# Patient Record
Sex: FEMALE | Race: WHITE | NOT HISPANIC OR LATINO | Employment: OTHER | URBAN - METROPOLITAN AREA
[De-identification: names, ages, dates, MRNs, and addresses within clinical notes are randomized per-mention and may not be internally consistent; named-entity substitution may affect disease eponyms.]

---

## 2017-03-13 ENCOUNTER — ALLSCRIPTS OFFICE VISIT (OUTPATIENT)
Dept: OTHER | Facility: OTHER | Age: 72
End: 2017-03-13

## 2017-03-29 ENCOUNTER — HOSPITAL ENCOUNTER (OUTPATIENT)
Dept: MAMMOGRAPHY | Facility: MEDICAL CENTER | Age: 72
Discharge: HOME | End: 2017-03-29
Payer: MEDICARE

## 2017-03-29 DIAGNOSIS — Z12.31 ENCOUNTER FOR SCREENING MAMMOGRAM FOR MALIGNANT NEOPLASM OF BREAST: ICD-10-CM

## 2017-03-29 PROCEDURE — G0202 SCR MAMMO BI INCL CAD: HCPCS

## 2017-03-29 PROCEDURE — 77063 BREAST TOMOSYNTHESIS BI: CPT

## 2017-04-05 ENCOUNTER — APPOINTMENT (OUTPATIENT)
Dept: LAB | Facility: MEDICAL CENTER | Age: 72
End: 2017-04-05
Payer: MEDICARE

## 2017-04-05 ENCOUNTER — TRANSCRIBE ORDERS (OUTPATIENT)
Dept: ADMINISTRATIVE | Facility: HOSPITAL | Age: 72
End: 2017-04-05

## 2017-04-05 ENCOUNTER — ALLSCRIPTS OFFICE VISIT (OUTPATIENT)
Dept: OTHER | Facility: OTHER | Age: 72
End: 2017-04-05

## 2017-04-05 DIAGNOSIS — E03.9 HYPOTHYROIDISM: ICD-10-CM

## 2017-04-05 DIAGNOSIS — E78.5 HYPERLIPIDEMIA: ICD-10-CM

## 2017-04-05 DIAGNOSIS — R93.5 ABNORMAL FINDINGS ON DIAGNOSTIC IMAGING OF OTHER ABDOMINAL REGIONS, INCLUDING RETROPERITONEUM: ICD-10-CM

## 2017-04-05 DIAGNOSIS — K21.9 GASTRO-ESOPHAGEAL REFLUX DISEASE WITHOUT ESOPHAGITIS: ICD-10-CM

## 2017-04-05 DIAGNOSIS — R73.01 IMPAIRED FASTING GLUCOSE: ICD-10-CM

## 2017-04-05 DIAGNOSIS — I10 ESSENTIAL (PRIMARY) HYPERTENSION: ICD-10-CM

## 2017-04-05 LAB
ALBUMIN SERPL BCP-MCNC: 3.4 G/DL (ref 3.5–5)
ALP SERPL-CCNC: 86 U/L (ref 46–116)
ALT SERPL W P-5'-P-CCNC: 49 U/L (ref 12–78)
ANION GAP SERPL CALCULATED.3IONS-SCNC: 9 MMOL/L (ref 4–13)
AST SERPL W P-5'-P-CCNC: 42 U/L (ref 5–45)
BASOPHILS # BLD AUTO: 0.04 THOUSANDS/ΜL (ref 0–0.1)
BASOPHILS NFR BLD AUTO: 1 % (ref 0–1)
BILIRUB SERPL-MCNC: 0.99 MG/DL (ref 0.2–1)
BUN SERPL-MCNC: 10 MG/DL (ref 5–25)
CALCIUM SERPL-MCNC: 9.3 MG/DL (ref 8.3–10.1)
CHLORIDE SERPL-SCNC: 94 MMOL/L (ref 100–108)
CHOLEST SERPL-MCNC: 278 MG/DL (ref 50–200)
CO2 SERPL-SCNC: 28 MMOL/L (ref 21–32)
CREAT SERPL-MCNC: 0.83 MG/DL (ref 0.6–1.3)
CREAT UR-MCNC: 123 MG/DL
EOSINOPHIL # BLD AUTO: 0.08 THOUSAND/ΜL (ref 0–0.61)
EOSINOPHIL NFR BLD AUTO: 2 % (ref 0–6)
ERYTHROCYTE [DISTWIDTH] IN BLOOD BY AUTOMATED COUNT: 12 % (ref 11.6–15.1)
EST. AVERAGE GLUCOSE BLD GHB EST-MCNC: 137 MG/DL
GFR SERPL CREATININE-BSD FRML MDRD: >60 ML/MIN/1.73SQ M
GLUCOSE P FAST SERPL-MCNC: 159 MG/DL (ref 65–99)
HBA1C MFR BLD: 6.4 % (ref 4.2–6.3)
HCT VFR BLD AUTO: 42.8 % (ref 34.8–46.1)
HDLC SERPL-MCNC: 67 MG/DL (ref 40–60)
HGB BLD-MCNC: 15.1 G/DL (ref 11.5–15.4)
LDLC SERPL CALC-MCNC: 175 MG/DL (ref 0–100)
LYMPHOCYTES # BLD AUTO: 1.64 THOUSANDS/ΜL (ref 0.6–4.47)
LYMPHOCYTES NFR BLD AUTO: 33 % (ref 14–44)
MCH RBC QN AUTO: 34.6 PG (ref 26.8–34.3)
MCHC RBC AUTO-ENTMCNC: 35.3 G/DL (ref 31.4–37.4)
MCV RBC AUTO: 98 FL (ref 82–98)
MICROALBUMIN UR-MCNC: 7 MG/L (ref 0–20)
MICROALBUMIN/CREAT 24H UR: 6 MG/G CREATININE (ref 0–30)
MONOCYTES # BLD AUTO: 0.62 THOUSAND/ΜL (ref 0.17–1.22)
MONOCYTES NFR BLD AUTO: 12 % (ref 4–12)
NEUTROPHILS # BLD AUTO: 2.66 THOUSANDS/ΜL (ref 1.85–7.62)
NEUTS SEG NFR BLD AUTO: 52 % (ref 43–75)
NRBC BLD AUTO-RTO: 0 /100 WBCS
PLATELET # BLD AUTO: 193 THOUSANDS/UL (ref 149–390)
PMV BLD AUTO: 9.9 FL (ref 8.9–12.7)
POTASSIUM SERPL-SCNC: 3.3 MMOL/L (ref 3.5–5.3)
PROT SERPL-MCNC: 6.6 G/DL (ref 6.4–8.2)
RBC # BLD AUTO: 4.37 MILLION/UL (ref 3.81–5.12)
SODIUM SERPL-SCNC: 131 MMOL/L (ref 136–145)
T4 FREE SERPL-MCNC: 1.31 NG/DL (ref 0.76–1.46)
TRIGL SERPL-MCNC: 180 MG/DL
TSH SERPL DL<=0.05 MIU/L-ACNC: 4.34 UIU/ML (ref 0.36–3.74)
WBC # BLD AUTO: 5.05 THOUSAND/UL (ref 4.31–10.16)

## 2017-04-05 PROCEDURE — 80061 LIPID PANEL: CPT

## 2017-04-05 PROCEDURE — 82570 ASSAY OF URINE CREATININE: CPT

## 2017-04-05 PROCEDURE — 83036 HEMOGLOBIN GLYCOSYLATED A1C: CPT

## 2017-04-05 PROCEDURE — 82043 UR ALBUMIN QUANTITATIVE: CPT

## 2017-04-05 PROCEDURE — 36415 COLL VENOUS BLD VENIPUNCTURE: CPT

## 2017-04-05 PROCEDURE — 84439 ASSAY OF FREE THYROXINE: CPT

## 2017-04-05 PROCEDURE — 84443 ASSAY THYROID STIM HORMONE: CPT

## 2017-04-05 PROCEDURE — 80053 COMPREHEN METABOLIC PANEL: CPT

## 2017-04-05 PROCEDURE — 85025 COMPLETE CBC W/AUTO DIFF WBC: CPT

## 2017-04-12 ENCOUNTER — ALLSCRIPTS OFFICE VISIT (OUTPATIENT)
Dept: OTHER | Facility: OTHER | Age: 72
End: 2017-04-12

## 2017-04-12 DIAGNOSIS — R91.8 OTHER NONSPECIFIC ABNORMAL FINDING OF LUNG FIELD: ICD-10-CM

## 2017-04-12 DIAGNOSIS — R93.5 ABNORMAL FINDINGS ON DIAGNOSTIC IMAGING OF OTHER ABDOMINAL REGIONS, INCLUDING RETROPERITONEUM: ICD-10-CM

## 2017-04-12 DIAGNOSIS — E03.9 HYPOTHYROIDISM: ICD-10-CM

## 2017-04-12 DIAGNOSIS — E55.9 VITAMIN D DEFICIENCY: ICD-10-CM

## 2017-04-12 DIAGNOSIS — C50.911 MALIGNANT NEOPLASM OF RIGHT FEMALE BREAST (HCC): ICD-10-CM

## 2017-04-12 DIAGNOSIS — I71.2 THORACIC AORTIC ANEURYSM WITHOUT RUPTURE (HCC): ICD-10-CM

## 2017-04-18 ENCOUNTER — HOSPITAL ENCOUNTER (OUTPATIENT)
Dept: CT IMAGING | Facility: HOSPITAL | Age: 72
Discharge: HOME | End: 2017-04-18
Payer: MEDICARE

## 2017-04-18 DIAGNOSIS — R91.8 OTHER NONSPECIFIC ABNORMAL FINDING OF LUNG FIELD: ICD-10-CM

## 2017-04-18 DIAGNOSIS — R93.5 ABNORMAL FINDINGS ON DIAGNOSTIC IMAGING OF OTHER ABDOMINAL REGIONS, INCLUDING RETROPERITONEUM: ICD-10-CM

## 2017-04-18 PROCEDURE — 71260 CT THORAX DX C+: CPT

## 2017-04-18 RX ADMIN — IOHEXOL 85 ML: 350 INJECTION, SOLUTION INTRAVENOUS at 18:11

## 2017-05-09 ENCOUNTER — APPOINTMENT (EMERGENCY)
Dept: CT IMAGING | Facility: HOSPITAL | Age: 72
DRG: 394 | End: 2017-05-09
Payer: MEDICARE

## 2017-05-09 ENCOUNTER — HOSPITAL ENCOUNTER (EMERGENCY)
Facility: HOSPITAL | Age: 72
Discharge: HOME | DRG: 394 | End: 2017-05-09
Attending: EMERGENCY MEDICINE | Admitting: EMERGENCY MEDICINE
Payer: MEDICARE

## 2017-05-09 VITALS
BODY MASS INDEX: 33.66 KG/M2 | TEMPERATURE: 98 F | RESPIRATION RATE: 18 BRPM | DIASTOLIC BLOOD PRESSURE: 68 MMHG | WEIGHT: 190 LBS | SYSTOLIC BLOOD PRESSURE: 145 MMHG | OXYGEN SATURATION: 98 % | HEART RATE: 90 BPM

## 2017-05-09 DIAGNOSIS — R10.9 ACUTE FLANK PAIN: ICD-10-CM

## 2017-05-09 DIAGNOSIS — E86.0 MODERATE DEHYDRATION: ICD-10-CM

## 2017-05-09 DIAGNOSIS — R10.9 ACUTE ABDOMINAL PAIN: Primary | ICD-10-CM

## 2017-05-09 LAB
ALBUMIN SERPL BCP-MCNC: 3.8 G/DL (ref 3.5–5)
ALP SERPL-CCNC: 110 U/L (ref 46–116)
ALT SERPL W P-5'-P-CCNC: 49 U/L (ref 12–78)
ANION GAP BLD CALC-SCNC: 18 MMOL/L (ref 4–13)
ANION GAP SERPL CALCULATED.3IONS-SCNC: 9 MMOL/L (ref 4–13)
AST SERPL W P-5'-P-CCNC: 58 U/L (ref 5–45)
BASOPHILS # BLD AUTO: 0.02 THOUSANDS/ΜL (ref 0–0.1)
BASOPHILS NFR BLD AUTO: 0 % (ref 0–1)
BILIRUB SERPL-MCNC: 0.87 MG/DL (ref 0.2–1)
BILIRUB UR QL STRIP: NEGATIVE
BUN BLD-MCNC: 12 MG/DL (ref 5–25)
BUN SERPL-MCNC: 12 MG/DL (ref 5–25)
CA-I BLD-SCNC: 1 MMOL/L (ref 1.12–1.32)
CALCIUM SERPL-MCNC: 9.7 MG/DL (ref 8.3–10.1)
CHLORIDE BLD-SCNC: 90 MMOL/L (ref 100–108)
CHLORIDE SERPL-SCNC: 90 MMOL/L (ref 100–108)
CLARITY UR: CLEAR
CLARITY, POC: CLEAR
CO2 SERPL-SCNC: 30 MMOL/L (ref 21–32)
COLOR UR: YELLOW
COLOR, POC: YELLOW
CREAT BLD-MCNC: 0.8 MG/DL (ref 0.6–1.3)
CREAT SERPL-MCNC: 1.04 MG/DL (ref 0.6–1.3)
EOSINOPHIL # BLD AUTO: 0.02 THOUSAND/ΜL (ref 0–0.61)
EOSINOPHIL NFR BLD AUTO: 0 % (ref 0–6)
ERYTHROCYTE [DISTWIDTH] IN BLOOD BY AUTOMATED COUNT: 11.8 % (ref 11.6–15.1)
GFR SERPL CREATININE-BSD FRML MDRD: 52.5 ML/MIN/1.73SQ M
GFR SERPL CREATININE-BSD FRML MDRD: >60 ML/MIN/1.73SQ M
GLUCOSE SERPL-MCNC: 114 MG/DL (ref 65–140)
GLUCOSE SERPL-MCNC: 149 MG/DL (ref 65–140)
GLUCOSE UR STRIP-MCNC: NEGATIVE MG/DL
HCT VFR BLD AUTO: 44.6 % (ref 34.8–46.1)
HCT VFR BLD CALC: 40 % (ref 34.8–46.1)
HGB BLD-MCNC: 16.3 G/DL (ref 11.5–15.4)
HGB BLDA-MCNC: 13.6 G/DL (ref 11.5–15.4)
HGB UR QL STRIP.AUTO: NEGATIVE
KETONES UR STRIP-MCNC: NEGATIVE MG/DL
LACTATE SERPL-SCNC: 1.5 MMOL/L (ref 0.5–2)
LACTATE SERPL-SCNC: 3 MMOL/L (ref 0.5–2)
LEUKOCYTE ESTERASE UR QL STRIP: NEGATIVE
LIPASE SERPL-CCNC: 93 U/L (ref 73–393)
LYMPHOCYTES # BLD AUTO: 1.73 THOUSANDS/ΜL (ref 0.6–4.47)
LYMPHOCYTES NFR BLD AUTO: 19 % (ref 14–44)
MCH RBC QN AUTO: 34.7 PG (ref 26.8–34.3)
MCHC RBC AUTO-ENTMCNC: 36.5 G/DL (ref 31.4–37.4)
MCV RBC AUTO: 95 FL (ref 82–98)
MONOCYTES # BLD AUTO: 0.8 THOUSAND/ΜL (ref 0.17–1.22)
MONOCYTES NFR BLD AUTO: 9 % (ref 4–12)
NEUTROPHILS # BLD AUTO: 6.34 THOUSANDS/ΜL (ref 1.85–7.62)
NEUTS SEG NFR BLD AUTO: 72 % (ref 43–75)
NITRITE UR QL STRIP: NEGATIVE
PCO2 BLD: 26 MMOL/L (ref 21–32)
PH UR STRIP.AUTO: 7.5 [PH] (ref 4.5–8)
PLATELET # BLD AUTO: 221 THOUSANDS/UL (ref 149–390)
PMV BLD AUTO: 9.4 FL (ref 8.9–12.7)
POTASSIUM BLD-SCNC: 3.5 MMOL/L (ref 3.5–5.3)
POTASSIUM SERPL-SCNC: 3.6 MMOL/L (ref 3.5–5.3)
PROT SERPL-MCNC: 7.6 G/DL (ref 6.4–8.2)
PROT UR STRIP-MCNC: NEGATIVE MG/DL
RBC # BLD AUTO: 4.7 MILLION/UL (ref 3.81–5.12)
SODIUM BLD-SCNC: 130 MMOL/L (ref 136–145)
SODIUM SERPL-SCNC: 129 MMOL/L (ref 136–145)
SP GR UR STRIP.AUTO: 1.01 (ref 1–1.03)
SPECIMEN SOURCE: ABNORMAL
UROBILINOGEN UR QL STRIP.AUTO: 0.2 E.U./DL
WBC # BLD AUTO: 8.91 THOUSAND/UL (ref 4.31–10.16)

## 2017-05-09 PROCEDURE — 96374 THER/PROPH/DIAG INJ IV PUSH: CPT

## 2017-05-09 PROCEDURE — 96361 HYDRATE IV INFUSION ADD-ON: CPT

## 2017-05-09 PROCEDURE — 81002 URINALYSIS NONAUTO W/O SCOPE: CPT | Performed by: EMERGENCY MEDICINE

## 2017-05-09 PROCEDURE — 81003 URINALYSIS AUTO W/O SCOPE: CPT

## 2017-05-09 PROCEDURE — 83605 ASSAY OF LACTIC ACID: CPT | Performed by: EMERGENCY MEDICINE

## 2017-05-09 PROCEDURE — 80047 BASIC METABLC PNL IONIZED CA: CPT

## 2017-05-09 PROCEDURE — 85025 COMPLETE CBC W/AUTO DIFF WBC: CPT | Performed by: EMERGENCY MEDICINE

## 2017-05-09 PROCEDURE — 96375 TX/PRO/DX INJ NEW DRUG ADDON: CPT

## 2017-05-09 PROCEDURE — 80053 COMPREHEN METABOLIC PANEL: CPT | Performed by: EMERGENCY MEDICINE

## 2017-05-09 PROCEDURE — 83690 ASSAY OF LIPASE: CPT | Performed by: EMERGENCY MEDICINE

## 2017-05-09 PROCEDURE — 74177 CT ABD & PELVIS W/CONTRAST: CPT

## 2017-05-09 PROCEDURE — 99284 EMERGENCY DEPT VISIT MOD MDM: CPT

## 2017-05-09 PROCEDURE — 36415 COLL VENOUS BLD VENIPUNCTURE: CPT | Performed by: EMERGENCY MEDICINE

## 2017-05-09 PROCEDURE — 85014 HEMATOCRIT: CPT

## 2017-05-09 RX ORDER — DICYCLOMINE HCL 20 MG
20 TABLET ORAL 2 TIMES DAILY
Qty: 20 TABLET | Refills: 0 | Status: SHIPPED | OUTPATIENT
Start: 2017-05-09 | End: 2017-05-13 | Stop reason: HOSPADM

## 2017-05-09 RX ORDER — ONDANSETRON 2 MG/ML
4 INJECTION INTRAMUSCULAR; INTRAVENOUS ONCE
Status: COMPLETED | OUTPATIENT
Start: 2017-05-09 | End: 2017-05-09

## 2017-05-09 RX ORDER — ANASTROZOLE 1 MG/1
TABLET ORAL
COMMUNITY
Start: 2016-05-13 | End: 2018-01-30

## 2017-05-09 RX ORDER — KETOROLAC TROMETHAMINE 30 MG/ML
15 INJECTION, SOLUTION INTRAMUSCULAR; INTRAVENOUS ONCE
Status: COMPLETED | OUTPATIENT
Start: 2017-05-09 | End: 2017-05-09

## 2017-05-09 RX ADMIN — ONDANSETRON 4 MG: 2 INJECTION INTRAMUSCULAR; INTRAVENOUS at 13:14

## 2017-05-09 RX ADMIN — SODIUM CHLORIDE 500 ML: 0.9 INJECTION, SOLUTION INTRAVENOUS at 13:13

## 2017-05-09 RX ADMIN — SODIUM CHLORIDE 500 ML: 0.9 INJECTION, SOLUTION INTRAVENOUS at 14:29

## 2017-05-09 RX ADMIN — IOHEXOL 100 ML: 350 INJECTION, SOLUTION INTRAVENOUS at 14:01

## 2017-05-09 RX ADMIN — KETOROLAC TROMETHAMINE 15 MG: 30 INJECTION, SOLUTION INTRAMUSCULAR at 13:15

## 2017-05-10 ENCOUNTER — HOSPITAL ENCOUNTER (INPATIENT)
Facility: HOSPITAL | Age: 72
LOS: 2 days | Discharge: HOME | DRG: 394 | End: 2017-05-13
Attending: EMERGENCY MEDICINE | Admitting: INTERNAL MEDICINE
Payer: MEDICARE

## 2017-05-10 DIAGNOSIS — K92.2 ACUTE LOWER GI BLEEDING: Primary | ICD-10-CM

## 2017-05-10 LAB
ALBUMIN SERPL BCP-MCNC: 3.3 G/DL (ref 3.5–5)
ALP SERPL-CCNC: 109 U/L (ref 46–116)
ALT SERPL W P-5'-P-CCNC: 38 U/L (ref 12–78)
ANION GAP SERPL CALCULATED.3IONS-SCNC: 8 MMOL/L (ref 4–13)
APTT PPP: 31 SECONDS (ref 23–35)
AST SERPL W P-5'-P-CCNC: 47 U/L (ref 5–45)
BASOPHILS # BLD AUTO: 0.05 THOUSANDS/ΜL (ref 0–0.1)
BASOPHILS NFR BLD AUTO: 1 % (ref 0–1)
BILIRUB SERPL-MCNC: 1.24 MG/DL (ref 0.2–1)
BUN SERPL-MCNC: 17 MG/DL (ref 5–25)
CALCIUM SERPL-MCNC: 9.6 MG/DL (ref 8.3–10.1)
CHLORIDE SERPL-SCNC: 94 MMOL/L (ref 100–108)
CO2 SERPL-SCNC: 30 MMOL/L (ref 21–32)
CREAT SERPL-MCNC: 1.2 MG/DL (ref 0.6–1.3)
EOSINOPHIL # BLD AUTO: 0.04 THOUSAND/ΜL (ref 0–0.61)
EOSINOPHIL NFR BLD AUTO: 1 % (ref 0–6)
ERYTHROCYTE [DISTWIDTH] IN BLOOD BY AUTOMATED COUNT: 12.2 % (ref 11.6–15.1)
GFR SERPL CREATININE-BSD FRML MDRD: 44.5 ML/MIN/1.73SQ M
GLUCOSE SERPL-MCNC: 142 MG/DL (ref 65–140)
HCT VFR BLD AUTO: 41.3 % (ref 34.8–46.1)
HGB BLD-MCNC: 14.6 G/DL (ref 11.5–15.4)
INR PPP: 0.99 (ref 0.86–1.16)
LIPASE SERPL-CCNC: 155 U/L (ref 73–393)
LYMPHOCYTES # BLD AUTO: 1.18 THOUSANDS/ΜL (ref 0.6–4.47)
LYMPHOCYTES NFR BLD AUTO: 17 % (ref 14–44)
MCH RBC QN AUTO: 34.8 PG (ref 26.8–34.3)
MCHC RBC AUTO-ENTMCNC: 35.4 G/DL (ref 31.4–37.4)
MCV RBC AUTO: 99 FL (ref 82–98)
MONOCYTES # BLD AUTO: 0.64 THOUSAND/ΜL (ref 0.17–1.22)
MONOCYTES NFR BLD AUTO: 9 % (ref 4–12)
NEUTROPHILS # BLD AUTO: 4.9 THOUSANDS/ΜL (ref 1.85–7.62)
NEUTS SEG NFR BLD AUTO: 72 % (ref 43–75)
NRBC BLD AUTO-RTO: 0 /100 WBCS
PLATELET # BLD AUTO: 194 THOUSANDS/UL (ref 149–390)
PMV BLD AUTO: 10.2 FL (ref 8.9–12.7)
POTASSIUM SERPL-SCNC: 3.2 MMOL/L (ref 3.5–5.3)
PROT SERPL-MCNC: 6.5 G/DL (ref 6.4–8.2)
PROTHROMBIN TIME: 13.1 SECONDS (ref 12.1–14.4)
RBC # BLD AUTO: 4.19 MILLION/UL (ref 3.81–5.12)
SODIUM SERPL-SCNC: 132 MMOL/L (ref 136–145)
WBC # BLD AUTO: 6.81 THOUSAND/UL (ref 4.31–10.16)

## 2017-05-10 PROCEDURE — 86900 BLOOD TYPING SEROLOGIC ABO: CPT | Performed by: EMERGENCY MEDICINE

## 2017-05-10 PROCEDURE — 36415 COLL VENOUS BLD VENIPUNCTURE: CPT

## 2017-05-10 PROCEDURE — 80053 COMPREHEN METABOLIC PANEL: CPT

## 2017-05-10 PROCEDURE — 85730 THROMBOPLASTIN TIME PARTIAL: CPT | Performed by: EMERGENCY MEDICINE

## 2017-05-10 PROCEDURE — 86901 BLOOD TYPING SEROLOGIC RH(D): CPT | Performed by: EMERGENCY MEDICINE

## 2017-05-10 PROCEDURE — 85025 COMPLETE CBC W/AUTO DIFF WBC: CPT

## 2017-05-10 PROCEDURE — 85610 PROTHROMBIN TIME: CPT | Performed by: EMERGENCY MEDICINE

## 2017-05-10 PROCEDURE — 83690 ASSAY OF LIPASE: CPT

## 2017-05-10 PROCEDURE — 86850 RBC ANTIBODY SCREEN: CPT | Performed by: EMERGENCY MEDICINE

## 2017-05-10 RX ORDER — SODIUM CHLORIDE 9 MG/ML
250 INJECTION, SOLUTION INTRAVENOUS CONTINUOUS
Status: DISCONTINUED | OUTPATIENT
Start: 2017-05-10 | End: 2017-05-11

## 2017-05-10 RX ORDER — SODIUM CHLORIDE 9 MG/ML
125 INJECTION, SOLUTION INTRAVENOUS CONTINUOUS
Status: DISCONTINUED | OUTPATIENT
Start: 2017-05-10 | End: 2017-05-12

## 2017-05-10 RX ADMIN — SODIUM CHLORIDE 125 ML/HR: 0.9 INJECTION, SOLUTION INTRAVENOUS at 23:26

## 2017-05-10 RX ADMIN — SODIUM CHLORIDE 250 ML/HR: 0.9 INJECTION, SOLUTION INTRAVENOUS at 21:29

## 2017-05-11 PROBLEM — K92.2 GASTROINTESTINAL BLEEDING: Status: ACTIVE | Noted: 2017-05-11

## 2017-05-11 LAB
ABO GROUP BLD: NORMAL
ANION GAP SERPL CALCULATED.3IONS-SCNC: 10 MMOL/L (ref 4–13)
BLD GP AB SCN SERPL QL: NEGATIVE
BUN SERPL-MCNC: 15 MG/DL (ref 5–25)
CALCIUM SERPL-MCNC: 9 MG/DL (ref 8.3–10.1)
CHLORIDE SERPL-SCNC: 100 MMOL/L (ref 100–108)
CO2 SERPL-SCNC: 25 MMOL/L (ref 21–32)
CREAT SERPL-MCNC: 0.84 MG/DL (ref 0.6–1.3)
ERYTHROCYTE [DISTWIDTH] IN BLOOD BY AUTOMATED COUNT: 12.3 % (ref 11.6–15.1)
GFR SERPL CREATININE-BSD FRML MDRD: >60 ML/MIN/1.73SQ M
GLUCOSE SERPL-MCNC: 127 MG/DL (ref 65–140)
HCT VFR BLD AUTO: 38.1 % (ref 34.8–46.1)
HEMOCCULT STL QL: POSITIVE
HGB BLD-MCNC: 13.1 G/DL (ref 11.5–15.4)
MCH RBC QN AUTO: 34.5 PG (ref 26.8–34.3)
MCHC RBC AUTO-ENTMCNC: 34.4 G/DL (ref 31.4–37.4)
MCV RBC AUTO: 100 FL (ref 82–98)
PLATELET # BLD AUTO: 138 THOUSANDS/UL (ref 149–390)
PMV BLD AUTO: 10.1 FL (ref 8.9–12.7)
POTASSIUM SERPL-SCNC: 3.9 MMOL/L (ref 3.5–5.3)
RBC # BLD AUTO: 3.8 MILLION/UL (ref 3.81–5.12)
RH BLD: POSITIVE
SODIUM SERPL-SCNC: 135 MMOL/L (ref 136–145)
SPECIMEN EXPIRATION DATE: NORMAL
TSH SERPL DL<=0.05 MIU/L-ACNC: 2.35 UIU/ML (ref 0.36–3.74)
WBC # BLD AUTO: 8.42 THOUSAND/UL (ref 4.31–10.16)

## 2017-05-11 PROCEDURE — 85027 COMPLETE CBC AUTOMATED: CPT | Performed by: NURSE PRACTITIONER

## 2017-05-11 PROCEDURE — 84443 ASSAY THYROID STIM HORMONE: CPT | Performed by: NURSE PRACTITIONER

## 2017-05-11 PROCEDURE — 82272 OCCULT BLD FECES 1-3 TESTS: CPT

## 2017-05-11 PROCEDURE — 80048 BASIC METABOLIC PNL TOTAL CA: CPT | Performed by: NURSE PRACTITIONER

## 2017-05-11 PROCEDURE — 99285 EMERGENCY DEPT VISIT HI MDM: CPT

## 2017-05-11 PROCEDURE — 82272 OCCULT BLD FECES 1-3 TESTS: CPT | Performed by: NURSE PRACTITIONER

## 2017-05-11 RX ORDER — POTASSIUM CHLORIDE 14.9 MG/ML
20 INJECTION INTRAVENOUS ONCE
Status: COMPLETED | OUTPATIENT
Start: 2017-05-11 | End: 2017-05-11

## 2017-05-11 RX ORDER — AMLODIPINE BESYLATE 5 MG/1
5 TABLET ORAL DAILY
Status: DISCONTINUED | OUTPATIENT
Start: 2017-05-11 | End: 2017-05-13 | Stop reason: HOSPADM

## 2017-05-11 RX ORDER — LEVOTHYROXINE SODIUM 0.07 MG/1
75 TABLET ORAL
Status: DISCONTINUED | OUTPATIENT
Start: 2017-05-11 | End: 2017-05-13 | Stop reason: HOSPADM

## 2017-05-11 RX ORDER — ONDANSETRON 2 MG/ML
4 INJECTION INTRAMUSCULAR; INTRAVENOUS EVERY 6 HOURS PRN
Status: DISCONTINUED | OUTPATIENT
Start: 2017-05-11 | End: 2017-05-13 | Stop reason: HOSPADM

## 2017-05-11 RX ORDER — ANASTROZOLE 1 MG/1
1 TABLET ORAL DAILY
Status: DISCONTINUED | OUTPATIENT
Start: 2017-05-11 | End: 2017-05-13 | Stop reason: HOSPADM

## 2017-05-11 RX ORDER — METOPROLOL SUCCINATE 50 MG/1
200 TABLET, EXTENDED RELEASE ORAL DAILY
Status: DISCONTINUED | OUTPATIENT
Start: 2017-05-11 | End: 2017-05-13 | Stop reason: HOSPADM

## 2017-05-11 RX ORDER — PANTOPRAZOLE SODIUM 40 MG/1
40 TABLET, DELAYED RELEASE ORAL
Status: DISCONTINUED | OUTPATIENT
Start: 2017-05-11 | End: 2017-05-13 | Stop reason: HOSPADM

## 2017-05-11 RX ADMIN — SODIUM CHLORIDE 125 ML/HR: 0.9 INJECTION, SOLUTION INTRAVENOUS at 03:30

## 2017-05-11 RX ADMIN — LEVOTHYROXINE SODIUM 75 MCG: 75 TABLET ORAL at 06:47

## 2017-05-11 RX ADMIN — PANTOPRAZOLE SODIUM 40 MG: 40 TABLET, DELAYED RELEASE ORAL at 06:47

## 2017-05-11 RX ADMIN — ANASTROZOLE 1 MG: 1 TABLET, COATED ORAL at 10:47

## 2017-05-11 RX ADMIN — SODIUM CHLORIDE 125 ML/HR: 0.9 INJECTION, SOLUTION INTRAVENOUS at 13:10

## 2017-05-11 RX ADMIN — HYDROCHLOROTHIAZIDE: 25 TABLET ORAL at 10:46

## 2017-05-11 RX ADMIN — POLYETHYLENE GLYCOL 3350, SODIUM SULFATE ANHYDROUS, SODIUM BICARBONATE, SODIUM CHLORIDE, POTASSIUM CHLORIDE 4000 ML: 236; 22.74; 6.74; 5.86; 2.97 POWDER, FOR SOLUTION ORAL at 22:38

## 2017-05-11 RX ADMIN — METOPROLOL SUCCINATE 200 MG: 50 TABLET, EXTENDED RELEASE ORAL at 10:46

## 2017-05-11 RX ADMIN — POTASSIUM CHLORIDE 20 MEQ: 200 INJECTION, SOLUTION INTRAVENOUS at 01:14

## 2017-05-11 RX ADMIN — AMLODIPINE BESYLATE 5 MG: 5 TABLET ORAL at 10:47

## 2017-05-12 ENCOUNTER — ANESTHESIA EVENT (INPATIENT)
Dept: GASTROENTEROLOGY | Facility: HOSPITAL | Age: 72
DRG: 394 | End: 2017-05-12
Payer: MEDICARE

## 2017-05-12 ENCOUNTER — GENERIC CONVERSION - ENCOUNTER (OUTPATIENT)
Dept: OTHER | Facility: OTHER | Age: 72
End: 2017-05-12

## 2017-05-12 ENCOUNTER — ANESTHESIA (INPATIENT)
Dept: GASTROENTEROLOGY | Facility: HOSPITAL | Age: 72
DRG: 394 | End: 2017-05-12
Payer: MEDICARE

## 2017-05-12 PROBLEM — E87.6 HYPOKALEMIA: Status: ACTIVE | Noted: 2017-05-12

## 2017-05-12 LAB
ANION GAP SERPL CALCULATED.3IONS-SCNC: 10 MMOL/L (ref 4–13)
BUN SERPL-MCNC: 7 MG/DL (ref 5–25)
CALCIUM SERPL-MCNC: 8.3 MG/DL (ref 8.3–10.1)
CHLORIDE SERPL-SCNC: 101 MMOL/L (ref 100–108)
CO2 SERPL-SCNC: 26 MMOL/L (ref 21–32)
CREAT SERPL-MCNC: 0.72 MG/DL (ref 0.6–1.3)
ERYTHROCYTE [DISTWIDTH] IN BLOOD BY AUTOMATED COUNT: 11.8 % (ref 11.6–15.1)
GFR SERPL CREATININE-BSD FRML MDRD: >60 ML/MIN/1.73SQ M
GLUCOSE SERPL-MCNC: 120 MG/DL (ref 65–140)
HCT VFR BLD AUTO: 36.9 % (ref 34.8–46.1)
HGB BLD-MCNC: 12.7 G/DL (ref 11.5–15.4)
MCH RBC QN AUTO: 34.1 PG (ref 26.8–34.3)
MCHC RBC AUTO-ENTMCNC: 34.4 G/DL (ref 31.4–37.4)
MCV RBC AUTO: 99 FL (ref 82–98)
PLATELET # BLD AUTO: 162 THOUSANDS/UL (ref 149–390)
PMV BLD AUTO: 9.8 FL (ref 8.9–12.7)
POTASSIUM SERPL-SCNC: 3 MMOL/L (ref 3.5–5.3)
RBC # BLD AUTO: 3.72 MILLION/UL (ref 3.81–5.12)
SODIUM SERPL-SCNC: 137 MMOL/L (ref 136–145)
WBC # BLD AUTO: 9.35 THOUSAND/UL (ref 4.31–10.16)

## 2017-05-12 PROCEDURE — 0DBL8ZX EXCISION OF TRANSVERSE COLON, VIA NATURAL OR ARTIFICIAL OPENING ENDOSCOPIC, DIAGNOSTIC: ICD-10-PCS | Performed by: INTERNAL MEDICINE

## 2017-05-12 PROCEDURE — 88305 TISSUE EXAM BY PATHOLOGIST: CPT | Performed by: NURSE PRACTITIONER

## 2017-05-12 PROCEDURE — 80048 BASIC METABOLIC PNL TOTAL CA: CPT | Performed by: INTERNAL MEDICINE

## 2017-05-12 PROCEDURE — 85027 COMPLETE CBC AUTOMATED: CPT | Performed by: INTERNAL MEDICINE

## 2017-05-12 RX ORDER — POTASSIUM CHLORIDE 20 MEQ/1
40 TABLET, EXTENDED RELEASE ORAL ONCE
Status: COMPLETED | OUTPATIENT
Start: 2017-05-12 | End: 2017-05-12

## 2017-05-12 RX ORDER — PROPOFOL 10 MG/ML
INJECTION, EMULSION INTRAVENOUS AS NEEDED
Status: DISCONTINUED | OUTPATIENT
Start: 2017-05-12 | End: 2017-05-12 | Stop reason: SURG

## 2017-05-12 RX ADMIN — PROPOFOL 120 MG: 10 INJECTION, EMULSION INTRAVENOUS at 11:18

## 2017-05-12 RX ADMIN — PROPOFOL 30 MG: 10 INJECTION, EMULSION INTRAVENOUS at 11:28

## 2017-05-12 RX ADMIN — POTASSIUM CHLORIDE 40 MEQ: 1500 TABLET, EXTENDED RELEASE ORAL at 18:18

## 2017-05-12 RX ADMIN — SODIUM CHLORIDE 125 ML/HR: 0.9 INJECTION, SOLUTION INTRAVENOUS at 05:05

## 2017-05-12 RX ADMIN — PROPOFOL 30 MG: 10 INJECTION, EMULSION INTRAVENOUS at 11:24

## 2017-05-12 RX ADMIN — ANASTROZOLE 1 MG: 1 TABLET, COATED ORAL at 16:42

## 2017-05-12 RX ADMIN — PANTOPRAZOLE SODIUM 40 MG: 40 TABLET, DELAYED RELEASE ORAL at 04:02

## 2017-05-12 RX ADMIN — METOPROLOL SUCCINATE 200 MG: 50 TABLET, EXTENDED RELEASE ORAL at 08:54

## 2017-05-12 RX ADMIN — LEVOTHYROXINE SODIUM 75 MCG: 75 TABLET ORAL at 04:02

## 2017-05-12 RX ADMIN — HYDROCHLOROTHIAZIDE: 25 TABLET ORAL at 16:42

## 2017-05-12 RX ADMIN — SODIUM CHLORIDE: 0.9 INJECTION, SOLUTION INTRAVENOUS at 11:30

## 2017-05-12 RX ADMIN — SODIUM CHLORIDE: 0.9 INJECTION, SOLUTION INTRAVENOUS at 11:14

## 2017-05-12 RX ADMIN — AMLODIPINE BESYLATE 5 MG: 5 TABLET ORAL at 16:41

## 2017-05-12 RX ADMIN — PROPOFOL 20 MG: 10 INJECTION, EMULSION INTRAVENOUS at 11:20

## 2017-05-12 RX ADMIN — PROPOFOL 20 MG: 10 INJECTION, EMULSION INTRAVENOUS at 11:22

## 2017-05-12 RX ADMIN — LIDOCAINE HYDROCHLORIDE 100 MG: 20 INJECTION, SOLUTION INTRAVENOUS at 11:18

## 2017-05-13 VITALS
TEMPERATURE: 97.2 F | RESPIRATION RATE: 18 BRPM | DIASTOLIC BLOOD PRESSURE: 78 MMHG | SYSTOLIC BLOOD PRESSURE: 122 MMHG | BODY MASS INDEX: 32.89 KG/M2 | OXYGEN SATURATION: 94 % | HEART RATE: 81 BPM | WEIGHT: 185.63 LBS | HEIGHT: 63 IN

## 2017-05-13 PROBLEM — I71.20 THORACIC AORTIC ANEURYSM: Status: ACTIVE | Noted: 2017-05-13

## 2017-05-13 PROBLEM — K55.9 ISCHEMIC COLITIS (HCC): Status: ACTIVE | Noted: 2017-05-11

## 2017-05-13 PROBLEM — I71.2 THORACIC AORTIC ANEURYSM: Status: ACTIVE | Noted: 2017-05-13

## 2017-05-13 RX ADMIN — METOPROLOL SUCCINATE 200 MG: 50 TABLET, EXTENDED RELEASE ORAL at 10:00

## 2017-05-13 RX ADMIN — PANTOPRAZOLE SODIUM 40 MG: 40 TABLET, DELAYED RELEASE ORAL at 05:52

## 2017-05-13 RX ADMIN — AMLODIPINE BESYLATE 5 MG: 5 TABLET ORAL at 10:00

## 2017-05-13 RX ADMIN — LEVOTHYROXINE SODIUM 75 MCG: 75 TABLET ORAL at 05:52

## 2017-05-13 RX ADMIN — HYDROCHLOROTHIAZIDE: 25 TABLET ORAL at 10:00

## 2017-05-13 RX ADMIN — ANASTROZOLE 1 MG: 1 TABLET, COATED ORAL at 10:00

## 2017-05-30 ENCOUNTER — ALLSCRIPTS OFFICE VISIT (OUTPATIENT)
Dept: OTHER | Facility: OTHER | Age: 72
End: 2017-05-30

## 2017-06-02 ENCOUNTER — GENERIC CONVERSION - ENCOUNTER (OUTPATIENT)
Dept: OTHER | Facility: OTHER | Age: 72
End: 2017-06-02

## 2017-06-30 ENCOUNTER — ALLSCRIPTS OFFICE VISIT (OUTPATIENT)
Dept: OTHER | Facility: OTHER | Age: 72
End: 2017-06-30

## 2017-10-03 ENCOUNTER — TRANSCRIBE ORDERS (OUTPATIENT)
Dept: ADMINISTRATIVE | Facility: HOSPITAL | Age: 72
End: 2017-10-03

## 2017-10-03 ENCOUNTER — ALLSCRIPTS OFFICE VISIT (OUTPATIENT)
Dept: OTHER | Facility: OTHER | Age: 72
End: 2017-10-03

## 2017-10-03 DIAGNOSIS — Z12.31 ENCOUNTER FOR SCREENING MAMMOGRAM FOR MALIGNANT NEOPLASM OF BREAST: Primary | ICD-10-CM

## 2017-10-04 NOTE — PROGRESS NOTES
Assessment  1  Encounter for screening mammogram for high-risk patient (V76 11) (Z12 31)   2  Invasive ductal carcinoma of breast, right (174 9) (C50 911)   3  Use of anastrozole (Arimidex) (V07 52) (Z79 811)    Plan  Encounter for screening mammogram for high-risk patient    · MAMMO SCREENING LEFT W 3D & CAD; Status:Active; Requested for:2018  12:20PM;    Perform:[facility]; Order Comments:[facility] [place] [place]; Due:2019; Last Updated Jonathan Watson; 10/3/2017 2:44:25 PM;Ordered;For:Encounter for screening mammogram for high-risk patient; Ordered By:Alhaji Chu; Invasive ductal carcinoma of breast, right    · Follow-up visit in 6 months Evaluation and Treatment  Follow-up  Status: Complete   Done: 32VKR0817   Ordered; For: Invasive ductal carcinoma of breast, right; Ordered By: Richard Corral Performed:  Due: 28UTC5346; Last Updated By: Krystal Hernandez; 10/3/2017 2:46:07 PM    Discussion/Summary  74 yo female s/p completion mastectomy of the right breast  She continues on anastrazole  KHANG based on exam today  I will make arrangements for her next left mammogram due the end of March  I will see her again in six months or sooner should the need arise  Chief Complaint  Chief Complaint Free Text Note Form: Pt is here for a 6 month breast checkhas no new concerns at this timeimagin2017 mammo scrn L 3D (Douglas [place], Dr Cesar Cerda and Dr Roblero Clock      History of Present Illness  Diagnosis and Staging: IDC right breast and pathologic Stage 1 T1N0, grade 2100%, CA 95%, HER2 1+   Treatment History: h/o DCIS 2004 lumpectomy, WBRT, henry x 5 yearsright mastectomy, SLNB  Current Therapy: anastrazole   Disease Status: khang   Interval History: none      Review of Systems  Complete Female ROS SurgOnc:   Constitutional: The patient denies new or recent history of general fatigue, no recent weight loss, no change in appetite     Eyes: No complaints of visual problems, no scleral icterus  ENT: no complaints of ear pain, no hoarseness, no difficulty swallowing,-no tinnitus-and-no new masses in head, oral cavity, or neck  Cardiovascular: No complaints of chest pain, no palpitations, no ankle edema  Respiratory: No complaints of shortness of breath, no cough  Gastrointestinal: bloody stools-and-reports being hospitalized for ischemic colitis  Genitourinary: No complaints of dysuria, no hematuria, no nocturia, no frequent urination, no urethral discharge  Musculoskeletal: No complaints of weakness, paralysis, joint stiffness or arthralgias,  Integumentary: No complaints of rash, no new lesions  Neurological: No complaints of convulsions, no seizures, no dizziness  Hematologic/Lymphatic: No complaints of easy bruising  Active Problems  1  Abnormal CT of the abdomen (793 6) (R93 5)   2  Anxiety (300 00) (F41 9)   3  Appetite loss (783 0) (R63 0)   4  Arthritis of knee, left (716 96) (M17 12)   5  Ascending aortic aneurysm (441 2) (I71 2)   6  Colon cancer screening (V76 51) (Z12 11)   7  Cyst of ovary (620 2) (N83 209)   8  Dysphagia (787 20) (R13 10)   9  Encounter for routine gynecological examination (V72 31) (Z01 419)   10  Encounter for screening mammogram for high-risk patient (V76 11) (Z12 31)   11  Esophageal reflux disease (530 81) (K21 9)   12  Herpes zoster (053 9) (B02 9)   13  Hyperlipidemia (272 4) (E78 5)   14  Hypertension (401 9) (I10)   15  Hypokalemia (276 8) (E87 6)   16  Hypomagnesemia (275 2) (E83 42)   17  Hypotension (458 9) (I95 9)   18  Hypothyroidism (244 9) (E03 9)   19  Impaired fasting glucose (790 21) (R73 01)   20  Invasive ductal carcinoma of breast, right (174 9) (C50 911)   21  Ischemic colitis (557 9) (K55 9)   22  Knee pain (719 46) (M25 569)   23  Primary localized osteoarthritis of left knee (715 16) (M17 12)   24  Pulmonary nodules (793 19) (R91 8)   25  Use of anastrozole (Arimidex) (V07 52) (Z79 811)   26   Vasovagal syncope (780 2) (R55)   27  Vitamin D deficiency (268 9) (E55 9)    Past Medical History  1  History of Abnormal finding on mammography (793 80) (R92 8)   2  History of Birth control (V25 9) (Z30 9)   3  History of DCIS (ductal carcinoma in situ) of breast (233 0) (D05 10)   4  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)   5  History of acute bronchitis (V12 69) (Z87 09)   6  History of hypokalemia (V12 29) (Z86 39)   7  History of pneumonia (V12 61) (Z87 01)   8  History of pregnancy (V13 29)   9  History of tamoxifen therapy (V67 51) (Z92 21)   10  History of Menarche (V21 8)   11  History of Radiation Therapy (V15 3)   12  History of Routine Gynecological Exam With Cervical Pap Smear (V72 31)   13  History of Weight loss (783 21) (R63 4)    Surgical History  1  History of Biopsy Breast Percutaneous Needle Core   · 03/02/16   2  History of Breast Surgery Excision Of Breast Single Lesion   · 1990 right excisional breast biopsy hyperplasia; 1st biopsy at 23years old right      excisional biopsy benign   3  History of Breast Surgery Lumpectomy   · 2004 right lumpectomy   4  History of Breast Surgery Mastectomy   · 04/12/16   5  History of Cholecystectomy   6  History of Hysterectomy   7  History of [place] Lymph Node Biopsy   · 04/12/16  Surgical History Reviewed: The surgical history was reviewed and updated today  Family History  Mother    1  Family history of Anemia (V18 2)   2  Family history of Disorders Of Blood And Blood-forming Organs (V18 3)   3  Denied: Family history of malignant neoplasm   4  Family history of Hypertension (V17 49)  Father    5  Family history of cerebral infarction (V17 1) (Z82 3)   6  Denied: Family history of malignant neoplasm  Brother    7  Family history of alcoholism (V17 0) (Z81 1)  Family History Reviewed: The family history was reviewed and updated today         Social History   · Being A Social Drinker   · Denied: Drug use (305 90) (F19 90)   · Never A Smoker  Social History Reviewed: The social history was reviewed and updated today  The social history was reviewed and is unchanged  Current Meds   1  Anastrozole 1 MG Oral Tablet; TAKE 1 TABLET DAILY; Therapy: 78WWG2276 to (Evaluate:36Wmk4317)  Requested for: 44EIC3489; Last   Rx:38Dsy8733 Ordered   2  Levothyroxine Sodium 75 MCG Oral Tablet; take 1 tablet every day; Therapy: 25BZU7155 to ([facility])  Requested for: 94Fgz6229; Last   Rx:26Aso2078 Ordered   3  Losartan Potassium-HCTZ 100-25 MG Oral Tablet; take 1 tablet every day; Therapy: 25TYU3971 to ([facility])  Requested for: 84Llr4776; Last   Rx:10Bfk7708 Ordered   4  Metoprolol Succinate  MG Oral Tablet Extended Release 24 Hour; take 1 tablet   every day; Therapy: 46UNB4836 to ([facility])  Requested for: 11Amd0261; Last   Rx:42Xxa5189 Ordered   5  Pantoprazole Sodium 40 MG Oral Tablet Delayed Release; Take 1 tablet daily; Therapy: 29XVY8782 to ([phone] 26)  Requested for: 65Imq7171 Recorded  Medication List Reviewed: The medication list was reviewed and updated today  Allergies  1  Lipitor TABS   2  Zocor TABS   3  Demerol SOLN   4  Nitroglycerin SUBL    Vitals  Vital Signs    Recorded: 38BNR1249 01:45PM   Temperature 98 5 F   Heart Rate 73   Respiration 15   Systolic 026   Diastolic 80   Height 5 ft 2 in   Weight 181 lb    BMI Calculated 33 11   BSA Calculated 1 83   O2 Saturation 96     Physical Exam    Constitutional: General appearance: The Patient is well-developed, well-nourished female who appears her stated age in no acute distress  She is pleasant and talkative  Chest: The lungs are clear to auscultation  Cardiac: Heart is regular rate  Abdomen: There is no evidence of hepatosplenomegaly  Neuro: Grossly nonfocal  -Orientation to person, place and time: Normal  -Mood and affect: Normal     Lymphatic: no evidence of cervical adenopathy bilaterally  -no evidence of axillary adenopathy bilaterally  Skin: Examination of Breast: Abnormal   Breast: Examination of the right breast revealed a total mastectomy-and-a mastectomy scar, but-no breast reconstruction  Examination of the left breast revealed no erythema,-no swelling-and-no tenderness  The left nipple was not inverted  No discharge was noted from the left nipple  No mass was palpable in the left breast  No chest wall masses or skin nodules right chest wall  Axillary nodes: no enlarged nodes  Future Appointments    Date/Time Provider Specialty Site   04/09/2018 01:45 PM MEJIA Lopez  Surgical Oncology [facility]   11/03/2017 02:00 PM MEJIA Jarrell  Hematology Oncology [facility]   10/30/2017 01:00 PM Debra Gross, Dong Brigette Irwin     End of Encounter Meds  1  Pantoprazole Sodium 40 MG Oral Tablet Delayed Release; Take 1 tablet daily; Therapy: 42YTL0555 to ([phone])  Requested for: 10Dly3240 Recorded  2  Losartan Potassium-HCTZ 100-25 MG Oral Tablet; take 1 tablet every day; Therapy: 78EZP5137 to ([facility])  Requested for: 61Wyc9839; Last   Rx:89Kbf0704 Ordered   3  Metoprolol Succinate  MG Oral Tablet Extended Release 24 Hour; take 1 tablet   every day; Therapy: 22AYM1769 to ([facility])  Requested for: 15Lkl9209; Last   Rx:42Aaa3212 Ordered  4  Levothyroxine Sodium 75 MCG Oral Tablet; take 1 tablet every day; Therapy: 66NZO6722 to ([facility])  Requested for: 12Iky6551; Last   Rx:97Dsg4904 Ordered  5  Anastrozole 1 MG Oral Tablet; TAKE 1 TABLET DAILY;    Therapy: 49EIS3870 to (Evaluate:69Bwc9482)  Requested for: 16BKZ2991; Last   Rx:16Zej9651 Ordered    Signatures   Electronically signed by : MEJIA Mir ; Oct  3 2017  3:06PM EST                       (Author)

## 2017-10-30 ENCOUNTER — ALLSCRIPTS OFFICE VISIT (OUTPATIENT)
Dept: OTHER | Facility: OTHER | Age: 72
End: 2017-10-30

## 2017-10-30 DIAGNOSIS — I10 ESSENTIAL (PRIMARY) HYPERTENSION: ICD-10-CM

## 2017-10-30 DIAGNOSIS — R73.01 IMPAIRED FASTING GLUCOSE: ICD-10-CM

## 2017-10-30 DIAGNOSIS — E78.5 HYPERLIPIDEMIA: ICD-10-CM

## 2017-10-30 DIAGNOSIS — E03.9 HYPOTHYROIDISM: ICD-10-CM

## 2017-10-31 NOTE — PROGRESS NOTES
Assessment  1  Hypertension (401 9) (I10)   2  Hyperlipidemia (272 4) (E78 5)   3  Hypothyroidism (244 9) (E03 9)   4  Impaired fasting glucose (790 21) (R73 01)    Plan  Hyperlipidemia, Hypertension, Hypothyroidism, Impaired fasting glucose    · Follow-up visit in 3 months Evaluation and Treatment  Follow-up  Status: Hold For -  Scheduling  Requested for: 30Oct2017   · (1) CBC/PLT/DIFF; Status:Active; Requested for:30Oct2017;    · (1) COMPREHENSIVE METABOLIC PANEL; Status:Active; Requested for:30Oct2017;    · (1) HEMOGLOBIN A1C; Status:Active; Requested for:30Oct2017;    · (1) LIPID PANEL FASTING W DIRECT LDL REFLEX; Status:Active; Requested  for:30Oct2017;    · (1) MICROALBUMIN CREATININE RATIO, RANDOM URINE; Status:Active; Requested  for:30Oct2017;    · (1) T4, FREE; Status:Active; Requested for:30Oct2017;    · (1) TSH; Status:Active; Requested for:30Oct2017;    · Fluzone High-Dose 0 5 ML Intramuscular Suspension Prefilled Syringe;  INJECT 0 5  ML Intramuscular; To Be Done: 75AEE8140    Discussion/Summary    Patient will continue with current regimen  Refills already given  Patient flu shot at this time  Patient will have laboratory studies done  Chief Complaint  PT reports feeling well today  No concerns  would like flu shot today  reports no falls injuries since last visit  History of Present Illness  Patient follow-up on hypertension hyperlipidemia hypothyroidism impaired fasting glucose  Patient feeling well overall  Patient is losing weight  No diarrhea or abdominal pain or vomiting  No chest pain or shortness of breath  No headache or blurred vision  Patient with decreased appetite  Review of Systems    Constitutional: No fever, no chills, feels well, no tiredness, no recent weight gain or weight loss  Eyes: No complaints of eye pain, no red eyes, no eyesight problems, no discharge, no dry eyes, no itching of eyes     ENT: no complaints of earache, no loss of hearing, no nose bleeds, no nasal discharge, no sore throat, no hoarseness  Cardiovascular: No complaints of slow heart rate, no fast heart rate, no chest pain, no palpitations, no leg claudication, no lower extremity edema  Respiratory: No complaints of shortness of breath, no wheezing, no cough, no SOB on exertion, no orthopnea, no PND  Gastrointestinal: No complaints of abdominal pain, no constipation, no nausea or vomiting, no diarrhea, no bloody stools  Genitourinary: No complaints of dysuria, no incontinence, no pelvic pain, no dysmenorrhea, no vaginal discharge or bleeding  Musculoskeletal: No complaints of arthralgias, no myalgias, no joint swelling or stiffness, no limb pain or swelling  Integumentary: No complaints of skin rash or lesions, no itching, no skin wounds, no breast pain or lump  Neurological: No complaints of headache, no confusion, no convulsions, no numbness, no dizziness or fainting, no tingling, no limb weakness, no difficulty walking  Psychiatric: Not suicidal, no sleep disturbance, no anxiety or depression, no change in personality, no emotional problems  Endocrine: No complaints of proptosis, no hot flashes, no muscle weakness, no deepening of the voice, no feelings of weakness  Hematologic/Lymphatic: No complaints of swollen glands, no swollen glands in the neck, does not bleed easily, does not bruise easily  Active Problems  1  Abnormal CT of the abdomen (793 6) (R93 5)   2  Anxiety (300 00) (F41 9)   3  Appetite loss (783 0) (R63 0)   4  Arthritis of knee, left (716 96) (M17 12)   5  Ascending aortic aneurysm (441 2) (I71 2)   6  Colon cancer screening (V76 51) (Z12 11)   7  Cyst of ovary (620 2) (N83 209)   8  Dysphagia (787 20) (R13 10)   9  Encounter for routine gynecological examination (V72 31) (Z01 419)   10  Encounter for screening mammogram for high-risk patient (V76 11) (Z12 31)   11  Esophageal reflux disease (530 81) (K21 9)   12  Herpes zoster (053 9) (B02 9)   13   Hyperlipidemia (272  4) (E78 5)   14  Hypertension (401 9) (I10)   15  Hypokalemia (276 8) (E87 6)   16  Hypomagnesemia (275 2) (E83 42)   17  Hypotension (458 9) (I95 9)   18  Hypothyroidism (244 9) (E03 9)   19  Impaired fasting glucose (790 21) (R73 01)   20  Invasive ductal carcinoma of breast, right (174 9) (C50 911)   21  Ischemic colitis (557 9) (K55 9)   22  Knee pain (719 46) (M25 569)   23  Primary localized osteoarthritis of left knee (715 16) (M17 12)   24  Pulmonary nodules (793 19) (R91 8)   25  Use of anastrozole (Arimidex) (V07 52) (Z79 811)   26  Vasovagal syncope (780 2) (R55)   27  Vitamin D deficiency (268 9) (E55 9)    Past Medical History  1  History of Abnormal finding on mammography (793 80) (R92 8)   2  History of Birth control (V25 9) (Z30 9)   3  History of DCIS (ductal carcinoma in situ) of breast (233 0) (D05 10)   4  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)   5  History of acute bronchitis (V12 69) (Z87 09)   6  History of hypokalemia (V12 29) (Z86 39)   7  History of pneumonia (V12 61) (Z87 01)   8  History of pregnancy (V13 29)   9  History of tamoxifen therapy (V67 51) (Z92 21)   10  History of Menarche (V21 8)   11  History of Radiation Therapy (V15 3)   12  History of Routine Gynecological Exam With Cervical Pap Smear (V72 31)   13  History of Weight loss (783 21) (R63 4)    The active problems and past medical history were reviewed and updated today  Surgical History  1  History of Biopsy Breast Percutaneous Needle Core   2  History of Breast Surgery Excision Of Breast Single Lesion   3  History of Breast Surgery Lumpectomy   4  History of Breast Surgery Mastectomy   5  History of Cholecystectomy   6  History of Hysterectomy   7  History of [place] Lymph Node Biopsy    Family History  Mother    1  Family history of Anemia (V18 2)   2  Family history of Disorders Of Blood And Blood-forming Organs (V18 3)   3  Denied: Family history of malignant neoplasm   4   Family history of Hypertension (V17 49)  Father    5  Family history of cerebral infarction (V17 1) (Z82 3)   6  Denied: Family history of malignant neoplasm  Brother    7  Family history of alcoholism (V17 0) (Z81 1)    Social History   · Being A Social Drinker   · Denied: Drug use (305 90) (F19 90)   · Never A Smoker    Current Meds   1  Anastrozole 1 MG Oral Tablet; TAKE 1 TABLET DAILY; Therapy: 96SCD1939 to (Evaluate:91Dxz1098)  Requested for: 98MNF3526; Last   Rx:66Crz1570 Ordered   2  Levothyroxine Sodium 75 MCG Oral Tablet; take 1 tablet every day; Therapy: 01DUW9257 to ([facility])  Requested for: 04Mdd2491; Last   Rx:62Cgb0250 Ordered   3  Losartan Potassium-HCTZ 100-25 MG Oral Tablet; take 1 tablet every day; Therapy: 93DRF2074 to ([facility])  Requested for: 21Uqe3979; Last   Rx:40Flq3850 Ordered   4  Metoprolol Succinate  MG Oral Tablet Extended Release 24 Hour; take 1 tablet   every day; Therapy: 57QRO9389 to ([facility])  Requested for: 69Gkr8178; Last   Rx:14Tys9957 Ordered   5  Pantoprazole Sodium 40 MG Oral Tablet Delayed Release; Take 1 tablet daily; Therapy: 46YRD7875 to (Scooter Clark)  Requested for: 11Sde7626 Recorded    The medication list was reviewed and updated today  Allergies  1  Lipitor TABS   2  Zocor TABS   3  Demerol SOLN   4  Nitroglycerin SUBL    Vitals  Vital Signs    Recorded: 66YDG7155 01:16PM   Heart Rate 92, L Radial   Respiration 16   Systolic 923, RUE, Sitting   Diastolic 80, RUE, Sitting   Height 5 ft 2 in   Weight 177 lb    BMI Calculated 32 37   BSA Calculated 1 81     Physical Exam    Constitutional   General appearance: No acute distress, well appearing and well nourished  Eyes   Conjunctiva and lids: No swelling, erythema or discharge  Pupils and irises: Equal, round and reactive to light      Ears, Nose, Mouth, and Throat   External inspection of ears and nose: Normal     Otoscopic examination: Tympanic membranes translucent with normal light reflex  Canals patent without erythema  Nasal mucosa, septum, and turbinates: Normal without edema or erythema  Oropharynx: Normal with no erythema, edema, exudate or lesions  Pulmonary   Respiratory effort: No increased work of breathing or signs of respiratory distress  Auscultation of lungs: Clear to auscultation  Cardiovascular   Palpation of heart: Normal PMI, no thrills  Auscultation of heart: Normal rate and rhythm, normal S1 and S2, without murmurs  Examination of extremities for edema and/or varicosities: Normal     Carotid pulses: Normal     Abdomen   Abdomen: Non-tender, no masses  Liver and spleen: No hepatomegaly or splenomegaly  Lymphatic   Palpation of lymph nodes in neck: No lymphadenopathy  Musculoskeletal   Gait and station: Normal     Digits and nails: Normal without clubbing or cyanosis  Inspection/palpation of joints, bones, and muscles: Abnormal  -- Status post right mastectomy  Skin   Skin and subcutaneous tissue: Abnormal  -- Status post right mastectomy  Neurologic   Cranial nerves: Cranial nerves 2-12 intact  Reflexes: 2+ and symmetric  Sensation: No sensory loss  Psychiatric   Orientation to person, place, and time: Normal     Mood and affect: Normal          Results/Data  PHQ-2 Adult Depression Screening 30Oct2017 01:14PM User, s     Test Name Result Flag Reference   PHQ-2 Adult Depression Score 0     Over the last two weeks, how often have you been bothered by any of the following problems? Little interest or pleasure in doing things: Not at all - 0  Feeling down, depressed, or hopeless: Not at all - 0   PHQ-2 Adult Depression Screening Negative         Future Appointments    Date/Time Provider Specialty Site   04/09/2018 01:45 PM NILESH [place]  [facility] [place] [address]   11/03/2017 02:00 PM NILESH Medel   Hematology Oncology [facility]     Signatures   Electronically signed by : Loretta Burgos DO; Oct 30 2017  1:44PM EST                       (Author)

## 2017-11-03 ENCOUNTER — ALLSCRIPTS OFFICE VISIT (OUTPATIENT)
Dept: OTHER | Facility: OTHER | Age: 72
End: 2017-11-03

## 2017-11-04 NOTE — PROGRESS NOTES
Assessment  1  Invasive ductal carcinoma of breast, right (174 9) (Y73 [id number])    Plan  Invasive ductal carcinoma of breast, right    · Anastrozole 1 MG Oral Tablet; TAKE 1 TABLET DAILY   Rx By: David Mckinney; Dispense: 90 Days ; #:90 Tablet; Refill: 3;For: Invasive ductal carcinoma of breast, right; HECTOR = N; Verified Transmission to [facility]; Last Updated By: System, [employer]; 11/3/2017 2:43:49 PM   · Follow-up visit in 1 year Evaluation and Treatment  Follow-up  Status: Complete  Done:  [id number] 02:00PM   Ordered; For: Invasive ductal carcinoma of breast, right; Ordered By: David Mckinney Performed:  Due: [id number]; Last Updated By: Indigo Echavarria; 11/3/2017 2:46:02 PM    Discussion/Summary  Discussion Summary:   A 81year old postmenopausal woman who has history of early stage right breast cancer, ER positive disease in 2002  She underwent lumpectomy followed by radiation, as well as 5 years of adjuvant hormonal therapy with tamoxifen, completed in 2007  She has stage IA recurrent right breast cancer, grade 1, ER/SD strongly positive, HER-2 negative disease  She underwent mastectomy resulting in MARIAELENA  She is currently on adjuvant hormonal therapy with anastrozole with no significant side effects  She has no evidence of recurrent disease, based on her symptomatology and physical examinations  I recommended her to continue with anastrozole 1 mg once a day  She is in agreement with my recommendation  I will see her again in a year for routine follow-up  Chief Complaint  Chief Complaint: Chief Complaint:   The patient presents to the office today with 1  Locally recurrent right breast cancer, stage IA (pT1c, pN0,M0) grade 1, ER/SD strongly positive, HER-2 negative disease  Diagnosed in April 2016  Early stage right breast cancer, ER positive disease  Diagnosed in 2002  Chief Complaint Free Text Note Form: 1   Locally recurrent right breast cancer, stage IA (pT1c, pN0,M0) grade 1, ER/RI strongly positive, HER-2 negative disease  Diagnosed in April 2016  Early stage right breast cancer, ER positive disease  Diagnosed in 2002  History of Present Illness  HPI: A 78year old postmenopausal woman who has history of atypical ductal hyperplasia in 1990  She had excisional biopsy  She was not eligible for chemoprevention trial  Therefore, she was observed  In 2002, she was diagnosed with early stage right breast cancer, ER positive disease  She underwent lumpectomy followed by radiation therapy  She took adjuvant tamoxifen for 5 years until 2007  She was recently found to have abnormality in the right breast  Biopsy showed invasive ductal carcinoma, grade 2  She was seen by Dr Aidan Robertson, who did mastectomy with sentinel lymph node biopsy  She had 1 3 cm of invasive ductal carcinoma, grade 1  2 sentinel lymph node and 3 non-sentinel lymph node were all negative for metastatic disease  This was % positive, RI 95% positive, HER-2 negative disease  She did not have reconstruction  She presented today to discuss adjuvant treatment options  After the 5 years of tamoxifen, she did not take additional hormonal therapy  She feels well  However, she is somewhat anxious  She has no complaint of pain  She denied respiratory symptoms  Her performance status is normal  She has no family history of breast or ovarian cancer  Current Therapy: Adjuvant hormonal therapy with anastrozole since May 2016  Disease Status: MARIAELENA status post mastectomy  Interval History: A 81year old postmenopausal woman with history of early stage right breast cancer, ER positive disease, diagnosed in 2002  She underwent lumpectomy followed by radiation as well as tamoxifen as adjuvant hormonal therapy until 2007  She was diagnosed with recurrent stage IA right breast cancer, grade 1, ER/RI positive HER-2 negative disease  She underwent mastectomy resulting in MARIAELENA   Since May 2016, she has been on adjuvant hormonal therapy with anastrozole  Since 2 years ago, she has gradual weight loss  This is not unintentional weight loss  CT scan of chest, abdomen pelvis in May 2017 showed no evidence of metastatic disease  She had an episode of GI bleeding in May 2017 at which time she underwent colonoscopy  Biopsy from colon was negative for malignancy  Currently, she feels well  She has some musculoskeletal symptoms  She denied hot flashes  She has no respiratory symptom  Her performance status is normal       Review of Systems  Complete-Female:   Constitutional: No fever, no chills, feels well, no tiredness, no recent weight gain or weight loss  Eyes: No complaints of eye pain, no red eyes, no eyesight problems, no discharge, no dry eyes, no itching of eyes  ENT: no complaints of earache, no loss of hearing, no nose bleeds, no nasal discharge, no sore throat, no hoarseness  Cardiovascular: No complaints of slow heart rate, no fast heart rate, no chest pain, no palpitations, no leg claudication, no lower extremity edema  Respiratory: No complaints of shortness of breath, no wheezing, no cough, no SOB on exertion, no orthopnea, no PND  Gastrointestinal: No complaints of abdominal pain, no constipation, no nausea or vomiting, no diarrhea, no bloody stools  Genitourinary: No complaints of dysuria, no incontinence, no pelvic pain, no dysmenorrhea, no vaginal discharge or bleeding  Musculoskeletal: No complaints of arthralgias, no myalgias, no joint swelling or stiffness, no limb pain or swelling  Integumentary: No complaints of skin rash or lesions, no itching, no skin wounds, no breast pain or lump  Neurological: No complaints of headache, no confusion, no convulsions, no numbness, no dizziness or fainting, no tingling, no limb weakness, no difficulty walking  Psychiatric: Not suicidal, no sleep disturbance, no anxiety or depression, no change in personality, no emotional problems     Endocrine: No complaints of proptosis, no hot flashes, no muscle weakness, no deepening of the voice, no feelings of weakness  Hematologic/Lymphatic: No complaints of swollen glands, no swollen glands in the neck, does not bleed easily, does not bruise easily  ROS Reviewed:   ROS reviewed  Active Problems  1  Abnormal CT of the abdomen (793 6) (R93 5)   2  Anxiety (300 00) (F41 9)   3  Appetite loss (783 0) (R63 0)   4  Arthritis of knee, left (716 96) (M17 12)   5  Ascending aortic aneurysm (441 2) (I71 2)   6  Colon cancer screening (V76 51) (Z12 11)   7  Cyst of ovary (620 2) (N83 209)   8  Dysphagia (787 20) (R13 10)   9  Encounter for routine gynecological examination (V72 31) (Z01 419)   10  Encounter for screening mammogram for high-risk patient (V76 11) (Z12 31)   11  Esophageal reflux disease (530 81) (K21 9)   12  Herpes zoster (053 9) (B02 9)   13  Hyperlipidemia (272 4) (E78 5)   14  Hypertension (401 9) (I10)   15  Hypokalemia (276 8) (E87 6)   16  Hypomagnesemia (275 2) (E83 42)   17  Hypotension (458 9) (I95 9)   18  Hypothyroidism (244 9) (E03 9)   19  Impaired fasting glucose (790 21) (R73 01)   20  Invasive ductal carcinoma of breast, right (174 9) (C50 911)   21  Ischemic colitis (557 9) (K55 9)   22  Knee pain (719 46) (M25 569)   23  Need for influenza vaccination (V04 81) (Z23)   24  Primary localized osteoarthritis of left knee (715 16) (M17 12)   25  Pulmonary nodules (793 19) (R91 8)   26  Use of anastrozole (Arimidex) (V07 52) (Z79 811)   27  Vasovagal syncope (780 2) (R55)   28  Vitamin D deficiency (268 9) (E55 9)    Past Medical History  1  History of Abnormal finding on mammography (793 80) (R92 8)   2  History of Birth control (V25 9) (Z30 9)   3  History of DCIS (ductal carcinoma in situ) of breast (233 0) (D05 10)   4  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)   5  History of acute bronchitis (V12 69) (Z87 09)   6  History of hypokalemia (V12 29) (Z86 39)   7  History of pneumonia (V12 61) (Z87 01)   8   History of pregnancy (V13 29)   9  History of tamoxifen therapy (V67 51) (Z92 21)   10  History of Menarche (V21 8)   11  History of Radiation Therapy (V15 3)   12  History of Routine Gynecological Exam With Cervical Pap Smear (V72 31)   13  History of Weight loss (783 21) (R63 4)  Active Problems And Past Medical History Reviewed: The active problems and past medical history were reviewed and updated today  Surgical History  1  History of Biopsy Breast Percutaneous Needle Core   2  History of Breast Surgery Excision Of Breast Single Lesion   3  History of Breast Surgery Lumpectomy   4  History of Breast Surgery Mastectomy   5  History of Cholecystectomy   6  History of Hysterectomy   7  History of [place] Lymph Node Biopsy  Surgical History Reviewed: The surgical history was reviewed and updated today  Family History  Mother    1  Family history of Anemia (V18 2)   2  Family history of Disorders Of Blood And Blood-forming Organs (V18 3)   3  Denied: Family history of malignant neoplasm   4  Family history of Hypertension (V17 49)  Father    5  Family history of cerebral infarction (V17 1) (Z82 3)   6  Denied: Family history of malignant neoplasm  Brother    7  Family history of alcoholism (V17 0) (Z81 1)  Family History Reviewed: The family history was reviewed and updated today  Social History   · Being A Social Drinker   · Denied: Drug use (305 90) (F19 90)   · Never A Smoker  Social History Reviewed: The social history was reviewed and updated today  The social history was reviewed and is unchanged  Current Meds   1  Anastrozole 1 MG Oral Tablet; TAKE 1 TABLET DAILY; Therapy: 38VZA9022 to (Evaluate:16Dmy4686)  Requested for: 11WYR6454; Last   Rx:78Bej3925 Ordered   2  Levothyroxine Sodium 75 MCG Oral Tablet; take 1 tablet every day; Therapy: 97ITW4103 to (Evaluate:30Jan2018)  Requested for: 43PGV7419; Last   Rx:01Nov2017 Ordered   3   Losartan Potassium-HCTZ 100-25 MG Oral Tablet; take 1 tablet every day; Therapy: 77YTA9522 to (Evaluate:29Jan2018)  Requested for: 31Oct2017; Last   Rx:31Oct2017 Ordered   4  Metoprolol Succinate  MG Oral Tablet Extended Release 24 Hour; take 1 tablet   every day; Therapy: 68MLI9712 to (Evaluate:29Jan2018)  Requested for: 31Oct2017; Last   Rx:31Oct2017 Ordered   5  Pantoprazole Sodium 40 MG Oral Tablet Delayed Release; Take 1 tablet daily; Therapy: 63IHW6652 to (Rosalin Kawasaki)  Requested for: 12Apr2017 Recorded  Medication List Reviewed: The medication list was reviewed and updated today  Allergies  1  Lipitor TABS   2  Zocor TABS   3  Demerol SOLN   4  Nitroglycerin SUBL    Vitals  Vital Signs    Recorded: 61VVD6238 02:15PM   Temperature 98 5 F   Heart Rate 94   Respiration 16   Systolic 796   Diastolic 76   Height 5 ft 2 in   Weight 176 lb    BMI Calculated 32 19   BSA Calculated 1 81   O2 Saturation 96     Physical Exam    Constitutional   General appearance: No acute distress, well appearing and well nourished  Eyes   Conjunctiva and lids: No swelling, erythema or discharge  Pupils and irises: Equal, round and reactive to light  Ears, Nose, Mouth, and Throat   External inspection of ears and nose: Normal     Otoscopic examination: Tympanic membranes translucent with normal light reflex  Canals patent without erythema  Nasal mucosa, septum, and turbinates: Normal without edema or erythema  Oropharynx: Normal with no erythema, edema, exudate or lesions  Pulmonary   Respiratory effort: No increased work of breathing or signs of respiratory distress  Auscultation of lungs: Clear to auscultation  Cardiovascular   Palpation of heart: Normal PMI, no thrills  Auscultation of heart: Normal rate and rhythm, normal S1 and S2, without murmurs  Examination of extremities for edema and/or varicosities: Normal     Carotid pulses: Normal     Abdomen   Abdomen: Non-tender, no masses      Liver and spleen: No hepatomegaly or splenomegaly  Lymphatic   Palpation of lymph nodes in neck: No lymphadenopathy  Musculoskeletal   Gait and station: Normal     Digits and nails: Normal without clubbing or cyanosis  Inspection/palpation of joints, bones, and muscles: Normal     Skin   Skin and subcutaneous tissue: Normal without rashes or lesions  Neurologic   Cranial nerves: Cranial nerves 2-12 intact  Reflexes: 2+ and symmetric  Sensation: No sensory loss  Psychiatric   Orientation to person, place, and time: Normal     Mood and affect: Normal     Additional Exam:  Breast exam; status post right mastectomy without reconstruction  No palpable abnormality in the right chest wall  Left breast exam is negative  ECOG 0       Results/Data  Results Form:   Results   Pathology Mastectomy specimen showed 1 3 cm of invasive ductal carcinoma, grade 1  5 lymph nodes are negative for metastatic disease  % positive, [place] 95% positive, HER-2 negative disease IA (pT1c, pN0,M0)  Radiology Chest x-ray was negative for pulmonary disease  scan in July 2016 showed normal bone density  in March 2017 was benign  BI-RADS 2 scan of chest, abdomen pelvis in May 2017 show no evidence of metastatic disease  Lab Results CBC and CMP in April 2017 were within normal limits  Future Appointments    Date/Time Provider Specialty Site   04/09/2018 01:45 PM NILESH Hawley   [facility]   01/30/2018 01:30 PM Edgardo Cunningham, [address] [place] [address]     Signatures   Electronically signed by : NILESH Fisher ; Nov  3 2017  2:47PM EST                       (Author)

## 2018-01-10 NOTE — MISCELLANEOUS
Message  Contacted pt for post operative follow up via phone  She shares she is doing well  She denies any pain  Incision lines are intact and dry  She has one ROBERT drain in place which is doing well  She has had the assitance of VNA  She denies any concerns and has follow up appt scheduled with Dr Marlo Self  Active Problems    1  Abnormal CT of the abdomen (793 6) (R93 5)   2  Abnormal finding on mammography (793 80) (R92 8)   3  Anxiety (300 00) (F41 9)   4  Appetite loss (783 0) (R63 0)   5  Ascending aortic aneurysm (441 2) (I71 2)   6  Cyst of ovary (620 2) (N83 20)   7  Dysphagia (787 20) (R13 10)   8  Encounter for routine gynecological examination (V72 31) (Z01 419)   9  Encounter for screening mammogram for malignant neoplasm of breast (V76 12)   (Z12 31)   10  Esophageal reflux disease (530 81) (K21 9)   11  Herpes zoster (053 9) (B02 9)   12  Hyperlipidemia (272 4) (E78 5)   13  Hypertension (401 9) (I10)   14  Hypokalemia (276 8) (E87 6)   15  Hypomagnesemia (275 2) (E83 42)   16  Hypothyroidism (244 9) (E03 9)   17  Impaired fasting glucose (790 21) (R73 01)   18  Invasive ductal carcinoma of breast, right (174 9) (C50 911)   19  Pulmonary nodules (793 19) (R91 8)   20  Vasovagal syncope (780 2) (R55)   21  Vitamin D deficiency (268 9) (E55 9)    Current Meds   1  AmLODIPine Besylate 5 MG Oral Tablet; TAKE 1 TABLET DAILY AS DIRECTED; Therapy: 36RNQ4009 to (Evaluate:08Jan2017)  Requested for: 04TOV4389; Last   Rx:14Jan2016 Ordered   2  Levothyroxine Sodium 50 MCG Oral Tablet; Take 1 tablet daily; Therapy: 46UDG5776 to (Evaluate:08Jan2017)  Requested for: 94QHP9886; Last   Rx:14Jan2016 Ordered   3  LORazepam 1 MG Oral Tablet; TAKE 1 TABLET TWICE DAILY; Therapy: 49AWM5387 to (Evaluate:02Apr2016); Last Rx:03Mar2016 Ordered   4  Losartan Potassium-HCTZ 100-25 MG Oral Tablet; TAKE 1 TABLET DAILY  Requested   for: 80BFD8829; Last Rx:14Jan2016 Ordered   5   Metoprolol Succinate  MG Oral Tablet Extended Release 24 Hour; take 1 tablet   by mouth every day; Therapy: 21YNG6380 to (Evaluate:48Dsv1919)  Requested for: 00TJZ1244; Last   Rx:14Jan2016 Ordered   6  Oxycodone-Acetaminophen 5-325 MG Oral Tablet; TAKE 1 TABLET every 4 hours as   needed for pain; Therapy: 05Apr2016 to (Last Rx:05Apr2016) Ordered   7  Pantoprazole Sodium 40 MG Oral Tablet Delayed Release; take 1 tablet twice a day; Therapy: 91TRS4490 to (Evaluate:08Jan2017)  Requested for: 44SQP4249; Last   Rx:14Jan2016 Ordered    Allergies    1  Lipitor TABS   2  Zocor TABS   3  Demerol SOLN   4  Nitroglycerin SUBL    Signatures   Electronically signed by :  Bayron Elmore, ; Apr 14 2016  4:33PM EST                       (Author)

## 2018-01-11 NOTE — PROGRESS NOTES
Discussion/Summary  Social Work-Discussion Summary [facility]: Patient is being seen for a distress screenning assessment  MSW met with Dr Chu's patient who self assessed high 9/10 on Distress Thermometer  Pt received news that she had cancer in her right breast  Pt was highly anxious  Pt admitted to being scared and worried about what is next  MSW provided emotional support and information on her role as a   MSW provided contact information  Pt to call as needed        Signatures   Electronically signed by : CAMMY Tim; Apr 7 2016  9:26AM EST                       (Author)

## 2018-01-12 VITALS
BODY MASS INDEX: 32.57 KG/M2 | HEIGHT: 62 IN | HEART RATE: 92 BPM | SYSTOLIC BLOOD PRESSURE: 122 MMHG | DIASTOLIC BLOOD PRESSURE: 80 MMHG | WEIGHT: 177 LBS | RESPIRATION RATE: 16 BRPM

## 2018-01-12 NOTE — MISCELLANEOUS
Assessment   1  Ischemic colitis (557 9) (K55 9)  2  Ascending aortic aneurysm (441 2) (I71 2)  3  Hypotension (458 9) (I95 9)  4  History of hypokalemia (V12 29) (Z86 39)  5  Hypokalemia (276 8) (E87 6)  6  Abnormal CT of the abdomen (793 6) (R93 5)  7  Hyperlipidemia (272 4) (E78 5)  8  Hypothyroidism (244 9) (E03 9)    Plan  Ischemic colitis    · Follow-up visit in 1 month Evaluation and Treatment  Follow-up  Status: Hold For -  Scheduling  Requested for: [id number]   Ordered; For: Ischemic colitis; Ordered By: Karey Myers  Performed:   Due: 38LIQ55100      1 Amended By: Karey Myers; May 30 2017 4:09 PM EST    Discussion/Summary  Discussion Summary:   Patient will follow with GI  Patient will increase fluids  Patient will hold amlodipine if not eating and blood pressure systolically less than 20 6        1 Amended By: Karey Myers; May 30 2017 4:09 PM EST    Chief Complaint  Chief Complaint Free Text Note Form: MAR: 5/10 5/13/17 @ [facility]  DX: ISCHEMIC COLITIS  PT NOTED BEING SEEN AT ER 5/9 BUT DC'D SAME DAY FOR SAME S/S  PT HAD CT ABD/PELVIS AS WELL AS COLONOSCOPY IN HOSPITAL  History of Present Illness  TCM Communication [facility]: The patient is being contacted for VISHNU WALLACE APPT ON 05/30/2017 AT 03:15 PM  Hospital course was discussed with the inpatient physician, records were reviewed and [address] [place] [address]  She was hospitalized at [facility]  The dates of hospitalization:, date of admission: 05/10/2017, date of discharge: 05/13/2017  Diagnosis: ADMITTING DIAGNOSIS: ABDOMINAL CRAMPING & BRIGHT RED BLOOD FROM THE RECTUM  PROCEDURES PERFORMED: COLONOSCOPY ON 05/12/2017- FINDINGS: ISCHEMIC COLITIS  CT ABDOMEN & PELVIS WITH CONTRAST ON 05/09/2017  RESULTS ON DISCHARGE DIAGNOSIS  REPEAT CT IN 3-6 MONTHS  She was discharged to home, DISCHARGE DIAGNOSIS: ISCHEMIC COLITIS   ACTIVE PROBLEMS: HX OF BREAST CA, GERD, HYPERTENSION, HYPERTHYROIDISM, HYPERLIPIDEMIA, BILATERAL AKIN CYSTS, HYPOKALEMIA, THORACIC AORTIC ANEURYSM  Medications reviewed and updated today  She scheduled a follow up appointment  Symptoms: headache, fatigue, back pain right side, lower abdominal pain, loose stools, swelling and swelling location FEET, but no fever, no weakness, no dizziness, no cough, no shortness of breath, no chest pain, no back pain on left side, no arm pain left side, no arm pain on right side, no leg pain on left side, no leg pain on right side, no upper abdominal pain, no middle abdominal pain, no rash:, no anorexia, no nausea, no vomiting, no constipation and no pain with urinating  SLIGHT HEADACHE IN AM, NOT SLEEPING WELL, SLIGHT LOOSE STOOLS FROM COLONOSCOPY, CRONIC EDEMA OF FEET  Counseling was provided to the patient and patient's family  Topics counseled included diagnostic results, instructions for management, risk factor reductions, patient and family education, activities of daily living and importance of compliance with treatment  Communication performed and completed by Tegan Hinton MA   HPI: Patient is here status post hospitalization from May 10 through May 13 for rectal bleeding, ischemic colitis  Patient had CAT scan of the abdomen and pelvis which showed no acute inflammatory process but mild hepatic steatosis and numerous renal cysts  Patient also had CAT scan of the chest which showed stable ascending thoracic aneurysm at 4 7 cm patient was placed on Zofran as well as Toradol and given IV fluids  Patient other diagnosis include GERD, hypothyroidism, bilateral renal cysts, hypokalemia, thoracic aortic aneurysm and hyperlipidemia  Labs reviewed aces potassium 3 0 lipase 155 hemoglobin A1c 6 4 troponin negative at 0 02  Patient had colonoscopy which showed ischemic colitis  Patient has follow-up with GI  Patient will need follow-up CAT scan for the renal cyst in 6 months  No rectal bleeding since hospitalization  No abdominal pain  No vomiting  Review of Systems  Complete-Female:   Constitutional: No fever, no chills, feels well, no tiredness, no recent weight gain or weight loss  Eyes: No complaints of eye pain, no red eyes, no eyesight problems, no discharge, no dry eyes, no itching of eyes  ENT: no complaints of earache, no loss of hearing, no nose bleeds, no nasal discharge, no sore throat, no hoarseness  Cardiovascular: No complaints of slow heart rate, no fast heart rate, no chest pain, no palpitations, no leg claudication, no lower extremity edema  Respiratory: No complaints of shortness of breath, no wheezing, no cough, no SOB on exertion, no orthopnea, no PND  Gastrointestinal: as noted in HPI  Genitourinary: No complaints of dysuria, no incontinence, no pelvic pain, no dysmenorrhea, no vaginal discharge or bleeding  Musculoskeletal: No complaints of arthralgias, no myalgias, no joint swelling or stiffness, no limb pain or swelling  Integumentary: No complaints of skin rash or lesions, no itching, no skin wounds, no breast pain or lump  Neurological: No complaints of headache, no confusion, no convulsions, no numbness, no dizziness or fainting, no tingling, no limb weakness, no difficulty walking  Psychiatric: Not suicidal, no sleep disturbance, no anxiety or depression, no change in personality, no emotional problems  Endocrine: No complaints of proptosis, no hot flashes, no muscle weakness, no deepening of the voice, no feelings of weakness  Hematologic/Lymphatic: No complaints of swollen glands, no swollen glands in the neck, does not bleed easily, does not bruise easily  Active Problems   1  Abnormal CT of the abdomen (793 6) (R93 5)  2  Anxiety (300 00) (F41 9)  3  Appetite loss (783 0) (R63 0)  4  Arthritis of knee, left (716 96) (M17 12)  5  Ascending aortic aneurysm (441 2) (I71 2)  6  Cyst of ovary (620 2) (N83 209)  7  Dysphagia (787 20) (R13 10)  8   Encounter for routine gynecological examination (V72 31) (Z01 419)  9  Encounter for screening mammogram for high-risk patient (V76 11) (Z12 31)  10  Esophageal reflux disease (530 81) (K21 9)  11  Herpes zoster (053 9) (B02 9)  12  Hyperlipidemia (272 4) (E78 5)  13  Hypertension (401 9) (I10)  14  Hypomagnesemia (275 2) (E83 42)  15  Hypothyroidism (244 9) (E03 9)  16  Impaired fasting glucose (790 21) (R73 01)  17  Invasive ductal carcinoma of breast, right (174 9) (C50 911)  18  Knee pain (719 46) (M25 569)  19  Primary localized osteoarthritis of left knee (715 16) (M17 12)  20  Pulmonary nodules (793 19) (R91 8)  21  Use of anastrozole (Arimidex) (V07 52) (Z79 811)  22  Vasovagal syncope (780 2) (R55)  23  Vitamin D deficiency (268 9) (E55 9)    Past Medical History   1  History of Abnormal finding on mammography (793 80) (R92 8)  2  History of Birth control (V25 9) (Z30 9)  3  History of DCIS (ductal carcinoma in situ) of breast (233 0) (D05 10)  4  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)  5  History of acute bronchitis (V12 69) (Z87 09)  6  History of hypokalemia (V12 29) (Z86 39)  7  History of pneumonia (V12 61) (Z87 01)  8  History of pregnancy (V13 29)  9  History of tamoxifen therapy (V67 51) (Z92 21)  10  History of Menarche (V21 8)  11  History of Radiation Therapy (V15 3)  12  History of Routine Gynecological Exam With Cervical Pap Smear (V72 31)  13  History of Weight loss (783 21) (R63 4)    Surgical History   1  History of Biopsy Breast Percutaneous Needle Core  2  History of Breast Surgery Excision Of Breast Single Lesion  3  History of Breast Surgery Lumpectomy  4  History of Breast Surgery Mastectomy  5  History of Cholecystectomy  6  History of Hysterectomy  7  History of [place] Lymph Node Biopsy  Surgical History Reviewed: The surgical history was reviewed and updated today  Family History  Mother   1  Family history of Anemia (V18 2)  2   Family history of Disorders Of Blood And Blood-forming Organs (V18 3)  3  Denied: Family history of malignant neoplasm  4  Family history of Hypertension (V17 49)  Father   5  Family history of cerebral infarction (V17 1) (Z82 3)  6  Denied: Family history of malignant neoplasm  Brother   7  Family history of alcoholism (V17 0) (Z81 1)    Social History    · Being A Social Drinker   · Denied: Drug use (305 90) (F19 90)   · Never A Smoker  Social History Reviewed: The social history was reviewed and updated today  The social history was reviewed and is unchanged  Current Meds  1  AmLODIPine Besylate 5 MG Oral Tablet; TAKE 1 TABLET DAILY AS DIRECTED; Therapy: 78CPK9863 to (Evaluate:47Uwm0735)  Requested for: 17Aug2016; Last   Rx:95Oxb8733 Ordered  2  Anastrozole 1 MG Oral Tablet; TAKE 1 TABLET DAILY; Therapy: 30OHV0564 to (Evaluate:69Fnp4318)  Requested for: 14AWT0439; Last   Rx:01Hig3737 Ordered  3  Levothyroxine Sodium 75 MCG Oral Tablet; TAKE 1 TABLET DAILY; Therapy: 54KII8789 to (Last Rx:12Apr2017)  Requested for: 12Apr2017 Ordered  4  Losartan Potassium-HCTZ 100-25 MG Oral Tablet; TAKE 1 TABLET DAILY  Requested   for: 17Aug2016; Last Rx:25Coe4634 Ordered  5  Metoprolol Succinate  MG Oral Tablet Extended Release 24 Hour; take 1 tablet by   mouth every day; Therapy: 62ZAB5824 to (Evaluate:21Qzj5981)  Requested for: 17Aug2016; Last   Rx:17Aug2016 Ordered  6  Pantoprazole Sodium 40 MG Oral Tablet Delayed Release; Take 1 tablet daily; Therapy: 71ZSP4136 to (Ginger Hernandez)  Requested for: 12Apr2017 Recorded  Medication List Reviewed: The medication list was reviewed and updated today  Allergies   1  Lipitor TABS  2  Zocor TABS  3  Demerol SOLN  4  Nitroglycerin SUBL    Vitals  Signs   Recorded: 18HTU4032 43:70RA   Systolic: 84  Diastolic: 66  Weight: 674 lb 6 oz  BMI Calculated: 32 75  BSA Calculated: 1 86    Physical Exam    Constitutional   General appearance: No acute distress, well appearing and well nourished      Eyes   Conjunctiva and lids: No swelling, erythema or discharge  Pupils and irises: Equal, round and reactive to light  Ears, Nose, Mouth, and Throat   External inspection of ears and nose: Normal     Otoscopic examination: Tympanic membranes translucent with normal light reflex  Canals patent without erythema  Nasal mucosa, septum, and turbinates: Normal without edema or erythema  Oropharynx: Normal with no erythema, edema, exudate or lesions  Pulmonary   Respiratory effort: No increased work of breathing or signs of respiratory distress  Auscultation of lungs: Clear to auscultation  Cardiovascular   Palpation of heart: Normal PMI, no thrills  Auscultation of heart: Normal rate and rhythm, normal S1 and S2, without murmurs  Examination of extremities for edema and/or varicosities: Normal     Carotid pulses: Normal     Abdomen   Abdomen: Non-tender, no masses  Liver and spleen: No hepatomegaly or splenomegaly  Lymphatic   Palpation of lymph nodes in neck: No lymphadenopathy  Musculoskeletal   Gait and station: Normal     Digits and nails: Normal without clubbing or cyanosis  Inspection/palpation of joints, bones, and muscles: Abnormal   Status post right mastectomy  Skin   Skin and subcutaneous tissue: Abnormal   Status post right mastectomy  Neurologic   Cranial nerves: Cranial nerves 2-12 intact  Reflexes: 2+ and symmetric  Sensation: No sensory loss  Psychiatric   Orientation to person, place, and time: Normal     Mood and affect: Normal          Future Appointments    Date/Time Provider Specialty Site   10/03/2017 01:45 PM NILESH Zhou  Surgical Oncology [facility] Deann Aguilar   09/18/2017 11:20 AM NILESH Overton   Hematology Oncology [facility]   08/11/2017 01:30 PM Markus Deleon, Richard Gayle     Signatures   Electronically signed by : Abigail Brown, ; May 16 2017 10:51AM EST (Co-author)    Electronically signed by : Maurilio Frey DO; May 30 2017  4:04PM EST                       (Author)    Electronically signed by : Maurilio Frey DO; May 30 2017  4:09PM EST                       (Author)

## 2018-01-13 VITALS
HEART RATE: 73 BPM | OXYGEN SATURATION: 96 % | RESPIRATION RATE: 15 BRPM | TEMPERATURE: 98.5 F | WEIGHT: 181 LBS | HEIGHT: 62 IN | BODY MASS INDEX: 33.31 KG/M2 | SYSTOLIC BLOOD PRESSURE: 130 MMHG | DIASTOLIC BLOOD PRESSURE: 80 MMHG

## 2018-01-13 VITALS
BODY MASS INDEX: 32.43 KG/M2 | SYSTOLIC BLOOD PRESSURE: 82 MMHG | DIASTOLIC BLOOD PRESSURE: 64 MMHG | WEIGHT: 183 LBS | HEIGHT: 63 IN

## 2018-01-13 VITALS
OXYGEN SATURATION: 96 % | HEIGHT: 63 IN | RESPIRATION RATE: 16 BRPM | TEMPERATURE: 99.1 F | HEART RATE: 88 BPM | SYSTOLIC BLOOD PRESSURE: 118 MMHG | BODY MASS INDEX: 33.49 KG/M2 | WEIGHT: 189 LBS | DIASTOLIC BLOOD PRESSURE: 74 MMHG

## 2018-01-13 VITALS
TEMPERATURE: 98.5 F | HEART RATE: 94 BPM | WEIGHT: 176 LBS | SYSTOLIC BLOOD PRESSURE: 112 MMHG | RESPIRATION RATE: 16 BRPM | BODY MASS INDEX: 32.39 KG/M2 | HEIGHT: 62 IN | OXYGEN SATURATION: 96 % | DIASTOLIC BLOOD PRESSURE: 76 MMHG

## 2018-01-14 VITALS
RESPIRATION RATE: 18 BRPM | BODY MASS INDEX: 33.49 KG/M2 | HEIGHT: 63 IN | WEIGHT: 189 LBS | HEART RATE: 104 BPM | DIASTOLIC BLOOD PRESSURE: 86 MMHG | SYSTOLIC BLOOD PRESSURE: 126 MMHG | TEMPERATURE: 98 F | OXYGEN SATURATION: 94 %

## 2018-01-14 VITALS — BODY MASS INDEX: 32.48 KG/M2 | DIASTOLIC BLOOD PRESSURE: 66 MMHG | SYSTOLIC BLOOD PRESSURE: 84 MMHG | WEIGHT: 183.38 LBS

## 2018-01-14 VITALS
WEIGHT: 190 LBS | BODY MASS INDEX: 33.66 KG/M2 | HEIGHT: 63 IN | SYSTOLIC BLOOD PRESSURE: 116 MMHG | DIASTOLIC BLOOD PRESSURE: 70 MMHG

## 2018-01-15 NOTE — RESULT NOTES
Verified Results  US BREAST RIGHT LIMITED 25Mbx2729 01:18PM [facility] Order Number: [id number]     Test Name Result Flag Reference   US BREAST RIGHT LIMITED (Report)     Patient History:   Patient is postmenopausal, has history of breast cancer at age    64, and had previous chest radiation therapy at age 64  Family history of unknown cancer in mother at age 76  Malignant excisional biopsy of the right breast, May 28, 2004  Malignant stereotactic core biopsy of the right breast, May 3,    2004  Core biopsy of the right breast    Took hormonal contraceptives for 5 years  Took tamoxifen for 5    years  Patient has never smoked  Patient's BMI is 32 6  Reason for exam: additional evaluation requested from abnormal    screening  US Breast Right Limited: February 29, 2016 - Check In #: [de-identified]   Technologist: Neomi Mohs, RDMS   Prior study comparison: February 24, 2016, mammo screening    bilateral W DBT and CAD, performed [address]  Ultrasound images of the right breast were obtained in area of    mammographic abnormality  1:00 5 cm from the nipple, a 9 x 4 x 6   mm hypoechoic lesion is identified  Ultrasound-guided core    biopsy is recommended for further characterization  No right    axillary abnormalities are identified  ASSESSMENT: BiRad:4 - Suspicious     Recommendation:   Ultrasound-guided biopsy of the right breast      Transcription Location: [address]: [id number]     Risk Value(s):   Myriad Table: 2 2%     * US PELVIS COMPLETE (TRANSABDOMINAL AND TRANSVAGINAL) 58FVB4216 01:10PM Uma Murillo     Test Name Result Flag Reference   US PELVIS COMPLETE (TRANSABDOMINAL AND TRANSVAGINAL) (Report)     PELVIC ULTRASOUND, COMPLETE     INDICATION: Follow-up of ovarian cyst  Patient is status post hysterectomy          COMPARISON: CT dated September 8, 2015     TECHNIQUE:  Transabdominal pelvic ultrasound was performed in sagittal and transverse planes with a curvilinear transducer  Additional transvaginal imaging was performed to better evaluate the endometrium and ovaries  Imaging included volumetric    sweeps as well as traditional still imaging technique  FINDINGS:     UTERUS:   The uterus has been removed  Vaginal cuff unremarkable  OVARIES/ADNEXA:   Right ovary: 1 6 x 2 1 x 0 9 cm  No suspicious right ovarian abnormality  Doppler flow within normal limits  Left ovary: 3 0 x 1 7 x 3 2 cm  Small simple cyst measuring 1 6 cm  2nd simple cyst measuring 2 4 cm  These cysts previously measured 1 6 and 2 4 cm respectively, and do not appear changed since September  Doppler flow within normal limits  No suspicious adnexal mass or loculated collections  There is no free fluid  IMPRESSION:   Unremarkable vaginal cuff status post hysterectomy  Normal right ovary  Two Stable small left ovarian cystic structures, largest measuring 2 4 cm  According to the recent consensus conference statement from the Society of Radiologists in Ultrasound (Radiology:Volume 256: Number 3-September 2010  pp [phone] )    POSTMENOPAUSAL SIMPLE CYST: > 1 cm to 7 cm: Is almost certainly benign  Follow yearly by ultrasound  Workstation performed: [id number]     Signed by:    Anton Back DO   2/24/16

## 2018-01-17 NOTE — MISCELLANEOUS
Message  I spoke with patient in March 9, 2017 in regards to her missed CT scan of chest and to schedule her CT scan and follow up  Patient decided not to reschedule testing due to having transportation issues  Patient stated she would call us back if she decided to reschedule testing and appointment  Active Problems    1  Abnormal CT of the abdomen (793 6) (R93 5)   2  Anxiety (300 00) (F41 9)   3  Appetite loss (783 0) (R63 0)   4  Arthritis of knee, left (716 96) (M17 12)   5  Ascending aortic aneurysm (441 2) (I71 2)   6  Colon cancer screening (V76 51) (Z12 11)   7  Cyst of ovary (620 2) (N83 209)   8  Dysphagia (787 20) (R13 10)   9  Encounter for routine gynecological examination (V72 31) (Z01 419)   10  Encounter for screening mammogram for high-risk patient (V76 11) (Z12 31)   11  Esophageal reflux disease (530 81) (K21 9)   12  Herpes zoster (053 9) (B02 9)   13  Hyperlipidemia (272 4) (E78 5)   14  Hypertension (401 9) (I10)   15  Hypokalemia (276 8) (E87 6)   16  Hypomagnesemia (275 2) (E83 42)   17  Hypotension (458 9) (I95 9)   18  Hypothyroidism (244 9) (E03 9)   19  Impaired fasting glucose (790 21) (R73 01)   20  Invasive ductal carcinoma of breast, right (174 9) (C50 911)   21  Ischemic colitis (557 9) (K55 9)   22  Knee pain (719 46) (M25 569)   23  Primary localized osteoarthritis of left knee (715 16) (M17 12)   24  Pulmonary nodules (793 19) (R91 8)   25  Use of anastrozole (Arimidex) (V07 52) (Z79 811)   26  Vasovagal syncope (780 2) (R55)   27  Vitamin D deficiency (268 9) (E55 9)    Current Meds   1  AmLODIPine Besylate 5 MG Oral Tablet; TAKE 1 TABLET DAILY AS DIRECTED; Therapy: 41VVK6783 to (Evaluate:21Vba8548)  Requested for: 17Aug2016; Last   Rx:17Aug2016 Ordered   2  Anastrozole 1 MG Oral Tablet; TAKE 1 TABLET DAILY; Therapy: 10NRA2921 to (Evaluate:86Vea2928)  Requested for: 28QNH5119; Last   Rx:92Dpu8909 Ordered   3   Levothyroxine Sodium 75 MCG Oral Tablet; TAKE 1 TABLET DAILY; Therapy: 19ZYD9059 to (Last Rx:12Apr2017)  Requested for: 12Apr2017 Ordered   4  Losartan Potassium-HCTZ 100-25 MG Oral Tablet; TAKE 1 TABLET DAILY  Requested   for: 17Aug2016; Last Rx:17Aug2016 Ordered   5  Metoprolol Succinate  MG Oral Tablet Extended Release 24 Hour; take 1 tablet   by mouth every day; Therapy: 90AUJ8867 to (Evaluate:41Aby5972)  Requested for: 17Aug2016; Last   Rx:17Aug2016 Ordered   6  Pantoprazole Sodium 40 MG Oral Tablet Delayed Release; Take 1 tablet daily; Therapy: 97CLZ8797 to (988 76 832)  Requested for: 12Apr2017 Recorded    Allergies    1  Lipitor TABS   2  Zocor TABS   3  Demerol SOLN   4   Nitroglycerin SUBL    Signatures   Electronically signed by : Barbara Owen, ; Jun 2 2017 10:20AM EST                       (Author)

## 2018-01-17 NOTE — PROGRESS NOTES
Assessment    1  Encounter for routine gynecological examination (V72 31) (Z01 419)    Plan  Cyst of ovary    · * US PELVIS COMPLETE (TRANSABDOMINAL AND TRANSVAGINAL); Status:Hold For -  Scheduling,Retrospective By Protocol Authorization; Requested [id number];    Perform:[facility]; AEJ:[id number]; Last Updated Theresa Clock; 1/21/2016 1:33:14 PM;Ordered; For:Cyst of ovary; Ordered By:Uma Murillo;  Encounter for screening mammogram for malignant neoplasm of breast    · * MAMMO SCREENING BILATERAL W CAD; Status:Hold For - Scheduling,Retrospective  By Protocol Authorization; Requested [id number];    Perform:[facility]; VTJ:[id number]; Last Updated Theresa Clock; 1/21/2016 12:56:41 PM;Ordered;  For:Encounter for screening mammogram for malignant neoplasm of breast; Ordered By:Adams Murillo;    Discussion/Summary  Currently, she eats an adequate diet  cervical cancer screening is not indicated Breast cancer screening: mammogram is current  Colorectal cancer screening: colorectal cancer screening is current  Osteoporosis screening: bone mineral density testing is current  Advice and education were given regarding calcium supplements and vitamin D supplements  Patient discussion: discussed with the patient  Check ultrasound to evaluate ovarian cyst       Chief Complaint  pt presents for a yearly exam, she is doing well  History of Present Illness  HPI: states over the year, had a lot of unexplained weight loss  During her work up, there was an ovarian cyst found on CT scan, no images available  no pain or discomfort  never found a reason for her weight loss   GYN HM, Adult Female [facility]: The patient is being seen for a gynecology evaluation  The last health maintenance visit was 1 year(s) ago  Social History: The patient has never smoked cigarettes  She reports occasional alcohol use  She has never used illicit drugs  General Health:  The patient's health since the last visit is described as good  Immunizations status: not up to date  Lifestyle:  She does not use tobacco  She consumes alcohol  She reports occasional alcohol use  Reproductive health: the patient is postmenopausal   she reports no menstrual problems  Menstrual history: LMP: the last menstrual period was 0  pregnancy history: G 2P 2   SUPRACERVICAL HYSTERECTOMY  Screening: cancer screening reviewed and current  Cervical cancer screening includes a pap smear performed 2012  Breast cancer screening includes a mammogram performed 2015  Colorectal cancer screening includes a colonoscopy performed within the past ten years  Metabolic screening includes DEXA performed 2012  Review of Systems  no pelvic pain, no pelvic pressure, no vaginal pain, no vaginal discharge, no vaginal itching, no vaginal lump or mass, no vaginal odor, no vulvar pain, no vulvar itching, no vulvar lump or mass, no labial swelling, no dysuria and no urinary loss of control  Constitutional: No fever, no chills, feels well, no tiredness, no recent weight gain or loss  ENT: no ear ache, no loss of hearing, no nosebleeds or nasal discharge, no sore throat or hoarseness  Cardiovascular: no complaints of slow or fast heart rate, no chest pain, no palpitations, no leg claudication or lower extremity edema  Respiratory: no complaints of shortness of breath, no wheezing, no dyspnea on exertion, no orthopnea or PND  Breasts: no complaints of breast pain, breast lump or nipple discharge  Gastrointestinal: no complaints of abdominal pain, no constipation, no nausea or diarrhea, no vomiting, no bloody stools  Genitourinary: no complaints of dysuria, no incontinence, no pelvic pain, no dysmenorrhea, no vaginal discharge or abnormal vaginal bleeding  Musculoskeletal: no complaints of arthralgia, no myalgia, no joint swelling or stiffness, no limb pain or swelling     Integumentary: no complaints of skin rash or lesion, no itching or dry skin, no skin wounds  Neurological: no complaints of headache, no confusion, no numbness or tingling, no dizziness or fainting  ROS reviewed  OB History  Pregnancy History (Brief):   Prior pregnancies: : 2  Para: 2 (full-term)   Delivery type: 2 vaginal       Active Problems    1  Aneurysm, thoracic aortic (441 2) (I71 2)   2  Anxiety (300 00) (F41 9)   3  Dysphagia (787 20) (R13 10)   4  Encounter for routine gynecological examination (V72 31) (Z01 419)   5  Encounter for screening mammogram for malignant neoplasm of breast (V76 12)   (Z12 31)   6  Esophageal reflux disease (530 81) (K21 9)   7  Herpes zoster (053 9) (B02 9)   8  Hyperlipidemia (272 4) (E78 5)   9  Hypertension (401 9) (I10)   10  Hypokalemia (276 8) (E87 6)   11  Hypomagnesemia (275 2) (E83 42)   12  Hypothyroidism (244 9) (E03 9)   13  Impaired fasting glucose (790 21) (R73 01)   14  Pulmonary nodules (793 19) (R91 8)   15  Vasovagal syncope (780 2) (R55)   16  Vitamin D deficiency (268 9) (E55 9)    Past Medical History    · History of Breast Cancer (V10 3)   · History of acute bronchitis (V12 69) (Z87 09)   · History of pneumonia (V12 61) (Z87 01)   · History of Routine Gynecological Exam With Cervical Pap Smear (V72 31)    The active problems and past medical history were reviewed and updated today  Surgical History    · History of Breast Surgery Lumpectomy   · History of Cholecystectomy   · History of Hysterectomy    Family History    · Family history of Anemia (V18 2)   · Family history of Disorders Of Blood And Blood-forming Organs (V18 3)   · Family history of Hypertension (V17 49)    · Family history of cerebral infarction (V17 1) (Z82 3)    · Family history of alcoholism (V17 0) (Z81 1)    Social History    · Being A Social Drinker   · Denied: Drug use (305 90) (F19 90)   · Never A Smoker    Current Meds   1  AmLODIPine Besylate 5 MG Oral Tablet; TAKE 1 TABLET DAILY AS DIRECTED;    Therapy: 95PGS3987 to (Evaluate:08Jan2017)  Requested for: 87VPQ7774; Last   Rx:14Jan2016 Ordered   2  Levothyroxine Sodium 50 MCG Oral Tablet; Take 1 tablet daily; Therapy: 70INF6514 to (Evaluate:08Jan2017)  Requested for: 58DQS5674; Last   Rx:14Jan2016 Ordered   3  Losartan Potassium-HCTZ 100-25 MG Oral Tablet; TAKE 1 TABLET DAILY  Requested   for: 68WJY2720; Last Rx:14Jan2016 Ordered   4  Metoprolol Succinate  MG Oral Tablet Extended Release 24 Hour; take 1 tablet   by mouth every day; Therapy: 61FGC5534 to (Evaluate:15Mxg1657)  Requested for: 11NDX2583; Last   Rx:14Jan2016 Ordered   5  Pantoprazole Sodium 40 MG Oral Tablet Delayed Release; take 1 tablet twice a day; Therapy: 13PXC2475 to (Evaluate:08Jan2017)  Requested for: 28BXN2925; Last   Rx:14Jan2016 Ordered    Allergies    1  Lipitor TABS   2  Zocor TABS   3  Demerol SOLN   4  Nitroglycerin SUBL    Vitals   Recorded: 66YSC2739 43:69GX   Systolic 814   Diastolic 82   Height 5 ft 3 5 in   Weight 198 lb 8 0 oz   BMI Calculated 34 61   BSA Calculated 1 93     Physical Exam    Constitutional   General appearance: No acute distress, well appearing and well nourished  Neck   Neck: Normal, supple, trachea midline, no masses  Thyroid: Normal, no thyromegaly  Pulmonary   Respiratory effort: No increased work of breathing or signs of respiratory distress  Auscultation of lungs: Clear to auscultation  Cardiovascular   Auscultation of heart: Normal rate and rhythm, normal S1 and S2, no murmurs  Peripheral vascular exam: Normal pulses Throughout  Genitourinary   External genitalia: Normal and no lesions appreciated  Vagina: Normal, no lesions or dryness appreciated  Urethra: Normal     Urethral meatus: Normal     Bladder: Normal, soft, non-tender and no prolapse or masses appreciated  Cervix: Normal, no palpable masses  Uterus: Surgically absent  Adnexa/parametria: Normal, non-tender and no fullness or masses appreciated      Chest   Breasts: Normal and no dimpling or skin changes noted  Abdomen   Abdomen: Normal, non-tender, and no organomegaly noted  Lymphatic   Palpation of lymph nodes in neck, axillae, groin and/or other locations: No lymphadenopathy or masses noted  Skin   Skin and subcutaneous tissue: Normal skin turgor and no rashes      Palpation of skin and subcutaneous tissue: Normal     Psychiatric   Orientation to person, place, and time: Normal     Mood and affect: Normal        Future Appointments    Date/Time Provider Specialty Site   04/11/2016 01:00 PM Richard Da Silva     Signatures   Electronically signed by : NILESH Christensen ; Jan 21 2016  2:08PM EST                       (Author)

## 2018-01-17 NOTE — MISCELLANEOUS
Assessment    1  Vasovagal syncope (780 2) (R55)   2  Hypokalemia (276 8) (E87 6)   3  Hypomagnesemia (275 2) (E83 42)   4  Pulmonary nodules (793 19) (R91 8)   5  Hypertension (401 9) (I10)   6  Hypothyroidism (244 9) (E03 9)   7  Hyperlipidemia (272 4) (E78 5)    Plan  Esophageal reflux disease, Hyperlipidemia    · Pantoprazole Sodium 40 MG Oral Tablet Delayed Release; take 1 tablet twice a  day   Rx By: Peggy Conway; Dispense: 90 Days ; #:180 Tablet Delayed Release; Refill: 3; For: Esophageal reflux disease, Hyperlipidemia; HECTOR = N; Sent To: [facility]  Hyperlipidemia    · Follow-up visit in 4 Months Evaluation and Treatment  Follow-up  Status: Hold For -  Scheduling  Requested for: 84QUU8315   Ordered; For: Hyperlipidemia; Ordered By: Peggy Conway Performed:  Due: 52LAC8988   · Fluzone High-Dose Intramuscular Suspension; INJECT 0 5  ML  Intramuscular; To Be Done: 15WUN3823   For: Hyperlipidemia; Ordered By:Trey Ko; Effective Date:14Jan2016  Hypertension    · AmLODIPine Besylate 5 MG Oral Tablet; TAKE 1 TABLET DAILY AS DIRECTED   Rx By: Peggy Conway; Dispense: 90 Days ; #:90 Tablet; Refill: 3; For: Hypertension; HECTOR = Y; Sent To: [facility]   · Losartan Potassium-HCTZ 100-25 MG Oral Tablet; TAKE 1 TABLET DAILY   Rx By: Peggy Conway; Dispense: 90 Days ; #:90 Tablet; Refill: 3; For: Hypertension; HECTOR = N; Sent To: [facility]   · Metoprolol Succinate  MG Oral Tablet Extended Release 24 Hour; take 1  tablet by mouth every day   Rx By: Peggy Conway; Dispense: 30 Days ; #:90 Tablet Extended Release 24 Hour; Refill: 3; For: Hypertension; HECTOR = Y; Sent To: [facility]  Hypothyroidism    · Levothyroxine Sodium 50 MCG Oral Tablet; Take 1 tablet daily   Rx By: Peggy Conway; Dispense: 90 Days ; #:90 Tablet;  Refill: 3; For: Hypothyroidism; HECTOR = Y; Sent To: [facility]    Discussion/Summary  Discussion Summary: Recommend magnesium supplementation  We'll reevaluate with laboratory studies at followup visit  Patient medications renewed at this time  Chief Complaint  Chief Complaint Free Text Note Form: Follow up for syncopal episode for 1 week ago  Needs refills to go [facility] mail order  History of Present Illness  TCM Communication  Luke: The patient is being contacted for follow-up after hospitalization and 1/14/2016  Hospital course was discussed with the inpatient physician and records were reviewed  She was [facility] at [facility]  The dates of hospitalization: 1/6-7/2016, date of admission: 1/6/2016, date of discharge: 1/7/2016  Diagnosis: VASOVAGAL SYNCOPE  She was discharged to home  Medications were not reviewed today  She scheduled a follow up appointment  Symptoms: no fever, no weakness, no dizziness, no headache, no fatigue, no cough, no shortness of breath, no chest pain, no back pain on left side, no back pain on right side, no arm pain left side, no arm pain on right side, no leg pain on left side, no leg pain on right side, no upper abdominal pain, no middle abdominal pain, no lower abdominal pain, no rash:, no anorexia, no nausea, no vomiting, no loose stools, no constipation, no pain with urinating, no incisional pain, no wound drainage and no swelling  Counseling was provided to the patient and patient's family  Topics counseled included diagnostic results, instructions for management, risk factor reductions, prognosis, patient and family education and activities of daily living  Communication performed and completed by Guru PAGE   HPI: Patient Is here status post hospitalization for syncopal event from January 6 through January 7  Patient also diagnosed with hypokalemia and hypomagnesemia both of which were replaced in the hospital  Patient also with diagnosis of pulmonary nodules  Patient will need medication refills at this time  Pt  s/p syncope at home   pt had bloodwork that morning  pt without cp or sob  No further syncopal events  No palpitations noted  No fevers noted  No vomiting or change in urination or defecation  No calf pain or swelling lower extremities  Patient's appetite is good sporadically  Review of Systems  Complete-Female:   Constitutional: as noted in HPI  Eyes: No complaints of eye pain, no red eyes, no eyesight problems, no discharge, no dry eyes, no itching of eyes  ENT: no complaints of earache, no loss of hearing, no nose bleeds, no nasal discharge, no sore throat, no hoarseness  Cardiovascular: No complaints of slow heart rate, no fast heart rate, no chest pain, no palpitations, no leg claudication, no lower extremity edema  Respiratory: No complaints of shortness of breath, no wheezing, no cough, no SOB on exertion, no orthopnea, no PND  Gastrointestinal: as noted in HPI  Genitourinary: No complaints of dysuria, no incontinence, no pelvic pain, no dysmenorrhea, no vaginal discharge or bleeding  Musculoskeletal: as noted in HPI  Integumentary: No complaints of skin rash or lesions, no itching, no skin wounds, no breast pain or lump  Neurological: No complaints of headache, no confusion, no convulsions, no numbness, no dizziness or fainting, no tingling, no limb weakness, no difficulty walking  Psychiatric: Not suicidal, no sleep disturbance, no anxiety or depression, no change in personality, no emotional problems  Endocrine: No complaints of proptosis, no hot flashes, no muscle weakness, no deepening of the voice, no feelings of weakness  Hematologic/Lymphatic: No complaints of swollen glands, no swollen glands in the neck, does not bleed easily, does not bruise easily  Active Problems    1  Aneurysm, thoracic aortic (441 2) (I71 2)   2  Anxiety (300 00) (F41 9)   3  Dysphagia (787 20) (R13 10)   4  Encounter for routine gynecological examination (V72 31) (Z01 419)   5   Encounter for screening mammogram for malignant neoplasm of breast (V76 12)   (Z12 31)   6  Esophageal reflux disease (530 81) (K21 9)   7  Herpes zoster (053 9) (B02 9)   8  Hyperlipidemia (272 4) (E78 5)   9  Hypertension (401 9) (I10)   10  Hypothyroidism (244 9) (E03 9)   11  Impaired fasting glucose (790 21) (R73 01)   12  Pulmonary nodules (793 19) (R91 8)   13  Vitamin D deficiency (268 9) (E55 9)    Past Medical History    1  History of Breast Cancer (V10 3)   2  History of acute bronchitis (V12 69) (Z87 09)   3  History of pneumonia (V12 61) (Z87 01)   4  History of Routine Gynecological Exam With Cervical Pap Smear (V72 31)    Surgical History    1  History of Breast Surgery Lumpectomy   2  History of Cholecystectomy   3  History of Hysterectomy    Family History    1  Family history of Anemia (V18 2)   2  Family history of Disorders Of Blood And Blood-forming Organs (V18 3)   3  Family history of Hypertension (V17 49)    4  Family history of cerebral infarction (V17 1) (Z82 3)    5  Family history of alcoholism (V17 0) (Z81 1)    Social History    · Being A Social Drinker   · Denied: Drug use (305 90) (F19 90)   · Never A Smoker    Current Meds   1  Losartan Potassium-HCTZ 100-25 MG Oral Tablet; TAKE 1 TABLET DAILY Recorded    Allergies    1  Lipitor TABS   2  Zocor TABS   3  Demerol SOLN   4  Nitroglycerin SUBL    Physical Exam    Constitutional   General appearance: Abnormal     Eyes   Conjunctiva and lids: No swelling, erythema or discharge  Pupils and irises: Equal, round and reactive to light  Ears, Nose, Mouth, and Throat   External inspection of ears and nose: Normal     Otoscopic examination: Tympanic membranes translucent with normal light reflex  Canals patent without erythema  Nasal mucosa, septum, and turbinates: Normal without edema or erythema  Oropharynx: Normal with no erythema, edema, exudate or lesions  Pulmonary   Respiratory effort: No increased work of breathing or signs of respiratory distress      Auscultation of lungs: Clear to auscultation  Cardiovascular   Palpation of heart: Normal PMI, no thrills  Auscultation of heart: Normal rate and rhythm, normal S1 and S2, without murmurs  Examination of extremities for edema and/or varicosities: Normal     Abdomen   Abdomen: Non-tender, no masses  Liver and spleen: No hepatomegaly or splenomegaly  Lymphatic   Palpation of lymph nodes in neck: No lymphadenopathy  Musculoskeletal   Gait and station: Normal     Digits and nails: Normal without clubbing or cyanosis  Inspection/palpation of joints, bones, and muscles: Normal     Neurologic   Cranial nerves: Cranial nerves 2-12 intact  Reflexes: 2+ and symmetric  Sensation: No sensory loss  Psychiatric   Orientation to person, place, and time: Normal     Mood and affect: Normal          Future Appointments    Date/Time Provider Specialty Site   01/21/2016 01:00 PM NILESH Aiken   Obstetrics/Gynecology OB GYN CARE [facility]     Signatures   Electronically signed by : Amy Hodges DO; Jan 14 2016 11:01AM EST                       (Author)

## 2018-01-18 NOTE — RESULT NOTES
Verified Results  MAMMO SCREENING BILATERAL W 3D & CAD [id number] 01:14PM Juan Olivarez     Test Name Result Flag Reference   MAMMO SCREENING BILATERAL W 3D & CAD (Report)     Patient History:   Patient is postmenopausal, has history of breast cancer at age    64, and had previous chest radiation therapy at age 64  Family history of unknown cancer in mother at age 76  Malignant excisional biopsy of the right breast, May 28, 2004  Malignant stereotactic core biopsy of the right breast, May 3,    2004  Core biopsy of the right breast    Took hormonal contraceptives for 5 years  Took tamoxifen for 5    years  Patient has never smoked  Patient's BMI is 32 6  Reason for exam: screening (asymptomatic)  Screening     Mammo Screening Bilateral W DBT and CAD: February 24, 2016 -    Check In #: [de-identified]   2D/3D Procedure   3D views: MLO view(s) were taken of the right breast  MLO and    MLO view(s) were taken of the left breast    2D views: Bilateral MLO and CC view(s) were taken  XCCL view(s)    were taken of the right breast        Technologist: BALTAZAR Cruz (R)(M)   Prior study comparison: February 4, 2015, bilateral digital    screening mammogram performed at [address] [place] [address]  July 23, 2014, right breast unilateral    diagnostic mammogram, performed at [address] [place] [address]  January 3, 2014, bilateral Follow-Up, performed at [address] [place] [address]  January 3, 2014, right breast    unilateral diagnostic mammogram, performed at [address] [place] [address]  December 19, 2013, bilateral digital screening    mammogram performed at [address] [place] [address]  December 18, 2012, bilateral digital screening mammogram   performed at [address] [place] [address]  There are scattered fibroglandular densities   Questionable    asymmetry is identified within the slightly inner breast on the    CC and most likely superior right breast on the MLO 3-D view    versus the right mid breast  Follow-up ultrasound of this region   is recommended for further characterization  Left breast is    unremarkable  Scattered benign calcifications are again present  No suspicious microcalcifications are present  ASSESSMENT: BiRad:0 - Incomplete: needs additional imaging    evaluation     Recommendation:   Further imaging  A breast health care nurse from our facility will be contacting    the patient regarding the need for additional imaging  8-10% of cancers will be missed on mammography  Management of a    palpable abnormality must be based on clinical grounds  Patients    will be notified of their results via letter from our facility  Accredited by [employer] of Radiology and FDA       Transcription Location: [facility]: [id number]     Risk Value(s):   Myriad Table: 2 2%       Plan  Abnormal finding on mammography    · Bello Robert; Status:Hold For - Scheduling; Requested for:[id number];

## 2018-01-23 ENCOUNTER — TRANSCRIBE ORDERS (OUTPATIENT)
Dept: ADMINISTRATIVE | Facility: HOSPITAL | Age: 73
End: 2018-01-23

## 2018-01-24 ENCOUNTER — APPOINTMENT (OUTPATIENT)
Dept: LAB | Facility: MEDICAL CENTER | Age: 73
End: 2018-01-24
Payer: MEDICARE

## 2018-01-24 DIAGNOSIS — E78.5 HYPERLIPIDEMIA: ICD-10-CM

## 2018-01-24 DIAGNOSIS — E03.9 HYPOTHYROIDISM: ICD-10-CM

## 2018-01-24 DIAGNOSIS — I10 ESSENTIAL (PRIMARY) HYPERTENSION: ICD-10-CM

## 2018-01-24 DIAGNOSIS — R73.01 IMPAIRED FASTING GLUCOSE: ICD-10-CM

## 2018-01-24 LAB
ALBUMIN SERPL BCP-MCNC: 3.2 G/DL (ref 3.5–5)
ALP SERPL-CCNC: 82 U/L (ref 46–116)
ALT SERPL W P-5'-P-CCNC: 56 U/L (ref 12–78)
ANION GAP SERPL CALCULATED.3IONS-SCNC: 8 MMOL/L (ref 4–13)
AST SERPL W P-5'-P-CCNC: 83 U/L (ref 5–45)
BASOPHILS # BLD AUTO: 0.03 THOUSANDS/ΜL (ref 0–0.1)
BASOPHILS NFR BLD AUTO: 1 % (ref 0–1)
BILIRUB SERPL-MCNC: 0.68 MG/DL (ref 0.2–1)
BUN SERPL-MCNC: 11 MG/DL (ref 5–25)
CALCIUM SERPL-MCNC: 8.8 MG/DL (ref 8.3–10.1)
CHLORIDE SERPL-SCNC: 97 MMOL/L (ref 100–108)
CHOLEST SERPL-MCNC: 266 MG/DL (ref 50–200)
CO2 SERPL-SCNC: 33 MMOL/L (ref 21–32)
CREAT SERPL-MCNC: 0.7 MG/DL (ref 0.6–1.3)
CREAT UR-MCNC: 99.3 MG/DL
EOSINOPHIL # BLD AUTO: 0.04 THOUSAND/ΜL (ref 0–0.61)
EOSINOPHIL NFR BLD AUTO: 1 % (ref 0–6)
ERYTHROCYTE [DISTWIDTH] IN BLOOD BY AUTOMATED COUNT: 12 % (ref 11.6–15.1)
EST. AVERAGE GLUCOSE BLD GHB EST-MCNC: 123 MG/DL
GFR SERPL CREATININE-BSD FRML MDRD: 88 ML/MIN/1.73SQ M
GLUCOSE P FAST SERPL-MCNC: 126 MG/DL (ref 65–99)
HBA1C MFR BLD: 5.9 % (ref 4.2–6.3)
HCT VFR BLD AUTO: 42.5 % (ref 34.8–46.1)
HDLC SERPL-MCNC: 72 MG/DL (ref 40–60)
HGB BLD-MCNC: 14.9 G/DL (ref 11.5–15.4)
LDLC SERPL CALC-MCNC: 153 MG/DL (ref 0–100)
LYMPHOCYTES # BLD AUTO: 1.44 THOUSANDS/ΜL (ref 0.6–4.47)
LYMPHOCYTES NFR BLD AUTO: 38 % (ref 14–44)
MCH RBC QN AUTO: 35.2 PG (ref 26.8–34.3)
MCHC RBC AUTO-ENTMCNC: 35.1 G/DL (ref 31.4–37.4)
MCV RBC AUTO: 101 FL (ref 82–98)
MICROALBUMIN UR-MCNC: <5 MG/L (ref 0–20)
MICROALBUMIN/CREAT 24H UR: <5 MG/G CREATININE (ref 0–30)
MONOCYTES # BLD AUTO: 0.48 THOUSAND/ΜL (ref 0.17–1.22)
MONOCYTES NFR BLD AUTO: 13 % (ref 4–12)
NEUTROPHILS # BLD AUTO: 1.79 THOUSANDS/ΜL (ref 1.85–7.62)
NEUTS SEG NFR BLD AUTO: 47 % (ref 43–75)
NRBC BLD AUTO-RTO: 0 /100 WBCS
PLATELET # BLD AUTO: 166 THOUSANDS/UL (ref 149–390)
PMV BLD AUTO: 10 FL (ref 8.9–12.7)
POTASSIUM SERPL-SCNC: 3.9 MMOL/L (ref 3.5–5.3)
PROT SERPL-MCNC: 6.6 G/DL (ref 6.4–8.2)
RBC # BLD AUTO: 4.23 MILLION/UL (ref 3.81–5.12)
SODIUM SERPL-SCNC: 138 MMOL/L (ref 136–145)
T4 FREE SERPL-MCNC: 1.21 NG/DL (ref 0.76–1.46)
TRIGL SERPL-MCNC: 203 MG/DL
TSH SERPL DL<=0.05 MIU/L-ACNC: 1.16 UIU/ML (ref 0.36–3.74)
WBC # BLD AUTO: 3.79 THOUSAND/UL (ref 4.31–10.16)

## 2018-01-24 PROCEDURE — 85025 COMPLETE CBC W/AUTO DIFF WBC: CPT

## 2018-01-24 PROCEDURE — 82570 ASSAY OF URINE CREATININE: CPT

## 2018-01-24 PROCEDURE — 84439 ASSAY OF FREE THYROXINE: CPT

## 2018-01-24 PROCEDURE — 36415 COLL VENOUS BLD VENIPUNCTURE: CPT

## 2018-01-24 PROCEDURE — 84443 ASSAY THYROID STIM HORMONE: CPT

## 2018-01-24 PROCEDURE — 82043 UR ALBUMIN QUANTITATIVE: CPT

## 2018-01-24 PROCEDURE — 80061 LIPID PANEL: CPT

## 2018-01-24 PROCEDURE — 80053 COMPREHEN METABOLIC PANEL: CPT

## 2018-01-24 PROCEDURE — 83036 HEMOGLOBIN GLYCOSYLATED A1C: CPT

## 2018-01-25 ENCOUNTER — TELEPHONE (OUTPATIENT)
Dept: FAMILY MEDICINE CLINIC | Facility: CLINIC | Age: 73
End: 2018-01-25

## 2018-01-25 NOTE — TELEPHONE ENCOUNTER
----- Message from Lizet Villegas DO sent at 1/25/2018  9:49 AM EST -----  Call patient  Recommend low-fat low-cholesterol diet as cholesterol is still elevated  Will discuss further at office visit

## 2018-01-29 RX ORDER — ANASTROZOLE 1 MG/1
1 TABLET ORAL DAILY
Status: ON HOLD | COMMUNITY
Start: 2016-05-13 | End: 2018-07-31

## 2018-01-30 ENCOUNTER — OFFICE VISIT (OUTPATIENT)
Dept: FAMILY MEDICINE CLINIC | Facility: CLINIC | Age: 73
End: 2018-01-30
Payer: MEDICARE

## 2018-01-30 VITALS
DIASTOLIC BLOOD PRESSURE: 68 MMHG | BODY MASS INDEX: 30.83 KG/M2 | SYSTOLIC BLOOD PRESSURE: 118 MMHG | HEIGHT: 63 IN | WEIGHT: 174 LBS

## 2018-01-30 DIAGNOSIS — Z23 NEED FOR TD VACCINE: ICD-10-CM

## 2018-01-30 DIAGNOSIS — E06.3 HYPOTHYROIDISM DUE TO HASHIMOTO'S THYROIDITIS: ICD-10-CM

## 2018-01-30 DIAGNOSIS — I10 ESSENTIAL HYPERTENSION: Primary | ICD-10-CM

## 2018-01-30 DIAGNOSIS — Z12.11 SCREENING FOR COLON CANCER: ICD-10-CM

## 2018-01-30 DIAGNOSIS — K21.9 GASTROESOPHAGEAL REFLUX DISEASE WITHOUT ESOPHAGITIS: ICD-10-CM

## 2018-01-30 DIAGNOSIS — E03.8 HYPOTHYROIDISM DUE TO HASHIMOTO'S THYROIDITIS: ICD-10-CM

## 2018-01-30 DIAGNOSIS — E78.2 MIXED HYPERLIPIDEMIA: ICD-10-CM

## 2018-01-30 PROCEDURE — 99214 OFFICE O/P EST MOD 30 MIN: CPT | Performed by: FAMILY MEDICINE

## 2018-01-30 RX ORDER — METOPROLOL SUCCINATE 200 MG/1
200 TABLET, EXTENDED RELEASE ORAL DAILY
Qty: 90 TABLET | Refills: 1 | Status: SHIPPED | OUTPATIENT
Start: 2018-01-30 | End: 2018-03-12 | Stop reason: SDUPTHER

## 2018-01-30 RX ORDER — LOSARTAN POTASSIUM AND HYDROCHLOROTHIAZIDE 25; 100 MG/1; MG/1
1 TABLET ORAL DAILY
Qty: 90 TABLET | Refills: 1 | Status: SHIPPED | OUTPATIENT
Start: 2018-01-30 | End: 2018-03-12 | Stop reason: SDUPTHER

## 2018-01-30 RX ORDER — LEVOTHYROXINE SODIUM 0.07 MG/1
75 TABLET ORAL
Qty: 90 TABLET | Refills: 1 | Status: SHIPPED | OUTPATIENT
Start: 2018-01-30 | End: 2018-03-12 | Stop reason: SDUPTHER

## 2018-01-30 RX ORDER — LEVOTHYROXINE SODIUM 0.07 MG/1
TABLET ORAL
COMMUNITY
Start: 2018-01-03 | End: 2018-01-30 | Stop reason: SDUPTHER

## 2018-01-30 RX ORDER — METOPROLOL SUCCINATE 200 MG/1
TABLET, EXTENDED RELEASE ORAL
COMMUNITY
Start: 2018-01-03 | End: 2018-01-30 | Stop reason: SDUPTHER

## 2018-01-30 RX ORDER — AMLODIPINE BESYLATE 5 MG/1
5 TABLET ORAL DAILY
Qty: 90 TABLET | Refills: 1 | Status: SHIPPED | OUTPATIENT
Start: 2018-01-30 | End: 2018-06-05

## 2018-01-30 NOTE — PROGRESS NOTES
Assessment/Plan:   labs reviewed with patient  The patient with elevated cholesterol  Patient unable to tolerate statins  Patient wishes observation at the present time  Refills on other medications for chronic medical conditions  No problem-specific Assessment & Plan notes found for this encounter  Diagnoses and all orders for this visit:    Essential hypertension  -     amLODIPine (NORVASC) 5 mg tablet; Take 1 tablet (5 mg total) by mouth daily  -     metoprolol succinate (TOPROL-XL) 200 MG 24 hr tablet; Take 1 tablet (200 mg total) by mouth daily  -     losartan-hydrochlorothiazide (HYZAAR) 100-25 MG per tablet; Take 1 tablet by mouth daily    Need for TD vaccine  -     Td vaccine greater than or equal to 8yo IM    Hypothyroidism due to Hashimoto's thyroiditis  -     levothyroxine 75 mcg tablet; Take 1 tablet (75 mcg total) by mouth daily in the early morning    Mixed hyperlipidemia    Gastroesophageal reflux disease without esophagitis    Screening for colon cancer  -     Ambulatory referral to Gastroenterology; Future  -     Ambulatory referral to Gastroenterology; Future    Other orders  -     anastrozole (ARIMIDEX) 1 mg tablet; Take 1 tablet by mouth daily  -     Discontinue: levothyroxine 75 mcg tablet;   -     Discontinue: metoprolol succinate (TOPROL-XL) 200 MG 24 hr tablet;           Subjective:   Chief Complaint   Patient presents with    Hyperlipidemia     review diet ,     Hypertension     has been doing well checking at home  Patient ID: Elo Sampson is a 81 y o  female  Patient here to follow-up on hypertension hypothyroidism hyperlipidemia ischemic colitis GERD  Patient with some fatigue  Patient does nap frequently  The patient feels well otherwise  No chest pain or shortness of breath or change in urination or defecation  Patient does have some difficulty with vision at night  No lower extremity edema    All other review systems negative      Hyperlipidemia Hypertension         The following portions of the patient's history were reviewed and updated as appropriate: allergies, current medications, past family history, past medical history, past social history, past surgical history and problem list     Review of Systems   Constitutional: Positive for fatigue  HENT: Negative  Eyes: Positive for visual disturbance  Respiratory: Negative  Cardiovascular: Negative  Gastrointestinal: Negative  Endocrine: Negative  Genitourinary: Negative  Musculoskeletal: Negative  Skin: Negative  Allergic/Immunologic: Negative  Neurological: Negative  Hematological: Negative  Psychiatric/Behavioral: Negative  Objective:     Physical Exam   Constitutional: She is oriented to person, place, and time  She appears well-developed and well-nourished  No distress  HENT:   Head: Normocephalic  Right Ear: External ear normal    Left Ear: External ear normal    Mouth/Throat: Oropharynx is clear and moist  No oropharyngeal exudate  Eyes: EOM are normal  Pupils are equal, round, and reactive to light  Right eye exhibits no discharge  Left eye exhibits no discharge  No scleral icterus  Neck: Normal range of motion  Neck supple  No thyromegaly present  Cardiovascular: Normal rate, regular rhythm, normal heart sounds and intact distal pulses  Exam reveals no gallop and no friction rub  No murmur heard  Pulmonary/Chest: Effort normal and breath sounds normal  No respiratory distress  She has no wheezes  She has no rales  She exhibits no tenderness  Abdominal: Soft  Bowel sounds are normal  She exhibits no distension  There is no tenderness  There is no rebound and no guarding  Musculoskeletal: Normal range of motion  She exhibits no edema or tenderness  Lymphadenopathy:     She has no cervical adenopathy  Neurological: She is oriented to person, place, and time  No cranial nerve deficit  She exhibits normal muscle tone   Coordination normal    Skin: Skin is warm and dry  No rash noted  She is not diaphoretic  No erythema  No pallor  Psychiatric: She has a normal mood and affect  Her behavior is normal  Judgment and thought content normal    Nursing note and vitals reviewed

## 2018-03-12 DIAGNOSIS — I10 ESSENTIAL HYPERTENSION: ICD-10-CM

## 2018-03-12 DIAGNOSIS — E06.3 HYPOTHYROIDISM DUE TO HASHIMOTO'S THYROIDITIS: ICD-10-CM

## 2018-03-12 DIAGNOSIS — E03.8 HYPOTHYROIDISM DUE TO HASHIMOTO'S THYROIDITIS: ICD-10-CM

## 2018-03-12 RX ORDER — LEVOTHYROXINE SODIUM 0.07 MG/1
TABLET ORAL
Qty: 90 TABLET | Refills: 0 | Status: SHIPPED | OUTPATIENT
Start: 2018-03-12 | End: 2018-07-31 | Stop reason: HOSPADM

## 2018-03-12 RX ORDER — METOPROLOL SUCCINATE 200 MG/1
TABLET, EXTENDED RELEASE ORAL
Qty: 90 TABLET | Refills: 0 | Status: SHIPPED | OUTPATIENT
Start: 2018-03-12 | End: 2018-07-27 | Stop reason: HOSPADM

## 2018-03-12 RX ORDER — LOSARTAN POTASSIUM AND HYDROCHLOROTHIAZIDE 25; 100 MG/1; MG/1
TABLET ORAL
Qty: 90 TABLET | Refills: 0 | Status: SHIPPED | OUTPATIENT
Start: 2018-03-12 | End: 2018-07-27 | Stop reason: HOSPADM

## 2018-03-30 ENCOUNTER — HOSPITAL ENCOUNTER (OUTPATIENT)
Dept: MAMMOGRAPHY | Facility: MEDICAL CENTER | Age: 73
Discharge: HOME | End: 2018-03-30
Payer: MEDICARE

## 2018-03-30 ENCOUNTER — TRANSCRIBE ORDERS (OUTPATIENT)
Dept: ADMINISTRATIVE | Facility: HOSPITAL | Age: 73
End: 2018-03-30

## 2018-03-30 DIAGNOSIS — Z12.31 ENCOUNTER FOR SCREENING MAMMOGRAM FOR MALIGNANT NEOPLASM OF BREAST: ICD-10-CM

## 2018-03-30 PROCEDURE — 77067 SCR MAMMO BI INCL CAD: CPT

## 2018-03-30 PROCEDURE — 77063 BREAST TOMOSYNTHESIS BI: CPT

## 2018-04-09 ENCOUNTER — OFFICE VISIT (OUTPATIENT)
Dept: SURGICAL ONCOLOGY | Facility: CLINIC | Age: 73
End: 2018-04-09
Payer: MEDICARE

## 2018-04-09 VITALS
HEART RATE: 74 BPM | RESPIRATION RATE: 18 BRPM | HEIGHT: 64 IN | WEIGHT: 173 LBS | DIASTOLIC BLOOD PRESSURE: 60 MMHG | TEMPERATURE: 97.8 F | BODY MASS INDEX: 29.53 KG/M2 | SYSTOLIC BLOOD PRESSURE: 110 MMHG

## 2018-04-09 DIAGNOSIS — Z79.811 USE OF ANASTROZOLE (ARIMIDEX): ICD-10-CM

## 2018-04-09 DIAGNOSIS — C50.911 INVASIVE DUCTAL CARCINOMA OF BREAST, RIGHT (HCC): Primary | ICD-10-CM

## 2018-04-09 PROBLEM — D05.11 BREAST NEOPLASM, TIS (DCIS), RIGHT: Status: ACTIVE | Noted: 2018-04-09

## 2018-04-09 PROBLEM — Z79.810 USE OF TAMOXIFEN (NOLVADEX): Status: RESOLVED | Noted: 2018-04-09 | Resolved: 2018-04-09

## 2018-04-09 PROBLEM — Z79.810 USE OF TAMOXIFEN (NOLVADEX): Status: ACTIVE | Noted: 2018-04-09

## 2018-04-09 PROBLEM — Z92.3 HISTORY OF RADIATION THERAPY: Status: RESOLVED | Noted: 2018-04-09 | Resolved: 2018-04-09

## 2018-04-09 PROBLEM — N64.1 FAT NECROSIS OF BREAST: Status: RESOLVED | Noted: 2018-04-09 | Resolved: 2018-04-09

## 2018-04-09 PROBLEM — D05.11 BREAST NEOPLASM, TIS (DCIS), RIGHT: Status: RESOLVED | Noted: 2018-04-09 | Resolved: 2018-04-09

## 2018-04-09 PROCEDURE — 99215 OFFICE O/P EST HI 40 MIN: CPT | Performed by: SURGERY

## 2018-04-09 NOTE — PROGRESS NOTES
Surgical Oncology Follow Up       [facility]  [facility] [address]  [facility] [place] [address]    Otoniel Lord  1945  [id number]  [address]  [facility] [place]  [address] [place] [id number]    Chief Complaint   Patient presents with    Breast Cancer     Pt is here for 6 month follow up       Assessment/Plan   Diagnoses and all orders for this visit:    Invasive ductal carcinoma of breast, right (HCC)    Use of anastrozole (Arimidex)        Advance Care Planning/Advance Directives:  Did not discuss  with the patient  Oncology History:       Invasive ductal carcinoma of breast, right [facility])     Initial Diagnosis     Invasive ductal carcinoma of breast, right [facility])       2004 Surgery     lumpectomy         2004 -  Radiation     WBRT         2004 -  Hormone Therapy     Tamoxifen X 5 years         3/9/2016 Surgery     Right breast US guided core biopsy,  IDC         4/12/2016 Surgery     Right breast mastectomy, SLNB         5/13/2016 -  Hormone Therapy     Anastrazole  Dr Xena Raymond            History of Present Illness: breast cancer follow up  -Interval History:recent GI bleed    Review of Systems:  Review of Systems   Constitutional: Positive for unexpected weight change (weight loss after recent surgery)  Negative for appetite change and fever  Eyes: Negative  Respiratory: Negative for shortness of breath  Cardiovascular: Negative  Gastrointestinal:        Recent GI bleed   Endocrine: Negative  Genitourinary: Negative  Musculoskeletal: Positive for arthralgias and myalgias  Skin: Negative  Allergic/Immunologic: Negative  Neurological: Negative  Hematological: Negative  Negative for adenopathy  Does not bruise/bleed easily  Psychiatric/Behavioral: Negative          Patient Active Problem List   Diagnosis    Anxiety    GERD (gastroesophageal reflux disease)    Hypertension    Hypothyroidism    Hyperlipidemia    Bilateral renal cysts    Ischemic colitis ([facility] [address] )    Hypokalemia    Thoracic aortic aneurysm (HCC)    Impaired fasting glucose    Esophageal reflux disease    Invasive ductal carcinoma of breast, right (HCC)    Use of anastrozole (Arimidex)     Past Medical History:   Diagnosis Date    Abnormal CT of the abdomen     Acute bronchitis     Anxiety     Appetite loss     Arthritis     Ascending aortic aneurysm (HCC)     Bilateral renal cysts     Cyst of ovary     Dysphagia     Esophageal reflux disease     Fat necrosis of breast     Full dentures     GERD (gastroesophageal reflux disease)     Herpes zoster     History of radiation therapy     Hyperlipidemia     Hypertension     Hypokalemia     Hypomagnesemia     Hypothyroidism     Impaired fasting glucose     Irritable bowel syndrome (IBS)     Ischemic colitis (HCC)     Knee pain     Limb alert care status     no BP/IV right arm    Osteoarthritis of right knee     Pneumonia     Post-op pain     Pulmonary nodules     Thoracic aortic aneurysm (HCC)     Unspecified ovarian cysts     Use of anastrozole (Arimidex)     Vasovagal syncope     Vitamin D deficiency     Wears glasses     Weight loss      Past Surgical History:   Procedure Laterality Date    APPENDECTOMY      pt states it was not removed    BREAST BIOPSY Right 03/02/2016    BREAST LUMPECTOMY Right 2004    BREAST SURGERY Right     Single lesion excision 1990 hyperplasia, 1st biopsy at 25yrs old was benign    CHOLECYSTECTOMY      COLONOSCOPY      COLONOSCOPY N/A 5/12/2017    Procedure: COLONOSCOPY with biopsy;  Surgeon: Hans Bell MD;  Location: [facility];   Service:     HYSTERECTOMY      KNEE SURGERY Right     ND BIOPSY/EXCISION, LYMPH NODE(S) Right 4/12/2016    Procedure: BIOPSY LYMPH NODE SENTINEL @ 1200 LYMPHOSCINTIGRAPHY LYMPHATIC MAPPING;  Surgeon: Li Fraser MD;  Location: [facility];  Service: General    ND MASTECTOMY, SIMPLE, COMPLETE Right 4/12/2016    Procedure: MASTECTOMY SIMPLE;  Surgeon: Eldon Griggs MD;  Location: [facility];  Service: General    TUBAL LIGATION       Family History   Problem Relation Age of Onset    Stroke Maternal Grandmother     Anemia Mother     Other Mother      disorders of blood and blood forming organs    Hypertension Mother     Stroke Father     Alcohol abuse Brother      Social History     Social History    Marital status: Single     Spouse name: N/A    Number of children: N/A    Years of education: N/A     Occupational History    Not on file  Social History Main Topics    Smoking status: Never Smoker    Smokeless tobacco: Never Used    Alcohol use Yes      Comment: occasionally    Drug use: No    Sexual activity: Not on file     Other Topics Concern    Not on file     Social History Narrative    No narrative on file       Current Outpatient Prescriptions:     anastrozole (ARIMIDEX) 1 mg tablet, Take 1 tablet by mouth daily, Disp: , Rfl:     levothyroxine 75 mcg tablet, TAKE 1 TABLET EVERY DAY, Disp: 90 tablet, Rfl: 0    losartan-hydrochlorothiazide (HYZAAR) 100-25 MG per tablet, TAKE 1 TABLET EVERY DAY, Disp: 90 tablet, Rfl: 0    metoprolol succinate (TOPROL-XL) 200 MG 24 hr tablet, TAKE 1 TABLET EVERY DAY, Disp: 90 tablet, Rfl: 0    pantoprazole (PROTONIX) 40 mg tablet, Take 40 mg by mouth daily  , Disp: , Rfl:     amLODIPine (NORVASC) 5 mg tablet, Take 1 tablet (5 mg total) by mouth daily, Disp: 90 tablet, Rfl: 1  Allergies   Allergen Reactions    Demerol [Meperidine] GI Intolerance and Other (See Comments)     severe nausea  Nausea/vomiting    Nitroglycerin Anaphylaxis and Other (See Comments)     BP drops drastically    Lipitor [Atorvastatin] Other (See Comments)     Joint/muscle pain    Zocor [Simvastatin] Other (See Comments)     Joint/muscle pain       The following portions of the patient's history were reviewed and updated as appropriate: allergies, current medications, past family history, past medical history, past social history, past surgical history and problem list         Vitals:    04/09/18 1343   BP: 110/60   Pulse: 74   Resp: 18   Temp: 97 8 °F (36 6 °C)       Physical Exam   Constitutional: She is oriented to person, place, and time  She appears well-developed and well-nourished  HENT:   Head: Normocephalic and atraumatic  Cardiovascular: Normal heart sounds  Pulmonary/Chest: Breath sounds normal  Right breast exhibits skin change (mastectomy scar)  Right breast exhibits no mass and no tenderness  Left breast exhibits no inverted nipple, no mass, no nipple discharge, no skin change and no tenderness  Abdominal: Soft  Lymphadenopathy:        Right axillary: No pectoral and no lateral adenopathy present  Left axillary: No pectoral and no lateral adenopathy present  Right: No supraclavicular adenopathy present  Left: No supraclavicular adenopathy present  Neurological: She is alert and oriented to person, place, and time  Psychiatric: She has a normal mood and affect  Results:      Imaging  3/30/18 left 3d screen mamm benign    I reviewed the above imaging data  Discussion/Summary:  27-year-old female status post completion mastectomy of the right breast after recurrent carcinoma  She continues on anastrozole  She reports a recent GI bleed  There is no evidence of disease based on examination today or recent contralateral mammogram   I will see her again in six months or sooner should the need arise

## 2018-06-05 ENCOUNTER — OFFICE VISIT (OUTPATIENT)
Dept: FAMILY MEDICINE CLINIC | Facility: CLINIC | Age: 73
End: 2018-06-05
Payer: MEDICARE

## 2018-06-05 VITALS
SYSTOLIC BLOOD PRESSURE: 128 MMHG | TEMPERATURE: 98.1 F | BODY MASS INDEX: 29.19 KG/M2 | DIASTOLIC BLOOD PRESSURE: 72 MMHG | HEIGHT: 64 IN | WEIGHT: 171 LBS

## 2018-06-05 DIAGNOSIS — I71.2 THORACIC AORTIC ANEURYSM WITHOUT RUPTURE (HCC): Primary | ICD-10-CM

## 2018-06-05 DIAGNOSIS — Z00.00 MEDICARE ANNUAL WELLNESS VISIT, SUBSEQUENT: Primary | ICD-10-CM

## 2018-06-05 DIAGNOSIS — I71.2 THORACIC AORTIC ANEURYSM WITHOUT RUPTURE (HCC): ICD-10-CM

## 2018-06-05 DIAGNOSIS — I10 ESSENTIAL HYPERTENSION: ICD-10-CM

## 2018-06-05 DIAGNOSIS — E78.2 MIXED HYPERLIPIDEMIA: ICD-10-CM

## 2018-06-05 DIAGNOSIS — R73.01 IMPAIRED FASTING GLUCOSE: ICD-10-CM

## 2018-06-05 DIAGNOSIS — K21.00 GASTROESOPHAGEAL REFLUX DISEASE WITH ESOPHAGITIS: ICD-10-CM

## 2018-06-05 PROCEDURE — 99214 OFFICE O/P EST MOD 30 MIN: CPT | Performed by: FAMILY MEDICINE

## 2018-06-05 NOTE — PROGRESS NOTES
Assessment/Plan:  Labs reviewed  the hypothyroidism and hypertension stable at this time  Hyperlipidemia   Uncontrolled  Patient's impaired fasting glucose stable  Patient may use Protonix intermittently as needed  Patient will see thoracic surgeon for follow-up on thoracic aneurysm  Refills will be given as needed  Patient refusing statins  Diagnoses and all orders for this visit:    Medicare annual wellness visit, subsequent    Essential hypertension    Mixed hyperlipidemia    Impaired fasting glucose    Gastroesophageal reflux disease with esophagitis    Thoracic aortic aneurysm without rupture (HCC)          Subjective:      Patient ID: Glory Okeefe is a 81 y o  female  Pt  Is here for htn, hypothyroidism and hld and gerd  Pt  Is doing well overall  Patient with insomnia  Patient with some fatigue associated with this  Patient does get some burning type chest pain intermittently this is associated with GERD symptoms  No sob or other cp  Patient has not seen thoracic surgeon recently  The following portions of the patient's history were reviewed and updated as appropriate: allergies, current medications, past family history, past medical history, past social history, past surgical history and problem list     Review of Systems   Constitutional: Negative  HENT: Negative  Eyes: Negative  Respiratory: Negative  Cardiovascular: Positive for chest pain  Gastrointestinal: Negative  Endocrine: Negative  Genitourinary: Negative  Musculoskeletal: Negative  Skin: Negative  Allergic/Immunologic: Negative  Neurological: Negative  Hematological: Negative  Psychiatric/Behavioral: Negative            Objective:      /72 (BP Location: Right arm, Patient Position: Sitting, Cuff Size: Standard)   Temp 98 1 °F (36 7 °C) (Tympanic)   Ht 5' 3 5" (1 613 m)   Wt 77 6 kg (171 lb)   BMI 29 82 kg/m²          Physical Exam   Constitutional: She is oriented to person, place, and time  She appears well-developed and well-nourished  No distress  HENT:   Head: Normocephalic  Right Ear: External ear normal    Left Ear: External ear normal    Mouth/Throat: Oropharynx is clear and moist  No oropharyngeal exudate  Eyes: EOM are normal  Pupils are equal, round, and reactive to light  Right eye exhibits no discharge  Left eye exhibits no discharge  No scleral icterus  Neck: Normal range of motion  Neck supple  No thyromegaly present  Cardiovascular: Normal rate, regular rhythm, normal heart sounds and intact distal pulses  Exam reveals no gallop and no friction rub  No murmur heard  Pulmonary/Chest: Effort normal and breath sounds normal  No respiratory distress  She has no wheezes  She has no rales  She exhibits no tenderness  Abdominal: Soft  Bowel sounds are normal  She exhibits no distension  There is no tenderness  There is no rebound and no guarding  Musculoskeletal: Normal range of motion  She exhibits no edema or tenderness  Lymphadenopathy:     She has no cervical adenopathy  Neurological: She is oriented to person, place, and time  No cranial nerve deficit  She exhibits normal muscle tone  Coordination normal    Skin: Skin is warm and dry  No rash noted  She is not diaphoretic  No erythema  No pallor  Psychiatric: She has a normal mood and affect  Her behavior is normal  Judgment and thought content normal    Nursing note and vitals reviewed

## 2018-06-05 NOTE — PROGRESS NOTES
Assessment and Plan:    Problem List Items Addressed This Visit     None        Health Maintenance Due   Topic Date Due    DTaP,Tdap,and Td Vaccines (1 - Tdap) 10/06/1968    Fall Risk  10/06/2012    Urinary Incontinence Screening  10/06/2012         HPI:  Jessica Toledo is a 81 y o  female here for her Subsequent Wellness Visit      Patient Active Problem List   Diagnosis    Anxiety    GERD (gastroesophageal reflux disease)    Hypertension    Hypothyroidism    Hyperlipidemia    Bilateral renal cysts    Ischemic colitis ([address] )    Hypokalemia    Thoracic aortic aneurysm (HCC)    Impaired fasting glucose    Esophageal reflux disease    Invasive ductal carcinoma of breast, right (HCC)    Use of anastrozole (Arimidex)     Past Medical History:   Diagnosis Date    Abnormal CT of the abdomen     Acute bronchitis     Anxiety     Appetite loss     Arthritis     Ascending aortic aneurysm (HCC)     Bilateral renal cysts     Cyst of ovary     Dysphagia     Esophageal reflux disease     Fat necrosis of breast     Full dentures     GERD (gastroesophageal reflux disease)     Herpes zoster     History of radiation therapy     Hyperlipidemia     Hypertension     Hypokalemia     Hypomagnesemia     Hypothyroidism     Impaired fasting glucose     Irritable bowel syndrome (IBS)     Ischemic colitis (HCC)     Knee pain     Limb alert care status     no BP/IV right arm    Osteoarthritis of right knee     Pneumonia     Post-op pain     Pulmonary nodules     Thoracic aortic aneurysm (HCC)     Unspecified ovarian cysts     Use of anastrozole (Arimidex)     Vasovagal syncope     Vitamin D deficiency     Wears glasses     Weight loss      Past Surgical History:   Procedure Laterality Date    APPENDECTOMY      pt states it was not removed    BREAST BIOPSY Right 03/02/2016    BREAST LUMPECTOMY Right 2004    BREAST SURGERY Right     Single lesion excision 1990 hyperplasia, 1st biopsy at 25yrs old was benign    CHOLECYSTECTOMY      COLONOSCOPY      COLONOSCOPY N/A 5/12/2017    Procedure: COLONOSCOPY with biopsy;  Surgeon: Marie Smith MD;  [facility]; Service:     HYSTERECTOMY      KNEE SURGERY Right     MA BIOPSY/EXCISION, LYMPH NODE(S) Right 4/12/2016    Procedure: BIOPSY LYMPH NODE SENTINEL @ 1200 LYMPHOSCINTIGRAPHY LYMPHATIC MAPPING;  Surgeon: Layne Sorensen MD;  [facility];  Service: General    MA MASTECTOMY, SIMPLE, COMPLETE Right 4/12/2016    Procedure: MASTECTOMY SIMPLE;  Surgeon: Layne Sorensen MD;  [facility];  Service: General    TUBAL LIGATION       Family History   Problem Relation Age of Onset    Stroke Maternal Grandmother     Anemia Mother     Other Mother      disorders of blood and blood forming organs    Hypertension Mother     Stroke Father     Alcohol abuse Brother      History   Smoking Status    Never Smoker   Smokeless Tobacco    Never Used     History   Alcohol Use    Yes     Comment: occasionally      History   Drug Use No       Current Outpatient Prescriptions   Medication Sig Dispense Refill    anastrozole (ARIMIDEX) 1 mg tablet Take 1 tablet by mouth daily      levothyroxine 75 mcg tablet TAKE 1 TABLET EVERY DAY 90 tablet 0    losartan-hydrochlorothiazide (HYZAAR) 100-25 MG per tablet TAKE 1 TABLET EVERY DAY 90 tablet 0    metoprolol succinate (TOPROL-XL) 200 MG 24 hr tablet TAKE 1 TABLET EVERY DAY 90 tablet 0     No current facility-administered medications for this visit        Allergies   Allergen Reactions    Demerol [Meperidine] GI Intolerance and Other (See Comments)     severe nausea  Nausea/vomiting    Nitroglycerin Anaphylaxis and Other (See Comments)     BP drops drastically    Lipitor [Atorvastatin] Other (See Comments)     Joint/muscle pain    Zocor [Simvastatin] Other (See Comments)     Joint/muscle pain     Immunization History   Administered Date(s) Administered     Influenza (IM) Preservative Free 09/17/2010, 12/29/2011    Influenza Split High Dose Preservative Free IM 11/14/2012, 10/30/2013, 11/21/2014, 01/14/2016, 10/03/2016, 10/30/2017    Influenza TIV (IM) 10/10/2008, 10/02/2009    Pneumococcal Polysaccharide PPV23 08/17/2016       Patient Care Team:  Oren Michaels DO as PCP - General  Brent Resendez MD as PCP - Breast Clinic (General Surgery)  MD Oren De Paz DO Christyne Graham, MD Loistine Ables, MD Algis Meadows, MD Audie Brass, DO      Medicare Screening Tests and Risk Assessments:  AWV Clinical     ISAR:       Once in a Lifetime Medicare Screening:       Medicare Screening Tests and Risk Assessment:   AAA Risk Assessment    Osteoporosis Risk Assessment    HIV Risk Assessment        Drug and Alcohol Use:   Tobacco use    Tobacco use duration    Tobacco Cessation Readiness    Alcohol use    Alcohol Treatment Readiness   Illicit Drug Use        Diet & Exercise:   Diet   How many servings a day of the following:   Exercise        Cognitive Impairment Screening:   Cognitive Impairment Screening        Functional Ability/Level of Safety:   Hearing    Hearing Impairment Assessment    Current Activities    Help needed with the folllowing:    ADL    Fall Risk   Injury History       Home Safety:   Home Safety Risk Factors       Advanced Directives:   Advanced Directives    Patient's End of Life Decisions        Urinary Incontinence:       Glaucoma:            Provider Screening    No data filed                      Assessment and Plan:    Problem List Items Addressed This Visit     None      Visit Diagnoses     Medicare annual wellness visit, subsequent    -  Primary        There are no preventive care reminders to display for this patient  HPI:  Chavo Garcia is a 81 y o  female here for her Subsequent Wellness Visit      Patient Active Problem List   Diagnosis    Anxiety    GERD (gastroesophageal reflux disease)    Hypertension    Hypothyroidism    Hyperlipidemia    Bilateral renal cysts    Ischemic colitis ([facility] [address]    Hypokalemia    Thoracic aortic aneurysm (HCC)    Impaired fasting glucose    Esophageal reflux disease    Invasive ductal carcinoma of breast, right (HCC)    Use of anastrozole (Arimidex)     Past Medical History:   Diagnosis Date    Abnormal CT of the abdomen     Acute bronchitis     Anxiety     Appetite loss     Arthritis     Ascending aortic aneurysm (HCC)     Bilateral renal cysts     Cyst of ovary     Dysphagia     Esophageal reflux disease     Fat necrosis of breast     Full dentures     GERD (gastroesophageal reflux disease)     Herpes zoster     History of radiation therapy     Hyperlipidemia     Hypertension     Hypokalemia     Hypomagnesemia     Hypothyroidism     Impaired fasting glucose     Irritable bowel syndrome (IBS)     Ischemic colitis (HCC)     Knee pain     Limb alert care status     no BP/IV right arm    Osteoarthritis of right knee     Pneumonia     Post-op pain     Pulmonary nodules     Thoracic aortic aneurysm (HCC)     Unspecified ovarian cysts     Use of anastrozole (Arimidex)     Vasovagal syncope     Vitamin D deficiency     Wears glasses     Weight loss      Past Surgical History:   Procedure Laterality Date    APPENDECTOMY      pt states it was not removed    BREAST BIOPSY Right 03/02/2016    BREAST LUMPECTOMY Right 2004    BREAST SURGERY Right     Single lesion excision 1990 hyperplasia, 1st biopsy at 25yrs old was benign    CHOLECYSTECTOMY      COLONOSCOPY      COLONOSCOPY N/A 5/12/2017    Procedure: COLONOSCOPY with biopsy;  Surgeon: Tammi Maldonado MD;  [facility];   Service:     HYSTERECTOMY      KNEE SURGERY Right     NE BIOPSY/EXCISION, LYMPH NODE(S) Right 4/12/2016    Procedure: BIOPSY LYMPH NODE SENTINEL @ 1200 LYMPHOSCINTIGRAPHY LYMPHATIC MAPPING;  Surgeon: Antonio Sousa MD;  [facility];  Service: General    NE MASTECTOMY, SIMPLE, COMPLETE Right 4/12/2016    Procedure: MASTECTOMY SIMPLE;  Surgeon: Patt Gurrola MD;  [facility];  Service: General    TUBAL LIGATION       Family History   Problem Relation Age of Onset    Stroke Maternal Grandmother     Anemia Mother     Other Mother      disorders of blood and blood forming organs    Hypertension Mother     Stroke Father     Alcohol abuse Brother      History   Smoking Status    Never Smoker   Smokeless Tobacco    Never Used     History   Alcohol Use    Yes     Comment: occasionally      History   Drug Use No       Current Outpatient Prescriptions   Medication Sig Dispense Refill    anastrozole (ARIMIDEX) 1 mg tablet Take 1 tablet by mouth daily      levothyroxine 75 mcg tablet TAKE 1 TABLET EVERY DAY 90 tablet 0    losartan-hydrochlorothiazide (HYZAAR) 100-25 MG per tablet TAKE 1 TABLET EVERY DAY 90 tablet 0    metoprolol succinate (TOPROL-XL) 200 MG 24 hr tablet TAKE 1 TABLET EVERY DAY 90 tablet 0     No current facility-administered medications for this visit        Allergies   Allergen Reactions    Demerol [Meperidine] GI Intolerance and Other (See Comments)     severe nausea  Nausea/vomiting    Nitroglycerin Anaphylaxis and Other (See Comments)     BP drops drastically    Lipitor [Atorvastatin] Other (See Comments)     Joint/muscle pain    Zocor [Simvastatin] Other (See Comments)     Joint/muscle pain     Immunization History   Administered Date(s) Administered     Influenza (IM) Preservative Free 09/17/2010, 12/29/2011    Influenza Split High Dose Preservative Free IM 11/14/2012, 10/30/2013, 11/21/2014, 01/14/2016, 10/03/2016, 10/30/2017    Influenza TIV (IM) 10/10/2008, 10/02/2009    Pneumococcal Polysaccharide PPV23 08/17/2016       Patient Care Team:  Elisabeth Live DO as PCP - General  Patt Gurrola MD as PCP - Breast Clinic (General Surgery)  MD Elisabeth Oseguera DO Mancil Levy, MD Erline Gaba, MD Elesa Row, MD Fausto Bimler, DO      Medicare Screening Tests and Risk Assessments:  AWV Clinical     ISAR:   Previous hospitalizations?:  No       Once in a Lifetime Medicare Screening:       Medicare Screening Tests and Risk Assessment:   AAA Risk Assessment    Osteoporosis Risk Assessment    HIV Risk Assessment        Drug and Alcohol Use:   Tobacco use    Cigarettes:  never smoker    Tobacco use duration    Tobacco Cessation Readiness    Alcohol use    Alcohol use:  occasional use    Amount of alcohol consumed:  soical   Concern about alcohol use:  No Tolerance to alcohol:  No   Attempts to cut down:  No Guilt about use:  No   Patient concern:  No Annoyed by criticism:  No   Morning drinking:  No Family concern:  No   Alcohol Treatment Readiness   Readiness to quit:  declined    Illicit Drug Use    Drug use:  never        Diet & Exercise:   Diet   What is your diet?:  Regular   How many servings a day of the following:   Fruits and Vegetables:  1-2 Meat:  1-2   Whole Grains:  0 Simple Carbs:  0   Dairy:  1 Soda:  1   Coffee:  0 Tea:  0   Exercise    Do you currently exercise?:  currently not exercising       Cognitive Impairment Screening:   Cognitive Impairment Screening    Do you have difficulty learning or retaining new information?:  No Do you have difficulty handling new tasks?:  No   Do you have difficulty with reasoning?:  No    Do you have difficulty with language?:  No Do you have difficulty with behavior?:  No       Functional Ability/Level of Safety:   Hearing    Hearing difficulties:  No Bilateral:  normal   Left:  normal Right:  normal   Hearing aid:  No    Hearing Impairment Assessment    Hearing status:  No impairment   Do your family members ever complain that you turn on the radio or Wantful  too loudly?:  No Do you find that other people have to repeat themselves when talking to you?:  No   Do you have difficulty hearing while talking on the phone?:  No Has anyone ever told you that you are speaking too loudly when talking with them?:  No   Do you have trouble hearing the doorbell or phone ringing?:  No Do you have difficulty hearing such that you feel frustrated talking to people?:  No   Do you feel sad because you cannot hear well?:  No Do you feel inconvienced due to your hearing problem?:  No   Do you think you would be a happier person if you could hear better?:  No Would you be willing to go for a hearing aid fitting if suggested?:  No   Current Activities    Status:  unlimited ADL's   Help needed with the folllowing:    Using the phone:  No Transportation:  No   Shopping:  No Preparing Meals:  No   Doing Housework:  No Doing Laundry:  No   Managing Medications:  No Managing Money:  No   ADL    Feeding:  Independant   Oral hygiene and Facial grooming:  Independant   Bathing:  Independant   Upper Body Dressing:  Independant   Lower Body Dressing:  Independant   Toileting:  Independant   Bed Mobility:  Independant   Fall Risk   Have you fallen in the last 12 months?:  No Are you unsteady on your feet?:  No    Are you taking any medications that may cause fatigue or dizziness?:  No    Do you rush to the bathroom potentially risking a fall?:  No   Injury History       Home Safety:   Are there hazards in your environment?:  No   If you fell, would you need help to get back up from the ground?:  No Do you have problems or concerns getting in/out of a bed, chair, tub, or toilet?:  No   Do you feel unsteady when walking?:  No Is your activity limited by pain?:  Yes   Do you have handrails and grab-bars in the home?:  Yes Are emergency numbers kept by the phone and regularly updated?:  Yes   Are you and/or family members aware of the dangers of smoking in bed?:  Yes Are firearms stored securely?:  No   Do you have working smoke alarms and fire extinguisher?:  Yes Do all household members know how to use them?:  Yes   Have you left the stove on unsupervised?:  No    Home Safety Risk Factors   Unfamilar with surroundings:  No Uneven floors:  No   Stairs or handrail saftey risk:  Yes Loose rugs:  No   Household clutter:  No Poor household lighting:  No   No grab bars in bathroom:  Yes Further evaluation needed:  No       Advanced Directives:   Advanced Directives    Living Will:  Yes Durable POA for healthcare: Yes   Advanced directive:  Yes    Patient's End of Life Decisions        Urinary Incontinence:   Do you have urinary incontinence?:  No Do you have incomplete emptying?:  No   Do you urinate frequently?:  No Do you have urinary urgency?:  No   Do you have urinary hesitancy?:  No Do you have dysuria (painful and/or difficult urination)?:  No   Do you have nocturia (waking up to urinate)?:  Yes Do you strain when urinating (have to push to urinate)?:  No   Do you have a weak stream when urinating?:  No Do you have intermittent streaming when urinating?:  No   Do you dribble urine after finishing?:  No    Do you have vaginal pressure?:  No Do you have vaginal dryness?:  No       Glaucoma:            Provider Screening     Preventative Screening/Counseling:   Cardiovascular Screening/Counseling:   (Labs Q5 years, EKG optional one-time)   General:  Risks and Benefits Discussed, Screening Current           Diabetes Screening/Counseling:   (2 tests/year if Pre-Diabetes or 1 test/year if no Diabetes)   General:  Risks and Benefits Discussed, Screening Current           Colorectal Cancer Screening/Counseling:   (FOBT Q1 yr; Flex Sig Q4 yrs or Q10 yrs after Screening Colonoscopy; Screening Colonoscpy Q2 yrs High Risk or Q10 yrs Low Risk; Barium Enema Q2 yrs High Risk or Q4 yrs Low Risk)   General:  Risks and Benefits Discussed, Screening Current           Prostate Cancer Screening/Counseling:   (Annual)          Breast Cancer Screening/Counseling:   (Baseline Age 28 - 43;  Annual Age 36+)   General:  Risks and Benefits Discussed, Screening Current          Cervical Cancer Screening/Counseling:   (Annual for High Risk or Childbearing Age with Abnormal Pap in Last 3 yrs; Every 2 all others)   General:  Risks and Benefits Discussed, Screening Not Indicated           Osteoporosis Screening/Counseling:   (Every 2 Yrs if at risk or more if medically necessary)   General:  Risks and Benefits Discussed, Screening Current Counseling:  Calcium and Vitamin D Intake          AAA Screening/Counseling:   (Once per Lifetime with risk factors)    General:  Screening Not Indicated           Glaucoma Screening/Counseling:   (Annual)   General:  Risks and Benefits Discussed Due for: Referrals:  referral to ophthalmology         HIV Screening/Counseling:   (Voluntary;  Once annually for high risk OR 3 times for Pregnancy at diagnosis of IUP; 3rd trimester; and at Labor   General:  Risks and Benefits Discussed, Screening Not Indicated           Hepatitis C Screening:             Immunizations:   Influenza (annual):  Risks & Benefits Discussed, Influenza UTD This Year   Pneumococcal (Once in a Lifetime):  Risks & Benefits Discussed, Lifetime Vaccine Completed   Zostavax (Medicare D Coverage, Pt >70 yo):  Risks & Benefits Discussed, Patient Declines   TD (Non-Medicare Wellness  Visit required):  Risks & Benefits Discussed, Patient Declines       Other Preventative Couseling (Non-Medicare Wellness Visit Required):   nutrition counseling performed, fall prevention education provided       Referrals (Non-Medicare Wellness Visit Required):       Medical Equipment/Suppliers:

## 2018-07-24 ENCOUNTER — APPOINTMENT (EMERGENCY)
Dept: RADIOLOGY | Facility: HOSPITAL | Age: 73
DRG: 552 | End: 2018-07-24
Payer: MEDICARE

## 2018-07-24 ENCOUNTER — HOSPITAL ENCOUNTER (EMERGENCY)
Facility: HOSPITAL | Age: 73
DRG: 552 | End: 2018-07-24
Attending: EMERGENCY MEDICINE
Payer: MEDICARE

## 2018-07-24 ENCOUNTER — APPOINTMENT (EMERGENCY)
Dept: CT IMAGING | Facility: HOSPITAL | Age: 73
DRG: 552 | End: 2018-07-24
Payer: MEDICARE

## 2018-07-24 ENCOUNTER — HOSPITAL ENCOUNTER (INPATIENT)
Facility: HOSPITAL | Age: 73
LOS: 3 days | Discharge: NURSING, REHAB OR LONG-TERM CARE | DRG: 552 | End: 2018-07-27
Attending: SURGERY | Admitting: SURGERY
Payer: MEDICARE

## 2018-07-24 VITALS
WEIGHT: 170 LBS | TEMPERATURE: 98.4 F | SYSTOLIC BLOOD PRESSURE: 152 MMHG | RESPIRATION RATE: 18 BRPM | HEART RATE: 81 BPM | OXYGEN SATURATION: 98 % | DIASTOLIC BLOOD PRESSURE: 75 MMHG | BODY MASS INDEX: 29.64 KG/M2

## 2018-07-24 DIAGNOSIS — S12.491A OTHER CLOSED NONDISPLACED FRACTURE OF FIFTH CERVICAL VERTEBRA, INITIAL ENCOUNTER (HCC): ICD-10-CM

## 2018-07-24 DIAGNOSIS — S12.9XXA MULTIPLE FRACTURES OF CERVICAL SPINE (HCC): Primary | ICD-10-CM

## 2018-07-24 DIAGNOSIS — F10.10 ALCOHOL ABUSE: ICD-10-CM

## 2018-07-24 DIAGNOSIS — S12.100D: ICD-10-CM

## 2018-07-24 DIAGNOSIS — R55 SYNCOPE, UNSPECIFIED SYNCOPE TYPE: ICD-10-CM

## 2018-07-24 DIAGNOSIS — S12.691A OTHER CLOSED NONDISPLACED FRACTURE OF SEVENTH CERVICAL VERTEBRA, INITIAL ENCOUNTER (HCC): ICD-10-CM

## 2018-07-24 DIAGNOSIS — E87.6 HYPOKALEMIA: ICD-10-CM

## 2018-07-24 DIAGNOSIS — S09.90XA MINOR HEAD INJURY, INITIAL ENCOUNTER: ICD-10-CM

## 2018-07-24 DIAGNOSIS — Z91.81 HX OF FALL: ICD-10-CM

## 2018-07-24 DIAGNOSIS — S12.100A CLOSED ODONTOID FRACTURE, INITIAL ENCOUNTER (HCC): Primary | ICD-10-CM

## 2018-07-24 PROBLEM — S12.601A CLOSED NONDISPLACED FRACTURE OF SEVENTH CERVICAL VERTEBRA (HCC): Status: ACTIVE | Noted: 2018-07-24

## 2018-07-24 PROBLEM — S12.401A CLOSED NONDISPLACED FRACTURE OF FIFTH CERVICAL VERTEBRA (HCC): Status: ACTIVE | Noted: 2018-07-24

## 2018-07-24 LAB
ANION GAP SERPL CALCULATED.3IONS-SCNC: 12 MMOL/L (ref 4–13)
APTT PPP: 29 SECONDS (ref 24–36)
ATRIAL RATE: 104 BPM
BASOPHILS # BLD AUTO: 0.02 THOUSANDS/ΜL (ref 0–0.1)
BASOPHILS NFR BLD AUTO: 0 % (ref 0–1)
BUN SERPL-MCNC: 12 MG/DL (ref 5–25)
CALCIUM SERPL-MCNC: 9 MG/DL (ref 8.3–10.1)
CHLORIDE SERPL-SCNC: 93 MMOL/L (ref 100–108)
CO2 SERPL-SCNC: 28 MMOL/L (ref 21–32)
CREAT SERPL-MCNC: 0.83 MG/DL (ref 0.6–1.3)
EOSINOPHIL # BLD AUTO: 0.01 THOUSAND/ΜL (ref 0–0.61)
EOSINOPHIL NFR BLD AUTO: 0 % (ref 0–6)
ERYTHROCYTE [DISTWIDTH] IN BLOOD BY AUTOMATED COUNT: 12 % (ref 11.6–15.1)
ETHANOL SERPL-MCNC: 31 MG/DL (ref 0–3)
GFR SERPL CREATININE-BSD FRML MDRD: 72 ML/MIN/1.73SQ M
GLUCOSE SERPL-MCNC: 155 MG/DL (ref 65–140)
HCT VFR BLD AUTO: 40.9 % (ref 34.8–46.1)
HGB BLD-MCNC: 14.6 G/DL (ref 11.5–15.4)
INR PPP: 1.02 (ref 0.86–1.17)
LYMPHOCYTES # BLD AUTO: 0.86 THOUSANDS/ΜL (ref 0.6–4.47)
LYMPHOCYTES NFR BLD AUTO: 15 % (ref 14–44)
MCH RBC QN AUTO: 35.4 PG (ref 26.8–34.3)
MCHC RBC AUTO-ENTMCNC: 35.7 G/DL (ref 31.4–37.4)
MCV RBC AUTO: 99 FL (ref 82–98)
MONOCYTES # BLD AUTO: 0.52 THOUSAND/ΜL (ref 0.17–1.22)
MONOCYTES NFR BLD AUTO: 9 % (ref 4–12)
NEUTROPHILS # BLD AUTO: 4.21 THOUSANDS/ΜL (ref 1.85–7.62)
NEUTS SEG NFR BLD AUTO: 75 % (ref 43–75)
NRBC BLD AUTO-RTO: 0 /100 WBCS
PLATELET # BLD AUTO: 143 THOUSANDS/UL (ref 149–390)
PMV BLD AUTO: 10 FL (ref 8.9–12.7)
POTASSIUM SERPL-SCNC: 2.9 MMOL/L (ref 3.5–5.3)
PROTHROMBIN TIME: 13.5 SECONDS (ref 11.8–14.2)
QRS AXIS: -15 DEGREES
QRSD INTERVAL: 80 MS
QT INTERVAL: 354 MS
QTC INTERVAL: 418 MS
RBC # BLD AUTO: 4.12 MILLION/UL (ref 3.81–5.12)
SODIUM SERPL-SCNC: 133 MMOL/L (ref 136–145)
T WAVE AXIS: 42 DEGREES
TROPONIN I SERPL-MCNC: <0.02 NG/ML
TROPONIN I SERPL-MCNC: <0.02 NG/ML
TSH SERPL DL<=0.05 MIU/L-ACNC: 0.6 UIU/ML (ref 0.36–3.74)
VENTRICULAR RATE: 84 BPM
WBC # BLD AUTO: 5.62 THOUSAND/UL (ref 4.31–10.16)

## 2018-07-24 PROCEDURE — 70496 CT ANGIOGRAPHY HEAD: CPT

## 2018-07-24 PROCEDURE — 99222 1ST HOSP IP/OBS MODERATE 55: CPT | Performed by: SURGERY

## 2018-07-24 PROCEDURE — 96366 THER/PROPH/DIAG IV INF ADDON: CPT

## 2018-07-24 PROCEDURE — 93010 ELECTROCARDIOGRAM REPORT: CPT | Performed by: INTERNAL MEDICINE

## 2018-07-24 PROCEDURE — 85730 THROMBOPLASTIN TIME PARTIAL: CPT | Performed by: EMERGENCY MEDICINE

## 2018-07-24 PROCEDURE — 80048 BASIC METABOLIC PNL TOTAL CA: CPT | Performed by: EMERGENCY MEDICINE

## 2018-07-24 PROCEDURE — 93005 ELECTROCARDIOGRAM TRACING: CPT

## 2018-07-24 PROCEDURE — 36415 COLL VENOUS BLD VENIPUNCTURE: CPT | Performed by: EMERGENCY MEDICINE

## 2018-07-24 PROCEDURE — 90715 TDAP VACCINE 7 YRS/> IM: CPT | Performed by: EMERGENCY MEDICINE

## 2018-07-24 PROCEDURE — 99285 EMERGENCY DEPT VISIT HI MDM: CPT

## 2018-07-24 PROCEDURE — 84484 ASSAY OF TROPONIN QUANT: CPT | Performed by: EMERGENCY MEDICINE

## 2018-07-24 PROCEDURE — 70450 CT HEAD/BRAIN W/O DYE: CPT

## 2018-07-24 PROCEDURE — 72125 CT NECK SPINE W/O DYE: CPT

## 2018-07-24 PROCEDURE — 96365 THER/PROPH/DIAG IV INF INIT: CPT

## 2018-07-24 PROCEDURE — 71045 X-RAY EXAM CHEST 1 VIEW: CPT

## 2018-07-24 PROCEDURE — 80320 DRUG SCREEN QUANTALCOHOLS: CPT | Performed by: EMERGENCY MEDICINE

## 2018-07-24 PROCEDURE — 84443 ASSAY THYROID STIM HORMONE: CPT | Performed by: EMERGENCY MEDICINE

## 2018-07-24 PROCEDURE — 85610 PROTHROMBIN TIME: CPT | Performed by: EMERGENCY MEDICINE

## 2018-07-24 PROCEDURE — 85025 COMPLETE CBC W/AUTO DIFF WBC: CPT | Performed by: EMERGENCY MEDICINE

## 2018-07-24 PROCEDURE — 70498 CT ANGIOGRAPHY NECK: CPT

## 2018-07-24 RX ORDER — ASPIRIN 325 MG
325 TABLET ORAL DAILY
Status: DISCONTINUED | OUTPATIENT
Start: 2018-07-25 | End: 2018-07-27 | Stop reason: HOSPADM

## 2018-07-24 RX ORDER — ANASTROZOLE 1 MG/1
1 TABLET ORAL DAILY
Status: DISCONTINUED | OUTPATIENT
Start: 2018-07-24 | End: 2018-07-27 | Stop reason: HOSPADM

## 2018-07-24 RX ORDER — LORAZEPAM 2 MG/ML
0.5 INJECTION INTRAMUSCULAR EVERY 4 HOURS PRN
Status: DISCONTINUED | OUTPATIENT
Start: 2018-07-24 | End: 2018-07-26

## 2018-07-24 RX ORDER — ANASTROZOLE 1 MG/1
1 TABLET ORAL DAILY
Status: DISCONTINUED | OUTPATIENT
Start: 2018-07-25 | End: 2018-07-24 | Stop reason: SDUPTHER

## 2018-07-24 RX ORDER — LORAZEPAM 2 MG/ML
0.5 INJECTION INTRAMUSCULAR EVERY 6 HOURS PRN
Status: DISCONTINUED | OUTPATIENT
Start: 2018-07-24 | End: 2018-07-24

## 2018-07-24 RX ORDER — METOPROLOL SUCCINATE 100 MG/1
200 TABLET, EXTENDED RELEASE ORAL DAILY
Status: DISCONTINUED | OUTPATIENT
Start: 2018-07-25 | End: 2018-07-25

## 2018-07-24 RX ORDER — OXAZEPAM 10 MG
10 CAPSULE ORAL EVERY 8 HOURS SCHEDULED
Status: DISCONTINUED | OUTPATIENT
Start: 2018-07-24 | End: 2018-07-26

## 2018-07-24 RX ORDER — POTASSIUM CHLORIDE 14.9 MG/ML
20 INJECTION INTRAVENOUS ONCE
Status: COMPLETED | OUTPATIENT
Start: 2018-07-24 | End: 2018-07-24

## 2018-07-24 RX ORDER — OXYCODONE HYDROCHLORIDE 5 MG/1
5 TABLET ORAL EVERY 4 HOURS PRN
Status: DISCONTINUED | OUTPATIENT
Start: 2018-07-24 | End: 2018-07-27 | Stop reason: HOSPADM

## 2018-07-24 RX ORDER — POTASSIUM CHLORIDE 20 MEQ/1
20 TABLET, EXTENDED RELEASE ORAL ONCE
Status: COMPLETED | OUTPATIENT
Start: 2018-07-24 | End: 2018-07-24

## 2018-07-24 RX ORDER — DOCUSATE SODIUM 100 MG/1
100 CAPSULE, LIQUID FILLED ORAL 2 TIMES DAILY
Status: DISCONTINUED | OUTPATIENT
Start: 2018-07-24 | End: 2018-07-27 | Stop reason: HOSPADM

## 2018-07-24 RX ORDER — METHOCARBAMOL 500 MG/1
500 TABLET, FILM COATED ORAL EVERY 6 HOURS PRN
Status: DISCONTINUED | OUTPATIENT
Start: 2018-07-24 | End: 2018-07-27 | Stop reason: HOSPADM

## 2018-07-24 RX ORDER — LORAZEPAM 2 MG/ML
1 INJECTION INTRAMUSCULAR ONCE
Status: COMPLETED | OUTPATIENT
Start: 2018-07-24 | End: 2018-07-24

## 2018-07-24 RX ORDER — SENNOSIDES 8.6 MG
1 TABLET ORAL
Status: DISCONTINUED | OUTPATIENT
Start: 2018-07-24 | End: 2018-07-27 | Stop reason: HOSPADM

## 2018-07-24 RX ORDER — ACETAMINOPHEN 325 MG/1
650 TABLET ORAL EVERY 6 HOURS PRN
Status: DISCONTINUED | OUTPATIENT
Start: 2018-07-24 | End: 2018-07-27 | Stop reason: HOSPADM

## 2018-07-24 RX ORDER — OXYCODONE HYDROCHLORIDE 10 MG/1
10 TABLET ORAL EVERY 4 HOURS PRN
Status: DISCONTINUED | OUTPATIENT
Start: 2018-07-24 | End: 2018-07-27 | Stop reason: HOSPADM

## 2018-07-24 RX ORDER — LEVOTHYROXINE SODIUM 0.07 MG/1
75 TABLET ORAL DAILY
Status: DISCONTINUED | OUTPATIENT
Start: 2018-07-25 | End: 2018-07-27 | Stop reason: HOSPADM

## 2018-07-24 RX ADMIN — OXAZEPAM 10 MG: 10 CAPSULE, GELATIN COATED ORAL at 18:13

## 2018-07-24 RX ADMIN — OXYCODONE HYDROCHLORIDE 10 MG: 10 TABLET ORAL at 17:19

## 2018-07-24 RX ADMIN — POTASSIUM CHLORIDE 20 MEQ: 200 INJECTION, SOLUTION INTRAVENOUS at 13:18

## 2018-07-24 RX ADMIN — TETANUS TOXOID, REDUCED DIPHTHERIA TOXOID AND ACELLULAR PERTUSSIS VACCINE, ADSORBED 0.5 ML: 5; 2.5; 8; 8; 2.5 SUSPENSION INTRAMUSCULAR at 17:22

## 2018-07-24 RX ADMIN — SENNOSIDES 8.6 MG: 8.6 TABLET, FILM COATED ORAL at 21:28

## 2018-07-24 RX ADMIN — SODIUM CHLORIDE 500 ML: 0.9 INJECTION, SOLUTION INTRAVENOUS at 13:37

## 2018-07-24 RX ADMIN — POTASSIUM CHLORIDE 20 MEQ: 200 INJECTION, SOLUTION INTRAVENOUS at 17:21

## 2018-07-24 RX ADMIN — IOHEXOL 85 ML: 350 INJECTION, SOLUTION INTRAVENOUS at 13:20

## 2018-07-24 RX ADMIN — DOCUSATE SODIUM 100 MG: 100 CAPSULE, LIQUID FILLED ORAL at 17:19

## 2018-07-24 RX ADMIN — OXYCODONE HYDROCHLORIDE 10 MG: 10 TABLET ORAL at 21:28

## 2018-07-24 RX ADMIN — ENOXAPARIN SODIUM 30 MG: 30 INJECTION, SOLUTION INTRAVENOUS; SUBCUTANEOUS at 21:29

## 2018-07-24 RX ADMIN — ANASTROZOLE 1 MG: 1 TABLET, COATED ORAL at 21:29

## 2018-07-24 RX ADMIN — POTASSIUM CHLORIDE 20 MEQ: 1500 TABLET, EXTENDED RELEASE ORAL at 13:19

## 2018-07-24 RX ADMIN — LORAZEPAM 1 MG: 2 INJECTION INTRAMUSCULAR; INTRAVENOUS at 14:02

## 2018-07-24 RX ADMIN — OXAZEPAM 10 MG: 10 CAPSULE, GELATIN COATED ORAL at 21:28

## 2018-07-24 NOTE — ORTHOTIC NOTE
Orthotic Note            Date: 7/24/2018     Time: 5:20    Patient Name: Marek Espino ordered by Ying Payan PA-C    Reason for Consult:  Patient Active Problem List   Diagnosis    Anxiety    GERD (gastroesophageal reflux disease)    Hypertension    Hypothyroidism    Hyperlipidemia    Bilateral renal cysts    Ischemic colitis ([address] )    Hypokalemia    Thoracic aortic aneurysm (HCC)    Impaired fasting glucose    Esophageal reflux disease    Invasive ductal carcinoma of breast, right (HCC)    Use of anastrozole (Arimidex)    Closed odontoid fracture with type I morphology (HCC)    Closed nondisplaced fracture of fifth cervical vertebra (HCC)    Closed nondisplaced fracture of seventh cervical vertebra (HCC)    Syncope     Classic collar removed with pt laying on the stretcher keeping her head/neck in place  Willshire Pilger collar donned with chin plate set at level 2 for optimal fit  Bilateral sides molded and chest piece flared out for comfort  Educated pt on the replacement pads and Ligia shower collar; pt states she has a friend at home that can help her  Pt's chin tends to slide down the brace; educated pt on trying to keep her chin up and on the brace  Will follow daily  Recommendations:  Please call the ortho tech, ext [phone], with any bracing questions and/or adjustments       [employer],

## 2018-07-24 NOTE — EMTALA/ACUTE CARE TRANSFER
[facility]  [address] [address]  Dept: [phone]      EMTALA TRANSFER CONSENT    NAME Darian CLARK 1945                              MRN [id number]    I have been informed of my rights regarding examination, treatment, and transfer   by Dr Alycia Fink MD    Benefits: Specialized equipment and/or services available at the receiving facility (Include comment)________________________ (trauma)    Risks: Potential for delay in receiving treatment, Potential deterioration of medical condition, Increased discomfort during transfer, Loss of IV, Possible worsening of condition or death during transfer      Consent for Transfer:  I acknowledge that my medical condition has been evaluated and explained to me by the emergency department physician or other qualified medical person and/or my attending physician, who has recommended that I be transferred to the service of  Accepting Physician: Marya Wen at [address] Name, [address] : [address] [place]  The above potential benefits of such transfer, the potential risks associated with such transfer, and the probable risks of not being transferred have been explained to me, and I fully understand them  The doctor has explained that, in my case, the benefits of transfer outweigh the risks  I agree to be transferred  I authorize the performance of emergency medical procedures and treatments upon me in both transit and upon arrival at the receiving facility  Additionally, I authorize the release of any and all medical records to the receiving facility and request they be transported with me, if possible  I understand that the safest mode of transportation during a medical emergency is an ambulance and that the Hospital advocates the use of this mode of transport   Risks of traveling to the receiving facility by car, including absence of medical control, life sustaining equipment, such as oxygen, and medical personnel has been explained to me and I fully understand them  (LINDSAY CORRECT BOX BELOW)  [ x ]  I consent to the stated transfer and to be transported by ambulance/helicopter  [  ]  I consent to the stated transfer, but refuse transportation by ambulance and accept full responsibility for my transportation by car  I understand the risks of non-ambulance transfers and I exonerate the Hospital and its staff from any deterioration in my condition that results from this refusal     X___________________________________________    DATE  18  TIME________  Signature of patient or legally responsible individual signing on patient behalf           RELATIONSHIP TO PATIENT_________________________          Provider Certification    NAME Ailin Ackerman                                        [facility] 1945                              MRN [id number]    A medical screening exam was performed on the above named patient  Based on the examination:    Condition Necessitating Transfer The primary encounter diagnosis was Multiple fractures of cervical spine ([facility] [address] )  Diagnoses of Minor head injury, initial encounter, Hypokalemia, Alcohol abuse, and Hx of fall were also pertinent to this visit      Patient Condition: The patient has been stabilized such that within reasonable medical probability, no material deterioration of the patient condition or the condition of the unborn child(bebe) is likely to result from the transfer    Reason for Transfer: Level of Care needed not available at this facility (trauma)    Transfer Requirements: [address] [place] [address]   · Space available and qualified personnel available for treatment as acknowledged by  PACS  · Agreed to accept transfer and to provide appropriate medical treatment as acknowledged by       Suzi Stein  · Appropriate medical records of the examination and treatment of the patient are provided at the time of transfer STAFF INITIAL WHEN COMPLETED _______  · Transfer will be performed by qualified personnel from  [facility]  and appropriate transfer equipment as required, including the use of necessary and appropriate life support measures  Provider Certification: I have examined the patient and explained the following risks and benefits of being transferred/refusing transfer to the patient/family:  Unanticipated needs of medical equipment and personnel during transport, Risk of worsening condition, The possibility of a transport vehicle being unavailable      Based on these reasonable risks and benefits to the patient and/or the unborn child(bebe), and based upon the information available at the time of the patients examination, I certify that the medical benefits reasonably to be expected from the provision of appropriate medical treatments at another medical facility outweigh the increasing risks, if any, to the individuals medical condition, and in the case of labor to the unborn child, from effecting the transfer      X____________________________________________ DATE 07/24/18        TIME_______      ORIGINAL - SEND TO MEDICAL RECORDS   COPY - SEND WITH PATIENT DURING TRANSFER

## 2018-07-24 NOTE — EMTALA/ACUTE CARE TRANSFER
[address]  [address] [address]  Dept: [phone]      EMTALA TRANSFER CONSENT    NAME Nishi CLARK 1945                              MRN [id number]    I have been informed of my rights regarding examination, treatment, and transfer   by Dr Essence Lay MD    Benefits: Specialized equipment and/or services available at the receiving facility (Include comment)________________________ (trauma)    Risks: Potential for delay in receiving treatment, Potential deterioration of medical condition, Increased discomfort during transfer, Loss of IV, Possible worsening of condition or death during transfer      Consent for Transfer:  I acknowledge that my medical condition has been evaluated and explained to me by the emergency department physician or other qualified medical person and/or my attending physician, who has recommended that I be transferred to the service of  Accepting Physician: Heather Jimenes at [address] Name, [address] : [address] [place]  The above potential benefits of such transfer, the potential risks associated with such transfer, and the probable risks of not being transferred have been explained to me, and I fully understand them  The doctor has explained that, in my case, the benefits of transfer outweigh the risks  I agree to be transferred  I authorize the performance of emergency medical procedures and treatments upon me in both transit and upon arrival at the receiving facility  Additionally, I authorize the release of any and all medical records to the receiving facility and request they be transported with me, if possible  I understand that the safest mode of transportation during a medical emergency is an ambulance and that the Hospital advocates the use of this mode of transport   Risks of traveling to the receiving facility by car, including absence of medical control, life sustaining equipment, such as oxygen, and medical personnel has been explained to me and I fully understand them  (LINDSAY CORRECT BOX BELOW)  [  ]  I consent to the stated transfer and to be transported by ambulance/helicopter  [  ]  I consent to the stated transfer, but refuse transportation by ambulance and accept full responsibility for my transportation by car  I understand the risks of non-ambulance transfers and I exonerate the Hospital and its staff from any deterioration in my condition that results from this refusal     X___________________________________________    DATE  18  TIME________  Signature of patient or legally responsible individual signing on patient behalf           RELATIONSHIP TO PATIENT_________________________          Provider Certification    NAME Otoniel Lrod                                        [facility] 1945                              MRN [id number]    A medical screening exam was performed on the above named patient  Based on the examination:    Condition Necessitating Transfer The primary encounter diagnosis was Multiple fractures of cervical spine ([facility] [address] )  Diagnoses of Minor head injury, initial encounter, Hypokalemia, Alcohol abuse, and Hx of fall were also pertinent to this visit      Patient Condition: The patient has been stabilized such that within reasonable medical probability, no material deterioration of the patient condition or the condition of the unborn child(bebe) is likely to result from the transfer    Reason for Transfer: Level of Care needed not available at this facility (trauma)    Transfer Requirements: [facility]   · Space available and qualified personnel available for treatment as acknowledged by    · Agreed to accept transfer and to provide appropriate medical treatment as acknowledged by       Valri Crigler  · Appropriate medical records of the examination and treatment of the patient are provided at the time of transfer   STAFF INITIAL WHEN COMPLETED _______  · Transfer will be performed by qualified personnel from    and appropriate transfer equipment as required, including the use of necessary and appropriate life support measures  Provider Certification: I have examined the patient and explained the following risks and benefits of being transferred/refusing transfer to the patient/family:  Unanticipated needs of medical equipment and personnel during transport, Risk of worsening condition, The possibility of a transport vehicle being unavailable      Based on these reasonable risks and benefits to the patient and/or the unborn child(bebe), and based upon the information available at the time of the patients examination, I certify that the medical benefits reasonably to be expected from the provision of appropriate medical treatments at another medical facility outweigh the increasing risks, if any, to the individuals medical condition, and in the case of labor to the unborn child, from effecting the transfer      X____________________________________________ DATE 07/24/18        TIME_______      ORIGINAL - SEND TO MEDICAL RECORDS   COPY - SEND WITH PATIENT DURING TRANSFER

## 2018-07-24 NOTE — ED PROVIDER NOTES
History  Chief Complaint   Patient presents with    Head Injury     Pt reports that she had a syncopial episode and fell in her kitchen  Pt hit her head  Pt does not take blood thinners  Denies CP, SOB  71-year-old female presents for evaluation of headache and neck pain status post syncopal event  Patient states that around 11:00 p m  last night she suffered which she believes syncopal episode in her kitchen  Patient is amnestic to the events and does not remember falling  She has a history of similar events in the past   Patient states that when she woke up she had a sharp stabbing severe posterior midline neck pain which is constant, nonradiating, worse with attempting overhead  Patient is patient complains of a sharp diffuse headache which started after the fall gradually, is constant, nonradiating, without modifying factors  Patient denies focal neuro deficits or weakness, chest pain, shortness of breath, abdominal pain, melena, calf pain, risk factors for DVT or pulmonary embolism, other traumatic injuries  History provided by:  Patient      Prior to Admission Medications   Prescriptions Last Dose Informant Patient Reported?  Taking?   anastrozole (ARIMIDEX) 1 mg tablet   Yes No   Sig: Take 1 tablet by mouth daily   levothyroxine 75 mcg tablet   No No   Sig: TAKE 1 TABLET EVERY DAY      Facility-Administered Medications: None       Past Medical History:   Diagnosis Date    Abnormal CT of the abdomen     Acute bronchitis     Anxiety     Appetite loss     Arthritis     Ascending aortic aneurysm (HCC)     Bilateral renal cysts     Cancer (HCC)     breast    Cyst of ovary     Dysphagia     Esophageal reflux disease     Fat necrosis of breast     Full dentures     GERD (gastroesophageal reflux disease)     Herpes zoster     History of radiation therapy     Hyperlipidemia     Hypertension     Hypokalemia     Hypomagnesemia     Hypothyroidism     Impaired fasting glucose     Irritable bowel syndrome (IBS)     Ischemic colitis ([facility] [address] )     Knee pain     Limb alert care status     no BP/IV right arm    Osteoarthritis of right knee     Pneumonia     Post-op pain     Pulmonary nodules     Thoracic aortic aneurysm (HCC)     Unspecified ovarian cysts     Use of anastrozole (Arimidex)     Vasovagal syncope     Vitamin D deficiency     Wears glasses     Weight loss        Past Surgical History:   Procedure Laterality Date    APPENDECTOMY      pt states it was not removed    BREAST BIOPSY Right 03/02/2016    BREAST LUMPECTOMY Right 2004    BREAST SURGERY Right     Single lesion excision 1990 hyperplasia, 1st biopsy at 25yrs old was benign    CHOLECYSTECTOMY      COLONOSCOPY      COLONOSCOPY N/A 5/12/2017    Procedure: COLONOSCOPY with biopsy;  Surgeon: Kimmy Arana MD;  Location: [facility]; Service:     HYSTERECTOMY      KNEE SURGERY Right     PA BIOPSY/EXCISION, LYMPH NODE(S) Right 4/12/2016    Procedure: BIOPSY LYMPH NODE SENTINEL @ 1200 LYMPHOSCINTIGRAPHY LYMPHATIC MAPPING;  Surgeon: Morgan Bell MD;  Location: [facility];  Service: General    PA MASTECTOMY, SIMPLE, COMPLETE Right 4/12/2016    Procedure: MASTECTOMY SIMPLE;  Surgeon: Morgan Bell MD;  Location: [facility];  Service: General    TUBAL LIGATION         Family History   Problem Relation Age of Onset    Stroke Maternal Grandmother     Anemia Mother     Other Mother         disorders of blood and blood forming organs    Hypertension Mother     Stroke Father     Alcohol abuse Brother      I have reviewed and agree with the history as documented  Social History   Substance Use Topics    Smoking status: Never Smoker    Smokeless tobacco: Never Used    Alcohol use Yes      Comment: 3 glasses of vodka, 16 oz, daily        Review of Systems   Constitutional: Negative for activity change, appetite change, fatigue and fever     HENT: Negative for congestion, dental problem, ear pain, rhinorrhea and sore throat  Eyes: Negative for pain and redness  Respiratory: Negative for chest tightness, shortness of breath and wheezing  Cardiovascular: Negative for chest pain and palpitations  Gastrointestinal: Negative for abdominal pain, blood in stool, constipation, diarrhea, nausea and vomiting  Endocrine: Negative for cold intolerance and heat intolerance  Genitourinary: Negative for dysuria, frequency and hematuria  Musculoskeletal: Positive for neck pain  Negative for arthralgias, myalgias and neck stiffness  Skin: Negative for color change, pallor and rash  Neurological: Positive for syncope and headaches  Negative for weakness and numbness  Hematological: Does not bruise/bleed easily  Psychiatric/Behavioral: Negative for agitation, hallucinations and suicidal ideas  Physical Exam  Physical Exam   Constitutional: She appears well-developed and well-nourished  HENT:   Normocephalic/ abrasion over L eye socket There is no facial bone tenderness palpation  No facial bone tenderness  No lugo sign, no raccoon eyes, no hemotympanum  Nose is atraumatic  No evidence of epistaxis  Eyes:   Patient has painless full range of motion in both her eyes  Normal visual fields  No hyphema noted  Neck: No tracheal deviation present  Patient is nontender palpation over her , thoracic, lumbar spines     +ttp over c-spine  There is no step-offs, no deformities  Cardiovascular:   No JVD noted  Heart sounds are normal  Regular rate rate and rhythm  Symmetric strong distal pulses  Pulmonary/Chest:   Chest wall is stable and nontender palpation  There is no crepitus noted  Patient has symmetric chest wall expansion  No flail segment noted clear and equal breath sounds bilateral    Abdominal:   No external signs of trauma noted on the abdomen/pelvis  Patient is nontender palpation of the abdomen  There is no rebound, guarding, CVA tenderness  Abdomen is nondistended  Pelvis stable, nttp  Musculoskeletal:   Right upper extremity has full range of motion without pain  There is no tenderness palpation noted  Patient has no external signs of trauma  Patient is neurovascularly intact in this extremity  Left upper extremity has full range of motion without pain  There is no tenderness palpation noted  Patient has no external signs of trauma  Patient is neurovascularly intact in this extremity  Right lower extremity has full range of motion without pain  There is no tenderness palpation noted  Patient has no external signs of trauma  Patient is neurovascularly intact in this extremity  Left Lower  extremity has full range of motion without pain  There is no tenderness palpation noted  Patient has no external signs of trauma  Patient is neurovascularly intact in this extremity  Neurological:   Alert and oriented ×3  Normal mental status exam  Normal cranial nerves II through XII  Normal sensation and strength throughout  Normal cerebellar exam  GCS 15    Psychiatric: She has a normal mood and affect  Her behavior is normal  Judgment normal    Nursing note and vitals reviewed        Vital Signs  ED Triage Vitals [07/24/18 1200]   Temperature Pulse Respirations Blood Pressure SpO2   98 4 °F (36 9 °C) 89 16 104/74 97 %      Temp Source Heart Rate Source Patient Position - Orthostatic VS BP Location FiO2 (%)   Temporal Monitor Sitting Right arm --      Pain Score       8           Vitals:    07/24/18 1200 07/24/18 1338   BP: 104/74 152/75   Pulse: 89 81   Patient Position - Orthostatic VS: Sitting Sitting       Visual Acuity      ED Medications  Medications   potassium chloride (K-DUR,KLOR-CON) CR tablet 20 mEq (20 mEq Oral Given 7/24/18 1319)   potassium chloride 20 mEq IVPB (premix) (0 mEq Intravenous Stopped 7/24/18 1504)   iohexol (OMNIPAQUE) 350 MG/ML injection (MULTI-DOSE) 85 mL (85 mL Intravenous Given 7/24/18 1320)   sodium chloride 0 9 % bolus 500 mL (0 mL Intravenous Stopped 7/24/18 1504) LORazepam (ATIVAN) 2 mg/mL injection 1 mg (1 mg Intravenous Given 7/24/18 1402)       Diagnostic Studies  Results Reviewed     Procedure Component Value Units Date/Time    Troponin I [[id number]]  (Normal) Collected:  07/24/18 1233    Lab Status:  Final result Specimen:  Blood from Arm, Left Updated:  07/24/18 1258     Troponin I <0 02 ng/mL     Ethanol [[id number]]  (Abnormal) Collected:  07/24/18 1233    Lab Status:  Final result Specimen:  Blood from Arm, Left Updated:  07/24/18 1258     Ethanol Lvl 31 (H) mg/dL     Basic metabolic panel [[id number]]  (Abnormal) Collected:  07/24/18 1233    Lab Status:  Final result Specimen:  Blood from Arm, Left Updated:  07/24/18 1256     Sodium 133 (L) mmol/L      Potassium 2 9 (L) mmol/L      Chloride 93 (L) mmol/L      CO2 28 mmol/L      Anion Gap 12 mmol/L      BUN 12 mg/dL      Creatinine 0 83 mg/dL      Glucose 155 (H) mg/dL      Calcium 9 0 mg/dL      eGFR 72 ml/min/1 73sq m     Narrative:         National Kidney Disease Education Program recommendations are as follows:  GFR calculation is accurate only with a steady state creatinine  Chronic Kidney disease less than 60 ml/min/1 73 sq  meters  Kidney failure less than 15 ml/min/1 73 sq  meters      Protime-INR [[id number]]  (Normal) Collected:  07/24/18 1233    Lab Status:  Final result Specimen:  Blood from Arm, Left Updated:  07/24/18 1249     Protime 13 5 seconds      INR 1 02    APTT [[id number]]  (Normal) Collected:  07/24/18 1233    Lab Status:  Final result Specimen:  Blood from Arm, Left Updated:  07/24/18 1249     PTT 29 seconds     CBC and differential [[id number]]  (Abnormal) Collected:  07/24/18 1233    Lab Status:  Final result Specimen:  Blood from Arm, Left Updated:  07/24/18 1242     WBC 5 62 Thousand/uL      RBC 4 12 Million/uL      Hemoglobin 14 6 g/dL      Hematocrit 40 9 %      MCV 99 (H) fL      MCH 35 4 (H) pg      MCHC 35 7 g/dL      RDW 12 0 %      MPV 10 0 fL      Platelets 722 (L) Thousands/uL      nRBC 0 /100 WBCs      Neutrophils Relative 75 %      Lymphocytes Relative 15 %      Monocytes Relative 9 %      Eosinophils Relative 0 %      Basophils Relative 0 %      Neutrophils Absolute 4 21 Thousands/µL      Lymphocytes Absolute 0 86 Thousands/µL      Monocytes Absolute 0 52 Thousand/µL      Eosinophils Absolute 0 01 Thousand/µL      Basophils Absolute 0 02 Thousands/µL                  CTA head and neck with and without contrast   ED Interpretation by Jil Ojeda MD (07/24 3758)   Atherosclerotic disease most pronounced within the left distal common carotid artery extending into the bifurcation where there is approximately 55-60% stenosis of the origin of the internal carotid artery        There is atherosclerotic disease of the proximal left subclavian as well with mild narrowing        No occlusive disease  No aneurysm        6 mm left upper lobe pulmonary nodule  This pulmonary nodule was present on a remote CT of the chest dated 10/13/2015 indicating stability  Final Result by Osmel Alamo DO (07/24 1765)   Addendum 1 of 1 by Osmel Alamo DO (07/24 5976)   ADDENDUM:      The patient has cervical spine fractures identified on CT scan of the    cervical spine also performed today including odontoid fracture, C5 right    lamina fracture and C7 right pedicle, lamina and transverse process    fractures  The right distal vertebral artery between the C2 transverse foramen and C1    transverse foramen demonstrates mild focal smooth narrowing which is    nonspecific and may represent atherosclerotic disease  No evidence of    dissection or pseudoaneurysm  However,    in the setting of trauma a mild grade 1 vascular injury cannot be    excluded  The vessel enhances normally above this is slight minimal    calcification and developmental narrowing above the posterior inferior    cerebellar artery        These findings were directly discussed with Dr Milla Smith of the emergency    department at 2:04 PM  Mentioned in the body of the report but not in the impression is smooth    aneurysmal dilatation of the ascending aorta measuring up to 4 8 cm  Final      Atherosclerotic disease most pronounced within the left distal common carotid artery extending into the bifurcation where there is approximately 55-60% stenosis of the origin of the internal carotid artery  There is atherosclerotic disease of the proximal left subclavian as well with mild narrowing  No occlusive disease  No aneurysm  6 mm left upper lobe pulmonary nodule  This pulmonary nodule was present on a remote CT of the chest dated 10/13/2015 indicating stability  Workstation performed: [id number]         XR chest portable   ED Interpretation by Essence Lay MD (07/24 2793)   Primary reviewed: no acute abnormality      Final Result by Lizet Alonso MD (07/24 4113)      No acute cardiopulmonary disease  Workstation performed: [id number]         CT cervical spine without contrast   ED Interpretation by Essence Lay MD (07/24 7135)   1   Type I odontoid fracture without evidence of displacement  2   C5 right lamina fracture  3   C7 right pedicle fracture with extension into the lamina and into the transverse foramen at C7-T1  Final Result by Julia Hyde MD (07/24 3906)      1  Type I odontoid fracture without evidence of displacement  2   C5 right lamina fracture  3   C7 right pedicle fracture with extension into the lamina and into the transverse foramen at C7-T1               I personally discussed this study with Danielle Barraza on 7/24/2018 at 12:59 PM                 Workstation performed: [id number]         CT head without contrast   ED Interpretation by Essence Lay MD (07/24 5908)   No acute intracranial hemorrhage seen   No mass effect or midline shift seen   Mild periventricular and white matter hypodensity seen related to chronic small vessel ischemic              Final Result by Jessica Collazo MD (07/24 1301)      No acute intracranial hemorrhage seen   No mass effect or midline shift seen   Mild periventricular and white matter hypodensity seen related to chronic small vessel ischemic                  Workstation performed: [id number]                    Procedures  ECG 12 Lead Documentation  Date/Time: 7/24/2018 1:20 PM  Performed by: Carissa Swift by: Eliot HERRING     ECG reviewed by me, the ED Provider: yes    Patient location:  ED  Rate:     ECG rate:  81    ECG rate assessment: normal    Rhythm:     Rhythm: sinus rhythm    Ectopy:     Ectopy: none    QRS:     QRS axis:  Normal    QRS intervals:  Normal  Conduction:     Conduction: normal    ST segments:     ST segments:  Normal  T waves:     T waves: inverted    Q waves:     Q waves:  V2, V3, V5 and V4           Phone Contacts  ED Phone Contact    ED Course  ED Course as of Jul 30 0658   Tue Jul 24, 2018   1258 Chloride: (!) 93   1258 Potassium: (!) 2 9                               MDM  Number of Diagnoses or Management Options  Diagnosis management comments: Syncope-will do cardiac work up, ct head    posttrauamtic headache-will ct head  To r/o acute cns pathology    Neck pain-will ct to r/o fx/dislocation    CritCare Time    Disposition  Final diagnoses:   Multiple fractures of cervical spine (HCC)   Minor head injury, initial encounter   Hypokalemia   Alcohol abuse   Hx of fall     Time reflects when diagnosis was documented in both MDM as applicable and the Disposition within this note     Time User Action Codes Description Comment    7/24/2018  1:56 PM Aldair Velarde Add [S12  9XXA] Multiple fractures of cervical spine ([address] )     7/24/2018  1:56 PM Aldair Velarde Add [S09 90XA] Minor head injury, initial encounter     7/24/2018  1:56 PM Douglas Rodrigues Add [E87 6] Hypokalemia     7/24/2018  1:56 PM Aldair Velarde Add [F10 10] Alcohol abuse     7/24/2018  1:56 PM Kayden Velarde Add [Z91 81] Hx of fall       ED Disposition     ED Disposition Condition Comment    Transfer to Another Facility-In Network        MD Documentation      Most Recent Value   Patient Condition  The patient has been stabilized such that within reasonable medical probability, no material deterioration of the patient condition or the condition of the unborn child(bebe) is likely to result from the transfer   Reason for Transfer  Level of Care needed not available at this facility [trauma]   Benefits of Transfer  Specialized equipment and/or services available at the receiving facility (Include comment)________________________ [trauma]   Risks of Transfer  Potential for delay in receiving treatment, Potential deterioration of medical condition, Increased discomfort during transfer, Loss of IV, Possible worsening of condition or death during transfer   Accepting Physician  [address] 1936 Name, Pat Ga   Sending MD Simon Cornell   Provider Certification  Unanticipated needs of medical equipment and personnel during transport, Risk of worsening condition, The possibility of a transport vehicle being unavailable      RN Documentation      [address] Name, aPt Ga      Follow-up Information    None         Discharge Medication List as of 7/24/2018  3:07 PM      CONTINUE these medications which have NOT CHANGED    Details   anastrozole (ARIMIDEX) 1 mg tablet Take 1 tablet by mouth daily, Starting Fri 5/13/2016, Historical Med      levothyroxine 75 mcg tablet TAKE 1 TABLET EVERY DAY, Normal      losartan-hydrochlorothiazide (HYZAAR) 100-25 MG per tablet TAKE 1 TABLET EVERY DAY, Normal      metoprolol succinate (TOPROL-XL) 200 MG 24 hr tablet TAKE 1 TABLET EVERY DAY, Normal           No discharge procedures on file      ED Provider  Electronically Signed by           Cam Strong MD  07/24/18 [address] [facility] MD Danny  07/24/18 [address] [place] [address], MD  07/25/18 [address] MD Bri  07/27/18 [address] MD Camron  07/30/18 8185

## 2018-07-24 NOTE — H&P
H&P Exam - Trauma   Quentin Germain 81 y o  female MRN: [id number]  Unit/Bed#: ED 13 Encounter: [id number]    Assessment/Plan   Trauma Alert: Other Transfer  Model of Arrival: Ambulance  Trauma Team: Attending Kyle Pulido and Residents [id number] [facility]  Consultants: Neurosurgery: routine  Time Called routine    Trauma Active Problems:   1  Type I odontoid fracture without evidence of displacement      2  C5 right lamina fracture      3   C7 right pedicle fracture with extension into the lamina and into the transverse foramen at C7-T1      4  Syncope      Trauma Plan:   Admit to Inpatient  Neurosurgery, PT/OT/case management consult, Cards  Pain management  C-spine precautions  Aspen collar  SCD, Lovenox  TSH, CBC, BMP, Echo, EKG  Chief Complaint: neckpain    History of Present Illness   HPI:  Quentin Germain is a 81 y o  female who presents as a trauma transfer from [address] [address]  Patient states that around 11 o'clock last night she suffered a syncopal episode in her kitchen and fell to the ground patient does not remember the events  Patient states that she then woke with a stabbing severe posterior neck pain without any other focal deficits  Patient also complains of a diffuse headache  Mechanism:Fall    Review of Systems   Musculoskeletal: Positive for neck pain  Neurological: Positive for headaches  All other systems reviewed and are negative  Historical Information   History is unobtainable from the patient due to na         Past Medical History:   Diagnosis Date    Abnormal CT of the abdomen     Acute bronchitis     Anxiety     Appetite loss     Arthritis     Ascending aortic aneurysm (HCC)     Bilateral renal cysts     Cancer (HCC)     breast    Cyst of ovary     Dysphagia     Esophageal reflux disease     Fat necrosis of breast     Full dentures     GERD (gastroesophageal reflux disease)     Herpes zoster     History of radiation therapy     Hyperlipidemia     Hypertension     Hypokalemia     Hypomagnesemia     Hypothyroidism     Impaired fasting glucose     Irritable bowel syndrome (IBS)     Ischemic colitis (HCC)     Knee pain     Limb alert care status     no BP/IV right arm    Osteoarthritis of right knee     Pneumonia     Post-op pain     Pulmonary nodules     Thoracic aortic aneurysm (HCC)     Unspecified ovarian cysts     Use of anastrozole (Arimidex)     Vasovagal syncope     Vitamin D deficiency     Wears glasses     Weight loss      Past Surgical History:   Procedure Laterality Date    APPENDECTOMY      pt states it was not removed    BREAST BIOPSY Right 03/02/2016    BREAST LUMPECTOMY Right 2004    BREAST SURGERY Right     Single lesion excision 1990 hyperplasia, 1st biopsy at 25yrs old was benign    CHOLECYSTECTOMY      COLONOSCOPY      COLONOSCOPY N/A 5/12/2017    Procedure: COLONOSCOPY with biopsy;  Surgeon: Uli Ugalde MD;  [facility]: [facility];   Service:     HYSTERECTOMY      KNEE SURGERY Right     [place] BIOPSY/EXCISION, LYMPH NODE(S) Right 4/12/2016    Procedure: BIOPSY LYMPH NODE SENTINEL @ 1200 LYMPHOSCINTIGRAPHY LYMPHATIC MAPPING;  Surgeon: Luke Avalos MD;  [facility];  Service: General    [place] MASTECTOMY, SIMPLE, COMPLETE Right 4/12/2016    Procedure: MASTECTOMY SIMPLE;  Surgeon: Luke Avalos MD;  [facility];  Service: General    TUBAL LIGATION       Social History   History   Alcohol Use    Yes     Comment: 3 glasses of vodka, 16 oz, daily     History   Drug Use No     History   Smoking Status    Never Smoker   Smokeless Tobacco    Never Used     Immunization History   Administered Date(s) Administered     Influenza (IM) Preservative Free 09/17/2010, 12/29/2011    Influenza Split High Dose Preservative Free IM 11/14/2012, 10/30/2013, 11/21/2014, 01/14/2016, 10/03/2016, 10/30/2017    Influenza TIV (IM) 10/10/2008, 10/02/2009    Pneumococcal Polysaccharide PPV23 08/17/2016     Last Tetanus: today  Family History: Non-contributory        Meds/Allergies   all current active meds have been reviewed    Allergies   Allergen Reactions    Demerol [Meperidine] GI Intolerance and Other (See Comments)     Other reaction(s): Other (See Comments)  severe nausea  severe nausea  Nausea/vomiting    Nitroglycerin Anaphylaxis and Other (See Comments)     BP drops drastically    Lipitor [Atorvastatin] Other (See Comments)     Joint/muscle pain    Zocor [Simvastatin] Other (See Comments)     Joint/muscle pain         PHYSICAL EXAM    PE limited by:  Na     Objective   Vitals:   First set: Pulse: 84 (07/24/18 1540)  Respirations: 18 (07/24/18 1540)  Blood Pressure: 144/68 (07/24/18 1540)    Primary Survey:   (A) Airway:  Intact  (B) Breathing:  Present CTA bilaterally  (C) Circulation: Pulses:   normal  (D) Disabliity:  GCS Total:  15  (E) Expose:  Completed    Secondary Survey: (Click on Physical Exam tab above)  Physical Exam   Constitutional: She is oriented to person, place, and time  She appears well-developed and well-nourished  No distress  HENT:   Head: Normocephalic and atraumatic  Eyes: Conjunctivae and EOM are normal  Pupils are equal, round, and reactive to light  Right eye exhibits no discharge  Left eye exhibits no discharge  Neck: Normal range of motion  Neck supple  Cardiovascular: Normal rate, regular rhythm, normal heart sounds and intact distal pulses  Exam reveals no gallop and no friction rub  No murmur heard  Pulmonary/Chest: Effort normal and breath sounds normal  No respiratory distress  She has no wheezes  She has no rales  She exhibits no tenderness  Abdominal: Soft  Bowel sounds are normal  She exhibits no distension and no mass  There is no tenderness  There is no rebound and no guarding  Musculoskeletal: She exhibits no edema or deformity  Neurological: She is alert and oriented to person, place, and time  Skin: Skin is warm and dry  No rash noted  She is not diaphoretic   No erythema  No pallor  Nursing note and vitals reviewed  Invasive Devices     Peripheral Intravenous Line            Peripheral IV 07/24/18 Left Antecubital less than 1 day                Lab Results: Results: I have personally reviewed pertinent reports  Imaging/EKG Studies: Results: I have personally reviewed pertinent reports      Other Studies:      Code Status: Prior  Advance Directive and Living Will: Yes    Power of :    POLST:

## 2018-07-24 NOTE — ED NOTES
Patient unable to provide urine sample at this time  Verbalizes understanding of pending orders and will notify staff when able to        Monica Astudillo RN  07/24/18 2692

## 2018-07-24 NOTE — ED NOTES
Patient with noticeable tremors  Unable to keep hold of water cup  Dr Ba Morejon made aware  Patient also admits to drinking 3 glasses of vodka, 16oz each, daily        Cheryl Severs, RN  07/24/18 1892

## 2018-07-24 NOTE — ED PROCEDURE NOTE
PROCEDURE  CriticalCare Time  Performed by: Jose Ramon Styles by: Malorie Loving     Critical care provider statement:     Critical care time (minutes):  35    Critical care time was exclusive of:  Separately billable procedures and treating other patients and teaching time    Critical care was necessary to treat or prevent imminent or life-threatening deterioration of the following conditions:  Trauma    Critical care was time spent personally by me on the following activities:  Blood draw for specimens, obtaining history from patient or surrogate, development of treatment plan with patient or surrogate, discussions with consultants, evaluation of patient's response to treatment, examination of patient, interpretation of cardiac output measurements, ordering and performing treatments and interventions, ordering and review of laboratory studies, ordering and review of radiographic studies, re-evaluation of patient's condition and review of old Zhen Willis MD  07/24/18 4925

## 2018-07-24 NOTE — ED NOTES
Patient reports RUE limb alert d/t right mastectomy  Pink limb alert bracelet placed        Greg Kidney, RN  07/24/18 5001

## 2018-07-25 ENCOUNTER — APPOINTMENT (INPATIENT)
Dept: NON INVASIVE DIAGNOSTICS | Facility: HOSPITAL | Age: 73
DRG: 552 | End: 2018-07-25
Payer: MEDICARE

## 2018-07-25 ENCOUNTER — APPOINTMENT (INPATIENT)
Dept: RADIOLOGY | Facility: HOSPITAL | Age: 73
DRG: 552 | End: 2018-07-25
Payer: MEDICARE

## 2018-07-25 LAB
ANION GAP SERPL CALCULATED.3IONS-SCNC: 9 MMOL/L (ref 4–13)
ATRIAL RATE: 81 BPM
ATRIAL RATE: 88 BPM
BASOPHILS # BLD AUTO: 0.05 THOUSANDS/ΜL (ref 0–0.1)
BASOPHILS NFR BLD AUTO: 1 % (ref 0–1)
BUN SERPL-MCNC: 13 MG/DL (ref 5–25)
CALCIUM SERPL-MCNC: 8.8 MG/DL (ref 8.3–10.1)
CHLORIDE SERPL-SCNC: 96 MMOL/L (ref 100–108)
CO2 SERPL-SCNC: 27 MMOL/L (ref 21–32)
CREAT SERPL-MCNC: 0.69 MG/DL (ref 0.6–1.3)
EOSINOPHIL # BLD AUTO: 0.06 THOUSAND/ΜL (ref 0–0.61)
EOSINOPHIL NFR BLD AUTO: 1 % (ref 0–6)
ERYTHROCYTE [DISTWIDTH] IN BLOOD BY AUTOMATED COUNT: 11.8 % (ref 11.6–15.1)
GFR SERPL CREATININE-BSD FRML MDRD: 88 ML/MIN/1.73SQ M
GLUCOSE SERPL-MCNC: 127 MG/DL (ref 65–140)
HCT VFR BLD AUTO: 39.4 % (ref 34.8–46.1)
HGB BLD-MCNC: 13.7 G/DL (ref 11.5–15.4)
IMM GRANULOCYTES # BLD AUTO: 0.01 THOUSAND/UL (ref 0–0.2)
IMM GRANULOCYTES NFR BLD AUTO: 0 % (ref 0–2)
LYMPHOCYTES # BLD AUTO: 1.25 THOUSANDS/ΜL (ref 0.6–4.47)
LYMPHOCYTES NFR BLD AUTO: 25 % (ref 14–44)
MAGNESIUM SERPL-MCNC: 1.5 MG/DL (ref 1.6–2.6)
MCH RBC QN AUTO: 35.1 PG (ref 26.8–34.3)
MCHC RBC AUTO-ENTMCNC: 34.8 G/DL (ref 31.4–37.4)
MCV RBC AUTO: 101 FL (ref 82–98)
MONOCYTES # BLD AUTO: 0.59 THOUSAND/ΜL (ref 0.17–1.22)
MONOCYTES NFR BLD AUTO: 12 % (ref 4–12)
NEUTROPHILS # BLD AUTO: 2.98 THOUSANDS/ΜL (ref 1.85–7.62)
NEUTS SEG NFR BLD AUTO: 61 % (ref 43–75)
NRBC BLD AUTO-RTO: 0 /100 WBCS
P AXIS: 11 DEGREES
P AXIS: 33 DEGREES
PLATELET # BLD AUTO: 151 THOUSANDS/UL (ref 149–390)
PMV BLD AUTO: 9.4 FL (ref 8.9–12.7)
POTASSIUM SERPL-SCNC: 3.3 MMOL/L (ref 3.5–5.3)
PR INTERVAL: 150 MS
PR INTERVAL: 168 MS
QRS AXIS: -20 DEGREES
QRS AXIS: -7 DEGREES
QRSD INTERVAL: 86 MS
QRSD INTERVAL: 86 MS
QT INTERVAL: 356 MS
QT INTERVAL: 376 MS
QTC INTERVAL: 413 MS
QTC INTERVAL: 454 MS
RBC # BLD AUTO: 3.9 MILLION/UL (ref 3.81–5.12)
SODIUM SERPL-SCNC: 132 MMOL/L (ref 136–145)
T WAVE AXIS: -40 DEGREES
T WAVE AXIS: 168 DEGREES
VENTRICULAR RATE: 81 BPM
VENTRICULAR RATE: 88 BPM
WBC # BLD AUTO: 4.94 THOUSAND/UL (ref 4.31–10.16)

## 2018-07-25 PROCEDURE — 97163 PT EVAL HIGH COMPLEX 45 MIN: CPT

## 2018-07-25 PROCEDURE — G8979 MOBILITY GOAL STATUS: HCPCS

## 2018-07-25 PROCEDURE — G8978 MOBILITY CURRENT STATUS: HCPCS

## 2018-07-25 PROCEDURE — G8988 SELF CARE GOAL STATUS: HCPCS

## 2018-07-25 PROCEDURE — G8987 SELF CARE CURRENT STATUS: HCPCS

## 2018-07-25 PROCEDURE — 97167 OT EVAL HIGH COMPLEX 60 MIN: CPT

## 2018-07-25 PROCEDURE — 85025 COMPLETE CBC W/AUTO DIFF WBC: CPT | Performed by: INTERNAL MEDICINE

## 2018-07-25 PROCEDURE — 83735 ASSAY OF MAGNESIUM: CPT | Performed by: INTERNAL MEDICINE

## 2018-07-25 PROCEDURE — 93010 ELECTROCARDIOGRAM REPORT: CPT | Performed by: INTERNAL MEDICINE

## 2018-07-25 PROCEDURE — 93005 ELECTROCARDIOGRAM TRACING: CPT

## 2018-07-25 PROCEDURE — 99232 SBSQ HOSP IP/OBS MODERATE 35: CPT | Performed by: SURGERY

## 2018-07-25 PROCEDURE — 93306 TTE W/DOPPLER COMPLETE: CPT | Performed by: INTERNAL MEDICINE

## 2018-07-25 PROCEDURE — 80048 BASIC METABOLIC PNL TOTAL CA: CPT | Performed by: INTERNAL MEDICINE

## 2018-07-25 PROCEDURE — 72040 X-RAY EXAM NECK SPINE 2-3 VW: CPT

## 2018-07-25 PROCEDURE — 93306 TTE W/DOPPLER COMPLETE: CPT

## 2018-07-25 PROCEDURE — 99221 1ST HOSP IP/OBS SF/LOW 40: CPT | Performed by: INTERNAL MEDICINE

## 2018-07-25 PROCEDURE — 99223 1ST HOSP IP/OBS HIGH 75: CPT | Performed by: NEUROLOGICAL SURGERY

## 2018-07-25 RX ORDER — METOPROLOL SUCCINATE 100 MG/1
100 TABLET, EXTENDED RELEASE ORAL DAILY
Status: DISCONTINUED | OUTPATIENT
Start: 2018-07-26 | End: 2018-07-27 | Stop reason: HOSPADM

## 2018-07-25 RX ORDER — POTASSIUM CHLORIDE 20 MEQ/1
40 TABLET, EXTENDED RELEASE ORAL ONCE
Status: COMPLETED | OUTPATIENT
Start: 2018-07-25 | End: 2018-07-25

## 2018-07-25 RX ORDER — LOSARTAN POTASSIUM 50 MG/1
100 TABLET ORAL DAILY
Status: DISCONTINUED | OUTPATIENT
Start: 2018-07-25 | End: 2018-07-27 | Stop reason: HOSPADM

## 2018-07-25 RX ORDER — MAGNESIUM SULFATE HEPTAHYDRATE 40 MG/ML
4 INJECTION, SOLUTION INTRAVENOUS ONCE
Status: COMPLETED | OUTPATIENT
Start: 2018-07-25 | End: 2018-07-25

## 2018-07-25 RX ADMIN — METOPROLOL SUCCINATE 100 MG: 100 TABLET, EXTENDED RELEASE ORAL at 11:57

## 2018-07-25 RX ADMIN — OXYCODONE HYDROCHLORIDE 5 MG: 5 TABLET ORAL at 13:41

## 2018-07-25 RX ADMIN — ENOXAPARIN SODIUM 30 MG: 30 INJECTION, SOLUTION INTRAVENOUS; SUBCUTANEOUS at 08:25

## 2018-07-25 RX ADMIN — POTASSIUM CHLORIDE 40 MEQ: 1500 TABLET, EXTENDED RELEASE ORAL at 17:47

## 2018-07-25 RX ADMIN — LEVOTHYROXINE SODIUM 75 MCG: 75 TABLET ORAL at 05:30

## 2018-07-25 RX ADMIN — ENOXAPARIN SODIUM 30 MG: 30 INJECTION, SOLUTION INTRAVENOUS; SUBCUTANEOUS at 21:14

## 2018-07-25 RX ADMIN — OXAZEPAM 10 MG: 10 CAPSULE, GELATIN COATED ORAL at 21:14

## 2018-07-25 RX ADMIN — SENNOSIDES 8.6 MG: 8.6 TABLET, FILM COATED ORAL at 21:14

## 2018-07-25 RX ADMIN — DOCUSATE SODIUM 100 MG: 100 CAPSULE, LIQUID FILLED ORAL at 17:47

## 2018-07-25 RX ADMIN — ANASTROZOLE 1 MG: 1 TABLET, COATED ORAL at 21:14

## 2018-07-25 RX ADMIN — OXAZEPAM 10 MG: 10 CAPSULE, GELATIN COATED ORAL at 05:30

## 2018-07-25 RX ADMIN — MAGNESIUM SULFATE IN WATER 4 G: 40 INJECTION, SOLUTION INTRAVENOUS at 17:47

## 2018-07-25 RX ADMIN — LOSARTAN POTASSIUM 100 MG: 50 TABLET, FILM COATED ORAL at 11:58

## 2018-07-25 RX ADMIN — OXAZEPAM 10 MG: 10 CAPSULE, GELATIN COATED ORAL at 13:39

## 2018-07-25 RX ADMIN — OXYCODONE HYDROCHLORIDE 5 MG: 5 TABLET ORAL at 21:14

## 2018-07-25 NOTE — OCCUPATIONAL THERAPY NOTE
OccupationalTherapy Evaluation     Patient Name: Anderson MAURICE'G Date: 7/25/2018  Problem List  Patient Active Problem List   Diagnosis    Anxiety    GERD (gastroesophageal reflux disease)    Hypertension    Hypothyroidism    Hyperlipidemia    Bilateral renal cysts    Ischemic colitis ([address] )    Hypokalemia    Thoracic aortic aneurysm (HCC)    Impaired fasting glucose    Esophageal reflux disease    Invasive ductal carcinoma of breast, right (HCC)    Use of anastrozole (Arimidex)    Closed odontoid fracture with type I morphology (HCC)    Closed nondisplaced fracture of fifth cervical vertebra (HCC)    Closed nondisplaced fracture of seventh cervical vertebra ([address] )    Syncope     Past Medical History  Past Medical History:   Diagnosis Date    Abnormal CT of the abdomen     Acute bronchitis     Anxiety     Appetite loss     Arthritis     Ascending aortic aneurysm (HCC)     Bilateral renal cysts     Cancer (HCC)     breast    Cyst of ovary     Dysphagia     Esophageal reflux disease     Fat necrosis of breast     Full dentures     GERD (gastroesophageal reflux disease)     Herpes zoster     History of radiation therapy     Hyperlipidemia     Hypertension     Hypokalemia     Hypomagnesemia     Hypothyroidism     Impaired fasting glucose     Irritable bowel syndrome (IBS)     Ischemic colitis (HCC)     Knee pain     Limb alert care status     no BP/IV right arm    Osteoarthritis of right knee     Pneumonia     Post-op pain     Pulmonary nodules     Thoracic aortic aneurysm (HCC)     Unspecified ovarian cysts     Use of anastrozole (Arimidex)     Vasovagal syncope     Vitamin D deficiency     Wears glasses     Weight loss      Past Surgical History  Past Surgical History:   Procedure Laterality Date    APPENDECTOMY      pt states it was not removed    BREAST BIOPSY Right 03/02/2016    BREAST LUMPECTOMY Right 2004    BREAST SURGERY Right     Single lesion excision 1990 hyperplasia, 1st biopsy at 25yrs old was benign    CHOLECYSTECTOMY      COLONOSCOPY      COLONOSCOPY N/A 5/12/2017    Procedure: COLONOSCOPY with biopsy;  Surgeon: Erick Juarez MD;  [facility]; Service:     HYSTERECTOMY      KNEE SURGERY Right     PA BIOPSY/EXCISION, LYMPH NODE(S) Right 4/12/2016    Procedure: BIOPSY LYMPH NODE SENTINEL @ 1200 LYMPHOSCINTIGRAPHY LYMPHATIC MAPPING;  Surgeon: Kin Henderson MD;  [facility];  Service: General    PA MASTECTOMY, SIMPLE, COMPLETE Right 4/12/2016    Procedure: MASTECTOMY SIMPLE;  Surgeon: Kin Henderson MD;  [facility];  Service: General    TUBAL LIGATION           07/25/18 1402   Note Type   Note type Eval/Treat   Restrictions/Precautions   Weight Bearing Precautions Per Order No   Braces or Orthoses C/S Collar   Other Precautions Fall Risk;Pain;Spinal precautions  (C-spine )   Pain Assessment   Pain Assessment 0-10   Pain Score 5   Pain Type Acute pain   Pain Location Neck   Pain Orientation Bilateral   Hospital Pain Intervention(s) Ambulation/increased activity;Repositioned   Response to Interventions tolerated   Home Living   Type of [address] [place] [address] Two level   Bathroom Shower/Tub Tub/shower unit   Bathroom Toilet Standard   Bathroom Equipment (denies)   Additional Comments Pt lives alone in a 2 story home  Prior Function   Level of [place] Independent with ADLs and functional mobility   Lives With Alone   Receives Help From Family   ADL Assistance Independent   IADLs Independent   Falls in the last 6 months 1 to 4   Vocational Retired   Comments Pt was I w/  ADLS and IADLS, drove short distances, & required no use of DME PTA   Lifestyle   Autonomy Pt was I w/ ADLS/IADLS PTA w/ + drving for short distances    Reciprocal Relationships Pt lives lone but reports supportive fiance who she reports is often with her    Service to Others Pt is retired   Intrinsic Gratification Pt enjoys watching TV  Mostly sedentary    Psychosocial   Psychosocial (WDL) WDL   ADL   Eating Assistance 7  Independent   Grooming Assistance 7  Independent   UB Bathing Assistance 4  Minimal Assistance   LB Bathing Assistance 4  Minimal Assistance   UB Dressing Assistance 4  Minimal Assistance   LB Dressing Assistance 4  [address] 4  Minimal Assistance   Bed Mobility   Rolling L 4  Minimal assistance   Additional items Assist x 1; Increased time required;LE management;Verbal cues   Supine to Sit 3  Moderate assistance   Additional items Assist x 1; Increased time required;Verbal cues   Additional Comments Educated pt on log roll technique    Transfers   Sit to Stand 4  Minimal assistance   Additional items Assist x 1; Increased time required;Verbal cues   Stand to Sit 4  Minimal assistance   Additional items Assist x 1; Increased time required;Verbal cues   Functional Mobility   Functional Mobility 4  Minimal assistance   Additional Comments Pt w/ very limited tolerance for mobility and presenting w/ tremors during mobility trial    Additional items Rolling walker   Balance   Static Sitting Fair   Dynamic Sitting Poor +   Static Standing Fair   Dynamic Standing Poor +   Ambulatory Poor   Activity Tolerance   Activity Tolerance Patient limited by fatigue;Patient limited by pain   Medical Staff Made Aware PT student Uma Dealcruz    Nurse Made Aware yes, [place] Assessment   RUE Assessment WFL   LUE Assessment   LUE Assessment WFL   Hand Function   Gross Motor Coordination Functional   Fine Motor Coordination Functional   Cognition   Overall Cognitive Status Unable to assess   Arousal/Participation Alert; Responsive; Cooperative   Attention Attends with cues to redirect   Orientation Level Oriented X4  (w/ increased time )   Memory Decreased recall of recent events;Decreased recall of precautions   Following Commands Follows one step commands with increased time or repetition   Comments Pt is alert and oriented x4 w/ increased time  Pt w/ no recall of events surrounding syncope  Recommend formal cognitive evaluation to assist w/ safe D/C plannin    Assessment   Limitation Decreased ADL status; Decreased Safe judgement during ADL;Decreased endurance;Decreased self-care trans;Decreased high-level ADLs   Prognosis Good   Assessment Pt is a 82 y/o female seen for OT eval s/p adm to [facility] s/p fall w/ syncope dx'd w/ odontoid fx, C5 and, C7 fx  Comorbidities include a h/o alcohol abuse, anxiety, arthritis, AAA, breast CA s/p R mastectomy, HTN, HLD, IBS, knee pain, and vasovagal syncope  Pt with active OT orders and okay to see per neurosurgery and RN  Pt lives alone in a 2 story home  Pt was I w/  ADLS and IADLS, drove short distances, & required no use of DME PTA  Pt is currently demonstrating the following occupational deficits: min A ADLS, min A functioal transfers and min A functional mobility using rw for short in room distances  Pt with deficits and limitations in all baseline areas of occupation 2* significant pain, tremors (unclear etiology withdrawal vs  Musculoskeletal?), decreased endurance/activity tolerance, decreased functional forward reach, decreased ADL status, impaired balance, decreased mobility status, C spinal precautions, deconditioning/generalized weakness, decreased insight into deficits and impaired judgement/safety awareness  The following Occupational Performance Areas to address include: bathing/shower, toilet hygiene, dressing, medication management, functional mobility, community mobility, clothing management, meal prep and household maintenance  Pt scored overall 50/100 on the Barthel Index  Based on the aforementioned OT evaluation, functional performance deficits, and assessments, pt has been identified as a high complexity evaluation  Recommend STR upon D/C pending progress   Pt to continue to benefit from acute immediate OT services to address the following goals 3-5x/week to  w/in 7-10 days:     Goals   Patient Goals to not fall again    Plan   Treatment Interventions ADL retraining;Functional transfer training; Endurance training;Cognitive reorientation;Patient/family training;Equipment evaluation/education; Compensatory technique education;Continued evaluation; Energy conservation; Activityengagement   Goal Expiration Date 18   OT Frequency 3-5x/wk   Recommendation   OT Discharge Recommendation Short Term Rehab   OT - OK to Discharge Yes  (to STR)   Barthel Index   Feeding 10   Bathing 0   Grooming Score 5   Dressing Score 5   Bladder Score 10   Bowels Score 10   Toilet Use Score 5   Transfers (Bed/Chair) Score 5   Mobility (Level Surface) Score 0   Stairs Score 0   Barthel Index Score 50   Modified [place] Scale   Modified [place] Scale 4       GOALS:    1) Pt will improve activity tolerance to G for min 30 min txment sessions to increase participation in ADLs  2) Pt will complete ADLs/self care w/ mod I w/ G hyiene/thoroughness w/ min cues fro cog support  3) Pt will complete toileting w/ mod I w/ G hygiene/thoroughness using DME as needed  4) Pt will improve functional transfers on/off all surfaces using DME as needed w/ G balance/safety including toileting w/ mod I  5) Pt will improve functional mobility during ADL/IADL/leisure tasks using DME as needed w/ g balance/safety w/ mod I  6) Pt will engage in ongoing formal and functional cognitive assessment w/ G participation, G safety awareness, and G judgement t/o ADL/IADL tasks to assist w/ safe D/C planning  7) Pt will demonstrate G carryover of pt/caregiver education and training as appropriate w/ mod I w/o cues w/ G tolerance  8) Pt will demonstrate 100% carryover of learned E C  techniques s/p skilled education w/o cues t/o functional ADL/ IADL/leisure interest tasks w/ mod I  9) Pt will demonstrate 100% carryover of precautions s/p review w/o cues w/ mod I w/ G tolerance/participation t/o functional ADL/IADL/leisure tasks  10) Pt will demonstrate G high level balance and tolerance t/o fx'l I/ADL/leisure tasks w/ mod I w/ DME PRN w/ G balance/safety w/o cues to decrease fall risk   11) Pt will participate in simulated IADLs (i e  Kitchen mobility, home safety questions, medication management, etc ) w/ G participation, G safety awareness/judgement, G balance and G carry over of education to decrease fall risk and increased safety/independence in the home          Francisco Javier Collazo MS, OTR/L

## 2018-07-25 NOTE — PLAN OF CARE
Problem: DISCHARGE PLANNING - CARE MANAGEMENT  Goal: Discharge to post-acute care or home with appropriate resources  INTERVENTIONS:  - Conduct assessment to determine patient/family and health care team treatment goals, and need for post-acute services based on payer coverage, community resources, and patient preferences, and barriers to discharge  - Address psychosocial, clinical, and financial barriers to discharge as identified in assessment in conjunction with the patient/family and health care team  - Arrange appropriate level of post-acute services according to patient's   needs and preference and payer coverage in collaboration with the physician and health care team  - Communicate with and update the patient/family, physician, and health care team regarding progress on the discharge plan  - Arrange appropriate transportation to post-acute venues  When medically clear will d/c home vs snf   Outcome: Progressing

## 2018-07-25 NOTE — CASE MANAGEMENT
Initial Clinical Review    Admission: Date/Time/Statement: 7/24/18 @ 1648   TRANSFER FROM [facility] TO A HIGHER LEVEL OF CARE    Orders Placed This Encounter   Procedures    Inpatient Admission     Standing Status:   Standing     Number of Occurrences:   1     Order Specific Question:   Admitting Physician     Answer:   Loreta Cerda [[id number]]     Order Specific Question:   Level of Care     Answer:   Med Surg [16]     Order Specific Question:   Bed Type     Answer:   Trauma [7]     Order Specific Question:   Estimated length of stay     Answer:   More than 2 Midnights     Order Specific Question:   Certification     Answer:   I certify that inpatient services are medically necessary for this patient for a duration of greater than two midnights  See H&P and MD Progress Notes for additional information about the patient's course of treatment  ED: Date/Time/Mode of Arrival:   ED Arrival Information     Expected Arrival Acuity Means of Arrival Escorted By Service Admission Type    7/24/2018 15:21 7/24/2018 15:36 Emergent Ambulance [address] Trauma Emergency    Arrival Complaint    -        Chief Complaint:   Chief Complaint   Patient presents with    Trauma     pt is a trauma transfer from [facility]  Patient reports repeated falls  Patient fell yesterday, does not recall how she fell  Patient states she woke up on the floor with neck pain  History of Illness:  Alex Miles is a 81 y o  female who presents as a trauma transfer from [facility]  Patient states that around 11 o'clock last night she suffered a syncopal episode in her kitchen and fell to the ground patient does not remember the events  Patient states that she then woke with a stabbing severe posterior neck pain without any other focal deficits  Patient also complains of a diffuse headache    Mechanism:Fall    ED Vital Signs:   ED Triage Vitals   Temperature Pulse Respirations Blood Pressure SpO2   07/24/18 1847 07/24/18 1540 07/24/18 1540 07/24/18 1538 07/24/18 1540   98 4 °F (36 9 °C) 84 18 144/68 96 %      Temp Source Heart Rate Source Patient Position - Orthostatic VS BP Location FiO2 (%)   07/24/18 1847 07/24/18 1540 07/24/18 1540 07/24/18 1847 --   Oral Monitor Lying Left arm       Pain Score       07/24/18 1544       5        Wt Readings from Last 1 Encounters:   07/24/18 73 kg (161 lb)     Vital Signs (abnormal):   07/24/18 1815  --   0   26  --  --  --    07/24/18 18:10:20  --   0   27  --  --  --    07/24/18 18:05:20  --   0   32  --  --  --    07/24/18 18:00:20  --  90   33  --  --  95 %    07/24/18 17:55:20  --  96   30  --  --  95 %    07/24/18 17:50:20  --  88   28  --  --  95 %    07/24/18 1745  --  86   31  --  --  97 %    07/24/18 17:40:20  --  86   29  --  --  96 %    07/24/18 17:35:21  --  86   33  --  --  95 %    07/24/18 17:30:20  --  86   29  --  --  96 %    07/24/18 17:25:21  --   0   26  --  --  --    07/24/18 17:20:20  --  84   29  --  --  96 %    07/24/18 1715  --  82   25  --  --  96 %    07/24/18 17:11:20  --  --   24  --  --  --      Abnormal Labs:   7/24 7/25   Sodium 133    132      Potassium 2 9 3 3   Chloride 93 96   Glucose 155 127   Magnesium  1 5     Trop WNL   Platelets 826  Medical alcohol 31    Diagnostic Test Results:     7/24 EKG - Normal sinus rhythm  Low voltage QRS  Nonspecific ST and T wave abnormality  Poor anterior R wave progression is noted  Abnormal ECG  When compared with ECG of 07-APR-2016 13:49,  Nonspecific T wave abnormality now evident in Inferior leads    7/25 Cardiac Echo - EF 65%  AV - mild regurgitation   MV  & PV trace regurg  Aorta - dilatation of ascending aorta up to 4 2 cm     7/24 CT Cervical spine - 1  Type I odontoid fracture without evidence of displacement  2   C5 right lamina fracture     3   C7 right pedicle fracture with extension into the lamina and into the transverse foramen at C7-T1     7/24 CTA head, neck - Atherosclerotic disease most pronounced within the left distal common carotid artery extending into the bifurcation where there is approximately 55-60% stenosis of the origin of the internal carotid artery  There is atherosclerotic disease of the proximal left subclavian as well with mild narrowing  6 mm left upper lobe pulmonary nodule  This pulmonary nodule was present on a remote CT of the chest dated 10/13/2015 indicating stability  7/25 Xray cervical spine - 1  Nonvisualization of fractures involving C2, C5 or C7   2   Stable degenerative changes    ED Treatment:   Medication Administration from 07/24/2018 1521 to 07/24/2018 1835    Date/Time Order Dose Route Action   07/24/2018 1719 oxyCODONE (ROXICODONE) immediate release tablet 10 mg 10 mg Oral Given   07/24/2018 1722 tetanus-diphtheria-acellular pertussis (BOOSTRIX) IM injection 0 5 mL 0 5 mL Intramuscular Given   07/24/2018 1719 docusate sodium (COLACE) capsule 100 mg 100 mg Oral Given   07/24/2018 1721 potassium chloride 20 mEq IVPB (premix) 20 mEq Intravenous New Bag   07/24/2018 1813 oxazepam (SERAX) capsule 10 mg 10 mg Oral Given        Past Medical/Surgical History:    Active Ambulatory Problems     Diagnosis Date Noted    Anxiety     GERD (gastroesophageal reflux disease)     Hypertension     Hypothyroidism     Hyperlipidemia     Bilateral renal cysts     Ischemic colitis ([facility] [address] ) 05/11/2017    Hypokalemia 05/12/2017    Thoracic aortic aneurysm (HCC) 05/13/2017    Impaired fasting glucose 08/19/2014    Esophageal reflux disease 07/25/2012    Invasive ductal carcinoma of breast, right (HCC)     Use of anastrozole (Arimidex)      Resolved Ambulatory Problems     Diagnosis Date Noted    History of radiation therapy 04/09/2018    Fat necrosis of breast 04/09/2018    Breast neoplasm, Tis (DCIS), right 04/09/2018    Use of tamoxifen (Nolvadex) 04/09/2018     Past Medical History:   Diagnosis Date    Abnormal CT of the abdomen     Acute bronchitis     Anxiety     Appetite loss     Arthritis     Ascending aortic aneurysm (HCC)     Bilateral renal cysts     Cancer (HCC)     Cyst of ovary     Dysphagia     Esophageal reflux disease     Fat necrosis of breast     Full dentures     GERD (gastroesophageal reflux disease)     Herpes zoster     History of radiation therapy     Hyperlipidemia     Hypertension     Hypokalemia     Hypomagnesemia     Hypothyroidism     Impaired fasting glucose     Irritable bowel syndrome (IBS)     Ischemic colitis (HCC)     Knee pain     Limb alert care status     Osteoarthritis of right knee     Pneumonia     Post-op pain     Pulmonary nodules     Thoracic aortic aneurysm (HCC)     Unspecified ovarian cysts     Use of anastrozole (Arimidex)     Vasovagal syncope     Vitamin D deficiency     Wears glasses     Weight loss      Admitting Diagnosis: Injury [T14 90XA]  Syncope, unspecified syncope type [R55]  Closed odontoid fracture, initial encounter ([facility] [address] ) [S12 100A]    Age/Sex: 81 y o  female    Assessment/Plan:   Trauma Alert:  Other Transfer  Model of Arrival: Ambulance  Trauma Team: Attending Stephan Mccabe and [facility]idents [facility]     Trauma Active Problems:   1   Type I odontoid fracture without evidence of displacement    2   C5 right lamina fracture    3   C7 right pedicle fracture with extension into the lamina and into the transverse foramen at C7-T1    4  Syncope      Trauma Plan:   Admit to Inpatient  Neurosurgery, PT/OT/case management consult, Cards  Pain management  C-spine precautions  Aspen collar  SCD, Lovenox  TSH, CBC, BMP, Echo, EKG  Chief Complaint: neckpain    Admission Orders:  Scheduled Meds:   Current Facility-Administered Medications:  acetaminophen 650 mg Oral Q6H PRN   anastrozole 1 mg Oral Daily   aspirin 325 mg Oral Daily   docusate sodium 100 mg Oral BID   enoxaparin 30 mg Subcutaneous Q12H [address]   levothyroxine 75 mcg Oral Daily   LORazepam 0 5 mg Intravenous Q4H PRN   losartan 100 mg Oral Daily methocarbamol 500 mg Oral Q6H PRN   [START ON 7/26/2018] metoprolol succinate 100 mg Oral Daily   oxazepam 10 mg Oral Q8H FLO   oxyCODONE 10 mg Oral Q4H PRN   oxyCODONE 5 mg Oral Q4H PRN   perflutren lipid microsphere 0 6 mL/min Intravenous Once in imaging   senna 1 tablet Oral HS     PRN Meds:   acetaminophen    LORazepam    methocarbamol    oxyCODONE 10 mg x 2    oxyCODONE 5 mg x 1    perflutren lipid microsphere    Tele   SCDs  Cervical spine precautions - cervical brace   Diet regular  Cons Cardiology   Cons Neurosurgery   PT eval   ________________________  7/24 Cardiology Consult   1  Syncope: vasovagal vs  Orthostatic vs  cardiac  -Patient stated that she was very dehydrated that day, had only had 2 cups of water all day   -troponin 0 02 measured 7/24   -possible etiology vasovagal syncope, patient is on diuretic and beta blocker, increasing risk for vasovagal syncope   -patient denies feeling chest pain, palpitation,  -monitor on telemetry  -f/u Echo results, compared to prior echo in 2016 and 2015, both showed normal EF, mild dilation of RA and LA, mild mitral and tricuspid regurg  -CTA head and neck shows atherosclerotic disease in left distal common carotid extending into bifurcation, with 55-60% stenosis of the internal carotid artery origin  -CT head without contrast: no intracranial hemorrhage, no midline shift    -Pt  Drinks about 8oz of vodka everyday, but denies drinking the day of her syncopal episode, but her ethanol level measured in the ED was 31 mg/dl  -normal Hgb level  -Consider placing event recorder for one month   -d/c thiazides  -decrease metoprolol by half

## 2018-07-25 NOTE — CONSULTS
Consultation - Neurosurgery   Jessica Toledo 81 y o  female MRN: [id number]  Unit/Bed#: [facility] 824-01 Encounter: [id number]      Inpatient consult to Neurosurgery  Consult performed by: Mckenzie How ordered by: Diane Paulino          Assessment/Plan     Assessment:  1  Type 1 C2 fracture  2  Right C5 lamina fracture  3  Right C7 pedicle fracture with extension into the lamina/transverse foramen  4  HTN  5  H/o breast cancer  6  AAA    Plan:  · Exam reveals TTP C2  ALMAGUER with good strength  No sensory deficits  GCS 15  Poor calculations  · Images reviewed personally and by attending  Final results as below  · CTA head  neck 7/24/18: mild focal smooth narrowing - arteriolosclerotic vs mild grade 1 injury  · CT cervical spine wo 7/24/18: type 1 odontoid fracture  C5 right lamina fracture and right C7 fracture of the pedicle with extension into the lamina and transverse foramen  · CT head wo 7/24/18: no acute intracranial abnormalities  · Cervical spine upright XR 7/25/18: overall anatomic alignment with mild degenerative changes with significant listhesis   · VISTA collar to be worn at all times except for showering change to donna collar  · ASA 325mg for 3 months with delayed CTA neck in approximately 1 month   · Pain control per primary team  · Mobilize as tolerated with therapy in collar  · DVT PPX: SCDs  lovenox  · No surgical intervention anticipated at this juncture  · Will follow-up in 2 weeks with repeat cervical spine upright XR  · Call with questions/concerns  History of Present Illness     HPI: Jessica Toledo is a 81 y o   female with PMH including IBS, hypothyroidism, HTN, HLD, DM, AAA, and breast cancer  who presents with complaint of neck pain x 1 day  Patient apparently had a syncopal episode last evening around 11pm in her kitchen  She is amnestic to events   She awoke on the ground with a sharp stabbing posterior non radiating neck pain which was worse with movement  Associated with diffuse sharp headache  Denies pain, numbness/itngling or weakness of her extremities  No bowel/bladder dysfunction  Denies Cp/SOB/N/V  No vision or speech difficulties  OOB to BR  CT neck revealed C2, C5 and C7 fractures for which neurosurgical evaluation was requested  Review of Systems   Constitutional: Negative for activity change, fatigue and fever  HENT: Negative for trouble swallowing and voice change  Eyes: Negative for photophobia and visual disturbance  Respiratory: Negative for cough and shortness of breath  Cardiovascular: Negative for chest pain and leg swelling  Gastrointestinal: Negative for abdominal pain, nausea and vomiting  Genitourinary: Negative for decreased urine volume and difficulty urinating  Musculoskeletal: Positive for neck pain  Negative for back pain and gait problem  Skin: Negative for pallor, rash and wound  Neurological: Positive for headaches  Negative for dizziness, tremors, seizures, speech difficulty, weakness, light-headedness and numbness  Psychiatric/Behavioral: Negative for agitation and confusion         Historical Information   Past Medical History:   Diagnosis Date    Abnormal CT of the abdomen     Acute bronchitis     Anxiety     Appetite loss     Arthritis     Ascending aortic aneurysm (HCC)     Bilateral renal cysts     Cancer (HCC)     breast    Cyst of ovary     Dysphagia     Esophageal reflux disease     Fat necrosis of breast     Full dentures     GERD (gastroesophageal reflux disease)     Herpes zoster     History of radiation therapy     Hyperlipidemia     Hypertension     Hypokalemia     Hypomagnesemia     Hypothyroidism     Impaired fasting glucose     Irritable bowel syndrome (IBS)     Ischemic colitis (HCC)     Knee pain     Limb alert care status     no BP/IV right arm    Osteoarthritis of right knee     Pneumonia     Post-op pain     Pulmonary nodules     Thoracic aortic aneurysm (HCC)     Unspecified ovarian cysts     Use of anastrozole (Arimidex)     Vasovagal syncope     Vitamin D deficiency     Wears glasses     Weight loss      Past Surgical History:   Procedure Laterality Date    APPENDECTOMY      pt states it was not removed    BREAST BIOPSY Right 03/02/2016    BREAST LUMPECTOMY Right 2004    BREAST SURGERY Right     Single lesion excision 1990 hyperplasia, 1st biopsy at 25yrs old was benign    CHOLECYSTECTOMY      COLONOSCOPY      COLONOSCOPY N/A 5/12/2017    Procedure: COLONOSCOPY with biopsy;  Surgeon: Jelena Khan MD;  Location: [facility];   Service:     HYSTERECTOMY      KNEE SURGERY Right     TX BIOPSY/EXCISION, LYMPH NODE(S) Right 4/12/2016    Procedure: BIOPSY LYMPH NODE SENTINEL @ 1200 LYMPHOSCINTIGRAPHY LYMPHATIC MAPPING;  Surgeon: Bianca Turner MD;  Location: [facility];  Service: General    TX MASTECTOMY, SIMPLE, COMPLETE Right 4/12/2016    Procedure: MASTECTOMY SIMPLE;  Surgeon: Bianca Turner MD;  Location: [facility];  Service: General    TUBAL LIGATION       History   Alcohol Use    Yes     Comment: 3 glasses of vodka, 16 oz, daily     History   Drug Use No     History   Smoking Status    Never Smoker   Smokeless Tobacco    Never Used     Family History   Problem Relation Age of Onset    Stroke Maternal Grandmother     Anemia Mother     Other Mother         disorders of blood and blood forming organs    Hypertension Mother     Stroke Father     Alcohol abuse Brother        Meds/Allergies   all current active meds have been reviewed, current meds:   Current Facility-Administered Medications   Medication Dose Route Frequency    acetaminophen (TYLENOL) tablet 650 mg  650 mg Oral Q6H PRN    anastrozole (ARIMIDEX) tablet 1 mg  1 mg Oral Daily    aspirin tablet 325 mg  325 mg Oral Daily    docusate sodium (COLACE) capsule 100 mg  100 mg Oral BID    enoxaparin (LOVENOX) subcutaneous injection 30 mg  30 mg Subcutaneous Q12H [facility]    levothyroxine tablet 75 mcg  75 mcg Oral Daily    LORazepam (ATIVAN) 2 mg/mL injection 0 5 mg  0 5 mg Intravenous Q4H PRN    losartan (COZAAR) tablet 100 mg  100 mg Oral Daily    magnesium sulfate 4 g/100 mL IVPB (premix) 4 g  4 g Intravenous Once    methocarbamol (ROBAXIN) tablet 500 mg  500 mg Oral Q6H PRN    [START ON 7/26/2018] metoprolol succinate (TOPROL-XL) 24 hr tablet 100 mg  100 mg Oral Daily    oxazepam (SERAX) capsule 10 mg  10 mg Oral Q8H [facility]    oxyCODONE (ROXICODONE) immediate release tablet 10 mg  10 mg Oral Q4H PRN    oxyCODONE (ROXICODONE) IR tablet 5 mg  5 mg Oral Q4H PRN    perflutren lipid microsphere (DEFINITY) injection  0 6 mL/min Intravenous Once in imaging    potassium chloride (K-DUR,KLOR-CON) CR tablet 40 mEq  40 mEq Oral Once    senna (SENOKOT) tablet 8 6 mg  1 tablet Oral HS    and PTA meds:   Prior to Admission Medications   Prescriptions Last Dose Informant Patient Reported? Taking?   anastrozole (ARIMIDEX) 1 mg tablet   Yes Yes   Sig: Take 1 tablet by mouth daily   levothyroxine 75 mcg tablet   No Yes   Sig: TAKE 1 TABLET EVERY DAY   losartan-hydrochlorothiazide (HYZAAR) 100-25 MG per tablet   No Yes   Sig: TAKE 1 TABLET EVERY DAY   metoprolol succinate (TOPROL-XL) 200 MG 24 hr tablet   No Yes   Sig: TAKE 1 TABLET EVERY DAY      Facility-Administered Medications: None     Allergies   Allergen Reactions    Demerol [Meperidine] GI Intolerance and Other (See Comments)     Other reaction(s): Other (See Comments)  severe nausea  severe nausea  Nausea/vomiting    Nitroglycerin Anaphylaxis and Other (See Comments)     BP drops drastically    Lipitor [Atorvastatin] Other (See Comments)     Joint/muscle pain    Zocor [Simvastatin] Other (See Comments)     Joint/muscle pain       Objective   I/O       07/23 0701 - 07/24 0700 07/24 0701 - 07/25 0700 07/25 0701 - 07/26 0700    P  O    60    Total Intake(mL/kg)   60 (0 8)    Urine (mL/kg/hr)  700 25 (0)    Stool   0    Total Output 700 25    Net   -700 +35           Unmeasured Emesis Occurrence   1 x          Physical Exam   Constitutional: She is oriented to person, place, and time  She appears well-developed and well-nourished  No distress  HENT:   Head: Normocephalic  Eyes: Conjunctivae and EOM are normal  Pupils are equal, round, and reactive to light  No scleral icterus  Neck:   VISTA in place  TTP C2 and mild C5-7 midline   Cardiovascular: Normal rate  Pulmonary/Chest: Effort normal  No respiratory distress  Abdominal: Soft  She exhibits no distension  There is no tenderness  Musculoskeletal: She exhibits tenderness (cervical spine)  Neurological: She is oriented to person, place, and time  She has normal strength  She has a normal Finger-Nose-Finger Test    Reflex Scores:       Bicep reflexes are 2+ on the right side and 2+ on the left side  Brachioradialis reflexes are 2+ on the right side and 2+ on the left side  Patellar reflexes are 1+ on the right side and 1+ on the left side  Achilles reflexes are 1+ on the right side and 1+ on the left side  Skin: Skin is warm and dry  No rash noted  Psychiatric: She has a normal mood and affect  Her speech is normal and behavior is normal  Judgment and thought content normal      Neurologic Exam     Mental Status   Oriented to person, place, and time  Follows 2 step commands  Attention: normal  Concentration: normal    Speech: speech is normal   Level of consciousness: alert  Knowledge: good  Unable to perform simple calculations  Normal comprehension  Cranial Nerves     CN III, IV, VI   Pupils are equal, round, and reactive to light  Extraocular motions are normal    Right pupil: Shape: regular  Left pupil: Shape: regular  Nystagmus: none   Upgaze: normal  Conjugate gaze: present    CN V   Facial sensation intact  CN VII   Facial expression full, symmetric       CN VIII   Hearing: impaired (bilateral finger rub)    CN XI   Right trapezius strength: normal  Left trapezius strength: normal    CN XII   Tongue: not atrophic  Fasciculations: absent  Tongue deviation: none    Motor Exam   Muscle bulk: normal  Overall muscle tone: normal  Right arm pronator drift: absent  Left arm pronator drift: absent    Strength   Strength 5/5 throughout  Sensory Exam   Light touch normal    Pinprick normal      Gait, Coordination, and Reflexes     Coordination   Finger to nose coordination: normal    Tremor   Resting tremor: absent  Intention tremor: absent  Action tremor: absent    Reflexes   Right brachioradialis: 2+  Left brachioradialis: 2+  Right biceps: 2+  Left biceps: 2+  Right patellar: 1+  Left patellar: 1+  Right achilles: 1+  Left achilles: 1+  Right Ulrich: absent  Left Ulrich: absent  Right ankle clonus: absent  Left ankle clonus: absent      Vitals:Blood pressure 93/63, pulse 85, temperature 98 3 °F (36 8 °C), temperature source Oral, resp  rate 18, height 5' 4" (1 626 m), weight 73 kg (161 lb), SpO2 97 %  ,Body mass index is 27 64 kg/m²  Lab Results:     Results from last 7 days  Lab Units 07/25/18  1213 07/24/18  1233   WBC Thousand/uL 4 94 5 62   HEMOGLOBIN g/dL 13 7 14 6   HEMATOCRIT % 39 4 40 9   PLATELETS Thousands/uL 151 143*   NEUTROS PCT % 61 75   MONOS PCT % 12 9       Results from last 7 days  Lab Units 07/25/18  1213 07/24/18  1233   SODIUM mmol/L 132* 133*   POTASSIUM mmol/L 3 3* 2 9*   CHLORIDE mmol/L 96* 93*   CO2 mmol/L 27 28   BUN mg/dL 13 12   CREATININE mg/dL 0 69 0 83   CALCIUM mg/dL 8 8 9 0   GLUCOSE RANDOM mg/dL 127 155*       Results from last 7 days  Lab Units 07/25/18  1213   MAGNESIUM mg/dL 1 5*           Results from last 7 days  Lab Units 07/24/18  1233   INR  1 02   PTT seconds 29     No results found for: TROPONINT  ABG:No results found for: PHART, VTJ5AAL, PO2ART, LUB8CDW, T6LKNKYQ, BEART, SOURCE    Imaging Studies: I have personally reviewed pertinent reports     and I have personally reviewed pertinent films in PACS    Cta Head And Neck With And Without Contrast    Addendum Date: 7/24/2018 Addendum:   ADDENDUM: The patient has cervical spine fractures identified on CT scan of the cervical spine also performed today including odontoid fracture, C5 right lamina fracture and C7 right pedicle, lamina and transverse process fractures  The right distal vertebral artery between the C2 transverse foramen and C1 transverse foramen demonstrates mild focal smooth narrowing which is nonspecific and may represent atherosclerotic disease  No evidence of dissection or pseudoaneurysm  However,  in the setting of trauma a mild grade 1 vascular injury cannot be excluded  The vessel enhances normally above this is slight minimal calcification and developmental narrowing above the posterior inferior cerebellar artery  These findings were directly discussed with Dr Chepe Luu of the emergency department at 2:04 PM  Mentioned in the body of the report but not in the impression is smooth aneurysmal dilatation of the ascending aorta measuring up to 4 8 cm  Result Date: 7/24/2018  Narrative: CTA NECK AND BRAIN WITH CONTRAST INDICATION: Syncopal episode  COMPARISON:   No previous vascular imaging  TECHNIQUE: Post contrast imaging was performed after administration of iodinated contrast through the neck and brain  Post contrast axial 0 625 mm images timed to opacify the arterial system  3D rendering was performed on an independent workstation  MIP reconstructions performed  Coronal reconstructions were performed of the noncontrast portion of the brain  Radiation dose length product (DLP) for this visit:  474 mGy-cm   This examination, like all CT scans performed in the [facility], was performed utilizing techniques to minimize radiation dose exposure, including the use of iterative reconstruction and automated exposure control     IV Contrast:  85 mL of iohexol (OMNIPAQUE)  IMAGE QUALITY:   Diagnostic FINDINGS: CERVICAL VASCULATURE AORTIC ARCH AND GREAT VESSELS:  There is an aneurysm of the ascending aorta which measures 4 8 cm in transverse diameter  Atherosclerotic calcification of the aortic arch and proximal great vessels  There is mild stenosis of the left subclavian origin  RIGHT VERTEBRAL ARTERY CERVICAL SEGMENT:  Normal origin  The vessel is normal in caliber throughout the neck  LEFT VERTEBRAL ARTERY CERVICAL SEGMENT:  Normal origin  The vessel is normal in caliber throughout the neck  RIGHT EXTRACRANIAL CAROTID SEGMENT:  Mild atherosclerotic disease of the distal common carotid artery and proximal cervical internal carotid artery with calcification but no focal stenosis  LEFT EXTRACRANIAL CAROTID SEGMENT:  Mild tortuosity of the left common carotid artery  Atherosclerotic disease of the bifurcation with eccentric calcification medially and posteriorly extending from the distal common carotid artery into the proximal cervical internal carotid artery  Focal stenosis at the origin of the internal carotid artery measuring 2 to 2 3 mm at the point of maximal stenosis  Comparing this to the more distal cervical internal carotid artery which measures 5 mm, this corresponds to an approximately 55-60% stenosis  NASCET criteria was used to determine the degree of internal carotid artery diameter stenosis  INTRACRANIAL VASCULATURE INTERNAL CAROTID ARTERIES:  Mild calcification of the distal cavernous and supraclinoid internal carotid arteries  Normal ophthalmic artery origins and ICA terminus  ANTERIOR CIRCULATION:  Symmetric A1 segments and anterior cerebral arteries with normal enhancement  Normal anterior communicating artery  MIDDLE CEREBRAL ARTERY CIRCULATION:  M1 segment and middle cerebral artery branches demonstrate normal enhancement bilaterally  DISTAL VERTEBRAL ARTERIES:  Mild calcification of the intracranial portions of the vertebral arteries with left-sided dominance  No focal stenosis   BASILAR ARTERY: Basilar artery is normal in caliber  Normal superior cerebellar arteries  POSTERIOR CEREBRAL ARTERIES: Both posterior cerebral arteries arises from the basilar tip  Both arteries demonstrate normal enhancement  Normal posterior communicating arteries  DURAL VENOUS SINUSES:  Normal  NON VASCULAR ANATOMY BONY STRUCTURES:  Mild cervical spondylitic degenerative change  Slight anterior subluxation of C4 upon C5 and C5 upon C6  Loss of disc height  Degenerative change within the upper thoracic spine as well with loss of disc height, endplate sclerosis and mild osteophyte formation  SOFT TISSUES OF THE NECK:  Unremarkable  THORACIC INLET:  Left upper lobe pulmonary nodule, series 2 image 280 measuring 6 mm  Impression: Atherosclerotic disease most pronounced within the left distal common carotid artery extending into the bifurcation where there is approximately 55-60% stenosis of the origin of the internal carotid artery  There is atherosclerotic disease of the proximal left subclavian as well with mild narrowing  No occlusive disease  No aneurysm  6 mm left upper lobe pulmonary nodule  This pulmonary nodule was present on a remote CT of the chest dated 10/13/2015 indicating stability  Workstation performed: [id number]     Xr Chest Portable    Result Date: 7/24/2018  Narrative: CHEST INDICATION:   syncope  COMPARISON:  4/18/2018  EXAM PERFORMED/VIEWS:  XR CHEST PORTABLE FINDINGS: Cardiomediastinal silhouette is stable  The lungs are clear  No pneumothorax or pleural effusion  Osseous structures appear within normal limits for patient age  Impression: No acute cardiopulmonary disease  Workstation performed: [id number]     Xr Spine Cervical 2 Or 3 Vw Injury    Result Date: 7/25/2018  Narrative: CERVICAL SPINE INDICATION:   C2 type 1 fracture  History of C2, C5 and C7 fractures  Follow-up alignment  COMPARISON:  CT cervical spine July 24, 2018   VIEWS:  XR SPINE CERVICAL 2 OR 3 VW INJURY Images: 3 FINDINGS: The previously identified fractures involving the odontoid process as well as the fractures of the right lamina of C5 and C7, are not seen on the current study  There is an overlying collar  Straightening and reversal of the cervical lordotic curvature  Degenerative grade 1 anterolisthesis C5 on C6  Disc space narrowing diffusely throughout the cervical spine, most pronounced C6-C7  Bridging anterior osteophytic spur formation C6-C7  The prevertebral soft tissues are within normal limits  The lung apices are clear  Impression: 1  Nonvisualization of fractures involving C2, C5 or C7  2   Stable degenerative changes  Workstation performed: [id number]     Ct Head Without Contrast    Result Date: 7/24/2018  Narrative: CT BRAIN - WITHOUT CONTRAST INDICATION:   HA s/p trauma  COMPARISON:  None  TECHNIQUE:  CT examination of the brain was performed  In addition to axial images, coronal 2D reformatted images were created and submitted for interpretation  Radiation dose length product (DLP) for this visit:  01 41 28 69 59 mGy-cm   This examination, like all CT scans performed in the [facility], was performed utilizing techniques to minimize radiation dose exposure, including the use of iterative reconstruction and automated exposure control  IMAGE QUALITY:  Diagnostic  FINDINGS: PARENCHYMA:  No intracranial mass, mass effect or midline shift  No CT signs of acute infarction  No acute parenchymal hemorrhage  VENTRICLES AND EXTRA-AXIAL SPACES:  Normal for the patient's age  VISUALIZED ORBITS AND PARANASAL SINUSES:  Unremarkable   CALVARIUM AND EXTRACRANIAL SOFT TISSUES:  Normal      Impression: No acute intracranial hemorrhage seen No mass effect or midline shift seen Mild periventricular and white matter hypodensity seen related to chronic small vessel ischemic Workstation performed: [id number]     Ct Cervical Spine Without Contrast    Result Date: 7/24/2018  Narrative: CT CERVICAL SPINE - WITHOUT CONTRAST INDICATION:   pain s/p fall  COMPARISON:  None  TECHNIQUE:  CT examination of the cervical spine was performed without intravenous contrast   Contiguous axial images were obtained  Sagittal and coronal reconstructions were performed  Radiation dose length product (DLP) for this visit:  554 mGy-cm   This examination, like all CT scans performed in the [facility], was performed utilizing techniques to minimize radiation dose exposure, including the use of iterative reconstruction and automated exposure control  IMAGE QUALITY:  Diagnostic  FINDINGS: ALIGNMENT:  Normal alignment of the cervical spine  No subluxation  VERTEBRAL BODIES:  There is a type I odontoid fracture  There is a fracture extending through the right lamina at C5  There is a fracture through the pedicle on the right at C7 with extension into the lamina and into the transverse foramen at C7-T1  DEGENERATIVE CHANGES:  Mild to moderate multilevel degenerative disc disease is present  PREVERTEBRAL AND PARASPINAL SOFT TISSUES:  Unremarkable  THORACIC INLET:  Normal      Impression: 1  Type I odontoid fracture without evidence of displacement  2   C5 right lamina fracture  3   C7 right pedicle fracture with extension into the lamina and into the transverse foramen at C7-T1  I personally discussed this study with Camila Arevalo on 7/24/2018 at 12:59 PM   Workstation performed: [id number]     EKG, Pathology, and Other Studies: I have personally reviewed pertinent reports  VTE Prophylaxis: Sequential compression device (Venodyne)  and Enoxaparin (Lovenox)    Code Status: Prior  Advance Directive and Living Will: Yes    Power of :    POLST:      Counseling / Coordination of Care  I spent 45 minutes with the patient

## 2018-07-25 NOTE — PLAN OF CARE
Problem: PAIN - ADULT  Goal: Verbalizes/displays adequate comfort level or baseline comfort level  Interventions:  - Encourage patient to monitor pain and request assistance  - Assess pain using appropriate pain scale  - Administer analgesics based on type and severity of pain and evaluate response  - Implement non-pharmacological measures as appropriate and evaluate response  - Consider cultural and social influences on pain and pain management  - Notify physician/advanced practitioner if interventions unsuccessful or patient reports new pain  Outcome: Progressing      Problem: SAFETY ADULT  Goal: Patient will remain free of falls  INTERVENTIONS:  - Assess patient frequently for physical needs  -  Identify cognitive and physical deficits and behaviors that affect risk of falls    -  Otis fall precautions as indicated by assessment   - Educate patient/family on patient safety including physical limitations  - Instruct patient to call for assistance with activity based on assessment  - Modify environment to reduce risk of injury  - Consider OT/PT consult to assist with strengthening/mobility  Outcome: Progressing

## 2018-07-25 NOTE — PHYSICAL THERAPY NOTE
Physical Therapy Evaluation    Patient's Name: Juma Anton    Admitting Diagnosis  Injury [T14 90XA]  Syncope, unspecified syncope type [R55]  Closed odontoid fracture, initial encounter ([facility] [address] ) [S12 100A]    Problem List  Patient Active Problem List   Diagnosis    Anxiety    GERD (gastroesophageal reflux disease)    Hypertension    Hypothyroidism    Hyperlipidemia    Bilateral renal cysts    [facility]    Hypokalemia    Thoracic aortic aneurysm (HCC)    Impaired fasting glucose    Esophageal reflux disease    Invasive ductal carcinoma of breast, right (HCC)    Use of anastrozole (Arimidex)    Closed odontoid fracture with type I morphology (HCC)    Closed nondisplaced fracture of fifth cervical vertebra (HCC)    Closed nondisplaced fracture of seventh cervical vertebra [facility]    Syncope       Past Medical History  Past Medical History:   Diagnosis Date    Abnormal CT of the abdomen     Acute bronchitis     Anxiety     Appetite loss     Arthritis     Ascending aortic aneurysm (HCC)     Bilateral renal cysts     Cancer (HCC)     breast    Cyst of ovary     Dysphagia     Esophageal reflux disease     Fat necrosis of breast     Full dentures     GERD (gastroesophageal reflux disease)     Herpes zoster     History of radiation therapy     Hyperlipidemia     Hypertension     Hypokalemia     Hypomagnesemia     Hypothyroidism     Impaired fasting glucose     Irritable bowel syndrome (IBS)     Ischemic colitis (HCC)     Knee pain     Limb alert care status     no BP/IV right arm    Osteoarthritis of right knee     Pneumonia     Post-op pain     Pulmonary nodules     Thoracic aortic aneurysm (HCC)     Unspecified ovarian cysts     Use of anastrozole (Arimidex)     Vasovagal syncope     Vitamin D deficiency     Wears glasses     Weight loss        Past Surgical History  Past Surgical History:   Procedure Laterality Date    APPENDECTOMY      pt states it was not removed    BREAST BIOPSY Right 03/02/2016    BREAST LUMPECTOMY Right 2004    BREAST SURGERY Right     Single lesion excision 1990 hyperplasia, 1st biopsy at 25yrs old was benign    CHOLECYSTECTOMY      COLONOSCOPY      COLONOSCOPY N/A 5/12/2017    Procedure: COLONOSCOPY with biopsy;  Surgeon: Johnna Brower MD;  Location: [facility]; Service:     HYSTERECTOMY      KNEE SURGERY Right     SD BIOPSY/EXCISION, LYMPH NODE(S) Right 4/12/2016    Procedure: BIOPSY LYMPH NODE SENTINEL @ 1200 LYMPHOSCINTIGRAPHY LYMPHATIC MAPPING;  Surgeon: Delmy Jameson MD;  Location: [facility];  Service: General    SD MASTECTOMY, SIMPLE, COMPLETE Right 4/12/2016    Procedure: MASTECTOMY SIMPLE;  Surgeon: Delmy Jameson MD;  Location: [facility];  Service: General    TUBAL LIGATION        07/25/18 1400   Note Type   Note type Eval only   Pain Assessment   Pain Assessment 0-10   Pain Score 5   Pain Type Acute pain   Pain Location Back;Neck   Pain Orientation Bilateral   Patient's Stated Pain Goal No pain   Hospital Pain Intervention(s) Ambulation/increased activity   Response to Interventions unchanged   Home Living   Type of Home House   Additional Comments Resides alone in 2 story home- can have first floor setup as needed  Indep self care- has fiance that does not live w/ her but checks on her  Drives occasionalyl, ambulates w/ out device   Prior Function   Level of [place] Independent with ADLs and functional mobility   Falls in the last 6 months 1 to 4   Restrictions/Precautions   Weight Bearing Precautions Per Order No   Braces or Orthoses C/S Collar  (vista)   Other Precautions Cognitive; Chair Alarm; Bed Alarm;Multiple lines; Fall Risk;Pain   General   Family/Caregiver Present No   Cognition   Overall Cognitive Status Impaired   Arousal/Participation Responsive   Orientation Level Oriented to person;Oriented to place;Oriented to situation   Memory Unable to assess   Following Commands Follows one step commands without difficulty   RLE Assessment   RLE Assessment (strength 4/5)   LLE Assessment   LLE Assessment (strength 4/5)   Coordination   Movements are Fluid and Coordinated 0   Coordination and Movement Description B LE ataxia   Bed Mobility   Supine to Sit 4  Minimal assistance   Additional items Assist x 1   Transfers   Sit to Stand 4  Minimal assistance   Additional items Assist x 1   Stand to Sit 4  Minimal assistance   Additional items Assist x 1   Ambulation/Elevation   Gait pattern (ataxia, short step length)   Gait Assistance 4  Minimal assist   Additional items Assist x 1   Assistive Device Rolling walker   Distance 10'   Balance   Static Sitting Fair   Dynamic Sitting Fair -   Static Standing Poor +   Dynamic Standing Poor +   Ambulatory Poor +   Endurance Deficit   Endurance Deficit Yes   Endurance Deficit Description fatigue, weakness, pain   Activity Tolerance   Activity Tolerance Patient limited by fatigue;Patient limited by pain;Treatment limited secondary to medical complications (Comment)   Nurse Made Aware yes   Assessment   Prognosis Fair   Problem List Decreased strength;Decreased endurance; Impaired balance;Decreased mobility;Pain   Assessment Pt seen for high complexity physical therapy evaluation  Pt is a 80 y/o female w/ history/comorbidites of thoracic aneurysm, OA R knee, HLD, HTN, anxiety who is now admitted p fall at home, ? due to syncopal event  c/o neck pain p same, found to have Odontoid fx, as well as additional C2 and C7 fxs  [place] in [place], and OK to mobilize per trauma  Given need for collar w/ acute injuries, fall risk, pain, note unstable clinical picture  PT consulted to assess mobility, d/c needs  Pt presnts w/ decreased functional mob, sitting and standing balance, endurance, B LE strength and coordination, barriers at home  will benefit from skilled PT to correct for the above problems    Presently, lean towards rehab at d/c, but could progress to be able to d/c home   will follow for progress   Goals   Patient Goals not to fall again   STG Expiration Date 08/08/18   Short Term Goal #1 1-2 wks: In order to decrease fall risk: bed mob and transfers w/ indep, standing balance to good/normal w/ device, ambulate 200 ft w/ RW and mod I w/ 100% correct use of same, increase B LE strength by 1/2 -1 grade, ambulate 1 flight of stairs w/ indep   Treatment Day 0   Plan   Treatment/Interventions Functional transfer training;LE strengthening/ROM; Therapeutic exercise; Endurance training;Patient/family training;Equipment eval/education; Bed mobility;Gait training   PT Frequency (3-6x/wk)   Recommendation   Recommendation (recommend rehab at d/c presently)   Equipment Recommended Walker   PT - OK to Discharge Yes  (when stable to rehab, unless she progresses quickly)   Modified [place] Scale   Modified [place] Scale 4   Barthel Index   Feeding 10   Bathing 0   Grooming Score 5   Dressing Score 5   Bladder Score 10   Bowels Score 10   Toilet Use Score 5   Transfers (Bed/Chair) Score 5   Mobility (Level Surface) Score 0   Stairs Score 0   Barthel Index Score 50           Lobito Damon PT, DPT, CSRS

## 2018-07-25 NOTE — PROGRESS NOTES
Progress Note - Trauma   uJma Anton 81 y o  female MRN: [id number]  Unit/Bed#: [facility] 824-01 Encounter: [id number]    Assessment: S/P Syncopal episode resulting in a fall  Odontoid Type 1 fracture  C5 fracture  C7 fracture  syncope    Plan: Admitted to trauma  Pain management  DVT prophylaxis  Therapy  Neurosurgery following  Cardiology consulted and following  Electrolytes repleted  Morning labs    Chief Complaint: pain    Subjective: I feel pretty good    Objective: resting in bed with C-collar in place    Meds/Allergies   Prescriptions Prior to Admission   Medication Sig Dispense Refill Last Dose    anastrozole (ARIMIDEX) 1 mg tablet Take 1 tablet by mouth daily   Taking    levothyroxine 75 mcg tablet TAKE 1 TABLET EVERY DAY 90 tablet 0 Taking    losartan-hydrochlorothiazide (HYZAAR) 100-25 MG per tablet TAKE 1 TABLET EVERY DAY 90 tablet 0 Taking    metoprolol succinate (TOPROL-XL) 200 MG 24 hr tablet TAKE 1 TABLET EVERY DAY 90 tablet 0 Taking       Vitals: Blood pressure 93/63, pulse 85, temperature 98 3 °F (36 8 °C), temperature source Oral, resp  rate 18, height 5' 4" (1 626 m), weight 73 kg (161 lb), SpO2 97 %  Body mass index is 27 64 kg/m²       ABG: No results found for: PHART, MQL4TZS, PO2ART, PUW5HBR, U4WMUIPT, BEART, SOURCE      Intake/Output Summary (Last 24 hours) at 07/25/18 1613  Last data filed at 07/25/18 1435   Gross per 24 hour   Intake               60 ml   Output              725 ml   Net             -665 ml       Invasive Devices     Peripheral Intravenous Line            Peripheral IV 07/24/18 Left Antecubital 1 day                Nutrition/GI Proph/Bowel Reg: Regular    Physical Exam:   GENERAL APPEARANCE: oveall appears to be comfortable  HEENT: EOM's intact  CV: RRR, no complaint of chest pain at this time  LUNGS: CTA bilaterally, no shortness of breath  ABD: soft and non-tender  EXT: moves all four extremities  NEURO: intact  SKIN: warm and dry      Lab Results: Results for SLIFER, Ricardo Gates (MRN [id number]) as of 7/25/2018 16:14   Ref   Range 7/25/2018 12:13   eGFR Latest Units: ml/min/1 73sq m 88   Sodium Latest Ref Range: 136 - 145 mmol/L 132 (L)   Potassium Latest Ref Range: 3 5 - 5 3 mmol/L 3 3 (L)   Chloride Latest Ref Range: 100 - 108 mmol/L 96 (L)   CO2 Latest Ref Range: 21 - 32 mmol/L 27   Anion Gap Latest Ref Range: 4 - 13 mmol/L 9   BUN Latest Ref Range: 5 - 25 mg/dL 13   Creatinine Latest Ref Range: 0 60 - 1 30 mg/dL 0 69   Glucose Latest Ref Range: 65 - 140 mg/dL 127   Calcium Latest Ref Range: 8 3 - 10 1 mg/dL 8 8   Magnesium Latest Ref Range: 1 6 - 2 6 mg/dL 1 5 (L)   WBC Latest Ref Range: 4 31 - 10 16 Thousand/uL 4 94   RBC Latest Ref Range: 3 81 - 5 12 Million/uL 3 90   Hemoglobin Latest Ref Range: 11 5 - 15 4 g/dL 13 7   Hematocrit Latest Ref Range: 34 8 - 46 1 % 39 4   MCV Latest Ref Range: 82 - 98 fL 101 (H)   MCH Latest Ref Range: 26 8 - 34 3 pg 35 1 (H)   MCHC Latest Ref Range: 31 4 - 37 4 g/dL 34 8   RDW Latest Ref Range: 11 6 - 15 1 % 11 8   Platelets Latest Ref Range: 149 - 390 Thousands/uL 151   MPV Latest Ref Range: 8 9 - 12 7 fL 9 4   nRBC Latest Units: /100 WBCs 0   Neutrophils Relative Latest Ref Range: 43 - 75 % 61   Lymphocytes Relative Latest Ref Range: 14 - 44 % 25   Monocytes Relative Latest Ref Range: 4 - 12 % 12   Eosinophils Relative Latest Ref Range: 0 - 6 % 1   Basophils Relative Latest Ref Range: 0 - 1 % 1   Neutrophils Absolute Latest Ref Range: 1 85 - 7 62 Thousands/µL 2 98   Lymphocytes Absolute Latest Ref Range: 0 60 - 4 47 Thousands/µL 1 25   Monocytes Absolute Latest Ref Range: 0 17 - 1 22 Thousand/µL 0 59   Eosinophils Absolute Latest Ref Range: 0 00 - 0 61 Thousand/µL 0 06   Basophils Absolute Latest Ref Range: 0 00 - 0 10 Thousands/µL 0 05   Immature Grans Absolute Latest Ref Range: 0 00 - 0 20 Thousand/uL 0 01   Immat GRANS % Latest Ref Range: 0 - 2 % 0     Imaging/EKG Studies:   Nonvisualization of fractures involving C2, C5 or C7   2   Stable degenerative changes    Other Studies: none  VTE Prophylaxis:  SCD's, Lovenox, Aspirin      Syncope   Assessment & Plan    cardiology consulted and has seen patient          Closed nondisplaced fracture of seventh cervical vertebra [facility])   Assessment & Plan    See above        Closed nondisplaced fracture of fifth cervical vertebra [facility])   Assessment & Plan    Moss collar in place  Neurosurgery consulted  Neuro checks  Pain management        * Closed odontoid fracture with type I morphology [facility])   Assessment & Plan    See above            Nurse / Provider rounds completed with staff nurse and patient and significant other

## 2018-07-25 NOTE — SOCIAL WORK
CM met with pt and pt  aware cm role at discharge  Pt lives in a house alone there are no sets to get in but 12 to get to bedroom and bathroom   Prior to admission pt was independent all ADL" s   Pt denies any DME's  Pt was open to [facility] the past  Pt denies any SNF mental health or [facility] rehab    CM educated and offered HOST program and pt declines at this time    Pt states she drinks socially and is not interested in any rehab or services    Pt uses [facility]   and when medically clear family  will transport home   CM reviewed d/c planning process including the following: identifying help at home, patient preference for d/c planning needs, Discharge Lounge, [facility] to [facility] program, availability of treatment team to discuss questions or concerns patient and/or family may have regarding understanding medications and recognizing signs and symptoms once discharged   CM also encouraged patient to follow up with all recommended appointments after discharge  Patient advised of importance for patient and family to participate in managing patients medical well being    Discharge checklist discussed with patient and family

## 2018-07-25 NOTE — PLAN OF CARE
Problem: PHYSICAL THERAPY ADULT  Goal: Performs mobility at highest level of function for planned discharge setting  See evaluation for individualized goals  Treatment/Interventions: Functional transfer training, LE strengthening/ROM, Therapeutic exercise, Endurance training, Patient/family training, Equipment eval/education, Bed mobility, Gait training  Equipment Recommended: Radha Sol       See flowsheet documentation for full assessment, interventions and recommendations  Prognosis: Fair  Problem List: Decreased strength, Decreased endurance, Impaired balance, Decreased mobility, Pain  Assessment: Pt seen for high complexity physical therapy evaluation  Pt is a 82 y/o female w/ history/comorbidites of thoracic aneurysm, OA R knee, HLD, HTN, anxiety who is now admitted p fall at home, ? due to syncopal event  c/o neck pain p same, found to have Odontoid fx, as well as additional C2 and C7 fxs  [place] in [place], and OK to mobilize per trauma  Given need for collar w/ acute injuries, fall risk, pain, note unstable clinical picture  PT consulted to assess mobility, d/c needs  Pt presnts w/ decreased functional mob, sitting and standing balance, endurance, B LE strength and coordination, barriers at home  will benefit from skilled PT to correct for the above problems  Presently, lean towards rehab at d/c, but could progress to be able to d/c home  will follow for progress        Recommendation:  (recommend rehab at d/c presently)     PT - OK to Discharge: (S) Yes (when stable to rehab, unless she progresses quickly)    See flowsheet documentation for full assessment

## 2018-07-25 NOTE — CONSULTS
Consultation - Cardiology   Olga Genao 81 y o  female MRN: [id number]  Unit/Bed#: [facility] 824-01 Encounter: [id number]    Assessment/Plan     1  Syncope: vasovagal vs  Orthostatic vs  cardiac  -Patient stated that she was very dehydrated that day, had only had 2 cups of water all day   -troponin 0 02 measured 7/24   -possible etiology vasovagal syncope, patient is on diuretic and beta blocker, increasing risk for vasovagal syncope   -patient denies feeling chest pain, palpitation,  -monitor on telemetry  -f/u Echo results, compared to prior echo in 2016 and 2015, both showed normal EF, mild dilation of RA and LA, mild mitral and tricuspid regurg  -CTA head and neck shows atherosclerotic disease in left distal common carotid extending into bifurcation, with 55-60% stenosis of the internal carotid artery origin  -CT head without contrast: no intracranial hemorrhage, no midline shift    -Pt  Drinks about 8oz of vodka everyday, but denies drinking the day of her syncopal episode, but her ethanol level measured in the ED was 31 mg/dl  -normal Hgb level  -Consider placing event recorder for one month   -d/c thiazides  -decrease metoprolol by half  2  Neck pain:  -CTA showed odontoid fracture, C5 and C7 fracture    History of Present Illness    Physician Requesting Consult: Julieta Luna MD  Reason for Consult / Principal Problem: Syncope  HPI: Olga Genao is a 81y o  year old female with PMHx of HTN, hypothyroidism, hyperlipidemia,  thoracic artery aneurysm, GERD and breast cancer, who presents with syncopal episode  Patient states that on Monday evening she was walking in her kitchen when she suddenly had an unwitnessed syncopal episode  She does not recall falling, she does not know how long she was out for  She denies feeling any prodromal symptoms before the episode, such as nausea, diaphoresis, tunnel vision, palpitations, chest pain, sob   When she woke up she felt a sharp pain in her posterior neck and a dull headache  She denies feeling any focal neurological deficits  She states that she had a similar syncopal episode 3 weeks ago, she was alone for that episode as well and denies any prodromal symptoms  She did feel confused after she woke up from that episode and noticed she was bleeding and had swelling on the right side of her forehead  She did not go to the hospital for that episode  She also states she had another syncopal episode 3 years ago, when she went from a sitting to a standing position  Inpatient consult to Cardiology     Performed by  Rusty Hamm     Authorized by Naveen BLISS              Review of Systems   Constitutional: Positive for appetite change  Negative for chills and diaphoresis  HENT: Negative for congestion, sinus pain, sneezing and sore throat  Eyes: Negative for discharge  Respiratory: Negative for cough, chest tightness and shortness of breath  Cardiovascular: Negative for chest pain, palpitations and leg swelling  Gastrointestinal: Negative for abdominal distention and abdominal pain  Musculoskeletal: Positive for neck pain  Negative for back pain  Neurological: Positive for headaches         Historical Information   Past Medical History:   Diagnosis Date    Abnormal CT of the abdomen     Acute bronchitis     Anxiety     Appetite loss     Arthritis     Ascending aortic aneurysm (HCC)     Bilateral renal cysts     Cancer (HCC)     breast    Cyst of ovary     Dysphagia     Esophageal reflux disease     Fat necrosis of breast     Full dentures     GERD (gastroesophageal reflux disease)     Herpes zoster     History of radiation therapy     Hyperlipidemia     Hypertension     Hypokalemia     Hypomagnesemia     Hypothyroidism     Impaired fasting glucose     Irritable bowel syndrome (IBS)     Ischemic colitis (HCC)     Knee pain     Limb alert care status     no BP/IV right arm    Osteoarthritis of right knee     Pneumonia  Post-op pain     Pulmonary nodules     Thoracic aortic aneurysm (HCC)     Unspecified ovarian cysts     Use of anastrozole (Arimidex)     Vasovagal syncope     Vitamin D deficiency     Wears glasses     Weight loss      Past Surgical History:   Procedure Laterality Date    APPENDECTOMY      pt states it was not removed    BREAST BIOPSY Right 03/02/2016    BREAST LUMPECTOMY Right 2004    BREAST SURGERY Right     Single lesion excision 1990 hyperplasia, 1st biopsy at 25yrs old was benign    CHOLECYSTECTOMY      COLONOSCOPY      COLONOSCOPY N/A 5/12/2017    Procedure: COLONOSCOPY with biopsy;  Surgeon: Marlo Miller MD;  [facility];   Service:     HYSTERECTOMY      KNEE SURGERY Right     MO BIOPSY/EXCISION, LYMPH NODE(S) Right 4/12/2016    Procedure: BIOPSY LYMPH NODE SENTINEL @ 1200 LYMPHOSCINTIGRAPHY LYMPHATIC MAPPING;  Surgeon: Eldon Griggs MD;  [facility];  Service: General    MO MASTECTOMY, SIMPLE, COMPLETE Right 4/12/2016    Procedure: MASTECTOMY SIMPLE;  Surgeon: Eldon Griggs MD;  [facility];  Service: General    TUBAL LIGATION       History   Alcohol Use    Yes     Comment: 3 glasses of vodka, 16 oz, daily     History   Drug Use No     History   Smoking Status    Never Smoker   Smokeless Tobacco    Never Used     Family History:   Family History   Problem Relation Age of Onset    Stroke Maternal Grandmother     Anemia Mother     Other Mother         disorders of blood and blood forming organs    Hypertension Mother     Stroke Father     Alcohol abuse Brother        Meds/Allergies   current meds:   Current Facility-Administered Medications   Medication Dose Route Frequency    acetaminophen (TYLENOL) tablet 650 mg  650 mg Oral Q6H PRN    anastrozole (ARIMIDEX) tablet 1 mg  1 mg Oral Daily    aspirin tablet 325 mg  325 mg Oral Daily    docusate sodium (COLACE) capsule 100 mg  100 mg Oral BID    enoxaparin (LOVENOX) subcutaneous injection 30 mg  30 mg Subcutaneous Q12H [address]    levothyroxine tablet 75 mcg  75 mcg Oral Daily    LORazepam (ATIVAN) 2 mg/mL injection 0 5 mg  0 5 mg Intravenous Q4H PRN    losartan potassium-hydrochlorothiazide (HYZAAR 100/25) combo dose   Oral Daily    methocarbamol (ROBAXIN) tablet 500 mg  500 mg Oral Q6H PRN    metoprolol succinate (TOPROL-XL) 24 hr tablet 200 mg  200 mg Oral Daily    oxazepam (SERAX) capsule 10 mg  10 mg Oral Q8H [address]    oxyCODONE (ROXICODONE) immediate release tablet 10 mg  10 mg Oral Q4H PRN    oxyCODONE (ROXICODONE) IR tablet 5 mg  5 mg Oral Q4H PRN    perflutren lipid microsphere (DEFINITY) injection  0 6 mL/min Intravenous Once in imaging    senna (SENOKOT) tablet 8 6 mg  1 tablet Oral HS    and PTA meds:   Prior to Admission Medications   Prescriptions Last Dose Informant Patient Reported? Taking?   anastrozole (ARIMIDEX) 1 mg tablet   Yes Yes   Sig: Take 1 tablet by mouth daily   levothyroxine 75 mcg tablet   No Yes   Sig: TAKE 1 TABLET EVERY DAY   losartan-hydrochlorothiazide (HYZAAR) 100-25 MG per tablet   No Yes   Sig: TAKE 1 TABLET EVERY DAY   metoprolol succinate (TOPROL-XL) 200 MG 24 hr tablet   No Yes   Sig: TAKE 1 TABLET EVERY DAY      Facility-Administered Medications: None     Allergies   Allergen Reactions    Demerol [Meperidine] GI Intolerance and Other (See Comments)     Other reaction(s): Other (See Comments)  severe nausea  severe nausea  Nausea/vomiting    Nitroglycerin Anaphylaxis and Other (See Comments)     BP drops drastically    Lipitor [Atorvastatin] Other (See Comments)     Joint/muscle pain    Zocor [Simvastatin] Other (See Comments)     Joint/muscle pain       Objective   Vitals: Blood pressure 143/79, pulse 86, temperature 97 9 °F (36 6 °C), temperature source Oral, resp  rate 18, height 5' 4" (1 626 m), weight 73 kg (161 lb), SpO2 96 %    Orthostatic Blood Pressures      Most Recent Value   Blood Pressure  143/79 filed at 07/25/2018 0700   Patient Position - Orthostatic VS  Lying filed at 07/25/2018 0700            Intake/Output Summary (Last 24 hours) at 07/25/18 0900  Last data filed at 07/25/18 0300   Gross per 24 hour   Intake                0 ml   Output              700 ml   Net             -700 ml       Invasive Devices     Peripheral Intravenous Line            Peripheral IV 07/24/18 Left Antecubital less than 1 day                Physical Exam   Constitutional: She is oriented to person, place, and time  She appears well-developed and well-nourished  HENT:   Head: Normocephalic  Abrasion over the left eye socket and forehead  Non-tender to palpation  Eyes: EOM are normal  Pupils are equal, round, and reactive to light  Right eye exhibits no discharge  Left eye exhibits no discharge  Neck: No JVD present  Cardiovascular: Normal rate, regular rhythm and intact distal pulses  Exam reveals no gallop and no friction rub  No murmur heard  Pulmonary/Chest: She has no wheezes  She has no rales  She exhibits no tenderness  Abdominal: She exhibits no distension  There is no tenderness  There is no rebound and no guarding  Musculoskeletal: She exhibits no edema, tenderness or deformity  Neurological: She is alert and oriented to person, place, and time         Lab Results:   CBC with diff:   Results from last 7 days  Lab Units 07/24/18  1233   WBC Thousand/uL 5 62   RBC Million/uL 4 12   HEMOGLOBIN g/dL 14 6   HEMATOCRIT % 40 9   MCV fL 99*   MCH pg 35 4*   MCHC g/dL 35 7   RDW % 12 0   MPV fL 10 0   PLATELETS Thousands/uL 143*     CMP:   Results from last 7 days  Lab Units 07/24/18  1233   SODIUM mmol/L 133*   POTASSIUM mmol/L 2 9*   CHLORIDE mmol/L 93*   CO2 mmol/L 28   ANION GAP mmol/L 12   BUN mg/dL 12   CREATININE mg/dL 0 83   GLUCOSE RANDOM mg/dL 155*   CALCIUM mg/dL 9 0   EGFR ml/min/1 73sq m 72     Troponin:   0  Lab Value Date/Time   TROPONINI <0 02 07/24/2018 2000   TROPONINI <0 02 07/24/2018 1233   TROPONINI <0 02 01/07/2016 0630 TROPONINI <0 02 01/06/2016 2345     BNP:   Results from last 7 days  Lab Units 07/24/18  1233   SODIUM mmol/L 133*   POTASSIUM mmol/L 2 9*   CHLORIDE mmol/L 93*   CO2 mmol/L 28   ANION GAP mmol/L 12   BUN mg/dL 12   CREATININE mg/dL 0 83   GLUCOSE RANDOM mg/dL 155*   CALCIUM mg/dL 9 0   EGFR ml/min/1 73sq m 72     Coags:   Results from last 7 days  Lab Units 07/24/18  1233   PTT seconds 29   INR  1 02     TSH:   Results from last 7 days  Lab Units 07/24/18  2000   TSH 3RD GENERATON uIU/mL 0 597     Magnesium:     Lipid Profile:     Imaging: I have personally reviewed pertinent reports        VTE Prophylaxis: Sequential compression device Jm Sanabria)     Code Status: Prior  Advance Directive and Living Will: Yes    Power of :    POLST:

## 2018-07-26 LAB
ANION GAP SERPL CALCULATED.3IONS-SCNC: 7 MMOL/L (ref 4–13)
BASOPHILS # BLD AUTO: 0.03 THOUSANDS/ΜL (ref 0–0.1)
BASOPHILS NFR BLD AUTO: 1 % (ref 0–1)
BUN SERPL-MCNC: 13 MG/DL (ref 5–25)
CALCIUM SERPL-MCNC: 8.5 MG/DL (ref 8.3–10.1)
CHLORIDE SERPL-SCNC: 98 MMOL/L (ref 100–108)
CO2 SERPL-SCNC: 27 MMOL/L (ref 21–32)
CREAT SERPL-MCNC: 0.7 MG/DL (ref 0.6–1.3)
EOSINOPHIL # BLD AUTO: 0.08 THOUSAND/ΜL (ref 0–0.61)
EOSINOPHIL NFR BLD AUTO: 2 % (ref 0–6)
ERYTHROCYTE [DISTWIDTH] IN BLOOD BY AUTOMATED COUNT: 11.7 % (ref 11.6–15.1)
GFR SERPL CREATININE-BSD FRML MDRD: 88 ML/MIN/1.73SQ M
GLUCOSE SERPL-MCNC: 112 MG/DL (ref 65–140)
HCT VFR BLD AUTO: 40.8 % (ref 34.8–46.1)
HGB BLD-MCNC: 13.9 G/DL (ref 11.5–15.4)
IMM GRANULOCYTES # BLD AUTO: 0.02 THOUSAND/UL (ref 0–0.2)
IMM GRANULOCYTES NFR BLD AUTO: 0 % (ref 0–2)
LYMPHOCYTES # BLD AUTO: 1.63 THOUSANDS/ΜL (ref 0.6–4.47)
LYMPHOCYTES NFR BLD AUTO: 33 % (ref 14–44)
MAGNESIUM SERPL-MCNC: 2.6 MG/DL (ref 1.6–2.6)
MCH RBC QN AUTO: 34.8 PG (ref 26.8–34.3)
MCHC RBC AUTO-ENTMCNC: 34.1 G/DL (ref 31.4–37.4)
MCV RBC AUTO: 102 FL (ref 82–98)
MONOCYTES # BLD AUTO: 0.6 THOUSAND/ΜL (ref 0.17–1.22)
MONOCYTES NFR BLD AUTO: 12 % (ref 4–12)
NEUTROPHILS # BLD AUTO: 2.66 THOUSANDS/ΜL (ref 1.85–7.62)
NEUTS SEG NFR BLD AUTO: 52 % (ref 43–75)
NRBC BLD AUTO-RTO: 0 /100 WBCS
PHOSPHATE SERPL-MCNC: 2.3 MG/DL (ref 2.3–4.1)
PLATELET # BLD AUTO: 148 THOUSANDS/UL (ref 149–390)
PMV BLD AUTO: 9.6 FL (ref 8.9–12.7)
POTASSIUM SERPL-SCNC: 3.8 MMOL/L (ref 3.5–5.3)
RBC # BLD AUTO: 4 MILLION/UL (ref 3.81–5.12)
SODIUM SERPL-SCNC: 132 MMOL/L (ref 136–145)
WBC # BLD AUTO: 5.02 THOUSAND/UL (ref 4.31–10.16)

## 2018-07-26 PROCEDURE — 80048 BASIC METABOLIC PNL TOTAL CA: CPT | Performed by: NURSE PRACTITIONER

## 2018-07-26 PROCEDURE — 99233 SBSQ HOSP IP/OBS HIGH 50: CPT | Performed by: NEUROLOGICAL SURGERY

## 2018-07-26 PROCEDURE — 97110 THERAPEUTIC EXERCISES: CPT

## 2018-07-26 PROCEDURE — 84100 ASSAY OF PHOSPHORUS: CPT | Performed by: NURSE PRACTITIONER

## 2018-07-26 PROCEDURE — 85025 COMPLETE CBC W/AUTO DIFF WBC: CPT | Performed by: NURSE PRACTITIONER

## 2018-07-26 PROCEDURE — 97116 GAIT TRAINING THERAPY: CPT

## 2018-07-26 PROCEDURE — 83735 ASSAY OF MAGNESIUM: CPT | Performed by: NURSE PRACTITIONER

## 2018-07-26 PROCEDURE — 99232 SBSQ HOSP IP/OBS MODERATE 35: CPT | Performed by: SURGERY

## 2018-07-26 PROCEDURE — 99232 SBSQ HOSP IP/OBS MODERATE 35: CPT | Performed by: INTERNAL MEDICINE

## 2018-07-26 RX ORDER — OXAZEPAM 10 MG
10 CAPSULE ORAL 2 TIMES DAILY
Status: DISCONTINUED | OUTPATIENT
Start: 2018-07-26 | End: 2018-07-27 | Stop reason: HOSPADM

## 2018-07-26 RX ORDER — LORAZEPAM 2 MG/ML
0.5 INJECTION INTRAMUSCULAR EVERY 6 HOURS PRN
Status: DISCONTINUED | OUTPATIENT
Start: 2018-07-26 | End: 2018-07-27

## 2018-07-26 RX ADMIN — DOCUSATE SODIUM 100 MG: 100 CAPSULE, LIQUID FILLED ORAL at 17:09

## 2018-07-26 RX ADMIN — ENOXAPARIN SODIUM 30 MG: 30 INJECTION, SOLUTION INTRAVENOUS; SUBCUTANEOUS at 21:31

## 2018-07-26 RX ADMIN — LOSARTAN POTASSIUM 100 MG: 50 TABLET, FILM COATED ORAL at 09:22

## 2018-07-26 RX ADMIN — SENNOSIDES 8.6 MG: 8.6 TABLET, FILM COATED ORAL at 21:31

## 2018-07-26 RX ADMIN — OXAZEPAM 10 MG: 10 CAPSULE, GELATIN COATED ORAL at 17:09

## 2018-07-26 RX ADMIN — ANASTROZOLE 1 MG: 1 TABLET, COATED ORAL at 21:31

## 2018-07-26 RX ADMIN — ENOXAPARIN SODIUM 30 MG: 30 INJECTION, SOLUTION INTRAVENOUS; SUBCUTANEOUS at 09:25

## 2018-07-26 RX ADMIN — LORAZEPAM 0.5 MG: 2 INJECTION INTRAMUSCULAR; INTRAVENOUS at 00:02

## 2018-07-26 RX ADMIN — LEVOTHYROXINE SODIUM 75 MCG: 75 TABLET ORAL at 06:02

## 2018-07-26 RX ADMIN — METOPROLOL SUCCINATE 100 MG: 100 TABLET, EXTENDED RELEASE ORAL at 09:21

## 2018-07-26 RX ADMIN — DOCUSATE SODIUM 100 MG: 100 CAPSULE, LIQUID FILLED ORAL at 09:22

## 2018-07-26 RX ADMIN — OXAZEPAM 10 MG: 10 CAPSULE, GELATIN COATED ORAL at 06:02

## 2018-07-26 RX ADMIN — ASPIRIN 325 MG: 325 TABLET ORAL at 11:07

## 2018-07-26 NOTE — SOCIAL WORK
CM met with pt and aware no bed at [facility] but accepted at [facility]   Pt agreeable to bed at [facility] for tomorrow  CM set up transport with [employer] [facility] for tomorrow 7/27/18 at 2:45   Cm notified salvador bell , pt , pt nurse and [place]   Cm notified pt of cost for w/c van and agreeable   Cm will follow

## 2018-07-26 NOTE — PHYSICAL THERAPY NOTE
PT Treatment       07/26/18 1407   Pain Assessment   Pain Assessment No/denies pain   Pain Score No Pain   Restrictions/Precautions   Weight Bearing Precautions Per Order No   Braces or Orthoses C/S Collar   Other Precautions Fall Risk;Pain; Chair Alarm; Bed Alarm   General   Chart Reviewed Yes   Family/Caregiver Present Yes   Cognition   Overall Cognitive Status WFL   Arousal/Participation Alert   Attention Within functional limits   Orientation Level Oriented X4   Memory Within functional limits   Following Commands Follows one step commands inconsistently   Comments Pt was in bed on arrival, pleasant, and agreeable to PT   Subjective   Subjective "I would like to do some therapy if you can wake me up"   Bed Mobility   Supine to Sit 4  Minimal assistance   Additional items Assist x 1; Increased time required  (Shoulder support )   Sit to Supine 5  Supervision   Transfers   Sit to Stand 4  Minimal assistance   Additional items Assist x 1;Verbal cues; Increased time required;Armrests   Stand to Sit 4  Minimal assistance   Additional items Assist x 1;Verbal cues; Increased time required   Toilet transfer 5  Supervision   Additional items Assist x 1;Raised toilet seat  (grab bars)   Ambulation/Elevation   Gait pattern Wide BETZAIDA; Short stride; Improper Weight shift  (unsteady)   Gait Assistance 4  Minimal assist   Additional items Verbal cues; Assist x 1  (A for LOB and vc for safety with rw with c/s collar)   Assistive Device Rolling walker   Distance 240'   Balance   Static Sitting Fair +   Dynamic Sitting Fair   Static Standing Fair -   Dynamic Standing Poor +   Ambulatory Poor +   Endurance Deficit   Endurance Deficit No   Activity Tolerance   Activity Tolerance Patient tolerated treatment well   Nurse Made Aware Yes, RN cleared PT for session   Exercises   Hamstring Sets 10 reps;Bilateral;Sitting  (MRE)   Knee AROM Long Arc Quad 10 reps;Bilateral;Sitting  (MRE)   Squat (7 STS at bedside chair with min A x1 for safety) Assessment   Prognosis Good   Problem List Decreased strength;Decreased endurance; Impaired balance;Decreased mobility;Pain;Decreased safety awareness; Impaired judgement   Assessment Pt agreeable to PT treatment session  She reported fatigue on arrival, but was excited to get up to walk and required min A x 1 for sup to sit and STS with amb to the bathroom  With amb, pt tolerated 240' using rw, but had multi episodes of LOB to the right that required min A for postural correction  She demonstrated difficulty navigating obstacles with the rw due to cervical collar impacting ROM and was educated to look ahead to avoid obstacles  She tolerated therex on return to her room  Pt was set up in bed with call bell in reach, bed alarm on, and SCD pumps activated  D/C recommendations to be further evaluated  Home vs STR  Pt is demonstrating improved functional mobility, but continues to demonstrate impaired balance and risk for falls requiring min A x1  She lives alone and would require assistance at home with her current functional status if d/c to home when medically stable  Goals   STG Expiration Date 08/08/18   Short Term Goal #1 1-2 wks: In order to decrease fall risk: bed mob and transfers w/ indep, standing balance to good/normal w/ device, ambulate 200 ft w/ RW and mod I w/ 100% correct use of same, increase B LE strength by 1/2 -1 grade, ambulate 1 flight of stairs w/ indep   Treatment Day 1   Plan   Treatment/Interventions Functional transfer training;LE strengthening/ROM; Elevations; Therapeutic exercise; Endurance training;Patient/family training;Equipment eval/education; Bed mobility;Gait training;Spoke to nursing   Progress Progressing toward goals   PT Frequency (3-6x/wk)   Recommendation   Recommendation (Rehab vs home pending progress and caregiver support)   Equipment Recommended Linard Bernheim   PT - OK to Discharge Yes   Additional Comments When medically stable   Jenni Arzate, PT, DPT

## 2018-07-26 NOTE — PHYSICIAN ADVISOR
Current patient class: Inpatient  The patient is currently on Hospital Day: 3 at [facility]      The patient was admitted to the hospital at [phone] on 7/24/18 for the following diagnosis:  Injury [T14 [id number]]  Syncope, unspecified syncope type [R55]  Closed odontoid fracture, initial encounter ([facility] [address] ) [S12 100A]       There is documentation in the medical record of an expected length of stay of at least 2 midnights  The patient is therefore expected to satisfy the 2 midnight benchmark and given the 2 midnight presumption is appropriate for INPATIENT ADMISSION  Given this expectation of a satisfying stay, CMS instructs us that the patient is most often appropriate for inpatient admission under part A provided medical necessity is documented in the chart  After review of the relevant documentation, labs, vital signs and test results, the patient is appropriate for INPATIENT ADMISSION  Admission to the hospital as an inpatient is a complex decision making process which requires the practitioner to consider the patients presenting complaint, history and physical examination and all relevant testing  With this in mind, in this case, the patient was deemed appropriate for INPATIENT ADMISSION  After review of the documentation and testing available at the time of the admission I concur with this clinical determination of medical necessity  Rationale is as follows: The patient is a 81 yrs old Female who presented to the ED at 7/24/2018  3:36 PM with a chief complaint of Trauma ([place] [facility] [facility]  Patient reports repeated falls  Patient fell yesterday, does not recall how she fell  Patient states she woke up on the floor with neck pain  )     Given the need for further hospitalization, and along with the documentation of medical necessity present in the chart, the patient is appropriate for inpatient admission    The patient is expected to satisfy the 2 midnight benchmark, and will require further acute medical care  The patient does have comorbid conditions which increases the risk for significant adverse outcome  Given this the patient is appropriate for inpatient admission        The patients vitals on arrival were ED Triage Vitals   Temperature Pulse Respirations Blood Pressure SpO2   07/24/18 1847 07/24/18 1540 07/24/18 1540 07/24/18 1538 07/24/18 1540   98 4 °F (36 9 °C) 84 18 144/68 96 %      Temp Source Heart Rate Source Patient Position - Orthostatic VS BP Location FiO2 (%)   07/24/18 1847 07/24/18 1540 07/24/18 1540 07/24/18 1847 --   Oral Monitor Lying Left arm       Pain Score       07/24/18 1544       5           Past Medical History:   Diagnosis Date    Abnormal CT of the abdomen     Acute bronchitis     Anxiety     Appetite loss     Arthritis     Ascending aortic aneurysm (HCC)     Bilateral renal cysts     Cancer (HCC)     breast    Cyst of ovary     Dysphagia     Esophageal reflux disease     Fat necrosis of breast     Full dentures     GERD (gastroesophageal reflux disease)     Herpes zoster     History of radiation therapy     Hyperlipidemia     Hypertension     Hypokalemia     Hypomagnesemia     Hypothyroidism     Impaired fasting glucose     Irritable bowel syndrome (IBS)     Ischemic colitis (HCC)     Knee pain     Limb alert care status     no BP/IV right arm    Osteoarthritis of right knee     Pneumonia     Post-op pain     Pulmonary nodules     Thoracic aortic aneurysm (HCC)     Unspecified ovarian cysts     Use of anastrozole (Arimidex)     Vasovagal syncope     Vitamin D deficiency     Wears glasses     Weight loss      Past Surgical History:   Procedure Laterality Date    APPENDECTOMY      pt states it was not removed    BREAST BIOPSY Right 03/02/2016    BREAST LUMPECTOMY Right 2004    BREAST SURGERY Right     Single lesion excision 1990 hyperplasia, 1st biopsy at 25yrs old was benign    CHOLECYSTECTOMY      COLONOSCOPY      COLONOSCOPY N/A 5/12/2017    Procedure: COLONOSCOPY with biopsy;  Surgeon: Claudette Lobe, MD;  [facility]: [facility];   Service:     HYSTERECTOMY      KNEE SURGERY Right     RI BIOPSY/EXCISION, LYMPH NODE(S) Right 4/12/2016    Procedure: BIOPSY LYMPH NODE SENTINEL @ 1200 LYMPHOSCINTIGRAPHY LYMPHATIC MAPPING;  Surgeon: Sheldon Benoit MD;  [facility];  Service: General    RI MASTECTOMY, SIMPLE, COMPLETE Right 4/12/2016    Procedure: MASTECTOMY SIMPLE;  Surgeon: Sheldon Benoit MD;  [facility];  Service: General    TUBAL LIGATION             Consults have been placed to:   IP CONSULT TO NEUROSURGERY  IP CONSULT TO CASE MANAGEMENT  IP CONSULT TO CARDIOLOGY    Vitals:    07/25/18 1205 07/25/18 1506 07/25/18 1914 07/25/18 2300   BP: 111/79 93/63 122/65 130/74   BP Location: Left arm Left arm Left arm Left arm   Pulse: 97 85 88 83   Resp: 18 18 18 16   Temp: 97 5 °F (36 4 °C) 98 3 °F (36 8 °C) 98 9 °F (37 2 °C) 98 1 °F (36 7 °C)   TempSrc: Oral Oral Oral Oral   SpO2: 97% 97% 98% 98%   Weight:       Height:           Most recent labs:    Recent Labs      07/24/18   1233  07/24/18 2000 07/25/18   1213   WBC  5 62   --   4 94   HGB  14 6   --   13 7   HCT  40 9   --   39 4   PLT  143*   --   151   K  2 9*   --   3 3*   NA  133*   --   132*   CALCIUM  9 0   --   8 8   BUN  12   --   13   CREATININE  0 83   --   0 69   INR  1 02   --    --    TROPONINI  <0 02  <0 02   --        Scheduled Meds:  Current Facility-Administered Medications:  acetaminophen 650 mg Oral Q6H PRN Js Christiansen, DO   anastrozole 1 mg Oral Daily Js Christiansen, DO   aspirin 325 mg Oral Daily Theresa Metzger, DO   docusate sodium 100 mg Oral BID Js Christiansen, DO   enoxaparin 30 mg Subcutaneous Q12H [address] Js Christiansen, DO   levothyroxine 75 mcg Oral Daily Js Christiansen, DO   LORazepam 0 5 mg Intravenous Q4H PRN Theresa Metzger, DO   losartan 100 mg Oral Daily Pascual Reyes MD   methocarbamol 500 mg Oral Q6H PRN Js Christiansen, DO   metoprolol succinate 100 mg Oral Daily Pascual Reyes MD   oxazepam 10 mg Oral [facility] Theresa Metzger, DO   oxyCODONE 10 mg Oral Q4H PRN Js Christiansen, DO   oxyCODONE 5 mg Oral Q4H PRN Js Christiansen, DO   perflutren lipid microsphere 0 6 mL/min Intravenous Once in imaging Js Christiansen, DO   senna 1 tablet Oral HS Js Christiansen, DO     Continuous Infusions:   PRN Meds:   acetaminophen    LORazepam    methocarbamol    oxyCODONE    oxyCODONE    perflutren lipid microsphere    Surgical procedures (if appropriate):

## 2018-07-26 NOTE — PROGRESS NOTES
Progress Note - Neurosurgery   Glory Okeefe 81 y o  female MRN: [id number]  Unit/Bed#: [facility] 824-01 Encounter: [id number]    Assessment:  1  Type 1 C2 fracture  2  Right C5 laminar fracture  3  Right C7 pedicle fracture with extension to lamina/transverse foramina  4  Possible mild grade 1 vertebral artery injury  5  Hypertension  6  History of breast cancer  7  AAA    Plan:  · Exam tenderness palpation midline C5-C7  Full strength and sensation throughout extremities  GCS 15   · Imaging reviewed personally and by attending  Final results as below  · CTA head neck July 24, 2018:  Mild smooth focal narrowing concern for possible mild grade 1 vertebral artery injury  · CT cervical spine without contrast July 24, 2018:  Type 1 odontoid fracture  C5 right laminar fracture with a right C7 fracture of the pedicle extension into the lamina transition Kathy Sullivan  · Cervical spine upright x-rays July 23, 2018:  Overall anatomic alignment with mild degenerative changes and grossly stable slight listhesis when compared to CT scan imaging  · Cadet collar at all times except for showering changes Ligia collar  · Aspirin 325 mg for three months with plan for delayed CTA neck in 4-6 weeks   · Pain control per primary team  · Mobilize with physical and occupational therapy  · DVT PPX:  SCDs and Lovenox  · No surgical intervention indicated at this juncture  · Will sign off  Will follow-up in two weeks with repeat cervical spine upright x-rays  Subjective/Objective   Chief Complaint: "My neck is better than yesterday"/follow-up fracture    Subjective:  Patient continues with mid to inferior midline cervical neck pain without radiation  Denies any arm or leg pain, numbness, tingling and or weakness  Voiding well  Denies chest pain or shortness of breath, nausea or vomiting  Objective:  Sitting up in chair  NAD  I/O       07/24 0701 - 07/25 0700 07/25 0701 - 07/26 0700 07/26 0701 - 07/27 0700    P  O   160 210 Total Intake(mL/kg)  160 (2 1) 210 (2 8)    Urine (mL/kg/hr) 700 825 (0 5)     Stool  0     Total Output 700 825      Net -700 -665 +210           Unmeasured Urine Occurrence  2 x     Unmeasured Emesis Occurrence  1 x           Invasive Devices     Peripheral Intravenous Line            Peripheral IV 07/24/18 Left Antecubital 1 day                Physical Exam:  Vitals: Blood pressure 170/79, pulse 82, temperature 97 8 °F (36 6 °C), temperature source Oral, resp  rate 18, height 5' 4" (1 626 m), weight 75 3 kg (166 lb 0 1 oz), SpO2 97 %  ,Body mass index is 28 49 kg/m²      General appearance: alert, appears stated age, cooperative and no distress  Head: Normocephalic, without obvious abnormality  Eyes: EOMI  Neck: supple, symmetrical, trachea midline and TTP C5-C7 midline  Lungs: non labored breathing  Heart: regular heart rate  Neurologic:   Mental status: Alert, oriented, thought content appropriate  Cranial nerves: grossly intact (Cranial nerves II-XII)  Sensory: normal to LT  Motor: moving all extremities without focal weakness   Reflexes: 2+ LUE, 3+ RUE, no naylor    Lab Results:    Results from last 7 days  Lab Units 07/26/18  0636 07/25/18  1213 07/24/18  1233   WBC Thousand/uL 5 02 4 94 5 62   HEMOGLOBIN g/dL 13 9 13 7 14 6   HEMATOCRIT % 40 8 39 4 40 9   PLATELETS Thousands/uL 148* 151 143*   NEUTROS PCT % 52 61 75   MONOS PCT % 12 12 9       Results from last 7 days  Lab Units 07/26/18  0636 07/25/18  1213 07/24/18  1233   SODIUM mmol/L 132* 132* 133*   POTASSIUM mmol/L 3 8 3 3* 2 9*   CHLORIDE mmol/L 98* 96* 93*   CO2 mmol/L 27 27 28   BUN mg/dL 13 13 12   CREATININE mg/dL 0 70 0 69 0 83   CALCIUM mg/dL 8 5 8 8 9 0   GLUCOSE RANDOM mg/dL 112 127 155*       Results from last 7 days  Lab Units 07/26/18  0636 07/25/18  1213   MAGNESIUM mg/dL 2 6 1 5*       Results from last 7 days  Lab Units 07/26/18  0636   PHOSPHORUS mg/dL 2 3       Results from last 7 days  Lab Units 07/24/18  1233   INR  1 02   PTT seconds 29     No results found for: TROPONINT  ABG:No results found for: PHART, WWF0UKD, PO2ART, AEC5OYX, Z5ROZEBN, BEART, SOURCE    Imaging Studies: I have personally reviewed pertinent reports  and I have personally reviewed pertinent films in PACS    XR spine cervical 2 or 3 vw injury   Final Result by Juanita Slater DO (07/25 1200)   1  Nonvisualization of fractures involving C2, C5 or C7    2   Stable degenerative changes  Workstation performed: [id number]             EKG, Pathology, and Other Studies: I have personally reviewed pertinent reports        VTE Pharmacologic Prophylaxis: Enoxaparin (Lovenox)    VTE Mechanical Prophylaxis: sequential compression device

## 2018-07-26 NOTE — PROGRESS NOTES
Progress Note - Darian Saldana 1945, 81 y o  female MRN: [id number]    Unit/Bed#: [facility] 824-01 Encounter: [id number]    Primary Care Provider: Marina Camarena DO   Date and time admitted to hospital: 7/24/2018  3:36 PM        Closed nondisplaced fracture of fifth cervical vertebra ([facility] [address] )   Assessment & Plan    -Aspen collar in place  -Neurosurgery consulted  -Neuro checks  -Pain management  -Continue ASA 325mg per neurosurgery        * Closed odontoid fracture with type I morphology ([facility] [address] )   Assessment & Plan    See above          Alcohol withdrawal   -continue Serax 10 mg Q 8   -decrease p r n  Ativan 0 5mg q 6 hours   -asymptomatic on exam   -vital stable    DVT prophylaxis: Lovenox and SCDs  PT and OT: eval and treat    Disposition:  Will follow up with case management regarding discharge plan  Patient medically stable  Subjective/Objective   Chief Complaint:  No complaints today  Subjective:  Patient offers no new complaints today on presentation  Denies any new chest pain or shortness of breath  Denies any fevers, chills, sweats  Denies any nausea or vomiting  Denies any abdominal pain  Objective:     Meds/Allergies   Prescriptions Prior to Admission   Medication Sig Dispense Refill Last Dose    anastrozole (ARIMIDEX) 1 mg tablet Take 1 tablet by mouth daily   Taking    levothyroxine 75 mcg tablet TAKE 1 TABLET EVERY DAY 90 tablet 0 Taking    losartan-hydrochlorothiazide (HYZAAR) 100-25 MG per tablet TAKE 1 TABLET EVERY DAY 90 tablet 0 Taking    metoprolol succinate (TOPROL-XL) 200 MG 24 hr tablet TAKE 1 TABLET EVERY DAY 90 tablet 0 Taking       Vitals: Blood pressure 170/79, pulse 82, temperature 97 8 °F (36 6 °C), temperature source Oral, resp  rate 18, height 5' 4" (1 626 m), weight 75 3 kg (166 lb 0 1 oz), SpO2 97 %  Body mass index is 28 49 kg/m²   SpO2: SpO2: 97 %, SpO2 Device: O2 Device: None (Room air)    ABG: No results found for: PHART, DCJ0DID, PO2ART, KLY4QEE, F7LFRQFH, BEART, SOURCE      Intake/Output Summary (Last 24 hours) at 07/26/18 1044  Last data filed at 07/26/18 1044   Gross per 24 hour   Intake              310 ml   Output              825 ml   Net             -515 ml       Invasive Devices     Peripheral Intravenous Line            Peripheral IV 07/24/18 Left Antecubital 1 day                Nutrition/GI Proph/Bowel Reg:  Continue current diet    Physical Exam:   GENERAL APPEARANCE:  No acute distress, well-appearing  HEENT:  Normocephalic  CV:  Regular rate and rhythm, no split S1-S2  LUNGS:  CTA bilaterally  ABD:  Bowel sounds x4, nontender  EXT:  +2 pulses on extremities, neurovascularly intact  NEURO:  Cranial nerves 2-12 intact, no focal deficits, GCS 15  SKIN:  Warm, dry, intact    Lab Results:   Results: I have personally reviewed pertinent reports   , BMP/CMP:   Lab Results   Component Value Date     (L) 07/26/2018    K 3 8 07/26/2018    CL 98 (L) 07/26/2018    CO2 27 07/26/2018    ANIONGAP 7 07/26/2018    BUN 13 07/26/2018    CREATININE 0 70 07/26/2018    GLUCOSE 112 07/26/2018    CALCIUM 8 5 07/26/2018    EGFR 88 07/26/2018    and CBC:   Lab Results   Component Value Date    WBC 5 02 07/26/2018    HGB 13 9 07/26/2018    HCT 40 8 07/26/2018     (H) 07/26/2018     (L) 07/26/2018    MCH 34 8 (H) 07/26/2018    MCHC 34 1 07/26/2018    RDW 11 7 07/26/2018    MPV 9 6 07/26/2018    NRBC 0 07/26/2018     Imaging/EKG Studies: Results: I have personally reviewed pertinent reports      Other Studies:  No other studies  VTE Prophylaxis:  SCDs and Lovenox    Nurse provider rounds completed, plan of care discussed

## 2018-07-26 NOTE — PLAN OF CARE
DISCHARGE PLANNING - CARE MANAGEMENT     Discharge to post-acute care or home with appropriate resources Progressing        Nutrition/Hydration-ADULT     Nutrient/Hydration intake appropriate for improving, restoring or maintaining nutritional needs Progressing        PAIN - ADULT     Verbalizes/displays adequate comfort level or baseline comfort level Progressing        Potential for Falls     Patient will remain free of falls Progressing        Prexisting or High Potential for Compromised Skin Integrity     Skin integrity is maintained or improved Progressing        SAFETY ADULT     Patient will remain free of falls Progressing

## 2018-07-26 NOTE — PLAN OF CARE
DISCHARGE PLANNING - CARE MANAGEMENT     Discharge to post-acute care or home with appropriate resources Progressing        MUSCULOSKELETAL - ADULT     Maintain or return mobility to safest level of function Progressing     Maintain proper alignment of affected body part Progressing        Nutrition/Hydration-ADULT     Nutrient/Hydration intake appropriate for improving, restoring or maintaining nutritional needs Progressing        PAIN - ADULT     Verbalizes/displays adequate comfort level or baseline comfort level Progressing        Potential for Falls     Patient will remain free of falls Progressing        Prexisting or High Potential for Compromised Skin Integrity     Skin integrity is maintained or improved Progressing        SAFETY ADULT     Patient will remain free of falls Progressing

## 2018-07-26 NOTE — PROGRESS NOTES
Rounded at bedside with Bentley Nichols PA-C  Patient code status not address, Efrain Rodríguez asked her about preferences  Vista collar in place  Efrain Rodríguez did neuro assessment which was benign  Continue current treatments

## 2018-07-26 NOTE — ASSESSMENT & PLAN NOTE
-cardiology consulted and has seen patient  -per discussion with Cardiology attending; could discharge patient today with outpatient follow-up with PCP  -electrolytes stable  -no new events on telemetry  -ECHO stable

## 2018-07-26 NOTE — ASSESSMENT & PLAN NOTE
-Aspen collar in place  -Neurosurgery consulted  -Neuro checks  -Pain management  -Continue ASA 325mg per neurosurgery possible grade 1 injury to vertebral artery

## 2018-07-26 NOTE — PROGRESS NOTES
Progress Note - Cardiology   Rosamaria Madsen 81 y o  female MRN: [id number]  Unit/Bed#: [facility] 824-01 Encounter: [id number]    Assessment:  Principal Problem:    Closed odontoid fracture with type I morphology ([address] )  Active Problems:    Closed nondisplaced fracture of fifth cervical vertebra (HCC)    Closed nondisplaced fracture of seventh cervical vertebra (HCC)    Syncope    Hyponatremia, likely secondary to volume depletion and EtOH intake with decrease other PO intake  Essential hypertension  Syncope unclear etiology  Hypokalemia, resolved  Aortic aneurysm - mild    Plan:  Continue telemetry monitoring  Brief 2:1 AV block noted the other day, none seen last 24 hours  K and Mg corrected  Na remains low, can give a liter of fluid gently (not bolus)  Adjustments to BP medications as noted yesterday  Monitor BP while here - adjustments if we need to  No intervention for aortic aneurysm  BP control  Outpatient follow up  Cessation of EtOH  Subjective/Objective     Subjective:  No events overnight  She feels well  Only walking short distances to [place] with walker  Still occasionally dizzy/LH  Says she ate some breakfast, but from sandwich, entire bagel remains on plate  Na remains low        Objective:    Vitals: /79 (BP Location: Left arm)   Pulse 82   Temp 97 8 °F (36 6 °C) (Oral)   Resp 18   Ht 5' 4" (1 626 m)   Wt 75 3 kg (166 lb 0 1 oz)   SpO2 97%   BMI 28 49 kg/m²   Vitals:    07/24/18 1847 07/26/18 0600   Weight: 73 kg (161 lb) 75 3 kg (166 lb 0 1 oz)     Orthostatic Blood Pressures      Most Recent Value   Blood Pressure  170/79 filed at 07/26/2018 0715   Patient Position - Orthostatic VS  Lying filed at 07/26/2018 0715            Intake/Output Summary (Last 24 hours) at 07/26/18 0933  Last data filed at 07/26/18 3018   Gross per 24 hour   Intake              100 ml   Output              825 ml   Net             -725 ml       Physical Exam:   General appearance: NAD  Head: excoriation forehead  Neck: in C collar  Lungs: clear to auscultation bilaterally  Normal air entry  Normal effort  Heart: S1, S2 normal and no S3 or S4  No murmurs  Abdomen: soft, non-tender; bowel sounds normal; no masses,  no organomegaly  Extremities: extremities normal, atraumatic, no cyanosis or edema  Pulses: 2+ and symmetric bilaterally  Skin: Skin color, texture, turgor normal  No rashes or lesions  Neurologic: Grossly normal  Alert and oriented      Medications:    Current Facility-Administered Medications:     acetaminophen (TYLENOL) tablet 650 mg, 650 mg, Oral, Q6H PRN, Js Christiansen DO    anastrozole (ARIMIDEX) tablet 1 mg, 1 mg, Oral, Daily, Js Christiansen DO, 1 mg at 07/25/18 2114    aspirin tablet 325 mg, 325 mg, Oral, Daily, Ramya Joya DO    docusate sodium (COLACE) capsule 100 mg, 100 mg, Oral, BID, Js Christiansen DO, 100 mg at 07/26/18 9103    enoxaparin (LOVENOX) subcutaneous injection 30 mg, 30 mg, Subcutaneous, Q12H [facility], Js Christiansen DO, 30 mg at 07/26/18 0925    levothyroxine tablet 75 mcg, 75 mcg, Oral, Daily, Js Christiansen DO, 75 mcg at 07/26/18 0602    LORazepam (ATIVAN) 2 mg/mL injection 0 5 mg, 0 5 mg, Intravenous, Q4H PRN, Theresa Metzger DO, 0 5 mg at 07/26/18 0002    losartan (COZAAR) tablet 100 mg, 100 mg, Oral, Daily, Herson Montiel MD, 100 mg at 07/26/18 5589    methocarbamol (ROBAXIN) tablet 500 mg, 500 mg, Oral, Q6H PRN, Js Christiansen DO    metoprolol succinate (TOPROL-XL) 24 hr tablet 100 mg, 100 mg, Oral, Daily, Herson Montiel MD, 100 mg at 07/26/18 0016    oxazepam (SERAX) capsule 10 mg, 10 mg, Oral, Q8H [facility], Theresa Metzger DO, 10 mg at 07/26/18 0602    oxyCODONE (ROXICODONE) immediate release tablet 10 mg, 10 mg, Oral, Q4H PRN, Js L Espland, DO, 10 mg at 07/24/18 2128    oxyCODONE (ROXICODONE) IR tablet 5 mg, 5 mg, Oral, Q4H PRN, Js L Espland, DO, 5 mg at 07/25/18 2114    perflutren lipid microsphere (DEFINITY) injection, 0 6 mL/min, Intravenous, Once in imaging, Js Christiansen DO    senna (SENOKOT) tablet 8 6 mg, 1 tablet, Oral, HS, Js Christiansen DO, 8 6 mg at 07/25/18 2114    Lab Results:    Results from last 7 days  Lab Units 07/24/18 2000 07/24/18  1233   TROPONIN I ng/mL <0 02 <0 02       Results from last 7 days  Lab Units 07/26/18  0636 07/25/18  1213 07/24/18  1233   WBC Thousand/uL 5 02 4 94 5 62   HEMOGLOBIN g/dL 13 9 13 7 14 6   HEMATOCRIT % 40 8 39 4 40 9   PLATELETS Thousands/uL 148* 151 143*           Results from last 7 days  Lab Units 07/26/18 0636 07/25/18  1213 07/24/18  1233   SODIUM mmol/L 132* 132* 133*   POTASSIUM mmol/L 3 8 3 3* 2 9*   CHLORIDE mmol/L 98* 96* 93*   CO2 mmol/L 27 27 28   BUN mg/dL 13 13 12   CREATININE mg/dL 0 70 0 69 0 83   CALCIUM mg/dL 8 5 8 8 9 0   GLUCOSE RANDOM mg/dL 112 127 155*       Results from last 7 days  Lab Units 07/24/18  1233   INR  1 02   PTT seconds 29       Results from last 7 days  Lab Units 07/26/18  0636 07/25/18  1213   MAGNESIUM mg/dL 2 6 1 5*       Telemetry: Personally reviewed  No further bradycardic events noted  Echo:  SUMMARY     LEFT VENTRICLE:  Systolic function was normal  Ejection fraction was estimated to be 65 %  Although no diagnostic regional wall motion abnormality was identified, this possibility cannot be completely excluded on the basis of this study      AORTIC VALVE:  There was trace to mild regurgitation      AORTA:  There was dilatation of the ascending aorta up to 4 2 cm

## 2018-07-26 NOTE — SOCIAL WORK
CM met with pt and pt aware PT recommendation for snf  Pt aware and agreeable and cm reviewed snf list and pt aware preferred providers and pt requested [facility] , all referrals in in as such   Cm will follow

## 2018-07-27 ENCOUNTER — HOSPITAL ENCOUNTER (INPATIENT)
Facility: HOSPITAL | Age: 73
LOS: 4 days | Discharge: HOME | DRG: 561 | End: 2018-07-31
Attending: FAMILY MEDICINE | Admitting: FAMILY MEDICINE
Payer: MEDICARE

## 2018-07-27 VITALS
HEART RATE: 99 BPM | DIASTOLIC BLOOD PRESSURE: 73 MMHG | RESPIRATION RATE: 18 BRPM | WEIGHT: 166.01 LBS | SYSTOLIC BLOOD PRESSURE: 137 MMHG | OXYGEN SATURATION: 97 % | HEIGHT: 64 IN | TEMPERATURE: 97.7 F | BODY MASS INDEX: 28.34 KG/M2

## 2018-07-27 DIAGNOSIS — E55.9 VITAMIN D DEFICIENCY: ICD-10-CM

## 2018-07-27 DIAGNOSIS — Z79.811 USE OF ANASTROZOLE (ARIMIDEX): ICD-10-CM

## 2018-07-27 DIAGNOSIS — I71.2 THORACIC AORTIC ANEURYSM WITHOUT RUPTURE (HCC): ICD-10-CM

## 2018-07-27 DIAGNOSIS — C50.911 INVASIVE DUCTAL CARCINOMA OF BREAST, RIGHT (HCC): ICD-10-CM

## 2018-07-27 DIAGNOSIS — S12.100A: ICD-10-CM

## 2018-07-27 DIAGNOSIS — R55 SYNCOPE, UNSPECIFIED SYNCOPE TYPE: ICD-10-CM

## 2018-07-27 DIAGNOSIS — K59.03 CONSTIPATION DUE TO OPIOID THERAPY: Primary | ICD-10-CM

## 2018-07-27 DIAGNOSIS — E87.1 HYPONATREMIA: ICD-10-CM

## 2018-07-27 DIAGNOSIS — E03.8 HYPOTHYROIDISM DUE TO HASHIMOTO'S THYROIDITIS: ICD-10-CM

## 2018-07-27 DIAGNOSIS — T40.2X5A CONSTIPATION DUE TO OPIOID THERAPY: Primary | ICD-10-CM

## 2018-07-27 DIAGNOSIS — S12.100A CLOSED ODONTOID FRACTURE, INITIAL ENCOUNTER (HCC): ICD-10-CM

## 2018-07-27 DIAGNOSIS — E06.3 HYPOTHYROIDISM DUE TO HASHIMOTO'S THYROIDITIS: ICD-10-CM

## 2018-07-27 DIAGNOSIS — S12.401A: ICD-10-CM

## 2018-07-27 DIAGNOSIS — E03.9 HYPOTHYROIDISM: ICD-10-CM

## 2018-07-27 DIAGNOSIS — I10 ESSENTIAL HYPERTENSION: ICD-10-CM

## 2018-07-27 DIAGNOSIS — S12.601A CLOSED NONDISPLACED FRACTURE OF SEVENTH CERVICAL VERTEBRA (HCC): ICD-10-CM

## 2018-07-27 PROCEDURE — 99238 HOSP IP/OBS DSCHRG MGMT 30/<: CPT | Performed by: PHYSICIAN ASSISTANT

## 2018-07-27 PROCEDURE — G0515 COGNITIVE SKILLS DEVELOPMENT: HCPCS

## 2018-07-27 PROCEDURE — 97535 SELF CARE MNGMENT TRAINING: CPT

## 2018-07-27 RX ORDER — ACETAMINOPHEN 325 MG/1
650 TABLET ORAL EVERY 6 HOURS PRN
Qty: 30 TABLET | Refills: 0
Start: 2018-07-27 | End: 2018-08-03

## 2018-07-27 RX ORDER — ASPIRIN 325 MG
325 TABLET ORAL DAILY
Status: DISCONTINUED | OUTPATIENT
Start: 2018-07-28 | End: 2018-07-31 | Stop reason: HOSPADM

## 2018-07-27 RX ORDER — ACETAMINOPHEN 325 MG/1
650 TABLET ORAL EVERY 6 HOURS PRN
Status: DISCONTINUED | OUTPATIENT
Start: 2018-07-27 | End: 2018-07-31 | Stop reason: HOSPADM

## 2018-07-27 RX ORDER — OXYCODONE HYDROCHLORIDE 5 MG/1
5 TABLET ORAL EVERY 4 HOURS PRN
Status: DISCONTINUED | OUTPATIENT
Start: 2018-07-27 | End: 2018-07-31 | Stop reason: HOSPADM

## 2018-07-27 RX ORDER — LEVOTHYROXINE SODIUM 0.07 MG/1
75 TABLET ORAL DAILY
Status: DISCONTINUED | OUTPATIENT
Start: 2018-07-28 | End: 2018-07-31 | Stop reason: HOSPADM

## 2018-07-27 RX ORDER — OXAZEPAM 10 MG
10 CAPSULE ORAL 2 TIMES DAILY
Qty: 4 CAPSULE | Refills: 0 | Status: SHIPPED | OUTPATIENT
Start: 2018-07-27 | End: 2018-07-31 | Stop reason: HOSPADM

## 2018-07-27 RX ORDER — ANASTROZOLE 1 MG/1
1 TABLET ORAL DAILY
Status: DISCONTINUED | OUTPATIENT
Start: 2018-07-28 | End: 2018-07-31 | Stop reason: HOSPADM

## 2018-07-27 RX ORDER — OXYCODONE HYDROCHLORIDE 5 MG/1
5 TABLET ORAL EVERY 4 HOURS PRN
Qty: 20 TABLET | Refills: 0 | Status: SHIPPED | OUTPATIENT
Start: 2018-07-27 | End: 2018-07-31 | Stop reason: HOSPADM

## 2018-07-27 RX ORDER — METOPROLOL SUCCINATE 100 MG/1
100 TABLET, EXTENDED RELEASE ORAL DAILY
Qty: 30 TABLET | Refills: 0 | Status: ON HOLD
Start: 2018-07-27 | End: 2018-07-31

## 2018-07-27 RX ORDER — LOSARTAN POTASSIUM 100 MG/1
100 TABLET ORAL DAILY
Qty: 30 TABLET | Refills: 0 | Status: ON HOLD
Start: 2018-07-27 | End: 2018-07-31

## 2018-07-27 RX ORDER — METOPROLOL SUCCINATE 100 MG/1
100 TABLET, EXTENDED RELEASE ORAL DAILY
Status: DISCONTINUED | OUTPATIENT
Start: 2018-07-28 | End: 2018-07-31 | Stop reason: HOSPADM

## 2018-07-27 RX ORDER — OXAZEPAM 10 MG
10 CAPSULE ORAL 2 TIMES DAILY
Status: DISCONTINUED | OUTPATIENT
Start: 2018-07-27 | End: 2018-07-27

## 2018-07-27 RX ORDER — ASPIRIN 325 MG
325 TABLET ORAL DAILY
Qty: 30 TABLET | Refills: 0 | Status: ON HOLD
Start: 2018-07-27 | End: 2018-07-31

## 2018-07-27 RX ORDER — DOCUSATE SODIUM 100 MG/1
100 CAPSULE, LIQUID FILLED ORAL 2 TIMES DAILY
Status: DISCONTINUED | OUTPATIENT
Start: 2018-07-27 | End: 2018-07-31 | Stop reason: HOSPADM

## 2018-07-27 RX ORDER — DOCUSATE SODIUM 100 MG/1
100 CAPSULE, LIQUID FILLED ORAL 2 TIMES DAILY
Qty: 10 CAPSULE | Refills: 0 | Status: SHIPPED | OUTPATIENT
Start: 2018-07-27 | End: 2018-08-03

## 2018-07-27 RX ORDER — LOSARTAN POTASSIUM 50 MG/1
100 TABLET ORAL DAILY
Status: DISCONTINUED | OUTPATIENT
Start: 2018-07-28 | End: 2018-07-31 | Stop reason: HOSPADM

## 2018-07-27 RX ORDER — TEMAZEPAM 7.5 MG/1
7.5 CAPSULE ORAL 2 TIMES DAILY
Status: COMPLETED | OUTPATIENT
Start: 2018-07-27 | End: 2018-07-29

## 2018-07-27 RX ADMIN — OXAZEPAM 10 MG: 10 CAPSULE, GELATIN COATED ORAL at 10:41

## 2018-07-27 RX ADMIN — METOPROLOL SUCCINATE 100 MG: 100 TABLET, EXTENDED RELEASE ORAL at 10:33

## 2018-07-27 RX ADMIN — ENOXAPARIN SODIUM 30 MG: 30 INJECTION SUBCUTANEOUS at 20:20

## 2018-07-27 RX ADMIN — DOCUSATE SODIUM 100 MG: 100 CAPSULE, LIQUID FILLED ORAL at 18:11

## 2018-07-27 RX ADMIN — ASPIRIN 325 MG: 325 TABLET ORAL at 10:33

## 2018-07-27 RX ADMIN — TEMAZEPAM 7.5 MG: 7.5 CAPSULE ORAL at 18:11

## 2018-07-27 RX ADMIN — LOSARTAN POTASSIUM 100 MG: 50 TABLET, FILM COATED ORAL at 10:33

## 2018-07-27 RX ADMIN — LEVOTHYROXINE SODIUM 75 MCG: 75 TABLET ORAL at 05:37

## 2018-07-27 RX ADMIN — ENOXAPARIN SODIUM 30 MG: 30 INJECTION, SOLUTION INTRAVENOUS; SUBCUTANEOUS at 10:32

## 2018-07-27 NOTE — ASSESSMENT & PLAN NOTE
-cardiology consulted and has seen patient  -patient is stable for discharge with outpatient follow-up with PCP to refer to cardiology  -electrolytes stable  -no new events on telemetry  -ECHO stable  -followed medication adjustments and decreased metoprolol by half as well as D/Herman HCTZ

## 2018-07-27 NOTE — DISCHARGE SUMMARY
Discharge- Chavo Garcia 1945, 81 y o  female MRN: [id number]    Unit/Bed#: [facility] 824-01 Encounter: [id number]    Primary Care Provider: Oren Michaels DO   Date and time admitted to hospital: 7/24/2018  3:36 PM        Closed nondisplaced fracture of fifth cervical vertebra ([facility] [address] )   Assessment & Plan    -Aspen collar in place  -Neurosurgery consulted  -Neuro checks  -Pain management  -Continue ASA 325mg per neurosurgery possible grade 1 injury to vertebral artery        * Closed odontoid fracture with type I morphology ([facility] )   Assessment & Plan    See above        Closed nondisplaced fracture of seventh cervical vertebra [facility])   Assessment & Plan    -See above        Syncope   Assessment & Plan    -cardiology consulted and has seen patient  -patient is stable for discharge with outpatient follow-up with PCP to refer to cardiology  -electrolytes stable  -no new events on telemetry  -ECHO stable  -followed medication adjustments and decreased metoprolol by half as well as D/Herman HCTZ            DVT prophylaxis:  SCDs and Lovenox  PT and OT:  Eval and treat    Disposition:  Discharged today at 2:45 p m  Subjective:  No acute complaints; ready to go  Objective:  General:  No acute distress, well-appearing  HEENT:  Normocephalic  Cardiac:  Regular rate and rhythm  Lungs:  CTA bilaterally  Abdomen: Bowel sounds x4, nontender  Neuro:  Cranial nerves 2-12 intact, no focal deficits  Extremities:  +2 pulses on extremities, neurovascularly intact  Skin:  Warm, dry, intact    Resolved Problems  Date Reviewed: 7/27/2018    None          Admission Date:   Admission Orders     Ordered        07/24/18 1648  Inpatient Admission  Once               Admitting Diagnosis: Injury [T14 90XA]  Syncope, unspecified syncope type [R55]  Closed odontoid fracture, initial encounter ([facility] ) [S12 100A]    HPI: Per Bin Santos, "Chavo Garcia is a 81 y o  female who presents as a trauma transfer from [facility] [place] [facility] CLOS[facility]    Patient states that around 11 o'clock last night she suffered a syncopal episode in her kitchen and fell to the ground patient does not remember the events  Patient states that she then woke with a stabbing severe posterior neck pain without any other focal deficits  Patient also complains of a diffuse headache  Mechanism:Fall"    Procedures Performed: No orders of the defined types were placed in this encounter  Summary of Hospital Course:  Patient is a 65-year-old female who comes in as a trauma transfer  She was noted to have multiple cervical spine fractures  She was consulted to Neurosurgery who recommended CT A  Patient was placed in collar and underwent CTA  CTA revealed a possible grade 1 vertebral artery injury  She was therefore placed on 325 mg of aspirin for total of 3 months  She would have outpatient follow-up neurosurgery to re-evaluate the cervical fractures as well as the vertebral artery injury  She would also have outpatient workup with her PCP for syncope  Cardiology was consulted during her hospital course and at that time cardiology recommended decreasing her metoprolol by half as well as discontinuing the HCTZ  Patient was discharged in stable condition to rehab  Significant Findings, Care, Treatment and Services Provided: Cta Head And Neck With And Without Contrast    Addendum Date: 7/24/2018    ADDENDUM: The patient has cervical spine fractures identified on CT scan of the cervical spine also performed today including odontoid fracture, C5 right lamina fracture and C7 right pedicle, lamina and transverse process fractures  The right distal vertebral artery between the C2 transverse foramen and C1 transverse foramen demonstrates mild focal smooth narrowing which is nonspecific and may represent atherosclerotic disease  No evidence of dissection or pseudoaneurysm  However,  in the setting of trauma a mild grade 1 vascular injury cannot be excluded    The vessel enhances normally above this is slight minimal calcification and developmental narrowing above the posterior inferior cerebellar artery  These findings were directly discussed with Dr Kathleen Alvarez of the emergency department at 2:04 PM  Mentioned in the body of the report but not in the impression is smooth aneurysmal dilatation of the ascending aorta measuring up to 4 8 cm  Result Date: 7/24/2018  Impression: Atherosclerotic disease most pronounced within the left distal common carotid artery extending into the bifurcation where there is approximately 55-60% stenosis of the origin of the internal carotid artery  There is atherosclerotic disease of the proximal left subclavian as well with mild narrowing  No occlusive disease  No aneurysm  6 mm left upper lobe pulmonary nodule  This pulmonary nodule was present on a remote CT of the chest dated 10/13/2015 indicating stability  Workstation performed: [id number]     Xr Chest Portable    Result Date: 7/24/2018  Impression: No acute cardiopulmonary disease  Workstation performed: [id number]     Xr Spine Cervical 2 Or 3 Vw Injury    Result Date: 7/25/2018  Impression: 1  Nonvisualization of fractures involving C2, C5 or C7  2   Stable degenerative changes  Workstation performed: [id number]     Ct Head Without Contrast    Result Date: 7/24/2018  Impression: No acute intracranial hemorrhage seen No mass effect or midline shift seen Mild periventricular and white matter hypodensity seen related to chronic small vessel ischemic Workstation performed: [id number]     Ct Cervical Spine Without Contrast    Result Date: 7/24/2018  Impression: 1  Type I odontoid fracture without evidence of displacement  2   C5 right lamina fracture  3   C7 right pedicle fracture with extension into the lamina and into the transverse foramen at C7-T1    I personally discussed this study with Hannah Hanley on 7/24/2018 at 12:59 PM   Workstation performed: [id number]       Complications: no complications    Condition at Discharge: good         Discharge instructions/Information to patient and family:   See after visit summary for information provided to patient and family  Provisions for Follow-Up Care:  See after visit summary for information related to follow-up care and any pertinent home health orders  PCP: Adelaide Calderon DO    Disposition: [facility]    Planned Readmission: No    Discharge Statement   I spent 21 minutes discharging the patient  This time was spent on the day of discharge  I had direct contact with the patient on the day of discharge  Additional documentation is required if more than 30 minutes were spent on discharge  Discharge Medications:  See after visit summary for reconciled discharge medications provided to patient and family

## 2018-07-27 NOTE — INCIDENTAL FINDINGS
The following findings require follow up:  Radiographic finding   Findin mm left upper lobe pulmonary nodule     Follow up required: Yes with PCP   Follow up should be done at your 2 week follow-up appointment with her primary care provider    Please notify the following clinician to assist with the follow up:   Primary care provider

## 2018-07-27 NOTE — OCCUPATIONAL THERAPY NOTE
[address] [facility] Note     Patient Name: Avtar Carpenter  UUGIH'K Date: 7/27/2018  Problem List  Patient Active Problem List   Diagnosis    Anxiety    GERD (gastroesophageal reflux disease)    Hypertension    Hypothyroidism    Hyperlipidemia    Bilateral renal cysts    Ischemic colitis ([facility] )    Hypokalemia    Thoracic aortic aneurysm (HCC)    Impaired fasting glucose    Esophageal reflux disease    Invasive ductal carcinoma of breast, right (HCC)    Use of anastrozole (Arimidex)    Closed odontoid fracture with type I morphology (HCC)    Closed nondisplaced fracture of fifth cervical vertebra (HCC)    Closed nondisplaced fracture of seventh cervical vertebra ([facility] )    Syncope        07/27/18 1418   Restrictions/Precautions   Weight Bearing Precautions Per Order No   Braces or Orthoses C/S Collar   Other Precautions Cognitive; Chair Alarm;Multiple lines;Pain; Fall Risk   Lifestyle   Autonomy Pt was Independent in ADLs/IADLs PTA and driving short distances   Reciprocal Relationships Pt lives alone but reports supportive fiance    Service to Others Pt is retired    Intrinsic Gratification Pt enjoys watchnig TV and her cats    Pain Assessment   Pain Assessment 0-10   Pain Score 2   Pain Type Acute pain   Pain Location Neck   Pain Orientation Distal   ADL   Where Assessed Chair   Grooming Assistance 5  Supervision/Setup   Grooming Deficit Supervision/safety; Increased time to complete   LB Dressing Assistance 4  Minimal Assistance   LB Dressing Deficit Setup;Steadying;Verbal cueing;Supervision/safety; Increased time to complete; Thread RLE into pants; Thread LLE into pants; Thread RLE into underwear; Thread LLE into underwear   Transfers   Sit to Stand 5  Supervision   Additional items Assist x 1; Increased time required;Verbal cues   Stand to Sit 5  Supervision   Additional items Assist x 1; Increased time required;Verbal cues   Additional Comments (Pt requires SBA for STS transfers w/o AD) Functional Mobility   Functional Mobility 5  Supervision   Additional Comments Pt requires SBA for short in room distances for safety    Cognition   Overall Cognitive Status Impaired   Arousal/Participation Alert; Responsive; Cooperative   Attention Attends with cues to redirect   Orientation Level Oriented to person;Oriented to place;Oriented to situation   Memory Decreased recall of precautions;Decreased short term memory   Following Commands Follows one step commands with increased time or repetition   Comments Pt is alert, pleasant, and Ox3  Pt unable to recall how or why she fell, pt reports this is the 2nd fall in the kitchen  Pt participated in formal cognitive assessment ([address] [place] [address], [place])  Pt searching for glasses after placeing them on her head  Pt anxious to leave the hospital, pt left with chair alarm in place and RN notified of impaired balance/fall risk/safety concerns    Cognition Assessment Tools MOCA   Score 16   Vision   Vision Comments (Pt able to read name tag with glasses on)   Activity Tolerance   Activity Tolerance Patient tolerated treatment well   Medical Staff Made Aware (Pt appropriate to see per RN Devin Snellen)   Assessment   Assessment Pt participated in skilled OT session to address goals established upon evaluation  Pt presents with C-Collar,spinal precautions, fall risk, pain, decreased ADL status  Pt participated in [address]  SCORED  16/30 INDICATING MODERATE COGNITIVE IMPAIRMENT FOR AGE/EDUCATION  SCORES ARE AS FOLLOWS: VISUOSPATIAL/EXECUTIVE FUNCTION: 52/5  Pt was able to complete trail by numbers but could not alternate number/letter trail  Pt was not able to copy cube, initially attempted to copy trail  Pt was able to draw a clock, accurately place the numbers, and was not able to place the hands at ten past eleven  NAMING: 3/3  Pt able to name 3/3 animals  ATTENTION: 3/6  Pt was able to repeat sequence of numbers forwards and backwards   Pt able to attend to sequence of letters  Pt was not able to correctly subtract 7 from 100  Pt reported she was not able to do the math  LANGUAGE: 1/3  Pt was able to repeat 1/2 sentences back to therapist  Pt was able to produce 5 words starting with the letter F in 1 minute  ABSTRACTION: 2/2  Pt was able to identify the similarity of two items x2 trials  Plan   Treatment Interventions ADL retraining;Functional transfer training;Cognitive reorientation; Endurance training;Patient/family training;Equipment evaluation/education;Continued evaluation; Energy conservation; Activityengagement; Compensatory technique education   Goal Expiration Date 08/04/18   Treatment Day 1   OT Frequency 3-5x/wk   Recommendation   OT Discharge Recommendation Short Term Rehab   OT - OK to Discharge Yes  (To STR when medically stable )   Barthel Index   Feeding 10   Bathing 0   Grooming Score 5   Dressing Score 5   Bladder Score 10   Bowels Score 10   Toilet Use Score 5   Transfers (Bed/Chair) Score 10   Mobility (Level Surface) Score 0   Stairs Score 0  (Did not assess )   Barthel Index Score 55   Modified Lacey Scale   Modified Lacey Scale 4     Heather Evans MS , OTR/L

## 2018-07-27 NOTE — DISCHARGE INSTRUCTIONS
Neurosurgical discharge instructions  VISTA collar at all times except for showering change to donna collar  No heavy lifting  No strenuous activities  NO DRIVING  **Please notify MD immediately if you have increased neck or arm pain  New numbness and/or weakness in your arm  Difficulty swallowing or breathing especially while lying down  Numbness or weakness in arms or legs  **    Continue taking aspirin 325 daily for total of 3 months recommended by Neurosurgery  You will have a repeat imaging study performed at the 4-6 week misa of the after discharge  This will be a CTA study  Please continue Lovenox while patient is in rehab  Please follow-up with her primary care provider discussing outpatient follow-up for your syncope as well as your cardiac workup

## 2018-07-27 NOTE — SOCIAL WORK
Pt admitted to [facility] for PT/OT s/p acute admission at [facility] for a fall at home w/ multiple cervical fractures and vertebral artery injury  SW met with pt and her sig other Jaquelin Munoz to complete admission packet and assessment  All consent forms signed including homecare selection form choosing to use [facility] upon discharge  Pt confirmed that prior to acute admission, she was living independently at her home (2 story w/ 2nd floor set-up and no RAHAT) w/ 2 cats  Pt's sig other visits often and is very supportive  Pt notes that she did not ambulate with any DME and does not own any either  Pt drives minimally and relies on sig other for assistance  If required, pt stated 1st floor set-up was an option as there is a 1/2 bath on the 1st floor  Pt denied any MH hx and reports to social ETOH use  Pt sees Dr Patricia Duran for primary care and uses [facility] [address] for short term meds (otherwise uses mail-in)  SW discussed scheduling a [facility] to discuss discharge planning  Pt refused at this time stating that she did not feel it was necessary  SW requested pt keep option in mind  No questions or concerns from pt or sig other at this time  MOOKIE notes that as per Summary of Care, a neurosurgery f/u appt  Is scheduled with Antonio Rai for 8/8/18 at 1:45  If pt remains admitted at that time, SW to assist in coordinating  SW will continue to follow for plan of care

## 2018-07-28 PROCEDURE — 97530 THERAPEUTIC ACTIVITIES: CPT

## 2018-07-28 PROCEDURE — 97163 PT EVAL HIGH COMPLEX 45 MIN: CPT

## 2018-07-28 RX ADMIN — ANASTROZOLE 1 MG: 1 TABLET ORAL at 09:01

## 2018-07-28 RX ADMIN — METOPROLOL SUCCINATE 100 MG: 100 TABLET, FILM COATED, EXTENDED RELEASE ORAL at 09:02

## 2018-07-28 RX ADMIN — ASPIRIN 325 MG ORAL TABLET 325 MG: 325 PILL ORAL at 09:01

## 2018-07-28 RX ADMIN — TEMAZEPAM 7.5 MG: 7.5 CAPSULE ORAL at 17:44

## 2018-07-28 RX ADMIN — OXYCODONE HYDROCHLORIDE 5 MG: 5 TABLET ORAL at 17:48

## 2018-07-28 RX ADMIN — ENOXAPARIN SODIUM 30 MG: 30 INJECTION SUBCUTANEOUS at 22:04

## 2018-07-28 RX ADMIN — ENOXAPARIN SODIUM 30 MG: 30 INJECTION SUBCUTANEOUS at 09:03

## 2018-07-28 RX ADMIN — LOSARTAN POTASSIUM 100 MG: 50 TABLET, FILM COATED ORAL at 09:04

## 2018-07-28 RX ADMIN — TEMAZEPAM 7.5 MG: 7.5 CAPSULE ORAL at 09:04

## 2018-07-28 RX ADMIN — TUBERCULIN PURIFIED PROTEIN DERIVATIVE 5 UNITS: 5 INJECTION INTRADERMAL at 10:16

## 2018-07-28 RX ADMIN — LEVOTHYROXINE SODIUM 75 MCG: 75 TABLET ORAL at 09:02

## 2018-07-28 NOTE — PHYSICAL THERAPY NOTE
PHYSICAL THERAPY EVALUATION - [facility]    Time In: 11:25   Time Out: 11:45  Total Time: 20 min  MRN: [id number]    Patient is a 81 y o  female originally admitted to [place] [address] then transferred as trauma patient to [facility] on 7/24/18  Patient transferred to [address] on 7/27/2018 with order in place for PT Evaluation and Treatment  Patient evaluated by PT today with admitting diagnosis of:  Syncope [R55]; PT treatment diagnosis: Difficulty in walking R26 2  Patient Active Problem List   Diagnosis    Anxiety    GERD (gastroesophageal reflux disease)    Hypertension    Hypothyroidism    Hyperlipidemia    Bilateral renal cysts    Ischemic colitis ([address] )    Hypokalemia    Thoracic aortic aneurysm (HCC)    Impaired fasting glucose    Esophageal reflux disease    Invasive ductal carcinoma of breast, right (HCC)    Use of anastrozole (Arimidex)    Closed odontoid fracture with type I morphology (HCC)    Closed nondisplaced fracture of fifth cervical vertebra (HCC)    Closed nondisplaced fracture of seventh cervical vertebra ([address] )    Syncope       Please refer to H & P for further details      Past Medical History:   Diagnosis Date    Abnormal CT of the abdomen     Acute bronchitis     Anxiety     Appetite loss     Arthritis     Ascending aortic aneurysm (HCC)     Bilateral renal cysts     Cancer (HCC)     breast    Cyst of ovary     Dysphagia     Esophageal reflux disease     Fat necrosis of breast     Full dentures     GERD (gastroesophageal reflux disease)     Herpes zoster     History of radiation therapy     Hyperlipidemia     Hypertension     Hypokalemia     Hypomagnesemia     Hypothyroidism     Impaired fasting glucose     Irritable bowel syndrome (IBS)     Ischemic colitis (HCC)     Knee pain     Limb alert care status     no BP/IV right arm    Osteoarthritis of right knee     Pneumonia     Post-op pain     Pulmonary nodules     Thoracic aortic aneurysm (HCC)     Unspecified ovarian cysts     Use of anastrozole (Arimidex)     Vasovagal syncope     Vitamin D deficiency     Wears glasses     Weight loss        Past Surgical History:   Procedure Laterality Date    APPENDECTOMY      pt states it was not removed    BREAST BIOPSY Right 03/02/2016    BREAST LUMPECTOMY Right 2004    BREAST SURGERY Right     Single lesion excision 1990 hyperplasia, 1st biopsy at 25yrs old was benign    CHOLECYSTECTOMY      COLONOSCOPY      COLONOSCOPY N/A 5/12/2017    Procedure: COLONOSCOPY with biopsy;  Surgeon: Hans Bell MD;  Location: [facility]; Service:     HYSTERECTOMY      KNEE SURGERY Right     MN BIOPSY/EXCISION, LYMPH NODE(S) Right 4/12/2016    Procedure: BIOPSY LYMPH NODE SENTINEL @ 1200 LYMPHOSCINTIGRAPHY LYMPHATIC MAPPING;  Surgeon: Li Fraser MD;  Location: [facility];  Service: General    MN MASTECTOMY, SIMPLE, COMPLETE Right 4/12/2016    Procedure: MASTECTOMY SIMPLE;  Surgeon: Li Fraser MD;  Location: [facility];  Service: General    TUBAL LIGATION         Comorbidities affecting patient's physical performance at time of assessment include: HTN and anxiety  Personal factors affecting the patient at time of IE include: lives in 2 story house, decreased cognition, limited home support, positive fall history, anxiety, impulsivity and has 1 step to enter her home  Please find objective findings from PT assessment regarding body systems outlined below:     07/28/18 1200   Note Type   Note type Eval/Treat   Pain Assessment   Pain Assessment No/denies pain   Pain Score No Pain   Home Living   Type of Home Other (Comment); House  (2 SH with 1 RAHAT without HR)   Home Layout Two level;1/2 bath on main level;Bed/bath upstairs;Stairs to enter without rails; Other (Comment)  (Full flight of steps up to 2nd level with bilateral HRs)   Bathroom Shower/Tub Tub/shower unit   [employer] Raised Bathroom Equipment Grab bars in [facility] [address] Other (Comment)  (Reports none)   Additional Comments Patient states she was independent with ambulation without an assistive device  States she has 2 cats  States she has a boyfriend who calls her at least 2x/day and they usually spend weekends together  States she occasionally drives  Prior Function   Level of [place] Independent with ADLs and functional mobility   Lives With Alone   Receives Help From Other (Comment)  (Boyfriend and daughter available)   ADL Assistance Independent   IADLs Independent   Falls in the last 6 months 1 to 4  (Reports 2 falls in last couple of wks; both in her kitchen)   Vocational Retired   Restrictions/Precautions   [facility] Bearing Precautions Per Order No   Braces or Orthoses C/S Collar; Other (Comment)  ([place] cervical brace)   Other Precautions Cognitive;Limb alert; Fall Risk;Spinal precautions  (Limb Alert Right UE)   General   Additional Pertinent History Patient originally admitted to [place] [address] 7/24/18 and then transferred as a trauma transfer to [address] [place] [address]  Family/Caregiver Present Yes  (Daughter Amee Jj visiting)   Cognition   Overall Cognitive Status Impaired   Arousal/Participation Alert   Attention Within functional limits   Orientation Level Oriented to person;Oriented to place;Oriented to situation;Disoriented to time   Following Commands Follows multistep commands without difficulty   RLE Assessment   RLE Assessment WFL  (Hip flex 4/5, knee ext & ankle DF 5/5)   LLE Assessment   LLE Assessment WFL  (Hip flex 4/5, knee ext & ankle DF 5/5)   Coordination   Movements are Fluid and Coordinated 1   Sensation WFL  (Denies numbness/tingling bilateral LEs)   Bed Mobility   Rolling R 7  Independent   Rolling L 7  Independent   Supine to Sit 6  Modified independent   Additional items Impulsive; Bedrails; Comment  (VCs to not move too quickly)   Sit to Supine 7  Independent   Additional items Impulsive; Comment  (VCs to not move too quickly)   Additional Comments Wearing Townsend Mathews collar   Transfers   Sit to Stand 5  Supervision  (For safety)   Additional items Verbal cues; Impulsive; Other  (VCs for proper hand placement)   Stand to Sit 5  Supervision  (For safety)   Additional items Impulsive;Verbal cues; Other  (VCs for proper hand placement)   Stand pivot 5  Supervision  (Close, for safety without AD)   Additional items Verbal cues; Impulsive; Other  (VCs for safety in movement from chair > bed)   Ambulation/Elevation   Gait pattern (Decreased alonso)   Gait Assistance 5  Supervision  (For safety)   Additional items Verbal cues; Other (Comment)  (VCs for safety in turning, wearing Townsend Mathews collar)   Assistive Device Rolling walker   Distance 67 ft, 69 ft   Stair Management Assistance Not tested   Balance   Static Sitting Fair +   Dynamic Sitting Fair +   Static Standing Fair   Dynamic Standing Fair -   Endurance Deficit   Endurance Deficit No   Activity Tolerance   Activity Tolerance Patient tolerated treatment well   Assessment   Prognosis Fair   Problem List Impaired balance;Decreased mobility; Decreased cognition; Impaired judgement;Decreased safety awareness;Decreased skin integrity;Orthopedic restrictions   Assessment In summary, guiding factors including patient history, examination of body system(s), clinical presentation and clinical decision making were considered  Patient presents with comorbid conditions that impact function, context of current functional limitations as compared to the prior level of function, limited physical support, recent hospital admission and with living environment deficits   Patient also presents with skin integrity issues (healing ulcerations right forehead, right lateral upper cheek, between eyes), impaired vital sign response, cognition, safety awareness, bed mobility, transfers, standing balance and gait abilities  Clinical presentation is unstable/unpredictable at this time  The assigned level of complexity is: high  Patient would benefit from continued PT treatment to address deficits as defined above and restore or maximize level of functional mobility with consistency in order to return to multi-level home, decrease risk for falls and increase independence in home alone environment  Barriers to Discharge Decreased caregiver support; Other (Comment)  (Lives alone in multilevel home with 1 RAHAT)   Goals   Patient Goals "To not fall again!"  (Patient also interested in trial use of rollator walker )   STG Expiration Date (N/A)   Short Term Goal #1 STGs = LTGs   LTG Expiration Date 08/07/18   Long Term Goal #1 1  Patient will perform all bed mobility with modified independence in order to get in/out of bed  2  Patient will perform all functional transfers with modified independence in order to  improve ability to perform upright tasks at home and transfer from one surface to another  3  Patient will ambulate with modified independence x at least 200 ft with least restrictive assistive device in order to safely access all necessary areas of home and prepare for return to community ambulation  4  To trial use of rollator walker and determine if safe for patient use at home and community; goals to be re-established as needed  5  Patient will ascend/descend 1 step without HR with device prn + full flight of steps with bilateral handrails with device prn with modified independence to enter/exit home and access bedroom/full bathroom level of home  Treatment Day 1   Plan   Treatment/Interventions ADL retraining;Functional transfer training;Elevations; Therapeutic exercise; Endurance training;Cognitive reorientation;Patient/family training;Equipment eval/education; Bed mobility;Gait training;Spoke to nursing;Family;OT  (Spoke with )   PT Frequency Other (Comment)  (6x/wk x 1 1/2 wks  ) Recommendation   Equipment Recommended Other (Comment)  (To be determined)     PHYSICAL THERAPY TREATMENT NOTE    Time In: 11:45    Time Out: 12:00  Total Time: 15 min  MRN: [id number]    S: "My cats at home are missing me!", patient stated  States her boyfriend is caring for her cats at home  O: Therapeutic Activities:   Reviewed spine precautions  Reviewed POC  Sit < > stand with supervision for safety with VCs for proper hand placement; patient tends to be impulsive  Ambulation with RW with supervision for safety x 150 ft wearing Gaston Billings collar; ambulates with decreased alonso  Ascended/descended 1 + 1 steps without HR with RW with close supervision for safety and VCs for sequencing/technique and to slow down; patient impulsive  Vitals: Seated post-ambulation and left UE BP = 152/102, Heart Rate = 90 bpm, SpO2 = 97%  on RA  Swapna Tommy RN made aware  A: Patient impulsive with movement  Increased ambulation distance, though elevation in BP; RN made aware  P: Continue to work on safety during transfers and mobility  Patient returned to chair at bedside; patient positioned with all needs, including call bell, within reach      Kristy Monaco, PT, DPT

## 2018-07-28 NOTE — PLAN OF CARE
Problem: PHYSICAL THERAPY ADULT  Goal: Performs mobility at highest level of function for planned discharge setting  See evaluation for individualized goals  Treatment/Interventions: ADL retraining, Functional transfer training, Elevations, Therapeutic exercise, Endurance training, Cognitive reorientation, Patient/family training, Equipment eval/education, Bed mobility, Gait training, Spoke to nursing, Family, OT (Spoke with )  Equipment Recommended: Other (Comment) (To be determined)       See flowsheet documentation for full assessment, interventions and recommendations  Outcome: Progressing  Prognosis: Fair  Problem List: Impaired balance, Decreased mobility, Decreased cognition, Impaired judgement, Decreased safety awareness, Decreased skin integrity, Orthopedic restrictions  Assessment: In summary, guiding factors including patient history, examination of body system(s), clinical presentation and clinical decision making were considered  Patient presents with comorbid conditions that impact function, context of current functional limitations as compared to the prior level of function, limited physical support, recent hospital admission and with living environment deficits  Patient also presents with skin integrity issues (healing ulcerations right forehead, right lateral upper cheek, between eyes), impaired vital sign response, cognition, safety awareness, bed mobility, transfers, standing balance and gait abilities  Clinical presentation is unstable/unpredictable at this time  The assigned level of complexity is: high  Patient would benefit from continued PT treatment to address deficits as defined above and restore or maximize level of functional mobility with consistency in order to return to multi-level home, decrease risk for falls and increase independence in home alone environment    Barriers to Discharge: Decreased caregiver support, Other (Comment) (Lives alone in multilevel home with 1 RAHAT)                See flowsheet documentation for full assessment

## 2018-07-29 PROCEDURE — 97530 THERAPEUTIC ACTIVITIES: CPT

## 2018-07-29 PROCEDURE — 97166 OT EVAL MOD COMPLEX 45 MIN: CPT

## 2018-07-29 PROCEDURE — 97535 SELF CARE MNGMENT TRAINING: CPT

## 2018-07-29 RX ADMIN — TEMAZEPAM 7.5 MG: 7.5 CAPSULE ORAL at 08:15

## 2018-07-29 RX ADMIN — LEVOTHYROXINE SODIUM 75 MCG: 75 TABLET ORAL at 06:24

## 2018-07-29 RX ADMIN — ENOXAPARIN SODIUM 30 MG: 30 INJECTION SUBCUTANEOUS at 22:15

## 2018-07-29 RX ADMIN — METOPROLOL SUCCINATE 100 MG: 100 TABLET, FILM COATED, EXTENDED RELEASE ORAL at 08:13

## 2018-07-29 RX ADMIN — ENOXAPARIN SODIUM 30 MG: 30 INJECTION SUBCUTANEOUS at 08:16

## 2018-07-29 RX ADMIN — LOSARTAN POTASSIUM 100 MG: 50 TABLET, FILM COATED ORAL at 08:14

## 2018-07-29 RX ADMIN — ANASTROZOLE 1 MG: 1 TABLET ORAL at 08:14

## 2018-07-29 RX ADMIN — ASPIRIN 325 MG ORAL TABLET 325 MG: 325 PILL ORAL at 08:13

## 2018-07-29 NOTE — OCCUPATIONAL THERAPY NOTE
Occupational Therapy Evaluation      Sigurd Breath    7/29/2018    Patient Active Problem List   Diagnosis    Anxiety    GERD (gastroesophageal reflux disease)    Hypertension    Hypothyroidism    Hyperlipidemia    Bilateral renal cysts    Ischemic colitis ([address] )    Hypokalemia    Thoracic aortic aneurysm (HCC)    Impaired fasting glucose    Esophageal reflux disease    Invasive ductal carcinoma of breast, right (HCC)    Use of anastrozole (Arimidex)    Closed odontoid fracture with type I morphology (HCC)    Closed nondisplaced fracture of fifth cervical vertebra (HCC)    Closed nondisplaced fracture of seventh cervical vertebra (HCC)    Syncope       Past Medical History:   Diagnosis Date    Abnormal CT of the abdomen     Acute bronchitis     Anxiety     Appetite loss     Arthritis     Ascending aortic aneurysm (HCC)     Bilateral renal cysts     Cancer (HCC)     breast    Cyst of ovary     Dysphagia     Esophageal reflux disease     Fat necrosis of breast     Full dentures     GERD (gastroesophageal reflux disease)     Herpes zoster     History of radiation therapy     Hyperlipidemia     Hypertension     Hypokalemia     Hypomagnesemia     Hypothyroidism     Impaired fasting glucose     Irritable bowel syndrome (IBS)     Ischemic colitis (HCC)     Knee pain     Limb alert care status     no BP/IV right arm    Osteoarthritis of right knee     Pneumonia     Post-op pain     Pulmonary nodules     Thoracic aortic aneurysm (HCC)     Unspecified ovarian cysts     Use of anastrozole (Arimidex)     Vasovagal syncope     Vitamin D deficiency     Wears glasses     Weight loss        Past Surgical History:   Procedure Laterality Date    APPENDECTOMY      pt states it was not removed    BREAST BIOPSY Right 03/02/2016    BREAST LUMPECTOMY Right 2004    BREAST SURGERY Right     Single lesion excision 1990 hyperplasia, 1st biopsy at 25yrs old was benign    CHOLECYSTECTOMY      COLONOSCOPY      COLONOSCOPY N/A 5/12/2017    Procedure: COLONOSCOPY with biopsy;  Surgeon: Velma Wiggins MD;  Location: [facility]; Service:     HYSTERECTOMY      KNEE SURGERY Right     [place] BIOPSY/EXCISION, LYMPH NODE(S) Right 4/12/2016    Procedure: BIOPSY LYMPH NODE SENTINEL @ 1200 LYMPHOSCINTIGRAPHY LYMPHATIC MAPPING;  Surgeon: Elieser Clifton MD;  [facility];  Service: General    KY MASTECTOMY, SIMPLE, COMPLETE Right 4/12/2016    Procedure: MASTECTOMY SIMPLE;  Surgeon: Elieser Clifton MD;  Location: [facility];  Service: General    TUBAL LIGATION        07/29/18 0840   Note Type   Note type Eval/Treat   Restrictions/Precautions   Braces or Orthoses C/S Collar  ([place] cervical collar)   Other Precautions Cognitive;Limb alert; Fall Risk;Spinal precautions   Pain Assessment   Pain Assessment No/denies pain   Home Living   Type of Home House  [facility] with 1STE without HR)   Home Layout Two level;1/2 bath on main level;Bed/bath upstairs;Stairs to enter without rails  (full flight of steps to 2nd level with (B)HR's)   Bathroom Shower/Tub Tub/shower unit   [employer] Raised   Bathroom Equipment Grab bars in shower   [address] Other (Comment)  (no DME)   Additional Comments (I) PTA with mobility, ADLs, IADLs  Pt reports "I cook very little"  Pt with 2 cats (daughter caring for cats at this time)  Pt reports boyfriend checks in (by phone) at least 2x/day and spends the weekends together  Pt reports drives very little- "13year old car and it has 56,000mi on it " Pt reports only drives on "off busy times"  Pt reports (I) with finances      Prior Function   Level of [place] Independent with ADLs and functional mobility   Lives With Alone   Receives Help From Family   ADL Assistance Independent   IADLs Independent   Falls in the last 6 months 1 to 4  (reports 2 falls; both in kitchen)   Vocational Retired  (worked at [employer]) Lifestyle   Reciprocal Relationships Pt reports has good supports between close neighbors, daughter (works from home), and boyfriend  Despite the latter, pt reports does not have someone able to stay with her  Pt with a son, whom she is not in contact with for many years  Intrinsic Gratification Pt reports being "a tv nut" and 2 cats  Sedentary lifestyle  Psychosocial   Psychosocial (WDL) WDL   Patient Behaviors/Mood Appropriate for age   Subjective   Subjective "I feel pretty good  They have been letting me walk around my room and I am doing just fine  ADL   Eating Assistance 7  Independent   Grooming Assistance 7  Independent   Grooming Deficit Standing with assistive device; Wash/dry face; Wash/dry hands   UB Bathing Assistance 4  Minimal Assistance   LB Bathing Assistance 5  Supervision/Setup   UB Dressing Assistance 4  Minimal Assistance   LB Dressing Assistance 5  Supervision/Setup   Toileting Assistance  5  Supervision/Setup   Additional Comments assist with donning/doffing c-collar   Bed Mobility   Rolling R 7  Independent   Rolling L 7  Independent   Supine to Sit 6  Modified independent   Sit to Supine 7  Independent   Transfers   Sit to Stand 5  Supervision   Stand to Sit 5  Supervision   Stand pivot 5  Supervision   Toilet transfer 5  Supervision   Additional Comments pt performing transfers with (S) without AD to/from surfaces within close proximity of one another (ie: bed<>bedside chair, toilet<>sink) and using RW functional mobility longer distances (ie: in hallway, bed<>bathroom)  No LOB noted   Pt states, "I know I need to stop and wait when I change positions to make sure I am not dizzy "    Functional Mobility   Functional Mobility 5  Supervision   Additional Comments Pt did note fatigue with functional mobility; dec'd activity tolerance   Additional items Rolling walker   Balance   Static Sitting Good   Dynamic Sitting Fair +   Static Standing Fair   Dynamic Standing Fair   Activity Tolerance   Activity Tolerance Patient tolerated treatment well;Patient limited by fatigue   Nurse Made Aware yes   RUE Assessment   RUE Assessment WFL   LUE Assessment   LUE Assessment WFL   Hand Function   Gross Motor Coordination Functional   Fine Motor Coordination Functional   Sensation   Light Touch No apparent deficits   Vision-Basic Assessment   Current Vision Wears glasses all the time  (bifocal glasses)   Cognition   Overall Cognitive Status Impaired   Arousal/Participation Alert; Responsive; Cooperative   Attention Attends with cues to redirect   Orientation Level Oriented to place;Oriented to time;Oriented to person;Oriented to situation   Memory Decreased recall of precautions; Within functional limits;Decreased short term memory   Following Commands Follows one step commands without difficulty   Comments Pt appropriuate t/o session  Impulsive at times and cues for safety with regard to the manner in which pt picking up items from floor, over-extending arms overhead  Recommend formal Cognitive assessment during TCF stay to determine if change since inpt stay 2' [facility] completed on 7/27/18 with a score of 16  Assessment   Limitation Decreased ADL status; Decreased UE strength;Decreased endurance;Decreased cognition;Decreased Safe judgement during ADL;Decreased high-level ADLs; Decreased self-care trans   Prognosis Good   Assessment Pt is a 73yo/F adm [facility] s/p fall with syncope resulting in odontoid fx, C5 and C7 fx  Pt with comorbidities that include: h/o alcohol abuse, anxiety, arthritis, AAA, breast CA s/p R mastectomy, HTN, HLD, IBS, knee pain, and vasovagal syncope  Pt reportedly lives alone in a [facility] with 2 cats and was completely (I) with ADLs, IADLs, and functional mobility without the use of AD, & drives locally  Pt reports having good supports in place, however unable to have full time support at d/c and must be (I) upon returning home   Upon evaluation, pt presents at an overall (S) level for all ADls with the exception of UB ADLs; pt at a min (A) level  Pt also at a (S) level for ADL transfers with the use of AD for longer distances  Pt does admit to fatigue with activity  Cognitively, pt appropriate t/o session however with some forgetfulness re: c-spine precautions for safety, mild impulsivity at times  Noted pt had MOCA done during hospital stay on 7/27/18 and therefore recommend a repeat prior to d/c, to assist with d/c planning, using another version in order to avoid skewed results from retesting  Pt below baseline level of independence 2' the following deficits: decreased endurance/activity tolerance, decreased functional forward reach, decreased ADL status, impaired balance, decreased mobility status, C spinal precautions, deconditioning/generalized weakness, decreased insight into deficits and impaired judgement/safety awareness  Recommend continued OT to address the following Occupational Performance Areas: bathing/shower, toilet hygiene, dressing, medication management, functional mobility, community mobility, clothing management, meal prep and household maintenance  MODERATE COMPLEXITY EVALUATION   Goals   Patient Goals "Go back to my house and take care of my cats  And I want to go back and watch all of my good tv- which they do not have here "    LTG Time Frame 7-10   Long Term Goal #1 see below for list of goals   Plan   Treatment Interventions ADL retraining;Functional transfer training; Endurance training;Cognitive reorientation;Patient/family training;Equipment evaluation/education; Compensatory technique education;Continued evaluation; Energy conservation; Activityengagement   Goal Expiration Date 08/09/18   OT Frequency (6x/wk)   Additional Treatment Session   Start Time 0900   End Time 0945   Treatment Assessment Pt seen at bedside for OT evaluation and tx session   Tx session included extensive education re: c-spine precautions in order to encourage healing, ADL transfer safety, brace management training, and further education re: role of services and OT POC  Pt demonstrated ability to carry out all transfers at a (S) level  Although pt able to recall "waiting" before mobilizing to ensure no dizziness, pt with poor carryover of this technique and therefore verbal cues for pt to "slow pace" when changing positions  Pt observed retrieving fallen items from floor in unsafe manner as well- holding sink to reach to floor, thus resulting in UE holding sink to be completely flexed overhead while pt retrieved item with free hand  OT discussed use of a reacher in order to safely retrieve items at this time  Also educated on benefits of reacher use in order to decrease risk of dizziness that may occur from changing positions quickly (forward leaning>standing) to retrieve items from floor  Pt verbalized understanding to this  Once returned to bed, pt lay in bed with (B)UE's overstretching and behind head, again- OT reinforced c-spine prec  Pt reports discomfort with c-collar and states this is reasoning for UE's behind head  OT suggesting more support for pillows or small folded blanket however pt declined  Instruction re: doff/donning brace with max vc's  Pt states, "my boyfriend will be helping me with this, I am sure " Pt would benefit from continued practice with doffing/donning  Pt also required MAX vc's to avoid twisting/turning neck when collar off  Once returned to bed, OT reviewed POC- discussed possible activities that may be done during stay such as kitchen tasks, light household maintenance tasks, tub/shower transfer, med management  Pt verbalized understanding to education  All needs/call bell left in reach at the end of session  Pt would benefit from continued OT services as per OT POC      Recommendation   Equipment Recommended (possible tub transfer bench, reacher)     GOALS:    STG=LTG  Pt will achieve the following goals within 1 5 weeks    *Perform sinkside ADLs  With good safety} and with good balance for inc'd independence with self cares    *ADL transfers (I) for inc'd independence with ADLs/purposeful tasks    *UB ADL (I) for inc'd independence with self cares    *LB ADL (I) using AE prn for inc'd independence with self cares    *Toileting (I) for clothing management and hygiene for return to PLOF with personal care    *Increase static stand balance to good and dyn stand balance to good- for inc'd safety with standing purposeful tasks    *Increase stand tolerance x7-10 m for inc'd tolerance with standing purposeful tasks    *Activity tolerance- fair+/good- for inc'd tolerance with purposeful tasks    *Participate in further cognitive testing to assist with safe d/c planning (if using MOCA, use alternate version 2' pt took test 7/27/18)    *Tub/shower transfer using most appropriate technique (step in vs  Tub seat/transfer bench) with mod (I)/(I) for inc'd safety and independence with bathing    *Kitchen mobility- (I) for inc'd independence and safety negotiating kitchen environment    *Light meal prep- (I) to increase independence with preparing self nourishment    *Brace management (I), for inc'd safety and support    *Pt will demonstrate good safety with all ADLs/IADL tasks by maintaining c-spine precautions    *Pt will demonstrate independence with medication management tasks      Fior Chiang, OT

## 2018-07-29 NOTE — PLAN OF CARE
Problem: OCCUPATIONAL THERAPY ADULT  Goal: Performs self-care activities at highest level of function for planned discharge setting  See evaluation for individualized goals  Treatment Interventions: ADL retraining, Functional transfer training, Endurance training, Cognitive reorientation, Patient/family training, Equipment evaluation/education, Compensatory technique education, Continued evaluation, Energy conservation, Activityengagement  Equipment Recommended:  (possible tub transfer bench, reacher)       See flowsheet documentation for full assessment, interventions and recommendations  Outcome: Progressing  Limitation: Decreased ADL status, Decreased UE strength, Decreased endurance, Decreased cognition, Decreased Safe judgement during ADL, Decreased high-level ADLs, Decreased self-care trans  Prognosis: Good  Assessment: Pt is a 73yo/F adm to [facility] from [facility] s/p fall with syncope resulting in odontoid fx, C5 and C7 fx  Pt with comorbidities that include: h/o alcohol abuse, anxiety, arthritis, AAA, breast CA s/p R mastectomy, HTN, HLD, IBS, knee pain, and vasovagal syncope  Pt reportedly lives alone in a [facility] with 2 cats and was completely (I) with ADLs, IADLs, and functional mobility without the use of AD, & drives locally  Pt reports having good supports in place, however unable to have full time support at d/c and must be (I) upon returning home  Upon evaluation, pt presents at an overall (S) level for all ADls with the exception of UB ADLs; pt at a min (A) level  Pt also at a (S) level for ADL transfers with the use of AD for longer distances  Pt does admit to fatigue with activity  Cognitively, pt appropriate t/o session however with some forgetfulness re: c-spine precautions for safety, mild impulsivity at times   Noted pt had MOCA done during hospital stay on 7/27/18 and therefore recommend a repeat prior to d/c, to assist with d/c planning, using another version in order to avoid skewed results from retesting  Pt below baseline level of independence 2' the following deficits: decreased endurance/activity tolerance, decreased functional forward reach, decreased ADL status, impaired balance, decreased mobility status, C spinal precautions, deconditioning/generalized weakness, decreased insight into deficits and impaired judgement/safety awareness  Recommend continued OT to address the following Occupational Performance Areas: bathing/shower, toilet hygiene, dressing, medication management, functional mobility, community mobility, clothing management, meal prep and household maintenance   MODERATE COMPLEXITY EVALUATION

## 2018-07-30 ENCOUNTER — TRANSITIONAL CARE MANAGEMENT (OUTPATIENT)
Dept: FAMILY MEDICINE CLINIC | Facility: CLINIC | Age: 73
End: 2018-07-30

## 2018-07-30 PROBLEM — F10.10 ETOH ABUSE: Status: ACTIVE | Noted: 2018-07-30

## 2018-07-30 PROBLEM — I70.90 ATHEROSCLEROSIS: Status: ACTIVE | Noted: 2018-07-30

## 2018-07-30 PROBLEM — R91.1 LUNG NODULE SEEN ON IMAGING STUDY: Status: ACTIVE | Noted: 2018-07-30

## 2018-07-30 LAB
25(OH)D3 SERPL-MCNC: 17.3 NG/ML (ref 30–100)
ANION GAP SERPL CALCULATED.3IONS-SCNC: 4 MMOL/L (ref 5–14)
BUN SERPL-MCNC: 13 MG/DL (ref 5–25)
CALCIUM SERPL-MCNC: 9.5 MG/DL (ref 8.4–10.2)
CHLORIDE SERPL-SCNC: 99 MMOL/L (ref 97–108)
CO2 SERPL-SCNC: 31 MMOL/L (ref 22–30)
CREAT SERPL-MCNC: 0.59 MG/DL (ref 0.6–1.2)
EOSINOPHIL # BLD AUTO: 0.06 THOUSAND/UL (ref 0–0.4)
EOSINOPHIL NFR BLD MANUAL: 1 % (ref 0–6)
ERYTHROCYTE [DISTWIDTH] IN BLOOD BY AUTOMATED COUNT: 12.6 %
GFR SERPL CREATININE-BSD FRML MDRD: 93 ML/MIN/1.73SQ M
GLUCOSE SERPL-MCNC: 96 MG/DL (ref 70–99)
HCT VFR BLD AUTO: 40.6 % (ref 36–46)
HGB BLD-MCNC: 14 G/DL (ref 12–16)
LYMPHOCYTES # BLD AUTO: 1.37 THOUSAND/UL (ref 0.5–4)
LYMPHOCYTES # BLD AUTO: 24 % (ref 20–50)
MAGNESIUM SERPL-MCNC: 1.7 MG/DL (ref 1.6–2.3)
MCH RBC QN AUTO: 36.3 PG (ref 26–34)
MCHC RBC AUTO-ENTMCNC: 34.5 G/DL (ref 31–36)
MCV RBC AUTO: 105 FL (ref 80–100)
MONOCYTES # BLD AUTO: 0.63 THOUSAND/UL (ref 0.2–0.9)
MONOCYTES NFR BLD AUTO: 11 % (ref 1–10)
NEUTS BAND NFR BLD MANUAL: 2 % (ref 0–8)
NEUTS SEG # BLD: 3.65 THOUSAND/UL (ref 1.8–7.8)
NEUTS SEG NFR BLD AUTO: 62 %
PHOSPHATE SERPL-MCNC: 3.6 MG/DL (ref 2.5–4.8)
PLATELET # BLD AUTO: 178 THOUSANDS/UL (ref 150–450)
PLATELET BLD QL SMEAR: ADEQUATE
PMV BLD AUTO: 8.7 FL (ref 8.9–12.7)
POTASSIUM SERPL-SCNC: 4.2 MMOL/L (ref 3.6–5)
RBC # BLD AUTO: 3.86 MILLION/UL (ref 4–5.2)
RBC MORPH BLD: NORMAL
SODIUM SERPL-SCNC: 134 MMOL/L (ref 137–147)
TOTAL CELLS COUNTED SPEC: 100
WBC # BLD AUTO: 5.7 THOUSAND/UL (ref 4.5–11)

## 2018-07-30 PROCEDURE — 80048 BASIC METABOLIC PNL TOTAL CA: CPT | Performed by: NURSE PRACTITIONER

## 2018-07-30 PROCEDURE — 99305 1ST NF CARE MODERATE MDM 35: CPT | Performed by: NURSE PRACTITIONER

## 2018-07-30 PROCEDURE — 97535 SELF CARE MNGMENT TRAINING: CPT

## 2018-07-30 PROCEDURE — 85007 BL SMEAR W/DIFF WBC COUNT: CPT | Performed by: NURSE PRACTITIONER

## 2018-07-30 PROCEDURE — 97116 GAIT TRAINING THERAPY: CPT

## 2018-07-30 PROCEDURE — 82306 VITAMIN D 25 HYDROXY: CPT | Performed by: NURSE PRACTITIONER

## 2018-07-30 PROCEDURE — 85027 COMPLETE CBC AUTOMATED: CPT | Performed by: NURSE PRACTITIONER

## 2018-07-30 PROCEDURE — 93005 ELECTROCARDIOGRAM TRACING: CPT

## 2018-07-30 PROCEDURE — 97530 THERAPEUTIC ACTIVITIES: CPT

## 2018-07-30 PROCEDURE — 84100 ASSAY OF PHOSPHORUS: CPT | Performed by: NURSE PRACTITIONER

## 2018-07-30 PROCEDURE — 83735 ASSAY OF MAGNESIUM: CPT | Performed by: NURSE PRACTITIONER

## 2018-07-30 RX ORDER — PANTOPRAZOLE SODIUM 40 MG/1
40 TABLET, DELAYED RELEASE ORAL
Status: DISCONTINUED | OUTPATIENT
Start: 2018-07-30 | End: 2018-07-31 | Stop reason: HOSPADM

## 2018-07-30 RX ORDER — AMOXICILLIN 250 MG
1 CAPSULE ORAL 2 TIMES DAILY
Status: DISCONTINUED | OUTPATIENT
Start: 2018-07-30 | End: 2018-07-31 | Stop reason: HOSPADM

## 2018-07-30 RX ORDER — PANTOPRAZOLE SODIUM 40 MG/1
40 TABLET, DELAYED RELEASE ORAL
Status: DISCONTINUED | OUTPATIENT
Start: 2018-07-30 | End: 2018-07-30 | Stop reason: SDUPTHER

## 2018-07-30 RX ADMIN — DOCUSATE SODIUM 100 MG: 100 CAPSULE, LIQUID FILLED ORAL at 08:13

## 2018-07-30 RX ADMIN — SENNOSIDES AND DOCUSATE SODIUM 1 TABLET: 8.6; 5 TABLET ORAL at 17:31

## 2018-07-30 RX ADMIN — DOCUSATE SODIUM 100 MG: 100 CAPSULE, LIQUID FILLED ORAL at 17:31

## 2018-07-30 RX ADMIN — PANTOPRAZOLE SODIUM 40 MG: 40 TABLET, DELAYED RELEASE ORAL at 16:21

## 2018-07-30 RX ADMIN — LEVOTHYROXINE SODIUM 75 MCG: 75 TABLET ORAL at 06:20

## 2018-07-30 RX ADMIN — ASPIRIN 325 MG ORAL TABLET 325 MG: 325 PILL ORAL at 08:11

## 2018-07-30 RX ADMIN — OXYCODONE HYDROCHLORIDE 5 MG: 5 TABLET ORAL at 20:14

## 2018-07-30 RX ADMIN — ENOXAPARIN SODIUM 30 MG: 30 INJECTION SUBCUTANEOUS at 20:08

## 2018-07-30 RX ADMIN — ENOXAPARIN SODIUM 30 MG: 30 INJECTION SUBCUTANEOUS at 08:10

## 2018-07-30 RX ADMIN — ANASTROZOLE 1 MG: 1 TABLET ORAL at 08:13

## 2018-07-30 RX ADMIN — LOSARTAN POTASSIUM 100 MG: 50 TABLET, FILM COATED ORAL at 08:10

## 2018-07-30 RX ADMIN — METOPROLOL SUCCINATE 100 MG: 100 TABLET, FILM COATED, EXTENDED RELEASE ORAL at 08:11

## 2018-07-30 NOTE — ASSESSMENT & PLAN NOTE
Stable  Continue pain management with Oxycondone 5mg Q4 prn  Continue in patient rehab with VISTA collar intact at all times except when showering  Pain mild  Well controlled on current regimen  Seen by neurosurgery and will require outpatient follow up

## 2018-07-30 NOTE — ASSESSMENT & PLAN NOTE
Stable  Continue pain management with Oxycondone 5mg Q4 prn  Continue in patient rehab with VISTA collar intact except when showering  Pain mild - well controlled on current regimen  Seen by neurosurgery and will require outpatient follow up

## 2018-07-30 NOTE — PLAN OF CARE
Problem: PHYSICAL THERAPY ADULT  Goal: Performs mobility at highest level of function for planned discharge setting  See evaluation for individualized goals  Treatment/Interventions: ADL retraining, Functional transfer training, Elevations, Therapeutic exercise, Endurance training, Cognitive reorientation, Patient/family training, Equipment eval/education, Bed mobility, Gait training, Spoke to nursing, Family, OT (Spoke with )  Equipment Recommended: Other (Comment) (To be determined)       See flowsheet documentation for full assessment, interventions and recommendations  Outcome: Progressing  Prognosis: Excellent  Problem List: Decreased strength, Decreased range of motion, Impaired balance, Decreased endurance, Decreased mobility, Decreased coordination, Impaired vision, Decreased skin integrity, Orthopedic restrictions  Assessment: Pt with very good progress in skilled PT today  Improvement assessed with: cognition ( alert and oriented x 4), functional strength, sit and stand balance, endurance for activity, sit <-> supine transfer, sit <->stand transfers, SPT's with and without RW, and gait with and without RW  Initiated elevation training on full flight of 12 steps today with BHR's;  to simulate steps to 2nd floor of home (pt reports having BHR's at home)  Pt reports that she frequently goes upstairs to check on her cats  A Timed up and GoTest  ( TUG) was administered today and pt scored a 50/56 ( indicative of a low fall risk and clearance to ambulate without assistive device)  Pt was issued an Independent sign to ambulate on TCF Unit with RW and to/from bathroom without AD)  Pt maintained her cervical spine precautions and wore her [address] collar t/o the entire PT tx session  Pt assessed to have elevated BP post ambulation  Barriers to Discharge: Decreased caregiver support (Lives alone; multilevel; home)                See flowsheet documentation for full assessment

## 2018-07-30 NOTE — OCCUPATIONAL THERAPY NOTE
Occupational Therapy Treatment Note    Name:  Job Part   MRN:   [id number]  Age:     81 y o  Patient Active Problem List   Diagnosis    Anxiety    GERD (gastroesophageal reflux disease)    Hypertension    Hypothyroidism    Hyperlipidemia    Bilateral renal cysts    Ischemic colitis ([facility] [address] )    Hypokalemia    Thoracic aortic aneurysm (HCC)    Impaired fasting glucose    Esophageal reflux disease    Invasive ductal carcinoma of breast, right (HCC)    Use of anastrozole (Arimidex)    Closed odontoid fracture with type I morphology (HCC)    Closed nondisplaced fracture of fifth cervical vertebra (HCC)    Closed nondisplaced fracture of seventh cervical vertebra ([facility] )    Syncope     Syncope [R55]      Subjective/Goals: "to be home by Friday"    Vitals: supine in bed at start of session 141/70 BP, 90HR    OT total treatment time: (609-572) 63 minutes    Additional goals & Comments:  · Patient recalled 3 cervical spine precautions when asked and followed throughout ADL  · No cognitive issues noted this session  · Cues needed mainly for slowing pace to increase overall safety and to initiate rest breaks as needed    Patient did not have any LOB or "red flag" issues this session  · Overall patient with good tolerance and understanding to OT goals as explained       07/30/18 0939   Restrictions/Precautions   Weight Bearing Precautions Per Order No   Braces or Orthoses C/S Collar   Other Precautions Fall Risk;Limb alert  (spinal precautions)   Pain Assessment   Pain Assessment 0-10   Pain Score No Pain   Pain Type Acute pain   Pain Location Neck   Pain Descriptors Aching   Pain Frequency Intermittent   ADL   Where Assessed Standing at sink   Grooming Assistance 7  Independent   Grooming Deficit (verbal cues to slow pace PRN)   UB Bathing Assistance 7  Independent   LB Bathing Assistance 7  Independent   LB Bathing Comments sat on toilet to complete for safety    UB Dressing Assistance 7  Independent LB Dressing Assistance 7  Independent   LB Dressing Comments no AE utilized, patient with good follow of precautions   Toileting Assistance  7  Independent   Toileting Comments completed 2x during session  Patient reports no BM x4 days   Meal Prep   Meal Preparation Discussed along with goals for future session  Patient reports doing very minimal and microwave prep mostly    Light Housekeeping   Light Housekeeping Level (no AD within room)   Light Housekeeping Level of Assistance Independent   Light Housekeeping made bed, cleaned post ADL, obtained all ADL items for self set up   Functional Standing Tolerance   Time 8-10 minutes   Activity ADL sinkside   Comments no LOB and good safety    Bed Mobility   Rolling R 7  Independent   Rolling L 7  Independent   Supine to Sit 7  Independent   Sit to Supine 7  Independent   Additional Comments patient wearing cervical collar  Patient states that she sits at edge of bed for a few minutes before getting up routinely with CALERO educating on same recommendations upon standing prior to initiating movement-- patient agreeable   Transfers   Sit to Stand 7  Independent   Stand to Sit 7  Independent   Stand pivot 7  Independent   Functional Mobility   Functional Mobility 6  Modified independent   Additional items Rolling walker  (provided for distance, patient used NO AD within room)   Toilet Transfers   Toilet Transfer From Bed   Toilet Transfer Type To and from   Toilet Transfer to Raised toilet seat with rails   Toilet Transfer Technique Ambulating   Toilet Transfers Independent   Toilet Transfers Comments good safety and no LOB   Cognition   Overall Cognitive Status WFL   Arousal/Participation Alert; Responsive; Cooperative   Attention Within functional limits   Orientation Level Oriented X4   Memory Within functional limits   Following Commands Follows all commands and directions without difficulty   Comments Patient appeared safe throughout session with CALERO educating patient on recommendations to just slow pace at times for overall safety  Patient reports that she only finished school thorugh 9th grade when CALERO educated her on goals to repeat the [facility] session  Patient states "I know I will never pass that" and goes on to explain things that she rememebered from last testing provided  CLAERO provided reassurance and she was agreeable for completion at next treatment session  Additional Activities   Additional Activities Comments Balance:  Static: Good Dynamic Good-   Activity Tolerance   Activity Tolerance Patient tolerated treatment well  (Fair+/good-)   Assessment   Assessment Patient seen this date for an ADL  Patient agreeable and without reports of pain this AM   No dizziness, lightheadedness or LOB throughout treatment session  Patient obtained all needed ADL items, completed AM care and cleaned post completion all with good safety, no LOB and follow of precautions  Patient then made her bed at end of session  Patient reports feeling well with a goal to d/c by weeks end  CALERO educated on OT goals and POC, patient agreeable and reports an understanding  No cognitive issues noted this session  Continue OT at this time with goals as set by OTR as d/c plans remain for home alone  Patient does have a supportive boyfriend  At end of sesison patient remains in room with all needs within reach  Plan   Treatment Interventions ADL retraining;Functional transfer training;UE strengthening/ROM; Activityengagement; Energy conservation   Goal Expiration Date 08/09/18   Treatment Day 2   OT Frequency (6x/week)   Recommendation   OT Discharge Recommendation Home with daily checks   Equipment Recommended (TBD)   OT - OK to Discharge No     STG=LTG  Pt will achieve the following goals within 1 5 weeks     *Perform sinkside ADLs  With good safety} and with good balance for inc'd independence with self cares  **Achieved this date     *ADL transfers (I) for inc'd independence with ADLs/purposeful tasks  **Achieved this date with NO AD in room and walker for hallway     *UB ADL (I) for inc'd independence with self cares  **Achieved this date     *LB ADL (I) using AE prn for inc'd independence with self cares  **Achieved this date, no AE     *Toileting (I) for clothing management and hygiene for return to PLOF with personal care  **Achieved this date x2     *Increase static stand balance to good and dyn stand balance to good- for inc'd safety with standing purposeful tasks  **Achieved this date     *Increase stand tolerance x7-10 m for inc'd tolerance with standing purposeful tasks  **Achieved this date     *Activity tolerance- fair+/good- for inc'd tolerance with purposeful tasks  **Achieved this date     *Participate in further cognitive testing to assist with safe d/c planning (if using MOCA, use alternate version 2' pt took test 7/27/18)     *Tub/shower transfer using most appropriate technique (step in vs  Tub seat/transfer bench) with mod (I)/(I) for inc'd safety and independence with bathing     *Kitchen mobility- (I) for inc'd independence and safety negotiating kitchen environment  **Achieved with room mobility this date for ADL set up/clean and bed making     *Light meal prep- (I) to increase independence with preparing self nourishment     *Brace management (I), for inc'd safety and support     *Pt will demonstrate good safety with all ADLs/IADL tasks by maintaining c-spine precautions  **Achieved this date     *Pt will demonstrate independence with medication management tasks    Srinath Blackburn  7/30/2018

## 2018-07-30 NOTE — PHYSICAL THERAPY NOTE
Physical Therapy Treatment Note       07/30/18 ( PT Treatment Session: 57 minutes ( 10:30 to 11:35)   Pain Assessment   Pain Assessment No/denies pain   Pain Score No Pain   Restrictions/Precautions   Braces or Orthoses C/S Collar  (Cervical precautions)   Other Precautions Visual impairment;Spinal precautions;Hard of hearing;Limb alert   General   Chart Reviewed Yes   Response to Previous Treatment Patient with no complaints from previous session  Family/Caregiver Present No   Cognition   Overall Cognitive Status WFL   Arousal/Participation Alert; Cooperative   Attention Within functional limits   Orientation Level Oriented X4   Memory Within functional limits   Following Commands Follows all commands and directions without difficulty   Subjective   Subjective (" I feel ready to go home, whenever they let me")   Bed Mobility   Rolling R 7  Independent   Rolling L 7  Independent   Supine to Sit 7  Independent   Sit to Supine 7  Independent   Additional Comments Pt wore cervical collar t/o PT tx session   Transfers   Sit to Stand 7  Independent   Stand to Sit 7  Independent   Stand pivot 7  Independent   Additional items (SPT with no AD I; SPT with RW and MOD I)   Ambulation/Elevation   Gait pattern Narrow BETZAIDA; Decreased foot clearance; Short stride   Gait Assistance (Amb with no AD for 85 feet MOD I; Amb with  ft MOD I)   Additional items (Pt used RW correctly 100% of time; during gait trial)      Pt negotiated a full flight of 12 steps with bilateral handrails and MOD I ( reciprocal patterning)   Balance   Static Sitting Good   Static Standing (Good unsupported; Good+ supported )   Dynamic Standing (Good (-) unsupported; Good supported with RW )   Ambulatory (Good (-) unsupported; Good supported with RW)   Higher level balance (TUG Test: 50/56 ( low fall risk))   Activity Tolerance   Activity Tolerance Patient tolerated treatment well;Patient limited by fatigue  (Fatigued by end of PT tx session)   Nurse Made Aware (RN aware of pt's medical status)   Exercises   Hip Abduction (Hip abd with blue TB x 6 minutes)   Assessment   Prognosis Excellent   Problem List Decreased strength;Decreased range of motion; Impaired balance;Decreased endurance;Decreased mobility; Decreased coordination; Impaired vision;Decreased skin integrity;Orthopedic restrictions   Assessment Pt with very good progress in skilled PT today  Improvement assessed with: cognition ( alert and oriented x 4), functional strength, sit and stand balance, endurance for activity, sit <-> supine transfer, sit <->stand transfers, SPT's with and without RW, and gait with and without RW  Initiated elevation training on full flight of 12 steps today with BHR's;  to simulate steps to 2nd floor of home (pt reports having BHR's at home)  Pt reports that she frequently goes upstairs to check on her cats  A Timed up and GoTest  ( TUG) was administered today and pt scored a 50/56 ( indicative of a low fall risk and clearance to ambulate without assistive device)  Pt was issued an Independent sign to ambulate on TCF Unit with RW and to/from bathroom without AD)  Pt maintained her cervical spine precautions and wore her [address] collar t/o the entire PT tx session  Pt assessed to have elevated BP post ambulation ( see vitals below for details)  Barriers to Discharge Decreased caregiver support  (Lives alone; multilevel; home)   Goals   Patient Goals (" I want to go home, whenever thay let me"  )   Short Term Goal #1 8/8/2018   LTG Expiration Date 08/08/18   Treatment Day 2   Plan   Treatment/Interventions ADL retraining;Functional transfer training;LE strengthening/ROM; Elevations; Therapeutic exercise; Endurance training;Patient/family training;Equipment eval/education; Bed mobility;Gait training; Compensatory technique education;Spoke to MD;Spoke to nursing;Spoke to case management;Spoke to advanced practitioner;OT;Family   Progress Improving as expected   PT Frequency (6x/week)   Recommendation   Equipment Recommended (Possibly a RW at discharge)       Vitals:  Supine: 144/88, HR 87  Sit: /83, HR 88  Stand: /82, HR 92  After ambulating for 360 feet with RW ( stand): /93, HR 92, SPO2 (RA) 99%  After negotiating a full flight of steps with BHR's (seated): ,  SPO2 ( RA) 98%    Viviane Garcia, PT

## 2018-07-30 NOTE — NURSING NOTE
Alert and oriented x 3 , cervical collar on at all times   Given ultram for pain with adequate relief ,  Stated that had no BMS for 7 days , then later stated had 2 large

## 2018-07-30 NOTE — PLAN OF CARE
Problem: OCCUPATIONAL THERAPY ADULT  Goal: Performs self-care activities at highest level of function for planned discharge setting  See evaluation for individualized goals  Treatment Interventions: ADL retraining, Functional transfer training, Endurance training, Cognitive reorientation, Patient/family training, Equipment evaluation/education, Compensatory technique education, Continued evaluation, Energy conservation, Activityengagement  Equipment Recommended:  (possible tub transfer bench, reacher)       See flowsheet documentation for full assessment, interventions and recommendations  Outcome: Progressing  Limitation: Decreased ADL status, Decreased UE strength, Decreased endurance, Decreased cognition, Decreased Safe judgement during ADL, Decreased high-level ADLs, Decreased self-care trans  Prognosis: Good  Assessment: Patient seen this date for an ADL  Patient agreeable and without reports of pain this AM   No dizziness, lightheadedness or LOB throughout treatment session  Patient obtained all needed ADL items, completed AM care and cleaned post completion all with good safety, no LOB and follow of precautions  Patient then made her bed at end of session  Patient reports feeling well with a goal to d/c by weeks end  CALERO educated on OT goals and POC, patient agreeable and reports an understanding  No cognitive issues noted this session  Continue OT at this time with goals as set by OTR as d/c plans remain for home alone  Patient does have a supportive boyfriend  At end of sesison patient remains in room with all needs within reach        OT Discharge Recommendation: Home with daily checks  OT - OK to Discharge: No

## 2018-07-30 NOTE — H&P
Progress Note - Chavo Garcia 1945, 81 y o  female MRN: [id number]    Unit/Bed#: -01 Encounter: [id number]    Primary Care Provider: Oren Michaels DO   Date and time admitted to hospital: 7/27/2018  3:34 PM        Syncope   Assessment & Plan    Resulting in cervical fractures  CT head negative  Seen by cardiology and neurosurgery  Pt was intoxicated during fall  Her antihypertensives were adjusted  Beta blocker dosage was cut back, but do not want to discontinue abruptly to avoid reflex tachycardia or hypertension  Hydrochlorothiazide also discontinued, as she was significantly hypokalemic and slightly hyponatremic on admission and did not have great p o  intake  Labs did improve, but will continue to monitor  She will need outpatient monitoring  EF 65 percent per recent echo  Her appetite is now very good  Atherosclerosis   Assessment & Plan    Has atherosclerosis with 55-60% stenosis of left carotid artery, without occlusion  She is on asa 325mg daily, and would also benefit from statin therapy, however, she refused  According to CTA,  right distal vertebral artery between the C2 transverse foramen and C1 transverse foramen demonstrates mild focal smooth narrowing which is nonspecific and may represent atherosclerotic disease  No evidence of dissection or pseudoaneurysm, however, in the setting of trauma a mild grade 1 vascular injury cannot be excluded  She is to continue  daily for months and f/u with neurosurgery  Closed nondisplaced fracture of seventh cervical vertebra (HCC)   Assessment & Plan    Stable  Continue pain management with Oxycondone 5mg Q4 prn  Continue in patient rehab with VISTA collar intact at all times except when showering  Pain mild  Well controlled on current regimen  Seen by neurosurgery and will require outpatient follow up  Closed nondisplaced fracture of fifth cervical vertebra (HCC)   Assessment & Plan    Stable   Continue pain management with Oxycondone 5mg Q4 prn  Continue in patient rehab with VISTA collar intact except when showering  Pain mild - well controlled on current regimen  Seen by neuro surgery and will require outpatient follow up  Closed odontoid fracture with type I morphology ([address] )   Assessment & Plan    Stable  Continue pain management with Oxycondone 5mg Q4 prn  Continue in patient rehab with VISTA collar intact except when showering  Pain mild - well controlled on current regimen  Seen by neurosurgery and will require outpatient follow up  Hypertension   Assessment & Plan    Controlled on metoprolol 100mg daily, and losartan 100mg daily  GERD (gastroesophageal reflux disease)   Assessment & Plan    Protonix ordered  Hypothyroidism   Assessment & Plan    Will continue synthroid 75mcg as per home med list   TSH wnl on 7/24/18  Hyperlipidemia   Assessment & Plan    Refuses statin therapy  Invasive ductal carcinoma of breast, right [facility])   Assessment & Plan    Pt is on Anastrozole  Will continue  She was first diagnosed 16 years ago and had lumpectomy with radiation  Three years ago she had recurrence and had right breast mastectomy  She states she has been in remission  She sees Dr Enmanuel Ambrosio and has a f/u appointment on October 10th  Pt made aware that she also has lung nodules and will need to f/u for that  ETOH abuse   Assessment & Plan    Drinks vodka regularly  Counseled on cessation  Lung nodule seen on imaging study   Assessment & Plan    CT showed stable 2 mm right upper lobe nodule on 4/18/2017  CT 7/24/2018 showed 6 mm left upper lobe pulmonary nodule  This pulmonary nodule was present on a remote CT of the chest dated 10/13/2015 indicating stability    She does have history of breast CA and will require follow up             Thoracic aortic aneurysm [facility])   Assessment & Plan    CT showed  Stable 4 7 cm fusiform aneurysm of the ascending thoracic aorta  Pt will need outpatient f/u  She is aware of findings                  VTE Prophylaxis: Enoxaparin (Lovenox)  / sequential compression device and reason for no mechanical VTE prophylaxis On lovenox   Code Status: Level 1 full code  POLST: POLST form is not discussed and not completed at this time  Discussion with family: n/a    Anticipated Length of Stay:  Patient will be admitted on an SNF Short Term Inpatient basis with an anticipated length of stay of  Greater than 2 midnights  Justification for Hospital Stay: deconditioning 2/2 recent fall with cervical fractures  Total Time for Visit, including Counseling / Coordination of Care: 45 minutes  Greater than 50% of this total time spent on direct patient counseling and coordination of care  Chief Complaint:   Deconditioning    History of Present Illness:    Otoniel Lord is a 81 y o  female who presents with deconditioning after sustaining a fall at home  She originally came to the hospital as   a trauma transfer from [address] [address] on 7/24/18  She states she had fallen, which was not the first time  She woke up in bed, but her fiance noticed blood in kitchen, and it was assumed that she fell there, and then went to be to go to sleep  She was noted to have multiple cervical spine fractures  She was seen by Neurosurgery who recommended CTA  Patient was placed in collar and underwent CTA, which evealed a possible grade 1 vertebral artery injury  She was therefore placed on 325 mg of aspirin for total of 3 months  She is to  have outpatient follow-up with neurosurgery to re-evaluate the cervical fractures, as well as the vertebral artery injury  She also needs outpatient workup with her PCP for syncope  Cardiology was consulted during her hospital course and at that time cardiology recommended decreasing her metoprolol by half as well as discontinuing the HCTZ for hyponatremia   She was discharged to rehab on 7/27/18, and will having continued monitoring for medical issues as stated above  Review of Systems:    Review of Systems   Constitutional: Positive for appetite change (Had poor appetite pta, but this has resolved) and unexpected weight change (had lost weight but now stable)  Negative for chills, diaphoresis, fatigue and fever  HENT: Negative for congestion, rhinorrhea, sinus pain, sinus pressure, sneezing, sore throat and trouble swallowing  Respiratory: Negative  Cardiovascular: Negative  Gastrointestinal: Positive for constipation (hx of constipation, but not presently)  Negative for abdominal distention, abdominal pain, diarrhea, nausea and vomiting  Genitourinary: Negative for difficulty urinating and dysuria  Musculoskeletal: Positive for neck pain (4/10)  Negative for arthralgias, back pain and myalgias  Neck stiffness: c-collar in place  Skin: Positive for wound (abrasion on forehead)  Negative for pallor and rash  Neurological: Positive for syncope (prior to admission - pt does not remember)  Negative for dizziness, tremors, facial asymmetry, speech difficulty, weakness, light-headedness, numbness and headaches  Psychiatric/Behavioral: Negative          Past Medical and Surgical History:     Past Medical History:   Diagnosis Date    Abnormal CT of the abdomen     Acute bronchitis     Anxiety     Appetite loss     Arthritis     Ascending aortic aneurysm (HCC)     Bilateral renal cysts     Cancer (HCC)     breast    Cyst of ovary     Dysphagia     Esophageal reflux disease     Fat necrosis of breast     Full dentures     GERD (gastroesophageal reflux disease)     Herpes zoster     History of radiation therapy     Hyperlipidemia     Hypertension     Hypokalemia     Hypomagnesemia     Hypothyroidism     Impaired fasting glucose     Irritable bowel syndrome (IBS)     Ischemic colitis (HCC)     Knee pain     Limb alert care status     no BP/IV right arm    Osteoarthritis of right knee  Pneumonia     Post-op pain     Pulmonary nodules     Thoracic aortic aneurysm (HCC)     Unspecified ovarian cysts     Use of anastrozole (Arimidex)     Vasovagal syncope     Vitamin D deficiency     Wears glasses     Weight loss        Past Surgical History:   Procedure Laterality Date    APPENDECTOMY      pt states it was not removed    BREAST BIOPSY Right 03/02/2016    BREAST LUMPECTOMY Right 2004    BREAST SURGERY Right     Single lesion excision 1990 hyperplasia, 1st biopsy at 25yrs old was benign    CHOLECYSTECTOMY      COLONOSCOPY      COLONOSCOPY N/A 5/12/2017    Procedure: COLONOSCOPY with biopsy;  Surgeon: Pat Simon MD;  Location: [facility]; Service:     HYSTERECTOMY      KNEE SURGERY Right     IN BIOPSY/EXCISION, LYMPH NODE(S) Right 4/12/2016    Procedure: BIOPSY LYMPH NODE SENTINEL @ 1200 LYMPHOSCINTIGRAPHY LYMPHATIC MAPPING;  Surgeon: Cesia Lara MD;  Location: [facility];  Service: General    IN MASTECTOMY, SIMPLE, COMPLETE Right 4/12/2016    Procedure: MASTECTOMY SIMPLE;  Surgeon: Cesia Lara MD;  Location: [facility];  Service: General    TUBAL LIGATION         Meds/Allergies:    Prior to Admission medications    Medication Sig Start Date End Date Taking?  Authorizing Provider   acetaminophen (TYLENOL) 325 mg tablet Take 2 tablets (650 mg total) by mouth every 6 (six) hours as needed for mild pain 7/27/18  Yes Mabel Evans PA-C   anastrozole (ARIMIDEX) 1 mg tablet Take 1 tablet by mouth daily 5/13/16  Yes Historical Provider, MD   aspirin 325 mg tablet Take 1 tablet (325 mg total) by mouth daily 7/27/18  Yes Mabel Evans PA-C   docusate sodium (COLACE) 100 mg capsule Take 1 capsule (100 mg total) by mouth 2 (two) times a day 7/27/18  Yes Mabel Evans PA-C   enoxaparin (LOVENOX) 30 mg/0 3 mL Inject 0 3 mL (30 mg total) under the skin every 12 (twelve) hours for 28 days 7/27/18 8/24/18 Yes Mabel Evans PA-C   levothyroxine 75 mcg tablet TAKE 1 TABLET EVERY DAY 3/12/18  Yes Nita Hatfield,    losartan (COZAAR) 100 MG tablet Take 1 tablet (100 mg total) by mouth daily 7/27/18  Yes Mabel Evans PA-C   metoprolol succinate (TOPROL-XL) 100 mg 24 hr tablet Take 1 tablet (100 mg total) by mouth daily 7/27/18  Yes Mabel Evans PA-C   oxazepam (SERAX) 10 mg capsule Take 1 capsule (10 mg total) by mouth 2 (two) times a day for 2 days 7/27/18 7/29/18 Yes Mabel Evans PA-C   oxyCODONE (ROXICODONE) 5 mg immediate release tablet Take 1 tablet (5 mg total) by mouth every 4 (four) hours as needed for moderate pain for up to 10 days Earliest Fill Date: 7/27/18 Max Daily Amount: 30 mg 7/27/18 8/6/18 Yes Mabel Evans PA-C     I have reviewed home medications using allscripts  Allergies: Allergies   Allergen Reactions    Demerol [Meperidine] GI Intolerance and Other (See Comments)     Other reaction(s):  Other (See Comments)  severe nausea  severe nausea  Nausea/vomiting    Nitroglycerin Anaphylaxis and Other (See Comments)     BP drops drastically    Lipitor [Atorvastatin] Other (See Comments)     Joint/muscle pain    Zocor [Simvastatin] Other (See Comments)     Joint/muscle pain       Social History:     Marital Status: Single   Occupation: retired  Patient Pre-hospital Living Situation: lives alone  Patient Pre-hospital Level of Mobility: independent  Patient Pre-hospital Diet Restrictions: none  Substance Use History:   History   Alcohol Use    Yes     Comment: 3 glasses of vodka, 16 oz, daily     History   Smoking Status    Never Smoker   Smokeless Tobacco    Never Used     History   Drug Use No       Family History:    non-contributory    Physical Exam:     Vitals:   Blood Pressure: 137/83 (07/30/18 0700)  Pulse: 82 (07/30/18 0700)  Temperature: 97 8 °F (36 6 °C) (07/30/18 0700)  Temp Source: Temporal (07/30/18 0700)  Respirations: 20 (07/30/18 0700)  Weight - Scale: 75 5 kg (166 lb 7 2 oz) (07/29/18 1100)  SpO2: 96 % (07/30/18 0700)    Physical Exam Constitutional: She is oriented to person, place, and time  She appears well-developed and well-nourished  No distress  HENT:   Head: Normocephalic and atraumatic  Eyes: Right eye exhibits no discharge  Left eye exhibits no discharge  Neck: No JVD present  Cardiovascular: Normal rate, regular rhythm, normal heart sounds and intact distal pulses  Exam reveals no gallop and no friction rub  No murmur heard  Pulmonary/Chest: Effort normal and breath sounds normal  No respiratory distress  She has no wheezes  She has no rales  She exhibits no tenderness  Abdominal: Soft  Bowel sounds are normal  She exhibits no distension  There is no tenderness  There is no rebound and no guarding  Musculoskeletal: She exhibits no edema  Limited ROM in neck - C-collar in place     Neurological: She is alert and oriented to person, place, and time  No focal deficits or sensory deficits  Speech clear, no facial assymetry  Skin: Skin is warm and dry  She is not diaphoretic  Forehead abrasion - scabbed   Psychiatric: She has a normal mood and affect  Additional Data:     Lab Results: I have personally reviewed pertinent reports  Results from last 7 days  Lab Units 18  0636   WBC Thousand/uL 5 02   HEMOGLOBIN g/dL 13 9   HEMATOCRIT % 40 8   PLATELETS Thousands/uL 148*   NEUTROS PCT % 52   LYMPHS PCT % 33   MONOS PCT % 12   EOS PCT % 2       Results from last 7 days  Lab Units 18  0636   SODIUM mmol/L 132*   POTASSIUM mmol/L 3 8   CHLORIDE mmol/L 98*   CO2 mmol/L 27   BUN mg/dL 13   CREATININE mg/dL 0 70   CALCIUM mg/dL 8 5   GLUCOSE RANDOM mg/dL 112       Results from last 7 days  Lab Units 18  1233   INR  1 02               Imaging: I have personally reviewed pertinent reports  No orders to display       EKG, Pathology, and Other Studies Reviewed on Admission:   · EK18 - SR with ST-T wave abnormality - seen by cardiology-echo also reviewed      [facility] / [address] [place] [address] Rd Records Reviewed:  Yes

## 2018-07-30 NOTE — ASSESSMENT & PLAN NOTE
Stable  Continue pain management with Oxycondone 5mg Q4 prn  Continue in patient rehab with VISTA collar intact except when showering  Pain mild - well controlled on current regimen  Seen by neuro surgery and will require outpatient follow up

## 2018-07-30 NOTE — ASSESSMENT & PLAN NOTE
Resulting in cervical fractures  CT head negative  Seen by cardiology and neurosurgery  Pt was intoxicated during fall  Her antihypertensives were adjusted  Beta blocker dosage was cut back, but do not want to discontinue abruptly to avoid reflex tachycardia or hypertension  Hydrochlorothiazide also discontinued, as she was significantly hypokalemic and slightly hyponatremic on admission and did not have great p o  intake  Labs did improve, but will continue to monitor  She will need outpatient monitoring  EF 65 percent per recent echo  Her appetite is now very good

## 2018-07-30 NOTE — ASSESSMENT & PLAN NOTE
Has atherosclerosis with 55-60% stenosis of left carotid artery, without occlusion  She is on asa 325mg daily, and would also benefit from statin therapy, however, she refused  According to CTA,  right distal vertebral artery between the C2 transverse foramen and C1 transverse foramen demonstrates mild focal smooth narrowing which is nonspecific and may represent atherosclerotic disease  No evidence of dissection or pseudoaneurysm, however, in the setting of trauma a mild grade 1 vascular injury cannot be excluded  She is to continue  daily for months and f/u with neurosurgery

## 2018-07-30 NOTE — ASSESSMENT & PLAN NOTE
CT showed stable 2 mm right upper lobe nodule on 4/18/2017  CT 7/24/2018 showed 6 mm left upper lobe pulmonary nodule  This pulmonary nodule was present on a remote CT of the chest dated 10/13/2015 indicating stability    She does have history of breast CA and will require follow up

## 2018-07-30 NOTE — ASSESSMENT & PLAN NOTE
Pt is on Anastrozole  Will continue  She was first diagnosed 16 years ago and had lumpectomy with radiation  Three years ago she had recurrence and had right breast mastectomy  She states she has been in remission  She sees Dr Taylor Prater and has a f/u appointment on October 10th  Pt made aware that she also has lung nodules and will need to f/u for that

## 2018-07-30 NOTE — ASSESSMENT & PLAN NOTE
CT showed  Stable 4 7 cm fusiform aneurysm of the ascending thoracic aorta  Pt will need outpatient f/u   She is aware of findings

## 2018-07-31 VITALS
RESPIRATION RATE: 22 BRPM | OXYGEN SATURATION: 97 % | HEART RATE: 90 BPM | DIASTOLIC BLOOD PRESSURE: 66 MMHG | BODY MASS INDEX: 28.57 KG/M2 | WEIGHT: 166.45 LBS | TEMPERATURE: 97.8 F | SYSTOLIC BLOOD PRESSURE: 137 MMHG

## 2018-07-31 PROBLEM — E87.1 HYPONATREMIA: Status: ACTIVE | Noted: 2018-07-31

## 2018-07-31 LAB
ATRIAL RATE: 0 BPM
ATRIAL RATE: 87 BPM
P AXIS: 13 DEGREES
PR INTERVAL: 168 MS
QRS AXIS: -34 DEGREES
QRS AXIS: 0 DEGREES
QRSD INTERVAL: 0 MS
QRSD INTERVAL: 86 MS
QT INTERVAL: 0 MS
QT INTERVAL: 360 MS
QTC INTERVAL: 0 MS
QTC INTERVAL: 433 MS
T WAVE AXIS: -45 DEGREES
T WAVE AXIS: 0 DEGREES
VENTRICULAR RATE: 0 BPM
VENTRICULAR RATE: 87 BPM

## 2018-07-31 PROCEDURE — 97530 THERAPEUTIC ACTIVITIES: CPT

## 2018-07-31 PROCEDURE — 97535 SELF CARE MNGMENT TRAINING: CPT

## 2018-07-31 PROCEDURE — 97116 GAIT TRAINING THERAPY: CPT

## 2018-07-31 PROCEDURE — 93010 ELECTROCARDIOGRAM REPORT: CPT | Performed by: INTERNAL MEDICINE

## 2018-07-31 PROCEDURE — 99316 NF DSCHRG MGMT 30 MIN+: CPT | Performed by: NURSE PRACTITIONER

## 2018-07-31 RX ORDER — MELATONIN
1000 DAILY
Qty: 30 TABLET | Refills: 0 | Status: SHIPPED | OUTPATIENT
Start: 2018-07-31 | End: 2018-08-03

## 2018-07-31 RX ORDER — LEVOTHYROXINE SODIUM 0.07 MG/1
75 TABLET ORAL DAILY
Qty: 30 TABLET | Refills: 0 | Status: SHIPPED | OUTPATIENT
Start: 2018-08-01 | End: 2018-08-14 | Stop reason: SDUPTHER

## 2018-07-31 RX ORDER — AMOXICILLIN 250 MG
1 CAPSULE ORAL 2 TIMES DAILY
Qty: 60 TABLET | Refills: 0 | Status: SHIPPED | OUTPATIENT
Start: 2018-07-31 | End: 2018-08-03

## 2018-07-31 RX ORDER — PANTOPRAZOLE SODIUM 40 MG/1
40 TABLET, DELAYED RELEASE ORAL
Qty: 30 TABLET | Refills: 0 | Status: CANCELLED | OUTPATIENT
Start: 2018-08-01

## 2018-07-31 RX ORDER — TRAMADOL HYDROCHLORIDE 50 MG/1
50 TABLET ORAL EVERY 6 HOURS PRN
Qty: 30 TABLET | Refills: 0 | Status: SHIPPED | OUTPATIENT
Start: 2018-07-31 | End: 2018-08-03

## 2018-07-31 RX ORDER — METOPROLOL SUCCINATE 100 MG/1
100 TABLET, EXTENDED RELEASE ORAL DAILY
Qty: 30 TABLET | Refills: 0 | Status: SHIPPED | OUTPATIENT
Start: 2018-07-31 | End: 2018-10-09 | Stop reason: SDUPTHER

## 2018-07-31 RX ORDER — LOSARTAN POTASSIUM 100 MG/1
100 TABLET ORAL DAILY
Qty: 30 TABLET | Refills: 0 | Status: SHIPPED | OUTPATIENT
Start: 2018-07-31 | End: 2018-10-09

## 2018-07-31 RX ORDER — ANASTROZOLE 1 MG/1
1 TABLET ORAL DAILY
Qty: 30 TABLET | Refills: 0 | Status: SHIPPED | OUTPATIENT
Start: 2018-07-31 | End: 2018-08-03

## 2018-07-31 RX ORDER — ASPIRIN 325 MG
325 TABLET ORAL DAILY
Qty: 90 TABLET | Refills: 0 | Status: SHIPPED | OUTPATIENT
Start: 2018-07-31 | End: 2018-08-03

## 2018-07-31 RX ADMIN — ANASTROZOLE 1 MG: 1 TABLET ORAL at 08:47

## 2018-07-31 RX ADMIN — PANTOPRAZOLE SODIUM 40 MG: 40 TABLET, DELAYED RELEASE ORAL at 05:49

## 2018-07-31 RX ADMIN — ENOXAPARIN SODIUM 30 MG: 30 INJECTION SUBCUTANEOUS at 08:49

## 2018-07-31 RX ADMIN — METOPROLOL SUCCINATE 100 MG: 100 TABLET, FILM COATED, EXTENDED RELEASE ORAL at 08:48

## 2018-07-31 RX ADMIN — DOCUSATE SODIUM 100 MG: 100 CAPSULE, LIQUID FILLED ORAL at 08:48

## 2018-07-31 RX ADMIN — LEVOTHYROXINE SODIUM 75 MCG: 75 TABLET ORAL at 05:49

## 2018-07-31 RX ADMIN — ASPIRIN 325 MG ORAL TABLET 325 MG: 325 PILL ORAL at 08:47

## 2018-07-31 RX ADMIN — LOSARTAN POTASSIUM 100 MG: 50 TABLET, FILM COATED ORAL at 08:47

## 2018-07-31 RX ADMIN — SENNOSIDES AND DOCUSATE SODIUM 1 TABLET: 8.6; 5 TABLET ORAL at 08:48

## 2018-07-31 NOTE — PHYSICAL THERAPY NOTE
Physical Therapy Treatment Note     PT Tx session ( 53 minutes total time: 11:20 to 12:13 -> within this time frame performed 23 minute co-tx session in conjunction with CALERO;  to assess higher level balance challenges)       07/31/18 1213   Pain Assessment   Pain Assessment No/denies pain   Pain Score No Pain   Subjective   Subjective (" I want to go home this afternoon after 1 o'clock")   Bed Mobility   Rolling R 7  Independent   Rolling L 7  Independent   Supine to Sit 7  Independent   Sit to Supine 7  Independent   Transfers   Sit to Stand 7  Independent   Stand to Sit 7  Independent   Stand pivot 7  Independent   Additional items (SPT with RW MOD A; SPT with no AD Independent)   Ambulation/Elevation   Gait pattern Decreased foot clearance; Short stride  (Decreased BETZAIDA)   Gait Assistance 6  Modified independent   Additional items (Amb with no AD for 458 , 75, 112, and 40 feet x 2 with MOD I)   Stair Management Assistance 7  Independent   Stair Management Technique Two rails; Reciprocal   Number of Stairs (Full flight of 12 steps)   Curbs (Pt negotiated 4 curb steps with no AD MOD I  )   Balance   Static Sitting (Normal ( improved, was Fair + on PT Eval))   Dynamic Sitting (Good-  ( improved, was Fair + on PT Eval))   Static Standing (Good unsupported; Good+ supported)   Dynamic Standing (Good (-) unsupported; Good supported)   Ambulatory (Good (-) unsupported; Good supported)   Higher level balance (Good- unsupported: ball catch/throw all planes; kitchen act)   Activity Tolerance   Activity Tolerance Patient tolerated treatment well   Assessment   Prognosis Excellent   Problem List Decreased strength;Decreased range of motion;Decreased endurance; Impaired balance;Decreased mobility; Decreased coordination;Decreased cognition; Impaired judgement;Decreased safety awareness; Impaired vision;Orthopedic restrictions   Assessment All STG's/LTG's now achieved   Pt requested discharge to home alone this PM  Pt awaiting medical clearance from MD  Pt was issued a RW (for longer distance ambulation) that was sized to fit by this PT  This PT educated pt on benefits of Home PT; if even just for a home safety evaluation  Pt declined Home PT services at this time  This PT asked pt if she has any pictures of her home, to perform a visual Home Eval  Pt reported not having any pictures on her flip phone  If pt is not medically cleared to return home, then plan to discharge pt from skilled PT tx services tomorrow  Pt notified of plan and agreeable  Barriers to Discharge Decreased caregiver support  (Lives alone)   Goals   Patient Goals (" I want to talk to the  to get out of here")   STG Expiration Date 07/08/18   LTG Expiration Date 07/08/18   Treatment Day 3   Plan   Treatment/Interventions ADL retraining;Functional transfer training;LE strengthening/ROM; Elevations; Therapeutic exercise; Endurance training;Patient/family training;Equipment eval/education; Bed mobility;Gait training; Compensatory technique education;Spoke to nursing;Spoke to MD;Spoke to case management;Spoke to advanced practitioner;OT;Family   Progress Improving as expected   PT Frequency (6x/week )   Recommendation   Equipment Recommended (RW issued and sized to fit by this PT)   PT - OK to Discharge Yes  (When medically cleared)     Vitals:  Sit (rest): /98, HR 52, SPO2 (RA) 99%  Stand ( rest): /79, HR 94  After ambulating with no AD for 458 feet ( stand): , SPO2 (RA) 96%  After negotiating a full flight of 12 steps with BHR's (seated): , SPO2 (RA) 92%  At conclusion of PT tx session (seated): /92, HR 97, SPO2 (RA) 98%

## 2018-07-31 NOTE — OCCUPATIONAL THERAPY NOTE
Occupational Therapy Treatment Note    Name:  Guilherme Corral   MRN:   [id number]  Age:     81 y o  Patient Active Problem List   Diagnosis    Anxiety    GERD (gastroesophageal reflux disease)    Hypertension    Hypothyroidism    Hyperlipidemia    Bilateral renal cysts    Ischemic colitis ([address] )    Hypokalemia    Thoracic aortic aneurysm (HCC)    Impaired fasting glucose    Esophageal reflux disease    Invasive ductal carcinoma of breast, right (HCC)    Use of anastrozole (Arimidex)    Closed odontoid fracture with type I morphology (HCC)    Closed nondisplaced fracture of fifth cervical vertebra (HCC)    Closed nondisplaced fracture of seventh cervical vertebra (HCC)    Syncope    Lung nodule seen on imaging study    Atherosclerosis    ETOH abuse     Syncope [R55]      Subjective/Goals: "to go home today"    Vitals: non taken this AM    OT total treatment time: (8556-2132) 23 minutes     Additional goals & Comments:       07/31/18 1203   Restrictions/Precautions   Weight Bearing Precautions Per Order No   Braces or Orthoses C/S Collar   Pain Assessment   Pain Assessment No/denies pain   Pain Score No Pain   ADL   Where Assessed (patient set self up for ADL sinkside)   Grooming Assistance 7  Independent   UB Bathing Assistance 7  Independent   LB Bathing Assistance 7  Independent   UB Dressing Assistance 7  Independent   LB Dressing Assistance 7  Independent   Toileting Assistance  7  Independent   Meal Prep   Meal Prep Level (NO AD)   Meal Prep Level of Assistance Independent   Meal Preparation Patient reports mainly microwave meals or soups PTA    Just starting to get appetite back and states being excited to be able to eat again-- made toast and obtained beverage   Light Housekeeping   Light Housekeeping Level (no AD)   Light Housekeeping Level of Assistance Independent   Light Housekeeping Meal prep clean up   Kitchen Mobility   Kitchen-Mobility Level (no AD)   Kitchen Activity Retrieve items; Transport items   Kitchen Mobility Comments Independent   Functional Standing Tolerance   Time 10+ min   Activity ADL's, HM and meal prep, balance act   Comments no LOB, cues to slow pace with activity   Transfers   Sit to Stand 7  Independent   Stand to Sit 7  Independent   Stand pivot 7  Independent   Toilet transfer 7  Independent   Functional Mobility   Functional Mobility 7  Independent  (NOTE:  did obtain RW for community/distance as needed)   Toilet Transfers   Toilet Transfer From [employer] Transfer Type To and from   Toilet Transfer to Raised toilet seat with rails   Toilet Transfer Technique Ambulating   Toilet Transfers Independent   Tub Transfers   Tub Transfer From (Ambulating)   Tub Transfer Type To and From   Tub Transfer to Standing   Tub Transfer Technique Ambulating   Tub Transfers Independent   Tub Transfers Comments 16" tub completed, states tub to be lower at home   Therapeutic Excerise-Strength   UE Strength (incooporated with functional activities)   Cognition   Overall Cognitive Status WFL   Arousal/Participation Alert;Arousable; Cooperative   Attention Within functional limits   Memory Within functional limits   Following Commands Follows one step commands with increased time or repetition   Additional Activities   Additional Activities Comments Balance:  Static good, dynamic Good-   Activity Tolerance   Activity Tolerance Patient tolerated treatment well  (Fair+/Good-)   Assessment   Assessment Patient seen this AM for partial session  Patient reports that she may d/c this PM if medically cleared and SS confirmed  Patient completed all AM care, HM tasks and kitchen/meal prep this date  Patient educated on plan for PM to address medication management, MOCA assessment and brace management and was agreeable  At end of session patient remains in room with lucjose and all needs within reach      Plan   Treatment Interventions ADL retraining;Functional transfer training;UE strengthening/ROM; Energy conservation; Activityengagement   Goal Expiration Date 08/09/18   Treatment Day 3   OT Frequency (6x/week)   Recommendation   OT Discharge Recommendation Home with daily checks   OT - OK to Discharge Yes       Louie Escobar  7/31/2018

## 2018-07-31 NOTE — SOCIAL WORK
Patient and PT met with MOOKIE to discuss discharge  Patient would like to be discharged today  SW advised attending will be on unit shortly to discuss medical clearance  Patient is cleared by PT/OT for discharge  PT advised recommendation for RW  Patient is agreeable  MOOKIE sent referral with [facility] and confirmed patient does not have OOP  [facility] liaison contacted SW and advised to deliver RW to patient  SW delivered and patient signed delivery form  SW discussed [address] with patient and patient is agreeable to sign  Placed in bin to be scanned

## 2018-07-31 NOTE — ASSESSMENT & PLAN NOTE
Controlled on metoprolol 100mg daily, and losartan 100mg daily  She is aware that metoprolol dose was cut and HCTZ was discontinued for low sodium, which she will f/u as outpatient

## 2018-07-31 NOTE — DISCHARGE INSTRUCTIONS
Please keep a copy of these discharge instructions and take to PCP appointment     -Visiting nurse was highly recommended, but not being arranged due to your refusal of these services  -You must abstain from alcohol, especially while taking pain medication  You will be d/c'd home with tramadol for pain, and this must not be taken with alcohol  -HCTZ was discontinued due to low sodium levels  You will need repeat blood work on 8/2/18 and f/u with PCP on 8/3/18     -You are not to drive until seen at f/u appointments, off of pain medication, and authorization is received from neurologist and PCP  -You were diagnosed with cervical fractures and a possible injury of your vertebral artery in you neck  For this reason, you must keep your appointment with neurosurgery      -A 4 7cm aortic aneurysm was found  You will need to f/u with PCP regarding this, and may need referral to a cardiothoracic surgeon  Recommend at least to monitor with CT or ultrasound every 6 - 12 months     -You fell prior to admission and were seen by cardiology  Reason for fall likely due to multiple reasons such as alcohol, mechanical fall, dehydration, however, a heart arrhythmia cannot be completely ruled out  For this reason you must f/u with your PCP who may suggest further cardiac workup  Prescription for stress test with echo was given to you at discharge  Please call central scheduling to make an appointment for as soon as possible       -Lung nodules were found on your CT scan, and given your history of breast cancer, this needs to be followed up with by your oncologist   Please keep your appointment

## 2018-07-31 NOTE — OCCUPATIONAL THERAPY NOTE
Occupational Therapy Treatment Note    Name:  Avtar Carpenter   MRN:   [id number]  Age:     81 y o  Patient Active Problem List   Diagnosis    Anxiety    GERD (gastroesophageal reflux disease)    Hypertension    Hypothyroidism    Hyperlipidemia    Bilateral renal cysts    Ischemic colitis ([address] )    Hypokalemia    Thoracic aortic aneurysm (HCC)    Impaired fasting glucose    Esophageal reflux disease    Invasive ductal carcinoma of breast, right (HCC)    Use of anastrozole (Arimidex)    Closed odontoid fracture with type I morphology (HCC)    Closed nondisplaced fracture of fifth cervical vertebra (HCC)    Closed nondisplaced fracture of seventh cervical vertebra (HCC)    Syncope    Lung nodule seen on imaging study    Atherosclerosis    ETOH abuse     Syncope [R55]      Subjective/Goals:  "i hate that test, I will never pass"    OT total treatment time: (7526-0658) 35 minutes    Additional goals & Comments:  · All functional ADL goals achieved   · Assist needed with brace management with patient indicating that her boyfriend will always be available when she needs to manage brace for showers  Patient is aware of precautions and able to state restrictions at this time and understands that she was cleared only to remove brace briefly for hair washing and then needs to reapply to complete remaining bathing  · Patient was able to manage and indicate how to take medications per our in house practice set up however recommend supervision upon d/c to ensure proper follow of medications  Patient reports boyfriend can assist as needed  · MOCA score 22/30 plue the 1 point for only completing school through 9th grade  Therefore 23/30 score given         07/31/18 1345   Restrictions/Precautions   Weight Bearing Precautions Per Order No   Braces or Orthoses C/S Collar   Pain Assessment   Pain Assessment No/denies pain   Pain Score No Pain   ADL   UB Dressing Comments BRACE management:  patient needs moderate assist   She was able discribe to CALERO how to manage and that she was told by the MD to have another person assist   [address] provided further ed and instructions to increase independence  Patient reports that she plans to onl remove for washing hair which she was cleared for during showering  Once hair is washed she was instructed to dry hair, face and shoulders before reapplying brace to complete remaining shower  Boyfriend to be present and assist when showering  Health Management   Health Management Level (Medication management)   Health Management Level of Assistance Modified independent   Health Management patient states that she sets up a weekly pill box taking 3 pills a day- 2 AM and cancer med in jigna  Patient was able to opne/close all containers for medicationprovided without issue  Patient was able to read and manage medication in bottle telling CALERO when and how many to take  Patient reports if she needs help her boyfriend is typically present  Cognition   Cognition Assessment Tools MOCA   Score (23/30 version 7 3)   Assessment   Assessment Patient seen this PM for brace management and ed, medication medication and MOCA assessement  Patient plans for home this PM if medically cleared  Plan   Treatment Interventions ADL retraining; Activityengagement   Goal Expiration Date 08/09/18   Treatment Day 3   OT Frequency (6x/week)   Recommendation   OT Discharge Recommendation Home with daily checks   OT - OK to Discharge Yes     Talia Dunn  7/31/2018

## 2018-07-31 NOTE — ASSESSMENT & PLAN NOTE
Stable  Continue pain management  Will d/c home with tramadol 50mg Q6prn, and d/c oxycodone  She will need to f/u outpatient  VISTA collar intact except when showering  Pain mild  Seen by neurosurgery and will require outpatient follow up  She is aware of plan of care

## 2018-07-31 NOTE — PLAN OF CARE
Problem: OCCUPATIONAL THERAPY ADULT  Goal: Performs self-care activities at highest level of function for planned discharge setting  See evaluation for individualized goals  Treatment Interventions: ADL retraining, Functional transfer training, Endurance training, Cognitive reorientation, Patient/family training, Equipment evaluation/education, Compensatory technique education, Continued evaluation, Energy conservation, Activityengagement  Equipment Recommended:  (possible tub transfer bench, reacher)       See flowsheet documentation for full assessment, interventions and recommendations  Outcome: Adequate for Discharge  Limitation: Decreased ADL status, Decreased UE strength, Decreased endurance, Decreased cognition, Decreased Safe judgement during ADL, Decreased high-level ADLs, Decreased self-care trans  Prognosis: Good  Assessment: Patient seen this PM for brace management and ed, medication medication and MOCA assessement  Patient plans for home this PM if medically cleared  OT Discharge Recommendation: Home with daily checks  OT - OK to Discharge:  Yes

## 2018-07-31 NOTE — PHYSICAL THERAPY NOTE
Physical Therapy Discharge Summary      Pt is discharged from skilled PT effective today, 7/31/18  Pt returned to her 2 story home alone this afternoon  Since 7/28/18 PT Catie, pt was seen for 3/3 skilled PT tx sessions for therex, theract, and gait/elevation training  Pt maintained her cervical spine precautions and wore her [address] collar during all PT tx sessions  Pt with excellent progress in skilled PT this past week  Improvement assessed with: cognition, functional strength, sit and stand balance, endurance for activity, rolling, sit <-> supine transfer, sit <->stand transfers, SPT's with and without RW, and gait with and without RW  A Timed up and GoTest  ( TUG) was administered on 7/30/18 and pt scored a 50/56 ( indicative of a low fall risk and clearance to ambulate without assistive device)  Yesterday, pt was issued an Independent sign to ambulate on TCF Unit with RW and to/from bathroom without AD  All STGs/LTGs achieved  Please see goals below for details on pts functional mobility status at discharge  STGs = LTGs (Expiration Date: 8/7/18)  1  Patient will perform all bed mobility with modified independence in order to get in/out of bed  ACHIEVED  CURRENTLY: Roll left <->right ( HOB flat, no rail): Independent  CURRENTLY: sit <->supine transfer ( HOB flat, no rail): Independent    2  Patient will perform all functional transfers with modified independence in order to  improve ability to perform upright tasks at home and transfer from one surface to another  ACHIEVED  CURRENTLY: sit <->stand: Independent  CURRENTLY: SPT with no AD: Independent  CURRENTLY: SPT with RW: MOD I      3  Patient will ambulate with modified independence x at least 200 ft with least restrictive assistive device in order to safely access all necessary areas of home and prepare for return to community ambulation    ACHIEVED  CURRENTLY: Ambulated with for 458 feet  with MOD I  CURRENTLY: Ambulated with a RW for 360 feet with: MOD I      4  To trial use of rollator walker and determine if safe for patient use at home and community; goals to be re-established as needed  ACHIEVED   CURRENTLY: Pt is disinterested in using a rollator at this time  Pt was discharged to home with a RW instead  5  Patient will ascend/descend 1 step without HR with device prn + full flight of steps with bilateral handrails with device prn with modified independence to enter/exit home and access bedroom/full bathroom level of home  ACHIEVED   CURRENTLY: Pt negotiated 4 curb steps with no AD and MOD I  CURRENTLY: Pt Independently negotiated a full flight of 12 steps with [facility]       Recommendations:  1  Pt now agreeable to VNA/Home PT to maximize safe mobility within her personal environment    2  Pt was discharged to home with a RW -> Pt initially disinterested in a RW; but then decided that she can use it on days that she is not feeling well  3  Continue with cervical precautions including wearing [place] collar until cleared by  ( Neuro; ortho)    4  Supervision ( by Alley Chin) on full flight of steps until Home PT can formally assess pt safety on her own steps      Ese Hooks, PT

## 2018-07-31 NOTE — DISCHARGE SUMMARY
Progress Note - Job Part 1945, 81 y o  female MRN: [id number]    Unit/Bed#: [facility] 547-01 Encounter: [id number]    Primary Care Provider: Alexa Mcnair DO   Date and time admitted to hospital: 7/27/2018  3:34 PM        Syncope   Assessment & Plan    Resulting in cervical fractures  CT head negative  Seen by cardiology and neurosurgery  Pt was intoxicated during fall  Her antihypertensives were adjusted  Beta blocker dosage was cut back from 200 to 100 daily, but do not want to discontinue abruptly to avoid reflex tachycardia or hypertension, per cardiologist   Hydrochlorothiazide also discontinued, as she was significantly hypokalemic and slightly hyponatremic on admission and did not have great p o  intake  Labs did improve  Richard Lima She will need outpatient monitoring  EF 65 percent per recent echo  Her appetite is now very good  Spoke with case management who will be working on setting pt up with outpatient PCP appointment  Per cardiology note: "Unclear if mechanical fall, fall secondary to alcohol intoxication, orthostatic hypotension in the setting of significant dehydration with poor p o  intake while being on a diuretic, as well as several antihypertensive agents, bradyarrhythmia in the setting of being on high dose of beta-blocker, or otherwise with conduction disease, or tachyarrhythmia  She has been stable while on TCF  Patient encouraged that she must f/u outpatient             Atherosclerosis   Assessment & Plan    Has atherosclerosis with 55-60% stenosis of left carotid artery, without occlusion  She is on asa 325mg daily, and would also benefit from statin therapy, however, she refused  According to CTA,  right distal vertebral artery between the C2 transverse foramen and C1 transverse foramen demonstrates mild focal smooth narrowing which is nonspecific and may represent atherosclerotic disease    No evidence of dissection or pseudoaneurysm, however, in the setting of trauma a mild grade 1 vascular injury cannot be excluded  She is to continue  daily for months and f/u with neurosurgery  She has an appointment scheduled 8/8/18 with [facility] at 1330  She was highly encouraged to not miss this appointment  Closed nondisplaced fracture of seventh cervical vertebra (HCC)   Assessment & Plan    Stable  Continue pain management with prn tramadol  VISTA collar intact at all times except when showering  Pain mild-moderate at times  Seen by neurosurgery and will require outpatient follow up  She is declining VNA  Closed nondisplaced fracture of fifth cervical vertebra (HCC)   Assessment & Plan    Stable  Continue pain management, tramadol 50mg Q6prn  VISTA collar intact except when showering  Pain mild - moderate at times  Seen by neuro surgery and will require outpatient follow up  She has appointment scheduled 8/8/18 with [facility] neurosurgery at 1330  She is declining VNA at this time  Closed odontoid fracture with type I morphology ([address] )   Assessment & Plan    Stable  Continue pain management  Will d/c home with tramadol 50mg Q6prn, and d/c oxycodone  She will need to f/u outpatient  VISTA collar intact except when showering  Pain mild - moderate  Seen by neurosurgery and will require outpatient follow up  She is aware of plan of care  Hypertension   Assessment & Plan    Controlled on metoprolol 100mg daily, and losartan 100mg daily  GERD (gastroesophageal reflux disease)   Assessment & Plan    Protonix ordered  Will d/c with prescription  Hypothyroidism   Assessment & Plan    Will continue synthroid 75mcg as per home med list   TSH wnl on 7/24/18  Hyperlipidemia   Assessment & Plan    Refuses statin therapy  Invasive ductal carcinoma of breast, right [facility])   Assessment & Plan    Pt is on Anastrozole  Will continue  She was first diagnosed 16 years ago and had lumpectomy with radiation    Three years ago she had recurrence and had right breast mastectomy  She states she has been in remission  She sees Dr Gretta Solano and has a f/u appointment on October 10th  Pt made aware that she also has lung nodules and will need to f/u for that  ETOH abuse   Assessment & Plan    Drinks vodka regularly  Counseled on cessation  No withdrawal symptoms  States she usually has a few glasses of vodka and orange juice every other day  Understands she must not mix alcohol with pain medication  Lung nodule seen on imaging study   Assessment & Plan    CT showed stable 2 mm right upper lobe nodule on 4/18/2017  CT 7/24/2018 showed 6 mm left upper lobe pulmonary nodule  This pulmonary nodule was present on a remote CT of the chest dated 10/13/2015 indicating stability  She does have history of breast CA and will require follow up  She has an appointment with her oncologist and is aware of her need to follow up             Thoracic aortic aneurysm [facility])   Assessment & Plan    CT showed stable 4 7 cm fusiform aneurysm of the ascending thoracic aorta  Pt will need outpatient f/u  She is aware of findings                  Discharging Physician / Practitioner: KG Mena  PCP: Agustina Malin DO  Admission Date:   Discharge Date: 07/31/18    Resolved Problems  Date Reviewed: 7/31/2018    None          Consultations During Hospital Stay:  · Cardiology and neurosurgery prior to transfer to [facility]    Procedures Performed:     · none    Significant Findings / Test Results:     Cervical fractures as stated above, and possible grade 1 vascular injury of right distal vertebral artery between the C2 transverse foramen and C1 transverse foramen  CT also showed stable 4 7cm fusiform aneurysm of the ascending aorta  Patient aware of all findings and need to follow up  Will refer to PCP, but will likely need referral to CT surgery  EKG: Anterior and lateral ST changes and T-wave inversions , likely nonspecific   When compared with ECG of  25-JUL-2018 00:43, No significant change  Incidental Findings:   · Lung nodules on CT as stated above     Test Results Pending at Discharge (will require follow up):   · none     Outpatient Tests Requested:  · BMP for hyponatremia, f/u possible biopsy for lung nodules, f/u with PCP for syncopal episode and brief episode of 2:1 AV block  F/u with neurosurgery for cervical fractures and possible grade 1 vascular injury of vertebral artery  F/U with pcp for 4 7cm aortic aneurym  · Order for ST with echo given to pt to complete as outpatient with results to PCP - non-specific ST change on EKG    Complications:  Uncomplicated rehab course  Reason for Admission: Deconditioning related to fall, resulting in cervical fractures  Hospital Course:     Quentin Germain is a 81 y o  female patient who originally presented to the hospital on 7/27/2018 due to deconditioning after a fall at home  She originally came to the hospital as a trauma transfer from [address] [place] [address] on 7/24/18  She states she had fallen, which was not the first time  She woke up in bed, but her fiance noticed blood in kitchen, and it was assumed that she fell there, and then went to bed to go to sleep  She was noted to have multiple cervical spine fractures on admission   She was seen by Neurosurgery who recommended CTA   Patient was placed in collar and underwent CTA, which revealed a possible grade 1 vertebral artery injury  She was therefore placed on 325 mg of aspirin for total of 3 months   She is to  have outpatient follow-up with neurosurgery to re-evaluate the cervical fractures, as well as the vertebral artery injury  She also needs outpatient workup with her PCP for syncope   Cardiology was consulted during her hospital course and at that time cardiology recommended decreasing her metoprolol by half as well as discontinuing the HCTZ for hyponatremia  She was discharged to rehab on 7/27/18, and she has been stable    She received maximum benefit from inpatient rehab, and is being discharged home with VNA  F/U appointments were made and pt was highly encouraged to abstain from alcohol and keep her appointment  Prescription for outpatient ST with echo given to pt  She is insistent upon leaving  Please see above list of diagnoses and related plan for additional information  Condition at Discharge: stable     Discharge Day Visit / Exam:     Subjective:  Denies CP, SOB, N/V/D, constipation, dysuria, dizziness, light headedness, paresthesia  Neck pain minimal at this time  Vitals: Blood Pressure: 137/66 (07/31/18 0709)  Pulse: 90 (07/31/18 0709)  Temperature: 97 8 °F (36 6 °C) (07/31/18 0709)  Temp Source: Temporal (07/31/18 0709)  Respirations: 22 (07/31/18 0709)  Weight - Scale: 75 5 kg (166 lb 7 2 oz) (07/29/18 1100)  SpO2: 97 % (07/31/18 0709)  Exam:   Physical Exam   Constitutional: She is oriented to person, place, and time  She appears well-developed and well-nourished  No distress  HENT:   Head: Normocephalic and atraumatic  Eyes: Right eye exhibits no discharge  Left eye exhibits no discharge  Neck: No JVD present  Cardiovascular: Normal rate, regular rhythm, normal heart sounds and intact distal pulses  Exam reveals no gallop and no friction rub  No murmur heard  Pulmonary/Chest: Effort normal and breath sounds normal  No respiratory distress  She has no wheezes  She has no rales  She exhibits no tenderness  Abdominal: Soft  Bowel sounds are normal  She exhibits no distension  There is no tenderness  There is no rebound and no guarding  Musculoskeletal: She exhibits edema (trace ankle edema)  Cervical collar intact  Neurological: She is alert and oriented to person, place, and time  No focal deficits or paresthesia  Skin: Skin is warm and dry  No rash noted  She is not diaphoretic  Psychiatric: She has a normal mood and affect         Discussion with Family: Plan of care discussed with pt     Discharge instructions/Information to patient and family:   See after visit summary for information provided to patient and family  Provisions for Follow-Up Care:  See after visit summary for information related to follow-up care and any pertinent home health orders  Disposition:     Home with VNA Services (Reminder: Complete face to face encounter)     For Discharges to [facility] SNF:   · Not Applicable to this Patient - Not Applicable to this Patient    Planned Readmission: no   Discharge Statement:  I spent 35 minutes discharging the patient  This time was spent on the day of discharge  I had direct contact with the patient on the day of discharge  Greater than 50% of the total time was spent examining patient, answering all patient questions, arranging and discussing plan of care with patient as well as directly providing post-discharge instructions  Additional time then spent on discharge activities  Discharge Medications:  See after visit summary for reconciled discharge medications provided to patient and family

## 2018-07-31 NOTE — SOCIAL WORK
MOOKIE and KG met with patient to discuss discharge planning needs  Patient reported earlier she did not want VNA but after further discussion patient is now agreeable to VNA  Patient is accepted with [facility]  MOOKIE discussed HOST resource with patient (for alcohol abuse)  Patient is interested in contact information but does not want referral  MOOKIE provided patient with number  Patient reports she uses alcohol when she is bored at home  MOOKIE discussed resources for patient and offered community resource information  Patient reports her physician provided her with this information already  MOOKIE advised a follow-up PCP appointment is scheduled and follow-up neurosurgery appointment and is documented on [facility]  Patient is agreeable and will follow-up as needed

## 2018-07-31 NOTE — PLAN OF CARE
Problem: PHYSICAL THERAPY ADULT  Goal: Performs mobility at highest level of function for planned discharge setting  See evaluation for individualized goals  Treatment/Interventions: ADL retraining, Functional transfer training, Elevations, Therapeutic exercise, Endurance training, Cognitive reorientation, Patient/family training, Equipment eval/education, Bed mobility, Gait training, Spoke to nursing, Family, OT (Spoke with )  Equipment Recommended: Other (Comment) (To be determined)       See flowsheet documentation for full assessment, interventions and recommendations  Outcome: Adequate for Discharge  Prognosis: Excellent  Problem List: Decreased strength, Decreased range of motion, Decreased endurance, Impaired balance, Decreased mobility, Decreased coordination, Decreased cognition, Impaired judgement, Decreased safety awareness, Impaired vision, Orthopedic restrictions  Assessment: All STG's/LTG's now achieved  Pt requested discharge to home alone this PM  Pt awaiting medical clearance from MD  Pt was issued a RW (for longer distance ambulation) that was sized to fit by this PT  This PT educated pt on benefits of Home PT; if even just for a home safety evaluation  Pt declined Home PT services at this time  This PT asked pt if she has any pictures of her home, to perform a visual Home Eval  Pt reported not having any pictures on her flip phone  If pt is not medically cleared to return home, then plan to discharge pt from skilled PT tx services tomorrow  Pt notified of plan and agreeable  Barriers to Discharge: Decreased caregiver support (Lives alone)           PT - OK to Discharge: Yes (When medically cleared)    See flowsheet documentation for full assessment

## 2018-07-31 NOTE — NURSING NOTE
Pt for discharge today  Cervical collar intact  Reviewed medication , dr appointments, nursing care, therapy at home, complication  Scripts given  Moving all extremeties, discharge with no distress

## 2018-07-31 NOTE — ASSESSMENT & PLAN NOTE
Stable  Continue pain management with prn tramadol  VISTA collar intact at all times except when showering  Pain mild-moderate at times  Seen by neurosurgery and will require outpatient follow up

## 2018-07-31 NOTE — ASSESSMENT & PLAN NOTE
Stable  Continue pain management, tramadol 50mg Q6prn  VISTA collar intact except when showering  Pain mild at this time  Seen by neuro surgery and will require outpatient follow up  She has appointment scheduled 8/8/18 with [facility] neurosurgery at 1330

## 2018-07-31 NOTE — ASSESSMENT & PLAN NOTE
CT showed stable 2 mm right upper lobe nodule on 4/18/2017  CT 7/24/2018 showed 6 mm left upper lobe pulmonary nodule  This pulmonary nodule was present on a remote CT of the chest dated 10/13/2015 indicating stability  She does have history of breast CA and will require follow up   She has an appointment with her oncologist and is aware of her need to follow up

## 2018-07-31 NOTE — ASSESSMENT & PLAN NOTE
Drinks vodka regularly  Counseled on cessation  No withdrawal symptoms  States she usually has a few glasses of vodka and orange juice every other day  Understands she must not mix alcohol with pain medication  Case management giving her phone number for outpatient support services if needed  Patient states she does not think she will, but did agree to take the phone number

## 2018-07-31 NOTE — ASSESSMENT & PLAN NOTE
Resulting in cervical fractures  CT head negative  Seen by cardiology and neurosurgery  Pt was intoxicated during fall  Her antihypertensives were adjusted  Beta blocker dosage was cut back from 200 to 100 daily, but do not want to discontinue abruptly to avoid reflex tachycardia or hypertension, per cardiologist   Hydrochlorothiazide also discontinued, as she was significantly hypokalemic and slightly hyponatremic on admission and did not have great p o  intake  Labs did improve  Sherre Soulier She will need outpatient monitoring  EF 65 percent per recent echo  Her appetite is now very good  Spoke with case management who will be working on setting pt up with outpatient PCP appointment  Per cardiology note: "Unclear if mechanical fall, fall secondary to alcohol intoxication, orthostatic hypotension in the setting of significant dehydration with poor p o  intake while being on a diuretic, as well as several antihypertensive agents, bradyarrhythmia in the setting of being on high dose of beta-blocker, or otherwise with conduction disease, or tachyarrhythmia  She has been stable while on TCF    Patient encouraged that she must f/u outpatient, and verbalized understanding

## 2018-07-31 NOTE — ASSESSMENT & PLAN NOTE
Has atherosclerosis with 55-60% stenosis of left carotid artery, without occlusion  She is on asa 325mg daily, and would also benefit from statin therapy, however, she refused  According to CTA,  right distal vertebral artery between the C2 transverse foramen and C1 transverse foramen demonstrates mild focal smooth narrowing which is nonspecific and may represent atherosclerotic disease  No evidence of dissection or pseudoaneurysm, however, in the setting of trauma a mild grade 1 vascular injury cannot be excluded  She is to continue  daily for months and f/u with neurosurgery  She has an appointment scheduled 8/8/18 with [facility] at 1330  She was highly encouraged to not miss this appointment

## 2018-07-31 NOTE — PLAN OF CARE
Problem: OCCUPATIONAL THERAPY ADULT  Goal: Performs self-care activities at highest level of function for planned discharge setting  See evaluation for individualized goals  Treatment Interventions: ADL retraining, Functional transfer training, Endurance training, Cognitive reorientation, Patient/family training, Equipment evaluation/education, Compensatory technique education, Continued evaluation, Energy conservation, Activityengagement  Equipment Recommended:  (possible tub transfer bench, reacher)       See flowsheet documentation for full assessment, interventions and recommendations  Outcome: Adequate for Discharge  Limitation: Decreased ADL status, Decreased UE strength, Decreased endurance, Decreased cognition, Decreased Safe judgement during ADL, Decreased high-level ADLs, Decreased self-care trans  Prognosis: Good  Assessment: Patient seen this AM for partial session  Patient reports that she may d/c this PM if medically cleared and SS confirmed  Patient completed all AM care, HM tasks and kitchen/meal prep this date  Patient educated on plan for PM to address medication management, MOCA assessment and brace management and was agreeable  At end of session patient remains in room with luch and all needs within reach  OT Discharge Recommendation: Home with daily checks  OT - OK to Discharge:  Yes

## 2018-07-31 NOTE — ASSESSMENT & PLAN NOTE
Pt is on Anastrozole  Will continue  She was first diagnosed 16 years ago and had lumpectomy with radiation  Three years ago she had recurrence and had right breast mastectomy  She states she has been in remission  She sees Dr Etienne Mercado and has a f/u appointment on October 10th  Pt made aware that she also has lung nodules and will need to f/u for that

## 2018-08-01 ENCOUNTER — TRANSITIONAL CARE MANAGEMENT (OUTPATIENT)
Dept: FAMILY MEDICINE CLINIC | Facility: CLINIC | Age: 73
End: 2018-08-01

## 2018-08-01 ENCOUNTER — LAB REQUISITION (OUTPATIENT)
Dept: LAB | Facility: HOSPITAL | Age: 73
End: 2018-08-01
Payer: MEDICARE

## 2018-08-01 DIAGNOSIS — E87.1 HYPO-OSMOLALITY AND HYPONATREMIA: ICD-10-CM

## 2018-08-01 LAB
ANION GAP SERPL CALCULATED.3IONS-SCNC: 6 MMOL/L (ref 4–13)
BUN SERPL-MCNC: 9 MG/DL (ref 5–25)
CALCIUM SERPL-MCNC: 9.9 MG/DL (ref 8.3–10.1)
CHLORIDE SERPL-SCNC: 100 MMOL/L (ref 100–108)
CO2 SERPL-SCNC: 31 MMOL/L (ref 21–32)
CREAT SERPL-MCNC: 0.79 MG/DL (ref 0.6–1.3)
GFR SERPL CREATININE-BSD FRML MDRD: 76 ML/MIN/1.73SQ M
GLUCOSE SERPL-MCNC: 126 MG/DL (ref 65–140)
POTASSIUM SERPL-SCNC: 4 MMOL/L (ref 3.5–5.3)
SODIUM SERPL-SCNC: 137 MMOL/L (ref 136–145)

## 2018-08-01 PROCEDURE — 80048 BASIC METABOLIC PNL TOTAL CA: CPT | Performed by: NURSE PRACTITIONER

## 2018-08-02 NOTE — PLAN OF CARE
Problem: OCCUPATIONAL THERAPY ADULT  Goal: Performs self-care activities at highest level of function for planned discharge setting  See evaluation for individualized goals  Treatment Interventions: ADL retraining, Functional transfer training, Endurance training, Cognitive reorientation, Patient/family training, Equipment evaluation/education, Compensatory technique education, Continued evaluation, Energy conservation, Activityengagement  Equipment Recommended:  (possible tub transfer bench, reacher)       See flowsheet documentation for full assessment, interventions and recommendations  Outcome: Adequate for Discharge  Limitation: Decreased ADL status, Decreased UE strength, Decreased endurance, Decreased cognition, Decreased Safe judgement during ADL, Decreased high-level ADLs, Decreased self-care trans  Prognosis: Good  OT Discharge Recommendation: Home with daily checks  OT - OK to Discharge: Yes    OCCUPATIONAL THERAPY DISCHARGE SUMMARY      DISPOSITION:  Home alone 7/31/18  AE/DME: Loi Rose    DISCHARGE SUMMARY: Pt discharged home alone 7/31/18 with check in's from boyfriend  Participated in a total of 3/3 OT sessions  Pt achieved all goals as per POC except brace management, but not consistently due to premature discharge  Pt scored 23/30 on MOCA assessment, which indicates safety concerns for being home alone  However, her boyfriend stays with her on the weekends and calls her 2x/daily  Recommendation made for him to check in more often if possible  Pt will also have VNA services to ensure safety in own environment and maximize function  Pt educated on need to wear cervical brace at all times except for washing hair, then must re-don; boyfriend to assist  SS provided number to seek help for alcohol  No further concerns noted  RECOMMENDATIONS: VNA services and assist from boyfriend       Margaux Hendricks, OT

## 2018-08-02 NOTE — OCCUPATIONAL THERAPY NOTE
OCCUPATIONAL THERAPY DISCHARGE SUMMARY      DISPOSITION:  Home alone 7/31/18  AE/DME: Jabari Aguilar    DISCHARGE SUMMARY: Pt discharged home alone 7/31/18 with check in's from boyfriend  Participated in a total of 3/3 OT sessions  Pt achieved all goals as per POC except brace management, but not consistently due to premature discharge  Pt scored 23/30 on MOCA assessment, which indicates safety concerns for being home alone  However, her boyfriend stays with her on the weekends and calls her 2x/daily  Recommendation made for him to check in more often if possible  Pt will also have VNA services to ensure safety in own environment and maximize function  Pt educated on need to wear cervical brace at all times except for washing hair, then must re-don; boyfriend to assist  SS provided number to seek help for alcohol  No further concerns noted  RECOMMENDATIONS: VNA services and assist from boyfriend       Nerissa Real, OT

## 2018-08-03 ENCOUNTER — OFFICE VISIT (OUTPATIENT)
Dept: FAMILY MEDICINE CLINIC | Facility: CLINIC | Age: 73
End: 2018-08-03
Payer: MEDICARE

## 2018-08-03 VITALS
DIASTOLIC BLOOD PRESSURE: 80 MMHG | WEIGHT: 163.4 LBS | BODY MASS INDEX: 27.9 KG/M2 | HEIGHT: 64 IN | SYSTOLIC BLOOD PRESSURE: 118 MMHG

## 2018-08-03 DIAGNOSIS — S12.601A CLOSED NONDISPLACED FRACTURE OF SEVENTH CERVICAL VERTEBRA, UNSPECIFIED FRACTURE MORPHOLOGY, INITIAL ENCOUNTER (HCC): ICD-10-CM

## 2018-08-03 DIAGNOSIS — I10 ESSENTIAL HYPERTENSION: ICD-10-CM

## 2018-08-03 DIAGNOSIS — Z79.811 USE OF ANASTROZOLE (ARIMIDEX): ICD-10-CM

## 2018-08-03 DIAGNOSIS — S12.401A CLOSED NONDISPLACED FRACTURE OF FIFTH CERVICAL VERTEBRA, UNSPECIFIED FRACTURE MORPHOLOGY, INITIAL ENCOUNTER (HCC): ICD-10-CM

## 2018-08-03 DIAGNOSIS — C50.911 INVASIVE DUCTAL CARCINOMA OF BREAST, RIGHT (HCC): ICD-10-CM

## 2018-08-03 DIAGNOSIS — E03.9 HYPOTHYROIDISM, UNSPECIFIED TYPE: ICD-10-CM

## 2018-08-03 DIAGNOSIS — S12.100A CLOSED ODONTOID FRACTURE WITH TYPE I MORPHOLOGY, INITIAL ENCOUNTER (HCC): Primary | ICD-10-CM

## 2018-08-03 DIAGNOSIS — S12.100A CLOSED ODONTOID FRACTURE, INITIAL ENCOUNTER (HCC): ICD-10-CM

## 2018-08-03 PROCEDURE — 99496 TRANSJ CARE MGMT HIGH F2F 7D: CPT | Performed by: FAMILY MEDICINE

## 2018-08-03 RX ORDER — ASPIRIN 325 MG
325 TABLET ORAL DAILY
Qty: 90 TABLET | Refills: 0
Start: 2018-08-03 | End: 2018-09-05 | Stop reason: DRUGHIGH

## 2018-08-03 RX ORDER — ANASTROZOLE 1 MG/1
1 TABLET ORAL DAILY
Qty: 30 TABLET | Refills: 0
Start: 2018-08-03 | End: 2018-09-04 | Stop reason: SDUPTHER

## 2018-08-03 NOTE — PROGRESS NOTES
Assessment/Plan:     Patient will go for chemical stress test   Patient will be on aspirin 325 mg daily along with other medications as listed  Blood pressure stable overall  Patient will follow up with Neurosurgery as well as Cardiology  Diagnoses and all orders for this visit:    Closed odontoid fracture with type I morphology, initial encounter ([facility] [address] )    Invasive ductal carcinoma of breast, right (HCC)  -     anastrozole (ARIMIDEX) 1 mg tablet; Take 1 tablet (1 mg total) by mouth daily    Use of anastrozole (Arimidex)  -     anastrozole (ARIMIDEX) 1 mg tablet; Take 1 tablet (1 mg total) by mouth daily    Closed nondisplaced fracture of fifth cervical vertebra, unspecified fracture morphology, initial encounter ([facility])    Closed nondisplaced fracture of seventh cervical vertebra, unspecified fracture morphology, initial encounter [facility])    Essential hypertension    Hypothyroidism, unspecified type    Closed odontoid fracture, initial encounter ([facility])  -     aspirin 325 mg tablet; Take 1 tablet (325 mg total) by mouth daily         Subjective:     Patient ID: Ronelle Aase is a 81 y o  female  The patient is here status post hospitalization for syncopal event which ultimately caused fracture of the odontoid process this was a type 1 fracture  Patient also had fracture of see 5 and C7  Patient had multiple CT scans and chest x-rays done  Chest x-ray was normal   CT scan of the head was normal   Echo showed ejection fraction 65%  Patient was noted to have thoracic aortic aneurysm of 4 7 cm  Patient had CT scan over neck also  Patient also had CTA of her neck which showed possible grade 1 vertebral artery injury  Patient is to have follow-up with Neurosurgery as well as Cardiology  Patient is to be going for stress test   Meds reviewed  Review of Systems   Constitutional: Negative  HENT: Negative  Eyes: Negative  Respiratory: Negative  Cardiovascular: Negative      Gastrointestinal: Negative  Endocrine: Negative  Genitourinary: Negative  Musculoskeletal: Positive for neck pain  Skin: Negative  Allergic/Immunologic: Negative  Neurological: Negative  Hematological: Negative  Psychiatric/Behavioral: Negative  Objective:     Physical Exam   Constitutional: She is oriented to person, place, and time  She appears well-developed and well-nourished  No distress  HENT:   Head: Normocephalic  Right Ear: External ear normal    Left Ear: External ear normal    Mouth/Throat: Oropharynx is clear and moist  No oropharyngeal exudate  Eyes: EOM are normal  Pupils are equal, round, and reactive to light  Right eye exhibits no discharge  Left eye exhibits no discharge  No scleral icterus  Neck: Normal range of motion  Neck supple  No thyromegaly present  Cardiovascular: Normal rate, regular rhythm, normal heart sounds and intact distal pulses  Exam reveals no gallop and no friction rub  No murmur heard  Pulmonary/Chest: Effort normal and breath sounds normal  No respiratory distress  She has no wheezes  She has no rales  She exhibits no tenderness  Abdominal: Soft  Bowel sounds are normal  She exhibits no distension  There is no tenderness  There is no rebound and no guarding  Musculoskeletal: She exhibits no edema  Patient in neck collar   Lymphadenopathy:     She has no cervical adenopathy  Neurological: She is oriented to person, place, and time  No cranial nerve deficit  She exhibits normal muscle tone  Coordination normal    Skin: Skin is warm and dry  No rash noted  She is not diaphoretic  No erythema  No pallor  Psychiatric: She has a normal mood and affect  Her behavior is normal  Judgment and thought content normal    Nursing note and vitals reviewed  There were no vitals filed for this visit  Transitional Care Management Review:  Kierra Kirk is a 81 y o  female here for TCM follow up       During the TCM phone call patient stated:    Date and time hospital follow up call was made:  7/30/2018  1:11 PM  Hospital care reviewed:  Discussed with Inpatient Physician, Records reviewed  [facility] [address] [address]  Date of admission:  7/27/18  Date of discharge:  7/31/18  Diagnosis:  Syncope   Disposition:  Home (Comment: [facility] care [facility])  Were the patients medicaitons reviewed and updated:  Yes  Current symptoms:  None  Post hospital issues:  None  Should patient be enrolled in anticoag monitoring?:  No  Scheduled for follow up?:  Yes  Did you obtain your prescribed medications:  Yes  Do you need help managing your perscriptions or medications:  No  Is transportation to your appointments needed:  No  I have advised the patient to call PCP with any new or worsening symptoms (please type in name along with any credentials):  Melo Grady  Living Arrangements:  [address] [place] [address] E:  None  Are you recieving outpatient services:  No  Are you recieving home care services:  No  Are you using any community resources:  No  Current waiver service:  No  Have you fallen in the last 12 months:  Yes  How many times:  1  Interperter language line required?:  No  Counseling:  Patient  Counseling topics:  Diagnostic results, instructions for management, Risk factor reduction, Home health agency benefits, patient and family education, Prognosis, Activities of daily living, Importance of RX compliance             Leroy Rahman [place]

## 2018-08-07 ENCOUNTER — TELEPHONE (OUTPATIENT)
Dept: NEUROSURGERY | Facility: CLINIC | Age: 73
End: 2018-08-07

## 2018-08-07 NOTE — TELEPHONE ENCOUNTER
Spoke to patient and reminded her to go for her Xray before appointment with us on 8/8  Script is in her chart

## 2018-08-08 ENCOUNTER — HOSPITAL ENCOUNTER (OUTPATIENT)
Dept: RADIOLOGY | Facility: HOSPITAL | Age: 73
Discharge: HOME | End: 2018-08-08
Payer: MEDICARE

## 2018-08-08 ENCOUNTER — OFFICE VISIT (OUTPATIENT)
Dept: NEUROSURGERY | Facility: CLINIC | Age: 73
End: 2018-08-08
Payer: MEDICARE

## 2018-08-08 ENCOUNTER — TRANSCRIBE ORDERS (OUTPATIENT)
Dept: RADIOLOGY | Facility: HOSPITAL | Age: 73
End: 2018-08-08

## 2018-08-08 VITALS
HEART RATE: 83 BPM | DIASTOLIC BLOOD PRESSURE: 70 MMHG | TEMPERATURE: 98.8 F | SYSTOLIC BLOOD PRESSURE: 100 MMHG | BODY MASS INDEX: 27.18 KG/M2 | WEIGHT: 159.2 LBS | HEIGHT: 64 IN

## 2018-08-08 DIAGNOSIS — I65.01 STENOSIS OF RIGHT VERTEBRAL ARTERY: ICD-10-CM

## 2018-08-08 DIAGNOSIS — S12.100D: ICD-10-CM

## 2018-08-08 DIAGNOSIS — S12.401S CLOSED NONDISPLACED FRACTURE OF FIFTH CERVICAL VERTEBRA, UNSPECIFIED FRACTURE MORPHOLOGY, SEQUELA: ICD-10-CM

## 2018-08-08 DIAGNOSIS — S12.100S: Primary | ICD-10-CM

## 2018-08-08 DIAGNOSIS — S12.491A OTHER CLOSED NONDISPLACED FRACTURE OF FIFTH CERVICAL VERTEBRA, INITIAL ENCOUNTER (HCC): ICD-10-CM

## 2018-08-08 DIAGNOSIS — S12.601S CLOSED NONDISPLACED FRACTURE OF SEVENTH CERVICAL VERTEBRA, UNSPECIFIED FRACTURE MORPHOLOGY, SEQUELA: ICD-10-CM

## 2018-08-08 DIAGNOSIS — S12.691A OTHER CLOSED NONDISPLACED FRACTURE OF SEVENTH CERVICAL VERTEBRA, INITIAL ENCOUNTER (HCC): ICD-10-CM

## 2018-08-08 PROCEDURE — 72040 X-RAY EXAM NECK SPINE 2-3 VW: CPT

## 2018-08-08 PROCEDURE — 99213 OFFICE O/P EST LOW 20 MIN: CPT | Performed by: PHYSICIAN ASSISTANT

## 2018-08-08 RX ORDER — NAPROXEN SODIUM 220 MG
220 TABLET ORAL
COMMUNITY
End: 2018-08-08

## 2018-08-08 RX ORDER — OMEGA-3S/DHA/EPA/FISH OIL/D3 300MG-1000
400 CAPSULE ORAL DAILY
COMMUNITY
End: 2019-01-28

## 2018-08-08 NOTE — PROGRESS NOTES
Assessment/Plan:     Diagnoses and all orders for this visit:    Closed odontoid fracture with type I morphology, sequela  -     CTA head and neck w wo contrast; Future  -     XR spine cervical 2 or 3 vw injury; Future    Closed nondisplaced fracture of fifth cervical vertebra, unspecified fracture morphology, sequela  -     CTA head and neck w wo contrast; Future  -     XR spine cervical 2 or 3 vw injury; Future    Closed nondisplaced fracture of seventh cervical vertebra, unspecified fracture morphology, sequela  -     CTA head and neck w wo contrast; Future  -     XR spine cervical 2 or 3 vw injury; Future    Stenosis of right vertebral artery  -     CTA head and neck w wo contrast; Future    Other orders  -     cholecalciferol (VITAMIN D3) 400 units tablet; Take 400 Units by mouth daily  -     Discontinue: naproxen sodium (ALEVE) 220 MG tablet; 220 mg 2 tabs po daily prn pain          Discussion / Summary: This is a 81 y o  female  who presents for hospital consult (7/25/18) follow up for cervical spine fractures (Type 1 C2 fracture ;  Right C5 lamina fracture ;  Right C7 pedicle fracture with extension into the lamina / transverse foramen) and possible mild grade 1 right vertebral artery injury  Patient has been maintained in C-collar for cervical fractures  She is taking Aspirin 325mg daily for the possible vertebral artery injury  C-spine Xray ( 8/8/18)  was reviewed in detail by Dr Steve Wallace and demonstrates stable cervical alignment compared to prior xray (7/25/18)  The known cervical fractures are not radiographically visualized  Also reviewed prior CTA Head/Neck (7/24/18)  For the cervical fractures -- patient is advised to continue wearing Vista C-collar at all times other then wearing [place] C-collar when showering  Offered to provide patient with new Vista C-collar pads today but she declined   She thinks she has spare Vista c-collar pads at home and will notify our office if she does not have such  She is to follow up in 2 weeks with new C-spine xray  For the possible mild grade 1 right vertebral artery injury -- she is to continue on Aspirin 325mg once daily  She is to follow up in 2 weeks with follow up CTA head/Neck  Advised she should not take Aleve in setting of having to take Aspirin  Patient informed no driving in setting of having to wear c-collar  Patient advise no strenuous activity  Patient is to limit lifting, pulling, and pushing to no more then 5-10 lbs  Recommend patient take fall precautions  Recommend patient refrain from activity that increases chance of falling or injury to neck  She is to follow up in office in 2 weeks with CTA head/Neck and C-spine xray  Patient is to contact our office or present to hospital Emergency Room if experience worsening or new neck pain, or experience headache, nausea/vomiting, speech/vision change, seizure, mental status change, sensory or motor change, bladder or bowel dysfunction, or other neurological change  These findings and recommendations were discussed in detain with patient and her significant other  Patient expressed understanding and agreement     ________________________________________________________________________________        Subjective:      Patient ID: Glory Okeefe is a 81 y o  female who presents for  hospital consult (7/25/18) follow up for cervical spine fractures (Type 1 C2 fracture ;  Right C5 lamina fracture ;  Right C7 pedicle fracture with extension into the lamina / transverse foramen) and possible mild grade 1 right vertebral artery injury  She is accompanied at office with her significant other -- Solo  In review -- has PMH including IBS, hypothyroidism, hypertension, hyperlipidemia, diabetes, AAA, and breast cancer who presented to hospital ER 7/24/18 with c/o of neck pain for 1 day    Hospital record indicates patient apparently had a syncopal episode the evening before around 6 p m in her kitchen  She was amnestic to the events  She awoke on the ground with a sharp stabbing posterior neck pain  During work up she was found to have C2, C5, and C7 fractures  CTA Head/Neck (7/24/18) reported right distal vertebral artery between the C2 transverse foramen and C1 transverse foramina demonstrates mild focal smooth narrowing which is nonspecific and may represent atherosclerotic disease  No evidence of dissection or pseudoaneurysm  However in the setting of trauma a mild grade 1 vertebral injury can't be excluded  She was placed on Aspirin 325mg daily for the possible vertebral artery injury and recommended delayed CTA in approximately 1 month  She was recommended C-collar for the c-spine fractures  Patient was discharged 7/27/18  She reports she stayed at [facility] for a few days and was discharged home  HPI  Patient reports she has been wearing Vista c-collar at all times  She reports she has [place] [employer] c-collar at home  She describes having extra c-collar / c-collar pads at home although describes them as being black  She reports occasional left sided neck pain which she reports is similar to her chronic neck discomfort she expeirenced prior to fall  She currently rated neck pain as 1/10 on pain scale  She denies pain, numbness, tingling, or weakness of her upper extremities or lower extremities  She denies any sensory disturbances of her torso  She ambulates independent  She denies fall or injury to head or neck since hospital discharge  She denies bladder or bowel dysfunction  She denies headache, nausea, vomiting, seizure, double or blurred vision, memory cognitive dysfunction, shortness of breath, chest pain, or dizziness  She denies any easy bleeding or bruising  Patient reports she has been taking Aspirin 325mg 1 tab daily as rx for history of possible vertebral injury    She reports she takes occasional Aleve 220mg 2 tab daily prn pain     The following portions of the patient's history were reviewed and updated as appropriate: allergies, current medications, past family history, past medical history, past social history and past surgical history  Review of Systems   Constitutional: Negative for fever  Eyes: Negative for visual disturbance  Respiratory: Negative for shortness of breath  Cardiovascular: Negative for chest pain  Gastrointestinal: Negative for nausea and vomiting  Genitourinary: Negative for difficulty urinating  Musculoskeletal: Negative for gait problem  Neurological: Negative for dizziness, seizures, weakness and headaches  Psychiatric/Behavioral: Negative for agitation  Objective:      /70 (BP Location: Left arm, Patient Position: Sitting, Cuff Size: Standard)   Pulse 83   Temp 98 8 °F (37 1 °C) (Tympanic)   Ht 5' 3 5" (1 613 m)   Wt 72 2 kg (159 lb 3 2 oz)   BMI 27 76 kg/m²          Physical Exam   Constitutional: She appears well-developed and well-nourished  No distress  Wearing Vista c-collar   Eyes: Conjunctivae and EOM are normal  Pupils are equal, round, and reactive to light  Pulmonary/Chest: Effort normal    Musculoskeletal:        Cervical back: She exhibits no tenderness and no deformity  Neurological: She has a normal Finger-Nose-Finger Test  Gait normal    Reflex Scores:       Tricep reflexes are 1+ on the right side and 1+ on the left side  Bicep reflexes are 2+ on the right side and 2+ on the left side  Brachioradialis reflexes are 2+ on the right side and 2+ on the left side  Patellar reflexes are 1+ on the right side and 1+ on the left side  Achilles reflexes are 1+ on the right side and 1+ on the left side  Psychiatric: She has a normal mood and affect   Her speech is normal        Neurologic Exam     Mental Status   Speech: speech is normal   Level of consciousness: alert    Oriented to person, place, and time (August   Initially said 2008 but corrected to 2018)  Cranial Nerves     CN III, IV, VI   Pupils are equal, round, and reactive to light  Extraocular motions are normal      CN V   Facial sensation intact  CN VII   Facial expression full, symmetric  CN VIII   Hearing: intact    CN IX, X   Palate: symmetric    CN XI   Right trapezius strength: normal  Left trapezius strength: normal    CN XII   Tongue: not atrophic  Fasciculations: absent  Tongue deviation: none    Motor Exam   Right arm pronator drift: absent  Left arm pronator drift: absent    Strength   Right deltoid: 5/5  Left deltoid: 5/5  Right biceps: 5/5  Left biceps: 5/5  Right triceps: 5/5  Left triceps: 5/5  Right wrist flexion: 5/5  Left wrist flexion: 5/5  Right wrist extension: 5/5  Left wrist extension: 5/5  Right interossei: 5/5  Left interossei: 5/5  Right iliopsoas: 5/5  Left iliopsoas: 5/5  Right quadriceps: 5/5  Left quadriceps: 5/5  Right hamstrin/5  Left hamstrin/5  Right anterior tibial: 5/5  Left anterior tibial: 5/5  Right gastroc: 5/5  Left gastroc: 5/5  Right EHL 5/5  Left EHL 5/5     Sensory Exam     Sensation to pinprick intact b/l upper extremities, torso, and b/l lower extremities  JPS and Vibratory sense intact b/l thumbs and great toes  Gait, Coordination, and Reflexes     Gait  Gait: normal (Independent)    Coordination   Finger to nose coordination: normal    Reflexes   Right brachioradialis: 2+  Left brachioradialis: 2+  Right biceps: 2+  Left biceps: 2+  Right triceps: 1+  Left triceps: 1+  Right patellar: 1+  Left patellar: 1+  Right achilles: 1+  Left achilles: 1+  Right plantar: normal  Left plantar: normal  Right Ulrich: absent  Left Ulrich: absent  Right ankle clonus: absent  Left ankle clonus: absent         Imaging study:    [facility]( C-spine xray -- per report: " CERVICAL SPINE     INDICATION:   "CLOSED ODONTOID FX WITH TYPE 1 MORPHOLOGY AND ROUTINE HEALING  FELL SIX DAYS AGO"       COMPARISON:  Cervical spine radiographs 7/25/2018  CT cervical spine 7/24/2018      VIEWS:  XR SPINE CERVICAL 2 OR 3 VW INJURY         FINDINGS:     The known odontoid fracture and C5 C7 fractures are not well visualized radiographically on the current or prior study       Mild chronic anterolisthesis of C5 on C6      Decreased disc space height at C6-C7  Anterior bridging osteophyte at this level      The prevertebral soft tissues are within normal limits        The lung apices are clear      IMPRESSION:     No new malalignment    Known cervical fractures are not radiographically visualized         Workstation performed: [id number] "

## 2018-08-08 NOTE — PATIENT INSTRUCTIONS
Wear Vista c-collar at all times other then wearing [id number] c-collar to shower  No driving in setting of having to wear c-collar  No strenuous activity  Limit lifting, pulling, and pushing to no more then 5-10 lbs  Recommend you take fall precautions  Recommend you refrain from activity that increases chance of falling or injury to neck  Contact our office or present to hospital Emergency Room if experience worsening or new neck pain, or experience headache, nausea/vomiting, speech/vision change, seizure, mental status change, sensory or motor change, bladder or bowel dysfunction, or other neurological change

## 2018-08-08 NOTE — LETTER
August 9, 2018     Logan Wilfrido, [address] [address]    Patient: Aleyda Ram   YOB: 1945   Date of Visit: 8/8/2018       Dear Dr Alessandro Garcia:    Thank you for referring Hussain Melton to me for evaluation  Below are my notes for this consultation  If you have questions, please do not hesitate to call me  I look forward to following your patient along with you  Sincerely,        Stacia Crain PA-C        CC: No Recipients  Stacia Crain PA-C  8/9/2018  6:15 AM  Sign at close encounter  Assessment/Plan:     Diagnoses and all orders for this visit:    Closed odontoid fracture with type I morphology, sequela  -     CTA head and neck w wo contrast; Future  -     XR spine cervical 2 or 3 vw injury; Future    Closed nondisplaced fracture of fifth cervical vertebra, unspecified fracture morphology, sequela  -     CTA head and neck w wo contrast; Future  -     XR spine cervical 2 or 3 vw injury; Future    Closed nondisplaced fracture of seventh cervical vertebra, unspecified fracture morphology, sequela  -     CTA head and neck w wo contrast; Future  -     XR spine cervical 2 or 3 vw injury; Future    Stenosis of right vertebral artery  -     CTA head and neck w wo contrast; Future    Other orders  -     cholecalciferol (VITAMIN D3) 400 units tablet; Take 400 Units by mouth daily  -     Discontinue: naproxen sodium (ALEVE) 220 MG tablet; 220 mg 2 tabs po daily prn pain          Discussion / Summary: This is a 81 y o  female  who presents for hospital consult (7/25/18) follow up for cervical spine fractures (Type 1 C2 fracture ;  Right C5 lamina fracture ;  Right C7 pedicle fracture with extension into the lamina / transverse foramen) and possible mild grade 1 right vertebral artery injury  Patient has been maintained in C-collar for cervical fractures  She is taking Aspirin 325mg daily for the possible vertebral artery injury      C-spine Xray ( 8/8/18)  was reviewed in detail by Dr Brigida Jensen and demonstrates stable cervical alignment compared to prior xray (7/25/18)  The known cervical fractures are not radiographically visualized  Also reviewed prior CTA Head/Neck (7/24/18)  For the cervical fractures -- patient is advised to continue wearing Vista C-collar at all times other then wearing [place] C-collar when showering  Offered to provide patient with new Vista C-collar pads today but she declined  She thinks she has spare Vista c-collar pads at home and will notify our office if she does not have such  She is to follow up in 2 weeks with new C-spine xray  For the possible mild grade 1 right vertebral artery injury -- she is to continue on Aspirin 325mg once daily  She is to follow up in 2 weeks with follow up CTA head/Neck  Advised she should not take Aleve in setting of having to take Aspirin  Patient informed no driving in setting of having to wear c-collar  Patient advise no strenuous activity  Patient is to limit lifting, pulling, and pushing to no more then 5-10 lbs  Recommend patient take fall precautions  Recommend patient refrain from activity that increases chance of falling or injury to neck  She is to follow up in office in 2 weeks with CTA head/Neck and C-spine xray  Patient is to contact our office or present to hospital Emergency Room if experience worsening or new neck pain, or experience headache, nausea/vomiting, speech/vision change, seizure, mental status change, sensory or motor change, bladder or bowel dysfunction, or other neurological change  These findings and recommendations were discussed in detain with patient and her significant other       Patient expressed understanding and agreement     ________________________________________________________________________________        Subjective:      Patient ID: [place] Srikanth is a 81 y o  female who presents for  hospital consult (7/25/18) follow up for cervical spine fractures (Type 1 C2 fracture ;  Right C5 lamina fracture ;  Right C7 pedicle fracture with extension into the lamina / transverse foramen) and possible mild grade 1 right vertebral artery injury  She is accompanied at office with her significant other -- Solo  In review -- has PMH including IBS, hypothyroidism, hypertension, hyperlipidemia, diabetes, AAA, and breast cancer who presented to hospital ER 7/24/18 with c/o of neck pain for 1 day  Hospital record indicates patient apparently had a syncopal episode the evening before around 11 p m in her kitchen  She was amnestic to the events  She awoke on the ground with a sharp stabbing posterior neck pain  During work up she was found to have C2, C5, and C7 fractures  CTA Head/Neck (7/24/18) reported right distal vertebral artery between the C2 transverse foramen and C1 transverse foramina demonstrates mild focal smooth narrowing which is nonspecific and may represent atherosclerotic disease  No evidence of dissection or pseudoaneurysm  However in the setting of trauma a mild grade 1 vertebral injury can't be excluded  She was placed on Aspirin 325mg daily for the possible vertebral artery injury and recommended delayed CTA in approximately 1 month  She was recommended C-collar for the c-spine fractures  Patient was discharged 7/27/18  She reports she stayed at [facility] for a few days and was discharged home  HPI  Patient reports she has been wearing Vista c-collar at all times  She reports she has [employer] c-collar at home  She describes having extra c-collar / c-collar pads at home although describes them as being black  She reports occasional left sided neck pain which she reports is similar to her chronic neck discomfort she expeirenced prior to fall  She currently rated neck pain as 1/10 on pain scale  She denies pain, numbness, tingling, or weakness of her upper extremities or lower extremities    She denies any sensory disturbances of her torso  She ambulates independent  She denies fall or injury to head or neck since hospital discharge  She denies bladder or bowel dysfunction  She denies headache, nausea, vomiting, seizure, double or blurred vision, memory cognitive dysfunction, shortness of breath, chest pain, or dizziness  She denies any easy bleeding or bruising  Patient reports she has been taking Aspirin 325mg 1 tab daily as rx for history of possible vertebral injury  She reports she takes occasional Aleve 220mg 2 tab daily prn pain  The following portions of the patient's history were reviewed and updated as appropriate: allergies, current medications, past family history, past medical history, past social history and past surgical history  Review of Systems   Constitutional: Negative for fever  Eyes: Negative for visual disturbance  Respiratory: Negative for shortness of breath  Cardiovascular: Negative for chest pain  Gastrointestinal: Negative for nausea and vomiting  Genitourinary: Negative for difficulty urinating  Musculoskeletal: Negative for gait problem  Neurological: Negative for dizziness, seizures, weakness and headaches  Psychiatric/Behavioral: Negative for agitation  Objective:      /70 (BP Location: Left arm, Patient Position: Sitting, Cuff Size: Standard)   Pulse 83   Temp 98 8 °F (37 1 °C) (Tympanic)   Ht 5' 3 5" (1 613 m)   Wt 72 2 kg (159 lb 3 2 oz)   BMI 27 76 kg/m²           Physical Exam   Constitutional: She appears well-developed and well-nourished  No distress  Wearing Vista c-collar   Eyes: Conjunctivae and EOM are normal  Pupils are equal, round, and reactive to light  Pulmonary/Chest: Effort normal    Musculoskeletal:        Cervical back: She exhibits no tenderness and no deformity     Neurological: She has a normal Finger-Nose-Finger Test  Gait normal    Reflex Scores:       Tricep reflexes are 1+ on the right side and 1+ on the left side  Bicep reflexes are 2+ on the right side and 2+ on the left side  Brachioradialis reflexes are 2+ on the right side and 2+ on the left side  Patellar reflexes are 1+ on the right side and 1+ on the left side  Achilles reflexes are 1+ on the right side and 1+ on the left side  Psychiatric: She has a normal mood and affect  Her speech is normal        Neurologic Exam     Mental Status   Speech: speech is normal   Level of consciousness: alert    Oriented to person, place, and time (August   Initially said  but corrected to 2018)  Cranial Nerves     CN III, IV, VI   Pupils are equal, round, and reactive to light  Extraocular motions are normal      CN V   Facial sensation intact  CN VII   Facial expression full, symmetric  CN VIII   Hearing: intact    CN IX, X   Palate: symmetric    CN XI   Right trapezius strength: normal  Left trapezius strength: normal    CN XII   Tongue: not atrophic  Fasciculations: absent  Tongue deviation: none    Motor Exam   Right arm pronator drift: absent  Left arm pronator drift: absent    Strength   Right deltoid: 5/5  Left deltoid: 5/5  Right biceps: 5/5  Left biceps: 5/5  Right triceps: 5/5  Left triceps: 5/5  Right wrist flexion: 5/5  Left wrist flexion: 5/5  Right wrist extension: 5/5  Left wrist extension: 5/5  Right interossei: 5/5  Left interossei: 5/5  Right iliopsoas: 5/5  Left iliopsoas: 5/5  Right quadriceps: 5/5  Left quadriceps: 5/5  Right hamstrin/5  Left hamstrin/5  Right anterior tibial: 5/5  Left anterior tibial: 5/5  Right gastroc: 5/5  Left gastroc: 5/5  Right EHL 5/5  Left EHL 5/5     Sensory Exam     Sensation to pinprick intact b/l upper extremities, torso, and b/l lower extremities  JPS and Vibratory sense intact b/l thumbs and great toes         Gait, Coordination, and Reflexes     Gait  Gait: normal (Independent)    Coordination   Finger to nose coordination: normal    Reflexes   Right brachioradialis: 2+  Left brachioradialis: 2+  Right biceps: 2+  Left biceps: 2+  Right triceps: 1+  Left triceps: 1+  Right patellar: 1+  Left patellar: 1+  Right achilles: 1+  Left achilles: 1+  Right plantar: normal  Left plantar: normal  Right Ulrich: absent  Left Ulrich: absent  Right ankle clonus: absent  Left ankle clonus: absent         Imaging study:    8/8/18 [facility]( C-spine xray -- per report: " CERVICAL SPINE     INDICATION:   "CLOSED ODONTOID FX WITH TYPE 1 MORPHOLOGY AND ROUTINE HEALING  FELL SIX DAYS AGO"     COMPARISON:  Cervical spine radiographs 7/25/2018  CT cervical spine 7/24/2018      VIEWS:  XR SPINE CERVICAL 2 OR 3 VW INJURY         FINDINGS:     The known odontoid fracture and C5 C7 fractures are not well visualized radiographically on the current or prior study       Mild chronic anterolisthesis of C5 on C6      Decreased disc space height at C6-C7  Anterior bridging osteophyte at this level      The prevertebral soft tissues are within normal limits        The lung apices are clear      IMPRESSION:     No new malalignment    Known cervical fractures are not radiographically visualized         Workstation performed: [id number] "

## 2018-08-13 DIAGNOSIS — I10 ESSENTIAL HYPERTENSION: Primary | ICD-10-CM

## 2018-08-13 RX ORDER — LOSARTAN POTASSIUM AND HYDROCHLOROTHIAZIDE 25; 100 MG/1; MG/1
TABLET ORAL
Qty: 90 TABLET | Refills: 0 | Status: SHIPPED | OUTPATIENT
Start: 2018-08-13 | End: 2018-10-22

## 2018-08-14 DIAGNOSIS — E03.9 HYPOTHYROIDISM: ICD-10-CM

## 2018-08-14 RX ORDER — LEVOTHYROXINE SODIUM 0.07 MG/1
TABLET ORAL
Qty: 90 TABLET | Refills: 0 | Status: SHIPPED | OUTPATIENT
Start: 2018-08-14 | End: 2018-10-18 | Stop reason: SDUPTHER

## 2018-08-22 ENCOUNTER — HOSPITAL ENCOUNTER (OUTPATIENT)
Dept: CT IMAGING | Facility: HOSPITAL | Age: 73
Discharge: HOME | End: 2018-08-22
Payer: MEDICARE

## 2018-08-22 ENCOUNTER — HOSPITAL ENCOUNTER (OUTPATIENT)
Dept: RADIOLOGY | Facility: HOSPITAL | Age: 73
Discharge: HOME | End: 2018-08-22
Payer: MEDICARE

## 2018-08-22 DIAGNOSIS — S12.601S CLOSED NONDISPLACED FRACTURE OF SEVENTH CERVICAL VERTEBRA, UNSPECIFIED FRACTURE MORPHOLOGY, SEQUELA: ICD-10-CM

## 2018-08-22 DIAGNOSIS — I65.01 STENOSIS OF RIGHT VERTEBRAL ARTERY: ICD-10-CM

## 2018-08-22 DIAGNOSIS — S12.401S CLOSED NONDISPLACED FRACTURE OF FIFTH CERVICAL VERTEBRA, UNSPECIFIED FRACTURE MORPHOLOGY, SEQUELA: ICD-10-CM

## 2018-08-22 DIAGNOSIS — S12.100S: ICD-10-CM

## 2018-08-22 PROCEDURE — 70496 CT ANGIOGRAPHY HEAD: CPT

## 2018-08-22 PROCEDURE — 72040 X-RAY EXAM NECK SPINE 2-3 VW: CPT

## 2018-08-22 PROCEDURE — 70498 CT ANGIOGRAPHY NECK: CPT

## 2018-08-22 RX ADMIN — IOHEXOL 85 ML: 350 INJECTION, SOLUTION INTRAVENOUS at 13:54

## 2018-09-04 DIAGNOSIS — Z79.811 USE OF ANASTROZOLE (ARIMIDEX): ICD-10-CM

## 2018-09-04 DIAGNOSIS — C50.911 INVASIVE DUCTAL CARCINOMA OF BREAST, RIGHT (HCC): ICD-10-CM

## 2018-09-04 RX ORDER — ANASTROZOLE 1 MG/1
TABLET ORAL
Qty: 90 TABLET | Refills: 3 | Status: SHIPPED | OUTPATIENT
Start: 2018-09-04 | End: 2019-11-11 | Stop reason: SDUPTHER

## 2018-09-05 ENCOUNTER — OFFICE VISIT (OUTPATIENT)
Dept: NEUROSURGERY | Facility: CLINIC | Age: 73
End: 2018-09-05
Payer: MEDICARE

## 2018-09-05 VITALS
HEART RATE: 89 BPM | TEMPERATURE: 99 F | SYSTOLIC BLOOD PRESSURE: 115 MMHG | WEIGHT: 158.8 LBS | DIASTOLIC BLOOD PRESSURE: 75 MMHG | BODY MASS INDEX: 27.11 KG/M2 | HEIGHT: 64 IN

## 2018-09-05 DIAGNOSIS — S12.601S CLOSED NONDISPLACED FRACTURE OF SEVENTH CERVICAL VERTEBRA, UNSPECIFIED FRACTURE MORPHOLOGY, SEQUELA: ICD-10-CM

## 2018-09-05 DIAGNOSIS — R91.1 PULMONARY NODULE, LEFT: ICD-10-CM

## 2018-09-05 DIAGNOSIS — S12.401S CLOSED NONDISPLACED FRACTURE OF FIFTH CERVICAL VERTEBRA, UNSPECIFIED FRACTURE MORPHOLOGY, SEQUELA: ICD-10-CM

## 2018-09-05 DIAGNOSIS — S12.100S: Primary | ICD-10-CM

## 2018-09-05 DIAGNOSIS — I65.23 ASYMPTOMATIC CAROTID ARTERY STENOSIS, BILATERAL: ICD-10-CM

## 2018-09-05 PROCEDURE — 99214 OFFICE O/P EST MOD 30 MIN: CPT | Performed by: PHYSICIAN ASSISTANT

## 2018-09-05 RX ORDER — ASPIRIN 81 MG/1
81 TABLET ORAL DAILY
Status: DISCONTINUED | OUTPATIENT
Start: 2018-09-05 | End: 2018-09-05

## 2018-09-05 RX ORDER — ASPIRIN 81 MG/1
81 TABLET ORAL DAILY
Refills: 0
Start: 2018-09-05 | End: 2018-11-05

## 2018-09-05 NOTE — LETTER
September 6, 2018     Rajni Delaney, [address]    Patient: Juma Anton   YOB: 1945   Date of Visit: 9/5/2018       Dear Dr Beauchamp Peer:    Thank you for referring Aniyah Fung to me for evaluation  Below are my notes for this consultation  If you have questions, please do not hesitate to call me  I look forward to following your patient along with you  Sincerely,        Farnaz Cooper MD        CC: No Recipients  Emir Baker PA-C  9/6/2018  5:15 AM  Sign at close encounter  Assessment/Plan:       Diagnoses and all orders for this visit:    Closed odontoid fracture with type I morphology, sequela  -     XR spine cervical complete 4 or 5 vw non injury; Future    Closed nondisplaced fracture of fifth cervical vertebra, unspecified fracture morphology, sequela  -     XR spine cervical complete 4 or 5 vw non injury; Future    Closed nondisplaced fracture of seventh cervical vertebra, unspecified fracture morphology, sequela  -     XR spine cervical complete 4 or 5 vw non injury; Future    Pulmonary nodule, left  -     Ambulatory referral to [facility]; Future    Asymptomatic carotid artery stenosis, bilateral  -     Ambulatory referral to [facility]; Future  -     aspirin (ECOTRIN LOW STRENGTH) 81 mg EC tablet; Take 1 tablet (81 mg total) by mouth daily        Discussion / Summary: This is a 81year old female who presents for follow up  She has been maintained in c-collar for approximately 6 weeks for cervical spine fractures (type 1 C2 fracture; right C5 laminal fracture; right C7 pedicle fracture with extension into the lamina/transverse foramen)  She was also noted during hospitalization to possibly have a mild grade 1 right vertebral artery injury and was placed on Aspirin 325mg daily  She had requested C-spine xray (8/22/18) and CTA head/neck (8/22/18) completed which were reviewed in detail by Dr Horacio Lujan    There is stable cervical alignment  To Dr Spencer Mean view there is presence of some healing of C2 fracture  CTA Head/Neck reports right vertebral artery cervical segment normal origin with vessel normal in caliber throughout the neck  There is atherosclerotic disease involving bilateral carotid bifurcations with approximately 60% stenosis of the proximal left cervical internal carotid artery  There is reports of a stable left upper lobe lung nodule  Findings were discussed with patient and her significant other  She is recommended to continue wearing C-collar for management of c-spine fractures  She is to wear [place] c-collar to shower  Otherwise wear [place] C-collar at all times  She is to have cervical xray (with flex/ext views) completed in 4 weeks and follow up in office after completion  To consider wean out of c-collar after next visit pending xray result and clinical status  Recommend she refrain from strenuous activity  Recommend she limit lifting, pulling, pushing to no more then 10 lbs  No driving in setting of having to wear C-collar  Recommend she take fall precautions and refrain from activity that increases chance of fall or injury to neck  At this juncture she may decrease Aspirin down to Aspirin 81mg daily  She is recommended to follow up with her Primary Care Provider for evaluation / management of asymptomatic carotid stenosis  Defer to PCP to determine if PCP desires Vascular surgery eval of vascular neurology eval     Also recommended she follow up with PCP for eval / management of lung nodule  Patient advised to contact our office or present to Emergency Room if experience worsening or new neck pain, sensory or motor change, bladder or bowel dysfunction, or other neurological change  Follow up with your Primary Care Provider for eval of lung nodule and carotid stenosis      _________________________________________________________________________________________________    Subjective:      Patient ID: Darian Saldana is a 81 y o  female who presents for follow up for cervical spine fractures (type 1 C2 fracture; right C5 laminal fracture; right C7 pedicle fracture with extension into the lamina/transverse foramen)  She was also noted during hospitalization to possibly have a mild grade 1 right vertebral artery injury and was placed on Aspirin 325mg daily  She had C-spine xray and CTA head/neck completed  She is accompanied with her significant other -- Solo  HPI  She reports wearing cervical collar at all times  Reports she does not like lying on her back with cervical collar  She denies neck pain  She denies pain in her upper extremities  She denies numbness or tingling of her upper extremities, torso, lower extremities  She denies weakness of her upper extremities or lower extremities  She denies headache, nausea, vomiting, vision changes, seizure, shortness of breath, chest pain, or bladder or bowel dysfunction  She denies saddle paresthesias  She ambulates independent  The following portions of the patient's history were reviewed and updated as appropriate: allergies, current medications, past family history, past medical history, past social history and past surgical history  Review of Systems   Constitutional: Negative for fever  Respiratory: Negative for shortness of breath  Cardiovascular: Negative for chest pain  Gastrointestinal: Negative for nausea and vomiting  Genitourinary: Negative for difficulty urinating  Musculoskeletal: Negative for neck pain  Neurological: Negative for weakness, numbness and headaches  Psychiatric/Behavioral: Negative for agitation           Objective:      /75 (BP Location: Left arm, Patient Position: Sitting, Cuff Size: Standard)   Pulse 89   Temp 99 °F (37 2 °C) (Tympanic)   Ht 5' 3 5" (1 613 m)   Wt 72 kg (158 lb 12 8 oz)   BMI 27 69 kg/m²           Physical Exam   Constitutional: She is oriented to person, place, and time  She appears well-developed and well-nourished  No distress  Wearing [place] cervical collar   Eyes: Conjunctivae are normal    Pulmonary/Chest: Effort normal    Musculoskeletal:        Cervical back: She exhibits no tenderness and no deformity  Neurological: She is alert and oriented to person, place, and time  Gait normal    Reflex Scores:       Tricep reflexes are 2+ on the right side and 2+ on the left side  Bicep reflexes are 2+ on the right side and 2+ on the left side  Brachioradialis reflexes are 2+ on the right side and 2+ on the left side  Patellar reflexes are Right patellar reflex grade: +1-2  and 2+ on the left side  Achilles reflexes are 1+ on the right side and 1+ on the left side  Skin:     No breakdown of skin underlying cervical collar  Psychiatric: She has a normal mood and affect  Her speech is normal        Neurologic Exam     Mental Status   Oriented to person, place, and time  Speech: speech is normal   Level of consciousness: alert    Cranial Nerves     Cranial nerves 2-12 grossly intact except did not evaluate SCM in setting of wearing c-collar  Motor Exam     Motor:  Shoulder abduction 5/5 bilaterally  Elbow flexion/extension 5/5 bilaterally  Wrist flexion/extension 5/5 bilaterally  Finger  / abduction 5/5 bilaterally  Hip flexion/abduction/adduction 5/5 bilaterally  Knee flexion/extension 5/5 bilaterally  Ankle DF/PF 5/5 bilaterally  Sensory Exam   Vibration normal    Proprioception normal    Pinprick normal      Sensation to pinprick intact b/l upper extremities, torso, and b/l lower extremities  JPS and Vibratory sense intact b/l thumbs and great toes  Gait, Coordination, and Reflexes     Gait  Gait: normal (Independent )    Reflexes   Right brachioradialis: 2+  Left brachioradialis: 2+  Right biceps: 2+  Left biceps: 2+  Right triceps: 2+  Left triceps: 2+  Right patellar reflex grade: +1-2    Left patellar: 2+  Right achilles: 1+  Left achilles: 1+  Right plantar: normal  Left plantar: normal  Right Ulrich: absent  Left Ulrich: absent  Right ankle clonus: absent  Left ankle clonus: absent         Imaging study:    8/22/18 [facility] CTA head/Neck -- per report: " CTA NECK AND BRAIN WITH AND WITHOUT CONTRAST     INDICATION: S12 100S: Unspecified displaced fracture of second cervical vertebra, sequela  S12 401S: Unspecified nondisplaced fracture of fifth cervical vertebra, sequela  S12 601S: Unspecified nondisplaced fracture of seventh cervical vertebra, sequela  I65 01: Occlusion and stenosis of right vertebral artery      COMPARISON:   CTA head and neck 7/24/2018     TECHNIQUE:  Routine CT imaging of the Brain without contrast   Post contrast imaging was performed after administration of iodinated contrast through the neck and brain  Post contrast axial 0 625 mm images timed to opacify the arterial system        3D rendering was performed on an independent workstation  MIP reconstructions performed  Coronal reconstructions were performed of the noncontrast portion of the brain        Radiation dose length product (DLP) for this visit:  1410 mGy-cm   This examination, like all CT scans performed in the [facility], was performed utilizing techniques to minimize radiation dose exposure, including the use of iterative   reconstruction and automated exposure control         IV Contrast:  85 mL of iohexol (OMNIPAQUE)      IMAGE QUALITY:   Diagnostic     FINDINGS:  NONCONTRAST BRAIN     PARENCHYMA:  No intracranial mass, mass effect or midline shift  No acute intracranial hemorrhage  No CT signs of acute infarction    Periventricular and deep subcortical white matter hypodensities representing chronic microangiopathic disease      VENTRICLES AND EXTRA-AXIAL SPACES:  Normal for patient's age      VISUALIZED ORBITS AND PARANASAL SINUSES:  Unremarkable      CALVARIUM AND EXTRACRANIAL SOFT TISSUES: Normal         CERVICAL VASCULATURE     AORTIC ARCH AND GREAT VESSELS:  Mild athersclerotic disease of the arch and proximal great vessels and visualized subclavian vessels  Moderate atherosclerotic narrowing of left subclavian origin      RIGHT VERTEBRAL ARTERY CERVICAL SEGMENT:  Normal origin  The vessel is normal in caliber throughout the neck      LEFT VERTEBRAL ARTERY CERVICAL SEGMENT:  Normal origin  The vessel is normal in caliber throughout the neck      RIGHT EXTRACRANIAL CAROTID SEGMENT:  Normal caliber common carotid artery  Atherosclerotic calcification at the distal common carotid artery and carotid bifurcation without significant stenosis      LEFT EXTRACRANIAL CAROTID SEGMENT:  Atherosclerotic calcification involving distal right common carotid artery and bifurcation  Focal stenosis at the proximal cervical internal carotid artery measuring 2 1 mm at the point of maximal stenosis comparing   to the more distal cervical internal carotid artery which measures 5 mm [place] approximately 60% stenosis        NASCET criteria was used to determine the degree of internal carotid artery diameter stenosis        INTRACRANIAL VASCULATURE      INTERNAL CAROTID ARTERIES:  Mild atherosclerotic calcification of the distal cavernous and supraclinoid internal carotid arteries  Ophthalmic artery origins and ICA terminus      ANTERIOR CIRCULATION:  Symmetric A1 segments and anterior cerebral arteries with normal enhancement  Normal anterior communicating artery      MIDDLE CEREBRAL ARTERY CIRCULATION:  M1 segment and middle cerebral artery branches demonstrate normal enhancement bilaterally      DISTAL VERTEBRAL ARTERIES:  Patent bilateral distal vertebral arteries  Unchanged smooth narrowing of distal right vertebral artery after PICA origin  Posterior inferior cerebellar artery origins are normal  Normal vertebral basilar junction      BASILAR ARTERY:  Basilar artery is normal in caliber    Normal superior cerebellar arteries      POSTERIOR CEREBRAL ARTERIES: Both posterior cerebral arteries arises from the basilar tip  Both arteries demonstrate normal enhancement  Normal posterior communicating arteries      DURAL VENOUS SINUSES:  Normal         NON VASCULAR ANATOMY     BONY STRUCTURES:  Redemonstration of type I odontoid fracture without significant interval healing  Also again noted C5 right lamina fracture as well as a superior process of C7 with extension to the right lamina      Stable mild anterolisthesis of C5 on C6  Significant disc height loss at C6-7  Multilevel degenerative changes of cervical spine with disc height loss, endplate sclerosis and facet arthropathies      SOFT TISSUES OF THE NECK:  Unremarkable      THORACIC INLET:  Left upper lobe 5 mm nodule (series 4 image 266), stable from 10/13/2015        IMPRESSION:     1  Patent major cervical and intracranial arteries without high-grade stenosis  No intracranial aneurysm      2  Atherosclerotic disease involving bilateral carotid bifurcations with approximately 60% stenosis of proximal left cervical internal carotid artery      3   Redemonstration of type I odontoid fracture without significant interval healing  No significant displacement      4   Redemonstration of nondisplaced fracture of C5 right lamina and fracture of superior articular process of C7 with extension to the right lamina      5  A 5 mm left upper lobe nodule is stable from 10/13/2015    Based on current Fleischner Society 2017 Guidelines on incidental pulmonary nodule, no further follow-up is needed for this nodule given stability for more than 2 years         Workstation performed: [id number] "    ______________________________________________________________________________________________    8/22/18 [facility]) C-spine xray -- per report: " CERVICAL SPINE     INDICATION:   S12 100S: Unspecified displaced fracture of second cervical vertebra, sequela  S12 401S: Unspecified nondisplaced fracture of fifth cervical vertebra, sequela  S12 601S: Unspecified nondisplaced fracture of seventh cervical vertebra, sequela      COMPARISON:  Cervical spine radiographs 8/8/2018  Head and neck CTA 8/22/2018     VIEWS:  XR SPINE CERVICAL 2 OR 3 VW INJURY         FINDINGS:     Type I odontoid fracture is better evaluated on same-day neck CT  Overall appearance and alignment is unchanged  Known fractures of C5 and C7 are not well appreciated on plain radiographs      Cervical lordosis is preserved    Mild anterolisthesis C4 on 5 and C5 on 6 is unchanged      Mild disc height loss at C6-7      The prevertebral soft tissues are within normal limits        The lung apices are clear      IMPRESSION:  Known cervical spine fractures are not well evaluated on plain radiographs, but are better evaluated on same-day neck CT         Workstation performed: [id number] "

## 2018-09-05 NOTE — PATIENT INSTRUCTIONS
Wear [place] c-collar to shower  Otherwise wear [place] C-collar at all times  Refrain from strenuous activity  Limit lifting, pulling, pushing to no more then 10 lbs  No driving in setting of having to wear C-collar  Recommend you take fall precautions and refrain from activity that increases chance of fall or injury to neck  Contact our office or present to Emergency Room if experience worsening or new neck pain, sensory or motor change, bladder or bowel dysfunction, or other neurological change  Follow up with your Primary Care Provider for eval of lung nodule and carotid stenosis

## 2018-09-05 NOTE — PROGRESS NOTES
Assessment/Plan:       Diagnoses and all orders for this visit:    Closed odontoid fracture with type I morphology, sequela  -     XR spine cervical complete 4 or 5 vw non injury; Future    Closed nondisplaced fracture of fifth cervical vertebra, unspecified fracture morphology, sequela  -     XR spine cervical complete 4 or 5 vw non injury; Future    Closed nondisplaced fracture of seventh cervical vertebra, unspecified fracture morphology, sequela  -     XR spine cervical complete 4 or 5 vw non injury; Future    Pulmonary nodule, left  -     Ambulatory referral to [facility]; Future    Asymptomatic carotid artery stenosis, bilateral  -     Ambulatory referral to [facility]; Future  -     aspirin (ECOTRIN LOW STRENGTH) 81 mg EC tablet; Take 1 tablet (81 mg total) by mouth daily        Discussion / Summary: This is a 81year old female who presents for follow up  She has been maintained in c-collar for approximately 6 weeks for cervical spine fractures (type 1 C2 fracture; right C5 laminal fracture; right C7 pedicle fracture with extension into the lamina/transverse foramen)  She was also noted during hospitalization to possibly have a mild grade 1 right vertebral artery injury and was placed on Aspirin 325mg daily  She had requested C-spine xray (8/22/18) and CTA head/neck (8/22/18) completed which were reviewed in detail by Dr Jayjay Roy  There is stable cervical alignment  To Dr Alexander King view there is presence of some healing of C2 fracture  CTA Head/Neck reports right vertebral artery cervical segment normal origin with vessel normal in caliber throughout the neck  There is atherosclerotic disease involving bilateral carotid bifurcations with approximately 60% stenosis of the proximal left cervical internal carotid artery  There is reports of a stable left upper lobe lung nodule  Findings were discussed with patient and her significant other      She is recommended to continue wearing C-collar for management of c-spine fractures  She is to wear [place] c-collar to shower  Otherwise wear [place] C-collar at all times  She is to have cervical xray (with flex/ext views) completed in 4 weeks and follow up in office after completion  To consider wean out of c-collar after next visit pending xray result and clinical status  Recommend she refrain from strenuous activity  Recommend she limit lifting, pulling, pushing to no more then 10 lbs  No driving in setting of having to wear C-collar  Recommend she take fall precautions and refrain from activity that increases chance of fall or injury to neck  At this juncture she may decrease Aspirin down to Aspirin 81mg daily  She is recommended to follow up with her Primary Care Provider for evaluation / management of asymptomatic carotid stenosis  Defer to PCP to determine if PCP desires Vascular surgery eval of vascular neurology eval     Also recommended she follow up with PCP for eval / management of lung nodule  Patient advised to contact our office or present to Emergency Room if experience worsening or new neck pain, sensory or motor change, bladder or bowel dysfunction, or other neurological change  Follow up with your Primary Care Provider for eval of lung nodule and carotid stenosis  _________________________________________________________________________________________________    Subjective:      Patient ID: Partlow Breath is a 81 y o  female who presents for follow up for cervical spine fractures (type 1 C2 fracture; right C5 laminal fracture; right C7 pedicle fracture with extension into the lamina/transverse foramen)  She was also noted during hospitalization to possibly have a mild grade 1 right vertebral artery injury and was placed on Aspirin 325mg daily  She had C-spine xray and CTA head/neck completed  She is accompanied with her significant other -- Solo  HPI  She reports wearing cervical collar at all times     Reports she does not like lying on her back with cervical collar  She denies neck pain  She denies pain in her upper extremities  She denies numbness or tingling of her upper extremities, torso, lower extremities  She denies weakness of her upper extremities or lower extremities  She denies headache, nausea, vomiting, vision changes, seizure, shortness of breath, chest pain, or bladder or bowel dysfunction  She denies saddle paresthesias  She ambulates independent  The following portions of the patient's history were reviewed and updated as appropriate: allergies, current medications, past family history, past medical history, past social history and past surgical history  Review of Systems   Constitutional: Negative for fever  Respiratory: Negative for shortness of breath  Cardiovascular: Negative for chest pain  Gastrointestinal: Negative for nausea and vomiting  Genitourinary: Negative for difficulty urinating  Musculoskeletal: Negative for neck pain  Neurological: Negative for weakness, numbness and headaches  Psychiatric/Behavioral: Negative for agitation  Objective:      /75 (BP Location: Left arm, Patient Position: Sitting, Cuff Size: Standard)   Pulse 89   Temp 99 °F (37 2 °C) (Tympanic)   Ht 5' 3 5" (1 613 m)   Wt 72 kg (158 lb 12 8 oz)   BMI 27 69 kg/m²          Physical Exam   Constitutional: She is oriented to person, place, and time  She appears well-developed and well-nourished  No distress  Wearing [place] cervical collar   Eyes: Conjunctivae are normal    Pulmonary/Chest: Effort normal    Musculoskeletal:        Cervical back: She exhibits no tenderness and no deformity  Neurological: She is alert and oriented to person, place, and time  Gait normal    Reflex Scores:       Tricep reflexes are 2+ on the right side and 2+ on the left side  Bicep reflexes are 2+ on the right side and 2+ on the left side         Brachioradialis reflexes are 2+ on the right side and 2+ on the left side  Patellar reflexes are Right patellar reflex grade: +1-2  and 2+ on the left side  Achilles reflexes are 1+ on the right side and 1+ on the left side  Skin:     No breakdown of skin underlying cervical collar  Psychiatric: She has a normal mood and affect  Her speech is normal        Neurologic Exam     Mental Status   Oriented to person, place, and time  Speech: speech is normal   Level of consciousness: alert    Cranial Nerves     Cranial nerves 2-12 grossly intact except did not evaluate SCM in setting of wearing c-collar  Motor Exam     Motor:  Shoulder abduction 5/5 bilaterally  Elbow flexion/extension 5/5 bilaterally  Wrist flexion/extension 5/5 bilaterally  Finger  / abduction 5/5 bilaterally  Hip flexion/abduction/adduction 5/5 bilaterally  Knee flexion/extension 5/5 bilaterally  Ankle DF/PF 5/5 bilaterally  Sensory Exam   Vibration normal    Proprioception normal    Pinprick normal      Sensation to pinprick intact b/l upper extremities, torso, and b/l lower extremities  JPS and Vibratory sense intact b/l thumbs and great toes  Gait, Coordination, and Reflexes     Gait  Gait: normal (Independent )    Reflexes   Right brachioradialis: 2+  Left brachioradialis: 2+  Right biceps: 2+  Left biceps: 2+  Right triceps: 2+  Left triceps: 2+  Right patellar reflex grade: +1-2    Left patellar: 2+  Right achilles: 1+  Left achilles: 1+  Right plantar: normal  Left plantar: normal  Right Ulrich: absent  Left Ulrich: absent  Right ankle clonus: absent  Left ankle clonus: absent         Imaging study:    8/22/18 [facility] CTA head/Neck -- per report: " CTA NECK AND BRAIN WITH AND WITHOUT CONTRAST     INDICATION: S12 100S: Unspecified displaced fracture of second cervical vertebra, sequela  S12 401S: Unspecified nondisplaced fracture of fifth cervical vertebra, sequela  S12 601S: Unspecified nondisplaced fracture of seventh cervical vertebra, sequela  I65 01: Occlusion and stenosis of right vertebral artery      COMPARISON:   CTA head and neck 7/24/2018     TECHNIQUE:  Routine CT imaging of the Brain without contrast   Post contrast imaging was performed after administration of iodinated contrast through the neck and brain  Post contrast axial 0 625 mm images timed to opacify the arterial system        3D rendering was performed on an independent workstation  MIP reconstructions performed  Coronal reconstructions were performed of the noncontrast portion of the brain        Radiation dose length product (DLP) for this visit:  1410 mGy-cm   This examination, like all CT scans performed in the [facility], was performed utilizing techniques to minimize radiation dose exposure, including the use of iterative   reconstruction and automated exposure control         IV Contrast:  85 mL of iohexol (OMNIPAQUE)      IMAGE QUALITY:   Diagnostic     FINDINGS:  NONCONTRAST BRAIN     PARENCHYMA:  No intracranial mass, mass effect or midline shift  No acute intracranial hemorrhage  No CT signs of acute infarction  Periventricular and deep subcortical white matter hypodensities representing chronic microangiopathic disease      VENTRICLES AND EXTRA-AXIAL SPACES:  Normal for patient's age      VISUALIZED ORBITS AND PARANASAL SINUSES:  Unremarkable      CALVARIUM AND EXTRACRANIAL SOFT TISSUES:   Normal         CERVICAL VASCULATURE     AORTIC ARCH AND GREAT VESSELS:  Mild athersclerotic disease of the arch and proximal great vessels and visualized subclavian vessels  Moderate atherosclerotic narrowing of left subclavian origin      RIGHT VERTEBRAL ARTERY CERVICAL SEGMENT:  Normal origin  The vessel is normal in caliber throughout the neck      LEFT VERTEBRAL ARTERY CERVICAL SEGMENT:  Normal origin  The vessel is normal in caliber throughout the neck      RIGHT EXTRACRANIAL CAROTID SEGMENT:  Normal caliber common carotid artery    Atherosclerotic calcification at the distal common carotid artery and carotid bifurcation without significant stenosis      LEFT EXTRACRANIAL CAROTID SEGMENT:  Atherosclerotic calcification involving distal right common carotid artery and bifurcation  Focal stenosis at the proximal cervical internal carotid artery measuring 2 1 mm at the point of maximal stenosis comparing   to the more distal cervical internal carotid artery which measures 5 mm Maxine Linh approximately 60% stenosis        NASCET criteria was used to determine the degree of internal carotid artery diameter stenosis        INTRACRANIAL VASCULATURE      INTERNAL CAROTID ARTERIES:  Mild atherosclerotic calcification of the distal cavernous and supraclinoid internal carotid arteries  Ophthalmic artery origins and ICA terminus      ANTERIOR CIRCULATION:  Symmetric A1 segments and anterior cerebral arteries with normal enhancement  Normal anterior communicating artery      MIDDLE CEREBRAL ARTERY CIRCULATION:  M1 segment and middle cerebral artery branches demonstrate normal enhancement bilaterally      DISTAL VERTEBRAL ARTERIES:  Patent bilateral distal vertebral arteries  Unchanged smooth narrowing of distal right vertebral artery after PICA origin  Posterior inferior cerebellar artery origins are normal  Normal vertebral basilar junction      BASILAR ARTERY:  Basilar artery is normal in caliber  Normal superior cerebellar arteries      POSTERIOR CEREBRAL ARTERIES: Both posterior cerebral arteries arises from the basilar tip  Both arteries demonstrate normal enhancement  Normal posterior communicating arteries      DURAL VENOUS SINUSES:  Normal         NON VASCULAR ANATOMY     BONY STRUCTURES:  Redemonstration of type I odontoid fracture without significant interval healing  Also again noted C5 right lamina fracture as well as a superior process of C7 with extension to the right lamina      Stable mild anterolisthesis of C5 on C6    Significant disc height loss at C6-7  Multilevel degenerative changes of cervical spine with disc height loss, endplate sclerosis and facet arthropathies      SOFT TISSUES OF THE NECK:  Unremarkable      THORACIC INLET:  Left upper lobe 5 mm nodule (series 4 image 266), stable from 10/13/2015        IMPRESSION:     1  Patent major cervical and intracranial arteries without high-grade stenosis  No intracranial aneurysm      2  Atherosclerotic disease involving bilateral carotid bifurcations with approximately 60% stenosis of proximal left cervical internal carotid artery      3   Redemonstration of type I odontoid fracture without significant interval healing  No significant displacement      4   Redemonstration of nondisplaced fracture of C5 right lamina and fracture of superior articular process of C7 with extension to the right lamina      5  A 5 mm left upper lobe nodule is stable from 10/13/2015  Based on current Fleischner Society 2017 Guidelines on incidental pulmonary nodule, no further follow-up is needed for this nodule given stability for more than 2 years         Workstation performed: [id number] "    ______________________________________________________________________________________________    8/22/18 [facility] C-spine xray -- per report: " CERVICAL SPINE     INDICATION:   S12 100S: Unspecified displaced fracture of second cervical vertebra, sequela  S12 401S: Unspecified nondisplaced fracture of fifth cervical vertebra, sequela  S12 601S: Unspecified nondisplaced fracture of seventh cervical vertebra, sequela      COMPARISON:  Cervical spine radiographs 8/8/2018  Head and neck CTA 8/22/2018     VIEWS:  XR SPINE CERVICAL 2 OR 3 VW INJURY         FINDINGS:     Type I odontoid fracture is better evaluated on same-day neck CT  Overall appearance and alignment is unchanged  Known fractures of C5 and C7 are not well appreciated on plain radiographs      Cervical lordosis is preserved    Mild anterolisthesis C4 on 5 and C5 on 6 is unchanged      Mild disc height loss at C6-7      The prevertebral soft tissues are within normal limits        The lung apices are clear      IMPRESSION:  Known cervical spine fractures are not well evaluated on plain radiographs, but are better evaluated on same-day neck CT         Workstation performed: [id number] "

## 2018-09-24 ENCOUNTER — TELEPHONE (OUTPATIENT)
Dept: NEUROSURGERY | Facility: CLINIC | Age: 73
End: 2018-09-24

## 2018-09-24 NOTE — TELEPHONE ENCOUNTER
Pt called very confused about upcoming needed study  Explained no appts for xray its a walk in  Explained that she will have her collar removed for study and then she will replace it once it is completed  She stated an understanding

## 2018-09-28 ENCOUNTER — HOSPITAL ENCOUNTER (OUTPATIENT)
Dept: RADIOLOGY | Facility: HOSPITAL | Age: 73
Discharge: HOME | End: 2018-09-28
Payer: MEDICARE

## 2018-09-28 DIAGNOSIS — S12.401S CLOSED NONDISPLACED FRACTURE OF FIFTH CERVICAL VERTEBRA, UNSPECIFIED FRACTURE MORPHOLOGY, SEQUELA: ICD-10-CM

## 2018-09-28 DIAGNOSIS — S12.100S: ICD-10-CM

## 2018-09-28 DIAGNOSIS — S12.601S CLOSED NONDISPLACED FRACTURE OF SEVENTH CERVICAL VERTEBRA, UNSPECIFIED FRACTURE MORPHOLOGY, SEQUELA: ICD-10-CM

## 2018-09-28 PROCEDURE — 72050 X-RAY EXAM NECK SPINE 4/5VWS: CPT

## 2018-10-03 ENCOUNTER — OFFICE VISIT (OUTPATIENT)
Dept: NEUROSURGERY | Facility: CLINIC | Age: 73
End: 2018-10-03
Payer: MEDICARE

## 2018-10-03 ENCOUNTER — HOSPITAL ENCOUNTER (OUTPATIENT)
Dept: RADIOLOGY | Facility: HOSPITAL | Age: 73
Discharge: HOME | End: 2018-10-03
Payer: MEDICARE

## 2018-10-03 ENCOUNTER — TRANSCRIBE ORDERS (OUTPATIENT)
Dept: RADIOLOGY | Facility: HOSPITAL | Age: 73
End: 2018-10-03

## 2018-10-03 VITALS
SYSTOLIC BLOOD PRESSURE: 110 MMHG | DIASTOLIC BLOOD PRESSURE: 70 MMHG | HEART RATE: 70 BPM | WEIGHT: 154.8 LBS | BODY MASS INDEX: 26.43 KG/M2 | HEIGHT: 64 IN | TEMPERATURE: 98 F

## 2018-10-03 DIAGNOSIS — S12.401S CLOSED NONDISPLACED FRACTURE OF FIFTH CERVICAL VERTEBRA, UNSPECIFIED FRACTURE MORPHOLOGY, SEQUELA: ICD-10-CM

## 2018-10-03 DIAGNOSIS — S12.601S CLOSED NONDISPLACED FRACTURE OF SEVENTH CERVICAL VERTEBRA, UNSPECIFIED FRACTURE MORPHOLOGY, SEQUELA: ICD-10-CM

## 2018-10-03 DIAGNOSIS — S12.100S: ICD-10-CM

## 2018-10-03 DIAGNOSIS — S12.100S: Primary | ICD-10-CM

## 2018-10-03 PROCEDURE — 72050 X-RAY EXAM NECK SPINE 4/5VWS: CPT

## 2018-10-03 PROCEDURE — 99213 OFFICE O/P EST LOW 20 MIN: CPT | Performed by: PHYSICIAN ASSISTANT

## 2018-10-03 NOTE — PROGRESS NOTES
Assessment/Plan:       Diagnoses and all orders for this visit:    Closed odontoid fracture with type I morphology, sequela  -     XR spine cervical complete 4 or 5 vw non injury; Future    Closed nondisplaced fracture of fifth cervical vertebra, unspecified fracture morphology, sequela  -     XR spine cervical complete 4 or 5 vw non injury; Future    Closed nondisplaced fracture of seventh cervical vertebra, unspecified fracture morphology, sequela  -     XR spine cervical complete 4 or 5 vw non injury; Future          Discussion / Summary: This is a 81 y o  female  who presents for follow up for cervical spine fractures  She has been maintained in c-collar for approximately 10 weeks for cervical spine fractures (type 1 C2 fracture; right C5 laminal fracture;  Right C7 pedicle fracture with extension into the lamina/ transverse foramen)  Of note -- during hospitalization she was noted to possibly have a mild grade 1 right vertebral artery injury and was placed on aspirin 325 mg daily initially  She had a follow up CTA head/neck (8/22/18) that reported right vertebral artery cervical segment normal origin with vessels normal in caliber throughout the neck  There was note of atherosclerotic disease involving bilateral carotid bifurcations with approximately 60% stenosis of the proximal left cervical internal carotid artery  She was cleared to decrease Aspirin dose down to Aspirin 81mg daily as of 9/5/18 office visit and was recommended to follow up with her with her PCP for carotid stenosis  She has been maintained in c-collar for approximately 10 weeks  Patient has no complaints at this juncture although indicates she would like to get out of the cervical collar  C-spine xray ( 9/28/18 )  was reviewed in detail by Dr Carole Soraino and demonstrates satisfactory cervical alignment  There is report of stable mild cortical irregularity of the odontoid    Previously noted C5 and C77 fractures are not visualized radiographically  Of note -- cervical flexion /extension xray views were not completed on xray despite request for such views to be completed  She is recommended to undergo cervical flexion/extension xray imaging  She was informed she will need to take c-collar off for xray and she will need to put c-collar back on after xray  She is to continue wearing c-collar at all times otherwise until further instructed by our practice  The plan would be to wean her out of c-collar if there is no instability on cervical flex/ext xray  Further follow up with our office would be on prn basis  She is advised to call our office within a week of having her c-spine xray if she does not hear from our office sooner to review xray result to determine if able to wean from c-collar  No driving in setting of having to wear c-collar  Recommend she refrain from strenuous activity  Recommend patient take fall precautions  Recommend patient refrain from activity that increases chance of falling or injury to neck  Patient advised to contact our office or present to hospital Emergency Room if experience worsening or new neck pain, sensory or motor change, bladder or bowel dysfunction, or other neurological change  Patient expressed understanding and agreement     _________________________________________________________________________________    Subjective:      Patient ID: Nasir Ruiz is a 81 y o  female who presents for follow up for history of cervical spine fractures  She has been maintained in c-collar for approximately 10 weeks for cervical spine fractures (type 1 C2 fracture; right C5 laminal fracture;  Right C7 pedicle fracture with extension into the lamina/ transverse foramen)  Of note -- during hospitalization she was noted to possibly have a mild grade 1 right vertebral artery injury and was placed on aspirin 325 mg daily initially   She had a follow up CTA head/neck (8/22/18) that reported right vertebral artery cervical segment normal origin with vessels normal in caliber throughout the neck  There was note of atherosclerotic disease involving bilateral carotid bifurcations with approximately 60% stenosis of the proximal left cervical internal carotid artery  She was cleared to decrease Aspirin dose down to Aspirin 81mg daily as of 9/5/18 office visit and was recommended to follow up with her with her PCP for carotid stenosis  HPI   Patient reports wearing c-collar at all times  Although it is noted she comes to office with c-collar loose and chin rest low -- so while in office c-collar was adjusted to level 3 and snugged to fit appropriately  Patient denies neck pain  She denies pain in her upper extremities  She denies numbness or tingling of her upper extremities, torso, lower extremities  She denies weakness of her upper extremities or lower extremities  She ambulates independent  She denies bladder or bowel dysfunction  She denies saddle paresthesias  She denies headaches, nausea, vomiting, vision changes, seizure, shortness of breath, or chest pain  The following portions of the patient's history were reviewed and updated as appropriate: allergies, current medications, past family history, past medical history, past social history and past surgical history  Review of Systems   Constitutional: Negative for fever  Eyes: Negative for visual disturbance  Respiratory: Negative for shortness of breath  Gastrointestinal:        Denies bowel dysfunction   Genitourinary: Negative for difficulty urinating  Musculoskeletal: Negative for neck pain  Neurological: Negative for seizures, weakness, numbness and headaches  Psychiatric/Behavioral: Negative for agitation           Objective:      /70 (BP Location: Left arm, Patient Position: Sitting, Cuff Size: Standard)   Pulse 70   Temp 98 °F (36 7 °C) (Temporal)   Ht 5' 3 5" (1 613 m)   Wt 70 2 kg (154 lb 12 8 oz)   BMI 26 99 kg/m²          Physical Exam   Constitutional: She appears well-developed and well-nourished  No distress  Eyes: Pupils are equal, round, and reactive to light  Conjunctivae and EOM are normal    Pulmonary/Chest: Effort normal    Musculoskeletal:        Cervical back: She exhibits normal range of motion, no tenderness and no deformity  Denies pain with cervical ROM (flexion, extension, lateral rotation to left/right,and lateral flexion to left/right)  Neurological: She is alert  Gait normal    Reflex Scores:       Tricep reflexes are 2+ on the right side and 2+ on the left side  Bicep reflexes are 2+ on the right side and 2+ on the left side  Brachioradialis reflexes are 2+ on the right side and 2+ on the left side  Patellar reflexes are 1+ on the right side and 2+ on the left side  Achilles reflexes are 1+ on the right side and 1+ on the left side  Skin: Skin is warm and dry  Psychiatric: She has a normal mood and affect  Her speech is normal        Neurologic Exam     Mental Status   Speech: speech is normal   Level of consciousness: alert    Cranial Nerves     CN III, IV, VI   Pupils are equal, round, and reactive to light  Extraocular motions are normal      CN V   Facial sensation intact  CN VII   Facial expression full, symmetric  CN VIII   Hearing: intact    CN IX, X   Palate: symmetric    CN XI   Right trapezius strength: normal  Left trapezius strength: normal    CN XII   Tongue: not atrophic  Fasciculations: absent  Tongue deviation: none    Motor Exam     Motor:  Shoulder abduction 5/5 bilaterally  Elbow flexion/extension 5/5 bilaterally  Wrist flexion/extension 5/5 bilaterally  Finger  / abduction 5/5 bilaterally  Hip flexion/abduction/adduction 5/5 bilaterally  Knee flexion/extension 5/5 bilaterally  Ankle DF/PF 5/5 bilaterally  Great toe DF 5/5 bilaterally          Sensory Exam     Sensation to pinprick intact b/l upper extremities, torso, and b/l lower extremities  JPS and Vibratory sense intact b/l thumbs and great toes  Gait, Coordination, and Reflexes     Gait  Gait: normal (Independent)    Reflexes   Right brachioradialis: 2+  Left brachioradialis: 2+  Right biceps: 2+  Left biceps: 2+  Right triceps: 2+  Left triceps: 2+  Right patellar: 1+  Left patellar: 2+  Right achilles: 1+  Left achilles: 1+  Right plantar: normal  Left plantar: normal  Right Ulrich: absent  Left Ulrich: absent  Right ankle clonus: absent  Left ankle clonus: absent      Imaging study:    9/28/18 [facility] C-spine xray -- per report:  " CERVICAL SPINE     INDICATION:   S12 100S: Unspecified displaced fracture of second cervical vertebra, sequela  S12 401S: Unspecified nondisplaced fracture of fifth cervical vertebra, sequela  S12 601S: Unspecified nondisplaced fracture of seventh cervical vertebra, sequela      COMPARISON:  X-ray cervical spine dated September 28, 2018, CTA neck and head dated 8/22/2018      VIEWS:  XR SPINE CERVICAL COMPLETE 4 OR 5 VW NON INJURY         FINDINGS:     Stable mild cortical irregularity of the odontoid, likely on the basis of patient's known type I odontoid fracture which was better evaluated on the prior CTA neck studies  Osseous alignment is unchanged from the prior exam   Known fractures of C5 and   C7 are not visualized radiographically      Cervical lordosis is preserved  Mild anterolisthesis C4 on 5 and C5 on 6 is unchanged      Unchanged degenerative changes at C6-7 with disc space narrowing, endplate sclerosis and prominent anterior osteophyte      The prevertebral soft tissues are within normal limits        The lung apices are clear      IMPRESSION:  Unchanged cortical irregularity of the dens, likely on the basis of patient's known type I odontoid fracture, which was better evaluated on prior CTA studies  Known fractures of C5 and C7 are not visualized radiographically    Overall osseous alignment is unchanged               Workstation performed: [id number] "

## 2018-10-03 NOTE — LETTER
October 4, 2018     Adam Rodriguez, [address] [address]    Patient: Karthik Wilkerson   YOB: 1945   Date of Visit: 10/3/2018       Dear Dr Parker Garcia:    Thank you for referring Sonja Deshpande to me for evaluation  Below are my notes for this consultation  If you have questions, please do not hesitate to call me  I look forward to following your patient along with you  Sincerely,        Abrahan Barker PA-C        CC: No Recipients  Abrahan Barker PA-C  10/4/2018  6:28 AM  Sign at close encounter  Assessment/Plan:       Diagnoses and all orders for this visit:    Closed odontoid fracture with type I morphology, sequela  -     XR spine cervical complete 4 or 5 vw non injury; Future    Closed nondisplaced fracture of fifth cervical vertebra, unspecified fracture morphology, sequela  -     XR spine cervical complete 4 or 5 vw non injury; Future    Closed nondisplaced fracture of seventh cervical vertebra, unspecified fracture morphology, sequela  -     XR spine cervical complete 4 or 5 vw non injury; Future          Discussion / Summary: This is a 81 y o  female  who presents for follow up for cervical spine fractures  She has been maintained in c-collar for approximately 10 weeks for cervical spine fractures (type 1 C2 fracture; right C5 laminal fracture;  Right C7 pedicle fracture with extension into the lamina/ transverse foramen)  Of note -- during hospitalization she was noted to possibly have a mild grade 1 right vertebral artery injury and was placed on aspirin 325 mg daily initially  She had a follow up CTA head/neck (8/22/18) that reported right vertebral artery cervical segment normal origin with vessels normal in caliber throughout the neck  There was note of atherosclerotic disease involving bilateral carotid bifurcations with approximately 60% stenosis of the proximal left cervical internal carotid artery    She was cleared to decrease Aspirin dose down to Aspirin 81mg daily as of 9/5/18 office visit and was recommended to follow up with her with her PCP for carotid stenosis  She has been maintained in c-collar for approximately 10 weeks  Patient has no complaints at this juncture although indicates she would like to get out of the cervical collar  C-spine xray ( 9/28/18 )  was reviewed in detail by Dr Kirit Cheatham and demonstrates satisfactory cervical alignment  There is report of stable mild cortical irregularity of the odontoid  Previously noted C5 and C77 fractures are not visualized radiographically  Of note -- cervical flexion /extension xray views were not completed on xray despite request for such views to be completed  She is recommended to undergo cervical flexion/extension xray imaging  She was informed she will need to take c-collar off for xray and she will need to put c-collar back on after xray  She is to continue wearing c-collar at all times otherwise until further instructed by our practice  The plan would be to wean her out of c-collar of there is no instability on cervical flex/ext xray  She is advised to call our office within a week of having her c-spine xray if she does not hear from our office sooner to review xray result to determine if able to wean from c-collar  No driving in setting of having to wear c-collar  Recommend she refrain from strenuous activity  Recommend patient take fall precautions  Recommend patient refrain from activity that increases chance of falling or injury to neck  Patient advised to contact our office or present to hospital Emergency Room if experience worsening or new neck pain, sensory or motor change, bladder or bowel dysfunction, or other neurological change      Patient expressed understanding and agreement     _________________________________________________________________________________    Subjective:      Patient ID: Karthik Wilkerson is a 81 y o  female who presents for follow up for history of cervical spine fractures  She has been maintained in c-collar for approximately 10 weeks for cervical spine fractures (type 1 C2 fracture; right C5 laminal fracture;  Right C7 pedicle fracture with extension into the lamina/ transverse foramen)  Of note -- during hospitalization she was noted to possibly have a mild grade 1 right vertebral artery injury and was placed on aspirin 325 mg daily initially  She had a follow up CTA head/neck (8/22/18) that reported right vertebral artery cervical segment normal origin with vessels normal in caliber throughout the neck  There was note of atherosclerotic disease involving bilateral carotid bifurcations with approximately 60% stenosis of the proximal left cervical internal carotid artery  She was cleared to decrease Aspirin dose down to Aspirin 81mg daily as of 9/5/18 office visit and was recommended to follow up with her with her PCP for carotid stenosis  HPI   Patient reports wearing c-collar at all times  Although it is noted she comes to office with c-collar loose and chin rest low -- so while in office c-collar was adjusted to level 3 and snugged to fit appropriately  Patient denies neck pain  She denies pain in her upper extremities  She denies numbness or tingling of her upper extremities, torso, lower extremities  She denies weakness of her upper extremities or lower extremities  She ambulates independent  She denies bladder or bowel dysfunction  She denies saddle paresthesias  She denies headaches, nausea, vomiting, vision changes, seizure, shortness of breath, or chest pain  The following portions of the patient's history were reviewed and updated as appropriate: allergies, current medications, past family history, past medical history, past social history and past surgical history  Review of Systems   Constitutional: Negative for fever  Eyes: Negative for visual disturbance     Respiratory: Negative for shortness of breath  Gastrointestinal:        Denies bowel dysfunction   Genitourinary: Negative for difficulty urinating  Musculoskeletal: Negative for neck pain  Neurological: Negative for seizures, weakness, numbness and headaches  Psychiatric/Behavioral: Negative for agitation  Objective:      /70 (BP Location: Left arm, Patient Position: Sitting, Cuff Size: Standard)   Pulse 70   Temp 98 °F (36 7 °C) (Temporal)   Ht 5' 3 5" (1 613 m)   Wt 70 2 kg (154 lb 12 8 oz)   BMI 26 99 kg/m²           Physical Exam   Constitutional: She appears well-developed and well-nourished  No distress  Eyes: Pupils are equal, round, and reactive to light  Conjunctivae and EOM are normal    Pulmonary/Chest: Effort normal    Musculoskeletal:        Cervical back: She exhibits normal range of motion, no tenderness and no deformity  Denies pain with cervical ROM (flexion, extension, lateral rotation to left/right,and lateral flexion to left/right)  Neurological: She is alert  Gait normal    Reflex Scores:       Tricep reflexes are 2+ on the right side and 2+ on the left side  Bicep reflexes are 2+ on the right side and 2+ on the left side  Brachioradialis reflexes are 2+ on the right side and 2+ on the left side  Patellar reflexes are 1+ on the right side and 2+ on the left side  Achilles reflexes are 1+ on the right side and 1+ on the left side  Skin: Skin is warm and dry  Psychiatric: She has a normal mood and affect  Her speech is normal        Neurologic Exam     Mental Status   Speech: speech is normal   Level of consciousness: alert    Cranial Nerves     CN III, IV, VI   Pupils are equal, round, and reactive to light  Extraocular motions are normal      CN V   Facial sensation intact  CN VII   Facial expression full, symmetric       CN VIII   Hearing: intact    CN IX, X   Palate: symmetric    CN XI   Right trapezius strength: normal  Left trapezius strength: normal    CN XII   Tongue: not atrophic  Fasciculations: absent  Tongue deviation: none    Motor Exam     Motor:  Shoulder abduction 5/5 bilaterally  Elbow flexion/extension 5/5 bilaterally  Wrist flexion/extension 5/5 bilaterally  Finger  / abduction 5/5 bilaterally  Hip flexion/abduction/adduction 5/5 bilaterally  Knee flexion/extension 5/5 bilaterally  Ankle DF/PF 5/5 bilaterally  Great toe DF 5/5 bilaterally  Sensory Exam     Sensation to pinprick intact b/l upper extremities, torso, and b/l lower extremities  JPS and Vibratory sense intact b/l thumbs and great toes  Gait, Coordination, and Reflexes     Gait  Gait: normal (Independent)    Reflexes   Right brachioradialis: 2+  Left brachioradialis: 2+  Right biceps: 2+  Left biceps: 2+  Right triceps: 2+  Left triceps: 2+  Right patellar: 1+  Left patellar: 2+  Right achilles: 1+  Left achilles: 1+  Right plantar: normal  Left plantar: normal  Right Ulrich: absent  Left Ulrich: absent  Right ankle clonus: absent  Left ankle clonus: absent      Imaging study:    9/28/18 [facility] C-spine xray -- per report:  " CERVICAL SPINE     INDICATION:   S12 100S: Unspecified displaced fracture of second cervical vertebra, sequela  S12 401S: Unspecified nondisplaced fracture of fifth cervical vertebra, sequela  S12 601S: Unspecified nondisplaced fracture of seventh cervical vertebra, sequela      COMPARISON:  X-ray cervical spine dated September 28, 2018, CTA neck and head dated 8/22/2018      VIEWS:  XR SPINE CERVICAL COMPLETE 4 OR 5 VW NON INJURY         FINDINGS:     Stable mild cortical irregularity of the odontoid, likely on the basis of patient's known type I odontoid fracture which was better evaluated on the prior CTA neck studies  Osseous alignment is unchanged from the prior exam   Known fractures of C5 and   C7 are not visualized radiographically      Cervical lordosis is preserved    Mild anterolisthesis C4 on 5 and C5 on 6 is unchanged      Unchanged degenerative changes at C6-7 with disc space narrowing, endplate sclerosis and prominent anterior osteophyte      The prevertebral soft tissues are within normal limits        The lung apices are clear      IMPRESSION:  Unchanged cortical irregularity of the dens, likely on the basis of patient's known type I odontoid fracture, which was better evaluated on prior CTA studies  Known fractures of C5 and C7 are not visualized radiographically    Overall osseous alignment is unchanged               Workstation performed: [id number] "

## 2018-10-03 NOTE — LETTER
October 4, 2018     Judy Marr, [address] [address]    Patient: Nishi Motta   YOB: 1945   Date of Visit: 10/3/2018       Dear Dr Geovanna Werner:    Thank you for referring Brittnee Mott to me for evaluation  Below are my notes for this consultation  If you have questions, please do not hesitate to call me  I look forward to following your patient along with you  Sincerely,        Jany Reddy PA-C        CC: No Recipients  Jany Reddy PA-C  10/4/2018  6:30 AM  Sign at close encounter  Assessment/Plan:       Diagnoses and all orders for this visit:    Closed odontoid fracture with type I morphology, sequela  -     XR spine cervical complete 4 or 5 vw non injury; Future    Closed nondisplaced fracture of fifth cervical vertebra, unspecified fracture morphology, sequela  -     XR spine cervical complete 4 or 5 vw non injury; Future    Closed nondisplaced fracture of seventh cervical vertebra, unspecified fracture morphology, sequela  -     XR spine cervical complete 4 or 5 vw non injury; Future          Discussion / Summary: This is a 81 y o  female  who presents for follow up for cervical spine fractures  She has been maintained in c-collar for approximately 10 weeks for cervical spine fractures (type 1 C2 fracture; right C5 laminal fracture;  Right C7 pedicle fracture with extension into the lamina/ transverse foramen)  Of note -- during hospitalization she was noted to possibly have a mild grade 1 right vertebral artery injury and was placed on aspirin 325 mg daily initially  She had a follow up CTA head/neck (8/22/18) that reported right vertebral artery cervical segment normal origin with vessels normal in caliber throughout the neck  There was note of atherosclerotic disease involving bilateral carotid bifurcations with approximately 60% stenosis of the proximal left cervical internal carotid artery    She was cleared to decrease Aspirin dose down to Aspirin 81mg daily as of 9/5/18 office visit and was recommended to follow up with her with her PCP for carotid stenosis  She has been maintained in c-collar for approximately 10 weeks  Patient has no complaints at this juncture although indicates she would like to get out of the cervical collar  C-spine xray ( 9/28/18 )  was reviewed in detail by Dr Samra Álvarez and demonstrates satisfactory cervical alignment  There is report of stable mild cortical irregularity of the odontoid  Previously noted C5 and C77 fractures are not visualized radiographically  Of note -- cervical flexion /extension xray views were not completed on xray despite request for such views to be completed  She is recommended to undergo cervical flexion/extension xray imaging  She was informed she will need to take c-collar off for xray and she will need to put c-collar back on after xray  She is to continue wearing c-collar at all times otherwise until further instructed by our practice  The plan would be to wean her out of c-collar if there is no instability on cervical flex/ext xray  Further follow up with our office would be on prn basis  She is advised to call our office within a week of having her c-spine xray if she does not hear from our office sooner to review xray result to determine if able to wean from c-collar  No driving in setting of having to wear c-collar  Recommend she refrain from strenuous activity  Recommend patient take fall precautions  Recommend patient refrain from activity that increases chance of falling or injury to neck  Patient advised to contact our office or present to hospital Emergency Room if experience worsening or new neck pain, sensory or motor change, bladder or bowel dysfunction, or other neurological change      Patient expressed understanding and agreement     _________________________________________________________________________________    Subjective:      Patient ID: Jasmin Arguello is a 81 y o  female who presents for follow up for history of cervical spine fractures  She has been maintained in c-collar for approximately 10 weeks for cervical spine fractures (type 1 C2 fracture; right C5 laminal fracture;  Right C7 pedicle fracture with extension into the lamina/ transverse foramen)  Of note -- during hospitalization she was noted to possibly have a mild grade 1 right vertebral artery injury and was placed on aspirin 325 mg daily initially  She had a follow up CTA head/neck (8/22/18) that reported right vertebral artery cervical segment normal origin with vessels normal in caliber throughout the neck  There was note of atherosclerotic disease involving bilateral carotid bifurcations with approximately 60% stenosis of the proximal left cervical internal carotid artery  She was cleared to decrease Aspirin dose down to Aspirin 81mg daily as of 9/5/18 office visit and was recommended to follow up with her with her PCP for carotid stenosis  HPI   Patient reports wearing c-collar at all times  Although it is noted she comes to office with c-collar loose and chin rest low -- so while in office c-collar was adjusted to level 3 and snugged to fit appropriately  Patient denies neck pain  She denies pain in her upper extremities  She denies numbness or tingling of her upper extremities, torso, lower extremities  She denies weakness of her upper extremities or lower extremities  She ambulates independent  She denies bladder or bowel dysfunction  She denies saddle paresthesias  She denies headaches, nausea, vomiting, vision changes, seizure, shortness of breath, or chest pain  The following portions of the patient's history were reviewed and updated as appropriate: allergies, current medications, past family history, past medical history, past social history and past surgical history  Review of Systems   Constitutional: Negative for fever     Eyes: Negative for visual disturbance  Respiratory: Negative for shortness of breath  Gastrointestinal:        Denies bowel dysfunction   Genitourinary: Negative for difficulty urinating  Musculoskeletal: Negative for neck pain  Neurological: Negative for seizures, weakness, numbness and headaches  Psychiatric/Behavioral: Negative for agitation  Objective:      /70 (BP Location: Left arm, Patient Position: Sitting, Cuff Size: Standard)   Pulse 70   Temp 98 °F (36 7 °C) (Temporal)   Ht 5' 3 5" (1 613 m)   Wt 70 2 kg (154 lb 12 8 oz)   BMI 26 99 kg/m²           Physical Exam   Constitutional: She appears well-developed and well-nourished  No distress  Eyes: Pupils are equal, round, and reactive to light  Conjunctivae and EOM are normal    Pulmonary/Chest: Effort normal    Musculoskeletal:        Cervical back: She exhibits normal range of motion, no tenderness and no deformity  Denies pain with cervical ROM (flexion, extension, lateral rotation to left/right,and lateral flexion to left/right)  Neurological: She is alert  Gait normal    Reflex Scores:       Tricep reflexes are 2+ on the right side and 2+ on the left side  Bicep reflexes are 2+ on the right side and 2+ on the left side  Brachioradialis reflexes are 2+ on the right side and 2+ on the left side  Patellar reflexes are 1+ on the right side and 2+ on the left side  Achilles reflexes are 1+ on the right side and 1+ on the left side  Skin: Skin is warm and dry  Psychiatric: She has a normal mood and affect  Her speech is normal        Neurologic Exam     Mental Status   Speech: speech is normal   Level of consciousness: alert    Cranial Nerves     CN III, IV, VI   Pupils are equal, round, and reactive to light  Extraocular motions are normal      CN V   Facial sensation intact  CN VII   Facial expression full, symmetric       CN VIII   Hearing: intact    CN IX, X   Palate: symmetric    CN XI   Right trapezius strength: normal  Left trapezius strength: normal    CN XII   Tongue: not atrophic  Fasciculations: absent  Tongue deviation: none    Motor Exam     Motor:  Shoulder abduction 5/5 bilaterally  Elbow flexion/extension 5/5 bilaterally  Wrist flexion/extension 5/5 bilaterally  Finger  / abduction 5/5 bilaterally  Hip flexion/abduction/adduction 5/5 bilaterally  Knee flexion/extension 5/5 bilaterally  Ankle DF/PF 5/5 bilaterally  Great toe DF 5/5 bilaterally  Sensory Exam     Sensation to pinprick intact b/l upper extremities, torso, and b/l lower extremities  JPS and Vibratory sense intact b/l thumbs and great toes  Gait, Coordination, and Reflexes     Gait  Gait: normal (Independent)    Reflexes   Right brachioradialis: 2+  Left brachioradialis: 2+  Right biceps: 2+  Left biceps: 2+  Right triceps: 2+  Left triceps: 2+  Right patellar: 1+  Left patellar: 2+  Right achilles: 1+  Left achilles: 1+  Right plantar: normal  Left plantar: normal  Right Ulrich: absent  Left Ulrich: absent  Right ankle clonus: absent  Left ankle clonus: absent      Imaging study:    9/28/18 [facility] C-spine xray -- per report:  " CERVICAL SPINE     INDICATION:   S12 100S: Unspecified displaced fracture of second cervical vertebra, sequela  S12 401S: Unspecified nondisplaced fracture of fifth cervical vertebra, sequela  S12 601S: Unspecified nondisplaced fracture of seventh cervical vertebra, sequela      COMPARISON:  X-ray cervical spine dated September 28, 2018, CTA neck and head dated 8/22/2018      VIEWS:  XR SPINE CERVICAL COMPLETE 4 OR 5 VW NON INJURY         FINDINGS:     Stable mild cortical irregularity of the odontoid, likely on the basis of patient's known type I odontoid fracture which was better evaluated on the prior CTA neck studies  Osseous alignment is unchanged from the prior exam   Known fractures of C5 and   C7 are not visualized radiographically      Cervical lordosis is preserved  Mild anterolisthesis C4 on 5 and C5 on 6 is unchanged      Unchanged degenerative changes at C6-7 with disc space narrowing, endplate sclerosis and prominent anterior osteophyte      The prevertebral soft tissues are within normal limits        The lung apices are clear      IMPRESSION:  Unchanged cortical irregularity of the dens, likely on the basis of patient's known type I odontoid fracture, which was better evaluated on prior CTA studies  Known fractures of C5 and C7 are not visualized radiographically    Overall osseous alignment is unchanged               Workstation performed: [id number] "

## 2018-10-03 NOTE — PATIENT INSTRUCTIONS
Have cervical flex/extension xray completed  (You will need to take c-collar off for xray and you will need to put c-collar back on after xray)  If do not hear from our office sooner then please call our office within a week of having c-spine xray completed to review result  Otherwise continue wearing [place] C-collar at all times  Refrain from strenuous activity  No driving in setting of having to wear C-collar  Recommend you take fall precautions and refrain from activity that increases chance of fall or injury to neck  Contact of office or present to Emergency Room if experience worsening or new neck pain, sensory or motor change, bladder or bowel dysfunction, or other neurological change

## 2018-10-08 ENCOUNTER — TELEPHONE (OUTPATIENT)
Dept: NEUROSURGERY | Facility: CLINIC | Age: 73
End: 2018-10-08

## 2018-10-08 NOTE — TELEPHONE ENCOUNTER
Pt reports she has been getting dizzy and almost passing out  Discussed with KDM who saw pt 10/3, and suggested she f/u with her PCP who pt was recommended to see for findings of carotid stenosis  She was in agreement

## 2018-10-08 NOTE — TELEPHONE ENCOUNTER
(Of Note -- Dr Stacia Frazier response was in regards to 10/3/18 C-spine xray  " Result Note"  Inquiry if she may wean out of c-collar at this juncture and f/u on PRN basis)  The 10/3/18 C-spine xray reports no evidence of dynamic instability on flex/ext  Called and spoke with patient and informed patient that she may wean out of c-collar  Discussed with patient how to gradually wean out of c-collar over the next 2 weeks by taking the c-collar off for short period of time a few times a day and gradually increase the amount of time she has c-collar off until fully out of c-collar in 2 weeks  Further neurosurgical follow up with our office is on an as needed basis  Patient informed to notify our office or present to ER if experience new or worsening neck pain or experience numbness/ tingling/ weakness  Patient expressed understanding and agreement  Patient mentions yesterday and today she felt episode of dizziness and indicated she did not tolerate bending forward  Denies passing out or falling  She reports she has an evaluation with her PCP tomorrow for further evaluation for the dizziness  Encouraged patient to pursue follow up with PCP  Patient expressed understanding and agreement

## 2018-10-09 ENCOUNTER — OFFICE VISIT (OUTPATIENT)
Dept: FAMILY MEDICINE CLINIC | Facility: CLINIC | Age: 73
End: 2018-10-09
Payer: MEDICARE

## 2018-10-09 VITALS
HEIGHT: 64 IN | BODY MASS INDEX: 26.05 KG/M2 | TEMPERATURE: 98.5 F | WEIGHT: 152.6 LBS | DIASTOLIC BLOOD PRESSURE: 68 MMHG | SYSTOLIC BLOOD PRESSURE: 100 MMHG

## 2018-10-09 DIAGNOSIS — I95.1 ORTHOSTATIC HYPOTENSION: ICD-10-CM

## 2018-10-09 DIAGNOSIS — S12.100A CLOSED ODONTOID FRACTURE, INITIAL ENCOUNTER (HCC): ICD-10-CM

## 2018-10-09 DIAGNOSIS — I10 ESSENTIAL HYPERTENSION: Primary | ICD-10-CM

## 2018-10-09 PROCEDURE — 99214 OFFICE O/P EST MOD 30 MIN: CPT | Performed by: FAMILY MEDICINE

## 2018-10-09 RX ORDER — METOPROLOL SUCCINATE 100 MG/1
100 TABLET, EXTENDED RELEASE ORAL DAILY
Qty: 30 TABLET | Refills: 0 | Status: ON HOLD | OUTPATIENT
Start: 2018-10-09 | End: 2019-01-22

## 2018-10-09 NOTE — PROGRESS NOTES
Assessment/Plan:    19-year-old woman with:  History of odontoid fracture and hypertension with now orthostatic hypotension  Discussed workup and treatment options with risks and benefits  Will decrease metoprolol to 100 mg daily and have her take at bedtime  Will have patient check daily blood pressures and follow-up at her upcoming appointment with Dr Felipe Aguilera in 2 weeks  Discussed at any point that if she feels worse that she needs to come and go to the emergency room  Encouraged her to increase her p   O  liquids with electrolytes as well  No problem-specific Assessment & Plan notes found for this encounter  Diagnoses and all orders for this visit:    Essential hypertension    Closed odontoid fracture, initial encounter ([facility] [address] )  -     metoprolol succinate (TOPROL-XL) 100 mg 24 hr tablet; Take 1 tablet (100 mg total) by mouth daily    Orthostatic hypotension          Subjective:   Chief Complaint   Patient presents with    Dizziness     patient states that shes been feeling dizzy and has been going on since 10/6/18  Hx of syncope in chart  Patient ID: Emilee Acosta is a 72 y o  female  Patient is a 19-year-old woman who presents for follow-up on dizziness  She has a history of high blood pressure but admits that her blood pressures have been low of late and she is getting dizzy especially when she gets up from her bed  No chest pain or shortness of breath  No syncopal episodes  Patient did have a fracture several months ago and she continues to follow with Ortho  No other complaints at this time      Dizziness         The following portions of the patient's history were reviewed and updated as appropriate: allergies, current medications, past family history, past medical history, past social history, past surgical history and problem list     Review of Systems   Constitutional: Negative  HENT: Negative  Eyes: Negative  Respiratory: Negative  Cardiovascular: Negative  Gastrointestinal: Negative  Endocrine: Negative  Genitourinary: Negative  Musculoskeletal: Negative  Allergic/Immunologic: Negative  Neurological: Positive for dizziness  Hematological: Negative  Psychiatric/Behavioral: Negative  All other systems reviewed and are negative  Objective:      /68 (BP Location: Right arm, Patient Position: Sitting, Cuff Size: Standard)   Temp 98 5 °F (36 9 °C) (Tympanic)   Ht 5' 3 5" (1 613 m)   Wt 69 2 kg (152 lb 9 6 oz)   BMI 26 61 kg/m²          Physical Exam   Constitutional: She is oriented to person, place, and time  She appears well-developed and well-nourished  HENT:   Head: Atraumatic  Right Ear: External ear normal    Left Ear: External ear normal    Eyes: Pupils are equal, round, and reactive to light  Conjunctivae and EOM are normal    Neck: Normal range of motion  Cardiovascular: Normal rate, regular rhythm and normal heart sounds  Pulmonary/Chest: Effort normal and breath sounds normal  No respiratory distress  Abdominal: Soft  Bowel sounds are normal  She exhibits no distension  There is no tenderness  There is no rebound and no guarding  Musculoskeletal: Normal range of motion  Neurological: She is alert and oriented to person, place, and time  No cranial nerve deficit  Skin: Skin is warm and dry  Psychiatric: She has a normal mood and affect   Her behavior is normal  Judgment and thought content normal

## 2018-10-10 ENCOUNTER — OFFICE VISIT (OUTPATIENT)
Dept: SURGICAL ONCOLOGY | Facility: CLINIC | Age: 73
End: 2018-10-10
Payer: MEDICARE

## 2018-10-10 VITALS
TEMPERATURE: 98.9 F | DIASTOLIC BLOOD PRESSURE: 60 MMHG | SYSTOLIC BLOOD PRESSURE: 100 MMHG | WEIGHT: 153 LBS | BODY MASS INDEX: 26.12 KG/M2 | RESPIRATION RATE: 16 BRPM | HEART RATE: 80 BPM | HEIGHT: 64 IN

## 2018-10-10 DIAGNOSIS — Z79.811 USE OF ANASTROZOLE (ARIMIDEX): ICD-10-CM

## 2018-10-10 DIAGNOSIS — D05.11 BREAST NEOPLASM, TIS (DCIS), RIGHT: Primary | ICD-10-CM

## 2018-10-10 DIAGNOSIS — C50.911 INVASIVE DUCTAL CARCINOMA OF BREAST, RIGHT (HCC): ICD-10-CM

## 2018-10-10 DIAGNOSIS — Z12.39 BREAST CANCER SCREENING, HIGH RISK PATIENT: ICD-10-CM

## 2018-10-10 PROCEDURE — 99214 OFFICE O/P EST MOD 30 MIN: CPT | Performed by: SURGERY

## 2018-10-10 NOTE — PROGRESS NOTES
Surgical Oncology Follow Up       [address]  [facility] [address] [address]    Juan Daniel Hernandez  1945  [id number]  [address]  [facility] [place]  [address] [place] [address]    Chief Complaint   Patient presents with    Breast Cancer     Pt is here for 6 month follow up        Assessment/Plan   Diagnoses and all orders for this visit:    Breast neoplasm, Tis (DCIS), right    Invasive ductal carcinoma of breast, right (HCC)    Use of anastrozole (Arimidex)    Breast cancer screening, high risk patient  -     Mammo screening left w 3d & cad; Future        Advance Care Planning/Advance Directives:  Did not discuss  with the patient  Oncology History:       Invasive ductal carcinoma of breast, right [facility])     Initial Diagnosis     Invasive ductal carcinoma of breast, right [facility])       2004 Surgery     lumpectomy         2004 -  Radiation     WBRT         2004 -  Hormone Therapy     Tamoxifen X 5 years         3/9/2016 Surgery     Right breast US guided core biopsy,  IDC         4/12/2016 Surgery     Right breast mastectomy, SLNB         5/13/2016 -  Hormone Therapy     Anastrazole  Dr Hudson Kelley            History of Present Illness: breast cancer follow up   -Interval History: none    Review of Systems:  Review of Systems   Constitutional: Negative  Negative for appetite change and fever  HENT:        Wearing neck brace, reports recent fall   Eyes: Negative  Respiratory: Negative for shortness of breath  Cardiovascular: Negative  Gastrointestinal: Negative  Endocrine: Negative  Genitourinary: Negative  Musculoskeletal: Negative  Negative for arthralgias and myalgias  Skin: Negative  Allergic/Immunologic: Negative  Neurological: Negative  Hematological: Negative  Negative for adenopathy  Does not bruise/bleed easily  Psychiatric/Behavioral: Negative          Patient Active Problem List   Diagnosis    Anxiety    GERD (gastroesophageal reflux disease)    Hypertension    Hypothyroidism    Hyperlipidemia    Bilateral renal cysts    Ischemic colitis ([address] )    Hypokalemia    Thoracic aortic aneurysm (HCC)    Impaired fasting glucose    Esophageal reflux disease    Invasive ductal carcinoma of breast, right (HCC)    Use of anastrozole (Arimidex)    Closed odontoid fracture with type I morphology (HCC)    Closed nondisplaced fracture of fifth cervical vertebra (HCC)    Closed nondisplaced fracture of seventh cervical vertebra (HCC)    Syncope    Lung nodule seen on imaging study    Atherosclerosis    ETOH abuse    Hyponatremia    Orthostatic hypotension    Breast cancer screening, high risk patient     Past Medical History:   Diagnosis Date    Abnormal CT of the abdomen     Acute bronchitis     Anxiety     Appetite loss     Arthritis     Ascending aortic aneurysm (HCC)     Bilateral renal cysts     Cyst of ovary     Dysphagia     Esophageal reflux disease     Fat necrosis of breast     Full dentures     GERD (gastroesophageal reflux disease)     Herpes zoster     History of radiation therapy     Hyperlipidemia     Hypertension     Hypokalemia     Hypomagnesemia     Hypothyroidism     Impaired fasting glucose     Irritable bowel syndrome (IBS)     Ischemic colitis (HCC)     Knee pain     Limb alert care status     no BP/IV right arm    Osteoarthritis of right knee     Pneumonia     Post-op pain     Pulmonary nodules     Thoracic aortic aneurysm (HCC)     Unspecified ovarian cysts     Use of anastrozole (Arimidex)     Vasovagal syncope     Vitamin D deficiency     Wears glasses     Weight loss      Past Surgical History:   Procedure Laterality Date    APPENDECTOMY      pt states it was not removed    BREAST BIOPSY Right 03/02/2016    BREAST LUMPECTOMY Right 2004    BREAST SURGERY Right     Single lesion excision 1990 hyperplasia, 1st biopsy at 25yrs old was benign    CHOLECYSTECTOMY      COLONOSCOPY      COLONOSCOPY N/A 5/12/2017    Procedure: COLONOSCOPY with biopsy;  Surgeon: Ruby Gerber MD;  Location: [facility];   Service:     HYSTERECTOMY      KNEE SURGERY Right     MI BIOPSY/EXCISION, LYMPH NODE(S) Right 4/12/2016    Procedure: BIOPSY LYMPH NODE SENTINEL @ 1200 LYMPHOSCINTIGRAPHY LYMPHATIC MAPPING;  Surgeon: Samantha Pichardo MD;  Location: [facility];  Service: General    MI MASTECTOMY, SIMPLE, COMPLETE Right 4/12/2016    Procedure: MASTECTOMY SIMPLE;  Surgeon: Samantha Pichardo MD;  Location: [facility];  Service: General    TUBAL LIGATION       Family History   Problem Relation Age of Onset    Stroke Maternal Grandmother     Anemia Mother     Other Mother         disorders of blood and blood forming organs    Hypertension Mother     Stroke Father     Alcohol abuse Brother      Social History     Social History    Marital status: Single     Spouse name: N/A    Number of children: N/A    Years of education: N/A     Occupational History    retired      Social History Main Topics    Smoking status: Never Smoker    Smokeless tobacco: Never Used      Comment: not interested    Alcohol use Yes      Comment: 3 glasses of vodka, 16 oz, daily    Drug use: No    Sexual activity: Not Currently     Other Topics Concern    Not on file     Social History Narrative    No narrative on file       Current Outpatient Prescriptions:     anastrozole (ARIMIDEX) 1 mg tablet, TAKE 1 TABLET EVERY DAY, Disp: 90 tablet, Rfl: 3    aspirin (ECOTRIN LOW STRENGTH) 81 mg EC tablet, Take 1 tablet (81 mg total) by mouth daily, Disp: , Rfl: 0    levothyroxine 75 mcg tablet, TAKE 1 TABLET EVERY DAY, Disp: 90 tablet, Rfl: 0    losartan-hydrochlorothiazide (HYZAAR) 100-25 MG per tablet, TAKE 1 TABLET EVERY DAY, Disp: 90 tablet, Rfl: 0    metoprolol succinate (TOPROL-XL) 100 mg 24 hr tablet, Take 1 tablet (100 mg total) by mouth daily, Disp: 30 tablet, Rfl: 0    cholecalciferol (VITAMIN D3) 400 units tablet, Take 400 Units by mouth daily, Disp: , Rfl:   Allergies   Allergen Reactions    Demerol [Meperidine] GI Intolerance and Other (See Comments)     Other reaction(s): Other (See Comments)  severe nausea  severe nausea  Nausea/vomiting    Nitroglycerin Anaphylaxis and Other (See Comments)     BP drops drastically    Lipitor [Atorvastatin] Other (See Comments)     Joint/muscle pain    Zocor [Simvastatin] Other (See Comments)     Joint/muscle pain       The following portions of the patient's history were reviewed and updated as appropriate: allergies, current medications, past family history, past medical history, past social history, past surgical history and problem list         Vitals:    10/10/18 1448   BP: 100/60   Pulse: 80   Resp: 16   Temp: 98 9 °F (37 2 °C)       Physical Exam   Constitutional: She is oriented to person, place, and time  She appears well-developed and well-nourished  Cardiovascular: Normal heart sounds  Pulmonary/Chest: Breath sounds normal  Right breast exhibits skin change (mastectomy scar)  Right breast exhibits no mass and no tenderness  Left breast exhibits no inverted nipple, no mass, no nipple discharge, no skin change and no tenderness  Abdominal: Soft  Lymphadenopathy:        Right axillary: No pectoral and no lateral adenopathy present  Left axillary: No pectoral and no lateral adenopathy present  Right: No supraclavicular adenopathy present  Left: No supraclavicular adenopathy present  Neurological: She is alert and oriented to person, place, and time  Psychiatric: She has a normal mood and affect  Discussion/Summary:  81-year-old female status post completion mastectomy of the right breast for recurrent carcinoma  She continues on anastrozole  She had a recent fall which she attributes to her blood pressure and is wearing a neck brace    There is no evidence of disease based on examination today  I will make arrangements for her left-sided mammogram due in the spring  I will see her again in six months or sooner should the need arise

## 2018-10-18 DIAGNOSIS — E03.9 HYPOTHYROIDISM: ICD-10-CM

## 2018-10-18 RX ORDER — LEVOTHYROXINE SODIUM 0.07 MG/1
TABLET ORAL
Qty: 90 TABLET | Refills: 0 | Status: SHIPPED | OUTPATIENT
Start: 2018-10-18 | End: 2018-12-19 | Stop reason: SDUPTHER

## 2018-10-22 ENCOUNTER — OFFICE VISIT (OUTPATIENT)
Dept: FAMILY MEDICINE CLINIC | Facility: CLINIC | Age: 73
End: 2018-10-22
Payer: MEDICARE

## 2018-10-22 VITALS
WEIGHT: 152 LBS | SYSTOLIC BLOOD PRESSURE: 102 MMHG | HEIGHT: 64 IN | BODY MASS INDEX: 25.95 KG/M2 | TEMPERATURE: 98.7 F | DIASTOLIC BLOOD PRESSURE: 74 MMHG

## 2018-10-22 DIAGNOSIS — I10 ESSENTIAL HYPERTENSION: ICD-10-CM

## 2018-10-22 DIAGNOSIS — I48.0 PAROXYSMAL ATRIAL FIBRILLATION (HCC): Primary | ICD-10-CM

## 2018-10-22 DIAGNOSIS — I70.90 ATHEROSCLEROSIS: ICD-10-CM

## 2018-10-22 DIAGNOSIS — Z23 FLU VACCINE NEED: ICD-10-CM

## 2018-10-22 PROBLEM — I48.91 ATRIAL FIBRILLATION (HCC): Status: ACTIVE | Noted: 2018-10-22

## 2018-10-22 PROBLEM — I49.9 IRREGULAR CARDIAC RHYTHM: Status: ACTIVE | Noted: 2018-10-22

## 2018-10-22 PROCEDURE — G0008 ADMIN INFLUENZA VIRUS VAC: HCPCS | Performed by: FAMILY MEDICINE

## 2018-10-22 PROCEDURE — 90662 IIV NO PRSV INCREASED AG IM: CPT | Performed by: FAMILY MEDICINE

## 2018-10-22 PROCEDURE — 93000 ELECTROCARDIOGRAM COMPLETE: CPT | Performed by: FAMILY MEDICINE

## 2018-10-22 PROCEDURE — 99214 OFFICE O/P EST MOD 30 MIN: CPT | Performed by: FAMILY MEDICINE

## 2018-10-22 RX ORDER — LOSARTAN POTASSIUM 100 MG/1
100 TABLET ORAL DAILY
Qty: 90 TABLET | Refills: 0 | Status: SHIPPED | OUTPATIENT
Start: 2018-10-22 | End: 2019-01-22 | Stop reason: HOSPADM

## 2018-10-22 NOTE — PROGRESS NOTES
Assessment/Plan:   patient will have Hyzaar changed to Cozaar  Patient will see Cardiology regarding anterior ischemia noted on EK G  Patient will restart aspirin 81 mg daily  Continue with metoprolol daily  Flu shot at this time  Blood pressure will be rechecked at follow-up visit  Follow-up in 3 months       Diagnoses and all orders for this visit:    Paroxysmal atrial fibrillation (HCC)  -     POCT ECG  -     losartan (COZAAR) 100 MG tablet; Take 1 tablet (100 mg total) by mouth daily  -     Ambulatory referral to Cardiology; Future    Flu vaccine need  -     influenza vaccine, [id number], high-dose, PF 0 5 mL, for patients 65 yr+ (FLUZONE HIGH-DOSE)    Essential hypertension    Atherosclerosis          Subjective:      Patient ID: Akilah Renae is a 72 y o  female  Patient follow-up on hypertension  Patient recently had bouts of hypotension  Patient has had medications decreased  Patient is using metoprolol 100 mg daily and  Losartan  Will patient does get orthostatics changes  No chest pain or shortness of breath  The following portions of the patient's history were reviewed and updated as appropriate: allergies, current medications, past family history, past medical history, past social history, past surgical history and problem list     Review of Systems   Constitutional: Negative  HENT: Negative  Eyes: Negative  Respiratory: Negative  Cardiovascular: Negative  Gastrointestinal: Negative  Endocrine: Negative  Genitourinary: Negative  Musculoskeletal: Negative  Skin: Negative  Allergic/Immunologic: Negative  Neurological: Positive for dizziness  Hematological: Negative  Psychiatric/Behavioral: Negative            Objective:      /74 (BP Location: Right arm, Patient Position: Sitting, Cuff Size: Standard)   Temp 98 7 °F (37 1 °C) (Tympanic)   Ht 5' 3 5" (1 613 m)   Wt 68 9 kg (152 lb)   BMI 26 50 kg/m²          Physical Exam   Constitutional: She appears well-developed  Cardiovascular: Normal rate  Irregular   Pulmonary/Chest: Effort normal and breath sounds normal    Musculoskeletal: Normal range of motion  Nursing note and vitals reviewed

## 2018-11-05 ENCOUNTER — OFFICE VISIT (OUTPATIENT)
Dept: HEMATOLOGY ONCOLOGY | Facility: CLINIC | Age: 73
End: 2018-11-05
Payer: MEDICARE

## 2018-11-05 VITALS
OXYGEN SATURATION: 96 % | TEMPERATURE: 98.4 F | WEIGHT: 158 LBS | SYSTOLIC BLOOD PRESSURE: 118 MMHG | BODY MASS INDEX: 26.98 KG/M2 | DIASTOLIC BLOOD PRESSURE: 84 MMHG | HEART RATE: 81 BPM | HEIGHT: 64 IN | RESPIRATION RATE: 16 BRPM

## 2018-11-05 DIAGNOSIS — C50.911 INVASIVE DUCTAL CARCINOMA OF BREAST, RIGHT (HCC): Primary | ICD-10-CM

## 2018-11-05 PROCEDURE — 99214 OFFICE O/P EST MOD 30 MIN: CPT | Performed by: INTERNAL MEDICINE

## 2018-11-05 NOTE — PROGRESS NOTES
Hematology / Oncology Outpatient Follow Up Note    Gale Sarabia 72 y o  female :1945 VYN:[id number]         Date:  2018    Assessment / Plan:  A 72year old postmenopausal woman who has history of early stage right breast cancer, ER positive disease in   She underwent lumpectomy followed by radiation, as well as 5 years of adjuvant hormonal therapy with tamoxifen, completed in   She has stage IA recurrent right breast cancer, grade 1, ER/DE strongly positive, HER-2 negative disease  She underwent mastectomy resulting in MARIAELENA  She is currently on adjuvant hormonal therapy with anastrozole with no significant side effects  Clinically, she has no evidence recurrent disease  I recommended her to continue with anastrozole 1 mg once a day  She is in agreement with my recommendation  I will see her again in a year for routine follow-up  Subjective:     HPI: A 78year old postmenopausal woman who has history of atypical ductal hyperplasia in   She had excisional biopsy  She was not eligible for chemoprevention trial  Therefore, she was observed  In , she was diagnosed with early stage right breast cancer, ER positive disease  She underwent lumpectomy followed by radiation therapy  She took adjuvant tamoxifen for 5 years until   She was recently found to have abnormality in the right breast  Biopsy showed invasive ductal carcinoma, grade 2  She was seen by Dr Jane Robles, who did mastectomy with sentinel lymph node biopsy  She had 1 3 cm of invasive ductal carcinoma, grade 1  2 sentinel lymph node and 3 non-sentinel lymph node were all negative for metastatic disease  This was % positive, DE 95% positive, HER-2 negative disease  She did not have reconstruction  She presented today to discuss adjuvant treatment options  After the 5 years of tamoxifen, she did not take additional hormonal therapy  She feels well  However, she is somewhat anxious  She has no complaint of pain   She denied respiratory symptoms  Her performance status is normal  She has no family history of breast or ovarian cancer  Interval History:  A 72year old postmenopausal woman with history of early stage right breast cancer, ER positive disease, diagnosed in 2002  She underwent lumpectomy followed by radiation as well as tamoxifen as adjuvant hormonal therapy until 2007  She was diagnosed with recurrent stage IA right breast cancer, grade 1, ER/TX positive HER-2 negative disease  She underwent mastectomy resulting in MARIAELENA  Since May 2016, she has been on adjuvant hormonal therapy with anastrozole  She presents today for routine follow-up  Lately, she has several episode of fall  For this reason, she is going to see cardiologist   Otherwise, she feels well  She has no complaint hot flashes  However, she has mild musculoskeletal symptoms  She denied any pain  She has no respiratory symptoms  Her performance status is normal       Objective:     Primary Diagnosis:    1  Locally recurrent right breast cancer, stage IA (pT1c, pN0,M0) grade 1, ER/TX strongly positive, HER-2 negative disease  Diagnosed in April 2016   2  Early stage right breast cancer, ER positive disease  Diagnosed in 2002  Cancer Staging:  Cancer Staging  Invasive ductal carcinoma of breast, right ([facility] [address] )  Staging form: Breast, AJCC 7th Edition  - Pathologic: Stage IA (T1c, N0, cM0) - Signed by Tiffanie King MD on 4/9/2018        Previous Hematologic/ Oncologic Treatment:         Current Hematologic/ Oncologic Treatment:      Adjuvant hormonal therapy with anastrozole since May 2016  Disease Status:     MARIAELENA status post mastectomy  Test Results:    Pathology:    Mastectomy specimen showed 1 3 cm of invasive ductal carcinoma, grade 1  5 lymph nodes are negative for metastatic disease  % positive, TX 95% positive, HER-2 negative disease IA (pT1c, pN0,M0)       Radiology:    DEXA scan in July 2016 showed normal bone density  Mammography in March 2018 was benign  BI-RADS 2    Laboratory:        Physical Exam:      General Appearance:    Alert, oriented        Eyes:    PERRL   Ears:    Normal external ear canals, both ears   Nose:   Nares normal, septum midline   Throat:   Mucosa moist  Pharynx without injection  Neck:   Supple       Lungs:     Clear to auscultation bilaterally   Chest Wall:    No tenderness or deformity    Heart:    Regular rate and rhythm       Abdomen:     Soft, non-tender, bowel sounds +, no organomegaly           Extremities:   Extremities no cyanosis or edema       Skin:   no rash or icterus  Lymph nodes:   Cervical, supraclavicular, and axillary nodes normal   Neurologic:   CNII-XII intact, normal strength, sensation and reflexes     Throughout          Breast exam:   status post right mastectomy without reconstruction  No palpable abnormality in the right chest wall  Left breast exam is negative  ROS: Review of Systems   Musculoskeletal:        Musculoskeletal symptoms   All other systems reviewed and are negative  Imaging: No results found  Labs:   Lab Results   Component Value Date    WBC 5 70 07/30/2018    HGB 14 0 07/30/2018    HCT 40 6 07/30/2018     (H) 07/30/2018     07/30/2018     Lab Results   Component Value Date     (L) 01/06/2016    K 4 0 08/01/2018     08/01/2018    CO2 31 08/01/2018    ANIONGAP 9 01/06/2016    BUN 9 08/01/2018    CREATININE 0 79 08/01/2018    GLUCOSE 114 05/09/2017    GLUF 126 (H) 01/24/2018    CALCIUM 9 9 08/01/2018    AST 83 (H) 01/24/2018    ALT 56 01/24/2018    ALKPHOS 82 01/24/2018    PROT 6 8 01/06/2016    BILITOT 1 36 (H) 01/06/2016    EGFR 76 08/01/2018       Lab Results   Component Value Date    VTIRZOPY67 440 01/06/2016       Current Medications: Reviewed  Allergies: Reviewed  PMH/FH/SH:  Reviewed      Vital Sign:    Body surface area is 1 76 meters squared      Wt Readings from Last 3 Encounters:   11/05/18 71 7 kg (158 lb)   10/22/18 68 9 kg (152 lb)   10/10/18 69 4 kg (153 lb)        Temp Readings from Last 3 Encounters:   11/05/18 98 4 °F (36 9 °C) (Tympanic)   10/22/18 98 7 °F (37 1 °C) (Tympanic)   10/10/18 98 9 °F (37 2 °C) (Tympanic)        BP Readings from Last 3 Encounters:   11/05/18 118/84   10/22/18 102/74   10/10/18 100/60         Pulse Readings from Last 3 Encounters:   11/05/18 81   10/10/18 80   10/03/18 70     @LASTSAO2(3)@

## 2018-12-19 DIAGNOSIS — E03.9 HYPOTHYROIDISM: ICD-10-CM

## 2018-12-19 RX ORDER — LEVOTHYROXINE SODIUM 0.07 MG/1
TABLET ORAL
Qty: 90 TABLET | Refills: 0 | Status: SHIPPED | OUTPATIENT
Start: 2018-12-19 | End: 2019-02-22 | Stop reason: HOSPADM

## 2019-01-18 ENCOUNTER — APPOINTMENT (EMERGENCY)
Dept: CT IMAGING | Facility: HOSPITAL | Age: 74
DRG: 378 | End: 2019-01-18
Payer: MEDICARE

## 2019-01-18 ENCOUNTER — HOSPITAL ENCOUNTER (INPATIENT)
Facility: HOSPITAL | Age: 74
LOS: 4 days | Discharge: HOME | DRG: 378 | End: 2019-01-22
Attending: EMERGENCY MEDICINE | Admitting: INTERNAL MEDICINE
Payer: MEDICARE

## 2019-01-18 DIAGNOSIS — S12.100A CLOSED ODONTOID FRACTURE, INITIAL ENCOUNTER (HCC): ICD-10-CM

## 2019-01-18 DIAGNOSIS — K25.4 GASTROINTESTINAL HEMORRHAGE ASSOCIATED WITH GASTRIC ULCER: ICD-10-CM

## 2019-01-18 DIAGNOSIS — K21.9 GASTROESOPHAGEAL REFLUX DISEASE WITHOUT ESOPHAGITIS: ICD-10-CM

## 2019-01-18 DIAGNOSIS — K92.2 UPPER GI BLEED: Primary | ICD-10-CM

## 2019-01-18 PROBLEM — M25.512 ACUTE PAIN OF LEFT SHOULDER: Status: ACTIVE | Noted: 2019-01-18

## 2019-01-18 LAB
ABO GROUP BLD: NORMAL
ALBUMIN SERPL BCP-MCNC: 3.2 G/DL (ref 3.5–5)
ALP SERPL-CCNC: 68 U/L (ref 46–116)
ALT SERPL W P-5'-P-CCNC: 19 U/L (ref 12–78)
ANION GAP SERPL CALCULATED.3IONS-SCNC: 11 MMOL/L (ref 4–13)
APTT PPP: 29 SECONDS (ref 26–38)
AST SERPL W P-5'-P-CCNC: 20 U/L (ref 5–45)
ATRIAL RATE: 74 BPM
BASOPHILS # BLD AUTO: 0.05 THOUSANDS/ΜL (ref 0–0.1)
BASOPHILS NFR BLD AUTO: 1 % (ref 0–1)
BILIRUB SERPL-MCNC: 0.74 MG/DL (ref 0.2–1)
BILIRUB UR QL STRIP: NEGATIVE
BLD GP AB SCN SERPL QL: NEGATIVE
BUN SERPL-MCNC: 46 MG/DL (ref 5–25)
CALCIUM SERPL-MCNC: 9.2 MG/DL (ref 8.3–10.1)
CHLORIDE SERPL-SCNC: 99 MMOL/L (ref 100–108)
CLARITY UR: CLEAR
CO2 SERPL-SCNC: 29 MMOL/L (ref 21–32)
COLOR UR: YELLOW
COLOR, POC: YELLOW
CREAT SERPL-MCNC: 0.74 MG/DL (ref 0.6–1.3)
EOSINOPHIL # BLD AUTO: 0.02 THOUSAND/ΜL (ref 0–0.61)
EOSINOPHIL NFR BLD AUTO: 0 % (ref 0–6)
ERYTHROCYTE [DISTWIDTH] IN BLOOD BY AUTOMATED COUNT: 12.4 % (ref 11.6–15.1)
GFR SERPL CREATININE-BSD FRML MDRD: 82 ML/MIN/1.73SQ M
GLUCOSE SERPL-MCNC: 113 MG/DL (ref 65–140)
GLUCOSE UR STRIP-MCNC: NEGATIVE MG/DL
HCT VFR BLD AUTO: 36.5 % (ref 34.8–46.1)
HGB BLD-MCNC: 12.6 G/DL (ref 11.5–15.4)
HGB UR QL STRIP.AUTO: NEGATIVE
IMM GRANULOCYTES # BLD AUTO: 0.02 THOUSAND/UL (ref 0–0.2)
IMM GRANULOCYTES NFR BLD AUTO: 0 % (ref 0–2)
INR PPP: 0.99 (ref 0.86–1.17)
KETONES UR STRIP-MCNC: NEGATIVE MG/DL
LEUKOCYTE ESTERASE UR QL STRIP: NEGATIVE
LIPASE SERPL-CCNC: 100 U/L (ref 73–393)
LYMPHOCYTES # BLD AUTO: 1.66 THOUSANDS/ΜL (ref 0.6–4.47)
LYMPHOCYTES NFR BLD AUTO: 22 % (ref 14–44)
MCH RBC QN AUTO: 34.3 PG (ref 26.8–34.3)
MCHC RBC AUTO-ENTMCNC: 34.5 G/DL (ref 31.4–37.4)
MCV RBC AUTO: 100 FL (ref 82–98)
MONOCYTES # BLD AUTO: 0.99 THOUSAND/ΜL (ref 0.17–1.22)
MONOCYTES NFR BLD AUTO: 13 % (ref 4–12)
NEUTROPHILS # BLD AUTO: 4.82 THOUSANDS/ΜL (ref 1.85–7.62)
NEUTS SEG NFR BLD AUTO: 64 % (ref 43–75)
NITRITE UR QL STRIP: NEGATIVE
NRBC BLD AUTO-RTO: 0 /100 WBCS
P AXIS: 70 DEGREES
PH UR STRIP.AUTO: 7 [PH] (ref 4.5–8)
PLATELET # BLD AUTO: 165 THOUSANDS/UL (ref 149–390)
PMV BLD AUTO: 10.2 FL (ref 8.9–12.7)
POTASSIUM SERPL-SCNC: 4.3 MMOL/L (ref 3.5–5.3)
PROT SERPL-MCNC: 6.1 G/DL (ref 6.4–8.2)
PROT UR STRIP-MCNC: NEGATIVE MG/DL
PROTHROMBIN TIME: 13.2 SECONDS (ref 11.8–14.2)
QRS AXIS: -28 DEGREES
QRSD INTERVAL: 86 MS
QT INTERVAL: 378 MS
QTC INTERVAL: 422 MS
RBC # BLD AUTO: 3.67 MILLION/UL (ref 3.81–5.12)
RH BLD: POSITIVE
SODIUM SERPL-SCNC: 139 MMOL/L (ref 136–145)
SP GR UR STRIP.AUTO: 1.01 (ref 1–1.03)
SPECIMEN EXPIRATION DATE: NORMAL
T WAVE AXIS: 16 DEGREES
TROPONIN I SERPL-MCNC: <0.02 NG/ML
UROBILINOGEN UR QL STRIP.AUTO: 0.2 E.U./DL
VENTRICULAR RATE: 75 BPM
WBC # BLD AUTO: 7.56 THOUSAND/UL (ref 4.31–10.16)

## 2019-01-18 PROCEDURE — 93005 ELECTROCARDIOGRAM TRACING: CPT

## 2019-01-18 PROCEDURE — 86900 BLOOD TYPING SEROLOGIC ABO: CPT | Performed by: EMERGENCY MEDICINE

## 2019-01-18 PROCEDURE — 86901 BLOOD TYPING SEROLOGIC RH(D): CPT | Performed by: EMERGENCY MEDICINE

## 2019-01-18 PROCEDURE — 86923 COMPATIBILITY TEST ELECTRIC: CPT

## 2019-01-18 PROCEDURE — 85730 THROMBOPLASTIN TIME PARTIAL: CPT | Performed by: EMERGENCY MEDICINE

## 2019-01-18 PROCEDURE — C9113 INJ PANTOPRAZOLE SODIUM, VIA: HCPCS | Performed by: EMERGENCY MEDICINE

## 2019-01-18 PROCEDURE — 81003 URINALYSIS AUTO W/O SCOPE: CPT

## 2019-01-18 PROCEDURE — 36415 COLL VENOUS BLD VENIPUNCTURE: CPT | Performed by: EMERGENCY MEDICINE

## 2019-01-18 PROCEDURE — C9113 INJ PANTOPRAZOLE SODIUM, VIA: HCPCS | Performed by: PHYSICIAN ASSISTANT

## 2019-01-18 PROCEDURE — 96374 THER/PROPH/DIAG INJ IV PUSH: CPT

## 2019-01-18 PROCEDURE — 86850 RBC ANTIBODY SCREEN: CPT | Performed by: EMERGENCY MEDICINE

## 2019-01-18 PROCEDURE — 93010 ELECTROCARDIOGRAM REPORT: CPT | Performed by: INTERNAL MEDICINE

## 2019-01-18 PROCEDURE — 85025 COMPLETE CBC W/AUTO DIFF WBC: CPT | Performed by: EMERGENCY MEDICINE

## 2019-01-18 PROCEDURE — 85610 PROTHROMBIN TIME: CPT | Performed by: EMERGENCY MEDICINE

## 2019-01-18 PROCEDURE — 96375 TX/PRO/DX INJ NEW DRUG ADDON: CPT

## 2019-01-18 PROCEDURE — 99285 EMERGENCY DEPT VISIT HI MDM: CPT

## 2019-01-18 PROCEDURE — 74177 CT ABD & PELVIS W/CONTRAST: CPT

## 2019-01-18 PROCEDURE — 84484 ASSAY OF TROPONIN QUANT: CPT | Performed by: EMERGENCY MEDICINE

## 2019-01-18 PROCEDURE — 83690 ASSAY OF LIPASE: CPT | Performed by: EMERGENCY MEDICINE

## 2019-01-18 PROCEDURE — 80053 COMPREHEN METABOLIC PANEL: CPT | Performed by: EMERGENCY MEDICINE

## 2019-01-18 PROCEDURE — 99223 1ST HOSP IP/OBS HIGH 75: CPT | Performed by: INTERNAL MEDICINE

## 2019-01-18 PROCEDURE — 96361 HYDRATE IV INFUSION ADD-ON: CPT

## 2019-01-18 RX ORDER — ONDANSETRON 2 MG/ML
4 INJECTION INTRAMUSCULAR; INTRAVENOUS EVERY 6 HOURS PRN
Status: DISCONTINUED | OUTPATIENT
Start: 2019-01-18 | End: 2019-01-22 | Stop reason: HOSPADM

## 2019-01-18 RX ORDER — ANASTROZOLE 1 MG/1
1 TABLET ORAL DAILY
Status: DISCONTINUED | OUTPATIENT
Start: 2019-01-19 | End: 2019-01-22 | Stop reason: HOSPADM

## 2019-01-18 RX ORDER — SODIUM CHLORIDE, SODIUM LACTATE, POTASSIUM CHLORIDE, CALCIUM CHLORIDE 600; 310; 30; 20 MG/100ML; MG/100ML; MG/100ML; MG/100ML
75 INJECTION, SOLUTION INTRAVENOUS CONTINUOUS
Status: DISCONTINUED | OUTPATIENT
Start: 2019-01-18 | End: 2019-01-21

## 2019-01-18 RX ORDER — ONDANSETRON 2 MG/ML
4 INJECTION INTRAMUSCULAR; INTRAVENOUS ONCE
Status: COMPLETED | OUTPATIENT
Start: 2019-01-18 | End: 2019-01-18

## 2019-01-18 RX ORDER — ACETAMINOPHEN 160 MG/5ML
650 SUSPENSION, ORAL (FINAL DOSE FORM) ORAL EVERY 6 HOURS PRN
Status: DISCONTINUED | OUTPATIENT
Start: 2019-01-18 | End: 2019-01-22 | Stop reason: HOSPADM

## 2019-01-18 RX ORDER — LEVOTHYROXINE SODIUM 0.07 MG/1
75 TABLET ORAL
Status: DISCONTINUED | OUTPATIENT
Start: 2019-01-19 | End: 2019-01-22 | Stop reason: HOSPADM

## 2019-01-18 RX ORDER — HYDRALAZINE HYDROCHLORIDE 20 MG/ML
5 INJECTION INTRAMUSCULAR; INTRAVENOUS EVERY 6 HOURS PRN
Status: DISCONTINUED | OUTPATIENT
Start: 2019-01-18 | End: 2019-01-22 | Stop reason: HOSPADM

## 2019-01-18 RX ORDER — METOPROLOL SUCCINATE 50 MG/1
100 TABLET, EXTENDED RELEASE ORAL DAILY
Status: DISCONTINUED | OUTPATIENT
Start: 2019-01-19 | End: 2019-01-19

## 2019-01-18 RX ORDER — LOSARTAN POTASSIUM 50 MG/1
100 TABLET ORAL DAILY
Status: DISCONTINUED | OUTPATIENT
Start: 2019-01-19 | End: 2019-01-19

## 2019-01-18 RX ADMIN — SODIUM CHLORIDE 80 MG: 9 INJECTION, SOLUTION INTRAVENOUS at 16:56

## 2019-01-18 RX ADMIN — SODIUM CHLORIDE 8 MG/HR: 9 INJECTION, SOLUTION INTRAVENOUS at 22:11

## 2019-01-18 RX ADMIN — SODIUM CHLORIDE 80 MG: 9 INJECTION, SOLUTION INTRAVENOUS at 21:27

## 2019-01-18 RX ADMIN — ONDANSETRON 4 MG: 2 INJECTION INTRAMUSCULAR; INTRAVENOUS at 16:54

## 2019-01-18 RX ADMIN — SODIUM CHLORIDE 1000 ML: 0.9 INJECTION, SOLUTION INTRAVENOUS at 16:37

## 2019-01-18 RX ADMIN — SODIUM CHLORIDE, SODIUM LACTATE, POTASSIUM CHLORIDE, AND CALCIUM CHLORIDE 75 ML/HR: .6; .31; .03; .02 INJECTION, SOLUTION INTRAVENOUS at 21:12

## 2019-01-18 RX ADMIN — IOHEXOL 100 ML: 350 INJECTION, SOLUTION INTRAVENOUS at 17:20

## 2019-01-18 NOTE — ED PROVIDER NOTES
History  Chief Complaint   Patient presents with    Abdominal Pain     Reports abd pain x 3 days and vomiting black substance since this morning x 3  Denies diarrhea, blood in stool  A 56-year-old female with past medical history of anxiety, ascending aortic aneurysm, GERD, breast cancer currently in remission, hyperlipidemia, hypertension, hypothyroidism & IBS; presents with epigastric abdominal pain for the past 3 days along with several episodes of emesis this morning  Patient states the emesis was dark black in nature  Patient denies noticing melena or bright red blood per rectum  Patient states she has had a normal appetite since the abdominal pain began  Patient otherwise denies fever, chills, dizziness, lightheadedness, chest pain, shortness of breath, diarrhea, dysuria, hematuria, peripheral edema and rashes  Patient does not take anticoagulation  Patient does report one prior episode of GI bleeding, which was felt to be secondary to hypotension and ischemic colitis  On review of records this occurred in May 2017  A/P:  Epigastric abdominal pain, associated with black emesis  Reproducible tenderness in the epigastric region  No peritoneal signs  Concern for upper GI bleed  Stool is faintly guaiac positive, however normal appearing in color  Will check lab work for anemia, renal impairment and electrolyte abnormality  Will also obtain type and screen  Will obtain CTAP to evaluate for possible recurrence of ischemic colitis, although unlikely at this time  Will give a dose of Protonix  History provided by:  Patient, significant other and medical records  Abdominal Pain   Associated symptoms: nausea and vomiting        Prior to Admission Medications   Prescriptions Last Dose Informant Patient Reported?  Taking?   anastrozole (ARIMIDEX) 1 mg tablet  Self No Yes   Sig: TAKE 1 TABLET EVERY DAY   aspirin (ECOTRIN) 325 mg EC tablet Not Taking at Unknown time  Yes No   Sig: Take 325 mg by mouth daily   cholecalciferol (VITAMIN D3) 400 units tablet Not Taking at Unknown time Self Yes No   Sig: Take 400 Units by mouth daily   levothyroxine 75 mcg tablet   No Yes   Sig: TAKE 1 TABLET EVERY DAY   losartan (COZAAR) 100 MG tablet   No Yes   Sig: Take 1 tablet (100 mg total) by mouth daily   metoprolol succinate (TOPROL-XL) 100 mg 24 hr tablet  Self No Yes   Sig: Take 1 tablet (100 mg total) by mouth daily      Facility-Administered Medications: None       Past Medical History:   Diagnosis Date    Abnormal CT of the abdomen     Acute bronchitis     Anxiety     Appetite loss     Arthritis     Ascending aortic aneurysm (HCC)     Bilateral renal cysts     Cyst of ovary     Dysphagia     Esophageal reflux disease     Fat necrosis of breast     Full dentures     GERD (gastroesophageal reflux disease)     Herpes zoster     History of radiation therapy     Hyperlipidemia     Hypertension     Hypokalemia     Hypomagnesemia     Hypothyroidism     Impaired fasting glucose     Irritable bowel syndrome (IBS)     Ischemic colitis (HCC)     Knee pain     Limb alert care status     no BP/IV right arm    Osteoarthritis of right knee     Pneumonia     Post-op pain     Pulmonary nodules     Thoracic aortic aneurysm (HCC)     Unspecified ovarian cysts     Use of anastrozole (Arimidex)     Vasovagal syncope     Vitamin D deficiency     Wears glasses     Weight loss        Past Surgical History:   Procedure Laterality Date    APPENDECTOMY      pt states it was not removed    BREAST BIOPSY Right 03/02/2016    BREAST LUMPECTOMY Right 2004    BREAST SURGERY Right     Single lesion excision 1990 hyperplasia, 1st biopsy at 25yrs old was benign    CHOLECYSTECTOMY      COLONOSCOPY      COLONOSCOPY N/A 5/12/2017    Procedure: COLONOSCOPY with biopsy;  Surgeon: Corey Mota MD;  Location: [facility];   Service:     HYSTERECTOMY      KNEE SURGERY Right     IL BIOPSY/EXCISION, LYMPH NODE(S) Right 4/12/2016    Procedure: BIOPSY LYMPH NODE SENTINEL @ 1200 LYMPHOSCINTIGRAPHY LYMPHATIC MAPPING;  Surgeon: Domenica Mixon MD;  Location: [facility];  Service: General    LA MASTECTOMY, SIMPLE, COMPLETE Right 4/12/2016    Procedure: MASTECTOMY SIMPLE;  Surgeon: Domenica Mixon MD;  Location: [facility];  Service: General    TUBAL LIGATION      US GUIDED BREAST BIOPSY RIGHT COMPLETE Right 3/9/2016       Family History   Problem Relation Age of Onset    Stroke Maternal Grandmother     Anemia Mother     Other Mother         disorders of blood and blood forming organs    Hypertension Mother     Stroke Father     Alcohol abuse Brother      I have reviewed and agree with the history as documented  Social History   Substance Use Topics    Smoking status: Never Smoker    Smokeless tobacco: Never Used      Comment: not interested    Alcohol use Yes      Comment: 3 glasses of vodka, 16 oz, daily        Review of Systems   Gastrointestinal: Positive for abdominal pain, nausea and vomiting  All other systems reviewed and are negative  Physical Exam  Physical Exam  General Appearance: alert and oriented, nad, non toxic appearing  Skin:  Warm, dry, intact  HEENT: atraumatic, normocephalic  Neck: Supple, trachea midline  Cardiac: RRR; no murmurs, rub, gallops  Pulmonary: lungs CTAB; no wheezes, rales, rhonchi  Gastrointestinal: abdomen soft, epigastric tenderness, nondistended; no guarding or rebound tenderness; good bowel sounds, no mass or bruits  Stool normal appearing in coloring however faintly guaiac positive      Extremities:  no pedal edema, 2+ pulses; no calf tenderness, no clubbing, no cyanosis  Neuro:  no focal motor or sensory deficits, CN 2-12 grossly intact  Psych:  Normal mood and affect, normal judgement and insight      Vital Signs  ED Triage Vitals   Temperature Pulse Respirations Blood Pressure SpO2   01/18/19 1539 01/18/19 1539 01/18/19 1539 01/18/19 1539 01/18/19 1539   99 2 °F (37 3 °C) 83 18 144/90 98 %      Temp Source Heart Rate Source Patient Position - Orthostatic VS BP Location FiO2 (%)   01/18/19 1539 -- 01/18/19 1630 01/18/19 1630 --   Temporal  Lying Left arm       Pain Score       01/18/19 1539       5           Vitals:    01/18/19 1539 01/18/19 1630 01/18/19 1742 01/18/19 1824   BP: 144/90 161/74 170/77 (!) 176/89   Pulse: 83 74 83 100   Patient Position - Orthostatic VS:  Lying Lying Lying       Visual Acuity      ED Medications  Medications   pantoprazole (PROTONIX) 80 mg in sodium chloride 0 9 % 100 mL IVPB (0 mg Intravenous Stopped 1/18/19 1711)   sodium chloride 0 9 % bolus 1,000 mL (1,000 mL Intravenous New Bag 1/18/19 1637)   ondansetron (ZOFRAN) injection 4 mg (4 mg Intravenous Given 1/18/19 1654)   iohexol (OMNIPAQUE) 350 MG/ML injection (MULTI-DOSE) 100 mL (100 mL Intravenous Given 1/18/19 1720)       Diagnostic Studies  Results Reviewed     Procedure Component Value Units Date/Time    Troponin I [[id number]]  (Normal) Collected:  01/18/19 1630    Lab Status:  Final result Specimen:  Blood from Arm, Left Updated:  01/18/19 1700     Troponin I <0 02 ng/mL     Comprehensive metabolic panel [[id number]]  (Abnormal) Collected:  01/18/19 1630    Lab Status:  Final result Specimen:  Blood from Arm, Left Updated:  01/18/19 1658     Sodium 139 mmol/L      Potassium 4 3 mmol/L      Chloride 99 (L) mmol/L      CO2 29 mmol/L      ANION GAP 11 mmol/L      BUN 46 (H) mg/dL      Creatinine 0 74 mg/dL      Glucose 113 mg/dL      Calcium 9 2 mg/dL      AST 20 U/L      ALT 19 U/L      Alkaline Phosphatase 68 U/L      Total Protein 6 1 (L) g/dL      Albumin 3 2 (L) g/dL      Total Bilirubin 0 74 mg/dL      eGFR 82 ml/min/1 73sq m     Narrative:         National Kidney Disease Education Program recommendations are as follows:  GFR calculation is accurate only with a steady state creatinine  Chronic Kidney disease less than 60 ml/min/1 73 sq  meters  Kidney failure less than 15 ml/min/1 73 sq  meters  Lipase [[id number]]  (Normal) Collected:  01/18/19 1630    Lab Status:  Final result Specimen:  Blood from Arm, Left Updated:  01/18/19 1658     Lipase 100 u/L     Protime-INR [[id number]]  (Normal) Collected:  01/18/19 1630    Lab Status:  Final result Specimen:  Blood from Arm, Left Updated:  01/18/19 1655     Protime 13 2 seconds      INR 0 99    APTT [[id number]]  (Normal) Collected:  01/18/19 1630    Lab Status:  Final result Specimen:  Blood from Arm, Left Updated:  01/18/19 1655     PTT 29 seconds     CBC and differential [[id number]]  (Abnormal) Collected:  01/18/19 1630    Lab Status:  Final result Specimen:  Blood from Arm, Left Updated:  01/18/19 1642     WBC 7 56 Thousand/uL      RBC 3 67 (L) Million/uL      Hemoglobin 12 6 g/dL      Hematocrit 36 5 %       (H) fL      MCH 34 3 pg      MCHC 34 5 g/dL      RDW 12 4 %      MPV 10 2 fL      Platelets 454 Thousands/uL      nRBC 0 /100 WBCs      Neutrophils Relative 64 %      Immat GRANS % 0 %      Lymphocytes Relative 22 %      Monocytes Relative 13 (H) %      Eosinophils Relative 0 %      Basophils Relative 1 %      Neutrophils Absolute 4 82 Thousands/µL      Immature Grans Absolute 0 02 Thousand/uL      Lymphocytes Absolute 1 66 Thousands/µL      Monocytes Absolute 0 99 Thousand/µL      Eosinophils Absolute 0 02 Thousand/µL      Basophils Absolute 0 05 Thousands/µL     POCT urinalysis dipstick [[id number]]     Lab Status:  No result Specimen:  Urine                  CT abdomen pelvis with contrast   Final Result by Jose Yadav MD (01/18 1811)      Colonic diverticulosis       Renal cysts  Grade 1 anterolisthesis of L2 on L3 with disc bulge              Workstation performed: [id number]                    Procedures  Procedures   ECG 12 Lead Documentation  Date/Time: today/date: 1/18/2019  Performed by: Dylan Hill    ECG reviewed by me, the ED Provider: yes    Patient location:  ED   Previous ECG:  Changes noted (improved from prior)  Rate:  75  ECG rate assessment: normal    Rhythm: sinus rhythm    Ectopy:  none    QRS axis:  Normal  Intervals: normal   Q waves: None   ST segments:  Nonspecific changes  T waves: nonspecific changes      Impression: NSR with nonspecific ST and T wave changes           Phone Contacts  ED Phone Contact    ED Course  ED Course as of Jan 18 1849 Fri Jan 18, 2019   1700 Hemoglobin 5 months ago was 13-14 Hemoglobin: 12 6   1830 No acute findings  Pt updated and resting comfortably  No additional episodes of vomiting in ED  Will proceed with admission for further evaluation of suspected upper GI bleed  CT abdomen pelvis with contrast                               MDM  CritCare Time    Disposition  Final diagnoses:   Upper GI bleed     Time reflects when diagnosis was documented in both MDM as applicable and the Disposition within this note     Time User Action Codes Description Comment    1/18/2019  6:46 PM Merary, [address] [[address]] Upper GI bleed       ED Disposition     ED Disposition Condition Comment    Admit  Case was discussed with [facility] and the patient's admission status was agreed to be Admission Status: inpatient status to the service of Dr Michel Story   Follow-up Information    None         Patient's Medications   Discharge Prescriptions    No medications on file     No discharge procedures on file      ED Provider  Electronically Signed by           Domingo Mclaughlin DO  01/18/19 2549

## 2019-01-19 ENCOUNTER — APPOINTMENT (INPATIENT)
Dept: CT IMAGING | Facility: HOSPITAL | Age: 74
DRG: 378 | End: 2019-01-19
Payer: MEDICARE

## 2019-01-19 ENCOUNTER — ANESTHESIA EVENT (INPATIENT)
Dept: GASTROENTEROLOGY | Facility: HOSPITAL | Age: 74
DRG: 378 | End: 2019-01-19
Payer: MEDICARE

## 2019-01-19 ENCOUNTER — ANESTHESIA (INPATIENT)
Dept: GASTROENTEROLOGY | Facility: HOSPITAL | Age: 74
DRG: 378 | End: 2019-01-19
Payer: MEDICARE

## 2019-01-19 LAB
ABO GROUP BLD: NORMAL
ANION GAP SERPL CALCULATED.3IONS-SCNC: 11 MMOL/L (ref 4–13)
BASOPHILS # BLD AUTO: 0.04 THOUSANDS/ΜL (ref 0–0.1)
BASOPHILS NFR BLD AUTO: 0 % (ref 0–1)
BLD GP AB SCN SERPL QL: NEGATIVE
BUN SERPL-MCNC: 55 MG/DL (ref 5–25)
CALCIUM SERPL-MCNC: 8.7 MG/DL (ref 8.3–10.1)
CHLORIDE SERPL-SCNC: 105 MMOL/L (ref 100–108)
CO2 SERPL-SCNC: 24 MMOL/L (ref 21–32)
CREAT SERPL-MCNC: 0.97 MG/DL (ref 0.6–1.3)
EOSINOPHIL # BLD AUTO: 0.01 THOUSAND/ΜL (ref 0–0.61)
EOSINOPHIL NFR BLD AUTO: 0 % (ref 0–6)
ERYTHROCYTE [DISTWIDTH] IN BLOOD BY AUTOMATED COUNT: 12.6 % (ref 11.6–15.1)
GFR SERPL CREATININE-BSD FRML MDRD: 59 ML/MIN/1.73SQ M
GLUCOSE SERPL-MCNC: 152 MG/DL (ref 65–140)
HCT VFR BLD AUTO: 27.5 % (ref 34.8–46.1)
HCT VFR BLD AUTO: 28.3 % (ref 34.8–46.1)
HGB BLD-MCNC: 10.6 G/DL (ref 11.5–15.4)
HGB BLD-MCNC: 10.7 G/DL (ref 11.5–15.4)
HGB BLD-MCNC: 9.2 G/DL (ref 11.5–15.4)
HGB BLD-MCNC: 9.3 G/DL (ref 11.5–15.4)
IMM GRANULOCYTES # BLD AUTO: 0.05 THOUSAND/UL (ref 0–0.2)
IMM GRANULOCYTES NFR BLD AUTO: 1 % (ref 0–2)
LYMPHOCYTES # BLD AUTO: 1.03 THOUSANDS/ΜL (ref 0.6–4.47)
LYMPHOCYTES NFR BLD AUTO: 11 % (ref 14–44)
MCH RBC QN AUTO: 33.8 PG (ref 26.8–34.3)
MCHC RBC AUTO-ENTMCNC: 32.9 G/DL (ref 31.4–37.4)
MCV RBC AUTO: 103 FL (ref 82–98)
MONOCYTES # BLD AUTO: 0.8 THOUSAND/ΜL (ref 0.17–1.22)
MONOCYTES NFR BLD AUTO: 9 % (ref 4–12)
NEUTROPHILS # BLD AUTO: 7.09 THOUSANDS/ΜL (ref 1.85–7.62)
NEUTS SEG NFR BLD AUTO: 79 % (ref 43–75)
NRBC BLD AUTO-RTO: 0 /100 WBCS
PLATELET # BLD AUTO: 141 THOUSANDS/UL (ref 149–390)
PMV BLD AUTO: 10.5 FL (ref 8.9–12.7)
POTASSIUM SERPL-SCNC: 3.8 MMOL/L (ref 3.5–5.3)
RBC # BLD AUTO: 2.75 MILLION/UL (ref 3.81–5.12)
RH BLD: POSITIVE
SODIUM SERPL-SCNC: 140 MMOL/L (ref 136–145)
SPECIMEN EXPIRATION DATE: NORMAL
WBC # BLD AUTO: 9.02 THOUSAND/UL (ref 4.31–10.16)

## 2019-01-19 PROCEDURE — 99232 SBSQ HOSP IP/OBS MODERATE 35: CPT | Performed by: INTERNAL MEDICINE

## 2019-01-19 PROCEDURE — P9016 RBC LEUKOCYTES REDUCED: HCPCS

## 2019-01-19 PROCEDURE — C9113 INJ PANTOPRAZOLE SODIUM, VIA: HCPCS | Performed by: PHYSICIAN ASSISTANT

## 2019-01-19 PROCEDURE — 0W3P8ZZ CONTROL BLEEDING IN GASTROINTESTINAL TRACT, VIA NATURAL OR ARTIFICIAL OPENING ENDOSCOPIC: ICD-10-PCS | Performed by: INTERNAL MEDICINE

## 2019-01-19 PROCEDURE — 86900 BLOOD TYPING SEROLOGIC ABO: CPT | Performed by: PHYSICIAN ASSISTANT

## 2019-01-19 PROCEDURE — 86850 RBC ANTIBODY SCREEN: CPT | Performed by: PHYSICIAN ASSISTANT

## 2019-01-19 PROCEDURE — 30233N1 TRANSFUSION OF NONAUTOLOGOUS RED BLOOD CELLS INTO PERIPHERAL VEIN, PERCUTANEOUS APPROACH: ICD-10-PCS | Performed by: INTERNAL MEDICINE

## 2019-01-19 PROCEDURE — 85018 HEMOGLOBIN: CPT | Performed by: PHYSICIAN ASSISTANT

## 2019-01-19 PROCEDURE — 86901 BLOOD TYPING SEROLOGIC RH(D): CPT | Performed by: PHYSICIAN ASSISTANT

## 2019-01-19 PROCEDURE — 85025 COMPLETE CBC W/AUTO DIFF WBC: CPT | Performed by: PHYSICIAN ASSISTANT

## 2019-01-19 PROCEDURE — 99222 1ST HOSP IP/OBS MODERATE 55: CPT | Performed by: INTERNAL MEDICINE

## 2019-01-19 PROCEDURE — 80048 BASIC METABOLIC PNL TOTAL CA: CPT | Performed by: PHYSICIAN ASSISTANT

## 2019-01-19 PROCEDURE — 43270 EGD LESION ABLATION: CPT | Performed by: INTERNAL MEDICINE

## 2019-01-19 PROCEDURE — 70450 CT HEAD/BRAIN W/O DYE: CPT

## 2019-01-19 PROCEDURE — 85014 HEMATOCRIT: CPT | Performed by: PHYSICIAN ASSISTANT

## 2019-01-19 RX ORDER — PROPOFOL 10 MG/ML
INJECTION, EMULSION INTRAVENOUS AS NEEDED
Status: DISCONTINUED | OUTPATIENT
Start: 2019-01-19 | End: 2019-01-19 | Stop reason: SURG

## 2019-01-19 RX ADMIN — PROPOFOL 100 MG: 10 INJECTION, EMULSION INTRAVENOUS at 10:27

## 2019-01-19 RX ADMIN — LIDOCAINE HYDROCHLORIDE 50 MG: 20 INJECTION, SOLUTION INTRAVENOUS at 10:26

## 2019-01-19 RX ADMIN — ANASTROZOLE 1 MG: 1 TABLET, COATED ORAL at 19:41

## 2019-01-19 RX ADMIN — PROPOFOL 40 MG: 10 INJECTION, EMULSION INTRAVENOUS at 10:31

## 2019-01-19 RX ADMIN — PROPOFOL 20 MG: 10 INJECTION, EMULSION INTRAVENOUS at 10:32

## 2019-01-19 RX ADMIN — SODIUM CHLORIDE 8 MG/HR: 9 INJECTION, SOLUTION INTRAVENOUS at 19:00

## 2019-01-19 RX ADMIN — LEVOTHYROXINE SODIUM 75 MCG: 75 TABLET ORAL at 05:35

## 2019-01-19 RX ADMIN — PROPOFOL 40 MG: 10 INJECTION, EMULSION INTRAVENOUS at 10:29

## 2019-01-19 RX ADMIN — PROPOFOL 30 MG: 10 INJECTION, EMULSION INTRAVENOUS at 10:35

## 2019-01-19 RX ADMIN — PROPOFOL 30 MG: 10 INJECTION, EMULSION INTRAVENOUS at 10:33

## 2019-01-19 RX ADMIN — SODIUM CHLORIDE 8 MG/HR: 9 INJECTION, SOLUTION INTRAVENOUS at 06:44

## 2019-01-19 RX ADMIN — SODIUM CHLORIDE, SODIUM LACTATE, POTASSIUM CHLORIDE, AND CALCIUM CHLORIDE 125 ML/HR: .6; .31; .03; .02 INJECTION, SOLUTION INTRAVENOUS at 18:21

## 2019-01-19 RX ADMIN — SODIUM CHLORIDE, SODIUM LACTATE, POTASSIUM CHLORIDE, AND CALCIUM CHLORIDE 125 ML/HR: .6; .31; .03; .02 INJECTION, SOLUTION INTRAVENOUS at 13:35

## 2019-01-19 NOTE — CONSULTS
Consultation -  [facility]  Claudette Jenni 72 y o  female MRN: [id number]  Unit/Bed#: ICU 06 Encounter: [id number]        Consults    ASSESSMENT/ PLAN:    73 yo female with pmh GERD, HTN, HLD, hypothyroidism who presented to the ED for epigastric abdominal pain and coffee ground emesis    1  Upper GI bleeding: admits to epigastric abdominal pain with melena and black emesis starting yesterday  She admits to dizziness, lightheadedness  Her Hgb on admission was 12 6 but this further decreased to 9 2 with a baseline of around 14  Her BUN is elevated to 55 and she is continuing to have melena this morning  This could be secondary to PUD, gastric or small bowel AVMs also consider MWT  She received 1 unit of PRBCs and is receiving another  She denies NSAID or alcohol use  -NPO  -PPI gtt  -monitor Hgb  -continue to transfuse as necessary  -EGD today for further evaluation after moved to step down   -discussed with primary team     Reason for Consult / Principal Problem: gi bleeding    HPI: Felicia Dye is a 73 yo female with pmh GERD, HTN, HLD, hypothyroidism who presented to the ED for epigastric abdominal pain and coffee ground emesis  She admits to epigastric abdominal pain x the past few days  She then experienced black emesis and melena yesterday multiple times bringing her to the ED  She admits to lightheadedness and dizziness at home and today  She appears pale and diaphoretic on exam  She has been hypotensive with bp 90/50  Her hgb on presentation was 12 6 with a baseline of 14  This further decreased today to 9 2 with active melena  She has never had an EGD, she denies abdominal surgery in the past  She had a colonoscopy 5/2017 for lower Gi bleeding that revealed ischemic colitis  REVIEW OF SYSTEMS:    CONSTITUTIONAL: Denies any fever, chills, or rigors  Good appetite, and no recent weight loss  HEENT: No earache or tinnitus  Denies hearing loss or visual disturbances    CARDIOVASCULAR: No chest pain or palpitations  RESPIRATORY: Denies any cough, hemoptysis, shortness of breath or dyspnea on exertion  GASTROINTESTINAL: As noted in the History of Present Illness  GENITOURINARY: No problems with urination  Denies any hematuria or dysuria  NEUROLOGIC: No dizziness or vertigo, denies headaches  MUSCULOSKELETAL: Denies any muscle or joint pain  SKIN: Denies skin rashes or itching  ENDOCRINE: Denies excessive thirst  Denies intolerance to heat or cold  PSYCHOSOCIAL: Denies depression or anxiety  Denies any recent memory loss         Historical Information   Past Medical History:   Diagnosis Date    Abnormal CT of the abdomen     Acute bronchitis     Anxiety     Appetite loss     Arthritis     Ascending aortic aneurysm (HCC)     Bilateral renal cysts     Cyst of ovary     Dysphagia     Esophageal reflux disease     Fat necrosis of breast     Full dentures     GERD (gastroesophageal reflux disease)     Herpes zoster     History of radiation therapy     Hyperlipidemia     Hypertension     Hypokalemia     Hypomagnesemia     Hypothyroidism     Impaired fasting glucose     Irritable bowel syndrome (IBS)     Ischemic colitis (HCC)     Knee pain     Limb alert care status     no BP/IV right arm    Osteoarthritis of right knee     Pneumonia     Post-op pain     Pulmonary nodules     Thoracic aortic aneurysm (HCC)     Unspecified ovarian cysts     Use of anastrozole (Arimidex)     Vasovagal syncope     Vitamin D deficiency     Wears glasses     Weight loss      Past Surgical History:   Procedure Laterality Date    APPENDECTOMY      pt states it was not removed    BREAST BIOPSY Right 03/02/2016    BREAST LUMPECTOMY Right 2004    BREAST SURGERY Right     Single lesion excision 1990 hyperplasia, 1st biopsy at 25yrs old was benign    CHOLECYSTECTOMY      COLONOSCOPY      COLONOSCOPY N/A 5/12/2017    Procedure: COLONOSCOPY with biopsy;  Surgeon: Gopal Gonzalez MD;  Location: [facility];   Service:     HYSTERECTOMY      KNEE SURGERY Right     MD BIOPSY/EXCISION, LYMPH NODE(S) Right 4/12/2016    Procedure: BIOPSY LYMPH NODE SENTINEL @ 1200 LYMPHOSCINTIGRAPHY LYMPHATIC MAPPING;  Surgeon: Marilee West MD;  [facility];  Service: General    MD MASTECTOMY, SIMPLE, COMPLETE Right 4/12/2016    Procedure: MASTECTOMY SIMPLE;  Surgeon: Marilee West MD;  [facility];  Service: General    TUBAL LIGATION      US GUIDED BREAST BIOPSY RIGHT COMPLETE Right 3/9/2016     Social History   History   Alcohol Use    Yes     Comment: 3 glasses of vodka, 16 oz, daily     History   Drug Use No     History   Smoking Status    Never Smoker   Smokeless Tobacco    Never Used     Comment: not interested     Family History   Problem Relation Age of Onset    Stroke Maternal Grandmother     Anemia Mother     Other Mother         disorders of blood and blood forming organs    Hypertension Mother     Stroke Father     Alcohol abuse Brother        Meds/Allergies     Prescriptions Prior to Admission   Medication    anastrozole (ARIMIDEX) 1 mg tablet    levothyroxine 75 mcg tablet    losartan (COZAAR) 100 MG tablet    metoprolol succinate (TOPROL-XL) 100 mg 24 hr tablet    aspirin (ECOTRIN) 325 mg EC tablet    cholecalciferol (VITAMIN D3) 400 units tablet     Current Facility-Administered Medications   Medication Dose Route Frequency    [MAR Hold] acetaminophen (TYLENOL) oral suspension 650 mg  650 mg Oral Q6H PRN    [MAR Hold] anastrozole (ARIMIDEX) tablet 1 mg  1 mg Oral Daily    [MAR Hold] hydrALAZINE (APRESOLINE) injection 5 mg  5 mg Intravenous Q6H PRN    lactated ringers infusion  125 mL/hr Intravenous Continuous    [MAR Hold] levothyroxine tablet 75 mcg  75 mcg Oral Early Morning    [MAR Hold] ondansetron (ZOFRAN) injection 4 mg  4 mg Intravenous Q6H PRN    pantoprazole (PROTONIX) 80 mg in sodium chloride 0 9 % 100 mL infusion  8 mg/hr Intravenous Continuous Allergies   Allergen Reactions    Demerol [Meperidine] GI Intolerance and Other (See Comments)     Other reaction(s): Other (See Comments)  severe nausea  severe nausea  Nausea/vomiting    Nitroglycerin Anaphylaxis and Other (See Comments)     BP drops drastically    Lipitor [Atorvastatin] Other (See Comments)     Joint/muscle pain    Zocor [Simvastatin] Other (See Comments)     Joint/muscle pain           Objective     Blood pressure 92/58, pulse 100, temperature 99 °F (37 2 °C), temperature source Tympanic, resp  rate 20, height 5' 4" (1 626 m), weight 71 5 kg (157 lb 10 1 oz), SpO2 99 %  Intake/Output Summary (Last 24 hours) at 01/19/19 1021  Last data filed at 01/19/19 0700   Gross per 24 hour   Intake             2100 ml   Output              600 ml   Net             1500 ml         PHYSICAL EXAM:      General Appearance:   Alert, mental status waxing and waning, cooperative, no distress, appears stated age, pale   HEENT:   Normocephalic, atraumatic  Right eye exhibits no discharge  Left eye exhibits no discharge  No scleral icterus    Neck:  Supple, symmetrical, trachea midline, no stridor   Lungs:   Clear to auscultation bilaterally; no rales, rhonchi or wheezing    Heart[de-identified]   S1 and S2 normal; regular rate and rhythm; no murmur, rub, or gallop     Abdomen:   Soft, non-tender, non-distended; normal bowel sounds; no masses, no organomegaly    Genitalia:   Deferred    Rectal:   Deferred    Extremities:  No cyanosis, clubbing or edema        Skin:  Skin color, texture, turgor normal, no rashes or lesions          Lab Results:   Admission on 01/18/2019   Component Date Value    WBC 01/18/2019 7 56     RBC 01/18/2019 3 67*    Hemoglobin 01/18/2019 12 6     Hematocrit 01/18/2019 36 5     MCV 01/18/2019 100*    MCH 01/18/2019 34 3     MCHC 01/18/2019 34 5     RDW 01/18/2019 12 4     MPV 01/18/2019 10 2     Platelets 31/57/1910 165     nRBC 01/18/2019 0     Neutrophils Relative 01/18/2019 64  Immat GRANS % 01/18/2019 0     Lymphocytes Relative 01/18/2019 22     Monocytes Relative 01/18/2019 13*    Eosinophils Relative 01/18/2019 0     Basophils Relative 01/18/2019 1     Neutrophils Absolute 01/18/2019 4 82     Immature Grans Absolute 01/18/2019 0 02     Lymphocytes Absolute 01/18/2019 1 66     Monocytes Absolute 01/18/2019 0 99     Eosinophils Absolute 01/18/2019 0 02     Basophils Absolute 01/18/2019 0 05     Protime 01/18/2019 13 2     INR 01/18/2019 0 99     PTT 01/18/2019 29     Sodium 01/18/2019 139     Potassium 01/18/2019 4 3     Chloride 01/18/2019 99*    CO2 01/18/2019 29     ANION GAP 01/18/2019 11     BUN 01/18/2019 46*    Creatinine 01/18/2019 0 74     Glucose 01/18/2019 113     Calcium 01/18/2019 9 2     AST 01/18/2019 20     ALT 01/18/2019 19     Alkaline Phosphatase 01/18/2019 68     Total Protein 01/18/2019 6 1*    Albumin 01/18/2019 3 2*    Total Bilirubin 01/18/2019 0 74     eGFR 01/18/2019 82     Lipase 01/18/2019 100     Troponin I 01/18/2019 <0 02     Color, UA 01/18/2019 yellow     ABO Grouping 01/18/2019 O     Rh Factor 01/18/2019 Positive     Antibody Screen 01/18/2019 Negative     Specimen Expiration Date 01/18/2019 [id number]     Ventricular Rate 01/18/2019 75     Atrial Rate 01/18/2019 74     QRSD Interval 01/18/2019 86     QT Interval 01/18/2019 378     QTC Interval 01/18/2019 422     P Axis 01/18/2019 70     QRS [place] 01/18/2019 -28     T Wave Axis 01/18/2019 16     Color, UA 01/18/2019 Yellow     Clarity, UA 01/18/2019 Clear     pH, UA 01/18/2019 7 0     Leukocytes, UA 01/18/2019 Negative     Nitrite, UA 01/18/2019 Negative     Protein, UA 01/18/2019 Negative     Glucose, UA 01/18/2019 Negative     Ketones, UA 01/18/2019 Negative     Urobilinogen, UA 01/18/2019 0 2     Bilirubin, UA 01/18/2019 Negative     Blood, UA 01/18/2019 Negative     Specific Gravity, UA 01/18/2019 1 010     Hemoglobin 01/19/2019 10 6*    Hemoglobin 01/19/2019 9 2*    Hematocrit 01/19/2019 27 5*    ABO Grouping 01/19/2019 O     Rh Factor 01/19/2019 Positive     Antibody Screen 01/19/2019 Negative     Specimen Expiration Date 01/19/2019 [id number]     Sodium 01/19/2019 140     Potassium 01/19/2019 3 8     Chloride 01/19/2019 105     CO2 01/19/2019 24     ANION GAP 01/19/2019 11     BUN 01/19/2019 55*    Creatinine 01/19/2019 0 97     Glucose 01/19/2019 152*    Calcium 01/19/2019 8 7     eGFR 01/19/2019 59     WBC 01/19/2019 9 02     RBC 01/19/2019 2 75*    Hemoglobin 01/19/2019 9 3*    Hematocrit 01/19/2019 28 3*    MCV 01/19/2019 103*    MCH 01/19/2019 33 8     MCHC 01/19/2019 32 9     RDW 01/19/2019 12 6     MPV 01/19/2019 10 5     Platelets 98/51/4957 141*    nRBC 01/19/2019 0     Neutrophils Relative 01/19/2019 79*    Immat GRANS % 01/19/2019 1     Lymphocytes Relative 01/19/2019 11*    Monocytes Relative 01/19/2019 9     Eosinophils Relative 01/19/2019 0     Basophils Relative 01/19/2019 0     Neutrophils Absolute 01/19/2019 7 09     Immature Grans Absolute 01/19/2019 0 05     Lymphocytes Absolute 01/19/2019 1 03     Monocytes Absolute 01/19/2019 0 80     Eosinophils Absolute 01/19/2019 0 01     Basophils Absolute 01/19/2019 0 04     Unit Product Code 01/19/2019 F3712R23     Unit Number 01/19/2019 [id number]     Unit ABO 01/19/2019 O     Unit RH 01/19/2019 POS     Unit Dispense Status 01/19/2019 Issued     Unit Product Code 01/19/2019 H5156P56     Unit Number 01/19/2019 [id number]     Unit ABO 01/19/2019 O     Unit RH 01/19/2019 POS     Unit Dispense Status 01/19/2019 Crossmatched        Imaging Studies: I have personally reviewed pertinent imaging studies  Ct Head Wo Contrast    Result Date: 1/19/2019  Impression: No acute intracranial abnormality  Ct Abdomen Pelvis With Contrast    Result Date: 1/18/2019  Impression: Colonic diverticulosis Renal cysts   Grade 1 anterolisthesis of L2 on L3 with disc bulge  This patient was seen and examined by Dr Willard Nielson  All key medical decisions were made by Dr Willard Nielson  Thank you for allowing us to participate in the care of this patient  We will follow up closely with you

## 2019-01-19 NOTE — ASSESSMENT & PLAN NOTE
· Vomiting x3 episodes since this morning of black emesis  · History of GI bleed due to ischemic colitis and hypotension in past  · Stool mildly guaiac positive  · Will make NPO, hydrate, monitor H/H, Protonix IV, consult Gastroenterology  · Had seen [facility] in the past and would like to see a different group

## 2019-01-19 NOTE — H&P
History and Physical - [facility]    Patient Information: Melina Martin 72 y o  female MRN: [id number]  Unit/Bed#: ED 32 Encounter: [id number]  Admitting Physician: Adrianne Houser DO  PCP: Malathi Boykin DO  Date of Admission:  01/18/19      * Gastrointestinal hemorrhage associated with gastric ulcer   Assessment & Plan    · Vomiting x3 episodes since this morning of black emesis  · History of GI bleed due to ischemic colitis and hypotension in past  · Stool mildly guaiac positive  · Will make NPO, hydrate, monitor H/H, Protonix IV, consult Gastroenterology  · Had seen [facility] in the past and would like to see a different group  Acute pain of left shoulder   Assessment & Plan    · Possible impingement with rotator cuff tendinitis and bursitis vs  Small RTC tear  · No weakness on manual muscle testing but this does elicit pain  · Decreased internal rotation, abduction and forward flexion secondary to pain  · She does have a history of seeing [facility], Dr Larry Khan, and can follow up with them as an outpatient for the left shoulder     Atrial fibrillation ([facility] [address] )   Assessment & Plan    · Continue Toprol-XL, Cozaar  · NSR on EKG     Invasive ductal carcinoma of breast, right (HCC)   Assessment & Plan    · Continue daily anastrozole     Hyperlipidemia   Assessment & Plan    · Not currently on statin     Hypothyroidism   Assessment & Plan    · Continue levothyroxine     Hypertension   Assessment & Plan    · Continue Toprol-XL, Cozaar daily  · Add hydralazine prn           HPI:   Paulette Lin is a 72 y o  female with a history of GI bleeding due to ischemic colitis and hypotension, essential hypertension, hyperlipidemia, ascending aortic aneurysm, paroxysmal atrial fibrillation, orthostatic hypotension, hypothyroidism, and breast cancer reports for at least 3 days or more she has had right upper quadrant and epigastric abdominal pain    This morning she had 3 episodes of vomiting blackish material     At present, she has no complaints other than some left shoulder pain from all the cooking and baking she was doing over the Christmas holidays  She has difficulty reaching behind her back, reaching above shoulder level and across her body  Some positions bother was sleeping  She localizes the pain to the lateral left shoulder  She does not report any weakness but is limited by pain  She has no history of left shoulder problems  Denies: Chest pain, Shortness of Breath, Nausea, Diarrhea, Dysuria    ROS:  A 12-point review of systems was done  Please see the HPI for the full details  All other systems negative      PMH:  Principal Problem:    Gastrointestinal hemorrhage associated with gastric ulcer  Active Problems:    Anxiety    Hypertension    Hypothyroidism    Hyperlipidemia    Thoracic aortic aneurysm (HCC)    Invasive ductal carcinoma of breast, right (HCC)    Orthostatic hypotension    Atrial fibrillation (HCC)    Acute pain of left shoulder      Past Medical History:   Diagnosis Date    Abnormal CT of the abdomen     Acute bronchitis     Anxiety     Appetite loss     Arthritis     Ascending aortic aneurysm (HCC)     Bilateral renal cysts     Cyst of ovary     Dysphagia     Esophageal reflux disease     Fat necrosis of breast     Full dentures     GERD (gastroesophageal reflux disease)     Herpes zoster     History of radiation therapy     Hyperlipidemia     Hypertension     Hypokalemia     Hypomagnesemia     Hypothyroidism     Impaired fasting glucose     Irritable bowel syndrome (IBS)     Ischemic colitis (HCC)     Knee pain     Limb alert care status     no BP/IV right arm    Osteoarthritis of right knee     Pneumonia     Post-op pain     Pulmonary nodules     Thoracic aortic aneurysm (HCC)     Unspecified ovarian cysts     Use of anastrozole (Arimidex)     Vasovagal syncope     Vitamin D deficiency     Wears glasses     Weight loss Past Surgical History:   Procedure Laterality Date    APPENDECTOMY      pt states it was not removed    BREAST BIOPSY Right 03/02/2016    BREAST LUMPECTOMY Right 2004    BREAST SURGERY Right     Single lesion excision 1990 hyperplasia, 1st biopsy at 25yrs old was benign    CHOLECYSTECTOMY      COLONOSCOPY      COLONOSCOPY N/A 5/12/2017    Procedure: COLONOSCOPY with biopsy;  Surgeon: Corey Mota MD;  Location: [facility];   Service:     HYSTERECTOMY      KNEE SURGERY Right     HI BIOPSY/EXCISION, LYMPH NODE(S) Right 4/12/2016    Procedure: BIOPSY LYMPH NODE SENTINEL @ 1200 LYMPHOSCINTIGRAPHY LYMPHATIC MAPPING;  Surgeon: Keshawn Velez MD;  Location: [facility];  Service: General    HI MASTECTOMY, SIMPLE, COMPLETE Right 4/12/2016    Procedure: MASTECTOMY SIMPLE;  Surgeon: Keshawn Velez MD;  Location: [facility];  Service: General    TUBAL LIGATION      US GUIDED BREAST BIOPSY RIGHT COMPLETE Right 3/9/2016     Social History     Social History    Marital status: Single     Spouse name: N/A    Number of children: N/A    Years of education: N/A     Occupational History    retired      Social History Main Topics    Smoking status: Never Smoker    Smokeless tobacco: Never Used      Comment: not interested    Alcohol use Yes      Comment: 3 glasses of vodka, 16 oz, daily    Drug use: No    Sexual activity: Not Currently     Other Topics Concern    None     Social History Narrative    None     Family History   Problem Relation Age of Onset    Stroke Maternal Grandmother     Anemia Mother     Other Mother         disorders of blood and blood forming organs    Hypertension Mother     Stroke Father     Alcohol abuse Brother        MED/ALLERGIES:  Current Facility-Administered Medications   Medication Dose Route Frequency Provider Last Rate Last Dose    acetaminophen (TYLENOL) oral suspension 650 mg  650 mg Oral Q6H PRN Ruby Pelayo PA-C        [START ON 1/19/2019] anastrozole (ARIMIDEX) tablet 1 mg  1 mg Oral Daily Rainer Oar, PA-C        lactated ringers infusion  75 mL/hr Intravenous Continuous Rainer Oar, PA-C 75 mL/hr at 01/18/19 2112 75 mL/hr at 01/18/19 2112    [START ON 1/19/2019] levothyroxine tablet 75 mcg  75 mcg Oral Early Morning Rainer Oar, PA-C        [START ON 1/19/2019] losartan (COZAAR) tablet 100 mg  100 mg Oral Daily Rainer Oar, [place]        [START ON 1/19/2019] metoprolol succinate (TOPROL-XL) 24 hr tablet 100 mg  100 mg Oral Daily Rainer Oar, PA-C        ondansetron [facility]) injection 4 mg  4 mg Intravenous Q6H PRN Rainer Oar, PA-C        pantoprazole (PROTONIX) 80 mg in sodium chloride 0 9 % 100 mL IVPB  80 mg Intravenous Once Rainer Oar, PA-C        And    pantoprazole (PROTONIX) 80 mg in sodium chloride 0 9 % 100 mL infusion  8 mg/hr Intravenous Continuous Rainer Oar, PA-C         Allergies   Allergen Reactions    Demerol [Meperidine] GI Intolerance and Other (See Comments)     Other reaction(s):  Other (See Comments)  severe nausea  severe nausea  Nausea/vomiting    Nitroglycerin Anaphylaxis and Other (See Comments)     BP drops drastically    Lipitor [Atorvastatin] Other (See Comments)     Joint/muscle pain    Zocor [Simvastatin] Other (See Comments)     Joint/muscle pain       OBJECTIVE:    Current Vitals:   Blood Pressure: (!) 176/89 (01/18/19 1824)  Pulse: 86 (01/18/19 1915)  Temperature: 99 2 °F (37 3 °C) (01/18/19 1539)  Temp Source: Temporal (01/18/19 1539)  Respirations: 18 (01/18/19 1915)  Weight - Scale: 68 kg (150 lb) (01/18/19 1539)  SpO2: 99 % (01/18/19 1915)      Intake/Output Summary (Last 24 hours) at 01/18/19 2119  Last data filed at 01/18/19 1859   Gross per 24 hour   Intake             2100 ml   Output                0 ml   Net             2100 ml       Invasive Devices     Peripheral Intravenous Line            Peripheral IV 01/18/19 Left Antecubital less than 1 day                  Physical Exam Constitutional: She is oriented to person, place, and time  She appears well-developed and well-nourished  HENT:   Head: Normocephalic and atraumatic  Right Ear: External ear normal    Left Ear: External ear normal    Eyes: Pupils are equal, round, and reactive to light  EOM are normal  No scleral icterus  Neck: Neck supple  No thyromegaly present  Cardiovascular: Normal rate, regular rhythm and normal heart sounds  No murmur heard  Pulmonary/Chest: Effort normal and breath sounds normal  No respiratory distress  She has no wheezes  Abdominal: Soft  Bowel sounds are normal  She exhibits no distension  Musculoskeletal:        Left shoulder: She exhibits decreased range of motion and tenderness (See comment)  She exhibits normal strength  Arms:  Pain with attempted impingement testing, scratch back maneuver and range of motion testing  No weakness with resisted internal or external rotation and no weakness with abduction although this is somewhat limited by pain inhibition   Neurological: She is alert and oriented to person, place, and time  No cranial nerve deficit  Skin: Skin is warm and dry  No erythema  Psychiatric: She has a normal mood and affect  Her behavior is normal  Judgment and thought content normal                                                      Lab Results:     Results from last 7 days  Lab Units 01/18/19  1630   WBC Thousand/uL 7 56   HEMOGLOBIN g/dL 12 6   HEMATOCRIT % 36 5   PLATELETS Thousands/uL 165        Results from last 7 days  Lab Units 01/18/19  1630   POTASSIUM mmol/L 4 3   CHLORIDE mmol/L 99*   CO2 mmol/L 29   BUN mg/dL 46*   CREATININE mg/dL 0 74   CALCIUM mg/dL 9 2   ALK PHOS U/L 68   ALT U/L 19   AST U/L 20     Lab Results   Component Value Date    TROPONINI <0 02 01/18/2019         Imaging:  CT ABDOMEN AND PELVIS WITH IV CONTRAST:  IMPRESSION:  Colonic diverticulosis  Renal cysts  Grade 1 anterolisthesis of L2 on L3 with disc bulge      EKG: NSR, No ishemia    VTE Prophylaxis: Reason for no pharmacologic prophylaxis GI BLEEDING    Code Status: FULL    Counseling / Coordination of Care: Total floor / unit time spent today 45 minutes  Anticipated Length of Stay will be: MORE THAN 2 (TWO) Midnights:     Laura Iglesias PA-C    This note has been constructed using a voice recognition system

## 2019-01-19 NOTE — ANESTHESIA PREPROCEDURE EVALUATION
Review of Systems/Medical History  Patient summary reviewed  Chart reviewed      Cardiovascular  Exercise tolerance (METS): <4,  Hyperlipidemia, Hypertension ,    Pulmonary  Negative pulmonary ROS Pneumonia,        GI/Hepatic    GERD well controlled,             Endo/Other  History of thyroid disease , hyperthyroidism,      GYN  Negative gynecology ROS          Hematology  Negative hematology ROS      Musculoskeletal    Arthritis     Neurology  Negative neurology ROS      Psychology   Negative psychology ROS Anxiety,              Physical Exam    Airway    Mallampati score: II  TM Distance: <3 FB  Neck ROM: full     Dental   upper dentures and lower dentures,     Cardiovascular  Rhythm: regular, Rate: normal,     Pulmonary  Breath sounds clear to auscultation,     Other Findings        Anesthesia Plan  ASA Score- 3 Emergent    Anesthesia Type- general with ASA Monitors  Additional Monitors:   Airway Plan:         Plan Factors- Patient instructed to abstain from smoking on day of procedure  Patient did not smoke on day of surgery  Induction- intravenous  Postoperative Plan-     Informed Consent- Anesthetic plan and risks discussed with patient

## 2019-01-19 NOTE — PROGRESS NOTES
Pt rang call bell at 2:00 to get up to use BR  While sitting Pt up she fell back down onto the bed stating she was way too dizzy to stand up  Pt's neuro assessment noticed to be changed  She no longer was able to follow commands, she did not know the year or where she was  SLIM PA came to assess pt  Ordered CT of the Head and repeat H/H  Transported to CT @ [phone]  Pts VSS   Will continue to monitor

## 2019-01-19 NOTE — PROGRESS NOTES
[facility] Progress Note  Patient: Vivien Mckeon 72 y o  female   MRN: [id number]  PCP: Samantha Hernandez DO  Unit/Bed#: E4 -01 Encounter: [id number]  Date Of Visit: 01/19/19    Assessment:    Principal Problem:    Gastrointestinal hemorrhage associated with gastric ulcer  Active Problems:    Anxiety    Hypertension    Hypothyroidism    Hyperlipidemia    Thoracic aortic aneurysm ([address] )    Invasive ductal carcinoma of breast, right (HCC)    Orthostatic hypotension    Atrial fibrillation (HCC)    Acute pain of left shoulder      Plan:    · GI bleed from upper GI source presumed continue PPI drip discuss with GI may want to pursue immediate EGD given gravity of bleed and symptoms,  · Blood loss anemia symptomatic with orthostatic hypotension and presyncope receive 1 unit of packed red blood cells will give another watch H&H closely  hypotensive and will discontinue BP meds continue lactated Ringer's at increased rate if BP does not respond with transfusions may need transfer for pressors in ICU  · Hypertension had been on losartan and metoprolol 100 mg will hold these till improved hemodynamics from GI bleed  · History of invasive ductal carcinoma of the breast on the right continue Arimidex  · Atrial fibrillation by history will place on telemetry given orthostatics symptoms had been on metoprolol for rate control will have to be held until BP stabilizes  · Orthostatic hypotension noted after admission and attempts to go to bathroom became disoriented lightheaded and presyncopal and CT of the brain performed this morning is unremarkable for change of note, need to keep at bed rest continue IV hydration and transfuse to get hemoglobin over 10      VTE Pharmacologic Prophylaxis:   Pharmacologic: Pharmacologic VTE Prophylaxis contraindicated due to GI bleed  Mechanical VTE Prophylaxis in Place: Yes    Discussions with Specialists or Other Care Team Provider:  Discussed with Gastroenterology    Time Spent for Care: 45 minutes  More than 50% of total time spent on counseling and coordination of care as described above  Subjective:   Patient presently awake and alert anxious and admitting to cramping abdominal discomfort and had passage of melanotic stool while during exam     Objective:     Vitals:   Temp (24hrs), Av 9 °F (37 2 °C), Min:98 4 °F (36 9 °C), Max:99 2 °F (37 3 °C)    Temp:  [98 4 °F (36 9 °C)-99 2 °F (37 3 °C)] 99 °F (37 2 °C)  HR:  [] 100  Resp:  [16-20] 20  BP: ()/(58-90) 92/58  SpO2:  [96 %-100 %] 99 %  Body mass index is 27 06 kg/m²  Input and Output Summary (last 24 hours): Intake/Output Summary (Last 24 hours) at 19 0813  Last data filed at 19 0700   Gross per 24 hour   Intake             2100 ml   Output              600 ml   Net             1500 ml       Physical Exam:     Physical Exam:   General appearance: alert, fatigued, appears stated age and cooperative  Head: Normocephalic, without obvious abnormality, atraumatic  Lungs: clear to auscultation bilaterally  Heart: irregularly irregular rhythm  Abdomen: soft, non-tender; bowel sounds normal; no masses,  no organomegaly  Back: negative  Extremities: extremities normal, atraumatic, no cyanosis or edema  Neurologic: Grossly normal      Additional Data:     Labs:      Results from last 7 days  Lab Units 19  0448   WBC Thousand/uL 9 02   HEMOGLOBIN g/dL 9 3*   HEMATOCRIT % 28 3*   PLATELETS Thousands/uL 141*   NEUTROS PCT % 79*   LYMPHS PCT % 11*   MONOS PCT % 9   EOS PCT % 0       Results from last 7 days  Lab Units 19  0448 19  1630   POTASSIUM mmol/L 3 8 4 3   CHLORIDE mmol/L 105 99*   CO2 mmol/L 24 29   BUN mg/dL 55* 46*   CREATININE mg/dL 0 97 0 74   CALCIUM mg/dL 8 7 9 2   ALK PHOS U/L  --  68   ALT U/L  --  19   AST U/L  --  20       Results from last 7 days  Lab Units 19  1630   INR  0 99       * I Have Reviewed All Lab Data Listed Above    * Additional Pertinent Lab Tests Reviewed: [facility] Admission Reviewed    Imaging:  Ct Head Wo Contrast    Result Date: 1/19/2019  Narrative: CT BRAIN - WITHOUT CONTRAST INDICATION:   Decreased alertness  COMPARISON:  8/22/2018  TECHNIQUE:  CT examination of the brain was performed  In addition to axial images, coronal 2D reformatted images were created and submitted for interpretation  Radiation dose length product (DLP) for this visit:  927 mGy-cm   This examination, like all CT scans performed in the [facility], was performed utilizing techniques to minimize radiation dose exposure, including the use of iterative reconstruction and automated exposure control  IMAGE QUALITY:  Diagnostic  FINDINGS: PARENCHYMA: Decreased attenuation is noted in periventricular and subcortical white matter demonstrating an appearance that is statistically most likely to represent moderate microangiopathic change; this appearance is similar when compared to most recent prior examination  No CT signs of acute infarction  No intracranial mass, mass effect or midline shift  No acute parenchymal hemorrhage  VENTRICLES AND EXTRA-AXIAL SPACES:  Normal for the patient's age  VISUALIZED ORBITS AND PARANASAL SINUSES:  Unremarkable  CALVARIUM AND EXTRACRANIAL SOFT TISSUES:  Normal      Impression: No acute intracranial abnormality  Workstation performed: [id number]     Ct Abdomen Pelvis With Contrast    Result Date: 1/18/2019  Narrative: CT ABDOMEN AND PELVIS WITH IV CONTRAST INDICATION: Unspecified abdominal pain, gastrointestinal bleeding COMPARISON:  5/9/2017 TECHNIQUE:  CT examination of the abdomen and pelvis was performed  Axial, sagittal, and coronal 2D reformatted images were created from the source data and submitted for interpretation  Radiation dose length product (DLP) for this visit:  358 mGy-cm     This examination, like all CT scans performed in the [facility], was performed utilizing techniques to minimize radiation dose exposure, including the use of iterative reconstruction and automated exposure control  IV Contrast:  100 mL of iohexol (OMNIPAQUE) Enteric Contrast:  Enteric contrast was not administered  FINDINGS: ABDOMEN LOWER CHEST:  No clinically significant abnormality identified in the visualized lower chest  LIVER/BILIARY TREE:  Unremarkable  GALLBLADDER:  No calcified gallstones  No pericholecystic inflammatory change  SPLEEN:  Unremarkable  PANCREAS:  Unremarkable  ADRENAL GLANDS:  Unremarkable  KIDNEYS/URETERS:  One or more sharply circumscribed subcentimeter renal hypodensities are noted  These lesions are too small to accurately characterize, but are statistically most likely to represent benign cortical renal cyst(s)  According to the guidelines published in the [facility] Paper of the ACR Incidental Findings Committee (Radiology 2010), no further workup of these lesions is recommended  STOMACH AND BOWEL:  There is colonic diverticulosis without evidence of acute diverticulitis  APPENDIX:  No findings to suggest appendicitis  ABDOMINOPELVIC CAVITY:  No ascites or free intraperitoneal air  No lymphadenopathy  VESSELS:  Unremarkable for patient's age  PELVIS REPRODUCTIVE ORGANS:  Unremarkable for patient's age  URINARY BLADDER:  Unremarkable  ABDOMINAL WALL/INGUINAL REGIONS:  Unremarkable  OSSEOUS STRUCTURES:  No acute fracture or destructive osseous lesion  Grade 1 anterolisthesis of L2 on L3 with disc bulge  Impression: Colonic diverticulosis Renal cysts  Grade 1 anterolisthesis of L2 on L3 with disc bulge  Workstation performed: [id number]     Imaging Reports Reviewed Today Include:   Imaging Personally Reviewed by Myself Includes:    Procedure: Ct Head Wo Contrast    Result Date: 1/19/2019  Narrative: CT BRAIN - WITHOUT CONTRAST INDICATION:   Decreased alertness  COMPARISON:  8/22/2018  TECHNIQUE:  CT examination of the brain was performed    In addition to axial images, coronal 2D reformatted images were created and submitted for interpretation  Radiation dose length product (DLP) for this visit:  927 mGy-cm   This examination, like all CT scans performed in the [facility], was performed utilizing techniques to minimize radiation dose exposure, including the use of iterative reconstruction and automated exposure control  IMAGE QUALITY:  Diagnostic  FINDINGS: PARENCHYMA: Decreased attenuation is noted in periventricular and subcortical white matter demonstrating an appearance that is statistically most likely to represent moderate microangiopathic change; this appearance is similar when compared to most recent prior examination  No CT signs of acute infarction  No intracranial mass, mass effect or midline shift  No acute parenchymal hemorrhage  VENTRICLES AND EXTRA-AXIAL SPACES:  Normal for the patient's age  VISUALIZED ORBITS AND PARANASAL SINUSES:  Unremarkable  CALVARIUM AND EXTRACRANIAL SOFT TISSUES:  Normal      Impression: No acute intracranial abnormality  Workstation performed: [id number]     Procedure: Ct Abdomen Pelvis With Contrast    Result Date: 1/18/2019  Narrative: CT ABDOMEN AND PELVIS WITH IV CONTRAST INDICATION: Unspecified abdominal pain, gastrointestinal bleeding COMPARISON:  5/9/2017 TECHNIQUE:  CT examination of the abdomen and pelvis was performed  Axial, sagittal, and coronal 2D reformatted images were created from the source data and submitted for interpretation  Radiation dose length product (DLP) for this visit:  358 mGy-cm   This examination, like all CT scans performed in the [facility], was performed utilizing techniques to minimize radiation dose exposure, including the use of iterative reconstruction and automated exposure control  IV Contrast:  100 mL of iohexol (OMNIPAQUE) Enteric Contrast:  Enteric contrast was not administered   FINDINGS: ABDOMEN LOWER CHEST:  No clinically significant abnormality identified in the visualized lower chest  LIVER/BILIARY TREE:  Unremarkable  GALLBLADDER:  No calcified gallstones  No pericholecystic inflammatory change  SPLEEN:  Unremarkable  PANCREAS:  Unremarkable  ADRENAL GLANDS:  Unremarkable  KIDNEYS/URETERS:  One or more sharply circumscribed subcentimeter renal hypodensities are noted  These lesions are too small to accurately characterize, but are statistically most likely to represent benign cortical renal cyst(s)  According to the guidelines published in the [facility] Paper of the ACR Incidental Findings Committee (Radiology 2010), no further workup of these lesions is recommended  STOMACH AND BOWEL:  There is colonic diverticulosis without evidence of acute diverticulitis  APPENDIX:  No findings to suggest appendicitis  ABDOMINOPELVIC CAVITY:  No ascites or free intraperitoneal air  No lymphadenopathy  VESSELS:  Unremarkable for patient's age  PELVIS REPRODUCTIVE ORGANS:  Unremarkable for patient's age  URINARY BLADDER:  Unremarkable  ABDOMINAL WALL/INGUINAL REGIONS:  Unremarkable  OSSEOUS STRUCTURES:  No acute fracture or destructive osseous lesion  Grade 1 anterolisthesis of L2 on L3 with disc bulge  Impression: Colonic diverticulosis Renal cysts  Grade 1 anterolisthesis of L2 on L3 with disc bulge   Workstation performed: [id number]        Recent Cultures (last 7 days):           Last 24 Hours Medication List:     Current Facility-Administered Medications:  acetaminophen 650 mg Oral Q6H PRN Sarahboris Nguyen PA-C    anastrozole 1 mg Oral Daily Sarah Wendy PA-C    hydrALAZINE 5 mg Intravenous Q6H PRN Sarah Wendy, PA-C    lactated ringers 100 mL/hr Intravenous Continuous Sarahboris Nguyen, PA-C Last Rate: 100 mL/hr (01/19/19 6732)   levothyroxine 75 mcg Oral Early Morning Sarah Wendy PA-C    ondansetron 4 mg Intravenous Q6H PRN Sarahboris Nguyen PA-C    pantoprozole (PROTONIX) infusion (Continuous) 8 mg/hr Intravenous Continuous Sarah Wendy, PA-C Last Rate: 8 mg/hr (01/19/19 7825)        Today, Patient Was Seen By: Citlalli Taylor MD    ** Please Note: Dragon 360 Dictation voice to text software may have been used in the creation of this document   **

## 2019-01-19 NOTE — PROGRESS NOTES
Progress Note - Amara King 72 y o  female MRN: [id number]    Unit/Bed#: E4 -01 Encounter: [id number]      Assessment:  Possible TIA    Underlying GI bleed with decreased H/H  Alzheimer's type dementia  Hypothyroidism  Essential hypertension  Paroxysmal atrial fibrillation  Interval bowel syndrome  Hyperlipidemia  Breast cancer in remission  Ascending aortic aneurysm    Plan:  Due to the acute mental status changes, will obtain CT without contrast, because she had a contrast study within the last 12 hours, recheck an H/H and type and screen    Subjective:   Patient exhibited increased disorientation, inability to follow commands and reported difficulty speaking  She is no longer oriented to place or time  She is complaining of abdominal pain  Objective:     Vitals: Blood pressure 110/73, pulse 88, temperature 98 9 °F (37 2 °C), temperature source Tympanic, resp  rate 20, height 5' 4" (1 626 m), weight 71 5 kg (157 lb 10 1 oz), SpO2 100 %  ,Body mass index is 27 06 kg/m²        Intake/Output Summary (Last 24 hours) at 01/19/19 0241  Last data filed at 01/18/19 2055   Gross per 24 hour   Intake             2100 ml   Output              300 ml   Net             1800 ml       Physical Exam:   She is disoriented to time and place  Regular rate and rhythm without murmur  Will rales or wheezes  Bowel sounds present and normal  Significant tenderness in the right upper quadrant and epigastric areas without rebound and no appreciable abdominal bruit  EOMs intact  Pupils equal round reactive to light  Cranial nerves II-XII intact; no appreciable facial droop  3+ biceps, triceps, brachioradialis reflexes bilaterally  5/5 strength throughout upper and lower extremity    Invasive Devices     Peripheral Intravenous Line            Peripheral IV 01/18/19 Left Antecubital less than 1 day                ADDENDUM:    CT scan without contrast showed no acute abnormality  Consider transfusion in obtain blood consent given the significant drop from hemoglobin of 14 0 in July 2018 to 12 6 on 1/18/19 to 10 6 and 9 2 today  She remains disoriented

## 2019-01-19 NOTE — ASSESSMENT & PLAN NOTE
· Possible impingement with rotator cuff tendinitis and bursitis vs  Small RTC tear  · No weakness on manual muscle testing but this does elicit pain  · Decreased internal rotation, abduction and forward flexion secondary to pain  · She does have a history of seeing [facility], Dr Mavis White, and can follow up with them as an outpatient for the left shoulder

## 2019-01-19 NOTE — OP NOTE
ESOPHAGOGASTRODUODENOSCOPY    PROCEDURE: EGD    SEDATION: Monitored anesthesia care, check anesthesia records    ASA Class: 3    INDICATIONS:  Acute GI bleed    CONSENT:  Informed consent was obtained for the procedure, including sedation after explaining the risks and benefits of the procedure  Risks including but not limited to bleeding, perforation, infection, and missed lesion  PREPARATION:   Telemetry, pulse oximetry, blood pressure were monitored throughout the procedure  Patient was identified by myself both verbally and by visual inspection of ID band  DESCRIPTION:   Patient was placed in the left lateral decubitus position and was sedated with the above medication  The gastroscope was introduced in to the oropharynx and the esophagus was intubated under direct visualization  Scope was passed down the esophagus up to 2nd part of the duodenum  A careful inspection was made as the gastroscope was withdrawn, including a retroflexed view of the stomach; findings and interventions are described below  FINDINGS:    #1  Esophagus- normal esophagus and GE junction    #2  Stomach- 2 ulcers in the antrum/pre-pyloric region  Largest ulcer was approximately 1 cm with a visible vessel  There was no active bleeding at the time of procedure  The area was cauterized with gold probe and 4 cc of 1 in 100,000 epinephrine oranges injected in 4 quadrants  Normal retroflexion    #3  Duodenum- normal duodenum         IMPRESSIONS:      Upper GI bleed secondary to pre-pyloric ulceration  Largest ulcer had a visible vessel, cauterized with gold probe, epinephrine injection  No bleeding before and after procedure  RECOMMENDATIONS:     Monitored step-down  Continue Protonix drip for 48 hours  Repeat EGD in 6 to 8 weeks  Call with any signs of active bleeding  Transfuse as clinically indicated    COMPLICATIONS:  None; patient tolerated the procedure well      SPECIMENS:  * No specimens in log *    ESTIMATED BLOOD LOSS:  Minimal

## 2019-01-20 LAB
ABO GROUP BLD BPU: NORMAL
ABO GROUP BLD BPU: NORMAL
BPU ID: NORMAL
BPU ID: NORMAL
HGB BLD-MCNC: 10.2 G/DL (ref 11.5–15.4)
HGB BLD-MCNC: 9.3 G/DL (ref 11.5–15.4)
HGB BLD-MCNC: 9.4 G/DL (ref 11.5–15.4)
UNIT DISPENSE STATUS: NORMAL
UNIT DISPENSE STATUS: NORMAL
UNIT PRODUCT CODE: NORMAL
UNIT PRODUCT CODE: NORMAL
UNIT RH: NORMAL
UNIT RH: NORMAL

## 2019-01-20 PROCEDURE — 87338 HPYLORI STOOL AG IA: CPT | Performed by: PHYSICIAN ASSISTANT

## 2019-01-20 PROCEDURE — 85018 HEMOGLOBIN: CPT | Performed by: PHYSICIAN ASSISTANT

## 2019-01-20 PROCEDURE — C9113 INJ PANTOPRAZOLE SODIUM, VIA: HCPCS | Performed by: PHYSICIAN ASSISTANT

## 2019-01-20 PROCEDURE — 99232 SBSQ HOSP IP/OBS MODERATE 35: CPT | Performed by: INTERNAL MEDICINE

## 2019-01-20 PROCEDURE — 85018 HEMOGLOBIN: CPT | Performed by: INTERNAL MEDICINE

## 2019-01-20 RX ORDER — METOPROLOL SUCCINATE 50 MG/1
50 TABLET, EXTENDED RELEASE ORAL DAILY
Status: DISCONTINUED | OUTPATIENT
Start: 2019-01-20 | End: 2019-01-22 | Stop reason: HOSPADM

## 2019-01-20 RX ADMIN — SODIUM CHLORIDE 8 MG/HR: 9 INJECTION, SOLUTION INTRAVENOUS at 14:41

## 2019-01-20 RX ADMIN — ANASTROZOLE 1 MG: 1 TABLET, COATED ORAL at 19:50

## 2019-01-20 RX ADMIN — METOPROLOL SUCCINATE 50 MG: 50 TABLET, EXTENDED RELEASE ORAL at 11:10

## 2019-01-20 RX ADMIN — SODIUM CHLORIDE 8 MG/HR: 9 INJECTION, SOLUTION INTRAVENOUS at 05:06

## 2019-01-20 RX ADMIN — SODIUM CHLORIDE, SODIUM LACTATE, POTASSIUM CHLORIDE, AND CALCIUM CHLORIDE 125 ML/HR: .6; .31; .03; .02 INJECTION, SOLUTION INTRAVENOUS at 02:40

## 2019-01-20 RX ADMIN — LEVOTHYROXINE SODIUM 75 MCG: 75 TABLET ORAL at 05:06

## 2019-01-20 NOTE — PLAN OF CARE
DISCHARGE PLANNING     Discharge to home or other facility with appropriate resources Progressing        GASTROINTESTINAL - ADULT     Minimal or absence of nausea and/or vomiting Progressing     Maintains or returns to baseline bowel function Progressing     Maintains adequate nutritional intake Progressing        HEMATOLOGIC - ADULT     Maintains hematologic stability Progressing        INFECTION - ADULT     Absence or prevention of progression during hospitalization Progressing     Absence of fever/infection during neutropenic period Progressing        Knowledge Deficit     Patient/family/caregiver demonstrates understanding of disease process, treatment plan, medications, and discharge instructions Progressing        METABOLIC, FLUID AND ELECTROLYTES - ADULT     Electrolytes maintained within normal limits Progressing     Fluid balance maintained Progressing        MUSCULOSKELETAL - ADULT     Maintain or return mobility to safest level of function Progressing     Maintain proper alignment of affected body part Progressing        PAIN - ADULT     Verbalizes/displays adequate comfort level or baseline comfort level Progressing        Potential for Falls     Patient will remain free of falls Progressing        Prexisting or High Potential for Compromised Skin Integrity     Skin integrity is maintained or improved Progressing        SAFETY ADULT     Maintain or return to baseline ADL function Progressing     Maintain or return mobility status to optimal level Progressing

## 2019-01-20 NOTE — PROGRESS NOTES
[facility] Internal Medicine Progress Note  Patient: Melina Martin 72 y o  female   MRN: [id number]  PCP: Malathi Boykin DO  Unit/Bed#: [facility] 68 2 -02 Encounter: [id number]  Date Of Visit: 01/20/19    Assessment:    Principal Problem:    Gastrointestinal hemorrhage associated with gastric ulcer  Active Problems:    Anxiety    Hypertension    Hypothyroidism    Hyperlipidemia    Thoracic aortic aneurysm ([address] )    Invasive ductal carcinoma of breast, right (HCC)    Orthostatic hypotension    Atrial fibrillation (HCC)    Acute pain of left shoulder      Plan:    · GI bleed from upper GI source underwent urgent EGD noting 2 ulcers in the antrum 2 pyloric region 1 of which had a visible vessel measuring 1 cm no active bleeding seen gold probe and epinephrine injections used will continue PPI infusion for 48 hr and advance diet continue monitor H&H,  · Blood loss anemia symptomatic with orthostatic hypotension and presyncope receive 2 unit of packed red blood cells with immediate H&H 10 7 now at 9 3 on admission presentation 12 6  · Hypertension had been on losartan and metoprolol 100 mg will hold these till improved hemodynamics from GI bleed  · History of invasive ductal carcinoma of the breast on the right continue Arimidex for adjuvant therapy with history of early stage right breast cancer, ER positive disease, diagnosed in 2002  She underwent lumpectomy followed by radiation as well as tamoxifen as adjuvant hormonal therapy until 2007  She was diagnosed with recurrent stage IA right breast cancer, grade 1, ER/FL positive HER-2 negative disease  She underwent mastectomy resulting in MARIAELENA  Since May 2016, she has been on adjuvant hormonal therapy with anastrozole     · Atrial fibrillation by history  on telemetry presently sinus rhythm given orthostatics symptoms had been on metoprolol 100 mg and losartan 100 mg both of which were held will resume metoprolol 50 mg today  · Orthostatic hypotension by history in relation to bilateral carotid stenosis and again noted here with blood loss anemia noted after admission and attempts to go to bathroom became disoriented lightheaded and presyncopal and CT of the brain performed subsequently yesterday is unremarkable for change of note, will of assess further with PT/OT early week  ·   Recent cervical spine fractures involved to be in the of odontoid, C5 and C7 had been followed by neuro surgery with C-collar being removed in October during hospitalization she was noted to possibly have a mild grade 1 right vertebral artery injury and was placed on aspirin 325 mg daily initially  She had a follow up CTA head/neck (18) that reported right vertebral artery cervical segment normal origin with vessels normal in caliber throughout the neck  There was note of atherosclerotic disease involving bilateral carotid bifurcations with approximately 60% stenosis of the proximal left cervical internal carotid artery  She was cleared to decrease Aspirin dose down to Aspirin 81mg daily as of 18 office visit and was recommended to follow up with her with her PCP for carotid stenosis  We will need to assess what if any anti-platelet therapy she can't undertake after her serious upper GI bleed  ·        VTE Pharmacologic Prophylaxis:   Pharmacologic: Pharmacologic VTE Prophylaxis contraindicated due to GI bleed  Mechanical VTE Prophylaxis in Place: Yes    Discussions with Specialists or Other Care Team Provider:  Discussed with Gastroenterology    Time Spent for Care: 45 minutes  More than 50% of total time spent on counseling and coordination of care as described above  Subjective:   Patient presently awake and alert anxious but no longer passing any melanotic stools or cramping abdominal pain    Continued complaints of some left shoulder pain that is been ongoing    Objective:     Vitals:   Temp (24hrs), Av 9 °F (37 2 °C), Min:98 1 °F (36 7 °C), Max:99 9 °F (37 7 °C)    Temp:  [98 1 °F (36 7 °C)-99 9 °F (37 7 °C)] 99 5 °F (37 5 °C)  HR:  [] 92  Resp:  [14-32] 20  BP: ()/(50-85) 118/77  SpO2:  [94 %-100 %] 94 %  Body mass index is 28 38 kg/m²  Input and Output Summary (last 24 hours): Intake/Output Summary (Last 24 hours) at 01/20/19 1027  Last data filed at 01/20/19 1004   Gross per 24 hour   Intake          2753 17 ml   Output             1825 ml   Net           928 17 ml       Physical Exam:     Physical Exam:   General appearance: alert, fatigued, appears stated age and cooperative  Head: Normocephalic, without obvious abnormality, atraumatic  Lungs: clear to auscultation bilaterally  Heart: irregularly irregular rhythm  Abdomen: soft, non-tender; bowel sounds normal; no masses,  no organomegaly  Back: negative  Extremities: extremities normal, atraumatic, no cyanosis or edema  Neurologic: Grossly normal      Additional Data:     Labs:      Results from last 7 days  Lab Units 01/20/19  0823  01/19/19  0448   WBC Thousand/uL  --   --  9 02   HEMOGLOBIN g/dL 9 3*  < > 9 3*   HEMATOCRIT %  --   --  28 3*   PLATELETS Thousands/uL  --   --  141*   NEUTROS PCT %  --   --  79*   LYMPHS PCT %  --   --  11*   MONOS PCT %  --   --  9   EOS PCT %  --   --  0   < > = values in this interval not displayed  Results from last 7 days  Lab Units 01/19/19  0448 01/18/19  1630   POTASSIUM mmol/L 3 8 4 3   CHLORIDE mmol/L 105 99*   CO2 mmol/L 24 29   BUN mg/dL 55* 46*   CREATININE mg/dL 0 97 0 74   CALCIUM mg/dL 8 7 9 2   ALK PHOS U/L  --  68   ALT U/L  --  19   AST U/L  --  20       Results from last 7 days  Lab Units 01/18/19  1630   INR  0 99       * I Have Reviewed All Lab Data Listed Above  * Additional Pertinent Lab Tests Reviewed: Real 66 Admission Reviewed    Imaging:  Ct Head Wo Contrast    Result Date: 1/19/2019  Narrative: CT BRAIN - WITHOUT CONTRAST INDICATION:   Decreased alertness  COMPARISON:  8/22/2018   TECHNIQUE:  CT examination of the brain was performed  In addition to axial images, coronal 2D reformatted images were created and submitted for interpretation  Radiation dose length product (DLP) for this visit:  927 mGy-cm   This examination, like all CT scans performed in the [facility], was performed utilizing techniques to minimize radiation dose exposure, including the use of iterative reconstruction and automated exposure control  IMAGE QUALITY:  Diagnostic  FINDINGS: PARENCHYMA: Decreased attenuation is noted in periventricular and subcortical white matter demonstrating an appearance that is statistically most likely to represent moderate microangiopathic change; this appearance is similar when compared to most recent prior examination  No CT signs of acute infarction  No intracranial mass, mass effect or midline shift  No acute parenchymal hemorrhage  VENTRICLES AND EXTRA-AXIAL SPACES:  Normal for the patient's age  VISUALIZED ORBITS AND PARANASAL SINUSES:  Unremarkable  CALVARIUM AND EXTRACRANIAL SOFT TISSUES:  Normal      Impression: No acute intracranial abnormality  Workstation performed: [id number]     Ct Abdomen Pelvis With Contrast    Result Date: 1/18/2019  Narrative: CT ABDOMEN AND PELVIS WITH IV CONTRAST INDICATION: Unspecified abdominal pain, gastrointestinal bleeding COMPARISON:  5/9/2017 TECHNIQUE:  CT examination of the abdomen and pelvis was performed  Axial, sagittal, and coronal 2D reformatted images were created from the source data and submitted for interpretation  Radiation dose length product (DLP) for this visit:  358 mGy-cm   This examination, like all CT scans performed in the [facility], was performed utilizing techniques to minimize radiation dose exposure, including the use of iterative reconstruction and automated exposure control  IV Contrast:  100 mL of iohexol (OMNIPAQUE) Enteric Contrast:  Enteric contrast was not administered   FINDINGS: ABDOMEN LOWER CHEST:  No clinically significant abnormality identified in the visualized lower chest  LIVER/BILIARY TREE:  Unremarkable  GALLBLADDER:  No calcified gallstones  No pericholecystic inflammatory change  SPLEEN:  Unremarkable  PANCREAS:  Unremarkable  ADRENAL GLANDS:  Unremarkable  KIDNEYS/URETERS:  One or more sharply circumscribed subcentimeter renal hypodensities are noted  These lesions are too small to accurately characterize, but are statistically most likely to represent benign cortical renal cyst(s)  According to the guidelines published in the [facility] Paper of the ACR Incidental Findings Committee (Radiology 2010), no further workup of these lesions is recommended  STOMACH AND BOWEL:  There is colonic diverticulosis without evidence of acute diverticulitis  APPENDIX:  No findings to suggest appendicitis  ABDOMINOPELVIC CAVITY:  No ascites or free intraperitoneal air  No lymphadenopathy  VESSELS:  Unremarkable for patient's age  PELVIS REPRODUCTIVE ORGANS:  Unremarkable for patient's age  URINARY BLADDER:  Unremarkable  ABDOMINAL WALL/INGUINAL REGIONS:  Unremarkable  OSSEOUS STRUCTURES:  No acute fracture or destructive osseous lesion  Grade 1 anterolisthesis of L2 on L3 with disc bulge  Impression: Colonic diverticulosis Renal cysts  Grade 1 anterolisthesis of L2 on L3 with disc bulge  Workstation performed: [id number]     Imaging Reports Reviewed Today Include:   Imaging Personally Reviewed by Myself Includes:    Procedure: Ct Head Wo Contrast    Result Date: 1/19/2019  Narrative: CT BRAIN - WITHOUT CONTRAST INDICATION:   Decreased alertness  COMPARISON:  8/22/2018  TECHNIQUE:  CT examination of the brain was performed  In addition to axial images, coronal 2D reformatted images were created and submitted for interpretation  Radiation dose length product (DLP) for this visit:  927 mGy-cm     This examination, like all CT scans performed in the [facility], was performed utilizing techniques to minimize radiation dose exposure, including the use of iterative reconstruction and automated exposure control  IMAGE QUALITY:  Diagnostic  FINDINGS: PARENCHYMA: Decreased attenuation is noted in periventricular and subcortical white matter demonstrating an appearance that is statistically most likely to represent moderate microangiopathic change; this appearance is similar when compared to most recent prior examination  No CT signs of acute infarction  No intracranial mass, mass effect or midline shift  No acute parenchymal hemorrhage  VENTRICLES AND EXTRA-AXIAL SPACES:  Normal for the patient's age  VISUALIZED ORBITS AND PARANASAL SINUSES:  Unremarkable  CALVARIUM AND EXTRACRANIAL SOFT TISSUES:  Normal      Impression: No acute intracranial abnormality  Workstation performed: [id number]     Procedure: Ct Abdomen Pelvis With Contrast    Result Date: 1/18/2019  Narrative: CT ABDOMEN AND PELVIS WITH IV CONTRAST INDICATION: Unspecified abdominal pain, gastrointestinal bleeding COMPARISON:  5/9/2017 TECHNIQUE:  CT examination of the abdomen and pelvis was performed  Axial, sagittal, and coronal 2D reformatted images were created from the source data and submitted for interpretation  Radiation dose length product (DLP) for this visit:  358 mGy-cm   This examination, like all CT scans performed in the [facility], was performed utilizing techniques to minimize radiation dose exposure, including the use of iterative reconstruction and automated exposure control  IV Contrast:  100 mL of iohexol (OMNIPAQUE) Enteric Contrast:  Enteric contrast was not administered  FINDINGS: ABDOMEN LOWER CHEST:  No clinically significant abnormality identified in the visualized lower chest  LIVER/BILIARY TREE:  Unremarkable  GALLBLADDER:  No calcified gallstones  No pericholecystic inflammatory change  SPLEEN:  Unremarkable  PANCREAS:  Unremarkable  ADRENAL GLANDS:  Unremarkable   KIDNEYS/URETERS:  One or more sharply circumscribed subcentimeter renal hypodensities are noted  These lesions are too small to accurately characterize, but are statistically most likely to represent benign cortical renal cyst(s)  According to the guidelines published in the [facility] Paper of the ACR Incidental Findings Committee (Radiology 2010), no further workup of these lesions is recommended  STOMACH AND BOWEL:  There is colonic diverticulosis without evidence of acute diverticulitis  APPENDIX:  No findings to suggest appendicitis  ABDOMINOPELVIC CAVITY:  No ascites or free intraperitoneal air  No lymphadenopathy  VESSELS:  Unremarkable for patient's age  PELVIS REPRODUCTIVE ORGANS:  Unremarkable for patient's age  URINARY BLADDER:  Unremarkable  ABDOMINAL WALL/INGUINAL REGIONS:  Unremarkable  OSSEOUS STRUCTURES:  No acute fracture or destructive osseous lesion  Grade 1 anterolisthesis of L2 on L3 with disc bulge  Impression: Colonic diverticulosis Renal cysts  Grade 1 anterolisthesis of L2 on L3 with disc bulge  Workstation performed: [id number]        Recent Cultures (last 7 days):           Last 24 Hours Medication List:     Current Facility-Administered Medications:  acetaminophen 650 mg Oral Q6H PRN Bernie Henley PA-C    anastrozole 1 mg Oral Daily Bernie Henley PA-C    hydrALAZINE 5 mg Intravenous Q6H PRN Bernie Henley PA-C    lactated ringers 125 mL/hr Intravenous Continuous Keeley Portillo MD Last Rate: 125 mL/hr (01/20/19 0240)   levothyroxine 75 mcg Oral Early Morning Bernie Henley PA-C    ondansetron 4 mg Intravenous Q6H PRN Bernie Henley PA-C    pantoprozole (PROTONIX) infusion (Continuous) 8 mg/hr Intravenous Continuous Bernie Henley PA-C Last Rate: 8 mg/hr (01/20/19 0506)        Today, Patient Was Seen By: Keeley Portillo MD    ** Please Note: Dragon 360 Dictation voice to text software may have been used in the creation of this document   **

## 2019-01-20 NOTE — NURSING NOTE
Transferred pt to 211  Report given to Ирина  Cell phone, purse, dentures, and shoes transferred with pt  Pt will notify family

## 2019-01-20 NOTE — PROGRESS NOTES
Progress Note - Melinda Stallings 72 y o  female MRN: [id number]    Unit/Bed#: [facility] 68 2 -02 Encounter: [id number]      ASSESSMENT/ PLAN:   73 yo female with pmh GERD, HTN, HLD, hypothyroidism who presented to the ED for epigastric abdominal pain and coffee ground emesis     1  Upper GI bleeding secondary to PUD: s/p EGD yesterday revealed 2 ulcers in antrum/pre-pyloric region  1 measured 1cm and had a visible vessel  No active bleeding was seen  Gold probe and epinephrine injection was used  Her Hgb is stable at 9 3 today  She denies any further melena, lightheadedness or dizziness    -continue PPI gtt for total of 48 hours  -advance diet to surgical soft  -continue to monitor H&H  -transfuse as necessary  -check h pylori stool antigen   -will require repeat EGD in 8 weeks to ensure healing of ulcers      Subjective:     Patient seen examined    Objective:     Vitals: Blood pressure 118/77, pulse 92, temperature 99 5 °F (37 5 °C), temperature source Temporal, resp  rate 20, height 5' 4" (1 626 m), weight 75 kg (165 lb 5 5 oz), SpO2 94 %  ,Body mass index is 28 38 kg/m²        Intake/Output Summary (Last 24 hours) at 01/20/19 0949  Last data filed at 01/20/19 0901   Gross per 24 hour   Intake          2753 17 ml   Output             1625 ml   Net          1128 17 ml       Physical Exam:     General Appearance: A&Ox3, appears stated age and cooperative  Lungs: Clear to auscultation bilaterally, no rales or rhonchi  Heart: Regular rate and rhythm, S1, S2 normal, no murmur, click, rub or gallop  Abdomen: Soft, non-tender, non-distended; bowel sounds normal; no masses or no organomegaly  Extremities: No cyanosis, clubbing, edema    Invasive Devices     Peripheral Intravenous Line            Peripheral IV 01/18/19 Left Antecubital 1 day    Peripheral IV 01/19/19 Left Arm 1 day                Lab Results:      Results from last 7 days  Lab Units 01/20/19  0823  01/19/19  0448   WBC Thousand/uL  --   --  9 02   HEMOGLOBIN g/dL 9 3*  < > 9 3*   HEMATOCRIT %  --   --  28 3*   PLATELETS Thousands/uL  --   --  141*   NEUTROS PCT %  --   --  79*   LYMPHS PCT %  --   --  11*   MONOS PCT %  --   --  9   EOS PCT %  --   --  0   < > = values in this interval not displayed  Results from last 7 days  Lab Units 01/19/19  0448 01/18/19  1630   POTASSIUM mmol/L 3 8 4 3   CHLORIDE mmol/L 105 99*   CO2 mmol/L 24 29   BUN mg/dL 55* 46*   CREATININE mg/dL 0 97 0 74   CALCIUM mg/dL 8 7 9 2   ALK PHOS U/L  --  68   ALT U/L  --  19   AST U/L  --  20       Results from last 7 days  Lab Units 01/18/19  1630   INR  0 99       Results from last 7 days  Lab Units 01/18/19  1630   LIPASE u/L 100       Imaging Studies: I have personally reviewed pertinent imaging studies  Ct Head Wo Contrast    Result Date: 1/19/2019  Impression: No acute intracranial abnormality  Ct Abdomen Pelvis With Contrast    Result Date: 1/18/2019  Impression: Colonic diverticulosis Renal cysts  Grade 1 anterolisthesis of L2 on L3 with disc bulge

## 2019-01-21 PROBLEM — D69.6 THROMBOCYTOPENIA (HCC): Status: ACTIVE | Noted: 2019-01-21

## 2019-01-21 PROBLEM — D62 ACUTE BLOOD LOSS ANEMIA: Status: ACTIVE | Noted: 2019-01-21

## 2019-01-21 LAB
ANION GAP SERPL CALCULATED.3IONS-SCNC: 12 MMOL/L (ref 4–13)
BASOPHILS # BLD AUTO: 0.03 THOUSANDS/ΜL (ref 0–0.1)
BASOPHILS NFR BLD AUTO: 1 % (ref 0–1)
BUN SERPL-MCNC: 14 MG/DL (ref 5–25)
CALCIUM SERPL-MCNC: 8.2 MG/DL (ref 8.3–10.1)
CHLORIDE SERPL-SCNC: 106 MMOL/L (ref 100–108)
CO2 SERPL-SCNC: 25 MMOL/L (ref 21–32)
CREAT SERPL-MCNC: 0.8 MG/DL (ref 0.6–1.3)
EOSINOPHIL # BLD AUTO: 0.12 THOUSAND/ΜL (ref 0–0.61)
EOSINOPHIL NFR BLD AUTO: 3 % (ref 0–6)
ERYTHROCYTE [DISTWIDTH] IN BLOOD BY AUTOMATED COUNT: 14.4 % (ref 11.6–15.1)
GFR SERPL CREATININE-BSD FRML MDRD: 74 ML/MIN/1.73SQ M
GLUCOSE SERPL-MCNC: 110 MG/DL (ref 65–140)
HCT VFR BLD AUTO: 28.1 % (ref 34.8–46.1)
HGB BLD-MCNC: 10.3 G/DL (ref 11.5–15.4)
HGB BLD-MCNC: 8.9 G/DL (ref 11.5–15.4)
HGB BLD-MCNC: 9.3 G/DL (ref 11.5–15.4)
IMM GRANULOCYTES # BLD AUTO: 0.01 THOUSAND/UL (ref 0–0.2)
IMM GRANULOCYTES NFR BLD AUTO: 0 % (ref 0–2)
LYMPHOCYTES # BLD AUTO: 1.49 THOUSANDS/ΜL (ref 0.6–4.47)
LYMPHOCYTES NFR BLD AUTO: 34 % (ref 14–44)
MCH RBC QN AUTO: 32.1 PG (ref 26.8–34.3)
MCHC RBC AUTO-ENTMCNC: 33.1 G/DL (ref 31.4–37.4)
MCV RBC AUTO: 97 FL (ref 82–98)
MONOCYTES # BLD AUTO: 0.42 THOUSAND/ΜL (ref 0.17–1.22)
MONOCYTES NFR BLD AUTO: 10 % (ref 4–12)
NEUTROPHILS # BLD AUTO: 2.34 THOUSANDS/ΜL (ref 1.85–7.62)
NEUTS SEG NFR BLD AUTO: 52 % (ref 43–75)
NRBC BLD AUTO-RTO: 0 /100 WBCS
PLATELET # BLD AUTO: 119 THOUSANDS/UL (ref 149–390)
PMV BLD AUTO: 9.8 FL (ref 8.9–12.7)
POTASSIUM SERPL-SCNC: 3.2 MMOL/L (ref 3.5–5.3)
RBC # BLD AUTO: 2.9 MILLION/UL (ref 3.81–5.12)
SODIUM SERPL-SCNC: 143 MMOL/L (ref 136–145)
WBC # BLD AUTO: 4.41 THOUSAND/UL (ref 4.31–10.16)

## 2019-01-21 PROCEDURE — 85018 HEMOGLOBIN: CPT | Performed by: INTERNAL MEDICINE

## 2019-01-21 PROCEDURE — 80048 BASIC METABOLIC PNL TOTAL CA: CPT | Performed by: INTERNAL MEDICINE

## 2019-01-21 PROCEDURE — 99232 SBSQ HOSP IP/OBS MODERATE 35: CPT | Performed by: INTERNAL MEDICINE

## 2019-01-21 PROCEDURE — 85025 COMPLETE CBC W/AUTO DIFF WBC: CPT | Performed by: INTERNAL MEDICINE

## 2019-01-21 PROCEDURE — 99233 SBSQ HOSP IP/OBS HIGH 50: CPT | Performed by: INTERNAL MEDICINE

## 2019-01-21 PROCEDURE — C9113 INJ PANTOPRAZOLE SODIUM, VIA: HCPCS | Performed by: PHYSICIAN ASSISTANT

## 2019-01-21 RX ORDER — POTASSIUM CHLORIDE 20 MEQ/1
40 TABLET, EXTENDED RELEASE ORAL ONCE
Status: COMPLETED | OUTPATIENT
Start: 2019-01-21 | End: 2019-01-21

## 2019-01-21 RX ORDER — FOLIC ACID 1 MG/1
1 TABLET ORAL DAILY
Status: DISCONTINUED | OUTPATIENT
Start: 2019-01-21 | End: 2019-01-22 | Stop reason: HOSPADM

## 2019-01-21 RX ORDER — LORAZEPAM 2 MG/ML
0.5 INJECTION INTRAMUSCULAR EVERY 4 HOURS PRN
Status: DISCONTINUED | OUTPATIENT
Start: 2019-01-21 | End: 2019-01-22

## 2019-01-21 RX ORDER — PANTOPRAZOLE SODIUM 40 MG/1
40 TABLET, DELAYED RELEASE ORAL
Status: DISCONTINUED | OUTPATIENT
Start: 2019-01-21 | End: 2019-01-22 | Stop reason: HOSPADM

## 2019-01-21 RX ORDER — THIAMINE MONONITRATE (VIT B1) 100 MG
100 TABLET ORAL DAILY
Status: DISCONTINUED | OUTPATIENT
Start: 2019-01-21 | End: 2019-01-22 | Stop reason: HOSPADM

## 2019-01-21 RX ADMIN — SODIUM CHLORIDE, SODIUM LACTATE, POTASSIUM CHLORIDE, AND CALCIUM CHLORIDE 75 ML/HR: .6; .31; .03; .02 INJECTION, SOLUTION INTRAVENOUS at 01:12

## 2019-01-21 RX ADMIN — METOPROLOL SUCCINATE 50 MG: 50 TABLET, EXTENDED RELEASE ORAL at 09:09

## 2019-01-21 RX ADMIN — POTASSIUM CHLORIDE 40 MEQ: 1500 TABLET, EXTENDED RELEASE ORAL at 09:09

## 2019-01-21 RX ADMIN — Medication 100 MG: at 17:52

## 2019-01-21 RX ADMIN — FOLIC ACID 1 MG: 1 TABLET ORAL at 17:52

## 2019-01-21 RX ADMIN — ANASTROZOLE 1 MG: 1 TABLET, COATED ORAL at 20:20

## 2019-01-21 RX ADMIN — SODIUM CHLORIDE, SODIUM LACTATE, POTASSIUM CHLORIDE, AND CALCIUM CHLORIDE 75 ML/HR: .6; .31; .03; .02 INJECTION, SOLUTION INTRAVENOUS at 14:29

## 2019-01-21 RX ADMIN — SODIUM CHLORIDE 8 MG/HR: 9 INJECTION, SOLUTION INTRAVENOUS at 10:34

## 2019-01-21 RX ADMIN — SODIUM CHLORIDE 8 MG/HR: 9 INJECTION, SOLUTION INTRAVENOUS at 20:42

## 2019-01-21 RX ADMIN — LORAZEPAM 0.5 MG: 2 INJECTION, SOLUTION INTRAMUSCULAR; INTRAVENOUS at 17:52

## 2019-01-21 RX ADMIN — LEVOTHYROXINE SODIUM 75 MCG: 75 TABLET ORAL at 05:08

## 2019-01-21 RX ADMIN — SODIUM CHLORIDE 8 MG/HR: 9 INJECTION, SOLUTION INTRAVENOUS at 01:52

## 2019-01-21 NOTE — PROGRESS NOTES
Progress Note - Yeny Gerardo 72 y o  female MRN: [id number]    Unit/Bed#: [facility] [address] 211-02 Encounter: [id number]      Assessment/Plan:  1-acute upper GI bleed:  Patient presented to the ER with epigastric pain, and coffee-ground emesis  She was evaluated by the GI team    -she underwent an EGD on 01/19 that revealed 2 gastric ulcers, 1 with a visible vessel  Gold probe and epinephrine injection was performed    -continue IV proton pump inhibitor infusion, today will complete 48 hr, and then will transition to Protonix 40 mg b i d    -be discharged home on Protonix 40 mg b i d  For an 8 week course   -H pylori antigen pending   -repeat EGD recommended in 8 weeks to document resolution   -repeat hemoglobin in a m     2-alcohol abuse:  Patient has history of alcohol abuse  She drinks an unknown quantity of vodka daily  She is currently hospital day 4  She is not tachycardic during my exam   She is not tremulous  She appears anxious  -start thiamine and folic acid   -order Ativan p r n  Anxiety   -will not start Librium at this time as patient is hospital day 4      3-status post possible confusion/paranoia:  Patient's nurse related that patient appeared confused earlier, and stated that the please for outside of her room watching her    -patient is currently awake, alert, and oriented x3  She answers questions with fluent and goal directed speech  She relates she has a history of poor short-term memory    -she currently does not have any hallucinations, or confusion during my exam   She is competent to make her own medical decisions, or signed herself out against medical advice  After conferring with her anahy Banda at the bedside, patient is agreeable to remain in the hospital further  4-essential hypertension:  Patient presented with hypotension secondary to her acute GI bleed and acute anemia  Her home losartan and metoprolol were initially held       -Her metoprolol has been started back at 50 mg daily    -blood pressure adequately controlled at 121/76    5-hypothyroidism:  Continue Synthroid    6-status post orthostatic hypotension:  Patient had an episode of symptomatic orthostatic hypotension when standing and ambulating  This was attributed to hypotension from her acute blood loss anemia  She was transfused packed red blood cells, and her home ARB and beta-blocker were held    -blood pressure currently stabilized  All previous symptoms have resolved  Patient notes she is ambulating without any difficulties  8-history of atrial fibrillation:  The patient has a previous history of atrial fibrillation  Has been in normal sinus rhythm  -beta-blocker resumed at half of her home dose    -has outpatient follow-up with Cardiology   -on telemetry she has been in normal sinus rhythm    -not currently anticoagulation candidate in light of her acute GI bleed    9-history of breast cancer:  Patient is on Arimidex as an outpatient  Status post chemotherapy and mastectomy  On anastrozole as an outpatient Continue outpatient follow-up    10-anxiety:  Will order Ativan p r n  11-acute blood loss anemia:  Patient's baseline hemoglobin appears to range 13-14  Patient presented with acute blood loss anemia secondary to her acute upper GI bleed  Her initial hemoglobin was 12 6, she decreased to 9 2  She was symptomatic and hypotensive      -she was transfused 2 units of packed red blood cells, with follow-up hemoglobin ranging between 8 9-9 4 predominantly    -check hemoglobin in a m     12-bilateral carotid artery stenosis:  Patient also has a history of possible right vertebral artery injury during her nurse surgical evaluation in 2018  She was placed on aspirin 325 mg daily  Her follow-up CTA revealed normal vertebral artery, and her aspirin was decreased to 81 mg daily    Continue outpatient follow-up    -confer with the GI team regarding continuing her 81 mg of aspirin daily, prior to discharge    13-history of cervical spinal fractures:  Her most recent your surgical office note from 10/03/2018 was reviewed  Patient has a history of a closed on tired fracture with type 1 morphology, closed nondisplaced fracture of the 5th cervical vertebrae, and 7th cervical vertebrae  She has been maintained in a C-collar  C-collar removed in -hypokalemia:  Replete and recheck    15-thrombocytopenia:  Patient with new thrombocytopenia at 119  This is likely secondary to her acute GI bleed, and platelet consumption   Prior platelet counts were within normal limits   -no evidence of significant coagulopathy on exam:  Repeat coags in a      16-family:  Requested permission to contact patient's daughter with an update, patient refuses, as she is concerned her daughter is too busy, working 2 jobs  She does not wish to bother her     She gave permission for me to speak with her fiance, Tin Olivarez, at the bedside  Gave him an update at great length  He encouraged patient remain in the hospital for additional monitoring  VTE Pharmacologic Prophylaxis: RX contraindicated due to: Acute blood loss anemia  VTE Mechanical Prophylaxis: sequential compression device    Certification Statement: The patient will continue to require additional inpatient hospital stay due to need for further acute intervention for acute blood loss anemia, follow-up hemoglobin    Status: inpatient     Total time spent today including her interview, exam, review of her outpatient records, her outpatient neurosurgical note, her outpatient PCP note, her previous discharge summary, as well as review of her current chart, plus extensive discussion regarding leaving against medical advice, and the need for continued monitorin min  ===================================================================    Subjective:  Patient denies any complaints at this time  She denies any pain anywhere  She notes she is tolerating p o  Mariangel Prows She notes she breakfast and lunch  She denies any associated abdominal pain and cramping when eating  She denies any nausea, vomiting, diarrhea  She notes she had not passed a bowel movement today  She denies any pain anywhere else at all  She denies any shortness of breath  She denies any dizziness or lightheadedness  She notes all of her previous symptoms have resolved  Patient notes she feels back to her baseline, she is tolerating p  O , and she is strenuously requesting to be discharged home immediately  She notes she does not wish to remain in the hospital any further  Physical Exam:   Temp:  [98 7 °F (37 1 °C)-99 7 °F (37 6 °C)] 99 4 °F (37 4 °C)  HR:  [73-94] 73  Resp:  [18-19] 18  BP: (121-151)/(76-82) 121/76    Gen:  Pleasant, non-tachypnic, non-dyspnic  Conversant  Sitting up at the bedside  Conversant  Pressured speech noted  Heart: regular rate and rhythm, S1S2 present, no murmur, rub or gallop  Lungs: clear to ausculatation bilaterally  No wheezing, crackles, or rhonchi  No accessory muscle use or respiratory distress  Abd: soft, non-tender with palpation, non-distended  NABS, no guarding, rebound or peritoneal signs  Extremities: no clubbing, cyanosis or edema  2+pedal pulses bilaterally  Full range of motion  Neuro: awake, alert  Oriented to person, place, situation, year, month, season, president  Able to spell world" forwards but not backwards  Cranial nerves 2-12 intact  Strength grossly intact  No tremor noted  Skin: warm and dry: no petechiae, purpura and rash      LABS:     Results from last 7 days  Lab Units 01/21/19  1008 01/21/19  0531 01/20/19  1749  01/19/19  0448 01/19/19  0252  01/18/19  1630   WBC Thousand/uL  --  4 41  --   --  9 02  --   --  7 56   HEMOGLOBIN g/dL 8 9* 9 3* 9 4*  < > 9 3* 9 2*  < > 12 6   HEMATOCRIT %  --  28 1*  --   --  28 3* 27 5*  --  36 5   PLATELETS Thousands/uL  --  119*  --   --  141*  --   --  165   < > = values in this interval not displayed  Results from last 7 days  Lab Units 01/21/19  0531 01/19/19  0448 01/18/19  1630   POTASSIUM mmol/L 3 2* 3 8 4 3   CHLORIDE mmol/L 106 105 99*   CO2 mmol/L 25 24 29   BUN mg/dL 14 55* 46*   CREATININE mg/dL 0 80 0 97 0 74   CALCIUM mg/dL 8 2* 8 7 9 2       Hospital Data:  1/19:  CT brain:  No acute abnormality    1/18:  CT abdomen/pelvis:  Colonic diverticulosis   Renal cysts  Grade 1 anterolisthesis of L2 on L3 with disc bulge    Procedure:  1/19:  EGD:  #1  Esophagus- normal esophagus and GE junction  #2  Stomach- 2 ulcers in the antrum/pre-pyloric region  Largest ulcer was approximately 1 cm with a visible vessel  There was no active bleeding at the time of procedure  The area was cauterized with gold probe and 4 cc of 1 in 100,000 epinephrine oranges injected in 4 quadrants  Normal retroflexion  #3  Duodenum- normal duodenum    Urinalysis:  Negative nitrates, negative leukocyte esterase     ---------------------------------------------------------------------------------------------------------------  This note has been constructed using a voice recognition system

## 2019-01-21 NOTE — PROGRESS NOTES
I agree with assessment done by Brain Trivedi RN  Patient was sitting in the recliner in her room  She seems confused and stated that the police were outside of her room and were watching her  Patient was afraid to get out of her chair and order food  Patient was assured that the police were not present and she could go into the bed if she wished and also told she could order food  She was assisted to bathroom and is now resting comfortably in her bed

## 2019-01-21 NOTE — SOCIAL WORK
Pt admitted with GIB undergoing EGD with hemostasis of ulcers cont on protonix gtt at present  Notified by RN that pt presented with paranoid behaviors earlier- in speaking with pt she is oriented x3 however when topic of such behaviors addressed she was noted to play it off as she wasn't fully awake at the time  During social assessment inquired as to ETOH consumption- pt found to be confabulating stating that she hasn't drank in some time and that her ETOH levels were negative on last admission (unfounded as pt was clearly intoxicated at that time) with her PCP stating various reasons for a potential law suit to the hospital   Pt states she no longer drinks however then speaks of drinking vodka daily  She refused referral to HOST program once again stating she no longer drinks since she last fell  Pt lives alone in a [facility] with no RAHAT having Bed/Bath on 2nd floor- She is independent with all aspects of care and household chores admitting that while she is able to drive that she has not been doing so with her S O  Providing transportation to appointments/stores  She then goes on to talk about walking to the store and as to why she doesn't use the RW that was provided to her previously as it is more trouble to deal with then not  Pt's PCP is Dr Asim Epps and she uses [facility] on [address] for short term prescriptions, using a mail away service for maintenance meds  Denies legal issues, substance abuse, mental health hx  Was in [address] in July 2018 however no other facility since that time  Pt's S O  Will transport home when medically stable  Discussed awaiting PT/OT recommendations for post discharge needs if any which will need to be followed up with  No further questions/concerns voiced by pt at this time  Will continue to follow for d/c needs

## 2019-01-21 NOTE — PROGRESS NOTES
[facility]  Progress Note - Aruna Grade 72 y o  female MRN: [id number]    Unit/Bed#: [place] [address] [address] 211-02 Encounter: [id number]    Assessment/Plan:  71 yo female with pmh GERD, HTN, HLD, hypothyroidism who presented to the ED for epigastric abdominal pain and coffee ground emesis, found to have anemia      1  Upper GI bleeding secondary to PUD   -s/p EGD 1/19/19 revealed 2 ulcers in antrum/pre-pyloric region  1 measured 1cm and had a visible vessel  No active bleeding was seen  Gold probe and epinephrine injection was used  -Her Hgb remains stable at 9 3 today  She denies any further melena, lightheadedness or dizziness     -continue PPI gtt for total of 48 hours, this will be discontinued later today  Begin pantoprazole 40mg PO BID for 8 weeks starting tomorrow  -h pylori stool antigen pending   -Recommend repeat EGD in 8 weeks to ensure healing of ulcers   -She could likely be discharged later today or early tomorrow from a GI standpoint    -She denied alcohol abuse today but notably she has a history of alcohol abuse documented in her chart  Recommend cessation  Subjective:   She reports that overall she is feeling well, she denies any abdominal pain  She states that she had 1 dark bowel movement after her EGD but she has not had a BM since  She has not had any further vomiting  She is eager to return home to her cats  Objective:     Vitals: Blood pressure 145/78, pulse 94, temperature 98 7 °F (37 1 °C), temperature source Temporal, resp  rate 18, height 5' 4" (1 626 m), weight 75 kg (165 lb 5 5 oz), SpO2 96 %  ,Body mass index is 28 38 kg/m²        Intake/Output Summary (Last 24 hours) at 01/21/19 1337  Last data filed at 01/21/19 0301   Gross per 24 hour   Intake             1240 ml   Output             1600 ml   Net             -360 ml       Review of Systems: as per HPI  Review of Systems   Constitutional: Negative for activity change, appetite change, chills, fatigue, fever and unexpected weight change  HENT: Negative for mouth sores, sore throat and trouble swallowing  Respiratory: Negative for shortness of breath  Cardiovascular: Negative for chest pain  Gastrointestinal: Negative for abdominal distention, abdominal pain, blood in stool, constipation, diarrhea, nausea and vomiting  Skin: Negative for color change, pallor, rash and wound  Neurological: Negative for tremors and syncope  All other systems reviewed and are negative  Physical Exam:     Physical Exam   Constitutional: She is oriented to person, place, and time  No distress  HENT:   Head: Normocephalic and atraumatic  Eyes: Right eye exhibits no discharge  Left eye exhibits no discharge  No scleral icterus  Neck: Neck supple  No tracheal deviation present  Cardiovascular: Normal rate, regular rhythm, normal heart sounds and intact distal pulses  Exam reveals no gallop and no friction rub  No murmur heard  Pulmonary/Chest: Effort normal and breath sounds normal  No respiratory distress  She has no wheezes  She has no rales  She exhibits no tenderness  Abdominal: Soft  Bowel sounds are normal  She exhibits no distension and no mass  There is no tenderness  There is no guarding  Neurological: She is alert and oriented to person, place, and time  Skin: Skin is warm and dry  Psychiatric: She has a normal mood and affect           Invasive Devices     Peripheral Intravenous Line            Peripheral IV 01/18/19 Left Antecubital 2 days    Peripheral IV 01/19/19 Left Arm 2 days                        CBC: Lab Results   Component Value Date    WBC 4 41 01/21/2019    HGB 8 9 (L) 01/21/2019    HCT 28 1 (L) 01/21/2019    MCV 97 01/21/2019     (L) 01/21/2019    MCH 32 1 01/21/2019    MCHC 33 1 01/21/2019    RDW 14 4 01/21/2019    MPV 9 8 01/21/2019    NRBC 0 01/21/2019   ,   CMP: Lab Results   Component Value Date    K 3 2 (L) 01/21/2019     01/21/2019    CO2 25 01/21/2019    BUN 14 01/21/2019    CREATININE 0 80 01/21/2019    CALCIUM 8 2 (L) 01/21/2019    EGFR 74 01/21/2019   ,

## 2019-01-21 NOTE — UTILIZATION REVIEW
Initial Clinical Review    Admission: Date/Time/Statement: 1/18/19 @ 1847   Orders Placed This Encounter   Procedures    Inpatient Admission (expected length of stay for this patient is greater than two midnights)     Standing Status:   Standing     Number of Occurrences:   1     Order Specific Question:   Admitting Physician     Answer:   Luis M Hatfield [[id number]]     Order Specific Question:   Level of Care     Answer:   Med Surg [[id number]]     Order Specific Question:   Estimated length of stay     Answer:   More than 2 Midnights     Order Specific Question:   Certification     Answer:   I certify that inpatient services are medically necessary for this patient for a duration of greater than two midnights  See H&P and MD Progress Notes for additional information about the patient's course of treatment  ED: Date/Time/Mode of Arrival:   ED Arrival Information     Expected Arrival Acuity Means of Arrival Escorted By Service Admission Type    - 1/18/2019 15:33 Emergent Walk-In Family Member Hospitalist Emergency    Arrival Complaint    Abdominal pain,Vomiting        Chief Complaint:   Chief Complaint   Patient presents with    Abdominal Pain     Reports abd pain x 3 days and vomiting black substance since this morning x 3  Denies diarrhea, blood in stool  History of Illness: A 62-year-old female with past medical history of anxiety, ascending aortic aneurysm, GERD, breast cancer currently in remission, hyperlipidemia, hypertension, hypothyroidism & IBS; presents with epigastric abdominal pain for the past 3 days along with several episodes of emesis this morning  Patient states the emesis was dark black in nature  Patient denies noticing melena or bright red blood per rectum  Patient states she has had a normal appetite since the abdominal pain began  Patient otherwise denies fever, chills, dizziness, lightheadedness, chest pain, shortness of breath, diarrhea, dysuria, hematuria, peripheral edema and rashes  Patient does not take anticoagulation  Patient does report one prior episode of GI bleeding, which was felt to be secondary to hypotension and ischemic colitis  On review of records this occurred in May 2017  A/P:  Epigastric abdominal pain, associated with black emesis  Reproducible tenderness in the epigastric region  No peritoneal signs  Concern for upper GI bleed  Stool is faintly guaiac positive, however normal appearing in color  Will check lab work for anemia, renal impairment and electrolyte abnormality  Will also obtain type and screen  Will obtain CTAP to evaluate for possible recurrence of ischemic colitis, although unlikely at this time  Will give a dose of Protonix        ED Vital Signs:   ED Triage Vitals   Temperature Pulse Respirations Blood Pressure SpO2   01/18/19 1539 01/18/19 1539 01/18/19 1539 01/18/19 1539 01/18/19 1539   99 2 °F (37 3 °C) 83 18 144/90 98 %      Temp Source Heart Rate Source Patient Position - Orthostatic VS BP Location FiO2 (%)   01/18/19 1539 01/18/19 1915 01/18/19 1630 01/18/19 1630 --   Temporal Monitor Lying Left arm       Pain Score       01/18/19 1539       5        Wt Readings from Last 1 Encounters:   01/19/19 75 kg (165 lb 5 5 oz)     Pertinent Labs/Diagnostic Test Results: WBC's 7 56,   H&H 12 6 / 36 5,   Cl 99,   BUN 46,  Cr 0 74  CT A&P: Colonic diverticulosis   Renal cysts  Grade 1 anterolisthesis of L2 on L3 with disc bulge    EKG: NSR      ED Treatment:   Medication Administration from 01/18/2019 1533 to 01/18/2019 2054       Date/Time Order Dose Route Action Action by Comments     01/18/2019 1711 pantoprazole (PROTONIX) 80 mg in sodium chloride 0 9 % 100 mL IVPB 0 mg Intravenous Stopped Brandee Ontiveros RN      01/18/2019 1656 pantoprazole (PROTONIX) 80 mg in sodium chloride 0 9 % 100 mL IVPB 80 mg Intravenous [address] Brandee Ontiveros RN      01/18/2019 1859 sodium chloride 0 9 % bolus 1,000 mL 0 mL Intravenous Elijah Reyes RN 01/18/2019 1637 sodium chloride 0 9 % bolus 1,000 mL 1,000 mL Intravenous New Bag Lacey Glez, [address]      01/18/2019 1654 ondansetron (ZOFRAN) injection 4 mg 4 mg Intravenous Given Lacey Glez, RN      01/18/2019 1720 iohexol (OMNIPAQUE) 350 MG/ML injection (MULTI-DOSE) 100 mL 100 mL Intravenous Given Passtewal Vanessa         Past Medical/Surgical History:   Past Medical History:   Diagnosis Date    Abnormal CT of the abdomen     Acute bronchitis     Anxiety     Appetite loss     Arthritis     Ascending aortic aneurysm (HCC)     Bilateral renal cysts     Cyst of ovary     Dysphagia     Esophageal reflux disease     Fat necrosis of breast     Full dentures     GERD (gastroesophageal reflux disease)     Herpes zoster     History of radiation therapy     Hyperlipidemia     Hypertension     Hypokalemia     Hypomagnesemia     Hypothyroidism     Impaired fasting glucose     Irritable bowel syndrome (IBS)     Ischemic colitis (HCC)     Knee pain     Limb alert care status     Osteoarthritis of right knee     Pneumonia     Post-op pain     Pulmonary nodules     Thoracic aortic aneurysm (HCC)     Unspecified ovarian cysts     Use of anastrozole (Arimidex)     Vasovagal syncope     Vitamin D deficiency     Wears glasses     Weight loss      Admitting Diagnosis: Abdominal pain [R10 9]  Upper GI bleed [K92 2]  Gastroesophageal reflux disease without esophagitis [K21 9]  Age/Sex: 72 y o  female     Assessment/Plan:  73 yo female admitted with GI Bleed - vomitted black emesis x 3  With mildly + guiac stool  + HX of GI bleed due to ischemic colitis and hypotension in past  Make NPO,  IV hydration,  Protonix IV,   Consult GI,  Monitor H&H       Admission Orders:  IP  Scheduled Meds:   Current Facility-Administered Medications:  acetaminophen 650 mg Oral Q6H PRN     anastrozole 1 mg Oral Daily     hydrALAZINE 5 mg Intravenous Q6H PRN             levothyroxine 75 mcg Oral Early Morning metoprolol succinate 50 mg Oral Daily     ondansetron 4 mg Intravenous Q6H PRN Rainer Martines PA-C              Continuous Infusions:   lactated ringers 75 mL/hr Last Rate: 75 mL/hr (01/21/19 0112)   pantoprozole (PROTONIX) infusion (Continuous) 8 mg/hr Last Rate: 8 mg/hr (01/21/19 1034)     NPO  Consult GI  SCD's  CBC, BMP in am    1/19:   increased disorientation, inability to follow commands and reported difficulty speaking  She is no longer oriented to place or time  She is complaining of abdominal pain  Hg 10 6,   9 2,   9 3   Blood loss anemia symptomatic with orthostatic hypotension and presyncope receive 1 unit of packed red blood cells,  will give another watch H&H closely  hypotensive and will discontinue BP meds continue lactated Ringer's at increased rate if BP does not respond with transfusions may need transfer for pressors in ICU  Transfuse 1 U PC's  CT Head - neg  TELE  EGD: Upper GI bleed secondary to pre-pyloric ulceration  Largest ulcer had a visible vessel, cauterized with gold probe, epinephrine injection  No bleeding before and after procedure  [facility] Review Department  Phone: [phone]; Fax [phone]  [email or web address]  ATTENTION: Please call with any questions or concerns to [phone]  and carefully listen to the prompts so that you are directed to the right person  Send all requests for admission clinical reviews, approved or denied determinations and any other requests to fax [phone]   All voicemails are confidential

## 2019-01-21 NOTE — PLAN OF CARE
Problem: DISCHARGE PLANNING - CARE MANAGEMENT  Goal: Discharge to post-acute care or home with appropriate resources  INTERVENTIONS:  - Conduct assessment to determine patient/family and health care team treatment goals, and need for post-acute services based on payer coverage, community resources, and patient preferences, and barriers to discharge  - Address psychosocial, clinical, and financial barriers to discharge as identified in assessment in conjunction with the patient/family and health care team  - Arrange appropriate level of post-acute services according to patients   needs and preference and payer coverage in collaboration with the physician and health care team  - Communicate with and update the patient/family, physician, and health care team regarding progress on the discharge plan  - Arrange appropriate transportation to post-acute venues  Outcome: Progressing  CM to follow up on recommendations of PT/OT for post discharge care  Cm to continue to follow to assist with d/c needs

## 2019-01-22 ENCOUNTER — TELEPHONE (OUTPATIENT)
Dept: FAMILY MEDICINE CLINIC | Facility: CLINIC | Age: 74
End: 2019-01-22

## 2019-01-22 VITALS
HEART RATE: 76 BPM | DIASTOLIC BLOOD PRESSURE: 80 MMHG | OXYGEN SATURATION: 99 % | TEMPERATURE: 98.8 F | HEIGHT: 64 IN | BODY MASS INDEX: 28.23 KG/M2 | WEIGHT: 165.34 LBS | SYSTOLIC BLOOD PRESSURE: 143 MMHG | RESPIRATION RATE: 18 BRPM

## 2019-01-22 PROBLEM — Z87.898 HISTORY OF ALCOHOL USE: Status: ACTIVE | Noted: 2018-07-30

## 2019-01-22 PROBLEM — I95.1 ORTHOSTATIC HYPOTENSION: Status: RESOLVED | Noted: 2018-10-09 | Resolved: 2019-01-22

## 2019-01-22 PROBLEM — D69.6 THROMBOCYTOPENIA (HCC): Status: RESOLVED | Noted: 2019-01-21 | Resolved: 2019-01-22

## 2019-01-22 PROBLEM — M25.512 ACUTE PAIN OF LEFT SHOULDER: Status: RESOLVED | Noted: 2019-01-18 | Resolved: 2019-01-22

## 2019-01-22 PROBLEM — D62 ACUTE BLOOD LOSS ANEMIA: Status: RESOLVED | Noted: 2019-01-21 | Resolved: 2019-01-22

## 2019-01-22 LAB
ANION GAP SERPL CALCULATED.3IONS-SCNC: 12 MMOL/L (ref 4–13)
APTT PPP: 28 SECONDS (ref 26–38)
BUN SERPL-MCNC: 12 MG/DL (ref 5–25)
CALCIUM SERPL-MCNC: 8.8 MG/DL (ref 8.3–10.1)
CHLORIDE SERPL-SCNC: 105 MMOL/L (ref 100–108)
CO2 SERPL-SCNC: 25 MMOL/L (ref 21–32)
CREAT SERPL-MCNC: 0.84 MG/DL (ref 0.6–1.3)
ERYTHROCYTE [DISTWIDTH] IN BLOOD BY AUTOMATED COUNT: 14.2 % (ref 11.6–15.1)
GFR SERPL CREATININE-BSD FRML MDRD: 70 ML/MIN/1.73SQ M
GLUCOSE SERPL-MCNC: 102 MG/DL (ref 65–140)
H PYLORI AG STL QL IA: NEGATIVE
HCT VFR BLD AUTO: 29.6 % (ref 34.8–46.1)
HGB BLD-MCNC: 9.8 G/DL (ref 11.5–15.4)
INR PPP: 1.03 (ref 0.86–1.17)
MCH RBC QN AUTO: 33.1 PG (ref 26.8–34.3)
MCHC RBC AUTO-ENTMCNC: 33.1 G/DL (ref 31.4–37.4)
MCV RBC AUTO: 100 FL (ref 82–98)
PLATELET # BLD AUTO: 152 THOUSANDS/UL (ref 149–390)
PMV BLD AUTO: 10.4 FL (ref 8.9–12.7)
POTASSIUM SERPL-SCNC: 3.6 MMOL/L (ref 3.5–5.3)
PROTHROMBIN TIME: 13.6 SECONDS (ref 11.8–14.2)
RBC # BLD AUTO: 2.96 MILLION/UL (ref 3.81–5.12)
SODIUM SERPL-SCNC: 142 MMOL/L (ref 136–145)
WBC # BLD AUTO: 4.46 THOUSAND/UL (ref 4.31–10.16)

## 2019-01-22 PROCEDURE — 80048 BASIC METABOLIC PNL TOTAL CA: CPT | Performed by: INTERNAL MEDICINE

## 2019-01-22 PROCEDURE — 85730 THROMBOPLASTIN TIME PARTIAL: CPT | Performed by: INTERNAL MEDICINE

## 2019-01-22 PROCEDURE — 85610 PROTHROMBIN TIME: CPT | Performed by: INTERNAL MEDICINE

## 2019-01-22 PROCEDURE — 85027 COMPLETE CBC AUTOMATED: CPT | Performed by: INTERNAL MEDICINE

## 2019-01-22 PROCEDURE — 99239 HOSP IP/OBS DSCHRG MGMT >30: CPT | Performed by: INTERNAL MEDICINE

## 2019-01-22 RX ORDER — PANTOPRAZOLE SODIUM 40 MG/1
40 TABLET, DELAYED RELEASE ORAL
Qty: 60 TABLET | Refills: 0 | Status: SHIPPED | OUTPATIENT
Start: 2019-01-22 | End: 2019-01-22

## 2019-01-22 RX ORDER — METOPROLOL SUCCINATE 50 MG/1
50 TABLET, EXTENDED RELEASE ORAL DAILY
Qty: 30 TABLET | Refills: 0 | Status: SHIPPED | OUTPATIENT
Start: 2019-01-22 | End: 2019-01-28 | Stop reason: SDUPTHER

## 2019-01-22 RX ORDER — PANTOPRAZOLE SODIUM 40 MG/1
40 TABLET, DELAYED RELEASE ORAL
Qty: 60 TABLET | Refills: 0 | Status: SHIPPED | OUTPATIENT
Start: 2019-01-22 | End: 2019-01-28 | Stop reason: SDUPTHER

## 2019-01-22 RX ADMIN — PANTOPRAZOLE SODIUM 40 MG: 40 TABLET, DELAYED RELEASE ORAL at 06:41

## 2019-01-22 RX ADMIN — Medication 100 MG: at 08:09

## 2019-01-22 RX ADMIN — FOLIC ACID 1 MG: 1 TABLET ORAL at 08:09

## 2019-01-22 RX ADMIN — LEVOTHYROXINE SODIUM 75 MCG: 75 TABLET ORAL at 06:41

## 2019-01-22 RX ADMIN — METOPROLOL SUCCINATE 50 MG: 50 TABLET, EXTENDED RELEASE ORAL at 08:09

## 2019-01-22 NOTE — PROGRESS NOTES
At 2230 1/21/2019 patient came back from the bathroom and I noticed she was trying to put her jeans back on  When I asked her where she was going she said she was going home  Patient became adamant that she was leaving once she realized she would have to stay another night  She spoke to her firachaele several times and robert eventually came into the hospital to try to convince her that she needed to stay until the morning so the doctors could discharge her  After several arguments the robert then refused to drive her home if she was not being discharged by a doctor and he did not want that liability  The robert then left and said she was our problem  After several more attempts to educate the patient about staying we were able to convince her and she agreed to stay one night and went back into the room and fell asleep  The charge nurse and the supervisor were all made aware and present

## 2019-01-22 NOTE — DISCHARGE INSTRUCTIONS
Discharge instructions:    Please take your medication as directed    Your blood pressure has been low:  Do not take your losartan blood pressure medication  Your metoprolol XL/Toprol XL tablet has been decreased from 100 mg to 50 mg tablet  Please continue to take your coated aspirin 81 mg once daily as instructed by your neurosurgeon  You been started on a new pill Protonix/pantoprazole 40 mg twice a day  You will take this for 8 weeks to help heal the ulcers in your stomach  You been given a prescription for the initial month  You will need to receive all refills from your family doctor or your GI doctor  Please see your family doctor in 1 week for a checkup  Please schedule appointment to see the GI doctor in 1 month for a checkup as well as to schedule your repeat upper endoscopy to check your ulcers  Please do not drink any alcohol at all as this can cause stomach ulcers  Please return to the ER with any problems at all, especially any fever, chills, nausea, vomiting, pain, and any blood at all such as red or black blood in her bowel movements or your vomit

## 2019-01-22 NOTE — PLAN OF CARE
DISCHARGE PLANNING     Discharge to home or other facility with appropriate resources Progressing        DISCHARGE PLANNING - CARE MANAGEMENT     Discharge to post-acute care or home with appropriate resources Progressing        GASTROINTESTINAL - ADULT     Minimal or absence of nausea and/or vomiting Progressing     Maintains or returns to baseline bowel function Progressing     Maintains adequate nutritional intake Progressing        HEMATOLOGIC - ADULT     Maintains hematologic stability Progressing        INFECTION - ADULT     Absence or prevention of progression during hospitalization Progressing     Absence of fever/infection during neutropenic period Progressing        Knowledge Deficit     Patient/family/caregiver demonstrates understanding of disease process, treatment plan, medications, and discharge instructions Progressing        METABOLIC, FLUID AND ELECTROLYTES - ADULT     Electrolytes maintained within normal limits Progressing     Fluid balance maintained Progressing        MUSCULOSKELETAL - ADULT     Maintain or return mobility to safest level of function Progressing     Maintain proper alignment of affected body part Progressing        PAIN - ADULT     Verbalizes/displays adequate comfort level or baseline comfort level Progressing        Potential for Falls     Patient will remain free of falls Progressing        Prexisting or High Potential for Compromised Skin Integrity     Skin integrity is maintained or improved Progressing        SAFETY ADULT     Maintain or return to baseline ADL function Progressing     Maintain or return mobility status to optimal level Progressing

## 2019-01-22 NOTE — DISCHARGE SUMMARY
Discharge Summary - Medical Gale Sarabia 72 y o  female MRN: [id number]    [facility] 70 2 MED SURG Room / Bed: [place] [address] 2 [facility] 2 M* Encounter: [id number]    BRIEF OVERVIEW      Admission Date: 1/18/2019       Discharge Date:  01/22/2019    Primary Diagnoses  Principal Problem:    Gastrointestinal hemorrhage associated with gastric ulcer  Active Problems:    Anxiety    Essential hypertension    Hypothyroidism    Hyperlipidemia    Hypokalemia    Thoracic aortic aneurysm (HCC)    Invasive ductal carcinoma of breast, right (HCC)    ETOH abuse    Orthostatic hypotension    Atrial fibrillation (HCC)    Acute pain of left shoulder    Acute blood loss anemia    Thrombocytopenia (HCC)  Resolved Problems:    * No resolved hospital problems  *      Service:  [facility], [facility]  Consulting Providers   GI:  Dr Jefferson Gomez and Diana Woody Studies:  1/19:  CT brain:  No acute abnormality     1/18:  CT abdomen/pelvis:  Colonic diverticulosis   Renal cysts  Grade 1 anterolisthesis of L2 on L3 with disc bulge     Procedure:  1/19:  EGD:  #1  Esophagus- normal esophagus and GE junction  #2  Stomach- 2 ulcers in the antrum/pre-pyloric region   Largest ulcer was approximately 1 cm with a visible vessel   There was no active bleeding at the time of procedure   The area was cauterized with gold probe and 4 cc of 1 in 100,000 epinephrine oranges injected in 4 quadrants  Normal retroflexion  #3   Duodenum- normal duodenum     Urinalysis:  Negative nitrates, negative leukocyte esterase        Results from last 7 days  Lab Units 01/22/19  0640 01/21/19  1840 01/21/19  1008 01/21/19  0531  01/19/19  0448   WBC Thousand/uL 4 46  --   --  4 41  --  9 02   HEMOGLOBIN g/dL 9 8* 10 3* 8 9* 9 3*  < > 9 3*   HEMATOCRIT % 29 6*  --   --  28 1*  --  28 3*   PLATELETS Thousands/uL 152  --   --  119*  --  141*   < > = values in this interval not displayed  Results from last 7 days  Lab Units 01/22/19  0640 01/21/19  0531 01/19/19  0448   POTASSIUM mmol/L 3 6 3 2* 3 8   CHLORIDE mmol/L 105 106 105   CO2 mmol/L 25 25 24   BUN mg/dL 12 14 55*   CREATININE mg/dL 0 84 0 80 0 97   CALCIUM mg/dL 8 8 8 2* 8 7       History and Physical Exam:  Please refer to the Admission H&P note    Hospital Course by Problem  1-acute upper GI bleed:  Patient presented to the ER with epigastric pain, and coffee-ground emesis  She was admitted to the hospital, started on IV Protonix infusion, and evaluated by the GI team               -she underwent an EGD on 01/19 that revealed 2 gastric ulcers, 1 with a visible vessel  Gold probe and epinephrine injection was performed  She continued on the IV proton pump in infusion for 48 hours, as been changed to p o  Protonix 40 mg b i d  For an 8 week course              -H pylori antigen pending-will be followed up in the GI office              -repeat EGD recommended in 8 weeks to document resolution  She will follow up with the GI team   Patient denies any current symptoms, current melena, hematochezia, or vomiting  Her hemoglobin is stable  She will be discharged home                 2-alcohol abuse:  Patient has history of alcohol abuse  She drinks an unknown quantity of vodka  Patient confirms be that she is still drinking vodka, but notes she drinks it only occasionally, and denies daily intake  Per her fiance, who does not live with her, he believes she has been abstinent since her August 2018 admission  She is currently hospital day 5  She does not have any current signs or symptoms of alcohol withdrawal   She is awake, alert, oriented x3  She is non tremulous, and not tachycardic    -patient is on thiamine, folic acid, as well as p r n  Ativan    -she was counseled by the case management team    -was advised complete alcohol cessation       3-status post episodes of possible confusion:   It was noted that pt has had episodes of poor short term memory, poor judgement, and confusion that alternated with periods of logical thinking  She had a CT brain without any acute changes  Her fiance noted this seems worse after she received the Ativan, so that medication will be discontinued      -after prolonged discussion with her fiance, he relates he has noticed the gradual onset of these symptoms for some time  He notes he has been answering questions for her when they go to appointments  Also notes her intermittent confusion, and short-term memory deficits  He is concerned that she may be starting to up dementia  He notes she lives independently and is able to function in her home environment   -during my exam again today patient is awake, alert, and oriented x3  She has logical and goal directed speech  She is currently competent    -for history however is concerning, that she may be developing early dementia:  Discussed with him my recommendations that she follow up for cognitive testing with the geriatric service or with her family doctor    -called Dr Kenith Boas office     4-essential hypertension:  Patient presented with hypotension secondary to her acute GI bleed and acute anemia  Her home losartan and metoprolol were initially held  -Her metoprolol has been started back at 50 mg daily               -blood pressure adequately controlled with spb in 140's  So she will be continued on this dose  Her ARB will cont to be held      5-hypothyroidism:  Continue Synthroid     6-status post orthostatic hypotension:  Patient had an episode of symptomatic orthostatic hypotension when standing and ambulating  This was attributed to hypotension from her acute blood loss anemia  She was transfused packed red blood cells, and her home ARB and beta-blocker were held               -blood pressure currently stabilized  All previous symptoms have resolved    Patient notes she is ambulating without any difficulties      8-history of atrial fibrillation:  The patient has a previous history of atrial fibrillation  Has been in normal sinus rhythm  -beta-blocker resumed at half of her home dose               -has outpatient follow-up with Cardiology              -on telemetry she has been in normal sinus rhythm               -not currently anticoagulation candidate in light of her acute GI bleed  -will keep her cardiology appt      9-history of breast cancer:  Patient is on Arimidex as an outpatient  Status post chemotherapy and mastectomy  On anastrozole as an outpatient Continue outpatient follow-up     10-anxiety:  Will order Ativan p r n      11-acute blood loss anemia:  Patient's baseline hemoglobin appears to range 13-14  Patient presented with acute blood loss anemia secondary to her acute upper GI bleed  Her initial hemoglobin was 12 6, she decreased to 9 2  She was symptomatic and hypotensive                 -she was transfused 2 units of packed red blood cells, with follow-up hemoglobin ranging between 8 9-9 4 predominantly  Hemoglobin is stable prior to discharge                  12-bilateral carotid artery stenosis:  Patient also has a history of possible right vertebral artery injury during her nurse surgical evaluation in 2018  She was placed on aspirin 325 mg daily  Her follow-up CTA revealed normal vertebral artery, and her aspirin was decreased to 81 mg daily  Continue outpatient follow-up               -d/w the GI team:  They note she may continue her 81 mg of aspirin daily     13-history of cervical spinal fractures:  Her most recent Neurosurgical office note from 10/03/2018 was reviewed  Patient has a history of a closed  odontiod fracture with type 1 morphology, closed nondisplaced fracture of the 5th cervical vertebrae, and 7th cervical vertebrae  She has been maintained in a C-collar  C-collar removed in October  She will cont to follow up with Nsurg as scheduled  Will cont asa 81mg, approved by GI      14-hypokalemia:  Repleted and normally     15-status post thrombocytopenia:  Patient with new, transient thrombocytopenia at 119  This is likely secondary to her acute GI bleed, and platelet consumption   Prior platelet counts were within normal limits              -platelet count normalized prior to discharge  Her coags were also noted to be within normal limits     16-family:  Requested permission to contact patient's daughter with an update, patient refuses  She gave permission to speak with her fiance, Mitch Artis  Gave an update regarding her treatment plan, and discharge  Also discussed with him at length my concerns that she may be developing dementia, needs full testing for this  He concurs, and relates he has had similar concerns  He has been with her for the past 15 years  He relates that he will speak with patient's daughter regarding my concerns  I am unable to call and speak with her daughter, as patient adamantly refuses to give me permission for this  I called Dr Joseph Norton office, her primary care provider  I spoke with his office staff, Virgen Alvarez, who will coordinate her transition of care visit, as well as his communicate the need for full cognitive testing  She relates their office usually collaborates with the neurology team for this      VTE Pharmacologic Prophylaxis: RX contraindicated due to: Acute blood loss anemia  VTE Mechanical Prophylaxis: sequential compression device    Discharge Condition: Improved  Discharge Disposition: Home/Self Care    Discharge Note and Physical Exam:   Patient denies any complaints at all  She denies any pain anywhere  She denies any headache, chest pain, back pain, abdominal or extremity pain  She notes she is tolerating p o  She denies any nausea, vomiting, diarrhea  She denies any bleeding  She denies any recurrence of the melena or coffee-ground emesis  She denies any abdominal pain or cramping    She denies any shortness of breath  She is ambulating without any dizziness or lightheadedness  She denies any confusion this morning  Vitals:    01/22/19 0750   BP: 143/80   Pulse: 76   Resp: 18   Temp: 98 8 °F (37 1 °C)   SpO2: 99%     General:  Pleasant, non-tachypnic, non-dyspnic  Conversant  Sitting up in a chair at the bedside  Observed to ambulate with steady gait  Non tremulous  Good eye contact  Smiling and communicative  Heart: Regular rate and rhythm, S1S2 present  No murmur, rub or gallop  Lungs: Clear to auscultation bilaterally, no wheezing, rhonchi, or crackles  Good air movement  No accessory muscle use or respiratory distress  Abdomen: soft, non-tender, non-distended, NABS  No rebound or guarding  No mass or peritoneal signs  Extremities: no clubbing, cyanosis or edema  2+ pedal pulses bilaterally  Neurologic:  Awake, alert, oriented x3  Oriented to place, situation, city, state, year, month, president  Strength globally intact  Ambulates with steady gait  Fluent and goal directed speech  cooperates with exam  Skin: warm and dry  No petechiae, purpura or rash  Discharge Medications   Please see Medical Reconciliation Discharge Form    Discharge Follow Up Appointments:   Pedro Luis Mohr, DO: 1 week for check up and referral for cognitive testing  Dr Gabino De Luna and associates: 1 month for check up and scheduled repeat EGD    Discharge  Statement   Total Time Spent today including physical exam, discussion with patient and her fiancee  Jennie Dias, Dr Raheem Bartlett office, pt's nurse and , and discharge arrangements/care = 75minutes      This note has been constructed using a voice recognition system

## 2019-01-22 NOTE — NURSING NOTE
All the discharge instructions and changes to her prescriptions were discussed with the patient and her significant other  The importance of following up with her PCP, Cardiologist and GI doctors was discussed  We discussed stopping drinking as well  She took all her belongings with when discharged

## 2019-01-23 ENCOUNTER — TRANSITIONAL CARE MANAGEMENT (OUTPATIENT)
Dept: FAMILY MEDICINE CLINIC | Facility: CLINIC | Age: 74
End: 2019-01-23

## 2019-01-28 ENCOUNTER — OFFICE VISIT (OUTPATIENT)
Dept: FAMILY MEDICINE CLINIC | Facility: CLINIC | Age: 74
End: 2019-01-28
Payer: MEDICARE

## 2019-01-28 VITALS
HEIGHT: 64 IN | TEMPERATURE: 98.5 F | BODY MASS INDEX: 26.5 KG/M2 | DIASTOLIC BLOOD PRESSURE: 76 MMHG | WEIGHT: 155.2 LBS | SYSTOLIC BLOOD PRESSURE: 136 MMHG

## 2019-01-28 DIAGNOSIS — I10 ESSENTIAL HYPERTENSION: ICD-10-CM

## 2019-01-28 DIAGNOSIS — E06.3 HYPOTHYROIDISM DUE TO HASHIMOTO'S THYROIDITIS: ICD-10-CM

## 2019-01-28 DIAGNOSIS — K25.4 GASTROINTESTINAL HEMORRHAGE ASSOCIATED WITH GASTRIC ULCER: Primary | ICD-10-CM

## 2019-01-28 DIAGNOSIS — Z23 ENCOUNTER FOR VACCINATION: ICD-10-CM

## 2019-01-28 DIAGNOSIS — D50.0 IRON DEFICIENCY ANEMIA DUE TO CHRONIC BLOOD LOSS: ICD-10-CM

## 2019-01-28 DIAGNOSIS — E03.8 HYPOTHYROIDISM DUE TO HASHIMOTO'S THYROIDITIS: ICD-10-CM

## 2019-01-28 DIAGNOSIS — S12.100A CLOSED ODONTOID FRACTURE, INITIAL ENCOUNTER (HCC): ICD-10-CM

## 2019-01-28 DIAGNOSIS — M25.512 LEFT SHOULDER PAIN, UNSPECIFIED CHRONICITY: ICD-10-CM

## 2019-01-28 DIAGNOSIS — N28.1 BILATERAL RENAL CYSTS: ICD-10-CM

## 2019-01-28 DIAGNOSIS — D69.6 THROMBOCYTOPENIA (HCC): ICD-10-CM

## 2019-01-28 PROBLEM — D50.9 IRON DEFICIENCY ANEMIA: Status: ACTIVE | Noted: 2019-01-28

## 2019-01-28 PROCEDURE — 99496 TRANSJ CARE MGMT HIGH F2F 7D: CPT | Performed by: FAMILY MEDICINE

## 2019-01-28 RX ORDER — METOPROLOL SUCCINATE 50 MG/1
50 TABLET, EXTENDED RELEASE ORAL DAILY
Qty: 30 TABLET | Refills: 5 | Status: SHIPPED | OUTPATIENT
Start: 2019-01-28 | End: 2019-04-19 | Stop reason: SDUPTHER

## 2019-01-28 RX ORDER — PANTOPRAZOLE SODIUM 40 MG/1
40 TABLET, DELAYED RELEASE ORAL
Qty: 60 TABLET | Refills: 5 | Status: SHIPPED | OUTPATIENT
Start: 2019-01-28 | End: 2019-04-24 | Stop reason: SDUPTHER

## 2019-01-28 NOTE — PROGRESS NOTES
Assessment/Plan:  Patient will follow up with GI for repeat EGD and further evaluation  Patient will remain off aspirin at this time  The refills given at this time  The     Diagnoses and all orders for this visit:    Gastrointestinal hemorrhage associated with gastric ulcer  -     pantoprazole (PROTONIX) 40 mg tablet; Take 1 tablet (40 mg total) by mouth 2 (two) times a day before meals    Encounter for vaccination  -     PNEUMOCOCCAL POLYSACCHARIDE VACCINE 23-VALENT =>1YO SQ IM    Hypothyroidism due to Hashimoto's thyroiditis    Essential hypertension    Bilateral renal cysts    Iron deficiency anemia due to chronic blood loss    Thrombocytopenia (HCC)    Closed odontoid fracture, initial encounter ([facility])  -     metoprolol succinate (TOPROL-XL) 50 mg 24 hr tablet; Take 1 tablet (50 mg total) by mouth daily    Left shoulder pain, unspecified chronicity  -     XR shoulder 2+ vw left; Future          Subjective:      Patient ID: Reggie aCll is a 72 y o  female  Patient is here status post hospitalization from January 18th through the 20 sec for GI hemorrhage due to having 2 ulcers in her stomach  Patient was also hypotensive originally  Patient had GI consult to Dr Francia Ireland  EGD was done showing 2 ulcers of the stomach but normal esophagus and duodenum  Patient had 1 of the ulcers cauterized  The patient also had CT scan of the abdomen and pelvis which showed diverticulosis and renal cysts  Patient had an episode of confusion in the hospital and had normal CT scan of her brain  Patient was anemic with the lowest hemoglobin 8 9 which increased to 9 8  Patient's platelets were low originally at 119 and were up to 150 to at time of discharge  Patient was also placed on IV Protonix  Patient received 2 units of packed red blood cells  Other diagnosis include hypertension hyperlipidemia hypothyroidism  The patient with normal stools  Patient eating much better at this time    No melena or hematochezia or hematemesis  No chest pain or shortness of breath  No edema lower extremities  No fevers or chills  Normal urination  All other review systems negative  The following portions of the patient's history were reviewed and updated as appropriate: allergies, current medications, past family history, past medical history, past social history, past surgical history and problem list     Review of Systems   Constitutional: Negative  HENT: Negative  Eyes: Negative  Respiratory: Negative  Cardiovascular: Negative  Gastrointestinal: Negative  Endocrine: Negative  Genitourinary: Negative  Musculoskeletal: Negative  Skin: Negative  Allergic/Immunologic: Negative  Neurological: Negative  Hematological: Negative  Psychiatric/Behavioral: Negative  Objective:      /76 (BP Location: Right arm, Patient Position: Sitting, Cuff Size: Adult)   Temp 98 5 °F (36 9 °C) (Tympanic)   Ht 5' 4" (1 626 m)   Wt 70 4 kg (155 lb 3 2 oz)   BMI 26 64 kg/m²          Physical Exam   Constitutional: She is oriented to person, place, and time  She appears well-developed and well-nourished  No distress  HENT:   Head: Normocephalic  Right Ear: External ear normal    Left Ear: External ear normal    Mouth/Throat: Oropharynx is clear and moist  No oropharyngeal exudate  Eyes: Pupils are equal, round, and reactive to light  EOM are normal  Right eye exhibits no discharge  Left eye exhibits no discharge  No scleral icterus  Neck: Normal range of motion  Neck supple  No thyromegaly present  Cardiovascular: Normal rate, regular rhythm, normal heart sounds and intact distal pulses  Exam reveals no gallop and no friction rub  No murmur heard  Pulmonary/Chest: Effort normal and breath sounds normal  No respiratory distress  She has no wheezes  She has no rales  She exhibits no tenderness  Abdominal: Soft  Bowel sounds are normal  She exhibits no distension  There is no tenderness  There is no rebound and no guarding  Musculoskeletal: Normal range of motion  She exhibits no edema or tenderness  Lymphadenopathy:     She has no cervical adenopathy  Neurological: She is oriented to person, place, and time  No cranial nerve deficit  She exhibits normal muscle tone  Coordination normal    Skin: Skin is warm and dry  No rash noted  She is not diaphoretic  No erythema  No pallor  Psychiatric: She has a normal mood and affect  Her behavior is normal  Judgment and thought content normal    Nursing note and vitals reviewed

## 2019-02-04 ENCOUNTER — OFFICE VISIT (OUTPATIENT)
Dept: FAMILY MEDICINE CLINIC | Facility: CLINIC | Age: 74
End: 2019-02-04
Payer: MEDICARE

## 2019-02-04 VITALS
TEMPERATURE: 98.8 F | DIASTOLIC BLOOD PRESSURE: 80 MMHG | BODY MASS INDEX: 26.64 KG/M2 | SYSTOLIC BLOOD PRESSURE: 130 MMHG | HEIGHT: 64 IN

## 2019-02-04 DIAGNOSIS — J01.00 ACUTE NON-RECURRENT MAXILLARY SINUSITIS: Primary | ICD-10-CM

## 2019-02-04 PROCEDURE — 99213 OFFICE O/P EST LOW 20 MIN: CPT | Performed by: FAMILY MEDICINE

## 2019-02-04 RX ORDER — AZITHROMYCIN 250 MG/1
TABLET, FILM COATED ORAL
Qty: 6 TABLET | Refills: 0 | Status: SHIPPED | OUTPATIENT
Start: 2019-02-04 | End: 2019-02-04 | Stop reason: SDUPTHER

## 2019-02-04 RX ORDER — AZITHROMYCIN 250 MG/1
TABLET, FILM COATED ORAL
Qty: 6 TABLET | Refills: 0 | Status: SHIPPED | OUTPATIENT
Start: 2019-02-04 | End: 2019-02-08

## 2019-02-04 NOTE — PROGRESS NOTES
Assessment/Plan:     Diagnoses and all orders for this visit:    Acute non-recurrent maxillary sinusitis  -     azithromycin (ZITHROMAX) 250 mg tablet; Take 2 tablets today then 1 tablet daily x 4 days          Subjective:      Patient ID: Gale Sarabia is a 72 y o  female  Patient is here with congestion, cough, sore throat, nasal discharge, sputum production and chills over the past 5 days  Patient has use Vicks  No nausea vomiting or diarrhea associated with this  Patient with increased fatigue  The following portions of the patient's history were reviewed and updated as appropriate: allergies, current medications, past family history, past medical history, past social history, past surgical history and problem list     Review of Systems   Constitutional: Positive for chills  HENT: Positive for congestion, postnasal drip, rhinorrhea and sore throat  Eyes: Negative  Respiratory: Positive for cough  Cardiovascular: Negative  Gastrointestinal: Negative  Endocrine: Negative  Genitourinary: Negative  Musculoskeletal: Negative  Skin: Negative  Allergic/Immunologic: Negative  Neurological: Negative  Hematological: Negative  Psychiatric/Behavioral: Negative  Objective:      /80 (BP Location: Left arm, Patient Position: Sitting, Cuff Size: Standard)   Temp 98 8 °F (37 1 °C) (Tympanic)   Ht 5' 4" (1 626 m)   BMI 26 64 kg/m²          Physical Exam   Constitutional: She is oriented to person, place, and time  She appears well-developed and well-nourished  No distress  HENT:   Head: Normocephalic  Right Ear: External ear normal    Left Ear: External ear normal    Mouth/Throat: Oropharyngeal exudate present  Eyes: Pupils are equal, round, and reactive to light  EOM are normal  Right eye exhibits no discharge  Left eye exhibits no discharge  No scleral icterus  Neck: Normal range of motion  Neck supple  No thyromegaly present     Cardiovascular: Normal rate, regular rhythm, normal heart sounds and intact distal pulses  Exam reveals no gallop and no friction rub  No murmur heard  Pulmonary/Chest: Effort normal and breath sounds normal  No respiratory distress  She has no wheezes  She has no rales  She exhibits no tenderness  Abdominal: Soft  Bowel sounds are normal  She exhibits no distension  There is no tenderness  There is no rebound and no guarding  Musculoskeletal: Normal range of motion  She exhibits no edema or tenderness  Lymphadenopathy:     She has cervical adenopathy  Neurological: She is oriented to person, place, and time  No cranial nerve deficit  She exhibits normal muscle tone  Coordination normal    Skin: Skin is warm and dry  No rash noted  She is not diaphoretic  No erythema  No pallor  Psychiatric: She has a normal mood and affect  Her behavior is normal  Judgment and thought content normal    Nursing note and vitals reviewed

## 2019-02-14 ENCOUNTER — APPOINTMENT (OUTPATIENT)
Dept: RADIOLOGY | Facility: MEDICAL CENTER | Age: 74
End: 2019-02-14
Payer: MEDICARE

## 2019-02-14 DIAGNOSIS — M25.512 LEFT SHOULDER PAIN, UNSPECIFIED CHRONICITY: ICD-10-CM

## 2019-02-14 PROCEDURE — 73030 X-RAY EXAM OF SHOULDER: CPT

## 2019-02-18 ENCOUNTER — APPOINTMENT (EMERGENCY)
Dept: RADIOLOGY | Facility: HOSPITAL | Age: 74
DRG: 098 | End: 2019-02-18
Payer: MEDICARE

## 2019-02-18 ENCOUNTER — APPOINTMENT (EMERGENCY)
Dept: CT IMAGING | Facility: HOSPITAL | Age: 74
DRG: 098 | End: 2019-02-18
Payer: MEDICARE

## 2019-02-18 ENCOUNTER — HOSPITAL ENCOUNTER (INPATIENT)
Facility: HOSPITAL | Age: 74
LOS: 4 days | Discharge: HOME HEALTH | DRG: 098 | End: 2019-02-22
Attending: EMERGENCY MEDICINE | Admitting: FAMILY MEDICINE
Payer: MEDICARE

## 2019-02-18 DIAGNOSIS — F41.9 ANXIETY: ICD-10-CM

## 2019-02-18 DIAGNOSIS — I67.4 HYPERTENSIVE ENCEPHALOPATHY: Primary | ICD-10-CM

## 2019-02-18 DIAGNOSIS — R90.89 ABNORMAL FINDING ON MRI OF BRAIN: ICD-10-CM

## 2019-02-18 DIAGNOSIS — R41.82 ALTERED MENTAL STATUS: ICD-10-CM

## 2019-02-18 DIAGNOSIS — R79.89 LOW TSH LEVEL: ICD-10-CM

## 2019-02-18 DIAGNOSIS — R41.0 CONFUSION: ICD-10-CM

## 2019-02-18 DIAGNOSIS — K21.9 GASTROESOPHAGEAL REFLUX DISEASE WITHOUT ESOPHAGITIS: ICD-10-CM

## 2019-02-18 DIAGNOSIS — I10 HIGH BLOOD PRESSURE: ICD-10-CM

## 2019-02-18 DIAGNOSIS — G93.40 ENCEPHALOPATHY: ICD-10-CM

## 2019-02-18 DIAGNOSIS — F19.90: ICD-10-CM

## 2019-02-18 DIAGNOSIS — M79.622 LEFT UPPER ARM PAIN: ICD-10-CM

## 2019-02-18 DIAGNOSIS — E78.5 HLD (HYPERLIPIDEMIA): ICD-10-CM

## 2019-02-18 LAB
ALBUMIN SERPL BCP-MCNC: 3.5 G/DL (ref 3.5–5)
ALP SERPL-CCNC: 80 U/L (ref 46–116)
ALT SERPL W P-5'-P-CCNC: 28 U/L (ref 12–78)
AMPHETAMINES SERPL QL SCN: NEGATIVE
ANION GAP SERPL CALCULATED.3IONS-SCNC: 8 MMOL/L (ref 4–13)
APAP SERPL-MCNC: <2 UG/ML (ref 10–30)
AST SERPL W P-5'-P-CCNC: 26 U/L (ref 5–45)
BARBITURATES UR QL: NEGATIVE
BASOPHILS # BLD AUTO: 0.05 THOUSANDS/ΜL (ref 0–0.1)
BASOPHILS NFR BLD AUTO: 1 % (ref 0–1)
BENZODIAZ UR QL: NEGATIVE
BILIRUB SERPL-MCNC: 0.51 MG/DL (ref 0.2–1)
BILIRUB UR QL STRIP: NEGATIVE
BUN SERPL-MCNC: 17 MG/DL (ref 5–25)
CALCIUM SERPL-MCNC: 9.8 MG/DL (ref 8.3–10.1)
CHLORIDE SERPL-SCNC: 102 MMOL/L (ref 100–108)
CLARITY UR: CLEAR
CO2 SERPL-SCNC: 31 MMOL/L (ref 21–32)
COCAINE UR QL: NEGATIVE
COLOR UR: YELLOW
COLOR, POC: YELLOW
CREAT SERPL-MCNC: 0.86 MG/DL (ref 0.6–1.3)
EOSINOPHIL # BLD AUTO: 0.04 THOUSAND/ΜL (ref 0–0.61)
EOSINOPHIL NFR BLD AUTO: 1 % (ref 0–6)
ERYTHROCYTE [DISTWIDTH] IN BLOOD BY AUTOMATED COUNT: 12.6 % (ref 11.6–15.1)
ETHANOL SERPL-MCNC: <3 MG/DL (ref 0–3)
GFR SERPL CREATININE-BSD FRML MDRD: 68 ML/MIN/1.73SQ M
GLUCOSE SERPL-MCNC: 116 MG/DL (ref 65–140)
GLUCOSE SERPL-MCNC: 118 MG/DL (ref 65–140)
GLUCOSE UR STRIP-MCNC: NEGATIVE MG/DL
HCT VFR BLD AUTO: 37.6 % (ref 34.8–46.1)
HGB BLD-MCNC: 12.2 G/DL (ref 11.5–15.4)
HGB UR QL STRIP.AUTO: NEGATIVE
IMM GRANULOCYTES # BLD AUTO: 0.02 THOUSAND/UL (ref 0–0.2)
IMM GRANULOCYTES NFR BLD AUTO: 0 % (ref 0–2)
KETONES UR STRIP-MCNC: NEGATIVE MG/DL
LEUKOCYTE ESTERASE UR QL STRIP: NEGATIVE
LIPASE SERPL-CCNC: 117 U/L (ref 73–393)
LYMPHOCYTES # BLD AUTO: 1.19 THOUSANDS/ΜL (ref 0.6–4.47)
LYMPHOCYTES NFR BLD AUTO: 22 % (ref 14–44)
MCH RBC QN AUTO: 31.2 PG (ref 26.8–34.3)
MCHC RBC AUTO-ENTMCNC: 32.4 G/DL (ref 31.4–37.4)
MCV RBC AUTO: 96 FL (ref 82–98)
METHADONE UR QL: NEGATIVE
MONOCYTES # BLD AUTO: 0.49 THOUSAND/ΜL (ref 0.17–1.22)
MONOCYTES NFR BLD AUTO: 9 % (ref 4–12)
NEUTROPHILS # BLD AUTO: 3.54 THOUSANDS/ΜL (ref 1.85–7.62)
NEUTS SEG NFR BLD AUTO: 67 % (ref 43–75)
NITRITE UR QL STRIP: NEGATIVE
NRBC BLD AUTO-RTO: 0 /100 WBCS
OPIATES UR QL SCN: NEGATIVE
PCP UR QL: NEGATIVE
PH UR STRIP.AUTO: 7 [PH] (ref 4.5–8)
PLATELET # BLD AUTO: 249 THOUSANDS/UL (ref 149–390)
PMV BLD AUTO: 9.7 FL (ref 8.9–12.7)
POTASSIUM SERPL-SCNC: 3.6 MMOL/L (ref 3.5–5.3)
PROT SERPL-MCNC: 6.8 G/DL (ref 6.4–8.2)
PROT UR STRIP-MCNC: NEGATIVE MG/DL
RBC # BLD AUTO: 3.91 MILLION/UL (ref 3.81–5.12)
SALICYLATES SERPL-MCNC: <3 MG/DL (ref 3–20)
SODIUM SERPL-SCNC: 141 MMOL/L (ref 136–145)
SP GR UR STRIP.AUTO: 1.01 (ref 1–1.03)
THC UR QL: NEGATIVE
TROPONIN I SERPL-MCNC: <0.02 NG/ML
TSH SERPL DL<=0.05 MIU/L-ACNC: 0.26 UIU/ML (ref 0.36–3.74)
UROBILINOGEN UR QL STRIP.AUTO: 0.2 E.U./DL
WBC # BLD AUTO: 5.33 THOUSAND/UL (ref 4.31–10.16)

## 2019-02-18 PROCEDURE — 70450 CT HEAD/BRAIN W/O DYE: CPT

## 2019-02-18 PROCEDURE — 71046 X-RAY EXAM CHEST 2 VIEWS: CPT

## 2019-02-18 PROCEDURE — 99285 EMERGENCY DEPT VISIT HI MDM: CPT

## 2019-02-18 PROCEDURE — 80320 DRUG SCREEN QUANTALCOHOLS: CPT | Performed by: EMERGENCY MEDICINE

## 2019-02-18 PROCEDURE — 82948 REAGENT STRIP/BLOOD GLUCOSE: CPT

## 2019-02-18 PROCEDURE — 80307 DRUG TEST PRSMV CHEM ANLYZR: CPT | Performed by: EMERGENCY MEDICINE

## 2019-02-18 PROCEDURE — 36415 COLL VENOUS BLD VENIPUNCTURE: CPT | Performed by: EMERGENCY MEDICINE

## 2019-02-18 PROCEDURE — 99223 1ST HOSP IP/OBS HIGH 75: CPT | Performed by: FAMILY MEDICINE

## 2019-02-18 PROCEDURE — 85025 COMPLETE CBC W/AUTO DIFF WBC: CPT | Performed by: EMERGENCY MEDICINE

## 2019-02-18 PROCEDURE — 80329 ANALGESICS NON-OPIOID 1 OR 2: CPT | Performed by: EMERGENCY MEDICINE

## 2019-02-18 PROCEDURE — 96374 THER/PROPH/DIAG INJ IV PUSH: CPT

## 2019-02-18 PROCEDURE — 93005 ELECTROCARDIOGRAM TRACING: CPT

## 2019-02-18 PROCEDURE — 83690 ASSAY OF LIPASE: CPT | Performed by: EMERGENCY MEDICINE

## 2019-02-18 PROCEDURE — 81003 URINALYSIS AUTO W/O SCOPE: CPT

## 2019-02-18 PROCEDURE — 80053 COMPREHEN METABOLIC PANEL: CPT | Performed by: EMERGENCY MEDICINE

## 2019-02-18 PROCEDURE — 84484 ASSAY OF TROPONIN QUANT: CPT | Performed by: EMERGENCY MEDICINE

## 2019-02-18 PROCEDURE — 84443 ASSAY THYROID STIM HORMONE: CPT | Performed by: EMERGENCY MEDICINE

## 2019-02-18 RX ORDER — ANASTROZOLE 1 MG/1
1 TABLET ORAL DAILY
Status: DISCONTINUED | OUTPATIENT
Start: 2019-02-19 | End: 2019-02-22 | Stop reason: HOSPADM

## 2019-02-18 RX ORDER — ONDANSETRON 2 MG/ML
4 INJECTION INTRAMUSCULAR; INTRAVENOUS EVERY 6 HOURS PRN
Status: DISCONTINUED | OUTPATIENT
Start: 2019-02-18 | End: 2019-02-22 | Stop reason: HOSPADM

## 2019-02-18 RX ORDER — THIAMINE MONONITRATE (VIT B1) 100 MG
100 TABLET ORAL DAILY
Status: DISCONTINUED | OUTPATIENT
Start: 2019-02-19 | End: 2019-02-22 | Stop reason: HOSPADM

## 2019-02-18 RX ORDER — FOLIC ACID 1 MG/1
1 TABLET ORAL DAILY
Status: DISCONTINUED | OUTPATIENT
Start: 2019-02-19 | End: 2019-02-22 | Stop reason: HOSPADM

## 2019-02-18 RX ORDER — PANTOPRAZOLE SODIUM 40 MG/1
40 TABLET, DELAYED RELEASE ORAL
Status: DISCONTINUED | OUTPATIENT
Start: 2019-02-19 | End: 2019-02-22 | Stop reason: HOSPADM

## 2019-02-18 RX ORDER — ATORVASTATIN CALCIUM 40 MG/1
40 TABLET, FILM COATED ORAL EVERY EVENING
Status: DISCONTINUED | OUTPATIENT
Start: 2019-02-18 | End: 2019-02-18

## 2019-02-18 RX ORDER — LEVOTHYROXINE SODIUM 0.07 MG/1
75 TABLET ORAL
Status: DISCONTINUED | OUTPATIENT
Start: 2019-02-19 | End: 2019-02-19

## 2019-02-18 RX ORDER — LABETALOL 20 MG/4 ML (5 MG/ML) INTRAVENOUS SYRINGE
10 ONCE
Status: COMPLETED | OUTPATIENT
Start: 2019-02-18 | End: 2019-02-18

## 2019-02-18 RX ADMIN — LABETALOL 20 MG/4 ML (5 MG/ML) INTRAVENOUS SYRINGE 10 MG: at 16:52

## 2019-02-18 NOTE — ED NOTES
Pt requesting RN update friend, Sharifa Hollis RN left voicemail at this time        Filomena Samuels RN  02/18/19 3218

## 2019-02-18 NOTE — ED PROVIDER NOTES
History  Chief Complaint   Patient presents with    Altered Mental Status     Pt returned home from shopping and felt very confused  Pt went to neighbors and they called EMS  Pt having trouble remembering numbers  HPI    This is a 72-year-old female presents to the ED for evaluation of sudden-onset of amnesia  Patient states that she was driving home from the grocery store, when she suddenly had an episode where she did not know where she was  Patient states that she pulled over her car, her memory did return a for her to go home  She went to a neighbor's house, for the called EMS  Patient states that she has been having these episodes frequently, which she had 1 in the past 2-3 months  She does not always get evaluated for them  He currently states like her baseline, denies any symptoms on ROS  Patient had admission month ago, was noted to have a head CT without any acute changes  Patient currently is not oriented to year, she knows that it is winter, knows that she is in the hospital, knows her full name  On review of records, patient has recent GI bleed, with anemia, bilateral carotid stenosis, history of alcohol abuse, hypothyroidism, hypertension, distant history of breast cancer, on anastrozole  Prior to Admission Medications   Prescriptions Last Dose Informant Patient Reported?  Taking?   anastrozole (ARIMIDEX) 1 mg tablet 2/17/2019 at Unknown time Self No Yes   Sig: TAKE 1 TABLET EVERY DAY   levothyroxine 75 mcg tablet 2/18/2019 at Unknown time  No Yes   Sig: TAKE 1 TABLET EVERY DAY   metoprolol succinate (TOPROL-XL) 50 mg 24 hr tablet 2/18/2019 at Unknown time  No Yes   Sig: Take 1 tablet (50 mg total) by mouth daily   pantoprazole (PROTONIX) 40 mg tablet 2/18/2019 at Unknown time  No Yes   Sig: Take 1 tablet (40 mg total) by mouth 2 (two) times a day before meals      Facility-Administered Medications: None       Past Medical History:   Diagnosis Date    Abnormal CT of the abdomen  Acute bronchitis     Anxiety     Appetite loss     Arthritis     Ascending aortic aneurysm (HCC)     Bilateral renal cysts     Cyst of ovary     Dysphagia     Esophageal reflux disease     Fat necrosis of breast     Full dentures     GERD (gastroesophageal reflux disease)     Herpes zoster     History of radiation therapy     Hyperlipidemia     Hypertension     Hypokalemia     Hypomagnesemia     Hypothyroidism     Impaired fasting glucose     Irritable bowel syndrome (IBS)     Ischemic colitis (HCC)     Knee pain     Limb alert care status     no BP/IV right arm    Osteoarthritis of right knee     Pneumonia     Post-op pain     Pulmonary nodules     Thoracic aortic aneurysm (HCC)     Unspecified ovarian cysts     Use of anastrozole (Arimidex)     Vasovagal syncope     Vitamin D deficiency     Wears glasses     Weight loss        Past Surgical History:   Procedure Laterality Date    APPENDECTOMY      pt states it was not removed    BREAST BIOPSY Right 03/02/2016    BREAST LUMPECTOMY Right 2004    BREAST SURGERY Right     Single lesion excision 1990 hyperplasia, 1st biopsy at 25yrs old was benign    CHOLECYSTECTOMY      COLONOSCOPY      COLONOSCOPY N/A 5/12/2017    Procedure: COLONOSCOPY with biopsy;  Surgeon: Jennie De Santiago MD;  Location: [facility]; Service:     ESOPHAGOGASTRODUODENOSCOPY N/A 1/19/2019    Procedure: ESOPHAGOGASTRODUODENOSCOPY (EGD) with 4cc of epinephrine injection and cauterized with gold probe; Surgeon: Milena Dalal MD;  Location: [facility];   Service: Gastroenterology    HYSTERECTOMY      KNEE SURGERY Right     DE BIOPSY/EXCISION, LYMPH NODE(S) Right 4/12/2016    Procedure: BIOPSY LYMPH NODE SENTINEL @ 1200 LYMPHOSCINTIGRAPHY LYMPHATIC MAPPING;  Surgeon: Fernanda Garcia MD;  [facility];  Service: General    DE MASTECTOMY, SIMPLE, COMPLETE Right 4/12/2016    Procedure: MASTECTOMY SIMPLE;  Surgeon: Fernanda Garcia MD;  [facility];  Service: General    TUBAL LIGATION      US GUIDED BREAST BIOPSY RIGHT COMPLETE Right 3/9/2016       Family History   Problem Relation Age of Onset    Stroke Maternal Grandmother     Anemia Mother     Other Mother         disorders of blood and blood forming organs    Hypertension Mother     Stroke Father     Alcohol abuse Brother      I have reviewed and agree with the history as documented  Social History     Tobacco Use    Smoking status: Never Smoker    Smokeless tobacco: Never Used    Tobacco comment: not interested   Substance Use Topics    Alcohol use: Not Currently    Drug use: No        Review of Systems   Constitutional: Negative for chills, fatigue and fever  HENT: Negative for sore throat  Eyes: Negative for redness and visual disturbance  Respiratory: Negative for shortness of breath  Cardiovascular: Negative for chest pain  Gastrointestinal: Negative for abdominal pain, diarrhea and nausea  Genitourinary: Negative for difficulty urinating, dysuria and pelvic pain  Musculoskeletal: Negative for back pain  Skin: Negative for rash  Neurological: Negative for syncope, weakness and headaches  Amnesia    All other systems reviewed and are negative  Physical Exam  ED Triage Vitals [02/18/19 1607]   Temperature Pulse Respirations Blood Pressure SpO2   99 °F (37 2 °C) 78 18 (!) 226/103 97 %      Temp Source Heart Rate Source Patient Position - Orthostatic VS BP Location FiO2 (%)   Oral Monitor Lying Left arm --      Pain Score       No Pain            Orthostatic Vital Signs  Vitals:    02/18/19 2015 02/18/19 2115 02/18/19 2215 02/18/19 2315   BP: 145/90 129/74 122/81 136/79   Pulse: 84 72 68 74   Patient Position - Orthostatic VS: Lying Lying Lying Lying       Physical Exam   Constitutional: She appears well-developed and well-nourished  No distress  HENT:   Head: Normocephalic and atraumatic  Eyes: Pupils are equal, round, and reactive to light  Conjunctivae are normal    Neck: Normal range of motion  Cardiovascular: Normal rate, regular rhythm and normal heart sounds  No murmur heard  Pulmonary/Chest: Effort normal and breath sounds normal  No respiratory distress  Abdominal: Soft  Bowel sounds are normal  There is no tenderness  Musculoskeletal: Normal range of motion  Neurological: She is alert  No cranial nerve deficit or sensory deficit  She exhibits normal muscle tone  Coordination normal    Patient is only oriented to person knows that she is in the hospital, does not know the year, knows the season  Mental Status:  language fluent with good comprehension and repetition  CN: PERRLA, visual fields full to finger counting bilaterally, extraocular muscles intact without nystagmus  Face symmetrical with full sensation  Hearing in tact to bilateral finger rub  Tongue protrudes midline and palate elevates symmetrically  Sternocleidomastoid and trapezius muscle have full strength bilaterally  Motor: Normal muscle bulk and tone throughout 5/5 strength in upper and lower extremities throughout  Sensory: Sensation intact to light touch  Coordination: Able to perform finger to nose to finger  Gait at baseline     Skin: Skin is warm and dry  No rash noted  Psychiatric: She has a normal mood and affect  Nursing note and vitals reviewed        ED Medications  Medications   anastrozole (ARIMIDEX) tablet 1 mg (has no administration in time range)   levothyroxine tablet 75 mcg (has no administration in time range)   pantoprazole (PROTONIX) EC tablet 40 mg (has no administration in time range)   ondansetron (ZOFRAN) injection 4 mg (has no administration in time range)   thiamine (VITAMIN B1) tablet 100 mg (has no administration in time range)   folic acid (FOLVITE) tablet 1 mg (has no administration in time range)   Labetalol HCl (NORMODYNE) injection 10 mg (10 mg Intravenous Given 2/18/19 2422)       Diagnostic Studies  Results Reviewed Procedure Component Value Units Date/Time    TSH, 3rd generation with Free T4 reflex [[id number]]  (Abnormal) Collected:  02/18/19 1629    Lab Status:  Final result Specimen:  Blood from Arm, Left Updated:  02/18/19 1729     TSH 3RD GENERATON 0 262 uIU/mL     Narrative:       Patients undergoing fluorescein dye angiography may retain small amounts of fluorescein in the body for 48-72 hours post procedure  Samples containing fluorescein can produce falsely depressed TSH values  If the patient had this procedure,a specimen should be resubmitted post fluorescein clearance  T4, free [[id number]] Collected:  02/18/19 1629    Lab Status: In process Specimen:  Blood from Arm, Left Updated:  02/18/19 1729    Rapid drug screen, urine [[id number]]  (Normal) Collected:  02/18/19 1705    Lab Status:  Final result Specimen:  Urine, Clean Catch Updated:  02/18/19 1727     Amph/Meth UR Negative     Barbiturate Ur Negative     Benzodiazepine Urine Negative     Cocaine Urine Negative     Methadone Urine Negative     Opiate Urine Negative     PCP Ur Negative     THC Urine Negative    Narrative:       FOR MEDICAL PURPOSES ONLY  IF CONFIRMATION NEEDED PLEASE CONTACT THE LAB WITHIN 5 DAYS    Drug Screen Cutoff Levels:  AMPHETAMINE/METHAMPHETAMINES  1000 ng/mL  BARBITURATES     200 ng/mL  BENZODIAZEPINES     200 ng/mL  COCAINE      300 ng/mL  METHADONE      300 ng/mL  OPIATES      300 ng/mL  PHENCYCLIDINE     25 ng/mL  THC       50 ng/mL    POCT urinalysis dipstick [[id number]]  (Normal) Resulted:  02/18/19 1719    Lab Status:  Final result Specimen:  Urine Updated:  02/18/19 1719     Color, UA yellow    ED Urine Macroscopic [[id number]] Collected:  02/18/19 1708    Lab Status:  Final result Specimen:  Urine Updated:  02/18/19 1708     Color, UA Yellow     Clarity, UA Clear     pH, UA 7 0     Leukocytes, UA Negative     Nitrite, UA Negative     Protein, UA Negative mg/dl      Glucose, UA Negative mg/dl      Ketones, UA Negative mg/dl Urobilinogen, UA 0 2 E U /dl      Bilirubin, UA Negative     Blood, UA Negative     Specific Gravity, UA 1 015    Narrative:       CLINITEK RESULT    Ethanol [[id number]]  (Normal) Collected:  02/18/19 1631    Lab Status:  Final result Specimen:  Blood from Arm, Left Updated:  02/18/19 1657     Ethanol Lvl <3 mg/dL     Lipase [[id number]]  (Normal) Collected:  02/18/19 1631    Lab Status:  Final result Specimen:  Blood from Arm, Left Updated:  02/18/19 1657     Lipase 117 u/L     Troponin I [[id number]]  (Normal) Collected:  02/18/19 1629    Lab Status:  Final result Specimen:  Blood from Arm, Left Updated:  02/18/19 1655     Troponin I <0 02 ng/mL     Comprehensive metabolic panel [[id number]] Collected:  02/18/19 1629    Lab Status:  Final result Specimen:  Blood from Arm, Left Updated:  02/18/19 1653     Sodium 141 mmol/L      Potassium 3 6 mmol/L      Chloride 102 mmol/L      CO2 31 mmol/L      ANION GAP 8 mmol/L      BUN 17 mg/dL      Creatinine 0 86 mg/dL      Glucose 116 mg/dL      Calcium 9 8 mg/dL      AST 26 U/L      ALT 28 U/L      Alkaline Phosphatase 80 U/L      Total Protein 6 8 g/dL      Albumin 3 5 g/dL      Total Bilirubin 0 51 mg/dL      eGFR 68 ml/min/1 73sq m     Narrative:       National Kidney Disease Education Program recommendations are as follows:  GFR calculation is accurate only with a steady state creatinine  Chronic Kidney disease less than 60 ml/min/1 73 sq  meters  Kidney failure less than 15 ml/min/1 73 sq  meters      Salicylate level [[id number]]  (Abnormal) Collected:  02/18/19 1629    Lab Status:  Final result Specimen:  Blood from Arm, Left Updated:  78/59/86 6202     Salicylate Lvl <3 mg/dL     Acetaminophen level [[id number]]  (Abnormal) Collected:  02/18/19 1629    Lab Status:  Final result Specimen:  Blood from Arm, Left Updated:  02/18/19 1652     Acetaminophen Level <2 ug/mL     CBC and differential [[id number]] Collected:  02/18/19 1629    Lab Status:  Final result Specimen: Blood from Arm, Left Updated:  02/18/19 1637     WBC 5 33 Thousand/uL      RBC 3 91 Million/uL      Hemoglobin 12 2 g/dL      Hematocrit 37 6 %      MCV 96 fL      MCH 31 2 pg      MCHC 32 4 g/dL      RDW 12 6 %      MPV 9 7 fL      Platelets 853 Thousands/uL      nRBC 0 /100 WBCs      Neutrophils Relative 67 %      Immat GRANS % 0 %      Lymphocytes Relative 22 %      Monocytes Relative 9 %      Eosinophils Relative 1 %      Basophils Relative 1 %      Neutrophils Absolute 3 54 Thousands/µL      Immature Grans Absolute 0 02 Thousand/uL      Lymphocytes Absolute 1 19 Thousands/µL      Monocytes Absolute 0 49 Thousand/µL      Eosinophils Absolute 0 04 Thousand/µL      Basophils Absolute 0 05 Thousands/µL     Fingerstick Glucose (POCT) [[id number]]  (Normal) Collected:  02/18/19 1614    Lab Status:  Final result Updated:  02/18/19 1619     POC Glucose 118 mg/dl                  CT head without contrast   Final Result by Aarti Gamboa MD (02/18 1732)      No acute intracranial abnormality  Workstation performed: [id number]         XR chest 2 views   ED Interpretation by Ela Garcia MD (02/18 1738)   No acute cardiopulmonary pathology      Final Result by Elizabeth Cid MD (02/18 2043)      No acute cardiopulmonary disease  Workstation performed: [id number]         VAS carotid complete study (*Order only if CTA has not been completed*)    (Results Pending)   MRI brain wo contrast    (Results Pending)   MRA and or MRV head wo contrast    (Results Pending)   MRA carotids wo contrast    (Results Pending)         Procedures  Procedures      Phone Consults  ED Phone Contact    ED Course           MDM    42-year-old female who presents to the ED for evaluation of amnesia, patient still at this time appears to be disoriented  Will get altered mental status workup, including labs, head CT, urine drug screen, alcohol level  Labs are unremarkable, head CT showed no acute pathology  Patient will be admitted to Medicine for altered mental status workup  Disposition  Final diagnoses: Altered mental status   Encephalopathy   Confusion   High blood pressure   Hypertensive encephalopathy     Time reflects when diagnosis was documented in both MDM as applicable and the Disposition within this note     Time User Action Codes Description Comment    2/18/2019  5:47 PM Marah Nageotte Add [R41 82] Altered mental status     2/18/2019  5:47 PM Marah Nageotte Add [G93 40] Encephalopathy     2/18/2019  5:47 PM Marah Nageotte Add [R41 0] Confusion     2/18/2019  5:47 PM Marah Nageotte Add [I10] High blood pressure     2/18/2019  7:17 PM Susu Reddish Add [F41 9] Anxiety     2/18/2019  7:22 PM Susu Reddish Add [T92 98] Uses illicit drugs yearly     2/18/2019  9:30 PM Lavaun Dellen Add [I67 4] Hypertensive encephalopathy     2/18/2019  9:30 PM Monna Pole B Modify [R41 82] Altered mental status     2/18/2019  9:30 PM Lavaun Dellen Modify [I67 4] Hypertensive encephalopathy       ED Disposition     ED Disposition Condition Date/Time Comment    Admit Stable Mon Feb 18, 2019  5:47 PM Case was discussed with FERN and the patient's admission status was agreed to be Admission Status: observation status to the service of Dr Kishore Antoine  Follow-up Information    None         Current Discharge Medication List      CONTINUE these medications which have NOT CHANGED    Details   anastrozole (ARIMIDEX) 1 mg tablet TAKE 1 TABLET EVERY DAY  Qty: 90 tablet, Refills: 3    Associated Diagnoses: Invasive ductal carcinoma of breast, right ([facility] [address] );  Use of anastrozole (Arimidex)      levothyroxine 75 mcg tablet TAKE 1 TABLET EVERY DAY  Qty: 90 tablet, Refills: 0    Associated Diagnoses: Hypothyroidism      metoprolol succinate (TOPROL-XL) 50 mg 24 hr tablet Take 1 tablet (50 mg total) by mouth daily  Qty: 30 tablet, Refills: 5    Associated Diagnoses: Closed odontoid fracture, initial encounter ([facility])      pantoprazole (PROTONIX) 40 mg tablet Take 1 tablet (40 mg total) by mouth 2 (two) times a day before meals  Qty: 60 tablet, Refills: 5    Associated Diagnoses: Gastrointestinal hemorrhage associated with gastric ulcer           No discharge procedures on file  ED Provider  Attending physically available and evaluated Lilo Bowers I managed the patient along with the ED Attending      Electronically Signed by         Sammi Kelly MD  02/19/19 9847

## 2019-02-19 ENCOUNTER — APPOINTMENT (INPATIENT)
Dept: MRI IMAGING | Facility: HOSPITAL | Age: 74
DRG: 098 | End: 2019-02-19
Payer: MEDICARE

## 2019-02-19 ENCOUNTER — TELEPHONE (OUTPATIENT)
Dept: FAMILY MEDICINE CLINIC | Facility: CLINIC | Age: 74
End: 2019-02-19

## 2019-02-19 ENCOUNTER — APPOINTMENT (INPATIENT)
Dept: NON INVASIVE DIAGNOSTICS | Facility: HOSPITAL | Age: 74
DRG: 098 | End: 2019-02-19
Payer: MEDICARE

## 2019-02-19 PROBLEM — R90.89 ABNORMAL FINDING ON MRI OF BRAIN: Status: ACTIVE | Noted: 2019-02-19

## 2019-02-19 LAB
CHOLEST SERPL-MCNC: 246 MG/DL (ref 50–200)
EST. AVERAGE GLUCOSE BLD GHB EST-MCNC: 111 MG/DL
HBA1C MFR BLD: 5.5 % (ref 4.2–6.3)
HDLC SERPL-MCNC: 48 MG/DL (ref 40–60)
LDLC SERPL CALC-MCNC: 179 MG/DL (ref 0–100)
T3 SERPL-MCNC: 1.4 NG/ML (ref 0.6–1.8)
TRIGL SERPL-MCNC: 95 MG/DL

## 2019-02-19 PROCEDURE — 83036 HEMOGLOBIN GLYCOSYLATED A1C: CPT | Performed by: PHYSICIAN ASSISTANT

## 2019-02-19 PROCEDURE — 99223 1ST HOSP IP/OBS HIGH 75: CPT | Performed by: PSYCHIATRY & NEUROLOGY

## 2019-02-19 PROCEDURE — 97166 OT EVAL MOD COMPLEX 45 MIN: CPT

## 2019-02-19 PROCEDURE — G8987 SELF CARE CURRENT STATUS: HCPCS

## 2019-02-19 PROCEDURE — G8979 MOBILITY GOAL STATUS: HCPCS

## 2019-02-19 PROCEDURE — 80061 LIPID PANEL: CPT | Performed by: PHYSICIAN ASSISTANT

## 2019-02-19 PROCEDURE — 70551 MRI BRAIN STEM W/O DYE: CPT

## 2019-02-19 PROCEDURE — 97163 PT EVAL HIGH COMPLEX 45 MIN: CPT

## 2019-02-19 PROCEDURE — 84480 ASSAY TRIIODOTHYRONINE (T3): CPT | Performed by: FAMILY MEDICINE

## 2019-02-19 PROCEDURE — G8988 SELF CARE GOAL STATUS: HCPCS

## 2019-02-19 PROCEDURE — 70544 MR ANGIOGRAPHY HEAD W/O DYE: CPT

## 2019-02-19 PROCEDURE — G8978 MOBILITY CURRENT STATUS: HCPCS

## 2019-02-19 PROCEDURE — 93880 EXTRACRANIAL BILAT STUDY: CPT

## 2019-02-19 PROCEDURE — 99232 SBSQ HOSP IP/OBS MODERATE 35: CPT | Performed by: FAMILY MEDICINE

## 2019-02-19 RX ORDER — ACETAMINOPHEN 325 MG/1
650 TABLET ORAL EVERY 6 HOURS PRN
Status: DISCONTINUED | OUTPATIENT
Start: 2019-02-19 | End: 2019-02-22 | Stop reason: HOSPADM

## 2019-02-19 RX ORDER — METOPROLOL SUCCINATE 50 MG/1
50 TABLET, EXTENDED RELEASE ORAL DAILY
Status: DISCONTINUED | OUTPATIENT
Start: 2019-02-20 | End: 2019-02-22 | Stop reason: HOSPADM

## 2019-02-19 RX ORDER — LEVOTHYROXINE SODIUM 0.05 MG/1
50 TABLET ORAL
Status: DISCONTINUED | OUTPATIENT
Start: 2019-02-20 | End: 2019-02-22 | Stop reason: HOSPADM

## 2019-02-19 RX ADMIN — ANASTROZOLE 1 MG: 1 TABLET, COATED ORAL at 08:50

## 2019-02-19 RX ADMIN — PANTOPRAZOLE SODIUM 40 MG: 40 TABLET, DELAYED RELEASE ORAL at 06:21

## 2019-02-19 RX ADMIN — Medication 100 MG: at 08:50

## 2019-02-19 RX ADMIN — FOLIC ACID 1 MG: 1 TABLET ORAL at 08:50

## 2019-02-19 RX ADMIN — LEVOTHYROXINE SODIUM 75 MCG: 75 TABLET ORAL at 06:21

## 2019-02-19 RX ADMIN — PANTOPRAZOLE SODIUM 40 MG: 40 TABLET, DELAYED RELEASE ORAL at 17:08

## 2019-02-19 NOTE — PROGRESS NOTES
Progress Note - Disha Bailey 1945, 72 y o  female MRN: [id number]    Unit/Bed#: E4 -01 Encounter: [id number]    Primary Care Provider: Stefan Westbrook DO   Date and time admitted to hospital: 2/18/2019  4:03 PM        * Confusion  Assessment & Plan  Persists, suspected underlying cognitive decline, anxiety  Admission CT head negative for ischemia/hemorrhage shift or midline mass  MRI of the brain noted for an abnormal finding, will further assess with Contrast MRI and plan for an LP  Appreciate Neurology input  Continue close monitoring    Abnormal finding on MRI of brain  Assessment & Plan  Abnormal findings that appear nonspecific  Plan for MRI brain with contrast and an LP for further workup - will attempt without sedation to avoid worsening of MS  Appreciate Neurology input    Low TSH level  Assessment & Plan  ? If contributing to anxiety and somewhat pressured speech  Lower levothyroxine dosing from 75 to 50 mcg    Atrial fibrillation (HCC)  Assessment & Plan  Paroxysmal a fib in NSR  No longer on AC and not a good candidate for [facility] given recent GI B  Continue Metoprolol    History of alcohol use  Assessment & Plan  Alcohol level <3  Unclear if alcohol use is on-going, per significant other the patient has not been drinking since August    Invasive ductal carcinoma of breast, right [facility])  Assessment & Plan  Pt w/recurrent breast ca s/p mastectomy maintained on anastrazoloe  Continue anastrazole and op surveillance    Esophageal reflux disease  Assessment & Plan  W/recent gastric ulcers maintained on PPI  Continue ppi 40mg bid    Essential hypertension  Assessment & Plan  Resume home metoprolol      VTE Pharmacologic Prophylaxis:   Pharmacologic: None due to recent GIB  Mechanical VTE Prophylaxis in Place: Yes    Patient Centered Rounds: I have performed bedside rounds with nursing staff today      Discussions with Specialists or Other Care Team Provider: Neurology    Education and Discussions with Family / Patient: Patient    Time Spent for Care: 30 minutes  More than 50% of total time spent on counseling and coordination of care as described above  Current Length of Stay: 1 day(s)    Current Patient Status: Inpatient   Certification Statement: The patient will continue to require additional inpatient hospital stay due to need for additional studies    Discharge Plan: TBD    Code Status: Level 1 - Full Code      Subjective:   Patient seen and examined  She does not offer any complaints  She appears oriented to place and situation but does give some bizarre answers  There were no overnight events  Patient was able to ambulate without issues  Objective:     Vitals:   Temp (24hrs), Av 2 °F (36 8 °C), Min:97 2 °F (36 2 °C), Max:99 4 °F (37 4 °C)    Temp:  [97 2 °F (36 2 °C)-99 4 °F (37 4 °C)] 98 4 °F (36 9 °C)  HR:  [68-84] 79  Resp:  [18-20] 18  BP: (112-161)/(56-90) 161/86  SpO2:  [94 %-100 %] 100 %  Body mass index is 27 4 kg/m²  Input and Output Summary (last 24 hours): Intake/Output Summary (Last 24 hours) at 2019 1851  Last data filed at 2019 1242  Gross per 24 hour   Intake 240 ml   Output --   Net 240 ml       Physical Exam:     Physical Exam   Constitutional: No distress  HENT:   Head: Normocephalic and atraumatic  Eyes: Pupils are equal, round, and reactive to light  Conjunctivae and EOM are normal    Neck: No JVD present  Cardiovascular: Normal rate and regular rhythm  No murmur heard  Pulmonary/Chest: Effort normal  No respiratory distress  She has no wheezes  She has no rales  Abdominal: Soft  She exhibits no distension  There is no tenderness  Musculoskeletal: She exhibits no edema  Neurological: She is alert  No cranial nerve deficit  She exhibits normal muscle tone  Skin: Skin is warm and dry  Psychiatric: Her mood appears anxious  Cognition and memory are impaired         Additional Data:     Labs:    Results from last 7 days   Lab Units 02/18/19  1629   WBC Thousand/uL 5 33   HEMOGLOBIN g/dL 12 2   HEMATOCRIT % 37 6   PLATELETS Thousands/uL 249   NEUTROS PCT % 67   LYMPHS PCT % 22   MONOS PCT % 9   EOS PCT % 1     Results from last 7 days   Lab Units 02/18/19  1629   SODIUM mmol/L 141   POTASSIUM mmol/L 3 6   CHLORIDE mmol/L 102   CO2 mmol/L 31   BUN mg/dL 17   CREATININE mg/dL 0 86   ANION GAP mmol/L 8   CALCIUM mg/dL 9 8   ALBUMIN g/dL 3 5   TOTAL BILIRUBIN mg/dL 0 51   ALK PHOS U/L 80   ALT U/L 28   AST U/L 26   GLUCOSE RANDOM mg/dL 116         Results from last 7 days   Lab Units 02/18/19  1614   POC GLUCOSE mg/dl 118     Results from last 7 days   Lab Units 02/19/19  0616   HEMOGLOBIN A1C % 5 5             * I Have Reviewed All Lab Data Listed Above  * Additional Pertinent Lab Tests Reviewed: Real 66 Admission Reviewed    Imaging:    Imaging Reports Reviewed Today Include: MRI brain      Recent Cultures (last 7 days):           Last 24 Hours Medication List:     Current Facility-Administered Medications:  acetaminophen 650 mg Oral Q6H PRN KG Rizvi   anastrozole 1 mg Oral Daily Ely Pham PA-C   folic acid 1 mg Oral Daily Ely Pham PA-C   [START ON 2/20/2019] levothyroxine 50 mcg Oral Early Morning Glen Dubin, MD   [START ON 2/20/2019] metoprolol succinate 50 mg Oral Daily Inna Bryant MD   ondansetron 4 mg Intravenous Q6H PRN Ely Pham PA-C   pantoprazole 40 mg Oral BID AC Ely Pham PA-C   thiamine 100 mg Oral Daily Madelyn Valrie Sever, PA-C        Today, Patient Was Seen By: Saida Caicedo MD    ** Please Note: Dictation voice to text software may have been used in the creation of this document   **

## 2019-02-19 NOTE — ASSESSMENT & PLAN NOTE
Pt w/recurrent breast ca s/p mastectomy maintained on anastrazoloe  Continue anastrazole and op surveillance

## 2019-02-19 NOTE — TELEPHONE ENCOUNTER
----- Message from Pedro Luis Mohr DO sent at 2/18/2019  8:39 PM EST -----  Call patient  X-ray of left shoulder shows no acute fracture or dislocation  Patient does have some mild AC joint degenerative changes    Patient also with calcification of rotator cuff tendon

## 2019-02-19 NOTE — ASSESSMENT & PLAN NOTE
Paroxysmal a fib in nsr  No longer on [facility] and not a good candidate for [facility] given recent GI B  If MRI positive for stroke will need to discuss [facility] options between neuro/gi

## 2019-02-19 NOTE — UTILIZATION REVIEW
Initial Clinical Review    Admission: Date/Time/Statement:   Start   Ordered   02/18/19 1939  Inpatient Admission Once     Transfer Service: General Medicine       Question Answer Comment   Admitting Physician Jaciel Rdoriguez    Level of Care Med Surg    Estimated length of stay More than 2 Midnights    Certification I certify that inpatient services are medically necessary for this patient for a duration of greater than two midnights  See H&P and MD Progress Notes for additional information about the patient's course of treatment  Start Status    02/18/19 1939 Completed Details       02/18/19 1938       ED: Date/Time/Mode of Arrival:   ED Arrival Information     Expected Arrival Acuity Means of Arrival Escorted By Service Admission Type    - 2/18/2019 16:03 [facility] [address]  Emergency    Arrival Complaint    confusion        Chief Complaint:   Chief Complaint   Patient presents with    Altered Mental Status     Pt returned home from shopping and felt very confused  Pt went to neighbors and they called EMS  Pt having trouble remembering numbers  History of Illness: 61-year-old female presents to the ED for evaluation of sudden-onset of amnesia  Patient states that she was driving home from the grocery store, when she suddenly had an episode where she did not know where she was  Patient states that she pulled over her car, her memory did return a for her to go home  She went to a neighbor's house, for the called EMS  Patient states that she has been having these episodes frequently, which she had 1 in the past 2-3 months  She does not always get evaluated for them  He currently states like her baseline, denies any symptoms on ROS  Patient had admission month ago, was noted to have a head CT without any acute changes    Patient currently is not oriented to year, she knows that it is winter, knows that she is in the hospital, knows her full name        ED Vital Signs:   ED Triage Vitals [02/18/19 1607]   Temperature Pulse Respirations Blood Pressure SpO2   99 °F (37 2 °C) 78 18 (!) 226/103 97 %      Temp Source Heart Rate Source Patient Position - Orthostatic VS BP Location FiO2 (%)   Oral Monitor Lying Left arm --      Pain Score       No Pain        Wt Readings from Last 1 Encounters:   02/18/19 72 4 kg (159 lb 9 8 oz)     Vital Signs (abnormal):  See above    Pertinent Labs/Diagnostic Test Results:   CBC and CMP  wnl's  TSH 0 262  Rapid Drug  Neg  CXR: No acute cardiopulmonary disease  CT Head: No acute intracranial abnormality        ED Treatment:   Medication Administration from 02/18/2019 1603 to 02/18/2019 1840       Date/Time Order Dose Route Action Action by Comments     02/18/2019 1652 Labetalol HCl (NORMODYNE) injection 10 mg 10 mg Intravenous Given Carlos Manuel Christiansen RN         Past Medical/Surgical History:   Past Medical History:   Diagnosis Date    Abnormal CT of the abdomen     Acute bronchitis     Anxiety     Appetite loss     Arthritis     Ascending aortic aneurysm (HCC)     Bilateral renal cysts     Cyst of ovary     Dysphagia     Esophageal reflux disease     Fat necrosis of breast     Full dentures     GERD (gastroesophageal reflux disease)     Herpes zoster     History of radiation therapy     Hyperlipidemia     Hypertension     Hypokalemia     Hypomagnesemia     Hypothyroidism     Impaired fasting glucose     Irritable bowel syndrome (IBS)     Ischemic colitis (HCC)     Knee pain     Limb alert care status     Osteoarthritis of right knee     Pneumonia     Post-op pain     Pulmonary nodules     Thoracic aortic aneurysm (HCC)     Unspecified ovarian cysts     Use of anastrozole (Arimidex)     Vasovagal syncope     Vitamin D deficiency     Wears glasses     Weight loss      Admitting Diagnosis: Confusion [R41 0]  High blood pressure [I10]  Altered mental status [R41 82]  Mental confusion [R41 0]  Encephalopathy [G93 40]     Age/Sex: 72 y o  female  Assessment/Plan:   71 yo female admitted with Acute encephalopathy - in setting of progressive likely multifactorial cognitive decline  CT Head negative  No tPA as NIH SS 0  Monitor on TELE,  Strok pathway  MRI Brain,   MRA Head & Neck,  Carotid,  Consult Neuro  Patient with recent gastric ulcers requiring cauterization of vessel and should not be started on Aspirin/Plavix unless strong indication    Admission Orders:  IP  TELE  Scheduled Meds:   Current Facility-Administered Medications:  acetaminophen 650 mg Oral Q6H PRN    anastrozole 1 mg Oral Daily    folic acid 1 mg Oral Daily    levothyroxine 75 mcg Oral Early Morning    ondansetron 4 mg Intravenous Q6H PRN    pantoprazole 40 mg Oral BID AC    thiamine 100 mg Oral Daily      Neuro checks q1h x 4, q2h x 4,  q4h  MRI / MRA  Carotid Study  ECHO  Consult Neuro  Consult Psych  SCD's  PT / OT Eval    MRI: High signal material in the sulci of the posterior temporal and parietal lobes, right greater than left  This could represent a small amount of subarachnoid blood, or less likely CSF spread of tumor or infection  Limited Contrasted MR of the brain is   suggested  Lumbar puncture to evaluate for cell count, Gram stain, C and S, and cytology suggested  Diffuse generalized volume loss and moderate degree of chronic small vessel disease      MRA: Unremarkable MR angiogram of the brain  No critical stenosis  No aneurysm        Network Utilization Review Department  Phone: [phone]; Fax [phone]  [email or web address]  ATTENTION: Please call with any questions or concerns to [phone]  and carefully listen to the prompts so that you are directed to the right person  Send all requests for admission clinical reviews, approved or denied determinations and any other requests to fax [phone]   All voicemails are confidential

## 2019-02-19 NOTE — ASSESSMENT & PLAN NOTE
Persists, suspected underlying cognitive decline, anxiety  Admission CT head negative for ischemia/hemorrhage shift or midline mass  MRI of the brain noted for an abnormal finding, will further assess with Contrast MRI and plan for an LP  Appreciate Neurology input  Continue close monitoring

## 2019-02-19 NOTE — CONSULTS
Consulted for stroke  Pt reported no concerns w/ chewing or swallowing, tolerating po well  Pt reported weight has been stable but had a hx of wt loss reported 5yrs ago weighing 300's and with diet changes lost weight  Reviewed wt hx records over the past year 1/30/18 174lbs, 8/8/18 159lbs, 10/3 165lbs, 1/19/19 165lbs, 2/18 159lbs, no significant weight changes  Reviewed diet hx: B: pb&j sandwich L: frozen meal, canned soups, or yogurt w/ fruit D: lean cusine or weight watchers, pt reported recently binging on chocolates and goes through spurts of binging on sweets  Reviewed elevated cholesterol (246) and LDL (179) levels, discussed heart healthy diet ed w/ pt  Currently on regular diet, will adjust to cardiac  Will continue to monitor po intake, tolerance and any further diet questions

## 2019-02-19 NOTE — ASSESSMENT & PLAN NOTE
Paroxysmal a fib in NSR  No longer on AC and not a good candidate for [facility] [place], operated by [facility] given recent GI B  Continue Metoprolol

## 2019-02-19 NOTE — TELEPHONE ENCOUNTER
Spoke with patient about results per Dr Cynthia Dueñas  While on phone with patient she stated she was admitted to hospital yesterday due to confusion and "difficulty doing things " I told her I would make Dr Cynthia Dueñas aware of admission

## 2019-02-19 NOTE — ASSESSMENT & PLAN NOTE
? If contributing to anxiety and somewhat pressured speech    Lower levothyroxine dosing from 75 to 50 mcg

## 2019-02-19 NOTE — ED ATTENDING ATTESTATION
Niall Lopez DO, saw and evaluated the patient  I have discussed the patient with the resident/non-physician practitioner and agree with the resident's/non-physician practitioner's findings, Plan of Care, and MDM as documented in the resident's/non-physician practitioner's note, except where noted  All available labs and Radiology studies were reviewed  At this point I agree with the current assessment done in the Emergency Department  I have conducted an independent evaluation of this patient a history and physical is as follows:    82-year-old female presents for evaluation of altered mental status confusion  The patient contact her neighbors because she is having trouble getting home from the grocery store  The patient is unclear to some of the events that happened earlier today and her history and review of systems is limited by her confusion  The patient denies any chest pain, pressure, shortness of breath  The patient denies any fevers or chills  The patient denies headache     - 10 organ system ROS was performed and all are normal unless stated in the history above  - Nursing note reviewed  Vitals reviewed  - Previous chart was reviewed    On examination:  The patient is awake, alert and oriented  HEENT: Normocephalic/atraumatic  External examination of the ears is unremarkable  Pupils are equal round and reactive to light, there is no conjunctival injection or scleral icterus noted  Nares are patent without rhinorrhea  The oropharynx is moist without injection  The neck is supple  Lungs: Clear to auscultation bilaterally  Heart: Regular without murmurs rubs or gallops  Abdomen: Soft and nontender  There are positive bowel sounds  there is no rebound or guarding  Musculoskeletal: Normal range of motion with grossly normal strength  Neuro: Cranial nerves II through XII grossly intact   Nonfocal exam   Patient is confused and oriented to person  Skin: No rash noted  Psych: Mood and affect normal    Results for orders placed or performed during the hospital encounter of 02/18/19   CBC and differential   Result Value Ref Range    WBC 5 33 4 31 - 10 16 Thousand/uL    RBC 3 91 3 81 - 5 12 Million/uL    Hemoglobin 12 2 11 5 - 15 4 g/dL    Hematocrit 37 6 34 8 - 46 1 %    MCV 96 82 - 98 fL    MCH 31 2 26 8 - 34 3 pg    MCHC 32 4 31 4 - 37 4 g/dL    RDW 12 6 11 6 - 15 1 %    MPV 9 7 8 9 - 12 7 fL    Platelets 662 823 - 936 Thousands/uL    nRBC 0 /100 WBCs    Neutrophils Relative 67 43 - 75 %    Immat GRANS % 0 0 - 2 %    Lymphocytes Relative 22 14 - 44 %    Monocytes Relative 9 4 - 12 %    Eosinophils Relative 1 0 - 6 %    Basophils Relative 1 0 - 1 %    Neutrophils Absolute 3 54 1 85 - 7 62 Thousands/µL    Immature Grans Absolute 0 02 0 00 - 0 20 Thousand/uL    Lymphocytes Absolute 1 19 0 60 - 4 47 Thousands/µL    Monocytes Absolute 0 49 0 17 - 1 22 Thousand/µL    Eosinophils Absolute 0 04 0 00 - 0 61 Thousand/µL    Basophils Absolute 0 05 0 00 - 0 10 Thousands/µL   Comprehensive metabolic panel   Result Value Ref Range    Sodium 141 136 - 145 mmol/L    Potassium 3 6 3 5 - 5 3 mmol/L    Chloride 102 100 - 108 mmol/L    CO2 31 21 - 32 mmol/L    ANION GAP 8 4 - 13 mmol/L    BUN 17 5 - 25 mg/dL    Creatinine 0 86 0 60 - 1 30 mg/dL    Glucose 116 65 - 140 mg/dL    Calcium 9 8 8 3 - 10 1 mg/dL    AST 26 5 - 45 U/L    ALT 28 12 - 78 U/L    Alkaline Phosphatase 80 46 - 116 U/L    Total Protein 6 8 6 4 - 8 2 g/dL    Albumin 3 5 3 5 - 5 0 g/dL    Total Bilirubin 0 51 0 20 - 1 00 mg/dL    eGFR 68 ml/min/1 73sq m   Troponin I   Result Value Ref Range    Troponin I <0 02 <=0 04 ng/mL   Lipase   Result Value Ref Range    Lipase 117 73 - 393 u/L   Rapid drug screen, urine   Result Value Ref Range    Amph/Meth UR Negative Negative    Barbiturate Ur Negative Negative    Benzodiazepine Urine Negative Negative    Cocaine Urine Negative Negative    Methadone Urine Negative Negative    Opiate Urine Negative Negative    PCP Ur Negative Negative    THC Urine Negative Negative   Ethanol   Result Value Ref Range    Ethanol Lvl <3 0 - 3 mg/dL   Salicylate level   Result Value Ref Range    Salicylate Lvl <3 (L) 3 - 20 mg/dL   Acetaminophen level   Result Value Ref Range    Acetaminophen Level <2 (L) 10 - 30 ug/mL   TSH, 3rd generation with Free T4 reflex   Result Value Ref Range    TSH 3RD GENERATON 0 262 (L) 0 358 - 3 740 uIU/mL   POCT urinalysis dipstick   Result Value Ref Range    Color, UA yellow    Fingerstick Glucose (POCT)   Result Value Ref Range    POC Glucose 118 65 - 140 mg/dl   ED Urine Macroscopic   Result Value Ref Range    Color, UA Yellow     Clarity, UA Clear     pH, UA 7 0 4 5 - 8 0    Leukocytes, UA Negative Negative    Nitrite, UA Negative Negative    Protein, UA Negative Negative mg/dl    Glucose, UA Negative Negative mg/dl    Ketones, UA Negative Negative mg/dl    Urobilinogen, UA 0 2 0 2, 1 0 E U /dl E U /dl    Bilirubin, UA Negative Negative    Blood, UA Negative Negative    Specific Gravity, UA 1 015 1 003 - 1 030     CT head without contrast   Final Result      No acute intracranial abnormality  Workstation performed: [id number]         XR chest 2 views   ED Interpretation   No acute cardiopulmonary pathology      Final Result      No acute cardiopulmonary disease              Workstation performed: [id number]         MRI Inpatient Order    (Results Pending)   MRI inpatient order    (Results Pending)   VAS carotid complete study (*Order only if CTA has not been completed*)    (Results Pending)         Critical Care Time  Procedures 35 minutes for CNS impairment and circulatory impairment the patient required IV antihypertensive medications which on re-evaluation improved her blood pressure at her mental status    Diagnosis:  Hypertensive encephalopathy, altered mental status

## 2019-02-19 NOTE — ASSESSMENT & PLAN NOTE
Alcohol level <3  Has been drinking less than daily    Anxious but no tremors or other s/sx of withdrawal  Continue thiamine/folic acid

## 2019-02-19 NOTE — CONSULTS
Consultation - Neurology   Elo Sampson 72 y o  female MRN: [id number]  Unit/Bed#: E4 -01 Encounter: [id number]      Assessment/Plan   Assessment/Plan:  74-year-old female with intermittent episodes of confusion and memory retrieval difficulties, often in the setting of heightened anxiety  Patient does have patchy and confluent white matter disease as demonstrated on MRI brain, which could contribute to difficulties with memory retrieval and likely evolving cognitive impairment  However, yesterday's episode more in keeping with anxiety driven confusional episode  On exam, patient with evidence of left kristofer-neglect with double simultaneous stimulation and difficulty considering an entire collection of objects, but could name each object individually  -MRI brain with abnormal signal in the posterior temporal and parietal lobes, right greater than left  Will need evaluation with contrast enhanced study  Unfortunately, patient very claustrophobic and had adverse reaction on Ativan last admission  Patient may require full sedation to obtain follow-up study  -LP for clarification of abnormal enhancement demonstrated on MRI brain  Patient does have history of breast cancer, raising the concern of possible metastatic disease  CT brain from 02/18/2019 did not demonstrate any significant changes or demonstrate a bleed   -routine EEG to screen for epileptogenic cause of intermittent confusion  -patient would also benefit from detailed outpatient bedside neuro cognitive evaluation as there has been concern raised as to potential evolving cognitive impairment  She did have difficulty with mental status testing on exam today  Unable to state the year, unable to perform simple calculations, and could not spell the word world backwards  Had 1 of 3 simple object delayed recall  Discussed with Dr Sigrid Savage and Dr Ministerio Bonds  Neurology will remain available to advise      History of Present Illness Reason for Consult / Principal Problem:  Confusional episodes  Hx and PE limited by:  N/A  HPI: Willy Siddiqui is a 72 y o  right handed female with past medical history of alcohol abuse (apparently sober since August 2018), breast cancer in remission 3 years treated with radiation and right mastectomy, dyslipidemia, hypothyroidism, and history of GI bleed being further evaluated for episodes of confusion  History was taken from the patient and from her fiance, Shamir Díaz, who was at bedside  Yesterday afternoon, the patient was grocery shopping when it started to snow  She suddenly became very anxious about the thought of driving in the inclement weather  She was able to drive home but once she got there, she could not figure out how to open her car door for a short period of time, but was able to eventually get out and make her way to her front door, where she had difficulty unlocking it  Her neighbor witnessed this and became concerned and called EMS  She relates several other episodes of the past 6 months of intermittent confusion or memory retrieval difficulties, often associated with heightened anxiety state  When given time to remember or a cue, she will recall  There is question during her admission for GI bleed in January of this year of a possible evolving cognitive impairment issue which she has not yet had formally evaluated  In addition, she has been coming increasingly anxious about driving and as a result has driven less and less often  She denies any history of stroke, TIA, or seizure  When asked about her drinking history, she states she stopped drinking alcohol completely in the Summer of 2018 following several falls and cervical spine fracture      She had an MRI of her brain performed which was personally reviewed and demonstrated high signal material in the sulci of the posterior temporal and parietal lobes, right greater than left with recommendation to follow-up with contrast enhanced MRI and consider lumbar puncture  In addition, MRI demonstrated patchy and confluent areas of chronic small vessel cerebrovascular ischemic disease  Her CT brain performed yesterday did not demonstrate any significant changes  Laboratory blood work was unremarkable except for depressed TSH at 0 262 with normal free T3 (free T4 pending)  Inpatient consult to Neurology  Consult performed by: Misty Tilley PA-C  Consult ordered by: Makayla Farfan PA-C          Review of Systems   Constitutional: Negative  HENT: Negative  Eyes: Negative  Respiratory: Negative  Cardiovascular: Negative  Gastrointestinal: Negative  Endocrine: Negative  Genitourinary: Negative  Musculoskeletal: Positive for arthralgias (Left shoulder and elbow pain, chronic)  Skin: Negative for rash  Allergic/Immunologic: Positive for environmental allergies  Neurological: Positive for light-headedness (When stands up too fast)  As above  Hematological: Negative  Psychiatric/Behavioral: Positive for confusion  The patient is nervous/anxious          Historical Information   Past Medical History:   Diagnosis Date    Abnormal CT of the abdomen     Acute bronchitis     Anxiety     Appetite loss     Arthritis     Ascending aortic aneurysm (HCC)     Bilateral renal cysts     Cyst of ovary     Dysphagia     Esophageal reflux disease     Fat necrosis of breast     Full dentures     GERD (gastroesophageal reflux disease)     Herpes zoster     History of radiation therapy     Hyperlipidemia     Hypertension     Hypokalemia     Hypomagnesemia     Hypothyroidism     Impaired fasting glucose     Irritable bowel syndrome (IBS)     Ischemic colitis (HCC)     Knee pain     Limb alert care status     no BP/IV right arm    Osteoarthritis of right knee     Pneumonia     Post-op pain     Pulmonary nodules     Thoracic aortic aneurysm (HCC)     Unspecified ovarian cysts     Use of anastrozole (Arimidex)     Vasovagal syncope     Vitamin D deficiency     Wears glasses     Weight loss      Past Surgical History:   Procedure Laterality Date    APPENDECTOMY      pt states it was not removed    BREAST BIOPSY Right 03/02/2016    BREAST LUMPECTOMY Right 2004    BREAST SURGERY Right     Single lesion excision 1990 hyperplasia, 1st biopsy at 25yrs old was benign    CHOLECYSTECTOMY      COLONOSCOPY      COLONOSCOPY N/A 5/12/2017    Procedure: COLONOSCOPY with biopsy;  Surgeon: Janes Paredes MD;  Location: [facility]; Service:     ESOPHAGOGASTRODUODENOSCOPY N/A 1/19/2019    Procedure: ESOPHAGOGASTRODUODENOSCOPY (EGD) with 4cc of epinephrine injection and cauterized with gold probe; Surgeon: Farzana Serna MD;  Location: [facility]; Service: Gastroenterology    HYSTERECTOMY      KNEE SURGERY Right     TX BIOPSY/EXCISION, LYMPH NODE(S) Right 4/12/2016    Procedure: BIOPSY LYMPH NODE SENTINEL @ 1200 LYMPHOSCINTIGRAPHY LYMPHATIC MAPPING;  Surgeon: Hang Bunch MD;  Location: [facility];  Service: General    TX MASTECTOMY, SIMPLE, COMPLETE Right 4/12/2016    Procedure: MASTECTOMY SIMPLE;  Surgeon: Hang Bunch MD;  Location: [facility];  Service: General    TUBAL LIGATION      US GUIDED BREAST BIOPSY RIGHT COMPLETE Right 3/9/2016     Social History   Social History     Substance and Sexual Activity   Alcohol Use Not Currently     Social History     Substance and Sexual Activity   Drug Use No     Social History     Tobacco Use   Smoking Status Never Smoker   Smokeless Tobacco Never Used   Tobacco Comment    not interested     Family History:   Family History   Problem Relation Age of Onset    Stroke Maternal Grandmother     Anemia Mother     Other Mother         disorders of blood and blood forming organs    Hypertension Mother     Stroke Father     Alcohol abuse Brother        Review of previous medical records was completed       Meds/Allergies   PTA meds:   Prior to Admission Medications   Prescriptions Last Dose Informant Patient Reported? Taking?   anastrozole (ARIMIDEX) 1 mg tablet 2/17/2019 at Unknown time Self No Yes   Sig: TAKE 1 TABLET EVERY DAY   levothyroxine 75 mcg tablet 2/18/2019 at Unknown time  No Yes   Sig: TAKE 1 TABLET EVERY DAY   metoprolol succinate (TOPROL-XL) 50 mg 24 hr tablet 2/18/2019 at Unknown time  No Yes   Sig: Take 1 tablet (50 mg total) by mouth daily   pantoprazole (PROTONIX) 40 mg tablet 2/18/2019 at Unknown time  No Yes   Sig: Take 1 tablet (40 mg total) by mouth 2 (two) times a day before meals      Facility-Administered Medications: None       Allergies   Allergen Reactions    Demerol [Meperidine] GI Intolerance and Other (See Comments)     Other reaction(s): Other (See Comments)  severe nausea  severe nausea  Nausea/vomiting    Nitroglycerin Anaphylaxis and Other (See Comments)     BP drops drastically    Lipitor [Atorvastatin] Other (See Comments)     Joint/muscle pain    Zocor [Simvastatin] Other (See Comments)     Joint/muscle pain       Objective   Vitals:Blood pressure 138/79, pulse 73, temperature 98 4 °F (36 9 °C), temperature source Tympanic, resp  rate 18, height 5' 4" (1 626 m), weight 72 4 kg (159 lb 9 8 oz), SpO2 96 %  ,Body mass index is 27 4 kg/m²  Intake/Output Summary (Last 24 hours) at 2/19/2019 1343  Last data filed at 2/19/2019 1242  Gross per 24 hour   Intake 240 ml   Output --   Net 240 ml       Invasive Devices: Invasive Devices     Peripheral Intravenous Line            Peripheral IV 02/18/19 Left Antecubital less than 1 day                Physical Exam   General:  Patient is well-developed, well-nourished, and in no acute distress  HEENT:  Head normocephalic  Eyes anicteric  Cardiovascular:  With regular rhythm  No anterior neck bruits auscultated  Lungs:  Clear to auscultation  Abdomen:  Soft, nontender, with bowel sounds present  Extremities:  With no significant edema      Neurologic Exam  Mental Status:  Patient was alert, pleasantly interactive, and appropriately conversational   At times, patient had mild word fluency issues with some semantic errors, but no dysarthria  Patient correctly stated the month but incorrectly stated the date and could not state the current year  Oriented to self and location however  Could not perform simple calculations or spell the word world backwards  Had 1 of 3 simple object delayed recall  Difficulty with naming when asked to look at a collection of objects as a whole, but could name each object when asked to focus on individual items  Unremarkable spontaneous gait  Romberg maneuver performed unremarkably  Cranial Nerves:   II: Visual fields full to confrontation but appeared to have left kristofer-neglect  Pupils equal, round, reactive to light with normal accomodation  III,IV,VI: extraocular movements intact with no nystagmus  Mild left ptosis noted  V: Sensation in the V1 through V3 distributions intact to pin bilaterally  VII:  Left facial asymmetry noted  VIII: Audition intact to finger rub bilaterally  IX/X: Uvula midline  Soft palate elevation symmetric  XII: Tongue midline with no atrophy or fasciculations with appropriate movement  Coordination:  Patient was accurate with finger-to-nose and heel-to-shin maneuvers bilaterally  Motor testing with full symmetrical strength throughout the 4 extremities with no upper extremity drift  Sensory testing grossly intact to pin and position throughout with exception of mildly diminished pin appreciation in the C5-6 distributions bilaterally  Evidence of left hemineglect with double simultaneous stimulation  Muscle stretch reflexes:  2+ bilateral upper extremities, absent at the right knee, 1 at the left knee, absent at the right ankle, and 2 at the left ankle  Toe responses were downgoing bilaterally  Lab Results:   I have personally reviewed pertinent reports    , CBC:   Results from last 7 days Lab Units 02/18/19  1629   WBC Thousand/uL 5 33   RBC Million/uL 3 91   HEMOGLOBIN g/dL 12 2   HEMATOCRIT % 37 6   MCV fL 96   PLATELETS Thousands/uL 249   , BMP/CMP:   Results from last 7 days   Lab Units 02/18/19  1629   SODIUM mmol/L 141   POTASSIUM mmol/L 3 6   CHLORIDE mmol/L 102   CO2 mmol/L 31   BUN mg/dL 17   CREATININE mg/dL 0 86   CALCIUM mg/dL 9 8   AST U/L 26   ALT U/L 28   ALK PHOS U/L 80   EGFR ml/min/1 73sq m 68   , HgBA1C:   Results from last 7 days   Lab Units 02/19/19  0616   HEMOGLOBIN A1C % 5 5   , TSH:   Results from last 7 days   Lab Units 02/18/19  1629   TSH 3RD GENERATON uIU/mL 0 262*   , Lipid Profile:   Results from last 7 days   Lab Units 02/19/19  0616   HDL mg/dL 48   LDL CALC mg/dL 179*   TRIGLYCERIDES mg/dL 95     Imaging Studies: I have personally reviewed pertinent reports  and I have personally reviewed pertinent films in PACS  EKG, Pathology, and Other Studies: I have personally reviewed pertinent reports  VTE Prophylaxis: Sequential compression device (Venodyne)     Code Status: Level 1 - Full Code  Advance Directive and Living Will: Yes    Power of :    POLST:      Counseling / Coordination of Care  Total time spent today 55 minutes  Greater than 50% of total time was spent with the patient and / or family counseling and / or coordination of care   A description of the counseling / coordination of care: Personal review of patient's imaging, laboratory reports, and extensive discussion with patient and her fiance at bedside; discussed with attending neurologist

## 2019-02-19 NOTE — ASSESSMENT & PLAN NOTE
Alcohol level <3  Unclear if alcohol use is on-going, per significant other the patient has not been drinking since August

## 2019-02-19 NOTE — H&P
H&P- Lionel Levy 1945, 72 y o  female MRN: [id number]    Unit/Bed#: E4 -01 Encounter: [id number]    Primary Care Provider: Pedro Luis Mohr DO   Date and time admitted to hospital: 2/18/2019  4:03 PM      History and Physical - [facility]    Patient Information: Lionel Levy 72 y o  female MRN: [id number]  Unit/Bed#: E4 -01 Encounter: [id number]  Admitting Physician: Magdy Petit PA-C  PCP: Pedro Luis Mohr DO  Date of Admission:  02/18/19    Assessment/Plan:    Hospital Problem List:     Principal Problem:    Confusion  Active Problems:    Essential hypertension    Esophageal reflux disease    Invasive ductal carcinoma of breast, right (HCC)    History of alcohol use    Atrial fibrillation (HCC)    Low TSH level      * Confusion  Assessment & Plan  Intermittent confusion this appears to be multifactorial likely psychogenic in setting of anxiety w/possible component of cognitive decline as pt has had recurrent episodes of this including forgetfulness over last several months  CT head negative for ischemia/hemorrhage shift or midline mass  No tPA as NIH SS 0  Will check ekg, monitor on telemetry for cva pathway  Not a candidate for AP therapy given gi bleed w/in last month and concern for non ischemic etiology  Does not tolerate any statin due to myalgias even low dose  Check mri brain mra head/neck carotid duplex, tte  Consult, neuro consult psych place on stroke pathway with risk stratification  Low TSH level  Assessment & Plan  ? If contributing to anxiety and somewhat pressured speech  Check free T4/full T3    Atrial fibrillation (HCC)  Assessment & Plan  Paroxysmal a fib in nsr  No longer on AC and not a good candidate for [facility] given recent GI B  If MRI positive for stroke will need to discuss [facility] options between neuro/gi    History of alcohol use  Assessment & Plan  Alcohol level <3 no longer drinking  Has been drinking less than daily    Anxious but no tremors or other s/sx of withdrawal  Continue thiamine/folic acid    Invasive ductal carcinoma of breast, right ([address] )  Assessment & Plan  Pt w/recurrent breast ca s/p mastectomy maintained on anastrazoloe  Continue anastrazole and op surveillance    Esophageal reflux disease  Assessment & Plan  W/recent gastric ulcers maintained on PPI  Continue ppi 40mg bid    Essential hypertension  Assessment & Plan  Hold bp meds for permissive htn for cva pathway          ·     VTE Prophylaxis: Pharmacologic VTE Prophylaxis contraindicated due to gib  / sequential compression device   Code Status: fc  POLST: There is no POLST form on file for this patient (pre-hospital)    Anticipated Length of Stay:  Patient will be admitted on an Inpatient basis with an anticipated length of stay of  Less than 2 midnights  Justification for Hospital Stay: confusion    Total Time for Visit, including Counseling / Coordination of Care: 45 minutes  Greater than 50% of this total time spent on direct patient counseling and coordination of care  Chief Complaint:   confusion    History of Present Illness:    Willy Siddiqui is a 72 y o  female who presents with pmh of alcohol abuse, htn, a fib, gib, hypothyroid coming to the hospital for confusion  This is been an intermittent problem and ongoing for several months  She was last seen here in January 2019 for a GI bleed and this was found to be due to gastric ulcers as well as 1 prominent blood vessel and the patient underwent Gold probe and epinephrine injection  During the stay she did have episodes of confusion and was concerned that she may be having episodes of short-term memory loss and concern for signs of dementia  The patient had been discharged home  Today the patient was coming home from the grocery store where she was picking up cat food  She was able to drive home and recall the events of driving home    However when she got to her driveway she had difficulty figuring out how to negotiate an open the door  She then when getting up into her apartment noted that she had difficulty opening the door which she described to as open the door to the TV    She would endorse that her neighbor  who was in shorts and T-shirt came over to help her into the home  She did had difficulty in opening the cat food as well  She came to the ER over this to be further evaluated  Review of Systems:    Review of Systems   Constitutional: Negative for chills and fever  Respiratory: Negative for shortness of breath  Cardiovascular: Negative for chest pain  Gastrointestinal: Negative for abdominal pain  Neurological: Negative for speech difficulty, weakness, light-headedness, numbness and headaches  Psychiatric/Behavioral: Positive for confusion  Negative for agitation  All other systems reviewed and are negative        Past Medical and Surgical History:     Past Medical History:   Diagnosis Date    Abnormal CT of the abdomen     Acute bronchitis     Anxiety     Appetite loss     Arthritis     Ascending aortic aneurysm (HCC)     Bilateral renal cysts     Cyst of ovary     Dysphagia     Esophageal reflux disease     Fat necrosis of breast     Full dentures     GERD (gastroesophageal reflux disease)     Herpes zoster     History of radiation therapy     Hyperlipidemia     Hypertension     Hypokalemia     Hypomagnesemia     Hypothyroidism     Impaired fasting glucose     Irritable bowel syndrome (IBS)     Ischemic colitis (HCC)     Knee pain     Limb alert care status     no BP/IV right arm    Osteoarthritis of right knee     Pneumonia     Post-op pain     Pulmonary nodules     Thoracic aortic aneurysm (HCC)     Unspecified ovarian cysts     Use of anastrozole (Arimidex)     Vasovagal syncope     Vitamin D deficiency     Wears glasses     Weight loss        Past Surgical History:   Procedure Laterality Date    APPENDECTOMY      pt states it was not removed    BREAST BIOPSY Right 03/02/2016    BREAST LUMPECTOMY Right 2004    BREAST SURGERY Right     Single lesion excision 1990 hyperplasia, 1st biopsy at 25yrs old was benign    CHOLECYSTECTOMY      COLONOSCOPY      COLONOSCOPY N/A 5/12/2017    Procedure: COLONOSCOPY with biopsy;  Surgeon: Irene Walsh MD;  Location: [facility]; Service:     ESOPHAGOGASTRODUODENOSCOPY N/A 1/19/2019    Procedure: ESOPHAGOGASTRODUODENOSCOPY (EGD) with 4cc of epinephrine injection and cauterized with gold probe; Surgeon: Nazario Eng MD;  Location: [facility]; Service: Gastroenterology    HYSTERECTOMY      KNEE SURGERY Right     DC BIOPSY/EXCISION, LYMPH NODE(S) Right 4/12/2016    Procedure: BIOPSY LYMPH NODE SENTINEL @ 1200 LYMPHOSCINTIGRAPHY LYMPHATIC MAPPING;  Surgeon: Lily Nichols MD;  Location: [facility];  Service: General    DC MASTECTOMY, SIMPLE, COMPLETE Right 4/12/2016    Procedure: MASTECTOMY SIMPLE;  Surgeon: Lily Nichols MD;  Location: [facility];  Service: General    TUBAL LIGATION      US GUIDED BREAST BIOPSY RIGHT COMPLETE Right 3/9/2016       Meds/Allergies:    Prior to Admission medications    Medication Sig Start Date End Date Taking? Authorizing Provider   anastrozole (ARIMIDEX) 1 mg tablet TAKE 1 TABLET EVERY DAY 9/4/18  Yes Marybeth Rowe MD   levothyroxine 75 mcg tablet TAKE 1 TABLET EVERY DAY 12/19/18  Yes Lamont Antis, DO   metoprolol succinate (TOPROL-XL) 50 mg 24 hr tablet Take 1 tablet (50 mg total) by mouth daily 1/28/19  Yes Miguel Antis, DO   pantoprazole (PROTONIX) 40 mg tablet Take 1 tablet (40 mg total) by mouth 2 (two) times a day before meals 1/28/19  Yes Miguel Antis, DO     I have reviewed home medications with patient personally  Allergies: Allergies   Allergen Reactions    Demerol [Meperidine] GI Intolerance and Other (See Comments)     Other reaction(s):  Other (See Comments)  severe nausea  severe nausea  Nausea/vomiting    Nitroglycerin Anaphylaxis and Other (See Comments)     BP drops drastically    Lipitor [Atorvastatin] Other (See Comments)     Joint/muscle pain    Zocor [Simvastatin] Other (See Comments)     Joint/muscle pain       Social History:     Marital Status: Single   Occupation:   Patient Pre-hospital Living Situation:   Patient Pre-hospital Level of Mobility:   Patient Pre-hospital Diet Restrictions:   Substance Use History:   Social History     Substance and Sexual Activity   Alcohol Use Not Currently     Social History     Tobacco Use   Smoking Status Never Smoker   Smokeless Tobacco Never Used   Tobacco Comment    not interested     Social History     Substance and Sexual Activity   Drug Use No       Family History:     Family History   Problem Relation Age of Onset    Stroke Maternal Grandmother     Anemia Mother     Other Mother         disorders of blood and blood forming organs    Hypertension Mother     Stroke Father     Alcohol abuse Brother        Physical Exam:     Vitals:   Blood Pressure: 143/71 (02/18/19 1815)  Pulse: 82 (02/18/19 1815)  Temperature: 99 °F (37 2 °C) (02/18/19 1607)  Temp Source: Oral (02/18/19 1607)  Respirations: 17 (02/18/19 1815)  Weight - Scale: 72 4 kg (159 lb 9 8 oz) (02/18/19 1607)  SpO2: 97 % (02/18/19 1815)    Physical Exam   Constitutional: She is oriented to person, place, and time  She appears well-developed  No distress  HENT:   Head: Normocephalic  Right Ear: External ear normal    Left Ear: External ear normal    Mouth/Throat: Oropharynx is clear and moist    Eyes: Pupils are equal, round, and reactive to light  Conjunctivae and EOM are normal  Right eye exhibits no discharge  Left eye exhibits no discharge  No scleral icterus  Neck: Normal range of motion  Cardiovascular: Normal rate, regular rhythm and normal heart sounds  Exam reveals no friction rub  No murmur heard  Pulmonary/Chest: Effort normal and breath sounds normal  No stridor  No respiratory distress  She has no wheezes  She has no rales  Abdominal: Soft  She exhibits no distension and no mass  There is no tenderness  There is no rebound and no guarding  Musculoskeletal: She exhibits no edema  Neurological: She is alert and oriented to person, place, and time  She displays normal reflexes  No cranial nerve deficit or sensory deficit  She exhibits normal muscle tone  Pt is able to describe vivid details of her day today as well current events regarding current presidency  She is not able to answer month/year  She demonstrates instant recall but will not attempt to guess for short term recall when asked to repeat the words socks, earrings, purse  Skin: Skin is warm and dry  She is not diaphoretic  Psychiatric: She has a normal mood and affect  Vitals reviewed  (  Be Sure to Include Physical Exam: Delete this entire line when you have entered your exam)    Additional Data:     Lab Results: I have personally reviewed pertinent reports  Results from last 7 days   Lab Units 02/18/19  1629   WBC Thousand/uL 5 33   HEMOGLOBIN g/dL 12 2   HEMATOCRIT % 37 6   PLATELETS Thousands/uL 249   NEUTROS PCT % 67   LYMPHS PCT % 22   MONOS PCT % 9   EOS PCT % 1     Results from last 7 days   Lab Units 02/18/19  1629   POTASSIUM mmol/L 3 6   CHLORIDE mmol/L 102   CO2 mmol/L 31   BUN mg/dL 17   CREATININE mg/dL 0 86   CALCIUM mg/dL 9 8   ALK PHOS U/L 80   ALT U/L 28   AST U/L 26           Imaging: I have personally reviewed pertinent reports  Xr Shoulder 2+ Vw Left    Result Date: 2/15/2019  Narrative: LEFT SHOULDER INDICATION:   M25 [id number]: Pain in left shoulder  "left shoulder pain and difficulty with ROM since end of january 2019" COMPARISON:  None VIEWS:  XR SHOULDER 2+ VW LEFT FINDINGS: There is no acute fracture or dislocation  Mild acromioclavicular degenerative changes with inferior spurring  No lytic or blastic lesions are seen  Calcifications superior to the humeral head in keeping with calcific rotator cuff tendinopathy       Impression: Mild acromioclavicular degenerative changes with inferior spurring  Calcifications superior to the humeral head in keeping with calcific rotator cuff tendinopathy  Workstation performed: [id number]     Ct Head Without Contrast    Result Date: 2/18/2019  Narrative: CT BRAIN - WITHOUT CONTRAST INDICATION:   ams  COMPARISON:  CT head 1/19/2019  TECHNIQUE:  CT examination of the brain was performed  In addition to axial images, coronal 2D reformatted images were created and submitted for interpretation  Radiation dose length product (DLP) for this visit:  987 mGy-cm   This examination, like all CT scans performed in the [facility], was performed utilizing techniques to minimize radiation dose exposure, including the use of iterative reconstruction and automated exposure control  IMAGE QUALITY:  Diagnostic  Moderate FINDINGS: PARENCHYMA:  No intracranial mass, mass effect or midline shift  No CT signs of acute infarction  No acute parenchymal hemorrhage  VENTRICLES AND EXTRA-AXIAL SPACES:  Normal for the patient's age  VISUALIZED ORBITS AND PARANASAL SINUSES:  Unremarkable  CALVARIUM AND EXTRACRANIAL SOFT TISSUES:  Normal      Impression: No acute intracranial abnormality  Workstation performed: [id number]       EKG, Pathology, and Other Studies Reviewed on Admission:   · EKG:     Allscripts / Epic Records Reviewed: Yes     ** Please Note: This note has been constructed using a voice recognition system   **

## 2019-02-19 NOTE — PLAN OF CARE
Problem: Potential for Falls  Goal: Patient will remain free of falls  Description  INTERVENTIONS:  - Assess patient frequently for physical needs  -  Identify cognitive and physical deficits and behaviors that affect risk of falls    -  [place] fall precautions as indicated by assessment   - Educate patient/family on patient safety including physical limitations  - Instruct patient to call for assistance with activity based on assessment  - Modify environment to reduce risk of injury  - Consider OT/PT consult to assist with strengthening/mobility  Outcome: Progressing     Problem: PAIN - ADULT  Goal: Verbalizes/displays adequate comfort level or baseline comfort level  Description  Interventions:  - Encourage patient to monitor pain and request assistance  - Assess pain using appropriate pain scale  - Administer analgesics based on type and severity of pain and evaluate response  - Implement non-pharmacological measures as appropriate and evaluate response  - Consider cultural and social influences on pain and pain management  - Notify physician/advanced practitioner if interventions unsuccessful or patient reports new pain  Outcome: Progressing     Problem: INFECTION - ADULT  Goal: Absence or prevention of progression during hospitalization  Description  INTERVENTIONS:  - Assess and monitor for signs and symptoms of infection  - Monitor lab/diagnostic results  - Administer medications as ordered  - Instruct and encourage patient and family to use good hand hygiene technique  - Identify and instruct in appropriate isolation precautions for identified infection/condition   Outcome: Progressing     Problem: SAFETY ADULT  Goal: Maintain or return to baseline ADL function  Description  INTERVENTIONS:  -  Assess patient's ability to carry out ADLs; assess patient's baseline for ADL function and identify physical deficits which impact ability to perform ADLs (bathing, care of mouth/teeth, toileting, grooming, dressing, etc )  - Assess/evaluate cause of self-care deficits   - Assess range of motion  - Assess patient's mobility; develop plan if impaired  - Assess patient's need for assistive devices and provide as appropriate  - Encourage maximum independence but intervene and supervise when necessary  ¯ Involve family in performance of ADLs  ¯ Assess for home care needs following discharge   ¯ Request OT consult to assist with ADL evaluation and planning for discharge  ¯ Provide patient education as appropriate  Outcome: Progressing  Goal: Maintain or return mobility status to optimal level  Description  INTERVENTIONS:  - Assess patient's baseline mobility status (ambulation, transfers, stairs, etc )    - Identify cognitive and physical deficits and behaviors that affect mobility  - Identify mobility aids required to assist with transfers and/or ambulation (gait belt, sit-to-stand, lift, walker, cane, etc )  - [place] fall precautions as indicated by assessment  - Record patient progress and toleration of activity level on Mobility SBAR; progress patient to next Phase/Stage  - Instruct patient to call for assistance with activity based on assessment  - Request Rehabilitation consult to assist with strengthening/weightbearing, etc   Outcome: Progressing     Problem: DISCHARGE PLANNING  Goal: Discharge to home or other facility with appropriate resources  Description  INTERVENTIONS:  - Identify barriers to discharge w/patient and caregiver  - Arrange for needed discharge resources and transportation as appropriate  - Identify discharge learning needs (meds, wound care, etc )  - Arrange for interpretive services to assist at discharge as needed  - Refer to Case Management Department for coordinating discharge planning if the patient needs post-hospital services based on physician/advanced practitioner order or complex needs related to functional status, cognitive ability, or social support system  Outcome: Progressing Problem: Knowledge Deficit  Goal: Patient/family/caregiver demonstrates understanding of disease process, treatment plan, medications, and discharge instructions  Description  Complete learning assessment and assess knowledge base  Interventions:  - Provide teaching at level of understanding  - Provide teaching via preferred learning methods  Outcome: Progressing     Problem: Neurological Deficit  Goal: Neurological status is stable or improving  Description  Interventions:  - Monitor and assess patient's level of consciousness, motor function, sensory function, and level of assistance needed for ADLs  - Monitor and report changes from baseline  Collaborate with interdisciplinary team to initiate plan and implement interventions as ordered  - Provide and maintain a safe environment  - Utilize fall precautions  - Utilize aspiration precautions  - Utilize bleeding precautions  Outcome: Progressing     Problem: Communication Impairment  Goal: Ability to express needs and understand communication  Description  Assess patient's communication skills and ability to understand information  Patient will demonstrate use of effective communication techniques, alternative methods of communication and understanding even if not able to speak  - Encourage communication and provide alternate methods of communication as needed  - Collaborate with case management/ for discharge needs  - Include patient/family/caregiver in decisions related to communication    Outcome: Progressing

## 2019-02-19 NOTE — PHYSICIAN ADVISOR
Current patient class: Inpatient  The patient is currently on Hospital Day: 2 at [facility]        The patient was admitted to the hospital  on 2/18/19 at 1938 for the following diagnosis:  Confusion [R41 0]  High blood pressure [I10]  Altered mental status [R41 82]  Mental confusion [R41 0]  Encephalopathy [G93 40]       There is documentation in the medical record of an expected length of stay of at least 2 midnights  The patient is therefore expected to satisfy the 2 midnight benchmark and given the 2 midnight presumption is appropriate for INPATIENT ADMISSION  Given this expectation of a satisfying stay, CMS instructs us that the patient is most often appropriate for inpatient admission under part A provided medical necessity is documented in the chart  After review of the relevant documentation, labs, vital signs and test results, the patient is appropriate for INPATIENT ADMISSION  Admission to the hospital as an inpatient is a complex decision making process which requires the practitioner to consider the patients presenting complaint, history and physical examination and all relevant testing  With this in mind, in this case, the patient was deemed appropriate for INPATIENT ADMISSION  After review of the documentation and testing available at the time of the admission I concur with this clinical determination of medical necessity  The patient does have an inpatient admission within the previous 30 days  The patient was admitted on 1/18/19 and discharged on 1/22/19 as an inpatient  The patient therefore required readmission review  In this case the patient should be considered a SEPARATE and UNRELATED INPATIENT ADMISSION  The patient had been discharged in stable condition with a completed care plan  There were no unresolved acute medical issues at the time of discharged which would have reasonably been expected to prompt this readmission      77-year-old woman admitted to hospital with acute GI bleed related gastric ulcer  She was discharged in stable condition  She is now admitted to the hospital with altered mental status and encephalopathy  She does have a significant finding on MRI of the brain  Since the patient is admitted for a separate medical condition, this admission should be considered unrelated to the previous admission  Rationale is as follows: The patient is a 72 yrs old   Female who presented to the ED at 2/18/2019  4:03 PM with a chief complaint of Altered Mental Status (Pt returned home from shopping and felt very confused  Pt went to neighbors and they called EMS    Pt having trouble remembering numbers   )    The patients vitals on arrival were ED Triage Vitals [02/18/19 1607]   Temperature Pulse Respirations Blood Pressure SpO2   99 °F (37 2 °C) 78 18 (!) 226/103 97 %      Temp Source Heart Rate Source Patient Position - Orthostatic VS BP Location FiO2 (%)   Oral Monitor Lying Left arm --      Pain Score       No Pain           Past Medical History:   Diagnosis Date    Abnormal CT of the abdomen     Acute bronchitis     Anxiety     Appetite loss     Arthritis     Ascending aortic aneurysm (HCC)     Bilateral renal cysts     Cyst of ovary     Dysphagia     Esophageal reflux disease     Fat necrosis of breast     Full dentures     GERD (gastroesophageal reflux disease)     Herpes zoster     History of radiation therapy     Hyperlipidemia     Hypertension     Hypokalemia     Hypomagnesemia     Hypothyroidism     Impaired fasting glucose     Irritable bowel syndrome (IBS)     Ischemic colitis (HCC)     Knee pain     Limb alert care status     no BP/IV right arm    Osteoarthritis of right knee     Pneumonia     Post-op pain     Pulmonary nodules     Thoracic aortic aneurysm (HCC)     Unspecified ovarian cysts     Use of anastrozole (Arimidex)     Vasovagal syncope     Vitamin D deficiency     Wears glasses     Weight loss Past Surgical History:   Procedure Laterality Date    APPENDECTOMY      pt states it was not removed    BREAST BIOPSY Right 03/02/2016    BREAST LUMPECTOMY Right 2004    BREAST SURGERY Right     Single lesion excision 1990 hyperplasia, 1st biopsy at 25yrs old was benign    CHOLECYSTECTOMY      COLONOSCOPY      COLONOSCOPY N/A 5/12/2017    Procedure: COLONOSCOPY with biopsy;  Surgeon: Angie Kohli MD;  Location: [facility]; Service:     ESOPHAGOGASTRODUODENOSCOPY N/A 1/19/2019    Procedure: ESOPHAGOGASTRODUODENOSCOPY (EGD) with 4cc of epinephrine injection and cauterized with gold probe; Surgeon: Rickie Cooley MD;  Location: [facility];   Service: Gastroenterology    HYSTERECTOMY      KNEE SURGERY Right     KS BIOPSY/EXCISION, LYMPH NODE(S) Right 4/12/2016    Procedure: BIOPSY LYMPH NODE SENTINEL @ 1200 LYMPHOSCINTIGRAPHY LYMPHATIC MAPPING;  Surgeon: Dana Spain MD;  [facility];  Service: General    KS MASTECTOMY, SIMPLE, COMPLETE Right 4/12/2016    Procedure: MASTECTOMY SIMPLE;  Surgeon: Dana Spain MD;  [facility];  Service: General    TUBAL LIGATION      US GUIDED BREAST BIOPSY RIGHT COMPLETE Right 3/9/2016           Consults have been placed to:   IP CONSULT TO NEUROLOGY  IP CONSULT TO CASE MANAGEMENT  IP CONSULT TO NUTRITION SERVICES  IP CONSULT TO PSYCHIATRY    Vitals:    02/19/19 0315 02/19/19 0515 02/19/19 0715 02/19/19 1135   BP: 126/80 112/56 140/70 138/79   BP Location: Left arm Left arm Left arm Left arm   Pulse: 70 74 77 73   Resp: 18 18 18 18   Temp: (!) 97 2 °F (36 2 °C) (!) 97 4 °F (36 3 °C) (!) 97 3 °F (36 3 °C) 98 4 °F (36 9 °C)   TempSrc: Tympanic Tympanic Tympanic Tympanic   SpO2: 95% 95% 99% 96%   Weight:       Height:           Most recent labs:    Recent Labs     02/18/19  1629 02/18/19  1631   WBC 5 33  --    HGB 12 2  --    HCT 37 6  --      --    K 3 6  --    CALCIUM 9 8  --    BUN 17  --    CREATININE 0 86  --    LIPASE  --  117 TROPONINI <0 02  --    AST 26  --    ALT 28  --    ALKPHOS 80  --        Scheduled Meds:  Current Facility-Administered Medications:  acetaminophen 650 mg Oral Q6H PRN KG Rizvi   anastrozole 1 mg Oral Daily Ely Pham PA-C   folic acid 1 mg Oral Daily Ely Pham PA-C   levothyroxine 75 mcg Oral Early Morning Ely Pham PA-C   ondansetron 4 mg Intravenous Q6H PRN Ely Pham PA-C   pantoprazole 40 mg Oral BID AC Ely Pham PA-C   thiamine 100 mg Oral Daily Ely Pham PA-C     Continuous Infusions:   PRN Meds:   acetaminophen    ondansetron

## 2019-02-19 NOTE — ASSESSMENT & PLAN NOTE
Intermittent confusion this appears to be multifactorial likely psychogenic in setting of anxiety w/possible component of cognitive decline as pt has had recurrent episodes of this including forgetfulness over last several months  CT head negative for ischemia/hemorrhage shift or midline mass  No tPA as NIH SS 0  Will check ekg, monitor on telemetry for cva pathway    Not a candidate for AP therapy given gi bleed w/in last month and concern for non ischemic etiology  Check mri brain mra head/neck carotid duplex, tte  Consult, neuro consult psych place on stroke pathway with risk stratification

## 2019-02-20 ENCOUNTER — APPOINTMENT (INPATIENT)
Dept: NEUROLOGY | Facility: HOSPITAL | Age: 74
DRG: 098 | End: 2019-02-20
Payer: MEDICARE

## 2019-02-20 LAB
ANION GAP SERPL CALCULATED.3IONS-SCNC: 9 MMOL/L (ref 4–13)
ATRIAL RATE: 76 BPM
ATRIAL RATE: 81 BPM
BASOPHILS # BLD AUTO: 0.04 THOUSANDS/ΜL (ref 0–0.1)
BASOPHILS NFR BLD AUTO: 1 % (ref 0–1)
BUN SERPL-MCNC: 12 MG/DL (ref 5–25)
CALCIUM SERPL-MCNC: 9.4 MG/DL (ref 8.3–10.1)
CHLORIDE SERPL-SCNC: 106 MMOL/L (ref 100–108)
CO2 SERPL-SCNC: 27 MMOL/L (ref 21–32)
CREAT SERPL-MCNC: 0.76 MG/DL (ref 0.6–1.3)
EOSINOPHIL # BLD AUTO: 0.1 THOUSAND/ΜL (ref 0–0.61)
EOSINOPHIL NFR BLD AUTO: 2 % (ref 0–6)
ERYTHROCYTE [DISTWIDTH] IN BLOOD BY AUTOMATED COUNT: 12.8 % (ref 11.6–15.1)
GFR SERPL CREATININE-BSD FRML MDRD: 79 ML/MIN/1.73SQ M
GLUCOSE SERPL-MCNC: 107 MG/DL (ref 65–140)
HCT VFR BLD AUTO: 32.7 % (ref 34.8–46.1)
HGB BLD-MCNC: 10.6 G/DL (ref 11.5–15.4)
IMM GRANULOCYTES # BLD AUTO: 0.01 THOUSAND/UL (ref 0–0.2)
IMM GRANULOCYTES NFR BLD AUTO: 0 % (ref 0–2)
LYMPHOCYTES # BLD AUTO: 1.66 THOUSANDS/ΜL (ref 0.6–4.47)
LYMPHOCYTES NFR BLD AUTO: 39 % (ref 14–44)
MCH RBC QN AUTO: 31.1 PG (ref 26.8–34.3)
MCHC RBC AUTO-ENTMCNC: 32.4 G/DL (ref 31.4–37.4)
MCV RBC AUTO: 96 FL (ref 82–98)
MONOCYTES # BLD AUTO: 0.67 THOUSAND/ΜL (ref 0.17–1.22)
MONOCYTES NFR BLD AUTO: 16 % (ref 4–12)
NEUTROPHILS # BLD AUTO: 1.79 THOUSANDS/ΜL (ref 1.85–7.62)
NEUTS SEG NFR BLD AUTO: 42 % (ref 43–75)
NRBC BLD AUTO-RTO: 0 /100 WBCS
P AXIS: 60 DEGREES
P AXIS: 84 DEGREES
PLATELET # BLD AUTO: 232 THOUSANDS/UL (ref 149–390)
PMV BLD AUTO: 10.1 FL (ref 8.9–12.7)
POTASSIUM SERPL-SCNC: 3.5 MMOL/L (ref 3.5–5.3)
PR INTERVAL: 188 MS
PR INTERVAL: 194 MS
QRS AXIS: -30 DEGREES
QRS AXIS: -54 DEGREES
QRSD INTERVAL: 80 MS
QRSD INTERVAL: 90 MS
QT INTERVAL: 370 MS
QT INTERVAL: 402 MS
QTC INTERVAL: 429 MS
QTC INTERVAL: 452 MS
RBC # BLD AUTO: 3.41 MILLION/UL (ref 3.81–5.12)
SODIUM SERPL-SCNC: 142 MMOL/L (ref 136–145)
T WAVE AXIS: 19 DEGREES
T WAVE AXIS: 47 DEGREES
VENTRICULAR RATE: 76 BPM
VENTRICULAR RATE: 81 BPM
WBC # BLD AUTO: 4.27 THOUSAND/UL (ref 4.31–10.16)

## 2019-02-20 PROCEDURE — 97535 SELF CARE MNGMENT TRAINING: CPT

## 2019-02-20 PROCEDURE — 97530 THERAPEUTIC ACTIVITIES: CPT

## 2019-02-20 PROCEDURE — 93880 EXTRACRANIAL BILAT STUDY: CPT | Performed by: SURGERY

## 2019-02-20 PROCEDURE — 99232 SBSQ HOSP IP/OBS MODERATE 35: CPT | Performed by: PHYSICIAN ASSISTANT

## 2019-02-20 PROCEDURE — 95816 EEG AWAKE AND DROWSY: CPT | Performed by: PSYCHIATRY & NEUROLOGY

## 2019-02-20 PROCEDURE — 97116 GAIT TRAINING THERAPY: CPT

## 2019-02-20 PROCEDURE — 93010 ELECTROCARDIOGRAM REPORT: CPT | Performed by: INTERNAL MEDICINE

## 2019-02-20 PROCEDURE — 95816 EEG AWAKE AND DROWSY: CPT

## 2019-02-20 PROCEDURE — 85025 COMPLETE CBC W/AUTO DIFF WBC: CPT | Performed by: FAMILY MEDICINE

## 2019-02-20 PROCEDURE — 80048 BASIC METABOLIC PNL TOTAL CA: CPT | Performed by: FAMILY MEDICINE

## 2019-02-20 RX ADMIN — ACETAMINOPHEN 650 MG: 325 TABLET, FILM COATED ORAL at 10:49

## 2019-02-20 RX ADMIN — PANTOPRAZOLE SODIUM 40 MG: 40 TABLET, DELAYED RELEASE ORAL at 16:00

## 2019-02-20 RX ADMIN — ANASTROZOLE 1 MG: 1 TABLET, COATED ORAL at 10:51

## 2019-02-20 RX ADMIN — FOLIC ACID 1 MG: 1 TABLET ORAL at 10:50

## 2019-02-20 RX ADMIN — Medication 100 MG: at 10:49

## 2019-02-20 RX ADMIN — PANTOPRAZOLE SODIUM 40 MG: 40 TABLET, DELAYED RELEASE ORAL at 05:23

## 2019-02-20 RX ADMIN — LEVOTHYROXINE SODIUM 50 MCG: 50 TABLET ORAL at 05:23

## 2019-02-20 RX ADMIN — METOPROLOL SUCCINATE 50 MG: 50 TABLET, EXTENDED RELEASE ORAL at 10:49

## 2019-02-20 NOTE — ASSESSMENT & PLAN NOTE
? If contributing to anxiety and somewhat pressured speech    Lower levothyroxine dosing from 75 to 50 mcg  Repeat TFT's in 4-6 weeks

## 2019-02-20 NOTE — ASSESSMENT & PLAN NOTE
Abnormal findings that appear nonspecific  Plan for MRI brain with contrast and an LP for further workup - will attempt without sedation to avoid worsening of MS  Appreciate Neurology input

## 2019-02-20 NOTE — PROGRESS NOTES
Kayla Wood from MRI called  MRI scheduled for 10:00 with conscious sedation on 2/21  Patient needs to be NPO at midnight tonight  I contacted Tessa Martini from IR to let them know that the patient will be going for an MRI under conscious sedation and that consent for the Lumbar Puncture, which is scheduled for later in the day, would have to be received before the sedation for MRI

## 2019-02-20 NOTE — ASSESSMENT & PLAN NOTE
W/recent gastric ulcers maintained on PPI  Continue ppi 40mg bid  Outpatient follow up scheduled with GI on 2/28

## 2019-02-20 NOTE — SOCIAL WORK
Met with pt to complete assessment and explain role  PCP is Dr Judy Marr  Pt lives in [facility] alone in a 2 story house with 7 steps to enter  Pt was independent wit ADLs, drove a car and amb with no devices  DME:  RW  Pt was not open with any VNA  Pt uses [address]  Pt has hx of alcohol, but no current use, anxiety and no drug abuse  Pt has POA/Living Will that is her dtr Isha Madrid listed as second   Pt still wants to keep her friend Berta Westbrook  As the first contact and he will transport her home  Friend Adams checks in on patient about 2 times a day  PT is recommending home PT and pt has refused  OT stated pt she have restricted driving and supervision due to disorientated and decreased short term memory  Pt has refused home VNA  Discussed with pt about HHA and skilled nursing and information was given to her

## 2019-02-20 NOTE — SOCIAL WORK
PARISA left a message with dtana lilia Beauchamp at [phone]  Waiting for a call back  Wrote dtr's phone number on face sheet

## 2019-02-20 NOTE — PLAN OF CARE
Problem: PHYSICAL THERAPY ADULT  Goal: Performs mobility at highest level of function for planned discharge setting  See evaluation for individualized goals  Description  Treatment/Interventions: Functional transfer training, LE strengthening/ROM, Elevations, Therapeutic exercise, Endurance training, Patient/family training, Equipment eval/education, Bed mobility, Gait training, Compensatory technique education, Continued evaluation, Spoke to MD, Spoke to nursing, OT  Equipment Recommended: (monitor)       See flowsheet documentation for full assessment, interventions and recommendations  2/20/2019 1642 by Hailey Easton PTA  Outcome: Progressing  Note:   Prognosis: Good  Problem List: Decreased strength, Decreased range of motion, Decreased endurance, Impaired balance, Decreased mobility, Decreased cognition, Impaired judgement, Decreased safety awareness  Assessment: Pt  supine in bed upon my arrival  Pt  reporting increased fatigue, however agreeable to therapeutic intervention  Progressed with transfers being able to complete practicing proper technique with no noted LOB  Progressed with an increased amb  trial with use of no AD and CG of therapist with cues for LE sequencing  Noted several episodes of lateral displacement requiring cueing for redirection by therapist to overt falls  Progressed to stair training, being able to complete practicing proper technique with no noted LOB  Continued with a second amb  trial with eventual return to room and repositioned supine in bed at end of session  At this time concern remains multiple falls in past, and question level of support at home at this time? Will continue to defer to OT recommendation for level of support needed upon d/c  PT will continue to recommend d/c with HHPT and support as needed when medically stable  Barriers to Discharge: Decreased caregiver support  Barriers to Discharge Comments: Level of support at home    Recommendation: Home PT, Home with family support(level of support at home defer to OT recommendation)     PT - OK to Discharge: Yes(if d/c when medically stable)    See flowsheet documentation for full assessment       2/20/2019 1642 by Arianna Poe PTA  Outcome: Progressing

## 2019-02-20 NOTE — OCCUPATIONAL THERAPY NOTE
Occupational Therapy Treatment Note:         02/20/19 6521   Restrictions/Precautions   Weight Bearing Precautions Per Order No   Other Precautions Fall Risk;Pain;Cognitive;Telemetry   Pain Assessment   Pain Assessment No/denies pain   Pain Score No Pain   ADL   Where Assessed Edge of bed   Grooming Assistance 5  Supervision/Setup   Grooming Deficit Setup   Toileting Assistance  5  Supervision/Setup   Toileting Deficit Setup   Functional Standing Tolerance   Time 5 mins   Activity dynamic stand balance activity  Comments No LOB noted    Transfers   Sit to Stand 5  Supervision   Additional items Assist x 1   Stand to Sit 5  Supervision   Additional items Assist x 1   Stand pivot 5  Supervision   Additional items Assist x 1   Toilet transfer 5  Supervision   Additional items Assist x 1   Functional Mobility   Functional Mobility 5  Supervision   Additional Comments x1   Toilet Transfers   Toilet Transfer From Bed   Toilet Transfer Type To and from   Toilet Transfer to Standard toilet   Toilet Transfer Technique Stand pivot; Ambulating   Toilet Transfers Verbal cues; Supervision   Cognition   Overall Cognitive Status Impaired   Arousal/Participation Alert; Cooperative   Attention Attends with cues to redirect   Orientation Level Disoriented to person;Disoriented to place; Disoriented to time   Memory Decreased short term memory;Decreased recall of precautions;Decreased recall of recent events   Following Commands Follows multistep commands with increased time or repetition   Comments Pt with initial focus to tasks  Limited carry over of newly learned information  Cognition Assessment Tools ACLS   Score 4 2   Additional Activities   Additional Activities Other (Comment)  (reviewed fall prevention/home safety  )   Additional Activities Comments Pt with need for cues wit functional tasks      Activity Tolerance   Activity Tolerance Patient tolerated treatment well   Medical Staff Made Aware Reported all findings to RN, Kendall Hannah as well as Nesha Dry  Assessment   Assessment Pt was seen for skilled OT with focus on completion of the [facility] Cognitive Level Screeing, functional mobility, review of fall prevention and review of current plan of care  Pt scored 4 2/6 0 on the ACLS  24 Hour supervision is recommended to remove dangerous objects outside the visual field and to solve problems due to minor changes in the environment  Behavior: Recognizes errors  Identifies problems  Asks for Day/Date  Sequences one activity at a time  Processing speed is slow and may take extra time to complete tasks  Memorization will be very slow  Requires long term repetitive training for new tasks  Keep directions short and concise  Grooming: Initiates and completes with supplies from familiar accessible locations  Stops and asks for help when an error is made  Expect cuts with use of straight razor  Check every few minutes if left alone  Allow ample time for completion of tasks  Pt may benefit from continued therapies and will require 24 hr supervision based on current levels of cognition as well as restriction to driving      Plan   Treatment Interventions ADL retraining;Functional transfer training;Cognitive reorientation   Goal Expiration Date 03/05/19   Treatment Day 1   OT Frequency 2-3x/wk   Recommendation   OT Discharge Recommendation   (continue OT 24 hr supervision/restrict driving at this time )   Barthel Index   Feeding 10   Bathing 0   Grooming Score 5   Dressing Score 5   Bladder Score 10   Bowels Score 10   Toilet Use Score 5   Transfers (Bed/Chair) Score 10   Mobility (Level Surface) Score 10   Stairs Score 0   Barthel Index Score 65   Modified Tracy Scale   Modified Tracy Scale 4   René Walt, [address]

## 2019-02-20 NOTE — PROGRESS NOTES
Progress Note - Lilo Bowers 1945, 72 y o  female MRN: [id number]    Unit/Bed#: E4 -01 Encounter: [id number]    Primary Care Provider: Tara Cheatham DO   Date and time admitted to hospital: 2/18/2019  4:03 PM        * Confusion  Assessment & Plan  Persists, suspected underlying cognitive decline, anxiety, now possible seizure  Admission CT head negative for ischemia/hemorrhage shift or midline mass  MRI of the brain noted for an abnormal finding, will further assess with Contrast MRI and LP (scheduled for 2/21 am)  EEG showing some sharp waves as possible epileptogenic focus  Await further recommendations per Neurology  PT/OT noted - patient's driving needs to be revoked  Patient states she will consider inpatient rehab, but prefers to return home  She would have frequent checks and not 24 hour supervision  Case management following      Abnormal finding on MRI of brain  Assessment & Plan  Abnormal findings that appear nonspecific  Plan for MRI brain with contrast and an LP for further workup - will attempt without sedation to avoid worsening of MS  Appreciate Neurology input    Essential hypertension  Assessment & Plan  Resumed home metoprolol 50mg  Medications recently decreased as BP was running low  Monitor    Esophageal reflux disease  Assessment & Plan  W/recent gastric ulcers maintained on PPI  Continue ppi 40mg bid  Outpatient follow up scheduled with GI on 2/28    Invasive ductal carcinoma of breast, right ([facility] [address] )  Assessment & Plan  Pt w/recurrent breast ca s/p mastectomy maintained on anastrazoloe  Continue anastrazole and op surveillance    History of alcohol use  Assessment & Plan  Alcohol level <3  Unclear if alcohol use is on-going, per significant other the patient has not been drinking since August    Atrial fibrillation [facility])  Assessment & Plan  Paroxysmal a fib in NSR  No longer on AC and not a good candidate for [facility] given recent GIB  Continue Metoprolol    Low TSH level  Assessment & Plan  ? If contributing to anxiety and somewhat pressured speech  Lower levothyroxine dosing from 75 to 50 mcg  Repeat TFT's in 4-6 weeks      VTE Pharmacologic Prophylaxis:   Pharmacologic: Pharmacologic VTE Prophylaxis contraindicated due to Recent bleeding ulcers  Mechanical VTE Prophylaxis in Place: No    Patient Centered Rounds: I have performed bedside rounds with nursing staff today  Discussions with Specialists or Other Care Team Provider: case management    Education and Discussions with Family / Patient: patient, message left for daughter with update    Time Spent for Care: 30 minutes  More than 50% of total time spent on counseling and coordination of care as described above  Current Length of Stay: 2 day(s)    Current Patient Status: Inpatient   Certification Statement: The patient will continue to require additional inpatient hospital stay due to ongoing Neurologic workup    Discharge Plan: OT recommending 24 hour supervision  Patient will think about rehab, but would prefer to return home  Case management to reach out to daughter  Code Status: Level 1 - Full Code      Subjective:   Offers no complaints  Speech is a little tangential     Objective:     Vitals:   Temp (24hrs), Av °F (36 7 °C), Min:96 °F (35 6 °C), Max:98 8 °F (37 1 °C)    Temp:  [96 °F (35 6 °C)-98 8 °F (37 1 °C)] 96 °F (35 6 °C)  HR:  [71-90] 90  Resp:  [18] 18  BP: (142-163)/(76-94) 142/89  SpO2:  [94 %-100 %] 99 %  Body mass index is 27 4 kg/m²  Input and Output Summary (last 24 hours): Intake/Output Summary (Last 24 hours) at 2019 1447  Last data filed at 2019 1228  Gross per 24 hour   Intake 240 ml   Output --   Net 240 ml       Physical Exam:     Physical Exam   Constitutional: She appears well-developed and well-nourished  No distress  HENT:   Head: Normocephalic and atraumatic  Cardiovascular: Normal rate and regular rhythm  Exam reveals no friction rub  No murmur heard    Pulmonary/Chest: Effort normal and breath sounds normal  No respiratory distress  She has no wheezes  Abdominal: Soft  Bowel sounds are normal  She exhibits no distension  There is no tenderness  There is no rebound and no guarding  Musculoskeletal: She exhibits no edema  Neurological: She is alert  No cranial nerve deficit  Skin: Skin is warm and dry  No rash noted  Psychiatric: Her mood appears anxious  Her speech is tangential  Cognition and memory are impaired  Nursing note and vitals reviewed  Additional Data:     Labs:    Results from last 7 days   Lab Units 02/20/19  0507   WBC Thousand/uL 4 27*   HEMOGLOBIN g/dL 10 6*   HEMATOCRIT % 32 7*   PLATELETS Thousands/uL 232   NEUTROS PCT % 42*   LYMPHS PCT % 39   MONOS PCT % 16*   EOS PCT % 2     Results from last 7 days   Lab Units 02/20/19  0507 02/18/19  1629   SODIUM mmol/L 142 141   POTASSIUM mmol/L 3 5 3 6   CHLORIDE mmol/L 106 102   CO2 mmol/L 27 31   BUN mg/dL 12 17   CREATININE mg/dL 0 76 0 86   ANION GAP mmol/L 9 8   CALCIUM mg/dL 9 4 9 8   ALBUMIN g/dL  --  3 5   TOTAL BILIRUBIN mg/dL  --  0 51   ALK PHOS U/L  --  80   ALT U/L  --  28   AST U/L  --  26   GLUCOSE RANDOM mg/dL 107 116         Results from last 7 days   Lab Units 02/18/19  1614   POC GLUCOSE mg/dl 118     Results from last 7 days   Lab Units 02/19/19  0616   HEMOGLOBIN A1C % 5 5               * I Have Reviewed All Lab Data Listed Above  * Additional Pertinent Lab Tests Reviewed:  All Labs Within Last 24 Hours Reviewed    Imaging:    Imaging Reports Reviewed Today Include: EEG, MRI brain non-contrast  Imaging Personally Reviewed by Myself Includes:  none    Recent Cultures (last 7 days):           Last 24 Hours Medication List:     Current Facility-Administered Medications:  acetaminophen 650 mg Oral Q6H PRN KG Rizvi   anastrozole 1 mg Oral Daily Ely Pham PA-C   folic acid 1 mg Oral Daily Ely Pham PA-C   levothyroxine 50 mcg Oral Early Morning Inna Lisseth Arauz MD   metoprolol succinate 50 mg Oral Daily Inna Bryant MD   ondansetron 4 mg Intravenous Q6H PRN Ely Pham PA-C   pantoprazole 40 mg Oral BID AC Ely Pham PA-C   thiamine 100 mg Oral Daily Ely Liz PA-C        Today, Patient Was Seen By: Sarah Yarbrough PA-C    ** Please Note: Dictation voice to text software may have been used in the creation of this document   **

## 2019-02-20 NOTE — PLAN OF CARE
Problem: OCCUPATIONAL THERAPY ADULT  Goal: Performs self-care activities at highest level of function for planned discharge setting  See evaluation for individualized goals  Description  Treatment Interventions: ADL retraining, Functional transfer training, UE strengthening/ROM, Endurance training, Patient/family training, Equipment evaluation/education, Compensatory technique education, Energy conservation, Activityengagement          See flowsheet documentation for full assessment, interventions and recommendations  Outcome: Progressing  Note:   Limitation: Decreased ADL status, Decreased UE strength, Decreased Safe judgement during ADL, Decreased endurance, Decreased self-care trans, Decreased high-level ADLs  Prognosis: Good  Assessment: Pt was seen for skilled OT with focus on completion of the [employer] Level Screeing, functional mobility, review of fall prevention and review of current plan of care  Pt scored 4 2/6 0 on the ACLS  24 Hour supervision is recommended to remove dangerous objects outside the visual field and to solve problems due to minor changes in the environment  Behavior: Recognizes errors  Identifies problems  Asks for Day/Date  Sequences one activity at a time  Processing speed is slow and may take extra time to complete tasks  Memorization will be very slow  Requires long term repetitive training for new tasks  Keep directions short and concise  Grooming: Initiates and completes with supplies from familiar accessible locations  Stops and asks for help when an error is made  Expect cuts with use of straight razor  Check every few minutes if left alone  Allow ample time for completion of tasks  Pt may benefit from continued therapies and will require 24 hr supervision based on current levels of cognition as well as restriction to driving        OT Discharge Recommendation: (continue OT 24 hr supervision/restrict driving at this time )

## 2019-02-20 NOTE — PHYSICAL THERAPY NOTE
PT EVALUATION    72 y o     [id number]    Confusion [R41 0]  High blood pressure [I10]  Altered mental status [R41 82]  Mental confusion [R41 0]  Encephalopathy [G93 40]    Past Medical History:   Diagnosis Date    Abnormal CT of the abdomen     Acute bronchitis     Anxiety     Appetite loss     Arthritis     Ascending aortic aneurysm (HCC)     Bilateral renal cysts     Cyst of ovary     Dysphagia     Esophageal reflux disease     Fat necrosis of breast     Full dentures     GERD (gastroesophageal reflux disease)     Herpes zoster     History of radiation therapy     Hyperlipidemia     Hypertension     Hypokalemia     Hypomagnesemia     Hypothyroidism     Impaired fasting glucose     Irritable bowel syndrome (IBS)     Ischemic colitis (HCC)     Knee pain     Limb alert care status     no BP/IV right arm    Osteoarthritis of right knee     Pneumonia     Post-op pain     Pulmonary nodules     Thoracic aortic aneurysm (HCC)     Unspecified ovarian cysts     Use of anastrozole (Arimidex)     Vasovagal syncope     Vitamin D deficiency     Wears glasses     Weight loss          Past Surgical History:   Procedure Laterality Date    APPENDECTOMY      pt states it was not removed    BREAST BIOPSY Right 03/02/2016    BREAST LUMPECTOMY Right 2004    BREAST SURGERY Right     Single lesion excision 1990 hyperplasia, 1st biopsy at 25yrs old was benign    CHOLECYSTECTOMY      COLONOSCOPY      COLONOSCOPY N/A 5/12/2017    Procedure: COLONOSCOPY with biopsy;  Surgeon: Tatiana Alston MD;  Location: [facility]; Service:     ESOPHAGOGASTRODUODENOSCOPY N/A 1/19/2019    Procedure: ESOPHAGOGASTRODUODENOSCOPY (EGD) with 4cc of epinephrine injection and cauterized with gold probe; Surgeon: Maury Morales MD;  Location: [facility];   Service: Gastroenterology    HYSTERECTOMY      KNEE SURGERY Right     [place] BIOPSY/EXCISION, LYMPH NODE(S) Right 4/12/2016    Procedure: BIOPSY LYMPH NODE SENTINEL @ 1200 LYMPHOSCINTIGRAPHY LYMPHATIC MAPPING;  Surgeon: Naa Schultz MD;  [facility];  Service: General    GA MASTECTOMY, SIMPLE, COMPLETE Right 4/12/2016    Procedure: MASTECTOMY SIMPLE;  Surgeon: Naa Schultz MD;  [facility];  Service: General    TUBAL LIGATION      US GUIDED BREAST BIOPSY RIGHT COMPLETE Right 3/9/2016        02/19/19 1702   Note Type   Note type Eval only   Pain Assessment   Pain Assessment No/denies pain   Home Living   Type of [address] [facility] Two level;1/2 bath on main level;Bed/bath upstairs  (1 RAHAT, flight to B/B)   Bathroom Shower/Tub Tub/shower unit   Bathroom Toilet Standard   Bathroom Equipment Grab bars in shower   [facility]  (denies use of AD  Used p fall with C spine fx/rhb)   Additional Comments alone in 2 story home  Has local dtr and fiance  Difficulty obtaining detailed hx   Prior Function   Level of [place] Independent with ADLs and functional mobility   Lives With Alone   Receives Help From Family; Other (Comment); Neighbor   ADL Assistance Independent   IADLs Independent   Falls in the last 6 months 0   Vocational Retired   Comments I with ADLS, IADLS, driving and ambulation without AD  Able to ambulate community distances prior to admission  Restrictions/Precautions   Weight Bearing Precautions Per Order No   Other Precautions Cognitive; Bed Alarm; Fall Risk;Telemetry   General   Additional Pertinent History Pt is 73 y/o female admitted with confusion  R/O CVA  PT consulted  Up and OOB as tolerated  Family/Caregiver Present No   Cognition   Overall Cognitive Status Impaired   Arousal/Participation Alert   Attention Attends with cues to redirect   Orientation Level Oriented to person   Following Commands Follows one step commands with increased time or repetition   Comments redirection needed    Some difficulty with providing hx or answering appropriately   RUE Assessment   RUE Assessment (see OT notes, proximal limitations noted )   LUE Assessment   LUE Assessment WFL   RLE Assessment   RLE Assessment WFL  (grossly 4-/5)   LLE Assessment   LLE Assessment WFL  (grossly 4-/5)   Coordination   Movements are Fluid and Coordinated 1   Light Touch   RLE Light Touch Grossly intact   LLE Light Touch Grossly intact   Bed Mobility   Supine to Sit 5  Supervision   Additional items Increased time required;HOB elevated   Sit to Supine 5  Supervision   Additional items Increased time required;HOB elevated   Transfers   Sit to Stand 5  Supervision   Additional items Increased time required;Verbal cues   Stand to Sit 5  Supervision   Additional items Increased time required;Verbal cues   Additional Comments cues for safety with hand placement with transitions  Ambulation/Elevation   Gait pattern Decreased foot clearance; Inconsistent alonso  (occasional lateral displacement noted )   Gait Assistance 5  Supervision  (close S)   Additional items Verbal cues   Assistive Device   (none)   Distance 250'x1  Stair Management Assistance Not tested   Balance   Static Sitting Fair +   Dynamic Sitting Fair   Static Standing Fair   Dynamic Standing [address] -   Activity Tolerance   Activity Tolerance Patient tolerated treatment well;Patient limited by fatigue   Medical Staff Made Aware NurseAshley  OT-Amanda  Dr Chaparro Teixeira at bedside  Nurse Made Aware yes   Assessment   Prognosis Good   Problem List Decreased strength;Decreased range of motion;Decreased endurance; Impaired balance;Decreased mobility; Decreased cognition; Impaired judgement;Decreased safety awareness   Assessment Pt is 71 y/o female admitted with confusion  Workup in progress  R/o CVA  PT consulted  Up and OOB as tolerated  Prior to admission resides alone in multistory home with 1 RAHAT  Ambulated independently without AD, drives, performs ADLS independently  Hx of fall in July 2018 with C spine fx and rhb following    Currently presents with impairments in cognition, oriented only to self  Difficulty providing history  Limitations in R shoulder ROM and strength with pain associated with same  Mild decrease LE strength  S for bed mobility, transfers  Close S for ambulation with some inconsistencies in pace, with lateral displacement at times  Over distances quality improved  No overt LOB  Fatigued with ambulation requesting return to bed p session  Will benefit from skilled PT in order to assure independent function as lives alone  Defer to OT for cognitive testing for level of supervision given confusion  PT will follow and progress  Functionally anticipate good progression in order to return home  Given cognition question safety with home alone  May benefit from more supportive environment  Barriers to Discharge Inaccessible home environment;Decreased caregiver support   Goals   Patient Goals go home   STG Expiration Date 03/01/19   Short Term Goal #1 10 days: 1)  Pt will perform bed mobility with Josr demonstrating appropriate technique 100% of the time in order to improve function  2)  Perform all transfers with Josr demonstrating safe and appropriate technique 100% of the time in order to improve ability to negotiate safely in home environment  3) Amb with least restrictive AD > 300'x1 with mod I in order to demonstrate ability to negotiate in home environment and in community  4)  Improve overall strength and balance 1/2 grade in order to optimize ability to perform functional tasks and reduce fall risk  5) Increase activity tolerance to 45 minutes in order to improve endurance to functional tasks  6)  Negotiate stairs using most appropriate technique and S in order to be able to negotiate safely in home environment  7) PT for ongoing patient and family/caregiver education, DME needs and d/c planning in order to promote highest level of function in least restrictive environment     Treatment Day 0   Plan   Treatment/Interventions Functional transfer training;LE strengthening/ROM; Elevations; Therapeutic exercise; Endurance training;Patient/family training;Equipment eval/education; Bed mobility;Gait training; Compensatory technique education;Continued evaluation;Spoke to MD;Spoke to nursing;OT   PT Frequency Other (Comment)  (4-5x/wk)   Recommendation   Recommendation Home PT; Home with family support  (? need for more supervision-defer to OT)   Equipment Recommended   (monitor)   PT - OK to Discharge No   Additional Comments to acheive PT goals for safe d/ to home     Modified Newport Scale   Modified Newport Scale 4   Barthel Index   Feeding 10   Bathing 0   Grooming Score 5   Dressing Score 5   Bladder Score 10   Bowels Score 10   Toilet Use Score 5   Transfers (Bed/Chair) Score 10   Mobility (Level Surface) Score 10   Stairs Score 0   Barthel Index Score 65     History: co - morbidities, fall risk,  cognition, multiple lines, lives alone in [facility]  Exam: impairments in locomotion, musculoskeletal, balance, joint integrity,  cardiac,  cognition   Clinical: unstable/unpredictable ( fall risk, decreased cognition, tele, workup ongoing)  Complexity:high    Deniz Rizo, PT

## 2019-02-20 NOTE — ASSESSMENT & PLAN NOTE
Persists, suspected underlying cognitive decline, anxiety, now possible seizure  Admission CT head negative for ischemia/hemorrhage shift or midline mass  MRI of the brain noted for an abnormal finding, will further assess with Contrast MRI and LP (scheduled for 2/21 am)  EEG showing some sharp waves as possible epileptogenic focus  Await further recommendations per Neurology  PT/OT noted - patient's driving needs to be revoked  Patient states she will consider inpatient rehab, but prefers to return home  She would have frequent checks and not 24 hour supervision  Case management following

## 2019-02-20 NOTE — PHYSICAL THERAPY NOTE
Physical Therapy Progress Note     02/20/19 1447   Pain Assessment   Pain Assessment No/denies pain   Pain Score No Pain   Restrictions/Precautions   Weight Bearing Precautions Per Order No   Other Precautions Fall Risk;Cognitive;Telemetry;Multiple lines   General   Chart Reviewed Yes   Response to Previous Treatment Patient with no complaints from previous session  Family/Caregiver Present No   Subjective   Subjective Willing to participate in therapy this PM    Bed Mobility   Supine to Sit 5  Supervision   Additional items Assist x 1;HOB elevated; Bedrails;Verbal cues;LE management; Increased time required   Sit to Supine 5  Supervision   Additional items Assist x 1;Bedrails;Leg ; Increased time required;LE management;Verbal cues   Transfers   Sit to Stand 5  Supervision   Additional items Assist x 1;Bedrails; Increased time required;Verbal cues   Stand to Sit 5  Supervision   Additional items Assist x 1;Bedrails; Increased time required;Verbal cues   Ambulation/Elevation   Gait pattern Decreased foot clearance; Inconsistent alonso  (occasional lateral sway noted)   Gait Assistance 4  Minimal assist   Additional items Assist x 1;Verbal cues; Tactile cues  (CG)   Assistive Device None   Distance 150' x 2 with stair training performed in between   Stair Management Assistance 5  Supervision   Additional items Assist x 1;Verbal cues; Tactile cues   Stair Management Technique One rail R;Foreward; Alternating pattern;Nonreciprocal;Step to pattern   Number of Stairs 16   Balance   Static Sitting Fair +   Dynamic Sitting Fair   Static Standing Fair   Dynamic Standing Fair -   Ambulatory Fair -   Endurance Deficit   Endurance Deficit No   Activity Tolerance   Activity Tolerance Patient tolerated treatment well   Nurse Made Aware Yes   Assessment   Prognosis Good   Problem List Decreased strength;Decreased range of motion;Decreased endurance; Impaired balance;Decreased mobility; Decreased cognition; Impaired judgement;Decreased safety awareness   Assessment Pt  supine in bed upon my arrival  Pt  reporting increased fatigue, however agreeable to therapeutic intervention  Progressed with transfers being able to complete practicing proper technique with no noted LOB  Progressed with an increased amb  trial with use of no AD and CG of therapist with cues for LE sequencing  Noted several episodes of lateral displacement requiring cueing for redirection by therapist to overt falls  Progressed to stair training, being able to complete practicing proper technique with no noted LOB  Continued with a second amb  trial with eventual return to room and repositioned supine in bed at end of session  At this time concern remains multiple falls in past, and question level of support at home at this time? Will continue to defer to OT recommendation for level of support needed upon d/c  PT will continue to recommend d/c with HHPT and support as needed when medically stable  Barriers to Discharge Decreased caregiver support   Barriers to Discharge Comments Level of support at home  Goals   Patient Goals To go home  STG Expiration Date 03/01/19   Treatment Day 1   Plan   Treatment/Interventions Functional transfer training;LE strengthening/ROM; Elevations; Endurance training;Bed mobility;Gait training;Spoke to nursing;Spoke to case management   Progress Progressing toward goals   PT Frequency Other (Comment)  (4-5x/wk)   Recommendation   Recommendation Home PT; Home with family support  (level of support at home defer to OT recommendation)   Equipment Recommended Other (Comment)  (continue to monitor)   PT - OK to Discharge Yes  (if d/c when medically stable)     Iram Sifuentes, PTA

## 2019-02-20 NOTE — OCCUPATIONAL THERAPY NOTE
[address]  Evaluation     Patient Name: Aruna Josue  WMVTU'E Date: 2/19/2019  Problem List  Patient Active Problem List   Diagnosis    Anxiety    GERD (gastroesophageal reflux disease)    Essential hypertension    Hypothyroidism    Hyperlipidemia    Bilateral renal cysts    Ischemic colitis ([address] )    Hypokalemia    Thoracic aortic aneurysm (HCC)    Impaired fasting glucose    Esophageal reflux disease    Invasive ductal carcinoma of breast, right (HCC)    Use of anastrozole (Arimidex)    Closed odontoid fracture with type I morphology (HCC)    Closed nondisplaced fracture of fifth cervical vertebra (HCC)    Closed nondisplaced fracture of seventh cervical vertebra (HCC)    Syncope    Lung nodule seen on imaging study    Atherosclerosis    History of alcohol use    Hyponatremia    Breast cancer screening, high risk patient    Irregular cardiac rhythm    Atrial fibrillation (HCC)    Gastrointestinal hemorrhage associated with gastric ulcer    Iron deficiency anemia    Thrombocytopenia (HCC)    Acute non-recurrent maxillary sinusitis    Low TSH level    Confusion    Abnormal finding on MRI of brain     Past Medical History  Past Medical History:   Diagnosis Date    Abnormal CT of the abdomen     Acute bronchitis     Anxiety     Appetite loss     Arthritis     Ascending aortic aneurysm (HCC)     Bilateral renal cysts     Cyst of ovary     Dysphagia     Esophageal reflux disease     Fat necrosis of breast     Full dentures     GERD (gastroesophageal reflux disease)     Herpes zoster     History of radiation therapy     Hyperlipidemia     Hypertension     Hypokalemia     Hypomagnesemia     Hypothyroidism     Impaired fasting glucose     Irritable bowel syndrome (IBS)     Ischemic colitis (HCC)     Knee pain     Limb alert care status     no BP/IV right arm    Osteoarthritis of right knee     Pneumonia     Post-op pain     Pulmonary nodules     Thoracic aortic aneurysm (HCC)     Unspecified ovarian cysts     Use of anastrozole (Arimidex)     Vasovagal syncope     Vitamin D deficiency     Wears glasses     Weight loss      Past Surgical History  Past Surgical History:   Procedure Laterality Date    APPENDECTOMY      pt states it was not removed    BREAST BIOPSY Right 03/02/2016    BREAST LUMPECTOMY Right 2004    BREAST SURGERY Right     Single lesion excision 1990 hyperplasia, 1st biopsy at 25yrs old was benign    CHOLECYSTECTOMY      COLONOSCOPY      COLONOSCOPY N/A 5/12/2017    Procedure: COLONOSCOPY with biopsy;  Surgeon: Alex Del Rio MD;  Location: [facility]; Service:     ESOPHAGOGASTRODUODENOSCOPY N/A 1/19/2019    Procedure: ESOPHAGOGASTRODUODENOSCOPY (EGD) with 4cc of epinephrine injection and cauterized with gold probe; Surgeon: Livia Garnica MD;  Location: [facility]; Service: Gastroenterology    HYSTERECTOMY      KNEE SURGERY Right     NV BIOPSY/EXCISION, LYMPH NODE(S) Right 4/12/2016    Procedure: BIOPSY LYMPH NODE SENTINEL @ 1200 LYMPHOSCINTIGRAPHY LYMPHATIC MAPPING;  Surgeon: Shanon Apple MD;  Location: [facility];  Service: General    NV MASTECTOMY, SIMPLE, COMPLETE Right 4/12/2016    Procedure: MASTECTOMY SIMPLE;  Surgeon: Shanon Apple MD;  Location: [facility];  Service: General    TUBAL LIGATION      US GUIDED BREAST BIOPSY RIGHT COMPLETE Right 3/9/2016           02/19/19 1701   Note Type   Note type Eval only   Restrictions/Precautions   Weight Bearing Precautions Per Order No   Other Precautions Cognitive; Fall Risk;Telemetry; Chair Alarm; Bed Alarm   Pain Assessment   Pain Assessment No/denies pain   Pain Score No Pain   Home Living   Type of Home House   Home Layout Two level;1/2 bath on main level;Bed/bath upstairs  (1 RAHAT; FOS to 2nd floor bed/full bath)   Bathroom Shower/Tub Tub/shower unit   Bathroom Toilet Standard   Bathroom Equipment Grab bars in shower   [address] [place]  (however no AD use)   Additional Comments Pt lives alone in a two level house with 1 RAHAT and FOS to 2nd floor bedroom/full bath  Pt reports local dtr and fiance to assist as needed  Prior Function   Level of [place] Independent with ADLs and functional mobility   Lives With Alone   Receives Help From Family; Other (Comment)  (local dtr, fiance)   ADL Assistance Independent   IADLs Independent   Falls in the last 6 months 0   Vocational Retired   Comments At baseline, pt was I w/ ADLs, IADLs, and functional mobility/transfers w/o use of AD, (+)  for local distances only, and reports 0 falls PTA   Lifestyle   Autonomy At baseline, pt was I w/ ADLs, IADLs, and functional mobility/transfers w/o use of AD, (+)  for local distances only, and reports 0 falls PTA   Reciprocal Relationships Lives alone; Local dtr and fiance   Service to Others Retired   Intrinsic Gratification Watching TV   Psychosocial   Psychosocial ([address]) X   Patient Behaviors/Mood Anxious   ADL   Where Assessed Edge of bed   Eating Assistance 5  Supervision/Setup   Grooming Assistance 5  Supervision/Setup   UB Bathing Assistance 5  Supervision/Setup   LB Bathing Assistance 4  Minimal [address] 4  Minimal Assistance   Bed Mobility   Supine to Sit 5  Supervision   Additional items Assist x 1;HOB elevated; Bedrails; Increased time required;Verbal cues   Sit to Supine 5  Supervision   Additional items Assist x 1; Increased time required;Verbal cues   Additional Comments Pt lying supine at end of session with call bell and phone within reach  All needs met and pt reports no further questions for OT at this time   Transfers   Sit to Stand 5  Supervision   Additional items Assist x 1; Increased time required;Verbal cues   Stand to Sit 5  Supervision   Additional items Assist x 1;Increased time required;Verbal cues   Additional Comments Cues for safe technique and hand placement   Functional Mobility   Functional Mobility 5  Supervision  (Close supervision)   Additional Comments Assist x1 w/o use of AD   Balance   Static Sitting Fair +   Dynamic Sitting Fair   Static Standing Fair   Dynamic Standing Fair -   Ambulatory Fair -   Activity Tolerance   Activity Tolerance Patient limited by fatigue;Patient tolerated treatment well   Medical Staff Made Aware Kita Greenberg PT   Nurse Made Aware yes; Martha Hernandez RN   RUE Assessment   RUE Assessment X  (decreased shldr flex 2* pain with movement)   RUE Overall AROM   R Shoulder Flexion 90*   RUE Strength   R Shoulder Flexion 2+/5   R Shoulder Extension 2+/5   R Elbow Flexion 3+/5   R Elbow Extension 3+/5   LUE Assessment   LUE Assessment WFL   LUE Strength   LUE Overall Strength Within Functional Limits - able to perform ADL tasks with strength  (3+/5 throughout)   Hand Function   Gross Motor Coordination Functional   Fine Motor Coordination Functional   Sensation   Light Touch Partial deficits in the RLE;Partial deficits in the LLE   Proprioception   Proprioception No apparent deficits   Vision - Complex Assessment   Ocular Range of Motion WFL   Acuity Able to read clock/calendar on wall without difficulty   Perception   Inattention/Neglect Appears intact   Cognition   Overall Cognitive Status Impaired   Arousal/Participation Alert; Cooperative   Attention Attends with cues to redirect   Orientation Level Oriented to person;Disoriented to place; Disoriented to time   Memory Decreased recall of precautions;Decreased recall of recent events;Decreased short term memory   Following Commands Follows one step commands with increased time or repetition   Comments Pt with decreased safety awareness and limited insight into deficits; Redirection to task and conversation required;  Increased time required for processing of information   Cognition Assessment Tools Other (Comment)  (Formal cognitive eval to follow)   Assessment   Limitation Decreased ADL status; Decreased UE strength;Decreased Safe judgement during ADL;Decreased endurance;Decreased self-care trans;Decreased high-level ADLs   Prognosis Good   Assessment Pt is a 72 y o  female seen for OT evaluation s/p adm to [facility] on 2/18/2019 w/ increased Confusion   CT (-) for acute intracranial abnormality  MRI indicated high signal material in the posterior temporal and parietal lobe, R > L  Contrast enhanced MRI and lumbar puncture pending  Comorbidities affecting pts functional performance include a significant PMH of Anxiety, arthritis, AAA, Dysphagia, HLD, HTN, Hypokalemia, pulmonary nodules, Hypomagnesemia, Hypothyroidism, OA of R knee, and vasovagal syncope  Pt with active OT orders and activity orders for Up and OOB as tolerated   Pt lives alone in a two level house with 1 RAHAT and FOS to 2nd floor bedroom/full bath  Pt reports local dtr and fiance to assist as needed  At baseline, pt was I w/ ADLs, IADLs, and functional mobility/transfers w/o use of AD, (+)  for local distances only, and reports 0 falls PTA  Upon evaluation, pt currently requires Min A-Supervision for overall ADLs and Supervision for functional mobility/transfers 2* the following deficits impacting occupational performance: weakness, decreased ROM, decreased strength, decreased balance, decreased tolerance, impaired memory, impaired problem solving and decreased safety awareness  These impairments, as well at pts steps to enter environment, limited home support, difficulty performing ADLS, difficulty performing IADLS  and limited insight into deficits limit pts ability to safely engage in all baseline areas of occupation, including grooming, bathing, dressing, toileting, functional mobility/transfers, community mobility, laundry , house maintenance, medication management, meal prep, cleaning and leisure activities    Pt scored overall 65/100 on the Barthel Index  Based on the aforementioned OT evaluation, functional performance deficits, and assessments, pt has been identified as a Moderate complexity evaluation  Pt to continue to benefit from continued acute OT services during hospital stay to address defined deficits and to maximize level of functional independence in the following Occupational Performance areas: grooming, bathing/shower, toilet hygiene, dressing, medication management, socialization, health maintenance, functional mobility, community mobility, clothing management, cleaning, meal prep and household maintenance  From OT standpoint, recommend continued OT at D/C and increased supervision/assistance upon D/C  Formal cognitive eval to follow  OT will continue to follow pt 2-3x/wk:   Goals   Patient Goals To go home   LTG Time Frame 10-14   Long Term Goal Please refer to LTGs listed below   Plan   Treatment Interventions ADL retraining;Functional transfer training;UE strengthening/ROM; Endurance training;Patient/family training;Equipment evaluation/education; Compensatory technique education; Energy conservation; Activityengagement   Goal Expiration Date 03/05/19   Treatment Day 0   OT Frequency 2-3x/wk   Recommendation   OT Discharge Recommendation Other (Comment)  (Continued OT;  Increased supervision/assistance)   Barthel Index   Feeding 10   Bathing 0   Grooming Score 5   Dressing Score 5   Bladder Score 10   Bowels Score 10   Toilet Use Score 5   Transfers (Bed/Chair) Score 10   Mobility (Level Surface) Score 10   Stairs Score 0   Barthel Index Score 65   Modified Austin Scale   Modified Tracy Scale 4       GOALS    1) Pt will improve activity tolerance to G for min 30 min txment sessions for increase engagement in functional tasks    2) Pt will complete UB/LB dressing/self care w/ mod I using adaptive device and DME as needed    3) Pt will complete bathing w/ Mod I w/ use of AE and DME as needed    4) Pt will complete toileting w/ mod I w/ G hygiene/thoroughness using DME as needed    5) Pt will improve functional transfers to Mod I on/off all surfaces using DME as needed w/ G balance/safety     6) Pt will improve functional mobility during ADL/IADL/leisure tasks to Mod I using DME as needed w/ G balance/safety     7) Pt will participate in simulated IADL management task to increase independence to Mod I w/ G safety and endurance    8) Pt will be attentive 100% of the time during ongoing cognitive assessment w/ G participation to assist w/ safe d/c planning/recommendations    9) Pt will demonstrate G carryover of pt/caregiver education and training as appropriate w/o cues w/ good tolerance to increase safety during functional tasks    10) Pt will demonstrate 100% carryover of energy conservation techniques t/o functional I/ADL/leisure tasks w/o cues s/p skilled education to increase endurance during functional tasks     11) Pt will increase BUE strength by 1 MM grade to increase independence in ADLs and transfers    12) Pt will be able to verbalize 3 potential fall hazards and identify appropriate compensatory techniques to decrease fall risk in home environment     Concepcion Stover OTR/L

## 2019-02-20 NOTE — ASSESSMENT & PLAN NOTE
Paroxysmal a fib in NSR  No longer on AC and not a good candidate for [facility] given recent GIB  Continue Metoprolol

## 2019-02-20 NOTE — PLAN OF CARE
Problem: PHYSICAL THERAPY ADULT  Goal: Performs mobility at highest level of function for planned discharge setting  See evaluation for individualized goals  Description  Treatment/Interventions: Functional transfer training, LE strengthening/ROM, Elevations, Therapeutic exercise, Endurance training, Patient/family training, Equipment eval/education, Bed mobility, Gait training, Compensatory technique education, Continued evaluation, Spoke to MD, Spoke to nursing, OT  Equipment Recommended: (monitor)       See flowsheet documentation for full assessment, interventions and recommendations  Note:   Prognosis: Good  Problem List: Decreased strength, Decreased range of motion, Decreased endurance, Impaired balance, Decreased mobility, Decreased cognition, Impaired judgement, Decreased safety awareness  Assessment: Pt is 71 y/o female admitted with confusion  Workup in progress  R/o CVA  PT consulted  Up and OOB as tolerated  Prior to admission resides alone in multistory home with 1 RAHAT  Ambulated independently without AD, drives, performs ADLS independently  Hx of fall in July 2018 with C spine fx and rhb following  Currently presents with impairments in cognition, oriented only to self  Difficulty providing history  Limitations in R shoulder ROM and strength with pain associated with same  Mild decrease LE strength  S for bed mobility, transfers  Close S for ambulation with some inconsistencies in pace, with lateral displacement at times  Over distances quality improved  No overt LOB  Fatigued with ambulation requesting return to bed p session  Will benefit from skilled PT in order to assure independent function as lives alone  Defer to OT for cognitive testing for level of supervision given confusion  PT will follow and progress  Functionally anticipate good progression in order to return home  Given cognition question safety with home alone  May benefit from more supportive environment    Barriers to Discharge: Inaccessible home environment, Decreased caregiver support     Recommendation: Home PT, Home with family support(? need for more supervision-defer to OT)     PT - OK to Discharge: No    See flowsheet documentation for full assessment

## 2019-02-21 ENCOUNTER — ANESTHESIA (INPATIENT)
Dept: MRI IMAGING | Facility: HOSPITAL | Age: 74
DRG: 098 | End: 2019-02-21
Payer: MEDICARE

## 2019-02-21 ENCOUNTER — ANESTHESIA EVENT (INPATIENT)
Dept: MRI IMAGING | Facility: HOSPITAL | Age: 74
DRG: 098 | End: 2019-02-21
Payer: MEDICARE

## 2019-02-21 ENCOUNTER — APPOINTMENT (INPATIENT)
Dept: RADIOLOGY | Facility: HOSPITAL | Age: 74
DRG: 098 | End: 2019-02-21
Payer: MEDICARE

## 2019-02-21 ENCOUNTER — APPOINTMENT (INPATIENT)
Dept: MRI IMAGING | Facility: HOSPITAL | Age: 74
DRG: 098 | End: 2019-02-21
Payer: MEDICARE

## 2019-02-21 PROBLEM — M79.622 LEFT UPPER ARM PAIN: Status: ACTIVE | Noted: 2019-02-21

## 2019-02-21 PROBLEM — E78.5 HLD (HYPERLIPIDEMIA): Status: ACTIVE | Noted: 2019-02-21

## 2019-02-21 LAB
APPEARANCE CSF: CLEAR
ERYTHROCYTE [DISTWIDTH] IN BLOOD BY AUTOMATED COUNT: 12.5 % (ref 11.6–15.1)
GLUCOSE CSF-MCNC: 57 MG/DL (ref 50–80)
GLUCOSE SERPL-MCNC: 100 MG/DL (ref 65–140)
GRAM STN SPEC: NORMAL
HCT VFR BLD AUTO: 36.3 % (ref 34.8–46.1)
HGB BLD-MCNC: 11.8 G/DL (ref 11.5–15.4)
MCH RBC QN AUTO: 31.5 PG (ref 26.8–34.3)
MCHC RBC AUTO-ENTMCNC: 32.5 G/DL (ref 31.4–37.4)
MCV RBC AUTO: 97 FL (ref 82–98)
PLATELET # BLD AUTO: 235 THOUSANDS/UL (ref 149–390)
PMV BLD AUTO: 9.6 FL (ref 8.9–12.7)
PROT CSF-MCNC: 74 MG/DL (ref 15–45)
RBC # BLD AUTO: 3.75 MILLION/UL (ref 3.81–5.12)
RBC # CSF MANUAL: 11 UL (ref 0–10)
TOTAL CELLS COUNTED BLD: NO
TUBE # CSF: 4
WBC # BLD AUTO: 4.24 THOUSAND/UL (ref 4.31–10.16)
WBC # CSF AUTO: 2 /UL (ref 0–5)

## 2019-02-21 PROCEDURE — 88108 CYTOPATH CONCENTRATE TECH: CPT | Performed by: PATHOLOGY

## 2019-02-21 PROCEDURE — 97535 SELF CARE MNGMENT TRAINING: CPT

## 2019-02-21 PROCEDURE — 99222 1ST HOSP IP/OBS MODERATE 55: CPT | Performed by: NURSE PRACTITIONER

## 2019-02-21 PROCEDURE — 89051 BODY FLUID CELL COUNT: CPT | Performed by: PHYSICIAN ASSISTANT

## 2019-02-21 PROCEDURE — 99233 SBSQ HOSP IP/OBS HIGH 50: CPT | Performed by: PSYCHIATRY & NEUROLOGY

## 2019-02-21 PROCEDURE — 84157 ASSAY OF PROTEIN OTHER: CPT | Performed by: PHYSICIAN ASSISTANT

## 2019-02-21 PROCEDURE — 70553 MRI BRAIN STEM W/O & W/DYE: CPT

## 2019-02-21 PROCEDURE — 82948 REAGENT STRIP/BLOOD GLUCOSE: CPT

## 2019-02-21 PROCEDURE — A9585 GADOBUTROL INJECTION: HCPCS | Performed by: PHYSICIAN ASSISTANT

## 2019-02-21 PROCEDURE — 89050 BODY FLUID CELL COUNT: CPT | Performed by: PHYSICIAN ASSISTANT

## 2019-02-21 PROCEDURE — 77003 FLUOROGUIDE FOR SPINE INJECT: CPT

## 2019-02-21 PROCEDURE — 99232 SBSQ HOSP IP/OBS MODERATE 35: CPT | Performed by: NURSE PRACTITIONER

## 2019-02-21 PROCEDURE — 86592 SYPHILIS TEST NON-TREP QUAL: CPT | Performed by: PHYSICIAN ASSISTANT

## 2019-02-21 PROCEDURE — 87070 CULTURE OTHR SPECIMN AEROBIC: CPT | Performed by: PHYSICIAN ASSISTANT

## 2019-02-21 PROCEDURE — 77003 FLUOROGUIDE FOR SPINE INJECT: CPT | Performed by: RADIOLOGY

## 2019-02-21 PROCEDURE — 99232 SBSQ HOSP IP/OBS MODERATE 35: CPT | Performed by: PHYSICIAN ASSISTANT

## 2019-02-21 PROCEDURE — 82945 GLUCOSE OTHER FLUID: CPT | Performed by: PHYSICIAN ASSISTANT

## 2019-02-21 PROCEDURE — 85027 COMPLETE CBC AUTOMATED: CPT | Performed by: NURSE PRACTITIONER

## 2019-02-21 PROCEDURE — 62270 DX LMBR SPI PNXR: CPT | Performed by: RADIOLOGY

## 2019-02-21 PROCEDURE — 62270 DX LMBR SPI PNXR: CPT

## 2019-02-21 PROCEDURE — 99222 1ST HOSP IP/OBS MODERATE 55: CPT | Performed by: INTERNAL MEDICINE

## 2019-02-21 PROCEDURE — 009U3ZX DRAINAGE OF SPINAL CANAL, PERCUTANEOUS APPROACH, DIAGNOSTIC: ICD-10-PCS | Performed by: ANESTHESIOLOGY

## 2019-02-21 RX ORDER — FENOFIBRATE 48 MG/1
48 TABLET, COATED ORAL DAILY
Status: DISCONTINUED | OUTPATIENT
Start: 2019-02-21 | End: 2019-02-22 | Stop reason: HOSPADM

## 2019-02-21 RX ORDER — FENTANYL CITRATE 50 UG/ML
INJECTION, SOLUTION INTRAMUSCULAR; INTRAVENOUS CODE/TRAUMA/SEDATION MEDICATION
Status: COMPLETED | OUTPATIENT
Start: 2019-02-21 | End: 2019-02-21

## 2019-02-21 RX ORDER — FENTANYL CITRATE 50 UG/ML
INJECTION, SOLUTION INTRAMUSCULAR; INTRAVENOUS AS NEEDED
Status: DISCONTINUED | OUTPATIENT
Start: 2019-02-21 | End: 2019-02-21 | Stop reason: SURG

## 2019-02-21 RX ORDER — SODIUM CHLORIDE 9 MG/ML
INJECTION, SOLUTION INTRAVENOUS CONTINUOUS PRN
Status: DISCONTINUED | OUTPATIENT
Start: 2019-02-21 | End: 2019-02-21 | Stop reason: SURG

## 2019-02-21 RX ORDER — MIDAZOLAM HYDROCHLORIDE 1 MG/ML
INJECTION INTRAMUSCULAR; INTRAVENOUS AS NEEDED
Status: DISCONTINUED | OUTPATIENT
Start: 2019-02-21 | End: 2019-02-21 | Stop reason: SURG

## 2019-02-21 RX ADMIN — GADOBUTROL 7 ML: 604.72 INJECTION INTRAVENOUS at 10:58

## 2019-02-21 RX ADMIN — PANTOPRAZOLE SODIUM 40 MG: 40 TABLET, DELAYED RELEASE ORAL at 16:26

## 2019-02-21 RX ADMIN — LEVOTHYROXINE SODIUM 50 MCG: 50 TABLET ORAL at 05:38

## 2019-02-21 RX ADMIN — MIDAZOLAM 2 MG: 1 INJECTION INTRAMUSCULAR; INTRAVENOUS at 10:32

## 2019-02-21 RX ADMIN — ANASTROZOLE 1 MG: 1 TABLET, COATED ORAL at 09:05

## 2019-02-21 RX ADMIN — FENTANYL CITRATE 50 MCG: 50 INJECTION, SOLUTION INTRAMUSCULAR; INTRAVENOUS at 13:55

## 2019-02-21 RX ADMIN — PANTOPRAZOLE SODIUM 40 MG: 40 TABLET, DELAYED RELEASE ORAL at 05:38

## 2019-02-21 RX ADMIN — SODIUM CHLORIDE: 9 INJECTION, SOLUTION INTRAVENOUS at 10:32

## 2019-02-21 RX ADMIN — FOLIC ACID 1 MG: 1 TABLET ORAL at 09:06

## 2019-02-21 RX ADMIN — FENTANYL CITRATE 100 MCG: 50 INJECTION, SOLUTION INTRAMUSCULAR; INTRAVENOUS at 10:32

## 2019-02-21 RX ADMIN — Medication 100 MG: at 09:06

## 2019-02-21 RX ADMIN — FENTANYL CITRATE 50 MCG: 50 INJECTION, SOLUTION INTRAMUSCULAR; INTRAVENOUS at 14:05

## 2019-02-21 NOTE — PROCEDURES
Procedures  Interventional Radiology Procedure Note    PATIENT NAME: Reggie Call  : 1945  MRN: [id number]    Procedure:  Image guided lumbar puncture    Estimated Blood Loss:  Minimal     Findings:  L2-3 access  20 gauge  Minimally blood tinged CSF cleared rapidly  12 mL drained  Specimens:  As ordered  12 mL submitted in 4 vials      Complications:  None    Anesthesia: Addie Bell MD     Date: 2019  Time: 2:16 PM

## 2019-02-21 NOTE — ASSESSMENT & PLAN NOTE
Patient reports left upper arm pain since Jacqui,limiting her ability with ADLs  Limited ROM to left shoulder due to pain  Likely left shoulder rotator cuff tendonitis  Patient seen by Orthopedic yesterday  Recommended PT  If shoulder pain does not resolve of the physical therapy, she should follow up in office for possible steroid injection or MRI at that time  Tylenol p r n  On on discharge  Home PT OT  Follow-up orthopedic as needed

## 2019-02-21 NOTE — ASSESSMENT & PLAN NOTE
Resumed home metoprolol 50mg  Medications recently decreased as BP was running low  BP stable  Monitor BP at home

## 2019-02-21 NOTE — PLAN OF CARE
Problem: Potential for Falls  Goal: Patient will remain free of falls  Description  INTERVENTIONS:  - Assess patient frequently for physical needs  -  Identify cognitive and physical deficits and behaviors that affect risk of falls    -  [place] fall precautions as indicated by assessment   - Educate patient/family on patient safety including physical limitations  - Instruct patient to call for assistance with activity based on assessment  - Modify environment to reduce risk of injury  - Consider OT/PT consult to assist with strengthening/mobility  Outcome: Progressing     Problem: PAIN - ADULT  Goal: Verbalizes/displays adequate comfort level or baseline comfort level  Description  Interventions:  - Encourage patient to monitor pain and request assistance  - Assess pain using appropriate pain scale  - Administer analgesics based on type and severity of pain and evaluate response  - Implement non-pharmacological measures as appropriate and evaluate response  - Consider cultural and social influences on pain and pain management  - Notify physician/advanced practitioner if interventions unsuccessful or patient reports new pain  Outcome: Progressing     Problem: INFECTION - ADULT  Goal: Absence or prevention of progression during hospitalization  Description  INTERVENTIONS:  - Assess and monitor for signs and symptoms of infection  - Monitor lab/diagnostic results  - Administer medications as ordered  - Instruct and encourage patient and family to use good hand hygiene technique  - Identify and instruct in appropriate isolation precautions for identified infection/condition   Outcome: Progressing     Problem: SAFETY ADULT  Goal: Maintain or return to baseline ADL function  Description  INTERVENTIONS:  -  Assess patient's ability to carry out ADLs; assess patient's baseline for ADL function and identify physical deficits which impact ability to perform ADLs (bathing, care of mouth/teeth, toileting, grooming, dressing, etc )  - Assess/evaluate cause of self-care deficits   - Assess range of motion  - Assess patient's mobility; develop plan if impaired  - Assess patient's need for assistive devices and provide as appropriate  - Encourage maximum independence but intervene and supervise when necessary  ¯ Involve family in performance of ADLs  ¯ Assess for home care needs following discharge   ¯ Request OT consult to assist with ADL evaluation and planning for discharge  ¯ Provide patient education as appropriate  Outcome: Progressing  Goal: Maintain or return mobility status to optimal level  Description  INTERVENTIONS:  - Assess patient's baseline mobility status (ambulation, transfers, stairs, etc )    - Identify cognitive and physical deficits and behaviors that affect mobility  - Identify mobility aids required to assist with transfers and/or ambulation (gait belt, sit-to-stand, lift, walker, cane, etc )  - [place] fall precautions as indicated by assessment  - Record patient progress and toleration of activity level on Mobility SBAR; progress patient to next Phase/Stage  - Instruct patient to call for assistance with activity based on assessment  - Request Rehabilitation consult to assist with strengthening/weightbearing, etc   Outcome: Progressing     Problem: DISCHARGE PLANNING  Goal: Discharge to home or other facility with appropriate resources  Description  INTERVENTIONS:  - Identify barriers to discharge w/patient and caregiver  - Arrange for needed discharge resources and transportation as appropriate  - Identify discharge learning needs (meds, wound care, etc )  - Arrange for interpretive services to assist at discharge as needed  - Refer to Case Management Department for coordinating discharge planning if the patient needs post-hospital services based on physician/advanced practitioner order or complex needs related to functional status, cognitive ability, or social support system  Outcome: Progressing Problem: Knowledge Deficit  Goal: Patient/family/caregiver demonstrates understanding of disease process, treatment plan, medications, and discharge instructions  Description  Complete learning assessment and assess knowledge base  Interventions:  - Provide teaching at level of understanding  - Provide teaching via preferred learning methods  Outcome: Progressing     Problem: Neurological Deficit  Goal: Neurological status is stable or improving  Description  Interventions:  - Monitor and assess patient's level of consciousness, motor function, sensory function, and level of assistance needed for ADLs  - Monitor and report changes from baseline  Collaborate with interdisciplinary team to initiate plan and implement interventions as ordered  - Provide and maintain a safe environment  - Utilize fall precautions  - Utilize aspiration precautions  - Utilize bleeding precautions  Outcome: Progressing     Problem: Communication Impairment  Goal: Ability to express needs and understand communication  Description  Assess patient's communication skills and ability to understand information  Patient will demonstrate use of effective communication techniques, alternative methods of communication and understanding even if not able to speak  - Encourage communication and provide alternate methods of communication as needed  - Collaborate with case management/ for discharge needs  - Include patient/family/caregiver in decisions related to communication  Outcome: Progressing     Problem: Nutrition/Hydration-ADULT  Goal: Nutrient/Hydration intake appropriate for improving, restoring or maintaining nutritional needs  Description  Monitor and assess patient's nutrition/hydration status for malnutrition (ex- brittle hair, bruises, dry skin, pale skin and conjunctiva, muscle wasting, smooth red tongue, and disorientation)   Collaborate with interdisciplinary team and initiate plan and interventions as ordered  Monitor patient's weight and dietary intake as ordered or per policy  Utilize nutrition screening tool and intervene per policy  Determine patient's food preferences and provide high-protein, high-caloric foods as appropriate  INTERVENTIONS:  - Monitor oral intake, urinary output, labs, and treatment plans  - Assess nutrition and hydration status and recommend course of action  - Evaluate amount of meals eaten  - Assist patient with eating if necessary   - Allow adequate time for meals  - Recommend/ encourage appropriate diets, oral nutritional supplements, and vitamin/mineral supplements  - Order, calculate, and assess calorie counts as needed  - Recommend, monitor, and adjust tube feedings and TPN/PPN based on assessed needs  - Assess need for intravenous fluids  - Provide specific nutrition/hydration education as appropriate  - Include patient/family/caregiver in decisions related to nutrition  Outcome: Progressing     Problem: DISCHARGE PLANNING - CARE MANAGEMENT  Goal: Discharge to post-acute care or home with appropriate resources  Description  INTERVENTIONS:    PT is recommending home PT and pt has refused  OT stated pt she have restricted driving and supervision due to disorientated and decreased short term memory  Pt has refused home VNA  Discussed with pt about [facility] and [facility] and information was given to her       - Conduct assessment to determine patient/family and health care team treatment goals, and need for post-acute services based on payer coverage, community resources, and patient preferences, and barriers to discharge  - Address psychosocial, clinical, and financial barriers to discharge as identified in assessment in conjunction with the patient/family and health care team  - Arrange appropriate level of post-acute services according to patient's   needs and preference and payer coverage in collaboration with the physician and health care team  - Communicate with and update the patient/family, physician, and health care team regarding progress on the discharge plan  - Arrange appropriate transportation to post-acute venues   Outcome: Progressing

## 2019-02-21 NOTE — ANESTHESIA POSTPROCEDURE EVALUATION
Post-Op Assessment Note    CV Status:  Stable  Pain Score: 0    Pain management: adequate     Mental Status:  Alert and awake   Hydration Status:  Euvolemic and stable   PONV Controlled:  Controlled   Airway Patency:  Patent   Post Op Vitals Reviewed: Yes      Staff: Anesthesiologist   Comments: fast track recovery  report  given to odessa           BP      Temp      Pulse     Resp      SpO2

## 2019-02-21 NOTE — CONSULTS
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  History of Presenting Illness:  A 79-year-old postmenopausal woman who was initially diagnosed with early stage of right breast cancer, ER positive disease in 2002  She underwent lumpectomy followed by radiation therapy  She was on adjuvant hormonal therapy with tamoxifen until 2007  In April 2016, she was found to have local recurrence of breast cancer in the right breast   Therefore, she underwent mastectomy which showed 1 3 cm of invasive ductal carcinoma, grade 1  Five lymph nodes were all negative for metastatic disease  This was % positive, SC 95% positive, HER2 negative disease  Since then, she has been on adjuvant hormonal therapy with anastrozole  She was hospitalized with intermittent confusion  She told me that she has some weight loss  She has no complaint of pain  She denied any respiratory symptoms  She denied double vision or difficulty of speaking  Has no ambulatory dysfunction  MRI of the brain showed flare signal abnormality, suggesting left meningeal enhancement  Therefore, she underwent lumbar puncture  She has elevated total protein with normal glucose  Cell count and cytology are still pending  Because of the concern of meningeal carcinomatosis with history of breast cancer, I was asked to see her  CT scan abdomen pelvis in January 2019 showed no evidence of metastatic disease  Review of Systems - As stated in the HPI otherwise the fourteen point review of systems was negative      Past Medical History:   Diagnosis Date    Abnormal CT of the abdomen     Acute bronchitis     Anxiety     Appetite loss     Arthritis     Ascending aortic aneurysm (HCC)     Bilateral renal cysts     Cyst of ovary     Dysphagia     Esophageal reflux disease     Fat necrosis of breast     Full dentures     GERD (gastroesophageal reflux disease)     Herpes zoster     History of radiation therapy     Hyperlipidemia     Hypertension     Hypokalemia     Hypomagnesemia     Hypothyroidism     Impaired fasting glucose     Irritable bowel syndrome (IBS)     Ischemic colitis (HCC)     Knee pain     Limb alert care status     no BP/IV right arm    Osteoarthritis of right knee     Pneumonia     Post-op pain     Pulmonary nodules     Thoracic aortic aneurysm (HCC)     Unspecified ovarian cysts     Use of anastrozole (Arimidex)     Vasovagal syncope     Vitamin D deficiency     Wears glasses     Weight loss        Social History     Socioeconomic History    Marital status: Single     Spouse name: None    Number of children: None    Years of education: None    Highest education level: None   Occupational History    Occupation: retired   Social Needs    Financial resource strain: None    Food insecurity:     Worry: None     Inability: None    Transportation needs:     Medical: None     Non-medical: None   Tobacco Use    Smoking status: Never Smoker    Smokeless tobacco: Never Used    Tobacco comment: not interested   Substance and Sexual Activity    Alcohol use: Not Currently    Drug use: No    Sexual activity: Not Currently   Lifestyle    Physical activity:     Days per week: None     Minutes per session: None    Stress: None   Relationships    Social connections:     Talks on phone: None     Gets together: None     Attends Jain service: None     Active member of club or organization: None     Attends meetings of clubs or organizations: None     Relationship status: None    Intimate partner violence:     Fear of current or ex partner: None     Emotionally abused: None     Physically abused: None     Forced sexual activity: None   Other Topics Concern    None   Social History Narrative    None       Family History   Problem Relation Age of Onset    Stroke Maternal Grandmother     Anemia Mother     Other Mother         disorders of blood and blood forming organs    Hypertension Mother     Stroke Father     Alcohol abuse Brother        Allergies   Allergen Reactions    Demerol [Meperidine] GI Intolerance and Other (See Comments)     Other reaction(s):  Other (See Comments)  severe nausea  severe nausea  Nausea/vomiting    Nitroglycerin Anaphylaxis and Other (See Comments)     BP drops drastically    Lipitor [Atorvastatin] Other (See Comments)     Joint/muscle pain    Zocor [Simvastatin] Other (See Comments)     Joint/muscle pain         Current Facility-Administered Medications:     acetaminophen (TYLENOL) tablet 650 mg, 650 mg, Oral, Q6H PRN, KG Rizvi, 650 mg at 02/20/19 1049    anastrozole (ARIMIDEX) tablet 1 mg, 1 mg, Oral, Daily, Ely Pham PA-C, 1 mg at 02/21/19 0905    fenofibrate (TRICOR) tablet 48 mg, 48 mg, Oral, Daily, KG Foss, Stopped at 60/88/21 3382    folic acid (FOLVITE) tablet 1 mg, 1 mg, Oral, Daily, Ely Pham PA-C, 1 mg at 02/21/19 0906    levothyroxine tablet 50 mcg, 50 mcg, Oral, Early Morning, Yaz Heller MD, 50 mcg at 02/21/19 0538    metoprolol succinate (TOPROL-XL) 24 hr tablet 50 mg, 50 mg, Oral, Daily, Yaz Heller MD, 50 mg at 02/20/19 1049    multivitamin stress formula tablet 1 tablet, 1 tablet, Oral, Daily, KG Foss, Stopped at 02/21/19 1122    ondansetron (ZOFRAN) injection 4 mg, 4 mg, Intravenous, Q6H PRN, Ely Pham PA-C    pantoprazole (PROTONIX) EC tablet 40 mg, 40 mg, Oral, BID AC, Ely Pham PA-C, 40 mg at 02/21/19 1626    thiamine (VITAMIN B1) tablet 100 mg, 100 mg, Oral, Daily, Ely Pham PA-C, 100 mg at 02/21/19 0906      /87   Pulse 76   Temp 97 6 °F (36 4 °C) (Tympanic)   Resp 18   Ht 5' 4" (1 626 m)   Wt 72 4 kg (159 lb 9 8 oz)   SpO2 98%   BMI 27 40 kg/m²     General Appearance: Alert, oriented        Eyes:    PERRL   Ears:    Normal external ear canals, both ears   Nose:   Nares normal, septum midline   Throat:   Mucosa moist  Pharynx without injection  Neck:   Supple       Lungs:     Clear to auscultation bilaterally   Chest Wall:    No tenderness or deformity    Heart:    Regular rate and rhythm       Abdomen:     Soft, non-tender, bowel sounds +, no organomegaly           Extremities:   Extremities no cyanosis or edema       Skin:   no rash or icterus  Lymph nodes:   Cervical, supraclavicular, and axillary nodes normal   Neurologic:   CNII-XII intact, normal strength, sensation and reflexes     Throughout        Breast exam; right status post mastectomy without reconstruction  No palpable abnormality in her right chest wall  Left breast exam is negative         Recent Results (from the past 48 hour(s))   CBC and differential    Collection Time: 02/20/19  5:07 AM   Result Value Ref Range    WBC 4 27 (L) 4 31 - 10 16 Thousand/uL    RBC 3 41 (L) 3 81 - 5 12 Million/uL    Hemoglobin 10 6 (L) 11 5 - 15 4 g/dL    Hematocrit 32 7 (L) 34 8 - 46 1 %    MCV 96 82 - 98 fL    MCH 31 1 26 8 - 34 3 pg    MCHC 32 4 31 4 - 37 4 g/dL    RDW 12 8 11 6 - 15 1 %    MPV 10 1 8 9 - 12 7 fL    Platelets 418 089 - 769 Thousands/uL    nRBC 0 /100 WBCs    Neutrophils Relative 42 (L) 43 - 75 %    Immat GRANS % 0 0 - 2 %    Lymphocytes Relative 39 14 - 44 %    Monocytes Relative 16 (H) 4 - 12 %    Eosinophils Relative 2 0 - 6 %    Basophils Relative 1 0 - 1 %    Neutrophils Absolute 1 79 (L) 1 85 - 7 62 Thousands/µL    Immature Grans Absolute 0 01 0 00 - 0 20 Thousand/uL    Lymphocytes Absolute 1 66 0 60 - 4 47 Thousands/µL    Monocytes Absolute 0 67 0 17 - 1 22 Thousand/µL    Eosinophils Absolute 0 10 0 00 - 0 61 Thousand/µL    Basophils Absolute 0 04 0 00 - 0 10 Thousands/µL   Basic metabolic panel    Collection Time: 02/20/19  5:07 AM   Result Value Ref Range    Sodium 142 136 - 145 mmol/L    Potassium 3 5 3 5 - 5 3 mmol/L    Chloride 106 100 - 108 mmol/L    CO2 27 21 - 32 mmol/L    ANION GAP 9 4 - 13 mmol/L    BUN 12 5 - 25 mg/dL    Creatinine 0 76 0 60 - 1 30 mg/dL    Glucose 107 65 - 140 mg/dL    Calcium 9 4 8 3 - 10 1 mg/dL    eGFR 79 ml/min/1 73sq m   CBC and Platelet    Collection Time: 02/21/19 11:53 AM   Result Value Ref Range    WBC 4 24 (L) 4 31 - 10 16 Thousand/uL    RBC 3 75 (L) 3 81 - 5 12 Million/uL    Hemoglobin 11 8 11 5 - 15 4 g/dL    Hematocrit 36 3 34 8 - 46 1 %    MCV 97 82 - 98 fL    MCH 31 5 26 8 - 34 3 pg    MCHC 32 5 31 4 - 37 4 g/dL    RDW 12 5 11 6 - 15 1 %    Platelets 321 679 - 059 Thousands/uL    MPV 9 6 8 9 - 12 7 fL   Fingerstick Glucose (POCT)    Collection Time: 02/21/19 12:39 PM   Result Value Ref Range    POC Glucose 100 65 - 140 mg/dl   Total Protein, CSF    Collection Time: 02/21/19  3:13 PM   Result Value Ref Range    Protein, CSF 74 (H) 15 - 45 mg/dL   Glucose, CSF    Collection Time: 02/21/19  3:13 PM   Result Value Ref Range    Glucose, CSF 57 50 - 80 mg/dL   RBC count,CSF    Collection Time: 02/21/19  5:00 PM   Result Value Ref Range    RBC, CSF 11 (H) 0 - 10 uL   CSF white cell count with differential    Collection Time: 02/21/19  5:01 PM   Result Value Ref Range    Appearance, CSF Clear     Tube Number, CSF 4     WBC, CSF 2 0 - 5 /uL    Xanthochromia No No         Xr Chest 2 Views    Result Date: 2/18/2019  Narrative: CHEST INDICATION:   ams  COMPARISON:  July 2014 EXAM PERFORMED/VIEWS:  XR CHEST PA & LATERAL  The frontal view was performed utilizing dual energy radiographic technique  FINDINGS: Cardiomediastinal silhouette appears unremarkable  The lungs are clear  No pneumothorax or pleural effusion  Degenerative disc disease noted in the midthoracic spine  No destructive or sclerotic lesion evident  Prior right mastectomy noted  Impression: No acute cardiopulmonary disease   Workstation performed: [id number]     Xr Shoulder 2+ Vw Left    Result Date: 2/15/2019  Narrative: LEFT SHOULDER INDICATION:   M25 512: Pain in left shoulder  "left shoulder pain and difficulty with ROM since end of january 2019" COMPARISON:  None VIEWS:  XR SHOULDER 2+ VW LEFT FINDINGS: There is no acute fracture or dislocation  Mild acromioclavicular degenerative changes with inferior spurring  No lytic or blastic lesions are seen  Calcifications superior to the humeral head in keeping with calcific rotator cuff tendinopathy  Impression: Mild acromioclavicular degenerative changes with inferior spurring  Calcifications superior to the humeral head in keeping with calcific rotator cuff tendinopathy  Workstation performed: [id number]     Ct Head Without Contrast    Result Date: 2/18/2019  Narrative: CT BRAIN - WITHOUT CONTRAST INDICATION:   ams  COMPARISON:  CT head 1/19/2019  TECHNIQUE:  CT examination of the brain was performed  In addition to axial images, coronal 2D reformatted images were created and submitted for interpretation  Radiation dose length product (DLP) for this visit:  987 mGy-cm   This examination, like all CT scans performed in the [facility], was performed utilizing techniques to minimize radiation dose exposure, including the use of iterative reconstruction and automated exposure control  IMAGE QUALITY:  Diagnostic  Moderate FINDINGS: PARENCHYMA:  No intracranial mass, mass effect or midline shift  No CT signs of acute infarction  No acute parenchymal hemorrhage  VENTRICLES AND EXTRA-AXIAL SPACES:  Normal for the patient's age  VISUALIZED ORBITS AND PARANASAL SINUSES:  Unremarkable  CALVARIUM AND EXTRACRANIAL SOFT TISSUES:  Normal      Impression: No acute intracranial abnormality   Workstation performed: [id number]     Mra And Or Mrv Head Wo Contrast    Result Date: 2/19/2019  Narrative: MRA BRAIN INDICATION:  mra head/neck; transient executive function abn COMPARISON:  CTA 7/24/2018 TECHNIQUE:  Axial 3-D time-of-flight imaging with 3-D reconstructions  FINDINGS: IMAGE QUALITY:  Diagnostic  ANATOMY INTERNAL CAROTID ARTERIES:  Normal flow related enhancement of the distal cervical, petrous and cavernous segments of the internal carotid arteries without critical stenosis  Normal ICA terminus  ANTERIOR CIRCULATION:  Patent bilateral A1 segments with mild A1 segment narrowing  Normal anterior communicating artery  Normal flow-related enhancement of the anterior cerebral arteries  MIDDLE CEREBRAL ARTERY CIRCULATION:  The M1 segment and middle cerebral artery branches demonstrate normal flow-related enhancement  DISTAL VERTEBRAL ARTERIES:  Distal vertebral arteries are patient with a normal vertebrobasilar junction  The posterior inferior cerebellar artery origins are normal  BASILAR ARTERY:  Normal  POSTERIOR CEREBRAL ARTERIES:  Both posterior cerebral arteries arises from the basilar tip  Both arteries demonstrate normal flow-related enhancement  Normal posterior communicating arteries  Impression: Unremarkable MR angiogram of the brain  No critical stenosis  No aneurysm  Workstation performed: [id number]     Mri Brain Wo Contrast    Result Date: 2/19/2019  Narrative: MRI BRAIN WITHOUT CONTRAST INDICATION: intermittent confusion ?executive function abnormality  COMPARISON:   CT 2/18/2018, CTA 8/22/2018 TECHNIQUE:  Sagittal T1, axial T2, axial FLAIR, axial T1, axial T2* and axial diffusion imaging  IMAGE QUALITY:  Minor motion related artifact FINDINGS: BRAIN PARENCHYMA:  There is a solitary focus of minimally restricted diffusion, series 3 image 20, within the deep right frontal white matter, thought to represent T2 shine through on ADC maps  No indication of acute ischemia  Moderate degree of chronic small vessel disease is evident  Linear foci of high signal identified within the sulci of the posterior temporal and parietal lobes, right greater than left    This could represent small amount of subarachnoid hemorrhage, inapparent on recent CT exam   Gradient images demonstrate the some linear susceptibility within posterior temporal parietal region on the left  The patient does not provide history of primary tumor  Lumbar puncture, and MR with contrast are suggested  VENTRICLES:  Mildly prominent for age  SELLA AND PITUITARY GLAND:  Normal  ORBITS:  Normal  PARANASAL SINUSES:  Normal  VASCULATURE:  Evaluation of the major intracranial vasculature demonstrates appropriate flow voids  CALVARIUM AND SKULL BASE:  Normal   Degenerative changes bilateral temporomandibular joints  EXTRACRANIAL SOFT TISSUES:  Normal      Impression: High signal material in the sulci of the posterior temporal and parietal lobes, right greater than left  This could represent a small amount of subarachnoid blood, or less likely CSF spread of tumor or infection  Limited Contrasted MR of the brain is suggested  Lumbar puncture to evaluate for cell count, Gram stain, C and S, and cytology suggested  Diffuse generalized volume loss and moderate degree of chronic small vessel disease  The study was marked in [facility] for immediate notification  Workstation performed: [id number]     Mri Brain W Wo Contrast    Result Date: 2/21/2019  Narrative: MRI BRAIN WITH AND WITHOUT CONTRAST INDICATION: Abnormal brain MRI w/o  Afebrile patient with confusion  No history of headache or meningismus  COMPARISON:  MRI of the brain from February 19, 2019 performed at 12:17 AM  TECHNIQUE: Axial FLAIR, T1, postcontrast axial and sagittal T1 and axial postcontrast T1 bravo imaging with coronal reformats  IV Contrast:  7 mL of gadobutrol injection (MULTI-DOSE)  IMAGE QUALITY:   Diagnostic  FINDINGS: BRAIN PARENCHYMA:  Redemonstrated is the abnormal FLAIR signal intensity within the right temporal parietal and left temporal sulci    There is also FLAIR signal abnormality in the posterior fossa along the right cerebellar sulci with T2 hyperintense signal in the right cerebellar pontine angle without adjacent fluid in the right mastoid air cells  Postcontrast imaging demonstrates leptomeningeal enhancement in the right posterior temporal and parietal region  There is no significant leptomeningeal  enhancement within the posterior fossa  There is questionable minimal leptomeningeal enhancement in the left temporal region  There is no enhancement of the ependymal surfaces  Nonspecific periventricular and deep cerebral white matter T2 prolongation is likely the sequela of microangiopathic disease  VENTRICLES:  Normal  SELLA AND PITUITARY GLAND:  Normal  ORBITS:  Normal  PARANASAL SINUSES:  Normal  VASCULATURE:  Evaluation of the major intracranial vasculature demonstrates appropriate flow voids  CALVARIUM AND SKULL BASE:  Normal  EXTRACRANIAL SOFT TISSUES:  Normal      Impression: Persistent FLAIR sulcal signal abnormality in the right more than left temporoparietal and right cerebellar regions with supratentorial leptomeningeal enhancement as discussed above  Findings are suggestive of leptomeningitis, correlate for underlying inflammatory/infectious or neoplastic etiologies  CSF cytology is recommended  Findings were discussed with Dr Tim Lubin at 11:30 AM on Thursday, February 21st 2019  Workstation performed: [id number]     Vas Carotid Complete Study (*order Only If Cta Has Not Been Completed*)    Result Date: 2/20/2019  Narrative:  [facility] REPORT CLINICAL: Indications:  Patient is admitted with cognitive dysfunction,  which is progressing  Risk Factors The patient has history of HTN and HLD  Clinical Right Pressure:  138/79 mm Hg,  FINDINGS:  Right        Impression  PSV  EDV (cm/s)  Direction of Flow  Ratio  Dist  ICA                 76          22                      1 19  Mid  ICA                  74          22                      1 16  Prox   ICA    1 - 49%      66          21                      1 03  Dist CCA                  66          21                            Mid CCA 64          18                      0 91  Prox CCA                  70          14                            Ext Carotid               90          14                      1 40  Prox Vert                 33          11  Antegrade                 Subclavian                77           5                             Left         Impression  PSV  EDV (cm/s)  Direction of Flow  Ratio  Dist  ICA                 73          22                      1 21  Mid  ICA                  72          22                      1 20  Prox  ICA    1 - 49%      81          15                      1 35  Dist CCA                  63          17                            Mid CCA                   60          16                      0 83  Prox CCA                  73          17                            Ext Carotid               91          18                      1 52  Prox Vert                 47          15  Antegrade                 Subclavian               134          10                               CONCLUSION: Impression RIGHT: There is <50% stenosis noted in the internal carotid artery  Plaque is heterogenous  Vertebral artery flow is antegrade  There is no significant subclavian artery disease  LEFT: There is <50% stenosis noted in the internal carotid artery  Plaque is heterogenous  Vertebral artery flow is antegrade  There is no significant subclavian artery disease  Internal carotid artery stenosis determination by consensus criteria from: Langston Dakins , et al  Carotid Artery Stenosis: Gray-Scale and Doppler US Diagnosis - [employer] of Radiologists in [address] [place] [address], Radiology 2003; [phone]  SIGNATURE: Electronically Signed by: Jolly Hicks MD, RPVI on 2019-02-20 11:20:00 AM    Ir Lumbar Puncture    Result Date: 2/21/2019  Narrative: IMAGE-GUIDED LUMBAR PUNCTURE Indication: Confusion Procedure: In the prone position, the lower back was prepped and draped in sterile fashion   1% lidocaine was used for local anesthetic  A suitable location for puncture was identified with fluoroscopy at the L2-3 level  A 20 gauge spinal needle was advanced into the CSF space using fluoroscopic guidance  Gravity drainage was used to fill 4 vials of fluid  The needle was removed and the puncture site was covered with sterile dressing  Fluoroscopy time: 2 7 minutes; Images: 3 Findings: There was return of faintly blood-tinged CSF which cleared rapidly upon entering the thecal sac  4 vials containing a total of 12 cc of CSF were submitted for laboratory analysis as ordered  Impression: Impression: Successful image-guided lumbar puncture Workstation performed: [id number]       Assessment: 1  Remote history of early stage ER positive right breast cancer  2  Local recurrence of right breast cancer, grade 1, ER/WI strongly positive, HER2 negative disease in 2016  3  Meningeal enhancement, 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

## 2019-02-21 NOTE — ASSESSMENT & PLAN NOTE
Pt w/recurrent breast ca s/p mastectomy maintained on anastrazoloe  Continue anastrazole and op surveillance  Follow-up oncology as outpatient

## 2019-02-21 NOTE — PROGRESS NOTES
Progress Note - Jasmin Arguello 1945, 72 y o  female MRN: [id number]    Unit/Bed#: E4 -01 Encounter: [id number]    Primary Care Provider: Isela Kaur DO   Date and time admitted to hospital: 2/18/2019  4:03 PM        * Confusion  Assessment & Plan  Persists, suspected underlying cognitive decline, anxiety, now possible seizure  Admission CT head negative for ischemia/hemorrhage shift or midline mass  MRI of the brain noted for an abnormal finding, will further assess with Contrast MRI and LP (scheduled for 2/21 am)  EEG showing some sharp waves as possible epileptogenic focus  Await further recommendations per Neurology  PT/OT noted - patient's driving needs to be revoked  Patient states she will consider inpatient rehab, but prefers to return home  She would have frequent checks and not 24 hour supervision  Case management following  Left upper arm pain  Assessment & Plan  Patient reports left upper arm pain since Christmas,limiting her ability with ADLs  Limited ROM to left shoulder due to pain  Lidoderm patch  Will consult Orthopedic  Continue PT OT  HLD (hyperlipidemia)  Assessment & Plan  , total cholesterol 246  Patient intolerant to statin  Will start patient on low-dose Tricor  Repeat lipid panel, CMP in 6 weeks  Abnormal finding on MRI of brain  Assessment & Plan  Abnormal findings that appear nonspecific  Plan for MRI brain with contrast and an LP for further workup - will attempt without sedation to avoid worsening of MS  Appreciate Neurology input    Low TSH level  Assessment & Plan  ? If contributing to anxiety and somewhat pressured speech  Lower levothyroxine dosing from 75 to 50 mcg  Repeat TFT's in 4-6 weeks    Iron deficiency anemia  Assessment & Plan  Hemoglobin 10 6 yesterday with mild leukopenia  Recent upper GI bleed last month  Hemoglobin 9 8 on admission  Continue folic acid, add MVI  Repeat CBC today      Atrial fibrillation [facility])  Assessment & Plan  Paroxysmal a fib in NSR  No longer on AC and not a good candidate for [facility] given recent GIB  Continue Metoprolol    History of alcohol use  Assessment & Plan  Alcohol level <3  Unclear if alcohol use is on-going, per significant other the patient has not been drinking since August    Invasive ductal carcinoma of breast, right [facility])  Assessment & Plan  Pt w/recurrent breast ca s/p mastectomy maintained on anastrazoloe  Continue anastrazole and op surveillance    Esophageal reflux disease  Assessment & Plan  W/recent gastric ulcers maintained on PPI  Continue ppi 40mg bid  Outpatient follow up scheduled with GI on     Essential hypertension  Assessment & Plan  Resumed home metoprolol 50mg  Medications recently decreased as BP was running low  Monitor      VTE Pharmacologic Prophylaxis:   Pharmacologic: Pharmacologic VTE Prophylaxis contraindicated due to recent bleeding ulcer  Mechanical VTE Prophylaxis in Place: Yes    Patient Centered Rounds: I have performed bedside rounds with nursing staff today  Discussions with Specialists or Other Care Team Provider: yes    Education and Discussions with Family / Patient: yes    Time Spent for Care: 20 minutes  More than 50% of total time spent on counseling and coordination of care as described above  Current Length of Stay: 3 day(s)    Current Patient Status: Inpatient   Certification Statement: The patient will continue to require additional inpatient hospital stay due to confusion  Discharge Plan: Home vs STR    Code Status: Level 1 - Full Code      Subjective:   Patient reports memory issues since she is retired  Reports left upper arm pain since [place]  Unable to life up left arm due to pain  Denies chest pain, headaches, dizziness, SOB, nausea, vomiting, diarrhea constipation  Denies fever or chills  Denies trouble speaking, trouble swallow  Denies weakness in lower extremities      Objective:     Vitals:   Temp (24hrs), Av 7 °F (36 5 °C), Min:96 °F (35 6 °C), Max:98 6 °F (37 °C)    Temp:  [96 °F (35 6 °C)-98 6 °F (37 °C)] 97 9 °F (36 6 °C)  HR:  [68-90] 68  Resp:  [18] 18  BP: (132-172)/(77-94) 132/94  SpO2:  [94 %-99 %] 98 %  Body mass index is 27 4 kg/m²  Input and Output Summary (last 24 hours): Intake/Output Summary (Last 24 hours) at 2/21/2019 1100  Last data filed at 2/21/2019 1033  Gross per 24 hour   Intake 370 ml   Output --   Net 370 ml       Physical Exam:     Physical Exam   Constitutional: She is oriented to person, place, and time  She appears well-developed and well-nourished  HENT:   Head: Normocephalic and atraumatic  Neck: Normal range of motion  Neck supple  Cardiovascular: Normal rate and regular rhythm  Exam reveals no gallop and no friction rub  No murmur heard  Pulmonary/Chest: Effort normal and breath sounds normal  No stridor  No respiratory distress  She has no wheezes  Abdominal: Soft  Bowel sounds are normal  She exhibits no distension  There is no tenderness  There is no guarding  Musculoskeletal: She exhibits no edema, tenderness or deformity  Left shoulder limited ROM due to pain  Neurological: She is alert and oriented to person, place, and time  Skin: Skin is warm and dry  Psychiatric: She has a normal mood and affect  Nursing note and vitals reviewed  Additional Data:     Labs:    Results from last 7 days   Lab Units 02/20/19  0507   WBC Thousand/uL 4 27*   HEMOGLOBIN g/dL 10 6*   HEMATOCRIT % 32 7*   PLATELETS Thousands/uL 232   NEUTROS PCT % 42*   LYMPHS PCT % 39   MONOS PCT % 16*   EOS PCT % 2     Results from last 7 days   Lab Units 02/20/19  0507 02/18/19  1629   POTASSIUM mmol/L 3 5 3 6   CHLORIDE mmol/L 106 102   CO2 mmol/L 27 31   BUN mg/dL 12 17   CREATININE mg/dL 0 76 0 86   CALCIUM mg/dL 9 4 9 8   ALK PHOS U/L  --  80   ALT U/L  --  28   AST U/L  --  26           * I Have Reviewed All Lab Data Listed Above  * Additional Pertinent Lab Tests Reviewed:  All Labs For Current Hospital Admission Reviewed    Imaging:    Imaging Reports Reviewed Today Include:  None  Imaging Personally Reviewed by Myself Includes:  None    Recent Cultures (last 7 days):           Last 24 Hours Medication List:     Current Facility-Administered Medications:  acetaminophen 650 mg Oral Q6H PRN KG Rizvi   anastrozole 1 mg Oral Daily Ely Pham PA-C   fenofibrate 48 mg Oral Daily KG Foss   folic acid 1 mg Oral Daily Ely Pham PA-C   levothyroxine 50 mcg Oral Early Morning Berta Loera MD   metoprolol succinate 50 mg Oral Daily Berta Loera MD   multivitamin stress formula 1 tablet Oral Daily KG Foss   ondansetron 4 mg Intravenous Q6H PRN Ely Pham PA-C   pantoprazole 40 mg Oral BID AC Ely Pham PA-C   thiamine 100 mg Oral Daily Ely Pham PA-C     Facility-Administered Medications Ordered in Other Encounters:  fentanyl citrate (PF)   PRN Delphy Defalcis, DO    midazolam   PRN Delphy Defalcis, DO    sodium chloride   Continuous PRN Delphy Defalcis, DO Last Rate: Stopped (02/21/19 1033)        Today, Patient Was Seen By: KG Lee    ** Please Note: Dragon 360 Dictation voice to text software may have been used in the creation of this document   **

## 2019-02-21 NOTE — ASSESSMENT & PLAN NOTE
With recent gastric ulcers maintained on PPI  Continue ppi 40mg bid  Outpatient follow up scheduled with GI on 2/28

## 2019-02-21 NOTE — ANESTHESIA PREPROCEDURE EVALUATION
Review of Systems/Medical History  Patient summary reviewed        Cardiovascular  Negative cardio ROS Hyperlipidemia, Hypertension , Dysrhythmias , atrial fibrillation,    Pulmonary  Negative pulmonary ROS Pneumonia,        GI/Hepatic  Negative GI/hepatic ROS   GERD well controlled,        Negative  ROS        Endo/Other  Negative endo/other ROS History of thyroid disease , hypothyroidism,      GYN  Negative gynecology ROS          Hematology  Negative hematology ROS Anemia ,     Musculoskeletal  Negative musculoskeletal ROS   Arthritis     Neurology  Negative neurology ROS      Psychology   Negative psychology ROS Anxiety,              Physical Exam    Airway    Mallampati score: II  TM Distance: >3 FB  Neck ROM: full     Dental   No notable dental hx     Cardiovascular  Comment: Negative ROS, Rhythm: irregular, Rate: normal, Cardiovascular exam normal    Pulmonary  Pulmonary exam normal Breath sounds clear to auscultation,     Other Findings        Anesthesia Plan  ASA Score- 3     Anesthesia Type- IV sedation with anesthesia with ASA Monitors  Additional Monitors:   Airway Plan:         Plan Factors-    Induction- intravenous  Postoperative Plan-     Informed Consent- Anesthetic plan and risks discussed with patient

## 2019-02-21 NOTE — ASSESSMENT & PLAN NOTE
Hemoglobin 10 6 on 2/21 with mild leukopenia  Recent upper GI bleed last month  Hemoglobin 9 8 on discharge last month  Continue folic acid,MVI  Repeat CBC stable

## 2019-02-21 NOTE — CONSULTS
Consultation - Jailene Knapp 72 y o  female MRN: [id number]  Unit/Bed#: E4 -01 Encounter: [id number]      Assessment/Plan     Assessment:  Left shoulder rotator cuff tendonitis     Plan:  - Ordering inpatient PT for the Left shoulder  -Recommended to the patient that after discharge from the hospital that she should could to do physical therapy  -Discussed with patient that if shoulder pain does not resolve after physical therapy that she should follow-up in our office and we will consider doing a steroid injection or MRI at that time   -Continue ice and analgesics as needed  -weight bearing as tolerated to Left upper extremity    History of Present Illness   Physician Requesting Consult: Franko Gurrola MD  Reason for Consult / Principal Problem: Left shoulder pain  HPI: Elo Sampson is a 72y o  year old female who present to  ED on 2/18/19 for altered mental status  Ortho was consulted for Left shoulder pain  Patient was seen at bedside and stated that her shoulder has been bothering her since [place] with baking cookies  She stated that over head movement and putting on clothing is when her pain is worse  The pain is localized to the anterior and lateral aspect of the shoulder  She stated that she has not done anything for the pain, she denies the use of analgesics, previous steroid injection, and Physical therapy  She stated that her family doctor did am xray of her shoulder in the past and it showed arthritis  In her chart there is record of a fall in July, but she stated that her shoulder pain began before the fall  Inpatient consult to Orthopedic Surgery  Consult performed by: Cristiana Millard PA-C  Consult ordered by: KG Jacob          Review of Systems   Constitutional: Negative for chills and fever  HENT: Negative  Respiratory: Negative for shortness of breath  Cardiovascular: Negative for chest pain and leg swelling     Musculoskeletal: Positive for arthralgias  Skin: Negative  Neurological: Negative for numbness  Psychiatric/Behavioral: Positive for confusion  Historical Information   Past Medical History:   Diagnosis Date    Abnormal CT of the abdomen     Acute bronchitis     Anxiety     Appetite loss     Arthritis     Ascending aortic aneurysm (HCC)     Bilateral renal cysts     Cyst of ovary     Dysphagia     Esophageal reflux disease     Fat necrosis of breast     Full dentures     GERD (gastroesophageal reflux disease)     Herpes zoster     History of radiation therapy     Hyperlipidemia     Hypertension     Hypokalemia     Hypomagnesemia     Hypothyroidism     Impaired fasting glucose     Irritable bowel syndrome (IBS)     Ischemic colitis (HCC)     Knee pain     Limb alert care status     no BP/IV right arm    Osteoarthritis of right knee     Pneumonia     Post-op pain     Pulmonary nodules     Thoracic aortic aneurysm (HCC)     Unspecified ovarian cysts     Use of anastrozole (Arimidex)     Vasovagal syncope     Vitamin D deficiency     Wears glasses     Weight loss      Past Surgical History:   Procedure Laterality Date    APPENDECTOMY      pt states it was not removed    BREAST BIOPSY Right 03/02/2016    BREAST LUMPECTOMY Right 2004    BREAST SURGERY Right     Single lesion excision 1990 hyperplasia, 1st biopsy at 25yrs old was benign    CHOLECYSTECTOMY      COLONOSCOPY      COLONOSCOPY N/A 5/12/2017    Procedure: COLONOSCOPY with biopsy;  Surgeon: Becca Martinez MD;  Location: [facility]; Service:     ESOPHAGOGASTRODUODENOSCOPY N/A 1/19/2019    Procedure: ESOPHAGOGASTRODUODENOSCOPY (EGD) with 4cc of epinephrine injection and cauterized with gold probe; Surgeon: Eleonora Velasco MD;  Location: [facility];   Service: Gastroenterology    HYSTERECTOMY      KNEE SURGERY Right     DE BIOPSY/EXCISION, LYMPH NODE(S) Right 4/12/2016    Procedure: BIOPSY LYMPH NODE SENTINEL @ 1200 LYMPHOSCINTIGRAPHY LYMPHATIC MAPPING;  Surgeon: Anson Paulson MD;  Location: [facility];  Service: General    OK MASTECTOMY, SIMPLE, COMPLETE Right 4/12/2016    Procedure: MASTECTOMY SIMPLE;  Surgeon: Anson Paulson MD;  Location: [facility];  Service: General    TUBAL LIGATION      US GUIDED BREAST BIOPSY RIGHT COMPLETE Right 3/9/2016     Social History   Social History     Substance and Sexual Activity   Alcohol Use Not Currently     Social History     Substance and Sexual Activity   Drug Use No     Social History     Tobacco Use   Smoking Status Never Smoker   Smokeless Tobacco Never Used   Tobacco Comment    not interested     Family History: non-contributory    Meds/Allergies   all current active meds have been reviewed  Allergies   Allergen Reactions    Demerol [Meperidine] GI Intolerance and Other (See Comments)     Other reaction(s): Other (See Comments)  severe nausea  severe nausea  Nausea/vomiting    Nitroglycerin Anaphylaxis and Other (See Comments)     BP drops drastically    Lipitor [Atorvastatin] Other (See Comments)     Joint/muscle pain    Zocor [Simvastatin] Other (See Comments)     Joint/muscle pain       Objective   Vitals: Blood pressure 150/91, pulse 67, temperature 97 6 °F (36 4 °C), temperature source Tympanic, resp  rate 18, height 5' 4" (1 626 m), weight 72 4 kg (159 lb 9 8 oz), SpO2 96 %  ,Body mass index is 27 4 kg/m²  Intake/Output Summary (Last 24 hours) at 2/21/2019 1309  Last data filed at 2/21/2019 1033  Gross per 24 hour   Intake 250 ml   Output --   Net 250 ml     I/O last 24 hours: In: 56 [P O :480; I V :10]  Out: -     Invasive Devices     Peripheral Intravenous Line            Peripheral IV 02/18/19 Left Antecubital 2 days                Physical Exam  Ortho Exam   Left Shoulder Exam  Alignment / Posture:  Normal shoulder posture  Inspection:  No swelling  No edema  No erythema  No ecchymosis  Palpation:  Moderate tenderness at the anterior and lateral aspect of the humeral head  Yasmine Blend  No effusion  No warmth  No crepitus  ROM:  Shoulder   Shoulder ER 60  Shoulder IR 0  Strength:  Supraspinatus 4/5  Infraspinatus 5/5  Subscapularis 4/5  Left weaker than right on comparsion Biceps 5/5  Stability:  Not tested  Tests: (+) Marshall  (+) Neer  (-) Drop arm  (-) Belly press  (-) Speed  (-) Maxie  Neurovascular:  Sensation intact in Ax/R/M/U nerve distributions  Fingers warm and perfused  Lab Results: I have personally reviewed pertinent lab results  Imaging Studies: I have personally reviewed pertinent films in PACS and there is minimal osteoarthritis in the Left shoulder, with osteophyte formation  EKG, Pathology, and Other Studies: I have personally reviewed pertinent reports      VTE Prophylaxis: No reaso pharmacologic prophylaxis at this time    Code Status: Level 1 - Full Code  Advance Directive and Living Will: Yes    Power of :    POLST:

## 2019-02-21 NOTE — CONSULTS
Consultation - Itzel Knapp 72 y o  female MRN: [id number]  Unit/Bed#: E4 -01 Encounter: [id number]      Assessment/Plan  1  Confusion/encephalopathy  Alert and orient x3, baseline  Periods of forgetfulness and remembering how to carry out tasks  Continue with neuro workup   MRI shows suggestive leptomeningitis  Neurology eval done 02/19/2019  with delayed recall 1/3, inability to spell the word world backwards, unable to perform simple calculations and unable to state the year  Unable to perform mini cog today secondary to post sedation of MRI  Recommend check vitamin B12, iron panel, folate  TSH level low, seen by endocrine and Synthroid was reduced  Recommend follow-up at [facility] for formal comprehensive assessment and supportive resources after resolution of infectious etiology for work up of cognitive decline    2  Ambulatory dysfunction  Patient with history of falls with injury, orthostatic hypotension, syncopal episodes  Recommend PT/OT  Fall precautions    3  Pain Chronic due to  Cervical fractures, and left shoulder  Multiple fractures, C2, C5, C7  Sustained in 7/24/2018 status post fall from syncopal event with amnesia  Patient was taking naproxen OTC at home  Recommend discontinuation of naproxen  Start acetaminophen 975 mg p o  Q 8 hours scheduled  Lidoderm patch    4  Insomnia  Do not use over-the-counter medication for sleep  Per patient sleep is  inhibited by pain  Recommend acetaminophen and Lidoderm  Do not use Benadryl, Tylenol p m  Advil p m  Recommend melatonin 3 mg p o  Q h s     5  Orthostatic hypotension  Patient with history of syncopal episodes, orthostatic hypotension  Was discontinued off of Cozaar and metoprolol was reduced from 100 mg to 50 mg  Continue to monitor for orthostatic hypotension    6   Anxiety  Patient with history of anxiety  Thyroid medication adjusted, recommend evaluation as outpatient, if continues  Recommend Zoloft 25 mg p o  Daily, to start as outpatient    7  Delirium precautions  Patient at risk for delirium secondary to age, cognitive decline, poly pharmacy, hospitalization, infection, electrolyte imbalance, dehydration, pain, constipation, urinary retention,head trauma, subdural hematoma, insomnia, immobility    Identify and treat underlying cause    Provide frequent redirection, reorientation, distraction techniques    Avoid deliriogenic medications such as tramadol, benzodiazepines, anticholinergics,  Benadryl  Treat pain, See geriatric pain protocol  Monitor for constipation and urinary retention  Encourage early and frequent moblization, OOB  Encourage Hydration/ Nutrition  Implement sleep hygiene, limit night time interuptions, group activities                  History of Present Illness   Physician Requesting Consult: Mireille Casey MD  Reason for Consult / Principal Problem: confusion  Hx and PE limited by: confusion  HPI: Benito Greer is a 72y o  year old female who presents with confusion  She has a history hypertension, AFib, GI bleed, hypothyroidism, alcohol abuse, breast cancer, proximal AFib, orthostatic hypertension  Per epic she reports increased confusion over the last several months  She lives at home alone  Spoke with Florentino Steward and patient  She is alert and oriented x3  She is independent  She still drives  She takes care of all her own bills and ADLs  She does not use assistive device at home  She has a history of falls  Patient has been struggling with insomnia recently secondary to pain  She has taken over-the-counter medication for insomnia but she can not recall what she was taking  She states it was  not melatonin  She states she has anxiety since she was 18, but does not like to take medication  She wears glasses, denies issues with hearing, has full set upper and lower dentures  She denies issues with constipation, urination, appetite       Inpatient consult to Gerontology  Consult performed by: KG Byrd  Consult ordered by: Sammye Collet, PA-C          Review of Systems   Constitutional: Negative for unexpected weight change  HENT: Negative for hearing loss  Respiratory: Negative for shortness of breath  Cardiovascular: Negative for chest pain  Gastrointestinal: Negative for constipation and diarrhea  Endocrine: Negative for polyuria  Genitourinary: Negative for difficulty urinating  Musculoskeletal: Positive for neck pain  Negative for gait problem  Secondary to fractures   Neurological: Negative for speech difficulty  Psychiatric/Behavioral: Positive for confusion         Historical Information   Past Medical History:   Diagnosis Date    Abnormal CT of the abdomen     Acute bronchitis     Anxiety     Appetite loss     Arthritis     Ascending aortic aneurysm (HCC)     Bilateral renal cysts     Cyst of ovary     Dysphagia     Esophageal reflux disease     Fat necrosis of breast     Full dentures     GERD (gastroesophageal reflux disease)     Herpes zoster     History of radiation therapy     Hyperlipidemia     Hypertension     Hypokalemia     Hypomagnesemia     Hypothyroidism     Impaired fasting glucose     Irritable bowel syndrome (IBS)     Ischemic colitis (HCC)     Knee pain     Limb alert care status     no BP/IV right arm    Osteoarthritis of right knee     Pneumonia     Post-op pain     Pulmonary nodules     Thoracic aortic aneurysm (HCC)     Unspecified ovarian cysts     Use of anastrozole (Arimidex)     Vasovagal syncope     Vitamin D deficiency     Wears glasses     Weight loss      Past Surgical History:   Procedure Laterality Date    APPENDECTOMY      pt states it was not removed    BREAST BIOPSY Right 03/02/2016    BREAST LUMPECTOMY Right 2004    BREAST SURGERY Right     Single lesion excision 1990 hyperplasia, 1st biopsy at 25yrs old was benign    CHOLECYSTECTOMY      COLONOSCOPY      COLONOSCOPY N/A 5/12/2017    Procedure: COLONOSCOPY with biopsy;  Surgeon: Will Barthel, MD;  Location: [facility]; Service:     ESOPHAGOGASTRODUODENOSCOPY N/A 1/19/2019    Procedure: ESOPHAGOGASTRODUODENOSCOPY (EGD) with 4cc of epinephrine injection and cauterized with gold probe; Surgeon: Romeo Gibbs MD;  Location: [facility];   Service: Gastroenterology    HYSTERECTOMY      KNEE SURGERY Right     IL BIOPSY/EXCISION, LYMPH NODE(S) Right 4/12/2016    Procedure: BIOPSY LYMPH NODE SENTINEL @ 1200 LYMPHOSCINTIGRAPHY LYMPHATIC MAPPING;  Surgeon: Reza Pace MD;  Location: [facility];  Service: General    IL MASTECTOMY, SIMPLE, COMPLETE Right 4/12/2016    Procedure: MASTECTOMY SIMPLE;  Surgeon: Reza Pace MD;  Location: [facility];  Service: General    TUBAL LIGATION      US GUIDED BREAST BIOPSY RIGHT COMPLETE Right 3/9/2016     Social History   Social History     Substance and Sexual Activity   Alcohol Use Not Currently     Social History     Substance and Sexual Activity   Drug Use No     Social History     Tobacco Use   Smoking Status Never Smoker   Smokeless Tobacco Never Used   Tobacco Comment    not interested         Family History: non-contributory    Meds/Allergies   Current meds:   Current Facility-Administered Medications   Medication Dose Route Frequency    acetaminophen (TYLENOL) tablet 650 mg  650 mg Oral Q6H PRN    anastrozole (ARIMIDEX) tablet 1 mg  1 mg Oral Daily    fenofibrate (TRICOR) tablet 48 mg  48 mg Oral Daily    folic acid (FOLVITE) tablet 1 mg  1 mg Oral Daily    levothyroxine tablet 50 mcg  50 mcg Oral Early Morning    metoprolol succinate (TOPROL-XL) 24 hr tablet 50 mg  50 mg Oral Daily    multivitamin stress formula tablet 1 tablet  1 tablet Oral Daily    ondansetron (ZOFRAN) injection 4 mg  4 mg Intravenous Q6H PRN    pantoprazole (PROTONIX) EC tablet 40 mg  40 mg Oral BID AC    thiamine (VITAMIN B1) tablet 100 mg  100 mg Oral Daily Facility-Administered Medications Ordered in Other Encounters   Medication Dose Route Frequency    fentanyl citrate (PF) 100 MCG/2ML    PRN    midazolam (VERSED) injection    PRN    sodium chloride 0 9 % infusion    Continuous PRN      Current PTA meds:  Medications Prior to Admission   Medication    anastrozole (ARIMIDEX) 1 mg tablet    levothyroxine 75 mcg tablet    metoprolol succinate (TOPROL-XL) 50 mg 24 hr tablet    pantoprazole (PROTONIX) 40 mg tablet        Allergies   Allergen Reactions    Demerol [Meperidine] GI Intolerance and Other (See Comments)     Other reaction(s): Other (See Comments)  severe nausea  severe nausea  Nausea/vomiting    Nitroglycerin Anaphylaxis and Other (See Comments)     BP drops drastically    Lipitor [Atorvastatin] Other (See Comments)     Joint/muscle pain    Zocor [Simvastatin] Other (See Comments)     Joint/muscle pain       Objective   Vitals: Blood pressure 132/94, pulse 68, temperature 97 9 °F (36 6 °C), temperature source Tympanic, resp  rate 18, height 5' 4" (1 626 m), weight 72 4 kg (159 lb 9 8 oz), SpO2 98 %  ,Body mass index is 27 4 kg/m²  Physical Exam   Constitutional: She appears well-developed and well-nourished  No distress  HENT:   Head: Normocephalic  Eyes: Right eye exhibits no discharge  Left eye exhibits no discharge  Neck: Normal range of motion  Cardiovascular: Normal rate, regular rhythm and normal heart sounds  Pulmonary/Chest: Effort normal    Abdominal: Soft  She exhibits no distension  There is no tenderness  Musculoskeletal: Normal range of motion  She exhibits no edema  Skin: Skin is warm and dry  She is not diaphoretic  Psychiatric: She has a normal mood and affect  Nursing note and vitals reviewed        Lab Results:   Results from last 7 days   Lab Units 02/20/19  0507   WBC Thousand/uL 4 27*   HEMOGLOBIN g/dL 10 6*   HEMATOCRIT % 32 7*   PLATELETS Thousands/uL 232        Results from last 7 days   Lab Units 02/20/19  0507 02/18/19  1629   POTASSIUM mmol/L 3 5 3 6   CHLORIDE mmol/L 106 102   CO2 mmol/L 27 31   BUN mg/dL 12 17   CREATININE mg/dL 0 76 0 86   CALCIUM mg/dL 9 4 9 8   ALK PHOS U/L  --  80   ALT U/L  --  28   AST U/L  --  26       Imaging Studies: I have personally reviewed pertinent reports  EKG, Pathology, and Other Studies: I have personally reviewed pertinent reports      VTE Prophylaxis: Sequential compression device (Venodyne)     Code Status: Level 1 - Full Code

## 2019-02-21 NOTE — ASSESSMENT & PLAN NOTE
, total cholesterol 246  Patient intolerant to statin  Continue Tricor  Repeat lipid panel, CMP in 6 weeks

## 2019-02-21 NOTE — PROGRESS NOTES
Progress Note - Neurology   Nasir Ruiz 72 y o  female MRN: [id number]  Unit/Bed#: E4 -01 Encounter: [id number]    Assessment/Plan  79-year-old female with intermittent episodes of confusion and memory retrieval difficulties  · MRI of brain with contrast - with persistent FLAIR sulcal signal abnormality in the right more than left temporal parietal and right cerebellar regions with supratentorial lepto-meningeal enhancement    -LP for clarification of abnormal enhancement demonstrated on MRI brain  Patient does have history of breast cancer, raising the concern of possible metastatic disease  No fever or leukocytosis to suspect infectious etiologies - no nuchal rigidity noted, non ill appearing     -Routine EEG completed - EEG showed bilateral temporal slowing, excessive theta activity in the waking background, and suspicious F7-T3 maximal sharp waves  Potential epileptic focus in the temporal region, will review with attending need for AED medications    -The patient would also benefit from detailed outpatient bedside neuro cognitive evaluation    - Continue supportive care - continue to monitor for infectious and metabolic derangements    -  Frequent neurological checks    -  Will review with attending physician in detail, attending to see and advise    Subjective:   She reports she has had multiple tests completed - today and that this has made her tired  She reports a mild ha last night, but no today  She denies fever or chills, or neck stiffness  She denies photophobia  She denies new one sided weakness, she does report left shoulder discomfort which she reports is chronic  She does reports she is in the hospital secondary to her being forgetful, and not knowing were her car was parked  She is currently going for her LP  ROS:  She denies ha, cp, sob, palp, nausea or vomiting, or one sided weakness, she denies paraesthesia, she denies any ataxia    She denies fever or chills, she does report at times she is anxious and forgetful  Vitals: Blood pressure 150/91, pulse 67, temperature 97 6 °F (36 4 °C), temperature source Tympanic, resp  rate 18, height 5' 4" (1 626 m), weight 72 4 kg (159 lb 9 8 oz), SpO2 96 %  ,Body mass index is 27 4 kg/m²  Current Facility-Administered Medications:  acetaminophen 650 mg Oral Q6H PRN KG Rizvi   anastrozole 1 mg Oral Daily Ely Pham PA-C   fenofibrate 48 mg Oral Daily KG Foss   folic acid 1 mg Oral Daily Eyl Pham PA-C   levothyroxine 50 mcg Oral Early Morning Margaux Aleman MD   metoprolol succinate 50 mg Oral Daily Margaux Aleman MD   multivitamin stress formula 1 tablet Oral Daily KG Foss   ondansetron 4 mg Intravenous Q6H PRN Ely Pham PA-C   pantoprazole 40 mg Oral BID AC Ely Pham PA-C   thiamine 100 mg Oral Daily Ely Prieto PA-C     Physical Exam:     Vital signs reviewed  Constitutional - in NAD, lying in bed, non ill appearing  HEENT - NC/AT, no nuchal rigidity noted  Neurological    Mental status - the patient is awake alert oriented x 2, to place and person, not to year, she was able to tell me , she had difficult time with spelling world backward, unable to complete calculations, she had no dysarthria however, there was some paraphasic errors noted no maria t aphasia - she was able to recognize objects and read  Flat affect today  Cranial nerves 2 through 12 are intact     Motor - 5/5 upper extremities and lower extremities, there appears to be some mild weakness of the left shoulder region  + drift slight on the left upper  Lowers appear intact  No tremor or fixed deficit noted    Rapid movements are equal bilaterally - in uppers    Sensation - reports decreased touch in the left upper compared to the right       Coordination -  no ataxia noted on finger-to-nose     Toes are downgoing bilaterally    No evidence of seizure activity, no starring or myoclonic jerking noted  Lab, Imaging and other studies:   I have personally reviewed pertinent reports  , CBC:   Results from last 7 days   Lab Units 02/21/19  1153 02/20/19  0507 02/18/19  1629   WBC Thousand/uL 4 24* 4 27* 5 33   RBC Million/uL 3 75* 3 41* 3 91   HEMOGLOBIN g/dL 11 8 10 6* 12 2   HEMATOCRIT % 36 3 32 7* 37 6   MCV fL 97 96 96   PLATELETS Thousands/uL 235 232 249   , BMP/CMP:   Results from last 7 days   Lab Units 02/20/19  0507 02/18/19  1629   SODIUM mmol/L 142 141   POTASSIUM mmol/L 3 5 3 6   CHLORIDE mmol/L 106 102   CO2 mmol/L 27 31   BUN mg/dL 12 17   CREATININE mg/dL 0 76 0 86   CALCIUM mg/dL 9 4 9 8   AST U/L  --  26   ALT U/L  --  28   ALK PHOS U/L  --  80   EGFR ml/min/1 73sq m 79 68   , Vitamin B12:   , HgBA1C:   Results from last 7 days   Lab Units 02/19/19  0616   HEMOGLOBIN A1C % 5 5   , TSH:   Results from last 7 days   Lab Units 02/18/19  1629   TSH 3RD GENERATON uIU/mL 0 262*   , Coagulation:   , Lipid Profile:   Results from last 7 days   Lab Units 02/19/19  0616   HDL mg/dL 48   LDL CALC mg/dL 179*   TRIGLYCERIDES mg/dL 95   , Ammonia:   , Urinalysis:   Results from last 7 days   Lab Units 02/18/19  1719 02/18/19  1708   COLOR UA  yellow Yellow   CLARITY UA   --  Clear   SPEC GRAV UA   --  1 015   PH UA   --  7 0   LEUKOCYTES UA   --  Negative   NITRITE UA   --  Negative   GLUCOSE UA mg/dl  --  Negative   KETONES UA mg/dl  --  Negative   BILIRUBIN UA   --  Negative   BLOOD UA   --  Negative   , Drug Screen:   Results from last 7 days   Lab Units 02/18/19  1705   BARBITURATE UR  Negative   BENZODIAZEPINE UR  Negative   THC UR  Negative   COCAINE UR  Negative   METHADONE URINE  Negative   OPIATE UR  Negative   PCP UR  Negative   , Medication Drug Levels:       Invalid input(s): CARBAMAZEPINE,  PHENOBARB, LACOSAMIDE, OXCARBAZEPINE     Procedure: Xr Chest 2 Views    Result Date: 2/18/2019  Narrative: CHEST INDICATION:   ams   COMPARISON:  July 2014 EXAM PERFORMED/VIEWS:  XR CHEST PA & LATERAL  The frontal view was performed utilizing dual energy radiographic technique  FINDINGS: Cardiomediastinal silhouette appears unremarkable  The lungs are clear  No pneumothorax or pleural effusion  Degenerative disc disease noted in the midthoracic spine  No destructive or sclerotic lesion evident  Prior right mastectomy noted  Impression: No acute cardiopulmonary disease  Workstation performed: [id number]     Procedure: Ct Head Without Contrast    Result Date: 2/18/2019  Narrative: CT BRAIN - WITHOUT CONTRAST INDICATION:   ams  COMPARISON:  CT head 1/19/2019  TECHNIQUE:  CT examination of the brain was performed  In addition to axial images, coronal 2D reformatted images were created and submitted for interpretation  Radiation dose length product (DLP) for this visit:  987 mGy-cm   This examination, like all CT scans performed in the [facility], was performed utilizing techniques to minimize radiation dose exposure, including the use of iterative reconstruction and automated exposure control  IMAGE QUALITY:  Diagnostic  Moderate FINDINGS: PARENCHYMA:  No intracranial mass, mass effect or midline shift  No CT signs of acute infarction  No acute parenchymal hemorrhage  VENTRICLES AND EXTRA-AXIAL SPACES:  Normal for the patient's age  VISUALIZED ORBITS AND PARANASAL SINUSES:  Unremarkable  CALVARIUM AND EXTRACRANIAL SOFT TISSUES:  Normal      Impression: No acute intracranial abnormality  Workstation performed: [id number]     Procedure: Mra And Or Mrv Head Wo Contrast    Result Date: 2/19/2019  Narrative: MRA BRAIN INDICATION:  mra head/neck; transient executive function abn COMPARISON:  CTA 7/24/2018 TECHNIQUE:  Axial 3-D time-of-flight imaging with 3-D reconstructions  FINDINGS: IMAGE QUALITY:  Diagnostic   ANATOMY INTERNAL CAROTID ARTERIES:  Normal flow related enhancement of the distal cervical, petrous and cavernous segments of the internal carotid arteries without critical stenosis  Normal ICA terminus  ANTERIOR CIRCULATION:  Patent bilateral A1 segments with mild A1 segment narrowing  Normal anterior communicating artery  Normal flow-related enhancement of the anterior cerebral arteries  MIDDLE CEREBRAL ARTERY CIRCULATION:  The M1 segment and middle cerebral artery branches demonstrate normal flow-related enhancement  DISTAL VERTEBRAL ARTERIES:  Distal vertebral arteries are patient with a normal vertebrobasilar junction  The posterior inferior cerebellar artery origins are normal  BASILAR ARTERY:  Normal  POSTERIOR CEREBRAL ARTERIES:  Both posterior cerebral arteries arises from the basilar tip  Both arteries demonstrate normal flow-related enhancement  Normal posterior communicating arteries  Impression: Unremarkable MR angiogram of the brain  No critical stenosis  No aneurysm  Workstation performed: [id number]     Procedure: Mri Brain Wo Contrast    Result Date: 2/19/2019  Narrative: MRI BRAIN WITHOUT CONTRAST INDICATION: intermittent confusion ?executive function abnormality  COMPARISON:   CT 2/18/2018, CTA 8/22/2018 TECHNIQUE:  Sagittal T1, axial T2, axial FLAIR, axial T1, axial T2* and axial diffusion imaging  IMAGE QUALITY:  Minor motion related artifact FINDINGS: BRAIN PARENCHYMA:  There is a solitary focus of minimally restricted diffusion, series 3 image 20, within the deep right frontal white matter, thought to represent T2 shine through on ADC maps  No indication of acute ischemia  Moderate degree of chronic small vessel disease is evident  Linear foci of high signal identified within the sulci of the posterior temporal and parietal lobes, right greater than left  This could represent small amount of subarachnoid hemorrhage, inapparent on recent CT exam   Gradient images demonstrate the some linear susceptibility within posterior temporal parietal region on the left    The patient does not provide history of primary tumor  Lumbar puncture, and MR with contrast are suggested  VENTRICLES:  Mildly prominent for age  SELLA AND PITUITARY GLAND:  Normal  ORBITS:  Normal  PARANASAL SINUSES:  Normal  VASCULATURE:  Evaluation of the major intracranial vasculature demonstrates appropriate flow voids  CALVARIUM AND SKULL BASE:  Normal   Degenerative changes bilateral temporomandibular joints  EXTRACRANIAL SOFT TISSUES:  Normal      Impression: High signal material in the sulci of the posterior temporal and parietal lobes, right greater than left  This could represent a small amount of subarachnoid blood, or less likely CSF spread of tumor or infection  Limited Contrasted MR of the brain is suggested  Lumbar puncture to evaluate for cell count, Gram stain, C and S, and cytology suggested  Diffuse generalized volume loss and moderate degree of chronic small vessel disease  The study was marked in [facility] for immediate notification  Workstation performed: [id number]     Procedure: Mri Brain W Wo Contrast    Result Date: 2/21/2019  Narrative: MRI BRAIN WITH AND WITHOUT CONTRAST INDICATION: Abnormal brain MRI w/o  Afebrile patient with confusion  No history of headache or meningismus  COMPARISON:  MRI of the brain from February 19, 2019 performed at 12:17 AM  TECHNIQUE: Axial FLAIR, T1, postcontrast axial and sagittal T1 and axial postcontrast T1 bravo imaging with coronal reformats  IV Contrast:  7 mL of gadobutrol injection (MULTI-DOSE)  IMAGE QUALITY:   Diagnostic  FINDINGS: BRAIN PARENCHYMA:  Redemonstrated is the abnormal FLAIR signal intensity within the right temporal parietal and left temporal sulci  There is also FLAIR signal abnormality in the posterior fossa along the right cerebellar sulci with T2 hyperintense signal in the right cerebellar pontine angle without adjacent fluid in the right mastoid air cells    Postcontrast imaging demonstrates leptomeningeal enhancement in the right posterior temporal and parietal region  There is no significant leptomeningeal  enhancement within the posterior fossa  There is questionable minimal leptomeningeal enhancement in the left temporal region  There is no enhancement of the ependymal surfaces  Nonspecific periventricular and deep cerebral white matter T2 prolongation is likely the sequela of microangiopathic disease  VENTRICLES:  Normal  SELLA AND PITUITARY GLAND:  Normal  ORBITS:  Normal  PARANASAL SINUSES:  Normal  VASCULATURE:  Evaluation of the major intracranial vasculature demonstrates appropriate flow voids  CALVARIUM AND SKULL BASE:  Normal  EXTRACRANIAL SOFT TISSUES:  Normal      Impression: Persistent FLAIR sulcal signal abnormality in the right more than left temporoparietal and right cerebellar regions with supratentorial leptomeningeal enhancement as discussed above  Findings are suggestive of leptomeningitis, correlate for underlying inflammatory/infectious or neoplastic etiologies  CSF cytology is recommended  Findings were discussed with Dr Daniel Juarez at 11:30 AM on Thursday, February 21st 2019  Workstation performed: [id number]     Procedure: Vas Carotid Complete Study (*order Only If Cta Has Not Been Completed*)    Result Date: 2/20/2019  Narrative:  [facility] REPORT CLINICAL: Indications:  Patient is admitted with cognitive dysfunction,  which is progressing  Risk Factors The patient has history of HTN and HLD  Clinical Right Pressure:  138/79 mm Hg,  FINDINGS:  Right        Impression  PSV  EDV (cm/s)  Direction of Flow  Ratio  Dist  ICA                 76          22                      1 19  Mid  ICA                  74          22                      1 16  Prox   ICA    1 - 49%      66          21                      1 03  Dist CCA                  66          21                            Mid CCA                   64          18                      0 91  Prox CCA                  70          14                            Ext Carotid 90          14                      1 40  Prox Vert                 33          11  Antegrade                 Subclavian                77           5                             Left         Impression  PSV  EDV (cm/s)  Direction of Flow  Ratio  Dist  ICA                 73          22                      1 21  Mid  ICA                  72          22                      1 20  Prox  ICA    1 - 49%      81          15                      1 35  Dist CCA                  63          17                            Mid CCA                   60          16                      0 83  Prox CCA                  73          17                            Ext Carotid               91          18                      1 52  Prox Vert                 47          15  Antegrade                 Subclavian               134          10                               CONCLUSION: Impression RIGHT: There is <50% stenosis noted in the internal carotid artery  Plaque is heterogenous  Vertebral artery flow is antegrade  There is no significant subclavian artery disease  LEFT: There is <50% stenosis noted in the internal carotid artery  Plaque is heterogenous  Vertebral artery flow is antegrade  There is no significant subclavian artery disease  Internal carotid artery stenosis determination by consensus criteria from: Sarita Perez et al  Carotid Artery Stenosis: Gray-Scale and Doppler US Diagnosis - [employer] of [employer]iologists in [address] [place] [address], Radiology 2003; [phone]  SIGNATURE: Electronically Signed by: Kirby Montoya MD, RPVI on 2019-02-20 11:20:00 AM    Counseling / Coordination of Care  Total time spent today 25 minutes  Greater than 50% of total time was spent with the patient and / or family counseling and / or coordination of care   A description of the counseling / coordination of care: floor time, reviewing records, physical examination

## 2019-02-21 NOTE — ASSESSMENT & PLAN NOTE
Paroxysmal a fib in NSR  No longer on AC and not a good candidate for [facility] given recent GIB  Continue Metoprolol

## 2019-02-21 NOTE — ASSESSMENT & PLAN NOTE
Paroxysmal a fib in NSR  No longer on AC and not a good candidate for [facility] given recent GIB  Continue Metoprolol

## 2019-02-21 NOTE — ASSESSMENT & PLAN NOTE
Patient reports left upper arm pain since Jacqui,limiting her ability with ADLs  Limited ROM to left shoulder due to pain  Will consult Orthopedic  Continue PT OT

## 2019-02-21 NOTE — ASSESSMENT & PLAN NOTE
Improving  suspected underlying cognitive decline, anxiety, now possible seizure  Patient alert and oriented x3, follows commands on exam   Admission CT head negative for ischemia/hemorrhage shift or midline mass  MRI of the brain noted for an abnormal finding on admission  EEG showing some sharp waves as possible epileptogenic focus  Repeat MRI of the brain with without contrast yesterday showed persistent FLAIR sulcal signal abnormality in the right more than left temporoparietal and right cerebellar regions with supratentorial leptomeningeal enhancement,  findings are suggestive of leptomeningitis, correlate for underlying   inflammatory/infectious or neoplastic etiologies  CSF cytology was recommended  LP yesterday showed elevated protein, normal glucose, normal white count, Gram stain negative  CSF cytology and flow cytometry are pending  Neurology not inclined to start anticonvulsant prophylaxis considering abnormal EEG findings, but would advise repeat EEG as outpatient  Patient seen by geriatric service  Recommended checking B12, iron panel, folate which are all pending  Recommending follow-up in office for formal comprehensive assessment and supportive services  PT/OT noted - patient's driving needs to be revoked  Patient is aware of it and agrees not to drive after discharge  PT recommended home with family support, home PT  OT recommended 24 hour supervision/assist   Patient refused short-term rehab  Would like to go home with support from friend/daughter  Friend/daughter will stay with patient until patient seen by PCP in 1 week  Patient is stable for discharge from Neurology standpoint  Will follow up in office in 4 weeks  Neurology office will call patient to set up appointment in 4 weeks  Follow-up oncology in 2 weeks  Follow-up PCP in 1 week  Follow-up geriatric in 2 weeks  Communicated with Neurology and Oncology regarding discharge    Patient will likely require additional spinal tap as outpatient which can be set up through IR as outpatient  Neurology will follow up on additional LP if needed

## 2019-02-22 VITALS
HEART RATE: 80 BPM | HEIGHT: 64 IN | SYSTOLIC BLOOD PRESSURE: 142 MMHG | WEIGHT: 159.61 LBS | BODY MASS INDEX: 27.25 KG/M2 | OXYGEN SATURATION: 98 % | RESPIRATION RATE: 18 BRPM | DIASTOLIC BLOOD PRESSURE: 87 MMHG | TEMPERATURE: 97.8 F

## 2019-02-22 LAB
ANION GAP SERPL CALCULATED.3IONS-SCNC: 7 MMOL/L (ref 4–13)
BASOPHILS # BLD AUTO: 0.04 THOUSANDS/ΜL (ref 0–0.1)
BASOPHILS NFR BLD AUTO: 1 % (ref 0–1)
BUN SERPL-MCNC: 18 MG/DL (ref 5–25)
CALCIUM SERPL-MCNC: 9.6 MG/DL (ref 8.3–10.1)
CHLORIDE SERPL-SCNC: 104 MMOL/L (ref 100–108)
CO2 SERPL-SCNC: 26 MMOL/L (ref 21–32)
CREAT SERPL-MCNC: 0.68 MG/DL (ref 0.6–1.3)
EOSINOPHIL # BLD AUTO: 0.12 THOUSAND/ΜL (ref 0–0.61)
EOSINOPHIL NFR BLD AUTO: 2 % (ref 0–6)
ERYTHROCYTE [DISTWIDTH] IN BLOOD BY AUTOMATED COUNT: 12.6 % (ref 11.6–15.1)
FERRITIN SERPL-MCNC: 55 NG/ML (ref 8–388)
FOLATE SERPL-MCNC: 19.1 NG/ML (ref 3.1–17.5)
GFR SERPL CREATININE-BSD FRML MDRD: 88 ML/MIN/1.73SQ M
GLUCOSE SERPL-MCNC: 95 MG/DL (ref 65–140)
HCT VFR BLD AUTO: 35.1 % (ref 34.8–46.1)
HGB BLD-MCNC: 11.5 G/DL (ref 11.5–15.4)
IMM GRANULOCYTES # BLD AUTO: 0.02 THOUSAND/UL (ref 0–0.2)
IMM GRANULOCYTES NFR BLD AUTO: 0 % (ref 0–2)
IRON SATN MFR SERPL: 14 %
IRON SERPL-MCNC: 61 UG/DL (ref 50–170)
LYMPHOCYTES # BLD AUTO: 1.9 THOUSANDS/ΜL (ref 0.6–4.47)
LYMPHOCYTES NFR BLD AUTO: 34 % (ref 14–44)
MAGNESIUM SERPL-MCNC: 1.9 MG/DL (ref 1.6–2.6)
MCH RBC QN AUTO: 31.3 PG (ref 26.8–34.3)
MCHC RBC AUTO-ENTMCNC: 32.8 G/DL (ref 31.4–37.4)
MCV RBC AUTO: 96 FL (ref 82–98)
MONOCYTES # BLD AUTO: 0.68 THOUSAND/ΜL (ref 0.17–1.22)
MONOCYTES NFR BLD AUTO: 12 % (ref 4–12)
NEUTROPHILS # BLD AUTO: 2.79 THOUSANDS/ΜL (ref 1.85–7.62)
NEUTS SEG NFR BLD AUTO: 51 % (ref 43–75)
NRBC BLD AUTO-RTO: 0 /100 WBCS
PLATELET # BLD AUTO: 218 THOUSANDS/UL (ref 149–390)
PMV BLD AUTO: 10.4 FL (ref 8.9–12.7)
POTASSIUM SERPL-SCNC: 4.4 MMOL/L (ref 3.5–5.3)
POTASSIUM SERPL-SCNC: 5.5 MMOL/L (ref 3.5–5.3)
RBC # BLD AUTO: 3.67 MILLION/UL (ref 3.81–5.12)
SODIUM SERPL-SCNC: 137 MMOL/L (ref 136–145)
TIBC SERPL-MCNC: 424 UG/DL (ref 250–450)
VIT B12 SERPL-MCNC: 277 PG/ML (ref 100–900)
WBC # BLD AUTO: 5.55 THOUSAND/UL (ref 4.31–10.16)

## 2019-02-22 PROCEDURE — 82728 ASSAY OF FERRITIN: CPT | Performed by: NURSE PRACTITIONER

## 2019-02-22 PROCEDURE — 82746 ASSAY OF FOLIC ACID SERUM: CPT | Performed by: NURSE PRACTITIONER

## 2019-02-22 PROCEDURE — 83550 IRON BINDING TEST: CPT | Performed by: NURSE PRACTITIONER

## 2019-02-22 PROCEDURE — 85025 COMPLETE CBC W/AUTO DIFF WBC: CPT | Performed by: FAMILY MEDICINE

## 2019-02-22 PROCEDURE — 84132 ASSAY OF SERUM POTASSIUM: CPT | Performed by: NURSE PRACTITIONER

## 2019-02-22 PROCEDURE — 83540 ASSAY OF IRON: CPT | Performed by: NURSE PRACTITIONER

## 2019-02-22 PROCEDURE — 82607 VITAMIN B-12: CPT | Performed by: NURSE PRACTITIONER

## 2019-02-22 PROCEDURE — 80048 BASIC METABOLIC PNL TOTAL CA: CPT | Performed by: FAMILY MEDICINE

## 2019-02-22 PROCEDURE — 99239 HOSP IP/OBS DSCHRG MGMT >30: CPT | Performed by: NURSE PRACTITIONER

## 2019-02-22 PROCEDURE — 83735 ASSAY OF MAGNESIUM: CPT | Performed by: FAMILY MEDICINE

## 2019-02-22 PROCEDURE — 86618 LYME DISEASE ANTIBODY: CPT | Performed by: PHYSICIAN ASSISTANT

## 2019-02-22 PROCEDURE — 99232 SBSQ HOSP IP/OBS MODERATE 35: CPT | Performed by: PSYCHIATRY & NEUROLOGY

## 2019-02-22 RX ORDER — ACETAMINOPHEN 325 MG/1
975 TABLET ORAL EVERY 8 HOURS PRN
Qty: 30 TABLET | Refills: 0 | Status: SHIPPED | OUTPATIENT
Start: 2019-02-22 | End: 2019-05-09

## 2019-02-22 RX ORDER — LANOLIN ALCOHOL/MO/W.PET/CERES
100 CREAM (GRAM) TOPICAL DAILY
Qty: 30 TABLET | Refills: 0 | Status: SHIPPED | OUTPATIENT
Start: 2019-02-23 | End: 2019-05-09

## 2019-02-22 RX ORDER — FENOFIBRATE 48 MG/1
48 TABLET, COATED ORAL DAILY
Qty: 30 TABLET | Refills: 0 | Status: SHIPPED | OUTPATIENT
Start: 2019-02-23 | End: 2019-03-01

## 2019-02-22 RX ORDER — LEVOTHYROXINE SODIUM 0.05 MG/1
50 TABLET ORAL
Qty: 30 TABLET | Refills: 0 | Status: SHIPPED | OUTPATIENT
Start: 2019-02-23 | End: 2019-04-24 | Stop reason: SDUPTHER

## 2019-02-22 RX ORDER — FOLIC ACID 1 MG/1
1 TABLET ORAL DAILY
Qty: 30 TABLET | Refills: 0 | Status: SHIPPED | OUTPATIENT
Start: 2019-02-23 | End: 2019-03-18 | Stop reason: SDUPTHER

## 2019-02-22 RX ADMIN — ANASTROZOLE 1 MG: 1 TABLET, COATED ORAL at 08:52

## 2019-02-22 RX ADMIN — METOPROLOL SUCCINATE 50 MG: 50 TABLET, EXTENDED RELEASE ORAL at 08:52

## 2019-02-22 RX ADMIN — LEVOTHYROXINE SODIUM 50 MCG: 50 TABLET ORAL at 05:56

## 2019-02-22 RX ADMIN — Medication 100 MG: at 08:52

## 2019-02-22 RX ADMIN — PANTOPRAZOLE SODIUM 40 MG: 40 TABLET, DELAYED RELEASE ORAL at 05:57

## 2019-02-22 RX ADMIN — FOLIC ACID 1 MG: 1 TABLET ORAL at 08:52

## 2019-02-22 RX ADMIN — ZINC 1 TABLET: TAB ORAL at 08:52

## 2019-02-22 RX ADMIN — FENOFIBRATE 48 MG: 48 TABLET, FILM COATED ORAL at 08:52

## 2019-02-22 RX ADMIN — PANTOPRAZOLE SODIUM 40 MG: 40 TABLET, DELAYED RELEASE ORAL at 16:34

## 2019-02-22 NOTE — DISCHARGE SUMMARY
Discharge- Elo Sampson 1945, 72 y o  female MRN: [id number]    Unit/Bed#: E4 -01 Encounter: [id number]    Primary Care Provider: Diya Agarwal DO   Date and time admitted to hospital: 2/18/2019  4:03 PM        * Confusion  Assessment & Plan  Improving  suspected underlying cognitive decline, anxiety, now possible seizure  Patient alert and oriented x3, follows commands on exam   Admission CT head negative for ischemia/hemorrhage shift or midline mass  MRI of the brain noted for an abnormal finding on admission  EEG showing some sharp waves as possible epileptogenic focus  Repeat MRI of the brain with without contrast yesterday showed persistent FLAIR sulcal signal abnormality in the right more than left temporoparietal and right cerebellar regions with supratentorial leptomeningeal enhancement,  f indings are suggestive of leptomeningitis, correlate for underlying   inflammatory/infectious or neoplastic etiologies  CSF cytology was recommended  LP yesterday showed elevated protein, normal glucose, normal white count, Gram stain negative  CSF cytology and flow cytometry are pending  Neurology not inclined to start anticonvulsant prophylaxis considering abnormal EEG findings, but would advise repeat EEG as outpatient  Patient seen by geriatric service  Recommended checking B12, iron panel, folate which are all pending  Recommending follow-up in office for formal comprehensive assessment and supportive services  PT/OT noted - patient's driving needs to be revoked  Patient is aware of it and agrees not to drive after discharge  PT recommended home with family support, home PT  OT recommended 24 hour supervision/assist   Patient refused short-term rehab  Would like to go home with support from friend/daughter  Friend/daughter will stay with patient until patient seen by PCP in 1 week  Patient is stable for discharge from Neurology standpoint  Will follow up in office in 4 weeks  Neurology office will call patient to set up appointment in 4 weeks  Follow-up oncology in 2 weeks  Follow-up PCP in 1 week  Follow-up geriatric in 2 weeks  Communicated with Neurology and Oncology regarding discharge  Patient will likely require additional spinal tap as outpatient which can be set up through IR as outpatient  Neurology will follow up on additional LP if needed  Left upper arm pain  Assessment & Plan  Patient reports left upper arm pain since Christmas,limiting her ability with ADLs  Limited ROM to left shoulder due to pain  Likely left shoulder rotator cuff tendonitis  Patient seen by Orthopedic yesterday  Recommended PT  If shoulder pain does not resolve of the physical therapy, she should follow up in office for possible steroid injection or MRI at that time  Tylenol p r n  On on discharge  Home PT OT  Follow-up orthopedic as needed  HLD (hyperlipidemia)  Assessment & Plan  , total cholesterol 246  Patient intolerant to statin  Continue Tricor  Repeat lipid panel, CMP in 6 weeks  Abnormal finding on MRI of brain  Assessment & Plan  Abnormal findings that appear nonspecific  Management as in #1  Low TSH level  Assessment & Plan  ? If contributing to anxiety and somewhat pressured speech  Lower levothyroxine dosing from 75 to 50 mcg  Repeat TFT's in 4-6 weeks    Iron deficiency anemia  Assessment & Plan  Hemoglobin 10 6 on 2/21 with mild leukopenia  Recent upper GI bleed last month  Hemoglobin 9 8 on discharge last month  Continue folic acid,MVI  Repeat CBC stable        Atrial fibrillation (HCC)  Assessment & Plan  Paroxysmal a fib in NSR  No longer on AC and not a good candidate for [facility] given recent GIB  Continue Metoprolol    History of alcohol use  Assessment & Plan  Alcohol level <3  Unclear if alcohol use is on-going, per significant other the patient has not been drinking since August    Invasive ductal carcinoma of breast, right [facility])  Assessment & Plan  Pt w/recurrent breast ca s/p mastectomy maintained on anastrazoloe  Continue anastrazole and op surveillance  Follow-up oncology as outpatient  Esophageal reflux disease  Assessment & Plan  With recent gastric ulcers maintained on PPI  Continue ppi 40mg bid  Outpatient follow up scheduled with GI on 2/28    Essential hypertension  Assessment & Plan  Resumed home metoprolol 50mg  Medications recently decreased as BP was running low  BP stable  Monitor BP at home  Discharging Physician / Practitioner: Dylan Slater  PCP: Dot Mathew DO  Admission Date: 2/18/2019  Discharge Date: 02/22/19    Reason for Admission: Altered Mental Status (Pt returned home from shopping and felt very confused  Pt went to neighbors and they called EMS    Pt having trouble remembering numbers   )        Resolved Problems  Date Reviewed: 2/22/2019    None          Consultations During Hospital Stay:  IP CONSULT TO NEUROLOGY  IP CONSULT TO CASE MANAGEMENT  IP CONSULT TO NUTRITION SERVICES  IP CONSULT TO GERONTOLOGY  IP CONSULT TO ORTHOPEDIC SURGERY  IP CONSULT TO ONCOLOGY    Procedures Performed:     · LP    Significant Findings / Test Results:     · As below  Results from last 7 days   Lab Units 02/22/19  0625 02/21/19  1153 02/20/19  0507   WBC Thousand/uL 5 55 4 24* 4 27*   HEMOGLOBIN g/dL 11 5 11 8 10 6*   PLATELETS Thousands/uL 218 235 232     Results from last 7 days   Lab Units 02/22/19  1143 02/22/19  0625 02/20/19  0507 02/18/19  1629   SODIUM mmol/L  --  137 142 141   POTASSIUM mmol/L 4 4 5 5* 3 5 3 6   CHLORIDE mmol/L  --  104 106 102   CO2 mmol/L  --  26 27 31   BUN mg/dL  --  18 12 17   CREATININE mg/dL  --  0 68 0 76 0 86   CALCIUM mg/dL  --  9 6 9 4 9 8   TOTAL BILIRUBIN mg/dL  --   --   --  0 51   ALK PHOS U/L  --   --   --  80   ALT U/L  --   --   --  28   AST U/L  --   --   --  26         Results from last 7 days   Lab Units 02/18/19  1629   TROPONIN I ng/mL <0 02     Lab Results   Component Value Date/Time    HGBA1C 5 5 02/19/2019 06:16 AM    HGBA1C 5 9 (H) 01/06/2016 08:47 AM     Results from last 7 days   Lab Units 02/21/19  1239 02/18/19  1614   POC GLUCOSE mg/dl 100 118         Blood Culture: No results found for: BLOODCX  Urine Culture: No results found for: URINECX  Sputum Culture: No components found for: SPUTUMCX  Wound Culture: No results found for: [facility]      IR lumbar puncture   Final Result by uJlieta Leon MD (02/21 1437)   Impression: Successful image-guided lumbar puncture            Workstation performed: [id number]         MRI brain w wo contrast   Final Result by Raymund Boxer, MD (02/21 1133)      Persistent FLAIR sulcal signal abnormality in the right more than left temporoparietal and right cerebellar regions with supratentorial leptomeningeal enhancement as discussed above  Findings are suggestive of leptomeningitis, correlate for underlying    inflammatory/infectious or neoplastic etiologies  CSF cytology is recommended  Findings were discussed with Dr Aditya Walton at 11:30 AM on Thursday, February 21st 2019  Workstation performed: [id number]         VAS carotid complete study (*Order only if CTA has not been completed*)   Final Result by Belem Morrow MD (02/20 1120)      MRA and or MRV head wo contrast   Final Result by King Judy MD (02/19 1038)      Unremarkable MR angiogram of the brain  No critical stenosis  No aneurysm  Workstation performed: [id number]         MRI brain wo contrast   Final Result by Yadira Duggan MD (02/19 0072)      High signal material in the sulci of the posterior temporal and parietal lobes, right greater than left  This could represent a small amount of subarachnoid blood, or less likely CSF spread of tumor or infection  Limited Contrasted MR of the brain is    suggested  Lumbar puncture to evaluate for cell count, Gram stain, C and S, and cytology suggested        Diffuse generalized volume loss and moderate degree of chronic small vessel disease  The study was marked in [facility] for immediate notification  Workstation performed: [id number]         CT head without contrast   Final Result by Ayah Jiménez MD (02/18 1732)      No acute intracranial abnormality  Workstation performed: [id number]         XR chest 2 views   ED Interpretation by Jeremiah Hodgkin, MD (02/18 1738)   No acute cardiopulmonary pathology      Final Result by Mercedes Dorantes MD (02/18 2043)      No acute cardiopulmonary disease  Workstation performed: [id number]                Incidental Findings:   · None     Test Results Pending at Discharge (will require follow up):   · B12,folate,iron panel,CSF cytology,CSF VDRL,CSF Lyme IGG/IGM,Leukemia/lymphoma flow cytometry  Outpatient Tests Requested:  · none    Complications:  none    Reason for Admission:   Chief Complaint   Patient presents with    Altered Mental Status     Pt returned home from shopping and felt very confused  Pt went to neighbors and they called EMS  Pt having trouble remembering numbers  Hospital Course:     Claudette Heller is a 72 y o  female patient with a PMH of HTN,GERD,breast cancer,a fib, anemia,hypothyroidism who originally presented to the hospital on 2/18/2019 due to confusion  Unknown clear etiology  MRI brain without contrast was abnormal  Repeat MRI brain with contrast showed persistent FLAIR sulcal signal abnormality in the right more than left temporoparietal and right cerebellar regions with supratentorial leptomeningeal enhancement  Findings were suggestive of leptomeningitis, correlate for underlying   inflammatory/infectious or neoplastic etiologies  CSF cytology was recommended  LP was performed  CSF resulted studies at the time of discharge were negative for infection   CSF cytology,CSF VDRL,CSF Lyme IGG/IGM,Leukemia/lymphoma flow cytometry are still pending at time of discharge,which can be followed up with Neurology/oncology/PCP after discharge  Oncology was consulted for possible meningeal metastatic disease from breast cancer  Waiting for cytology result  EEG was abnormal  Neurology recommended repeat EEG as outpatient,no prophylactic anticonvulsant at present time  Patient was seen by Geriatric service  Recommended checking B12,Folate,iron panel  Results pending at time of discharge  Patient was seen by Orthopedic for left shoulder pain,recommended PT and follow up in office if pain not resolved with PT  Patient was stable from Neurology standpoint to be discharged today  Follow up Neurology in office in 4 weeks  Patient will need additional LP if CSF cytology negative  She would require 3 negative LP for cytology to exclude meningeal carcinomatosis per neurology  Additional LP can be set up through IR as an outpatient with Neurology  Follow up Oncology in 2 weeks  Follow up Geriatric service in 2 weeks  Follow up Ortho as needed for left shoulder pain  Check TSH,free T4,CMP,Lipid panel in 6 weeks after discharge as synthroid dose was decreased and patient was started on Tricor for HLD  Patient was seen by PT OT  Recommended 24 hour supervision, no driving  Patient refused STR  Patient agreeable to go home with VNA and friend/daughter will stay with her until her follow up visit with her PCP in 1 week  Patient agreeable not to drive  Spoke to patient's friend Stella Pickard at bedside,agreeable to stay with patient for next couple of days  Explained to Stella Pickard that certain CSF studies still pending which will need to be followed up with Neurology,Oncology and PCP after discharge  All questions answered  Please see above list of diagnoses and related plan for additional information  Condition at Discharge: good       Discharge Day Visit / Exam:     Subjective:  Patient denies HA,dizziness,CP,SOB,N/V/D,fever,chills  Reports left shoulder pain      Vitals: Blood Pressure: 142/87 (02/22/19 1614)  Pulse: 80 (02/22/19 1614)  Temperature: 97 8 °F (36 6 °C) (02/22/19 1614)  Temp Source: Temporal (02/22/19 1614)  Respirations: 18 (02/22/19 1614)  Height: 5' 4" (162 6 cm) (02/18/19 2015)  Weight - Scale: 72 4 kg (159 lb 9 8 oz) (02/18/19 1607)  SpO2: 98 % (02/22/19 1614)  Exam:   Physical Exam   Constitutional: She is oriented to person, place, and time  She appears well-developed and well-nourished  HENT:   Head: Normocephalic and atraumatic  Neck: Normal range of motion  Neck supple  Cardiovascular: Normal rate and regular rhythm  Exam reveals no gallop and no friction rub  No murmur heard  Pulmonary/Chest: Effort normal and breath sounds normal  No stridor  No respiratory distress  She has no wheezes  Abdominal: Soft  Bowel sounds are normal  She exhibits no distension  There is no tenderness  There is no guarding  Musculoskeletal: Normal range of motion  She exhibits no edema, tenderness or deformity  Neurological: She is alert and oriented to person, place, and time  Skin: Skin is warm and dry  Psychiatric: She has a normal mood and affect  Nursing note and vitals reviewed  Discharge instructions/Information to patient and family:   See after visit summary for information provided to patient and family  Provisions for Follow-Up Care:  See after visit summary for information related to follow-up care and any pertinent home health orders  Disposition:     Home with VNA Services (Reminder: Complete face to face encounter)    Planned Readmission: none     Discharge Statement:  I spent 35 minutes discharging the patient  This time was spent on the day of discharge  I had direct contact with the patient on the day of discharge  Greater than 50% of the total time was spent examining patient, answering all patient questions, arranging and discussing plan of care with patient as well as directly providing post-discharge instructions  Additional time then spent on discharge activities      Discharge Medications:  See after visit summary for reconciled discharge medications provided to patient and family        ** Please Note: This note has been constructed using a voice recognition system **

## 2019-02-22 NOTE — PLAN OF CARE
Problem: OCCUPATIONAL THERAPY ADULT  Goal: Performs self-care activities at highest level of function for planned discharge setting  See evaluation for individualized goals  Description  Treatment Interventions: ADL retraining, Functional transfer training, UE strengthening/ROM, Endurance training, Patient/family training, Equipment evaluation/education, Compensatory technique education, Energy conservation, Activityengagement          See flowsheet documentation for full assessment, interventions and recommendations  Outcome: Progressing  Note:   Limitation: Decreased ADL status, Decreased UE strength, Decreased Safe judgement during ADL, Decreased endurance, Decreased self-care trans, Decreased high-level ADLs  Prognosis: Good  Assessment: Pt was seen for skilled OT with focus on completion of self care tasks, functional transfers, geographical orientation and review of current plan of care  Pt with need for initial redirection due to rapid speech regarding resent testing stating, "They're acting like I'm dying, Do I look like I'm dying"  See above levels of A required for all functional tasks  Pt continues to require a supervised envrironment due to noted cognnitive deficits  Pt without LOB with functional tasks  Pt may benefit from further cognitive retraining        OT Discharge Recommendation: 24 hour supervision/assist

## 2019-02-22 NOTE — PROGRESS NOTES
Progress Note - Neurology   Osei Hughes 72 y o  female MRN: [id number]  Unit/Bed#: E4 -01 Encounter: [id number]        Assessment/Plan :  73 yo female with intermittent episodes of confusion and memory difficulties  - MRI Brain w contrast demonstrating persistent flare single signal abnormality in the right more than left temporal parietal and right cerebellar regions with supratentorial leptomeningeal enhancement  - Routine EEG completed demonstrating bitemporal slowing, excessive theta activity in the waking background, and suspicious F7 to T3 maximal sharp waves  Potential epileptic focus in the temporal region   - LP completed   -CSF studies: total protein 74, glucose 57, RBC 11, WBC 2, no cells seen, Gram stain with rare mononuclear cells no bacteria or polys  VDRL, leukemia/lymphoma flow cytometry, non- gynecologic cytology, lyme index pending  - Oncology consult for suspicion of carcinomatosis in the setting of patient history of right breast cancer    - recommend repeat routine EEG outpatient; order placed in [facility]   - requested outpatient appointment with [facility]note 3[facility] Neurology epileptologist in 4-6 weeks        Subjective:   No acute events overnight  Patient with mild headache overnight, but states she ok now with no complaints  She wants to go home  Vitals: Blood pressure 142/88, pulse 92, temperature 98 5 °F (36 9 °C), temperature source Temporal, resp  rate 18, height 5' 4" (1 626 m), weight 72 4 kg (159 lb 9 8 oz), SpO2 96 %  ,Body mass index is 27 4 kg/m²      MEDS:    Current Facility-Administered Medications:  acetaminophen 650 mg Oral Q6H PRN KG Rizvi   anastrozole 1 mg Oral Daily Ely Pham PA-C   fenofibrate 48 mg Oral Daily KG Foss   folic acid 1 mg Oral Daily Ely Pham PA-C   levothyroxine 50 mcg Oral Early Morning Inna Bryant MD   metoprolol succinate 50 mg Oral Daily Aayush Rajput MD   multivitamin stress formula 1 tablet Oral Daily KG Foss   ondansetron 4 mg Intravenous Q6H PRN Ely Pham PA-C   pantoprazole 40 mg Oral BID AC Ely Pham PA-C   thiamine 100 mg Oral Daily Ely Moctezuma PA-C     Physical Exam   Constitutional: She is oriented to person, place, and time  She appears well-developed and well-nourished  HENT:   Head: Normocephalic and atraumatic  Mouth/Throat: Oropharynx is clear and moist    Neck: Normal range of motion  Cardiovascular: Normal rate, regular rhythm, normal heart sounds and intact distal pulses  Exam reveals no gallop and no friction rub  No murmur heard  Pulmonary/Chest: Effort normal and breath sounds normal  No stridor  No respiratory distress  She has no wheezes  Abdominal: Soft  Bowel sounds are normal  She exhibits no distension  Musculoskeletal: Normal range of motion  She exhibits no edema  Neurological: She is alert and oriented to person, place, and time  She has normal strength  Skin: Skin is warm and dry  Vitals reviewed  Neurologic Exam     Mental Status   Oriented to person, place, and time  Follows simple commands, but requires cuing at times to complete  Mildly confused, does not finish sentences when speaking and loses train of thought  Cranial Nerves     CN II   Right visual field deficit: lower temporal quadrant(s)  Left visual field deficit: upper temporal quadrant(s)    Motor Exam   Muscle bulk: normal  Overall muscle tone: normal  Right arm pronator drift: absent  Left arm pronator drift: absent    Strength   Strength 5/5 throughout  Sensory Exam   Light touch normal        Lab Results:   I have personally reviewed pertinent reports      CBC:   Results from last 7 days   Lab Units 02/22/19  0625 02/21/19  1153 02/20/19  0507   WBC Thousand/uL 5 55 4 24* 4 27*   RBC Million/uL 3 67* 3 75* 3 41*   HEMOGLOBIN g/dL 11 5 11 8 10 6*   HEMATOCRIT % 35 1 36 3 32 7*   MCV fL 96 97 96   PLATELETS Thousands/uL 218 235 232   , BMP/CMP: Results from last 7 days   Lab Units 02/22/19  0625 02/20/19  0507 02/18/19  1629   SODIUM mmol/L 137 142 141   POTASSIUM mmol/L 5 5* 3 5 3 6   CHLORIDE mmol/L 104 106 102   CO2 mmol/L 26 27 31   BUN mg/dL 18 12 17   CREATININE mg/dL 0 68 0 76 0 86   CALCIUM mg/dL 9 6 9 4 9 8   AST U/L  --   --  26   ALT U/L  --   --  28   ALK PHOS U/L  --   --  80   EGFR ml/min/1 73sq m 88 79 68   HgBA1C:   Results from last 7 days   Lab Units 02/19/19  0616   HEMOGLOBIN A1C % 5 5    TSH:   Results from last 7 days   Lab Units 02/18/19  1629   TSH 3RD GENERATON uIU/mL 0 262*    Lipid Profile:   Results from last 7 days   Lab Units 02/19/19  0616   HDL mg/dL 48   LDL CALC mg/dL 179*   TRIGLYCERIDES mg/dL 95    Urinalysis:   Results from last 7 days   Lab Units 02/18/19  1719 02/18/19  1708   COLOR UA  yellow Yellow   CLARITY UA   --  Clear   SPEC GRAV UA   --  1 015   PH UA   --  7 0   LEUKOCYTES UA   --  Negative   NITRITE UA   --  Negative   GLUCOSE UA mg/dl  --  Negative   KETONES UA mg/dl  --  Negative   BILIRUBIN UA   --  Negative   BLOOD UA   --  Negative   Drug Screen:   Results from last 7 days   Lab Units 02/18/19  1705   BARBITURATE UR  Negative   BENZODIAZEPINE UR  Negative   THC UR  Negative   COCAINE UR  Negative   METHADONE URINE  Negative   OPIATE UR  Negative   PCP UR  Negative     Imaging Studies: I have personally reviewed pertinent reports  and I have personally reviewed pertinent films in PACS     2/21/19 MRI Brain w/wo contrast:  IMPRESSION:   Persistent FLAIR sulcal signal abnormality in the right more than left temporoparietal and right cerebellar regions with supratentorial leptomeningeal enhancement as discussed above  Findings are suggestive of leptomeningitis, correlate for underlying   inflammatory/infectious or neoplastic etiologies  CSF cytology is recommended  2/19/19 MRA head and neck:  IMPRESSION:   Unremarkable MR angiogram of the brain  No critical stenosis  No aneurysm      2/19/19 MRI Brain wo contrast:   IMPRESSION:   High signal material in the sulci of the posterior temporal and parietal lobes, right greater than left  This could represent a small amount of subarachnoid blood, or less likely CSF spread of tumor or infection  Limited Contrasted MR of the brain is   suggested  Lumbar puncture to evaluate for cell count, Gram stain, C and S, and cytology suggested      Diffuse generalized volume loss and moderate degree of chronic small vessel disease        EEG, Pathology, and Other Studies: I have personally reviewed pertinent reports  and I have personally reviewed pertinent films in PACS    VTE Prophylaxis: Sequential compression device (Venodyne)      Counseling / Coordination of Care  Total time spent today 30 minutes  Greater than 50% of total time was spent with the patient and / or family counseling and / or coordination of care  A description of the counseling / coordination of care: discussion of diagnostic findings and plan of care with patient and spouse

## 2019-02-22 NOTE — SOCIAL WORK
PADMINI would like pt to have VNA and explained pt has refused  TC to pt's friend Lindsay Squires and he can stay over night a couple of nights when pt is discharge  Lindsay Squires gave this CM the correct phone number of pt's dtr Hannah Sevlila (Malinda Emely) at [phone]  Phone number was corrected on face sheet  TC to dtr aHnnah Sevilla and she stated her Mother is hard headed and if she does not want VNA then she can refuse  Met with pt and PADMINI, pt is now agreeable to VNA for RN, PT & OT  Referral made to [facility] and they have accepted the case  The start of care will not be until Tuesday 2/26/19  Lindsay Squires stated he can transport pt home

## 2019-02-22 NOTE — OCCUPATIONAL THERAPY NOTE
Occupational Therapy Treatment Note:         02/21/19 3890   Restrictions/Precautions   Weight Bearing Precautions Per Order No   Other Precautions Fall Risk;Pain;Telemetry;Multiple lines   ADL   Where Assessed Edge of bed   Grooming Assistance 5  Supervision/Setup   Grooming Deficit Setup   Toileting Assistance  5  Supervision/Setup   Toileting Deficit Setup   Functional Standing Tolerance   Time 15 mins   Activity functional mobility  Comments Pt without need for AD  able to tolerate activity without LOB or increased fatigue levels  Bed Mobility   Supine to Sit 5  Supervision   Additional items Assist x 1   Sit to Supine 5  Supervision   Additional items Assist x 1   Transfers   Sit to Stand 5  Supervision   Additional items Assist x 1   Stand to Sit 5  Supervision   Additional items Assist x 1   Stand pivot 5  Supervision   Additional items Assist x 1   Toilet transfer 5  Supervision   Additional items Assist x 1   Additional Comments Pt able to locate her room without need for redirection    Functional Mobility   Functional Mobility 5  Supervision   Additional Comments x1   Toilet Transfers   Toilet Transfer From Bed   Toilet Transfer Type To and from   Toilet Transfer to Standard toilet   Toilet Transfer Technique Stand pivot; Ambulating   Toilet Transfers Verbal cues; Supervision   Cognition   Overall Cognitive Status Impaired   Arousal/Participation Alert; Cooperative   Attention Attends with cues to redirect   Orientation Level Oriented X4   Memory Decreased short term memory;Decreased recall of recent events;Decreased recall of precautions   Following Commands Follows multistep commands with increased time or repetition   Additional Activities   Additional Activities Other (Comment)  (Reviewed current plan of care  )   Additional Activities Comments Pt with need for further review  Pt with increased verbalization and need for redirection with conversation      Activity Tolerance   Activity Tolerance Patient tolerated treatment well   Medical Staff Made Aware Reported all findings to nursing staff  Assessment   Assessment Pt was seen for skilled OT with focus on completion of self care tasks, functional transfers, geographical orientation and review of current plan of care  Pt with need for initial redirection due to rapid speech regarding resent testing stating, "They're acting like I'm dying, Do I look like I'm dying"  See above levels of A required for all functional tasks  Pt continues to require a supervised envrironment due to noted cognnitive deficits  Pt without LOB with functional tasks  Pt may benefit from further cognitive retraining  Plan   Treatment Interventions ADL retraining;Functional transfer training; Endurance training;Cognitive reorientation   Goal Expiration Date 03/05/19   Treatment Day 2   OT Frequency 2-3x/wk   Recommendation   OT Discharge Recommendation 24 hour supervision/assist   Barthel Index   Feeding 10   Bathing 0   Grooming Score 5   Dressing Score 5   Bladder Score 10   Bowels Score 10   Toilet Use Score 5   Transfers (Bed/Chair) Score 10   Mobility (Level Surface) Score 10   Stairs Score 0   Barthel Index Score 65   Modified Palmer Scale   Modified Palmer Scale 4   Kathia Thornton, [address]

## 2019-02-22 NOTE — DISCHARGE INSTRUCTIONS
Lumbar Puncture     WHAT YOU NEED TO KNOW:   Lumbar puncture (LP) is a procedure in which a needle is inserted in your back and into your spinal canal  This is usually done to collect cerebrospinal fluid (CSF) to check for an infection, inflammation, bleeding, or other conditions that affect the brain  CSF is a clear, protective fluid that flows around the brain and inside the spinal canal  LP may also be done to remove CSF to reduce pressure in the brain  DISCHARGE INSTRUCTIONS:     Follow up with your healthcare provider as directed: Write down your questions so you remember to ask them during your visits  Post-lumbar puncture headache: You may develop a headache during the first few hours after your LP that may last for several days  The headache may be mild to severe and may get worse when you sit or stand  The following may help ease a post-lumbar puncture headache:  · Drink plenty of liquids: You should drink more liquid than usual after your LP  Ask how much liquid is right for you  Caffeine may be used to treat a headache  Drinks, such as coffee, tea, or some sodas, have caffeine  Ask a Do not drink alcohol  · Lie down: If you have a headache after your lumbar puncture, it may be helpful to lie down and rest   · You may have a slight soreness over the LP area  This is normal   · Remove the band aid or dressing in 24 hours  · Contact Interventional Radiology imediately  at [phone] [place] PATIENTS: Contact Interventional Radiology at [phone]) Jorge Hoffman PATIENTS: Contact Interventional Radiology at [phone]) if any of the following occur:  · You have a severe headache that does not get better after you lie down  · Persistent nausea or vomiting   · You have a fever  · You have a stiff neck or have trouble thinking clearly  · Your legs, feet, or other parts below the waist feel numb, tingly, or weak     · You have bleeding or a discharge coming from the area where the needle was put into your back  · You have severe pain in your back or neck  Follow-up oncologist in 2 weeks  Follow-up Neurology in 4 weeks  Office will call you to set up appointment  Follow-up geriatric in 2 weeks  Follow-up PCP in 1 week  Follow-up CSF cytology result with Neurology and Oncology  Follow-up orthopedic as needed for left shoulder pain  Check TSH, free T4, CMP, lipid panel in 6 weeks  Please discussed with PCP  Do not use Benadryl, Tylenol p m , Advil p m , naproxen at home     Take over-the-counter plain Tylenol for left shoulder pain  Take over-the-counter melatonin 3 mg at bedtime for insomnia  May start Zoloft 25 mg p o  Daily for anxiety  -- recommendations from geriatric service  Please discuss with PCP about starting Zoloft  Take over-the-counter multivitamin

## 2019-02-22 NOTE — PLAN OF CARE
Problem: Potential for Falls  Goal: Patient will remain free of falls  Description  INTERVENTIONS:  - Assess patient frequently for physical needs  -  Identify cognitive and physical deficits and behaviors that affect risk of falls    -  [place] fall precautions as indicated by assessment   - Educate patient/family on patient safety including physical limitations  - Instruct patient to call for assistance with activity based on assessment  - Modify environment to reduce risk of injury  - Consider OT/PT consult to assist with strengthening/mobility  Outcome: Progressing     Problem: PAIN - ADULT  Goal: Verbalizes/displays adequate comfort level or baseline comfort level  Description  Interventions:  - Encourage patient to monitor pain and request assistance  - Assess pain using appropriate pain scale  - Administer analgesics based on type and severity of pain and evaluate response  - Implement non-pharmacological measures as appropriate and evaluate response  - Consider cultural and social influences on pain and pain management  - Notify physician/advanced practitioner if interventions unsuccessful or patient reports new pain  Outcome: Progressing     Problem: INFECTION - ADULT  Goal: Absence or prevention of progression during hospitalization  Description  INTERVENTIONS:  - Assess and monitor for signs and symptoms of infection  - Monitor lab/diagnostic results  - Administer medications as ordered  - Instruct and encourage patient and family to use good hand hygiene technique  - Identify and instruct in appropriate isolation precautions for identified infection/condition   Outcome: Progressing     Problem: SAFETY ADULT  Goal: Maintain or return to baseline ADL function  Description  INTERVENTIONS:  -  Assess patient's ability to carry out ADLs; assess patient's baseline for ADL function and identify physical deficits which impact ability to perform ADLs (bathing, care of mouth/teeth, toileting, grooming, dressing, etc )  - Assess/evaluate cause of self-care deficits   - Assess range of motion  - Assess patient's mobility; develop plan if impaired  - Assess patient's need for assistive devices and provide as appropriate  - Encourage maximum independence but intervene and supervise when necessary  ¯ Involve family in performance of ADLs  ¯ Assess for home care needs following discharge   ¯ Request OT consult to assist with ADL evaluation and planning for discharge  ¯ Provide patient education as appropriate  Outcome: Progressing  Goal: Maintain or return mobility status to optimal level  Description  INTERVENTIONS:  - Assess patient's baseline mobility status (ambulation, transfers, stairs, etc )    - Identify cognitive and physical deficits and behaviors that affect mobility  - Identify mobility aids required to assist with transfers and/or ambulation (gait belt, sit-to-stand, lift, walker, cane, etc )  - [place] fall precautions as indicated by assessment  - Record patient progress and toleration of activity level on Mobility SBAR; progress patient to next Phase/Stage  - Instruct patient to call for assistance with activity based on assessment  - Request Rehabilitation consult to assist with strengthening/weightbearing, etc   Outcome: Progressing     Problem: DISCHARGE PLANNING  Goal: Discharge to home or other facility with appropriate resources  Description  INTERVENTIONS:  - Identify barriers to discharge w/patient and caregiver  - Arrange for needed discharge resources and transportation as appropriate  - Identify discharge learning needs (meds, wound care, etc )  - Arrange for interpretive services to assist at discharge as needed  - Refer to Case Management Department for coordinating discharge planning if the patient needs post-hospital services based on physician/advanced practitioner order or complex needs related to functional status, cognitive ability, or social support system  Outcome: Progressing Problem: Knowledge Deficit  Goal: Patient/family/caregiver demonstrates understanding of disease process, treatment plan, medications, and discharge instructions  Description  Complete learning assessment and assess knowledge base  Interventions:  - Provide teaching at level of understanding  - Provide teaching via preferred learning methods  Outcome: Progressing     Problem: Neurological Deficit  Goal: Neurological status is stable or improving  Description  Interventions:  - Monitor and assess patient's level of consciousness, motor function, sensory function, and level of assistance needed for ADLs  - Monitor and report changes from baseline  Collaborate with interdisciplinary team to initiate plan and implement interventions as ordered  - Provide and maintain a safe environment  - Utilize fall precautions  - Utilize aspiration precautions  - Utilize bleeding precautions  Outcome: Progressing     Problem: Communication Impairment  Goal: Ability to express needs and understand communication  Description  Assess patient's communication skills and ability to understand information  Patient will demonstrate use of effective communication techniques, alternative methods of communication and understanding even if not able to speak  - Encourage communication and provide alternate methods of communication as needed  - Collaborate with case management/ for discharge needs  - Include patient/family/caregiver in decisions related to communication  Outcome: Progressing     Problem: Nutrition/Hydration-ADULT  Goal: Nutrient/Hydration intake appropriate for improving, restoring or maintaining nutritional needs  Description  Monitor and assess patient's nutrition/hydration status for malnutrition (ex- brittle hair, bruises, dry skin, pale skin and conjunctiva, muscle wasting, smooth red tongue, and disorientation)   Collaborate with interdisciplinary team and initiate plan and interventions as ordered  Monitor patient's weight and dietary intake as ordered or per policy  Utilize nutrition screening tool and intervene per policy  Determine patient's food preferences and provide high-protein, high-caloric foods as appropriate  INTERVENTIONS:  - Monitor oral intake, urinary output, labs, and treatment plans  - Assess nutrition and hydration status and recommend course of action  - Evaluate amount of meals eaten  - Assist patient with eating if necessary   - Allow adequate time for meals  - Recommend/ encourage appropriate diets, oral nutritional supplements, and vitamin/mineral supplements  - Order, calculate, and assess calorie counts as needed  - Recommend, monitor, and adjust tube feedings and TPN/PPN based on assessed needs  - Assess need for intravenous fluids  - Provide specific nutrition/hydration education as appropriate  - Include patient/family/caregiver in decisions related to nutrition  Outcome: Progressing     Problem: DISCHARGE PLANNING - CARE MANAGEMENT  Goal: Discharge to post-acute care or home with appropriate resources  Description  INTERVENTIONS:    PT is recommending home PT and pt has refused  OT stated pt she have restricted driving and supervision due to disorientated and decreased short term memory  Pt has refused home VNA  Discussed with pt about [facility] and [facility] and information was given to her       - Conduct assessment to determine patient/family and health care team treatment goals, and need for post-acute services based on payer coverage, community resources, and patient preferences, and barriers to discharge  - Address psychosocial, clinical, and financial barriers to discharge as identified in assessment in conjunction with the patient/family and health care team  - Arrange appropriate level of post-acute services according to patient's   needs and preference and payer coverage in collaboration with the physician and health care team  - Communicate with and update the patient/family, physician, and health care team regarding progress on the discharge plan  - Arrange appropriate transportation to post-acute venues   Outcome: Progressing

## 2019-02-24 LAB — BACTERIA CSF CULT: NO GROWTH

## 2019-02-25 ENCOUNTER — TRANSITIONAL CARE MANAGEMENT (OUTPATIENT)
Dept: FAMILY MEDICINE CLINIC | Facility: CLINIC | Age: 74
End: 2019-02-25

## 2019-02-26 ENCOUNTER — TELEPHONE (OUTPATIENT)
Dept: NEUROLOGY | Facility: CLINIC | Age: 74
End: 2019-02-26

## 2019-02-26 LAB — SCAN RESULT: NORMAL

## 2019-02-26 NOTE — TELEPHONE ENCOUNTER
----- Message from Caron Turk MA sent at 2/25/2019 10:43 AM EST -----  Regarding: FW: HFU  Patient needs EEG with HFU   ----- Message -----  From: KG Pimentel  Sent: 2/22/2019   3:25 PM  To: Neurology William Clenic  Subject: [address] [facility] Neurology [facility], Can you please schedule this patient with General Neuologist Dr Torri Hodge or Dr Angelic Redmond      Diagnosis/Reason for follow-up: altered mental status, memory difficulties, abnormal routine EEG and MRI Brain   Subspecialty for follow-up: General  Existing neurologist: none   Tests/Labs/Imaging ordered: routine EEG   Orders placed electronically: routine EEG   Additional notes: request outpatient appointment with general neurologist in 4-6 weeks     Thank You so much!

## 2019-02-28 ENCOUNTER — HOSPITAL ENCOUNTER (OUTPATIENT)
Dept: NEUROLOGY | Facility: HOSPITAL | Age: 74
Discharge: HOME | End: 2019-02-28
Payer: MEDICARE

## 2019-02-28 ENCOUNTER — OFFICE VISIT (OUTPATIENT)
Dept: GASTROENTEROLOGY | Facility: MEDICAL CENTER | Age: 74
End: 2019-02-28
Payer: MEDICARE

## 2019-02-28 VITALS
DIASTOLIC BLOOD PRESSURE: 74 MMHG | SYSTOLIC BLOOD PRESSURE: 122 MMHG | WEIGHT: 153 LBS | HEART RATE: 60 BPM | BODY MASS INDEX: 26.12 KG/M2 | TEMPERATURE: 99 F | HEIGHT: 64 IN

## 2019-02-28 DIAGNOSIS — K27.9 PEPTIC ULCER DISEASE: ICD-10-CM

## 2019-02-28 DIAGNOSIS — D62 ACUTE BLOOD LOSS ANEMIA: ICD-10-CM

## 2019-02-28 DIAGNOSIS — R41.0 CONFUSION: ICD-10-CM

## 2019-02-28 DIAGNOSIS — K25.4 GASTROINTESTINAL HEMORRHAGE ASSOCIATED WITH GASTRIC ULCER: ICD-10-CM

## 2019-02-28 DIAGNOSIS — K21.9 GASTROESOPHAGEAL REFLUX DISEASE WITHOUT ESOPHAGITIS: Primary | ICD-10-CM

## 2019-02-28 DIAGNOSIS — R41.82 ALTERED MENTAL STATUS: ICD-10-CM

## 2019-02-28 DIAGNOSIS — R90.89 ABNORMAL FINDING ON MRI OF BRAIN: ICD-10-CM

## 2019-02-28 LAB
B BURGDOR IGG CSF IA-ACNC: 0.12 INDEX (ref 0–0.09)
B BURGDOR IGM CSF IA-ACNC: <0.06 INDEX (ref 0–0.06)

## 2019-02-28 PROCEDURE — 95816 EEG AWAKE AND DROWSY: CPT

## 2019-02-28 PROCEDURE — 95816 EEG AWAKE AND DROWSY: CPT | Performed by: PSYCHIATRY & NEUROLOGY

## 2019-02-28 PROCEDURE — 99215 OFFICE O/P EST HI 40 MIN: CPT | Performed by: PHYSICIAN ASSISTANT

## 2019-02-28 NOTE — PATIENT INSTRUCTIONS
EGD scheduled with Dr Eluterio Dancer on 5/16/2019 at the [facility]  Instructions given to patient

## 2019-02-28 NOTE — PROGRESS NOTES
[facility] - Outpatient Follow-up Note  Yeny Gerardo 72 y o  female MRN: [id number]  Encounter: [id number]      ASSESSMENT AND PLAN:    1  Upper GI bleeding  2  Peptic ulcer disease  3  Acute blood loss anemia  4  Confusion  5  GERD              -s/p EGD 1/19/19 revealed 2 ulcers in antrum/pre-pyloric region  1 measured 1cm and had a visible vessel  No active bleeding was seen  Gold probe and epinephrine injection was used  -Her Hgb  improved after discharge from the hospital to 11 5    -She denies any further melena, lightheadedness or dizziness                -continue pantoprazole 40mg PO BID for 8 weeks until repeat EGD, further recommendations to follow  -h pylori stool antigen negative              -Recommend repeat EGD 8 weeks from the previous to ensure healing of ulcers, although CVA was ruled out during her hospital admission on CT would recommend scheduling her procedure in the hospital as the etiology of her confusion was not found, she is having repeat EEG as an outpatient               -She has a history of alcohol abuse but has been sober since August of last year, commended her on this    -Follow up after repeat EGD     ___________________________________________________________    SUBJECTIVE:    82-year-old female past medical history of GERD hypertension hyperlipidemia hypothyroidism presents to the office for follow-up after recent hospital admission accompanied by her  does provide some history  She was initially admitted for epigastric pain and coffee-ground emesis and was found to have 2 ulcers in the antrum which were treated  Her hemoglobin remained stable and she was discharged home  She reports that she has been feeling well from a GI standpoint however she did have another admission, she was just discharged last week  This was for confusion which has since resolved, CT head was negative for CVA    She is still undergoing workup for the cause by Neurology as no clear etiology was identified during her admission  She denies any nausea, vomiting, difficulty swallowing, decrease in appetite, unintended weight loss, hematochezia or jaundice  She notes that her appetite is much improved since starting a PPI  She has been taking the pantoprazole twice daily  Her  notes that she has had decreased appetite over the past year and he is happy to see her eating better  Her last colonoscopy was in May 2017 for lower GI bleeding, revealing ischemic colitis  The preparation was good  Prior to this her last colon polyps at age 60 by Dr Colleen Crockett per the chart  REVIEW OF SYSTEMS IS OTHERWISE NEGATIVE        Historical Information   Past Medical History:   Diagnosis Date    Abnormal CT of the abdomen     Acute bronchitis     Anxiety     Appetite loss     Arthritis     Ascending aortic aneurysm (HCC)     Bilateral renal cysts     Cyst of ovary     Dysphagia     Esophageal reflux disease     Fat necrosis of breast     Full dentures     GERD (gastroesophageal reflux disease)     Herpes zoster     History of radiation therapy     Hyperlipidemia     Hypertension     Hypokalemia     Hypomagnesemia     Hypothyroidism     Impaired fasting glucose     Irritable bowel syndrome (IBS)     Ischemic colitis (HCC)     Knee pain     Limb alert care status     no BP/IV right arm    Osteoarthritis of right knee     Pneumonia     Post-op pain     Pulmonary nodules     Thoracic aortic aneurysm (HCC)     Unspecified ovarian cysts     Use of anastrozole (Arimidex)     Vasovagal syncope     Vitamin D deficiency     Wears glasses     Weight loss      Past Surgical History:   Procedure Laterality Date    APPENDECTOMY      pt states it was not removed    BREAST BIOPSY Right 03/02/2016    BREAST LUMPECTOMY Right 2004    BREAST SURGERY Right     Single lesion excision 1990 hyperplasia, 1st biopsy at 25yrs old was benign  CHOLECYSTECTOMY      COLONOSCOPY      COLONOSCOPY N/A 5/12/2017    Procedure: COLONOSCOPY with biopsy;  Surgeon: Wayne Gabriel MD;  Location: [facility]; Service:     ESOPHAGOGASTRODUODENOSCOPY N/A 1/19/2019    Procedure: ESOPHAGOGASTRODUODENOSCOPY (EGD) with 4cc of epinephrine injection and cauterized with gold probe; Surgeon: Attila Pacheco MD;  Location: [facility];   Service: Gastroenterology    HYSTERECTOMY      IR LUMBAR PUNCTURE  2/21/2019    KNEE SURGERY Right     CA BIOPSY/EXCISION, LYMPH NODE(S) Right 4/12/2016    Procedure: BIOPSY LYMPH NODE SENTINEL @ 1200 LYMPHOSCINTIGRAPHY LYMPHATIC MAPPING;  Surgeon: Mehnaz Keller MD;  Location: [facility];  Service: General    CA MASTECTOMY, SIMPLE, COMPLETE Right 4/12/2016    Procedure: MASTECTOMY SIMPLE;  Surgeon: Mehnaz Keller MD;  Location[facility];  Service: [address] BREAST BIOPSY RIGHT COMPLETE Right 3/9/2016     Social History   Social History     Substance and Sexual Activity   Alcohol Use Not Currently     Social History     Substance and Sexual Activity   Drug Use No     Social History     Tobacco Use   Smoking Status Never Smoker   Smokeless Tobacco Never Used   Tobacco Comment    not interested     Family History   Problem Relation Age of Onset    Stroke Maternal Grandmother     Anemia Mother     Other Mother         disorders of blood and blood forming organs    Hypertension Mother     Stroke Father     Alcohol abuse Brother        Meds/Allergies       Current Outpatient Medications:     anastrozole (ARIMIDEX) 1 mg tablet    folic acid (FOLVITE) 1 mg tablet    levothyroxine 50 mcg tablet    metoprolol succinate (TOPROL-XL) 50 mg 24 hr tablet    pantoprazole (PROTONIX) 40 mg tablet    thiamine 100 MG tablet    acetaminophen (TYLENOL) 325 mg tablet    fenofibrate (TRICOR) 48 mg tablet    Allergies   Allergen Reactions    Demerol [Meperidine] GI Intolerance and Other (See Comments)     Other reaction(s): Other (See Comments)  severe nausea  severe nausea  Nausea/vomiting    Nitroglycerin Anaphylaxis and Other (See Comments)     BP drops drastically    Lipitor [Atorvastatin] Other (See Comments)     Joint/muscle pain    Zocor [Simvastatin] Other (See Comments)     Joint/muscle pain           Objective     Blood pressure 122/74, pulse 60, temperature 99 °F (37 2 °C), height 5' 4" (1 626 m), weight 69 4 kg (153 lb)  PHYSICAL EXAM:      Physical Exam   Constitutional: She is oriented to person, place, and time  She appears well-developed and well-nourished  No distress  HENT:   Head: Normocephalic and atraumatic  Eyes: Right eye exhibits no discharge  Left eye exhibits no discharge  No scleral icterus  Neck: Neck supple  No tracheal deviation present  Cardiovascular: Normal rate, regular rhythm and normal heart sounds  Exam reveals no gallop and no friction rub  No murmur heard  Pulmonary/Chest: Effort normal  No respiratory distress  She has no wheezes  She has no rales  Abdominal: Soft  Bowel sounds are normal  She exhibits no distension  There is no tenderness  There is no rebound and no guarding  Neurological: She is alert and oriented to person, place, and time  Skin: Skin is warm and dry  Psychiatric: She has a normal mood and affect  Lab Results:   No visits with results within 1 Day(s) from this visit     Latest known visit with results is:   Admission on 02/18/2019, Discharged on 02/22/2019   Component Date Value    POC Glucose 02/18/2019 118     WBC 02/18/2019 5 33     RBC 02/18/2019 3 91     Hemoglobin 02/18/2019 12 2     Hematocrit 02/18/2019 37 6     MCV 02/18/2019 96     MCH 02/18/2019 31 2     MCHC 02/18/2019 32 4     RDW 02/18/2019 12 6     MPV 02/18/2019 9 7     Platelets 97/56/6226 249     nRBC 02/18/2019 0     Neutrophils Relative 02/18/2019 67     Immat GRANS % 02/18/2019 0     Lymphocytes Relative 02/18/2019 22     Monocytes Relative 02/18/2019 9     Eosinophils Relative 02/18/2019 1     Basophils Relative 02/18/2019 1     Neutrophils Absolute 02/18/2019 3 54     Immature Grans Absolute 02/18/2019 0 02     Lymphocytes Absolute 02/18/2019 1 19     Monocytes Absolute 02/18/2019 0 49     Eosinophils Absolute 02/18/2019 0 04     Basophils Absolute 02/18/2019 0 05     Sodium 02/18/2019 141     Potassium 02/18/2019 3 6     Chloride 02/18/2019 102     CO2 02/18/2019 31     ANION GAP 02/18/2019 8     BUN 02/18/2019 17     Creatinine 02/18/2019 0 86     Glucose 02/18/2019 116     Calcium 02/18/2019 9 8     AST 02/18/2019 26     ALT 02/18/2019 28     Alkaline Phosphatase 02/18/2019 80     Total Protein 02/18/2019 6 8     Albumin 02/18/2019 3 5     Total Bilirubin 02/18/2019 0 51     eGFR 02/18/2019 68     Troponin I 02/18/2019 <0 02     Lipase 02/18/2019 117     Amph/Meth UR 02/18/2019 Negative     Barbiturate Ur 02/18/2019 Negative     Benzodiazepine Urine 02/18/2019 Negative     Cocaine Urine 02/18/2019 Negative     Methadone Urine 02/18/2019 Negative     Opiate Urine 02/18/2019 Negative     PCP Ur 02/18/2019 Negative     THC Urine 02/18/2019 Negative     Ethanol Lvl 94/23/2297 <3     Salicylate Lvl 65/97/4405 <3*    Acetaminophen Level 02/18/2019 <2*    TSH 3RD GENERATON 02/18/2019 0 262*    Color, UA 02/18/2019 yellow     Color, UA 02/18/2019 Yellow     Clarity, UA 02/18/2019 Clear     pH, UA 02/18/2019 7 0     Leukocytes, UA 02/18/2019 Negative     Nitrite, UA 02/18/2019 Negative     Protein, UA 02/18/2019 Negative     Glucose, UA 02/18/2019 Negative     Ketones, UA 02/18/2019 Negative     Urobilinogen, UA 02/18/2019 0 2     Bilirubin, UA 02/18/2019 Negative     Blood, UA 02/18/2019 Negative     Specific Gravity, UA 02/18/2019 1 015     Cholesterol 02/19/2019 246*    Triglycerides 02/19/2019 95     HDL, Direct 02/19/2019 48     LDL Calculated 02/19/2019 179*    Hemoglobin A1C 02/19/2019 5 5     EAG 02/19/2019 111     T3, Total 02/19/2019 1 40     CSF Culture 02/24/2019 No growth     Appearance, CSF 02/21/2019 Clear     Tube Number, CSF 02/21/2019 4     WBC, CSF 02/21/2019 2     Xanthochromia 02/21/2019 No     RBC, CSF 02/21/2019 11*    Protein, CSF 02/21/2019 74*    Glucose, CSF 02/21/2019 57     Case Report 02/21/2019                      Value:Non-gynecologic Cytology                          Case: [id number]                                  Authorizing Provider:  Katharine Stone PA-C    Collected:           02/21/2019                   Ordering Location:     [facility]        Received:            02/21/2019 [address] [place] [address]                                                             Pathologist:           Cassie Mclean MD                                                                 Specimen:    Lumbar Puncture                                                                            Final Diagnosis 02/21/2019                      Value: This result contains rich text formatting which cannot be displayed here  Alexa Olivares Gross Description 02/21/2019                      Value: This result contains rich text formatting which cannot be displayed here   Additional Information 02/21/2019                      Value: This result contains rich text formatting which cannot be displayed here      Scan Result 02/26/2019 SEE WRITTEN REPORT     WBC 02/20/2019 4 27*    RBC 02/20/2019 3 41*    Hemoglobin 02/20/2019 10 6*    Hematocrit 02/20/2019 32 7*    MCV 02/20/2019 96     MCH 02/20/2019 31 1     MCHC 02/20/2019 32 4     RDW 02/20/2019 12 8     MPV 02/20/2019 10 1     Platelets 14/14/5782 232     nRBC 02/20/2019 0     Neutrophils Relative 02/20/2019 42*    Immat GRANS % 02/20/2019 0     Lymphocytes Relative 02/20/2019 39     Monocytes Relative 02/20/2019 16*    Eosinophils Relative 02/20/2019 2     Basophils Relative 02/20/2019 1     Neutrophils Absolute 02/20/2019 1 79*    Immature Grans Absolute 02/20/2019 0 01     Lymphocytes Absolute 02/20/2019 1 66     Monocytes Absolute 02/20/2019 0 67     Eosinophils Absolute 02/20/2019 0 10     Basophils Absolute 02/20/2019 0 04     Sodium 02/20/2019 142     Potassium 02/20/2019 3 5     Chloride 02/20/2019 106     CO2 02/20/2019 27     ANION GAP 02/20/2019 9     BUN 02/20/2019 12     Creatinine 02/20/2019 0 76     Glucose 02/20/2019 107     Calcium 02/20/2019 9 4     eGFR 02/20/2019 79     Ventricular Rate 02/18/2019 81     Atrial Rate 02/18/2019 81     HI Interval 02/18/2019 188     QRSD Interval 02/18/2019 80     QT Interval 02/18/2019 370     QTC Interval 02/18/2019 429     P Axis 02/18/2019 60     QRS Axis 02/18/2019 -47     T Wave Axis 02/18/2019 47     Ventricular Rate 02/18/2019 76     Atrial Rate 02/18/2019 76     HI Interval 02/18/2019 194     QRSD Interval 02/18/2019 90     QT Interval 02/18/2019 402     QTC Interval 02/18/2019 452     P Axis 02/18/2019 84     QRS Axis 02/18/2019 -30     T Wave Axis 02/18/2019 19     WBC 02/21/2019 4 24*    RBC 02/21/2019 3 75*    Hemoglobin 02/21/2019 11 8     Hematocrit 02/21/2019 36 3     MCV 02/21/2019 97     MCH 02/21/2019 31 5     MCHC 02/21/2019 32 5     RDW 02/21/2019 12 5     Platelets 53/17/1577 235     MPV 02/21/2019 9 6     POC Glucose 02/21/2019 100     Gram Stain Result 02/21/2019 Rare Mononuclear Cells     Gram Stain Result 02/21/2019 No bacteria seen     Gram Stain Result 02/21/2019 No polys seen     WBC 02/22/2019 5 55     RBC 02/22/2019 3 67*    Hemoglobin 02/22/2019 11 5     Hematocrit 02/22/2019 35 1     MCV 02/22/2019 96     MCH 02/22/2019 31 3     MCHC 02/22/2019 32 8     RDW 02/22/2019 12 6     MPV 02/22/2019 10 4     Platelets 06/52/0774 218     nRBC 02/22/2019 0     Neutrophils Relative 02/22/2019 51     Immat GRANS % 02/22/2019 0     Lymphocytes Relative 02/22/2019 34     Monocytes Relative 02/22/2019 12     Eosinophils Relative 02/22/2019 2     Basophils Relative 02/22/2019 1     Neutrophils Absolute 02/22/2019 2 79     Immature Grans Absolute 02/22/2019 0 02     Lymphocytes Absolute 02/22/2019 1 90     Monocytes Absolute 02/22/2019 0 68     Eosinophils Absolute 02/22/2019 0 12     Basophils Absolute 02/22/2019 0 04     Sodium 02/22/2019 137     Potassium 02/22/2019 5 5*    Chloride 02/22/2019 104     CO2 02/22/2019 26     ANION GAP 02/22/2019 7     BUN 02/22/2019 18     Creatinine 02/22/2019 0 68     Glucose 02/22/2019 95     Calcium 02/22/2019 9 6     eGFR 02/22/2019 88     Magnesium 02/22/2019 1 9     Potassium 02/22/2019 4 4     Vitamin B-12 02/22/2019 277     Folate 02/22/2019 19 1*    Iron Saturation 02/22/2019 14     TIBC 02/22/2019 424     Iron 02/22/2019 61     Ferritin 02/22/2019 55          Radiology Results:   Xr Chest 2 Views    Result Date: 2/18/2019  Narrative: CHEST INDICATION:   ams  COMPARISON:  July 2014 EXAM PERFORMED/VIEWS:  XR CHEST PA & LATERAL  The frontal view was performed utilizing dual energy radiographic technique  FINDINGS: Cardiomediastinal silhouette appears unremarkable  The lungs are clear  No pneumothorax or pleural effusion  Degenerative disc disease noted in the midthoracic spine  No destructive or sclerotic lesion evident  Prior right mastectomy noted  Impression: No acute cardiopulmonary disease  Workstation performed: [id number]     Xr Shoulder 2+ Vw Left    Result Date: 2/15/2019  Narrative: LEFT SHOULDER INDICATION:   M25 512: Pain in left shoulder  "left shoulder pain and difficulty with ROM since end of january 2019" COMPARISON:  None VIEWS:  XR SHOULDER 2+ VW LEFT FINDINGS: There is no acute fracture or dislocation  Mild acromioclavicular degenerative changes with inferior spurring  No lytic or blastic lesions are seen   Calcifications superior to the humeral head in keeping with calcific rotator cuff tendinopathy  Impression: Mild acromioclavicular degenerative changes with inferior spurring  Calcifications superior to the humeral head in keeping with calcific rotator cuff tendinopathy  Workstation performed: [id number]     Ct Head Without Contrast    Result Date: 2/18/2019  Narrative: CT BRAIN - WITHOUT CONTRAST INDICATION:   ams  COMPARISON:  CT head 1/19/2019  TECHNIQUE:  CT examination of the brain was performed  In addition to axial images, coronal 2D reformatted images were created and submitted for interpretation  Radiation dose length product (DLP) for this visit:  987 mGy-cm   This examination, like all CT scans performed in the [facility], was performed utilizing techniques to minimize radiation dose exposure, including the use of iterative reconstruction and automated exposure control  IMAGE QUALITY:  Diagnostic  Moderate FINDINGS: PARENCHYMA:  No intracranial mass, mass effect or midline shift  No CT signs of acute infarction  No acute parenchymal hemorrhage  VENTRICLES AND EXTRA-AXIAL SPACES:  Normal for the patient's age  VISUALIZED ORBITS AND PARANASAL SINUSES:  Unremarkable  CALVARIUM AND EXTRACRANIAL SOFT TISSUES:  Normal      Impression: No acute intracranial abnormality  Workstation performed: [id number]     Mra And Or Mrv Head Wo Contrast    Result Date: 2/19/2019  Narrative: MRA BRAIN INDICATION:  mra head/neck; transient executive function abn COMPARISON:  CTA 7/24/2018 TECHNIQUE:  Axial 3-D time-of-flight imaging with 3-D reconstructions  FINDINGS: IMAGE QUALITY:  Diagnostic  ANATOMY INTERNAL CAROTID ARTERIES:  Normal flow related enhancement of the distal cervical, petrous and cavernous segments of the internal carotid arteries without critical stenosis  Normal ICA terminus  ANTERIOR CIRCULATION:  Patent bilateral A1 segments with mild A1 segment narrowing  Normal anterior communicating artery    Normal flow-related enhancement of the anterior cerebral arteries  MIDDLE CEREBRAL ARTERY CIRCULATION:  The M1 segment and middle cerebral artery branches demonstrate normal flow-related enhancement  DISTAL VERTEBRAL ARTERIES:  Distal vertebral arteries are patient with a normal vertebrobasilar junction  The posterior inferior cerebellar artery origins are normal  BASILAR ARTERY:  Normal  POSTERIOR CEREBRAL ARTERIES:  Both posterior cerebral arteries arises from the basilar tip  Both arteries demonstrate normal flow-related enhancement  Normal posterior communicating arteries  Impression: Unremarkable MR angiogram of the brain  No critical stenosis  No aneurysm  Workstation performed: [id number]     Mri Brain Wo Contrast    Result Date: 2/19/2019  Narrative: MRI BRAIN WITHOUT CONTRAST INDICATION: intermittent confusion ?executive function abnormality  COMPARISON:   CT 2/18/2018, CTA 8/22/2018 TECHNIQUE:  Sagittal T1, axial T2, axial FLAIR, axial T1, axial T2* and axial diffusion imaging  IMAGE QUALITY:  Minor motion related artifact FINDINGS: BRAIN PARENCHYMA:  There is a solitary focus of minimally restricted diffusion, series 3 image 20, within the deep right frontal white matter, thought to represent T2 shine through on ADC maps  No indication of acute ischemia  Moderate degree of chronic small vessel disease is evident  Linear foci of high signal identified within the sulci of the posterior temporal and parietal lobes, right greater than left  This could represent small amount of subarachnoid hemorrhage, inapparent on recent CT exam   Gradient images demonstrate the some linear susceptibility within posterior temporal parietal region on the left  The patient does not provide history of primary tumor  Lumbar puncture, and MR with contrast are suggested  VENTRICLES:  Mildly prominent for age   SELLA AND PITUITARY GLAND:  Normal  ORBITS:  Normal  PARANASAL SINUSES:  Normal  VASCULATURE:  Evaluation of the major intracranial vasculature demonstrates appropriate flow voids  CALVARIUM AND SKULL BASE:  Normal   Degenerative changes bilateral temporomandibular joints  EXTRACRANIAL SOFT TISSUES:  Normal      Impression: High signal material in the sulci of the posterior temporal and parietal lobes, right greater than left  This could represent a small amount of subarachnoid blood, or less likely CSF spread of tumor or infection  Limited Contrasted MR of the brain is suggested  Lumbar puncture to evaluate for cell count, Gram stain, C and S, and cytology suggested  Diffuse generalized volume loss and moderate degree of chronic small vessel disease  The study was marked in [facility] for immediate notification  Workstation performed: [id number]     Mri Brain W Wo Contrast    Result Date: 2/21/2019  Narrative: MRI BRAIN WITH AND WITHOUT CONTRAST INDICATION: Abnormal brain MRI w/o  Afebrile patient with confusion  No history of headache or meningismus  COMPARISON:  MRI of the brain from February 19, 2019 performed at 12:17 AM  TECHNIQUE: Axial FLAIR, T1, postcontrast axial and sagittal T1 and axial postcontrast T1 bravo imaging with coronal reformats  IV Contrast:  7 mL of gadobutrol injection (MULTI-DOSE)  IMAGE QUALITY:   Diagnostic  FINDINGS: BRAIN PARENCHYMA:  Redemonstrated is the abnormal FLAIR signal intensity within the right temporal parietal and left temporal sulci  There is also FLAIR signal abnormality in the posterior fossa along the right cerebellar sulci with T2 hyperintense signal in the right cerebellar pontine angle without adjacent fluid in the right mastoid air cells  Postcontrast imaging demonstrates leptomeningeal enhancement in the right posterior temporal and parietal region  There is no significant leptomeningeal  enhancement within the posterior fossa  There is questionable minimal leptomeningeal enhancement in the left temporal region  There is no enhancement of the ependymal surfaces   Nonspecific periventricular and deep cerebral white matter T2 prolongation is likely the sequela of microangiopathic disease  VENTRICLES:  Normal  SELLA AND PITUITARY GLAND:  Normal  ORBITS:  Normal  PARANASAL SINUSES:  Normal  VASCULATURE:  Evaluation of the major intracranial vasculature demonstrates appropriate flow voids  CALVARIUM AND SKULL BASE:  Normal  EXTRACRANIAL SOFT TISSUES:  Normal      Impression: Persistent FLAIR sulcal signal abnormality in the right more than left temporoparietal and right cerebellar regions with supratentorial leptomeningeal enhancement as discussed above  Findings are suggestive of leptomeningitis, correlate for underlying inflammatory/infectious or neoplastic etiologies  CSF cytology is recommended  Findings were discussed with Dr Genesis Sepulveda at 11:30 AM on Thursday, February 21st 2019  Workstation performed: [id number]     Vas Carotid Complete Study (*order Only If Cta Has Not Been Completed*)    Result Date: 2/20/2019  Narrative:  [facility] REPORT CLINICAL: Indications:  Patient is admitted with cognitive dysfunction,  which is progressing  Risk Factors The patient has history of HTN and HLD  Clinical Right Pressure:  138/79 mm Hg,  FINDINGS:  Right        Impression  PSV  EDV (cm/s)  Direction of Flow  Ratio  Dist  ICA                 76          22                      1 19  Mid  ICA                  74          22                      1 16  Prox   ICA    1 - 49%      66          21                      1 03  Dist CCA                  66          21                            Mid CCA                   64          18                      0 91  Prox CCA                  70          14                            Ext Carotid               90          14                      1 40  Prox Vert                 33          11  Antegrade                 Subclavian                77           5                             Left         Impression  PSV  EDV (cm/s)  Direction of Flow  Ratio  Dist  ICA                 73 22                      1 21  Mid  ICA                  72          22                      1 20  Prox  ICA    1 - 49%      81          15                      1 35  Dist CCA                  63          17                            Mid CCA                   60          16                      0 83  Prox CCA                  73          17                            Ext Carotid               91          18                      1 52  Prox Vert                 47          15  Antegrade                 Subclavian               134          10                               CONCLUSION: Impression RIGHT: There is <50% stenosis noted in the internal carotid artery  Plaque is heterogenous  Vertebral artery flow is antegrade  There is no significant subclavian artery disease  LEFT: There is <50% stenosis noted in the internal carotid artery  Plaque is heterogenous  Vertebral artery flow is antegrade  There is no significant subclavian artery disease  Internal carotid artery stenosis determination by consensus criteria from: Vasiliy Watkins et al  Carotid Artery Stenosis: Gray-Scale and Doppler US Diagnosis - Society of Radiologists in [address] [place] [address] [facility], Radiology 2003; [id number]  SIGNATURE: Electronically Signed by: Faizan Escobedo MD, RPVI on 2019-02-20 11:20:00 AM    Ir Lumbar Puncture    Result Date: 2/21/2019  Narrative: IMAGE-GUIDED LUMBAR PUNCTURE Indication: Confusion Procedure: In the prone position, the lower back was prepped and draped in sterile fashion  1% lidocaine was used for local anesthetic  A suitable location for puncture was identified with fluoroscopy at the L2-3 level  A 20 gauge spinal needle was advanced into the CSF space using fluoroscopic guidance  Gravity drainage was used to fill 4 vials of fluid  The needle was removed and the puncture site was covered with sterile dressing  Fluoroscopy time: 2 7 minutes; Images: 3 Findings:  There was return of faintly blood-tinged CSF which cleared rapidly upon entering the thecal sac  4 vials containing a total of 12 cc of CSF were submitted for laboratory analysis as ordered       Impression: Impression: Successful image-guided lumbar puncture Workstation performed: [id number]

## 2019-03-01 ENCOUNTER — OFFICE VISIT (OUTPATIENT)
Dept: FAMILY MEDICINE CLINIC | Facility: CLINIC | Age: 74
End: 2019-03-01
Payer: MEDICARE

## 2019-03-01 VITALS
TEMPERATURE: 98.8 F | WEIGHT: 152.2 LBS | SYSTOLIC BLOOD PRESSURE: 134 MMHG | DIASTOLIC BLOOD PRESSURE: 88 MMHG | HEIGHT: 64 IN | BODY MASS INDEX: 25.99 KG/M2

## 2019-03-01 DIAGNOSIS — M25.512 CHRONIC PAIN OF BOTH SHOULDERS: ICD-10-CM

## 2019-03-01 DIAGNOSIS — M25.511 CHRONIC PAIN OF BOTH SHOULDERS: ICD-10-CM

## 2019-03-01 DIAGNOSIS — E06.3 HYPOTHYROIDISM DUE TO HASHIMOTO'S THYROIDITIS: ICD-10-CM

## 2019-03-01 DIAGNOSIS — E03.8 HYPOTHYROIDISM DUE TO HASHIMOTO'S THYROIDITIS: ICD-10-CM

## 2019-03-01 DIAGNOSIS — G89.29 CHRONIC PAIN OF BOTH SHOULDERS: ICD-10-CM

## 2019-03-01 DIAGNOSIS — R41.0 CONFUSION: Primary | ICD-10-CM

## 2019-03-01 DIAGNOSIS — R90.89 ABNORMAL FINDING ON MRI OF BRAIN: ICD-10-CM

## 2019-03-01 DIAGNOSIS — I10 ESSENTIAL HYPERTENSION: ICD-10-CM

## 2019-03-01 DIAGNOSIS — R79.89 LOW TSH LEVEL: ICD-10-CM

## 2019-03-01 DIAGNOSIS — E78.2 MIXED HYPERLIPIDEMIA: ICD-10-CM

## 2019-03-01 DIAGNOSIS — C50.911 INVASIVE DUCTAL CARCINOMA OF BREAST, RIGHT (HCC): ICD-10-CM

## 2019-03-01 DIAGNOSIS — I48.0 PAROXYSMAL ATRIAL FIBRILLATION (HCC): ICD-10-CM

## 2019-03-01 DIAGNOSIS — F10.929: ICD-10-CM

## 2019-03-01 DIAGNOSIS — K21.00 GASTROESOPHAGEAL REFLUX DISEASE WITH ESOPHAGITIS: ICD-10-CM

## 2019-03-01 PROCEDURE — 99496 TRANSJ CARE MGMT HIGH F2F 7D: CPT | Performed by: FAMILY MEDICINE

## 2019-03-01 NOTE — PROGRESS NOTES
Assessment/Plan:  Patient will follow up with all specialists including Neurology and Oncology  EEG just done and results pending  Patient may need follow-up   Meds reviewed  A follow-up in 2 months     Diagnoses and all orders for this visit:    Confusion    Alcohol use with intoxication ([facility] [address] )    Abnormal finding on MRI of brain    Mixed hyperlipidemia    Invasive ductal carcinoma of breast, right (HCC)    Low TSH level    Paroxysmal atrial fibrillation (HCC)    Essential hypertension    Hypothyroidism due to Hashimoto's thyroiditis    Gastroesophageal reflux disease with esophagitis          Subjective:      Patient ID: Kayley Villagran is a 72 y o  female  Patient is here status post hospitalization from the 18th through the 22nd of February  Patient went in with confusional state  Patient had negative CT scan  Patient had MRI of the brain which showed left a meningitis  Patient also had EEG done which was abnormal   Patient also had lumbar puncture done also  Patient did see Neurology  Patient to see Oncology also  Patient does have follow-up with Oncology on the 14th  Patient is also to follow with Neurology  Patient had EEG yesterday again  MRI of the brain was negative  Other diagnosis include low TSH hyperlipidemia hypertension GERD iron deficiency anemia AFib which was in normal sinus rhythm and history of breast cancer and alcohol use  Patient feels fine at this time  Patient was oriented x3 at discharge  Patient without complaints  The following portions of the patient's history were reviewed and updated as appropriate: allergies, current medications, past family history, past medical history, past social history, past surgical history and problem list     Review of Systems   Constitutional: Negative  HENT: Negative  Eyes: Negative  Respiratory: Negative  Cardiovascular: Negative  Gastrointestinal: Negative  Endocrine: Negative  Genitourinary: Negative  Musculoskeletal: Negative  Skin: Negative  Allergic/Immunologic: Negative  Neurological: Negative  Hematological: Negative  Psychiatric/Behavioral: Negative  Objective:      /88 (BP Location: Left arm, Patient Position: Sitting, Cuff Size: Adult)   Temp 98 8 °F (37 1 °C) (Tympanic)   Ht 5' 4" (1 626 m)   Wt 69 kg (152 lb 3 2 oz)   BMI 26 13 kg/m²          Physical Exam   Constitutional: She appears well-developed and well-nourished  No distress  HENT:   Head: Normocephalic  Right Ear: External ear normal    Left Ear: External ear normal    Mouth/Throat: Oropharynx is clear and moist  No oropharyngeal exudate  Eyes: Pupils are equal, round, and reactive to light  EOM are normal  Right eye exhibits no discharge  Left eye exhibits no discharge  No scleral icterus  Neck: Normal range of motion  Neck supple  No thyromegaly present  Cardiovascular: Normal rate, regular rhythm, normal heart sounds and intact distal pulses  Exam reveals no gallop and no friction rub  No murmur heard  Pulmonary/Chest: Effort normal and breath sounds normal  No respiratory distress  She has no wheezes  She has no rales  She exhibits no tenderness  Abdominal: Soft  Bowel sounds are normal  She exhibits no distension  There is no tenderness  There is no rebound and no guarding  Musculoskeletal: Normal range of motion  She exhibits no edema or tenderness  Lymphadenopathy:     She has no cervical adenopathy  Neurological: She is alert  No cranial nerve deficit  She exhibits normal muscle tone  Coordination normal    Skin: Skin is warm and dry  No rash noted  She is not diaphoretic  No erythema  No pallor  Psychiatric: She has a normal mood and affect  Her behavior is normal  Judgment and thought content normal    Nursing note and vitals reviewed

## 2019-03-05 LAB — REAGIN AB CSF QL: NON REACTIVE

## 2019-03-11 ENCOUNTER — DOCUMENTATION (OUTPATIENT)
Dept: HEMATOLOGY ONCOLOGY | Facility: CLINIC | Age: 74
End: 2019-03-11

## 2019-03-11 NOTE — PROGRESS NOTES
Called pt & went over her benefits w/her   She sd she has no concerns at this time but she wcb if she does

## 2019-03-14 ENCOUNTER — OFFICE VISIT (OUTPATIENT)
Dept: HEMATOLOGY ONCOLOGY | Facility: CLINIC | Age: 74
End: 2019-03-14
Payer: MEDICARE

## 2019-03-14 VITALS
OXYGEN SATURATION: 97 % | DIASTOLIC BLOOD PRESSURE: 90 MMHG | RESPIRATION RATE: 18 BRPM | BODY MASS INDEX: 26.12 KG/M2 | HEART RATE: 83 BPM | HEIGHT: 64 IN | WEIGHT: 153 LBS | SYSTOLIC BLOOD PRESSURE: 144 MMHG | TEMPERATURE: 98 F

## 2019-03-14 DIAGNOSIS — C50.911 INVASIVE DUCTAL CARCINOMA OF BREAST, RIGHT (HCC): Primary | ICD-10-CM

## 2019-03-14 PROCEDURE — 99214 OFFICE O/P EST MOD 30 MIN: CPT | Performed by: INTERNAL MEDICINE

## 2019-03-14 NOTE — PROGRESS NOTES
Hematology / Oncology Outpatient Follow Up Note    Maryellen Hooks 72 y o  female :1945 OBI:[id number]         Date:  3/14/2019    Assessment / Plan:  A 72year old postmenopausal woman who has history of early stage right breast cancer, ER positive disease in   She underwent lumpectomy followed by radiation, as well as 5 years of adjuvant hormonal therapy with tamoxifen, completed in   She has stage IA recurrent right breast cancer, grade 1, ER/NH strongly positive, HER-2 negative disease  She underwent mastectomy resulting in MARIAELENA  She is currently on adjuvant hormonal therapy with anastrozole with no significant side effects  She had brief episode of confusion for which she was hospitalized in 2019  Although, MRI showed some any joint enhancement, lumbar puncture did not show any malignancy  Based on her stage, tumor phenotype as well as low-grade disease, meningeal metastasis is extremely rare  Clinically, she has no evidence recurrent disease  I recommended her to continue with anastrozole 1 mg once a day  I will see her again in 2019 for follow-up  She and her  are in agreement with my recommendations                                              Subjective:      HPI: A 78year old postmenopausal woman who has history of atypical ductal hyperplasia in   She had excisional biopsy  She was not eligible for chemoprevention trial  Therefore, she was observed  In , she was diagnosed with early stage right breast cancer, ER positive disease  She underwent lumpectomy followed by radiation therapy  She took adjuvant tamoxifen for 5 years until   She was recently found to have abnormality in the right breast  Biopsy showed invasive ductal carcinoma, grade 2  She was seen by Dr Blanche Bell, who did mastectomy with sentinel lymph node biopsy   She had 1 3 cm of invasive ductal carcinoma, grade 1  2 sentinel lymph node and 3 non-sentinel lymph node were all negative for metastatic disease  This was % positive, NM 95% positive, HER-2 negative disease  She did not have reconstruction  She presented today to discuss adjuvant treatment options  After the 5 years of tamoxifen, she did not take additional hormonal therapy  She feels well  However, she is somewhat anxious  She has no complaint of pain  She denied respiratory symptoms  Her performance status is normal  She has no family history of breast or ovarian cancer              Interval History:  A 72year old postmenopausal woman with history of early stage right breast cancer, ER positive disease, diagnosed in 2002  She underwent lumpectomy followed by radiation as well as tamoxifen as adjuvant hormonal therapy until 2007  She was diagnosed with recurrent stage IA right breast cancer, grade 1, ER/NM positive HER-2 negative disease  She underwent mastectomy resulting in MARIAELENA  Since May 2016, she has been on adjuvant hormonal therapy with anastrozole  A in February 2019, she was hospitalized after the brief episode of confusion  MRI brain shows some sign of meningeal enhancement  She underwent lumbar puncture  She had elevated protein  However, CSF cytology was negative  After the discharge, she has no more confusion episode  She presents today for follow-up  She has no complaint headache  She denied fever  She has no double vision or difficulty of speech  She is ambulating normal  Her performance status is normal                                            Objective:      Primary Diagnosis:     1  Locally recurrent right breast cancer, stage IA (pT1c, pN0,M0) grade 1, ER/NM strongly positive, HER-2 negative disease  Diagnosed in April 2016   2  Early stage right breast cancer, ER positive disease   Diagnosed in 2002       Cancer Staging:  Cancer Staging  Invasive ductal carcinoma of breast, right ([facility] [address] )  Staging form: Breast, AJCC 7th Edition  - Pathologic: Stage IA (T1c, N0, cM0) - Signed by Cinthia Lay MD on 4/9/2018           Previous Hematologic/ Oncologic Treatment:            Current Hematologic/ Oncologic Treatment:       Adjuvant hormonal therapy with anastrozole since May 2016       Disease Status:      MARIAELENA status post mastectomy      Test Results:     Pathology:     Mastectomy specimen showed 1 3 cm of invasive ductal carcinoma, grade 1  5 lymph nodes are negative for metastatic disease  % positive, OK 95% positive, HER-2 negative disease IA (pT1c, pN0,M0)  CSF cytology was negative for malignancy      Radiology:     DEXA scan in July 2016 showed normal bone density  Mammography in March 2018 was benign  BI-RADS 2     Laboratory:           Physical Exam:        General Appearance:    Alert, oriented          Eyes:    PERRL   Ears:    Normal external ear canals, both ears   Nose:   Nares normal, septum midline   Throat:   Mucosa moist  Pharynx without injection  Neck:   Supple         Lungs:     Clear to auscultation bilaterally   Chest Wall:    No tenderness or deformity    Heart:    Regular rate and rhythm         Abdomen:     Soft, non-tender, bowel sounds +, no organomegaly               Extremities:   Extremities no cyanosis or edema         Skin:   no rash or icterus  Lymph nodes:   Cervical, supraclavicular, and axillary nodes normal   Neurologic:   CNII-XII intact, normal strength, sensation and reflexes     Throughout             Breast exam:   status post right mastectomy without reconstruction  No palpable abnormality in the right chest wall  Left breast exam is negative  ROS: Review of Systems   All other systems reviewed and are negative  Imaging: Xr Chest 2 Views    Result Date: 2/18/2019  Narrative: CHEST INDICATION:   ams  COMPARISON:  July 2014 EXAM PERFORMED/VIEWS:  XR CHEST PA & LATERAL  The frontal view was performed utilizing dual energy radiographic technique  FINDINGS: Cardiomediastinal silhouette appears unremarkable  The lungs are clear    No pneumothorax or pleural effusion  Degenerative disc disease noted in the midthoracic spine  No destructive or sclerotic lesion evident  Prior right mastectomy noted  Impression: No acute cardiopulmonary disease  Workstation performed: [id number]     Xr Shoulder 2+ Vw Left    Result Date: 2/15/2019  Narrative: LEFT SHOULDER INDICATION:   M25 512: Pain in left shoulder  "left shoulder pain and difficulty with ROM since end of january 2019" COMPARISON:  None VIEWS:  XR SHOULDER 2+ VW LEFT FINDINGS: There is no acute fracture or dislocation  Mild acromioclavicular degenerative changes with inferior spurring  No lytic or blastic lesions are seen  Calcifications superior to the humeral head in keeping with calcific rotator cuff tendinopathy  Impression: Mild acromioclavicular degenerative changes with inferior spurring  Calcifications superior to the humeral head in keeping with calcific rotator cuff tendinopathy  Workstation performed: [id number]     Ct Head Without Contrast    Result Date: 2/18/2019  Narrative: CT BRAIN - WITHOUT CONTRAST INDICATION:   ams  COMPARISON:  CT head 1/19/2019  TECHNIQUE:  CT examination of the brain was performed  In addition to axial images, coronal 2D reformatted images were created and submitted for interpretation  Radiation dose length product (DLP) for this visit:  987 mGy-cm   This examination, like all CT scans performed in the [facility], was performed utilizing techniques to minimize radiation dose exposure, including the use of iterative reconstruction and automated exposure control  IMAGE QUALITY:  Diagnostic  Moderate FINDINGS: PARENCHYMA:  No intracranial mass, mass effect or midline shift  No CT signs of acute infarction  No acute parenchymal hemorrhage  VENTRICLES AND EXTRA-AXIAL SPACES:  Normal for the patient's age  VISUALIZED ORBITS AND PARANASAL SINUSES:  Unremarkable   CALVARIUM AND EXTRACRANIAL SOFT TISSUES:  Normal      Impression: No acute intracranial abnormality  Workstation performed: [id number]     Mra And Or Mrv Head Wo Contrast    Result Date: 2/19/2019  Narrative: MRA BRAIN INDICATION:  mra head/neck; transient executive function abn COMPARISON:  CTA 7/24/2018 TECHNIQUE:  Axial 3-D time-of-flight imaging with 3-D reconstructions  FINDINGS: IMAGE QUALITY:  Diagnostic  ANATOMY INTERNAL CAROTID ARTERIES:  Normal flow related enhancement of the distal cervical, petrous and cavernous segments of the internal carotid arteries without critical stenosis  Normal ICA terminus  ANTERIOR CIRCULATION:  Patent bilateral A1 segments with mild A1 segment narrowing  Normal anterior communicating artery  Normal flow-related enhancement of the anterior cerebral arteries  MIDDLE CEREBRAL ARTERY CIRCULATION:  The M1 segment and middle cerebral artery branches demonstrate normal flow-related enhancement  DISTAL VERTEBRAL ARTERIES:  Distal vertebral arteries are patient with a normal vertebrobasilar junction  The posterior inferior cerebellar artery origins are normal  BASILAR ARTERY:  Normal  POSTERIOR CEREBRAL ARTERIES:  Both posterior cerebral arteries arises from the basilar tip  Both arteries demonstrate normal flow-related enhancement  Normal posterior communicating arteries  Impression: Unremarkable MR angiogram of the brain  No critical stenosis  No aneurysm  Workstation performed: [id number]     Mri Brain Wo Contrast    Result Date: 2/19/2019  Narrative: MRI BRAIN WITHOUT CONTRAST INDICATION: intermittent confusion ?executive function abnormality  COMPARISON:   CT 2/18/2018, CTA 8/22/2018 TECHNIQUE:  Sagittal T1, axial T2, axial FLAIR, axial T1, axial T2* and axial diffusion imaging  IMAGE QUALITY:  Minor motion related artifact FINDINGS: BRAIN PARENCHYMA:  There is a solitary focus of minimally restricted diffusion, series 3 image 20, within the deep right frontal white matter, thought to represent T2 shine through on ADC maps    No indication of acute ischemia  Moderate degree of chronic small vessel disease is evident  Linear foci of high signal identified within the sulci of the posterior temporal and parietal lobes, right greater than left  This could represent small amount of subarachnoid hemorrhage, inapparent on recent CT exam   Gradient images demonstrate the some linear susceptibility within posterior temporal parietal region on the left  The patient does not provide history of primary tumor  Lumbar puncture, and MR with contrast are suggested  VENTRICLES:  Mildly prominent for age  SELLA AND PITUITARY GLAND:  Normal  ORBITS:  Normal  PARANASAL SINUSES:  Normal  VASCULATURE:  Evaluation of the major intracranial vasculature demonstrates appropriate flow voids  CALVARIUM AND SKULL BASE:  Normal   Degenerative changes bilateral temporomandibular joints  EXTRACRANIAL SOFT TISSUES:  Normal      Impression: High signal material in the sulci of the posterior temporal and parietal lobes, right greater than left  This could represent a small amount of subarachnoid blood, or less likely CSF spread of tumor or infection  Limited Contrasted MR of the brain is suggested  Lumbar puncture to evaluate for cell count, Gram stain, C and S, and cytology suggested  Diffuse generalized volume loss and moderate degree of chronic small vessel disease  The study was marked in [facility] for immediate notification  Workstation performed: [id number]     Mri Brain W Wo Contrast    Result Date: 2/21/2019  Narrative: MRI BRAIN WITH AND WITHOUT CONTRAST INDICATION: Abnormal brain MRI w/o  Afebrile patient with confusion  No history of headache or meningismus  COMPARISON:  MRI of the brain from February 19, 2019 performed at 12:17 AM  TECHNIQUE: Axial FLAIR, T1, postcontrast axial and sagittal T1 and axial postcontrast T1 bravo imaging with coronal reformats  IV Contrast:  7 mL of gadobutrol injection (MULTI-DOSE)  IMAGE QUALITY:   Diagnostic   FINDINGS: BRAIN PARENCHYMA:  Redemonstrated is the abnormal FLAIR signal intensity within the right temporal parietal and left temporal sulci  There is also FLAIR signal abnormality in the posterior fossa along the right cerebellar sulci with T2 hyperintense signal in the right cerebellar pontine angle without adjacent fluid in the right mastoid air cells  Postcontrast imaging demonstrates leptomeningeal enhancement in the right posterior temporal and parietal region  There is no significant leptomeningeal  enhancement within the posterior fossa  There is questionable minimal leptomeningeal enhancement in the left temporal region  There is no enhancement of the ependymal surfaces  Nonspecific periventricular and deep cerebral white matter T2 prolongation is likely the sequela of microangiopathic disease  VENTRICLES:  Normal  SELLA AND PITUITARY GLAND:  Normal  ORBITS:  Normal  PARANASAL SINUSES:  Normal  VASCULATURE:  Evaluation of the major intracranial vasculature demonstrates appropriate flow voids  CALVARIUM AND SKULL BASE:  Normal  EXTRACRANIAL SOFT TISSUES:  Normal      Impression: Persistent FLAIR sulcal signal abnormality in the right more than left temporoparietal and right cerebellar regions with supratentorial leptomeningeal enhancement as discussed above  Findings are suggestive of leptomeningitis, correlate for underlying inflammatory/infectious or neoplastic etiologies  CSF cytology is recommended  Findings were discussed with Dr Monserrat Mcduffie at 11:30 AM on Thursday, February 21st 2019  Workstation performed: [id number]     Vas Carotid Complete Study (*order Only If Cta Has Not Been Completed*)    Result Date: 2/20/2019  Narrative:  [facility] REPORT CLINICAL: Indications:  Patient is admitted with cognitive dysfunction,  which is progressing  Risk Factors The patient has history of HTN and HLD   Clinical Right Pressure:  138/79 mm Hg,  FINDINGS:  Right        Impression  PSV  EDV (cm/s)  Direction of Flow  Ratio  Dist  ICA                 76          22                      1 19  Mid  ICA                  74          22                      1 16  Prox  ICA    1 - 49%      66          21                      1 03  Dist CCA                  66          21                            Mid CCA                   64          18                      0 91  Prox CCA                  70          14                            Ext Carotid               90          14                      1 40  Prox Vert                 33          11  Antegrade                 Subclavian                77           5                             Left         Impression  PSV  EDV (cm/s)  Direction of Flow  Ratio  Dist  ICA                 73          22                      1 21  Mid  ICA                  72          22                      1 20  Prox  ICA    1 - 49%      81          15                      1 35  Dist CCA                  63          17                            Mid CCA                   60          16                      0 83  Prox CCA                  73          17                            Ext Carotid               91          18                      1 52  Prox Vert                 47          15  Antegrade                 Subclavian               134          10                               CONCLUSION: Impression RIGHT: There is <50% stenosis noted in the internal carotid artery  Plaque is heterogenous  Vertebral artery flow is antegrade  There is no significant subclavian artery disease  LEFT: There is <50% stenosis noted in the internal carotid artery  Plaque is heterogenous  Vertebral artery flow is antegrade  There is no significant subclavian artery disease  Internal carotid artery stenosis determination by consensus criteria from: Cindy Barrett et al  Carotid Artery Stenosis: Gray-Scale and Doppler US Diagnosis - Society of Radiologists in [address] [place] [address], Radiology 2003; [phone]    SIGNATURE: Electronically Signed by: Felicia Dumont MD, RPVI on 2019-02-20 11:20:00 AM    Ir Lumbar Puncture    Result Date: 2/21/2019  Narrative: IMAGE-GUIDED LUMBAR PUNCTURE Indication: Confusion Procedure: In the prone position, the lower back was prepped and draped in sterile fashion  1% lidocaine was used for local anesthetic  A suitable location for puncture was identified with fluoroscopy at the L2-3 level  A 20 gauge spinal needle was advanced into the CSF space using fluoroscopic guidance  Gravity drainage was used to fill 4 vials of fluid  The needle was removed and the puncture site was covered with sterile dressing  Fluoroscopy time: 2 7 minutes; Images: 3 Findings: There was return of faintly blood-tinged CSF which cleared rapidly upon entering the thecal sac  4 vials containing a total of 12 cc of CSF were submitted for laboratory analysis as ordered       Impression: Impression: Successful image-guided lumbar puncture Workstation performed: [id number]         Labs:   Lab Results   Component Value Date    WBC 5 55 02/22/2019    HGB 11 5 02/22/2019    HCT 35 1 02/22/2019    MCV 96 02/22/2019     02/22/2019     Lab Results   Component Value Date     (L) 01/06/2016    K 4 4 02/22/2019     02/22/2019    CO2 26 02/22/2019    ANIONGAP 9 01/06/2016    BUN 18 02/22/2019    CREATININE 0 68 02/22/2019    GLUCOSE 114 05/09/2017    GLUF 126 (H) 01/24/2018    CALCIUM 9 6 02/22/2019    AST 26 02/18/2019    ALT 28 02/18/2019    ALKPHOS 80 02/18/2019    PROT 6 8 01/06/2016    BILITOT 1 36 (H) 01/06/2016    EGFR 88 02/22/2019         Lab Results   Component Value Date    IRON 61 02/22/2019    TIBC 424 02/22/2019    FERRITIN 55 02/22/2019       Lab Results   Component Value Date    IETZBAFK38 277 02/22/2019       Lab Results   Component Value Date    FOLATE 19 1 (H) 02/22/2019         Current Medications: Reviewed  Allergies: Reviewed  PMH/FH/SH:  Reviewed      Vital Sign:    Body surface area is 1 75 meters squared      Wt Readings from Last 3 Encounters:   03/14/19 69 4 kg (153 lb)   03/01/19 69 kg (152 lb 3 2 oz)   02/28/19 69 4 kg (153 lb)        Temp Readings from Last 3 Encounters:   03/14/19 98 °F (36 7 °C) (Tympanic)   03/01/19 98 8 °F (37 1 °C) (Tympanic)   02/28/19 99 °F (37 2 °C)        BP Readings from Last 3 Encounters:   03/14/19 144/90   03/01/19 134/88   02/28/19 122/74         Pulse Readings from Last 3 Encounters:   03/14/19 83   02/28/19 60   02/22/19 80     @LASTSAO2(3)@

## 2019-03-18 DIAGNOSIS — K21.9 GASTROESOPHAGEAL REFLUX DISEASE WITHOUT ESOPHAGITIS: ICD-10-CM

## 2019-03-18 RX ORDER — FOLIC ACID 1 MG/1
1 TABLET ORAL DAILY
Qty: 30 TABLET | Refills: 5 | Status: SHIPPED | OUTPATIENT
Start: 2019-03-18 | End: 2019-05-09

## 2019-03-19 NOTE — TELEPHONE ENCOUNTER
Spoke with patient today and stated that her request was approved and RX was sent to her pharmacy of choice

## 2019-04-01 ENCOUNTER — HOSPITAL ENCOUNTER (OUTPATIENT)
Dept: MAMMOGRAPHY | Facility: MEDICAL CENTER | Age: 74
Discharge: HOME | End: 2019-04-01
Payer: MEDICARE

## 2019-04-01 VITALS — HEIGHT: 64 IN | BODY MASS INDEX: 26.12 KG/M2 | WEIGHT: 153 LBS

## 2019-04-01 DIAGNOSIS — Z12.39 BREAST CANCER SCREENING, HIGH RISK PATIENT: ICD-10-CM

## 2019-04-01 PROCEDURE — 77067 SCR MAMMO BI INCL CAD: CPT

## 2019-04-01 PROCEDURE — 77063 BREAST TOMOSYNTHESIS BI: CPT

## 2019-04-10 ENCOUNTER — OFFICE VISIT (OUTPATIENT)
Dept: SURGICAL ONCOLOGY | Facility: CLINIC | Age: 74
End: 2019-04-10
Payer: MEDICARE

## 2019-04-10 VITALS
HEIGHT: 64 IN | DIASTOLIC BLOOD PRESSURE: 90 MMHG | RESPIRATION RATE: 14 BRPM | BODY MASS INDEX: 25.33 KG/M2 | HEART RATE: 106 BPM | WEIGHT: 148.4 LBS | TEMPERATURE: 98.6 F | SYSTOLIC BLOOD PRESSURE: 120 MMHG

## 2019-04-10 DIAGNOSIS — C50.911 INVASIVE DUCTAL CARCINOMA OF BREAST, RIGHT (HCC): Primary | ICD-10-CM

## 2019-04-10 DIAGNOSIS — Z79.811 USE OF ANASTROZOLE (ARIMIDEX): ICD-10-CM

## 2019-04-10 DIAGNOSIS — R92.2 DENSE BREAST TISSUE: ICD-10-CM

## 2019-04-10 PROCEDURE — 99214 OFFICE O/P EST MOD 30 MIN: CPT | Performed by: SURGERY

## 2019-04-11 ENCOUNTER — TELEPHONE (OUTPATIENT)
Dept: NEUROLOGY | Facility: CLINIC | Age: 74
End: 2019-04-11

## 2019-04-11 ENCOUNTER — OFFICE VISIT (OUTPATIENT)
Dept: NEUROLOGY | Facility: CLINIC | Age: 74
End: 2019-04-11
Payer: MEDICARE

## 2019-04-11 VITALS
HEART RATE: 95 BPM | BODY MASS INDEX: 25.61 KG/M2 | DIASTOLIC BLOOD PRESSURE: 77 MMHG | HEIGHT: 64 IN | WEIGHT: 150 LBS | SYSTOLIC BLOOD PRESSURE: 159 MMHG

## 2019-04-11 DIAGNOSIS — R40.4 TRANSIENT ALTERATION OF AWARENESS: Primary | ICD-10-CM

## 2019-04-11 DIAGNOSIS — R90.89 ABNORMAL FINDING ON MRI OF BRAIN: ICD-10-CM

## 2019-04-11 PROCEDURE — 99214 OFFICE O/P EST MOD 30 MIN: CPT | Performed by: NURSE PRACTITIONER

## 2019-04-18 ENCOUNTER — APPOINTMENT (OUTPATIENT)
Dept: LAB | Facility: MEDICAL CENTER | Age: 74
End: 2019-04-18
Payer: MEDICARE

## 2019-04-18 DIAGNOSIS — R90.89 ABNORMAL FINDING ON MRI OF BRAIN: ICD-10-CM

## 2019-04-18 LAB
BUN SERPL-MCNC: 18 MG/DL (ref 5–25)
CREAT SERPL-MCNC: 0.95 MG/DL (ref 0.6–1.3)
GFR SERPL CREATININE-BSD FRML MDRD: 60 ML/MIN/1.73SQ M

## 2019-04-18 PROCEDURE — 82565 ASSAY OF CREATININE: CPT

## 2019-04-18 PROCEDURE — 84520 ASSAY OF UREA NITROGEN: CPT

## 2019-04-18 PROCEDURE — 36415 COLL VENOUS BLD VENIPUNCTURE: CPT

## 2019-04-19 DIAGNOSIS — S12.100A CLOSED ODONTOID FRACTURE, INITIAL ENCOUNTER (HCC): ICD-10-CM

## 2019-04-19 RX ORDER — METOPROLOL SUCCINATE 50 MG/1
50 TABLET, EXTENDED RELEASE ORAL DAILY
Qty: 30 TABLET | Refills: 5 | OUTPATIENT
Start: 2019-04-19

## 2019-04-19 RX ORDER — METOPROLOL SUCCINATE 50 MG/1
50 TABLET, EXTENDED RELEASE ORAL DAILY
Qty: 90 TABLET | Refills: 0 | Status: SHIPPED | OUTPATIENT
Start: 2019-04-19 | End: 2019-09-30 | Stop reason: SDUPTHER

## 2019-04-23 ENCOUNTER — TELEPHONE (OUTPATIENT)
Dept: OTHER | Facility: OTHER | Age: 74
End: 2019-04-23

## 2019-04-23 DIAGNOSIS — K25.4 GASTROINTESTINAL HEMORRHAGE ASSOCIATED WITH GASTRIC ULCER: ICD-10-CM

## 2019-04-23 DIAGNOSIS — R79.89 LOW TSH LEVEL: ICD-10-CM

## 2019-04-24 RX ORDER — PANTOPRAZOLE SODIUM 40 MG/1
40 TABLET, DELAYED RELEASE ORAL
Qty: 180 TABLET | Refills: 1 | Status: SHIPPED | OUTPATIENT
Start: 2019-04-24 | End: 2019-09-30 | Stop reason: SDUPTHER

## 2019-04-24 RX ORDER — LEVOTHYROXINE SODIUM 0.05 MG/1
50 TABLET ORAL
Qty: 90 TABLET | Refills: 1 | Status: SHIPPED | OUTPATIENT
Start: 2019-04-24 | End: 2019-04-24 | Stop reason: SDUPTHER

## 2019-04-24 RX ORDER — LEVOTHYROXINE SODIUM 0.05 MG/1
50 TABLET ORAL
Qty: 90 TABLET | Refills: 1 | Status: SHIPPED | OUTPATIENT
Start: 2019-04-24 | End: 2019-09-30 | Stop reason: SDUPTHER

## 2019-04-24 RX ORDER — PANTOPRAZOLE SODIUM 40 MG/1
40 TABLET, DELAYED RELEASE ORAL
Qty: 180 TABLET | Refills: 1 | Status: SHIPPED | OUTPATIENT
Start: 2019-04-24 | End: 2019-04-24 | Stop reason: SDUPTHER

## 2019-04-29 ENCOUNTER — HOSPITAL ENCOUNTER (OUTPATIENT)
Dept: MRI IMAGING | Facility: HOSPITAL | Age: 74
Discharge: HOME | End: 2019-04-29
Payer: MEDICARE

## 2019-04-29 ENCOUNTER — APPOINTMENT (OUTPATIENT)
Dept: LAB | Facility: HOSPITAL | Age: 74
End: 2019-04-29
Payer: MEDICARE

## 2019-04-29 ENCOUNTER — TRANSCRIBE ORDERS (OUTPATIENT)
Dept: ADMINISTRATIVE | Facility: HOSPITAL | Age: 74
End: 2019-04-29

## 2019-04-29 DIAGNOSIS — R90.89 ABNORMAL FINDING ON MRI OF BRAIN: ICD-10-CM

## 2019-04-29 DIAGNOSIS — R90.89 ABNORMAL FINDING ON MRI OF BRAIN: Primary | ICD-10-CM

## 2019-04-29 LAB
BUN SERPL-MCNC: 18 MG/DL (ref 5–25)
CREAT SERPL-MCNC: 0.8 MG/DL (ref 0.6–1.3)
GFR SERPL CREATININE-BSD FRML MDRD: 74 ML/MIN/1.73SQ M

## 2019-04-29 PROCEDURE — 70553 MRI BRAIN STEM W/O & W/DYE: CPT

## 2019-04-29 PROCEDURE — 84520 ASSAY OF UREA NITROGEN: CPT

## 2019-04-29 PROCEDURE — 36415 COLL VENOUS BLD VENIPUNCTURE: CPT

## 2019-04-29 PROCEDURE — A9585 GADOBUTROL INJECTION: HCPCS | Performed by: NURSE PRACTITIONER

## 2019-04-29 PROCEDURE — 82565 ASSAY OF CREATININE: CPT

## 2019-04-29 RX ADMIN — GADOBUTROL 6 ML: 604.72 INJECTION INTRAVENOUS at 17:27

## 2019-05-06 ENCOUNTER — OFFICE VISIT (OUTPATIENT)
Dept: FAMILY MEDICINE CLINIC | Facility: CLINIC | Age: 74
End: 2019-05-06
Payer: MEDICARE

## 2019-05-06 VITALS
SYSTOLIC BLOOD PRESSURE: 132 MMHG | HEIGHT: 64 IN | TEMPERATURE: 99.7 F | DIASTOLIC BLOOD PRESSURE: 72 MMHG | WEIGHT: 165 LBS | BODY MASS INDEX: 28.17 KG/M2

## 2019-05-06 DIAGNOSIS — E03.8 HYPOTHYROIDISM DUE TO HASHIMOTO'S THYROIDITIS: ICD-10-CM

## 2019-05-06 DIAGNOSIS — R90.89 ABNORMAL FINDING ON MRI OF BRAIN: ICD-10-CM

## 2019-05-06 DIAGNOSIS — Z00.00 MEDICARE ANNUAL WELLNESS VISIT, SUBSEQUENT: Primary | ICD-10-CM

## 2019-05-06 DIAGNOSIS — E06.3 HYPOTHYROIDISM DUE TO HASHIMOTO'S THYROIDITIS: ICD-10-CM

## 2019-05-06 DIAGNOSIS — C50.911 INVASIVE DUCTAL CARCINOMA OF BREAST, RIGHT (HCC): ICD-10-CM

## 2019-05-06 DIAGNOSIS — I10 ESSENTIAL HYPERTENSION: ICD-10-CM

## 2019-05-06 PROCEDURE — 99214 OFFICE O/P EST MOD 30 MIN: CPT | Performed by: FAMILY MEDICINE

## 2019-05-06 PROCEDURE — G0439 PPPS, SUBSEQ VISIT: HCPCS | Performed by: FAMILY MEDICINE

## 2019-05-06 RX ORDER — LANOLIN ALCOHOL/MO/W.PET/CERES
CREAM (GRAM) TOPICAL DAILY
COMMUNITY
End: 2021-12-13

## 2019-05-07 ENCOUNTER — TELEPHONE (OUTPATIENT)
Dept: NEUROLOGY | Facility: CLINIC | Age: 74
End: 2019-05-07

## 2019-05-07 ENCOUNTER — TELEPHONE (OUTPATIENT)
Dept: GASTROENTEROLOGY | Facility: AMBULARY SURGERY CENTER | Age: 74
End: 2019-05-07

## 2019-05-08 ENCOUNTER — TELEPHONE (OUTPATIENT)
Dept: NEUROLOGY | Facility: CLINIC | Age: 74
End: 2019-05-08

## 2019-05-09 ENCOUNTER — OFFICE VISIT (OUTPATIENT)
Dept: NEUROLOGY | Facility: CLINIC | Age: 74
End: 2019-05-09
Payer: MEDICARE

## 2019-05-09 VITALS — HEIGHT: 64 IN | BODY MASS INDEX: 25.1 KG/M2 | WEIGHT: 147 LBS

## 2019-05-09 DIAGNOSIS — R40.4 TRANSIENT ALTERATION OF AWARENESS: ICD-10-CM

## 2019-05-09 DIAGNOSIS — R41.89 UNRESPONSIVENESS: ICD-10-CM

## 2019-05-09 DIAGNOSIS — I95.89 OTHER SPECIFIED HYPOTENSION: ICD-10-CM

## 2019-05-09 DIAGNOSIS — H54.3 VISION LOSS, BILATERAL: ICD-10-CM

## 2019-05-09 DIAGNOSIS — C50.911 INVASIVE DUCTAL CARCINOMA OF BREAST, RIGHT (HCC): ICD-10-CM

## 2019-05-09 DIAGNOSIS — Z87.828 HISTORY OF HEAD INJURY: ICD-10-CM

## 2019-05-09 DIAGNOSIS — G96.198 LEPTOMENINGEAL DISEASE: Primary | ICD-10-CM

## 2019-05-09 PROBLEM — I95.9 ARTERIAL HYPOTENSION: Status: ACTIVE | Noted: 2019-05-09

## 2019-05-09 PROCEDURE — 99215 OFFICE O/P EST HI 40 MIN: CPT | Performed by: PSYCHIATRY & NEUROLOGY

## 2019-05-13 ENCOUNTER — TELEPHONE (OUTPATIENT)
Dept: FAMILY MEDICINE CLINIC | Facility: CLINIC | Age: 74
End: 2019-05-13

## 2019-05-24 ENCOUNTER — HOSPITAL ENCOUNTER (OUTPATIENT)
Dept: RADIOLOGY | Facility: HOSPITAL | Age: 74
Discharge: HOME | End: 2019-05-24
Attending: PSYCHIATRY & NEUROLOGY | Admitting: RADIOLOGY
Payer: MEDICARE

## 2019-05-24 VITALS
HEART RATE: 80 BPM | SYSTOLIC BLOOD PRESSURE: 158 MMHG | TEMPERATURE: 100.8 F | WEIGHT: 147 LBS | RESPIRATION RATE: 18 BRPM | BODY MASS INDEX: 25.1 KG/M2 | HEIGHT: 64 IN | DIASTOLIC BLOOD PRESSURE: 88 MMHG | OXYGEN SATURATION: 97 %

## 2019-05-24 DIAGNOSIS — C50.911 INVASIVE DUCTAL CARCINOMA OF BREAST, RIGHT (HCC): ICD-10-CM

## 2019-05-24 DIAGNOSIS — G96.198 LEPTOMENINGEAL DISEASE: ICD-10-CM

## 2019-05-24 DIAGNOSIS — H54.3 VISION LOSS, BILATERAL: ICD-10-CM

## 2019-05-24 LAB
APPEARANCE CSF: CLEAR
GLUCOSE CSF-MCNC: 54 MG/DL (ref 50–80)
GRAM STN SPEC: NORMAL
GRAM STN SPEC: NORMAL
INR PPP: 1.01 (ref 0.86–1.17)
LYMPHOCYTES NFR CSF MANUAL: 38 %
MONOS+MACROS CSF MANUAL: 63 %
PLATELET # BLD AUTO: 200 THOUSANDS/UL (ref 149–390)
PMV BLD AUTO: 9.7 FL (ref 8.9–12.7)
PROT CSF-MCNC: 71 MG/DL (ref 15–45)
PROTHROMBIN TIME: 13.4 SECONDS (ref 11.8–14.2)
TOTAL CELLS COUNTED BLD: NO
TOTAL CELLS COUNTED SPEC: 32
TUBE # CSF: 4
WBC # CSF AUTO: 1 /UL (ref 0–5)

## 2019-05-24 PROCEDURE — 62270 DX LMBR SPI PNXR: CPT

## 2019-05-24 PROCEDURE — 89051 BODY FLUID CELL COUNT: CPT | Performed by: PSYCHIATRY & NEUROLOGY

## 2019-05-24 PROCEDURE — 88108 CYTOPATH CONCENTRATE TECH: CPT | Performed by: PATHOLOGY

## 2019-05-24 PROCEDURE — 87798 DETECT AGENT NOS DNA AMP: CPT | Performed by: PSYCHIATRY & NEUROLOGY

## 2019-05-24 PROCEDURE — 87206 SMEAR FLUORESCENT/ACID STAI: CPT | Performed by: PSYCHIATRY & NEUROLOGY

## 2019-05-24 PROCEDURE — 85049 AUTOMATED PLATELET COUNT: CPT | Performed by: PSYCHIATRY & NEUROLOGY

## 2019-05-24 PROCEDURE — 82945 GLUCOSE OTHER FLUID: CPT | Performed by: PSYCHIATRY & NEUROLOGY

## 2019-05-24 PROCEDURE — 82164 ANGIOTENSIN I ENZYME TEST: CPT | Performed by: PSYCHIATRY & NEUROLOGY

## 2019-05-24 PROCEDURE — 87483 CNS DNA AMP PROBE TYPE 12-25: CPT | Performed by: PSYCHIATRY & NEUROLOGY

## 2019-05-24 PROCEDURE — 86255 FLUORESCENT ANTIBODY SCREEN: CPT | Performed by: PSYCHIATRY & NEUROLOGY

## 2019-05-24 PROCEDURE — 86341 ISLET CELL ANTIBODY: CPT

## 2019-05-24 PROCEDURE — 84157 ASSAY OF PROTEIN OTHER: CPT | Performed by: PSYCHIATRY & NEUROLOGY

## 2019-05-24 PROCEDURE — 85610 PROTHROMBIN TIME: CPT | Performed by: PSYCHIATRY & NEUROLOGY

## 2019-05-24 PROCEDURE — 87476 LYME DIS DNA AMP PROBE: CPT | Performed by: PSYCHIATRY & NEUROLOGY

## 2019-05-24 PROCEDURE — 87116 MYCOBACTERIA CULTURE: CPT | Performed by: PSYCHIATRY & NEUROLOGY

## 2019-05-24 RX ORDER — LIDOCAINE HYDROCHLORIDE 10 MG/ML
5 INJECTION, SOLUTION INFILTRATION; PERINEURAL
Status: COMPLETED | OUTPATIENT
Start: 2019-05-24 | End: 2019-05-24

## 2019-05-24 RX ADMIN — LIDOCAINE HYDROCHLORIDE 5 ML: 10 INJECTION, SOLUTION INFILTRATION; PERINEURAL at 15:00

## 2019-05-25 LAB
C GATTII+NEOFOR DNA CSF QL NAA+NON-PROBE: NOT DETECTED
CMV DNA CSF QL NAA+NON-PROBE: NOT DETECTED
E COLI K1 DNA CSF QL NAA+NON-PROBE: NOT DETECTED
EV RNA CSF QL NAA+NON-PROBE: NOT DETECTED
GP B STREP DNA CSF QL NAA+NON-PROBE: NOT DETECTED
HAEM INFLU DNA CSF QL NAA+NON-PROBE: NOT DETECTED
HHV6 DNA CSF QL NAA+NON-PROBE: NOT DETECTED
HSV1 DNA CSF QL NAA+NON-PROBE: NOT DETECTED
HSV2 DNA CSF QL NAA+NON-PROBE: NOT DETECTED
L MONOCYTOG DNA CSF QL NAA+NON-PROBE: NOT DETECTED
N MEN DNA CSF QL NAA+NON-PROBE: NOT DETECTED
PARECHOVIRUS A RNA CSF QL NAA+NON-PROBE: NOT DETECTED
S PNEUM DNA CSF QL NAA+NON-PROBE: NOT DETECTED
VZV DNA CSF QL NAA+NON-PROBE: NOT DETECTED

## 2019-05-28 LAB — SCAN RESULT: NORMAL

## 2019-05-30 LAB
ACE CSF-CCNC: 1 U/L (ref 0–2.8)
B BURGDOR DNA SPEC QL NAA+PROBE: NEGATIVE
JCPYV DNA CSF QL NAA+PROBE: NEGATIVE

## 2019-06-05 LAB — MISCELLANEOUS LAB TEST RESULT: NORMAL

## 2019-06-10 LAB — MISCELLANEOUS LAB TEST RESULT: NORMAL

## 2019-07-05 ENCOUNTER — OFFICE VISIT (OUTPATIENT)
Dept: NEUROLOGY | Facility: CLINIC | Age: 74
End: 2019-07-05
Payer: MEDICARE

## 2019-07-05 VITALS
DIASTOLIC BLOOD PRESSURE: 76 MMHG | HEIGHT: 64 IN | OXYGEN SATURATION: 97 % | BODY MASS INDEX: 24.52 KG/M2 | WEIGHT: 143.6 LBS | RESPIRATION RATE: 12 BRPM | HEART RATE: 81 BPM | SYSTOLIC BLOOD PRESSURE: 122 MMHG

## 2019-07-05 DIAGNOSIS — R41.0 CONFUSION: ICD-10-CM

## 2019-07-05 DIAGNOSIS — G96.198 LEPTOMENINGEAL DISEASE: Primary | ICD-10-CM

## 2019-07-05 PROCEDURE — 99215 OFFICE O/P EST HI 40 MIN: CPT | Performed by: PSYCHIATRY & NEUROLOGY

## 2019-07-05 PROCEDURE — 1123F ACP DISCUSS/DSCN MKR DOCD: CPT | Performed by: PSYCHIATRY & NEUROLOGY

## 2019-07-05 NOTE — PROGRESS NOTES
[facility]  PATIENT:  Quentin Germain  MRN:  [id number]  :  1945  DATE OF SERVICE:  2019    Assessment/Plan:   Problem List Items Addressed This Visit        Nervous and Auditory    Confusion    Relevant Orders    MRI brain with and without contrast    Leptomeningeal disease - Primary    Relevant Orders    MRI brain with and without contrast    BUN    Creatinine, serum           Mrs Maria T Puentes has presented for follow up on confusional state and leptomeningeal disease with gradual improvement of memory and cognitive function has been described  No headaches, no new focal neurologic deficit has been noted  Patient was advised repeating MRI w/w/o in 2019 as a follow up imaging -she has persistently mildly elevated CSF protein, with otherwise unremarkable infectious/inflammatory and autoimmune panels  Negative CSF cytology and flow cytometry reported as well  Follow up with oncology tea on scheduled bases  Patient has been asymptomatic and she agreed to follow with Neurosurgical team for potential brain biopsy if she has worsening clinical presentation or radiographic findings  6 months follow up advised  Subjective: confusional state, leptomeningeal diease  HPI History of Present Illness    Mrs Maria T Puentes has presented for follow up on her cognitive dysfunction in the setting of abnormal brain MRI  Prior evaluation was consistent with EEG and sharp waves with possible epileptogenic focus  MRI of the brain with without contrast showed persistent FLAIR sulcal signal abnormality in the right more than left temporoparietal and right cerebellar regions with supratentorial leptomeningeal enhancement  Our impression was that patient leptomeningeal enhancement on was likely due to 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 , to lesser degree : CJD, autoimmune encephalopathy, inflammatory meningoencephalitis    LP was repeated  negative inflammatory panel, negative autoimmune panel CSF from [facility], negative ACE, negative cytology and flow cytometry  Some better cognitive function, with better memory, no new focal neurologic dysfunction, patient is planning cataract surgery, no restrictions  The following portions of the patient's history were reviewed and updated as appropriate:   She  has a past medical history of Abnormal CT of the abdomen, Acute bronchitis, Anxiety, Appetite loss, Arthritis, Ascending aortic aneurysm (HCC), Bilateral renal cysts, Cyst of ovary, Dysphagia, Esophageal reflux disease, Fat necrosis of breast, Full dentures, GERD (gastroesophageal reflux disease), Herpes zoster, Hyperlipidemia, Hypertension, Hypokalemia, Hypomagnesemia, Hypothyroidism, Impaired fasting glucose, Irritable bowel syndrome (IBS), Ischemic colitis ([address] ), Knee pain, Limb alert care status, Osteoarthritis of right knee, Pneumonia, Post-op pain, Pulmonary nodules, Thoracic aortic aneurysm ([address] ), Unspecified ovarian cysts, Use of anastrozole (Arimidex), Vasovagal syncope, Vitamin D deficiency, Wears glasses, and Weight loss    She   Patient Active Problem List    Diagnosis Date Noted    History of head injury 05/09/2019    Arterial hypotension 05/09/2019    Unresponsiveness 05/09/2019    Leptomeningeal disease 05/09/2019    Vision loss, bilateral 05/09/2019    Dense breast tissue 04/10/2019    Alcohol use with intoxication ([address] ) 03/01/2019    Gastroesophageal reflux disease without esophagitis 02/28/2019    Acute blood loss anemia 02/28/2019    Peptic ulcer disease 02/28/2019    Transient alteration of awareness     Left upper arm pain 02/21/2019    HLD (hyperlipidemia) 02/21/2019    Abnormal finding on MRI of brain 02/19/2019    Low TSH level 02/18/2019    Confusion 02/18/2019    Acute non-recurrent maxillary sinusitis 02/04/2019    Iron deficiency anemia 01/28/2019    Thrombocytopenia ([address] ) 01/28/2019    Gastrointestinal hemorrhage associated with gastric ulcer 01/18/2019    Irregular cardiac rhythm 10/22/2018    Atrial fibrillation ([facility] ) 10/22/2018    Breast cancer screening, high risk patient 10/10/2018    Hyponatremia 07/31/2018    Lung nodule seen on imaging study 07/30/2018    Atherosclerosis 07/30/2018    History of alcohol use 07/30/2018    Closed odontoid fracture with type I morphology ([place] [address] ) 07/24/2018    Closed nondisplaced fracture of fifth cervical vertebra ([facility]) 07/24/2018    Closed nondisplaced fracture of seventh cervical vertebra ([place] [address] ) 07/24/2018    Syncope 07/24/2018    Invasive ductal carcinoma of breast, right ([facility])     Use of anastrozole (Arimidex)     Thoracic aortic aneurysm ([facility]) 05/13/2017    Hypokalemia 05/12/2017    Ischemic colitis ([place] [address] ) 05/11/2017    Anxiety     GERD (gastroesophageal reflux disease)     Essential hypertension     Hypothyroidism     Hyperlipidemia     Bilateral renal cysts     Impaired fasting glucose 08/19/2014    Esophageal reflux disease 07/25/2012     She  has a past surgical history that includes Colonoscopy; Cholecystectomy; Tubal ligation; Appendectomy; pr mastectomy, simple, complete (Right, 4/12/2016); pr biopsy/excision, lymph node(s) (Right, 4/12/2016); Knee surgery (Right); Colonoscopy (N/A, 5/12/2017); Breast biopsy (Right, 03/02/2016); Breast surgery (Right); Breast lumpectomy (Right, 2004); US guided breast biopsy right complete (Right, 3/9/2016); Esophagogastroduodenoscopy (N/A, 1/19/2019); IR lumbar puncture (2/21/2019); Hysterectomy (1977); and FL lumbar puncture (5/24/2019)  Her family history includes Alcohol abuse in her brother; Anemia in her mother; Hypertension in her mother; Other in her mother; Stroke in her father and maternal grandmother  She  reports that she has never smoked  She has never used smokeless tobacco  She reports that she drank alcohol  She reports that she does not use drugs    Current Outpatient Medications   Medication Sig Dispense Refill    anastrozole (ARIMIDEX) 1 mg tablet TAKE 1 TABLET EVERY DAY 90 tablet 3    cyanocobalamin (VITAMIN B-12) 1,000 mcg tablet Take by mouth daily      levothyroxine 50 mcg tablet Take 1 tablet (50 mcg total) by mouth daily in the early morning 90 tablet 1    metoprolol succinate (TOPROL-XL) 50 mg 24 hr tablet Take 1 tablet (50 mg total) by mouth daily 90 tablet 0    pantoprazole (PROTONIX) 40 mg tablet Take 1 tablet (40 mg total) by mouth 2 (two) times a day before meals 180 tablet 1     No current facility-administered medications for this visit  Current Outpatient Medications on File Prior to Visit   Medication Sig    anastrozole (ARIMIDEX) 1 mg tablet TAKE 1 TABLET EVERY DAY    cyanocobalamin (VITAMIN B-12) 1,000 mcg tablet Take by mouth daily    levothyroxine 50 mcg tablet Take 1 tablet (50 mcg total) by mouth daily in the early morning    metoprolol succinate (TOPROL-XL) 50 mg 24 hr tablet Take 1 tablet (50 mg total) by mouth daily    pantoprazole (PROTONIX) 40 mg tablet Take 1 tablet (40 mg total) by mouth 2 (two) times a day before meals     No current facility-administered medications on file prior to visit  She is allergic to demerol [meperidine]; nitroglycerin; lipitor [atorvastatin]; and zocor [simvastatin]            Objective:    Blood pressure 122/76, pulse 81, resp  rate 12, height 5' 4" (1 626 m), weight 65 1 kg (143 lb 9 6 oz), SpO2 97 %  Physical Exam/Neurological Exam    CONSTITUTIONAL: NAD, pleasant  NECK: supple, no lymphadenopathy, no thyromegaly, no JVD  CARDIOVASCULAR: RRR, normal S1S2, no murmurs, no rubs  RESP: clear to auscultation bilaterally, no wheezes/rhonchi/rales  ABDOMEN: soft, non tender, non distended  SKIN: no rash or skin lesions  EXTREMITIES: no edema, pulses 2+bilaterally  PSYCH: appropriate mood and affect  NEUROLOGIC COMPREHENSIVE EXAM: Patient is oriented to person, place and time, NAD; appropriate affect   CN II, III, IV, V, VI, VII,VIII,IX,X,XI-XII intact with EOMI, PERRLA, OKN intact, VF grossly intact, fundi poorly visualized secondary to pupillary constriction; symmetric face noted  Motor: 5/5 UE/LE bilateral symmetric; Sensory: intact to light touch and pinprick bilaterally; normal vibration sensation feet bilaterally; Coordination within normal limits on FTN and AMIRAH testing; DTR: 2/4 through, no Babinski, no clonus  Tandem gait is intact  Romberg: negative  ROS:  12 points of review of system was reviewed with the patient and was unremarkable with exception: see HPI  Review of Systems   All other systems reviewed and are negative

## 2019-07-09 LAB
MYCOBACTERIUM SPEC CULT: NORMAL
RHODAMINE-AURAMINE STN SPEC: NORMAL

## 2019-07-23 ENCOUNTER — APPOINTMENT (OUTPATIENT)
Dept: LAB | Facility: HOSPITAL | Age: 74
End: 2019-07-23
Attending: PSYCHIATRY & NEUROLOGY
Payer: MEDICARE

## 2019-07-23 ENCOUNTER — HOSPITAL ENCOUNTER (OUTPATIENT)
Dept: MRI IMAGING | Facility: HOSPITAL | Age: 74
Discharge: HOME | End: 2019-07-23
Attending: PSYCHIATRY & NEUROLOGY
Payer: MEDICARE

## 2019-07-23 DIAGNOSIS — G96.198 LEPTOMENINGEAL DISEASE: ICD-10-CM

## 2019-07-23 DIAGNOSIS — R41.0 CONFUSION: ICD-10-CM

## 2019-07-23 LAB
BUN SERPL-MCNC: 22 MG/DL (ref 5–25)
CREAT SERPL-MCNC: 0.9 MG/DL (ref 0.6–1.3)
GFR SERPL CREATININE-BSD FRML MDRD: 65 ML/MIN/1.73SQ M

## 2019-07-23 PROCEDURE — 84520 ASSAY OF UREA NITROGEN: CPT

## 2019-07-23 PROCEDURE — 36415 COLL VENOUS BLD VENIPUNCTURE: CPT

## 2019-07-23 PROCEDURE — A9585 GADOBUTROL INJECTION: HCPCS | Performed by: PSYCHIATRY & NEUROLOGY

## 2019-07-23 PROCEDURE — 82565 ASSAY OF CREATININE: CPT

## 2019-07-23 PROCEDURE — 70553 MRI BRAIN STEM W/O & W/DYE: CPT

## 2019-07-23 RX ADMIN — GADOBUTROL 6 ML: 604.72 INJECTION INTRAVENOUS at 17:05

## 2019-08-08 NOTE — PROGRESS NOTES
Assessment and Plan:     Problem List Items Addressed This Visit     None         History of Present Illness:     Patient presents for Welcome to Medicare visit  Patient Care Team:  Mak Garza DO as PCP - Jolly Rooney MD as PCP - [facility] (General Surgery)  MD Nora Estrada MD Kearney Nash, MD Paulita Savage, MD Acie Gail, DO Vic Huh, MD as Endoscopist     Review of Systems:     Review of Systems   Gastrointestinal: Negative for bowel incontinence  Psychiatric/Behavioral: The patient is not nervous/anxious           Problem List:     Patient Active Problem List   Diagnosis    Anxiety    GERD (gastroesophageal reflux disease)    Essential hypertension    Hypothyroidism    Hyperlipidemia    Bilateral renal cysts    Ischemic colitis [facility] )    Hypokalemia    Thoracic aortic aneurysm (HCC)    Impaired fasting glucose    Esophageal reflux disease    Invasive ductal carcinoma of breast, right (HCC)    Use of anastrozole (Arimidex)    Closed odontoid fracture with type I morphology (HCC)    Closed nondisplaced fracture of fifth cervical vertebra (HCC)    Closed nondisplaced fracture of seventh cervical vertebra (HCC)    Syncope    Lung nodule seen on imaging study    Atherosclerosis    History of alcohol use    Hyponatremia    Breast cancer screening, high risk patient    Irregular cardiac rhythm    Atrial fibrillation (HCC)    Gastrointestinal hemorrhage associated with gastric ulcer    Iron deficiency anemia    Thrombocytopenia (HCC)    Acute non-recurrent maxillary sinusitis    Low TSH level    Confusion    Abnormal finding on MRI of brain    Left upper arm pain    HLD (hyperlipidemia)    Gastroesophageal reflux disease without esophagitis    Acute blood loss anemia    Peptic ulcer disease    Transient alteration of awareness    Alcohol use with intoxication (HCC)    Dense breast tissue    History of head injury    Arterial hypotension    Unresponsiveness    Leptomeningeal disease    Vision loss, bilateral      Past Medical and Surgical History:     Past Medical History:   Diagnosis Date    Abnormal CT of the abdomen     Acute bronchitis     Anxiety     Appetite loss     Arthritis     Ascending aortic aneurysm (HCC)     Bilateral renal cysts     Cyst of ovary     Dysphagia     Esophageal reflux disease     Fat necrosis of breast     Full dentures     GERD (gastroesophageal reflux disease)     Herpes zoster     Hyperlipidemia     Hypertension     Hypokalemia     Hypomagnesemia     Hypothyroidism     Impaired fasting glucose     Irritable bowel syndrome (IBS)     Ischemic colitis (HCC)     Knee pain     Limb alert care status     no BP/IV right arm    Osteoarthritis of right knee     Pneumonia     Post-op pain     Pulmonary nodules     Thoracic aortic aneurysm (HCC)     Unspecified ovarian cysts     Use of anastrozole (Arimidex)     Vasovagal syncope     Vitamin D deficiency     Wears glasses     Weight loss      Past Surgical History:   Procedure Laterality Date    APPENDECTOMY      pt states it was not removed    BREAST BIOPSY Right 03/02/2016    BREAST LUMPECTOMY Right 2004    BREAST SURGERY Right     Single lesion excision 1990 hyperplasia, 1st biopsy at 25yrs old was benign    CHOLECYSTECTOMY      COLONOSCOPY      COLONOSCOPY N/A 5/12/2017    Procedure: COLONOSCOPY with biopsy;  Surgeon: Layton Collazo MD;  Location: [facility]; Service:     ESOPHAGOGASTRODUODENOSCOPY N/A 1/19/2019    Procedure: ESOPHAGOGASTRODUODENOSCOPY (EGD) with 4cc of epinephrine injection and cauterized with gold probe; Surgeon: Kaylynn Rios MD;  Location: [facility];   Service: Gastroenterology    [facility] LUMBAR PUNCTURE  5/24/2019    HYSTERECTOMY  1977    IR LUMBAR PUNCTURE  2/21/2019    KNEE SURGERY Right     UT BIOPSY/EXCISION, LYMPH NODE(S) Right 4/12/2016    Procedure: BIOPSY LYMPH NODE SENTINEL @ 1200 LYMPHOSCINTIGRAPHY LYMPHATIC MAPPING;  Surgeon: Antonio Sousa MD;  Location: [facility];  Service: General    [place] MASTECTOMY, SIMPLE, COMPLETE Right 4/12/2016    Procedure: MASTECTOMY SIMPLE;  Surgeon: Antonio Sousa MD;  Location: [facility];  Service: General    TUBAL LIGATION      US GUIDED BREAST BIOPSY RIGHT COMPLETE Right 3/9/2016      Family History:     Family History   Problem Relation Age of Onset    Stroke Maternal Grandmother     Anemia Mother     Other Mother         disorders of blood and blood forming organs    Hypertension Mother     Stroke Father     Alcohol abuse Brother       Social History:     Social History     Tobacco Use   Smoking Status Never Smoker   Smokeless Tobacco Never Used   Tobacco Comment    not interested     Social History     Substance and Sexual Activity   Alcohol Use Not Currently     Social History     Substance and Sexual Activity   Drug Use No      Medications and Allergies:     Current Outpatient Medications   Medication Sig Dispense Refill    anastrozole (ARIMIDEX) 1 mg tablet TAKE 1 TABLET EVERY DAY 90 tablet 3    cyanocobalamin (VITAMIN B-12) 1,000 mcg tablet Take by mouth daily      levothyroxine 50 mcg tablet Take 1 tablet (50 mcg total) by mouth daily in the early morning 90 tablet 1    metoprolol succinate (TOPROL-XL) 50 mg 24 hr tablet Take 1 tablet (50 mg total) by mouth daily 90 tablet 0    pantoprazole (PROTONIX) 40 mg tablet Take 1 tablet (40 mg total) by mouth 2 (two) times a day before meals 180 tablet 1     No current facility-administered medications for this visit  Allergies   Allergen Reactions    Demerol [Meperidine] GI Intolerance and Other (See Comments)     Other reaction(s):  Other (See Comments)  severe nausea  severe nausea  Nausea/vomiting    Nitroglycerin Anaphylaxis and Other (See Comments)     BP drops drastically    Lipitor [Atorvastatin] Other (See Comments)     Joint/muscle pain    Zocor [Simvastatin] Other (See Comments)     Joint/muscle pain      Immunizations:     Immunization History   Administered Date(s) Administered     Influenza (IM) Preservative Free 09/17/2010, 12/29/2011    Influenza Split High Dose Preservative Free IM 11/14/2012, 10/30/2013, 11/21/2014, 01/14/2016, 10/03/2016, 10/30/2017    Influenza TIV (IM) 10/10/2008, 10/02/2009    Influenza, high dose seasonal 0 5 mL 10/22/2018    Pneumococcal Polysaccharide PPV23 08/17/2016    Tdap 07/24/2018    Tuberculin Skin Test-PPD Intradermal 07/28/2018      Medicare Screening Tests and Risk Assessments:     Addison Rocha is here for her Subsequent Wellness visit  Health Risk Assessment:  Patient rates overall health as good  Patient feels that their physical health rating is Same  Eyesight was rated as Same  Hearing was rated as Same  Patient feels that their emotional and mental health rating is Same  Pain experienced by patient in the last 7 days has been None  Patient states that she has experienced no weight loss or gain in last 6 months  Emotional/Mental Health:  Patient has not been feeling nervous/anxious  PHQ-9 Depression Screening:    Frequency of the following problems over the past two weeks:      1  Little interest or pleasure in doing things: 0 - not at all      2  Feeling down, depressed, or hopeless: 0 - not at all  PHQ-2 Score: 0          Broken Bones/Falls: Fall Risk Assessment:    In the past year, patient has experienced: No history of falling in past year          Bladder/Bowel:  Patient has not leaked urine accidently in the last six months  Patient reports no loss of bowel control  Immunizations:  Patient has had a flu vaccination within the last year  Patient has received a pneumonia shot  Patient has not received a shingles shot  Patient has received tetanus/diphtheria shot  Home Safety:  Patient does not have trouble with stairs inside or outside of their home     Patient currently reports that there are no safety hazards present in home, working smoke alarms, working carbon monoxide detectors  Preventative Screenings:   Breast cancer screening performed, colon cancer screen completed, cholesterol screen completed, glaucoma eye exam completed,     Nutrition:  Current diet: Regular with servings of the following:    Medications:  Patient is able to manage medications  Lifestyle Choices:  Patient reports no tobacco use  Patient reports alcohol use  Alcohol use per week: OCCASIONALLY  Patient drives a vehicle  Patient wears seat belt  Activities of Daily Living:  Can get out of bed by his or her self, able to dress self, able to make own meals, able to do own shopping, able to bathe self, can do own laundry/housekeeping, can manage own money, pay bills and track expenses    Previous Hospitalizations:  No hospitalization or ED visit in past 12 months        Advanced Directives:  Patient has decided on a power of   Patient has spoken to designated power of   Patient has completed advanced directive          Preventative Screening/Counseling:      Cardiovascular:      General: Risks and Benefits Discussed and Screening Current          Diabetes:      General: Risks and Benefits Discussed and Screening Current          Colorectal Cancer:      General: Risks and Benefits Discussed and Screening Current          Breast Cancer:      General: Risks and Benefits Discussed and Screening Current          Cervical Cancer:      General: Risks and Benefits Discussed and Screening Current          Osteoporosis:      General: Risks and Benefits Discussed      Counseling: Calcium and Vitamin D Intake          AAA:      General: Screening Not Indicated          Glaucoma:      General: Risks and Benefits Discussed and Screening Current          HIV:      General: Risks and Benefits Discussed and Screening Not Indicated          Hepatitis C:      General: Risks and Benefits Discussed and Screening Current        Advanced Directives:   Patient has living will for healthcare, has durable POA for healthcare, patient has an advanced directive  Immunizations:      Influenza: Risks & Benefits Discussed and Influenza UTD This Year      Pneumococcal: Risks & Benefits Discussed and Lifetime Vaccine Completed      Shingrix: Risks & Benefits Discussed and Vaccine Status Unknown      Zostavax: Risks & Benefits Discussed and Vaccine Status Unknown      TD: Risks & Benefits Discussed and Td Vaccine UTD      Other Preventative Counseling (Non-Medicare): Fall Prevention and Increase physical activity        No exam data present     Physical Exam:     There were no vitals taken for this visit      Physical Exam

## 2019-08-12 ENCOUNTER — OFFICE VISIT (OUTPATIENT)
Dept: FAMILY MEDICINE CLINIC | Facility: CLINIC | Age: 74
End: 2019-08-12
Payer: MEDICARE

## 2019-08-12 VITALS
TEMPERATURE: 98.8 F | DIASTOLIC BLOOD PRESSURE: 64 MMHG | WEIGHT: 144 LBS | SYSTOLIC BLOOD PRESSURE: 116 MMHG | BODY MASS INDEX: 24.59 KG/M2 | HEIGHT: 64 IN

## 2019-08-12 DIAGNOSIS — I10 ESSENTIAL HYPERTENSION: ICD-10-CM

## 2019-08-12 DIAGNOSIS — K21.00 GASTROESOPHAGEAL REFLUX DISEASE WITH ESOPHAGITIS: ICD-10-CM

## 2019-08-12 DIAGNOSIS — E03.8 HYPOTHYROIDISM DUE TO HASHIMOTO'S THYROIDITIS: Primary | ICD-10-CM

## 2019-08-12 DIAGNOSIS — E06.3 HYPOTHYROIDISM DUE TO HASHIMOTO'S THYROIDITIS: Primary | ICD-10-CM

## 2019-08-12 DIAGNOSIS — E78.2 MIXED HYPERLIPIDEMIA: ICD-10-CM

## 2019-08-12 DIAGNOSIS — R73.01 IMPAIRED FASTING GLUCOSE: ICD-10-CM

## 2019-08-12 PROCEDURE — 99214 OFFICE O/P EST MOD 30 MIN: CPT | Performed by: FAMILY MEDICINE

## 2019-08-12 NOTE — PROGRESS NOTES
Assessment/Plan:       Diagnoses and all orders for this visit:    Hypothyroidism due to Hashimoto's thyroiditis  -     CBC and differential; Future  -     Comprehensive metabolic panel; Future  -     Hemoglobin A1C; Future  -     Lipid panel; Future  -     TSH, 3rd generation with Free T4 reflex; Future  -     Microalbumin / creatinine urine ratio    Essential hypertension  -     CBC and differential; Future  -     Comprehensive metabolic panel; Future  -     Hemoglobin A1C; Future  -     Lipid panel; Future  -     TSH, 3rd generation with Free T4 reflex; Future  -     Microalbumin / creatinine urine ratio    Impaired fasting glucose  -     CBC and differential; Future  -     Comprehensive metabolic panel; Future  -     Hemoglobin A1C; Future  -     Lipid panel; Future  -     TSH, 3rd generation with Free T4 reflex; Future  -     Microalbumin / creatinine urine ratio    Mixed hyperlipidemia  -     CBC and differential; Future  -     Comprehensive metabolic panel; Future  -     Hemoglobin A1C; Future  -     Lipid panel; Future  -     TSH, 3rd generation with Free T4 reflex; Future  -     Microalbumin / creatinine urine ratio    Gastroesophageal reflux disease with esophagitis            Subjective:        Patient ID: Casa Ceja is a 72 y o  female  Patient follow-up on hypertension hypothyroidism  Patient status post MRI of the brain which was discussed with her  Patient also laboratory studies  Patient sees should Neurology on July 23rd  The following portions of the patient's history were reviewed and updated as appropriate: allergies, current medications, past family history, past medical history, past social history, past surgical history and problem list       Review of Systems   Constitutional: Negative  HENT: Negative  Eyes: Negative  Respiratory: Negative  Cardiovascular: Negative  Gastrointestinal: Negative  Endocrine: Negative  Genitourinary: Negative  Musculoskeletal: Negative  Skin: Negative  Allergic/Immunologic: Negative  Neurological: Negative  Hematological: Negative  Psychiatric/Behavioral: Negative  Objective:      BMI Counseling: Body mass index is 24 72 kg/m²  Discussed the patient's BMI with her  The BMI is above average  BMI counseling and education was provided to the patient  Nutrition recommendations include reducing portion sizes  Depression Screening Follow-up Plan: Patient's depression screening was positive with a PHQ-2 score of 0  Their PHQ-9 score was   Patient assessed for underlying major depression  They have no active suicidal ideations  Brief counseling provided and recommend additional follow-up/re-evaluation next office visit  /64 (BP Location: Left arm, Patient Position: Sitting, Cuff Size: Adult)   Temp 98 8 °F (37 1 °C) (Tympanic)   Ht 5' 4" (1 626 m)   Wt 65 3 kg (144 lb)   BMI 24 72 kg/m²          Physical Exam   Constitutional: She appears well-developed and well-nourished  No distress  HENT:   Head: Normocephalic  Right Ear: External ear normal    Left Ear: External ear normal    Mouth/Throat: Oropharynx is clear and moist  No oropharyngeal exudate  Eyes: Pupils are equal, round, and reactive to light  EOM are normal  Right eye exhibits no discharge  Left eye exhibits no discharge  No scleral icterus  Neck: Normal range of motion  Neck supple  No thyromegaly present  Cardiovascular: Normal rate, regular rhythm, normal heart sounds and intact distal pulses  Exam reveals no gallop and no friction rub  No murmur heard  Pulmonary/Chest: Effort normal and breath sounds normal  No respiratory distress  She has no wheezes  She has no rales  She exhibits no tenderness  Abdominal: Soft  Bowel sounds are normal  She exhibits no distension  There is no tenderness  There is no rebound and no guarding  Musculoskeletal: Normal range of motion  She exhibits no edema or tenderness  Lymphadenopathy:     She has no cervical adenopathy  Neurological: She is alert  No cranial nerve deficit  She exhibits normal muscle tone  Coordination normal    Skin: Skin is warm and dry  No rash noted  She is not diaphoretic  No erythema  No pallor  Psychiatric: She has a normal mood and affect  Her behavior is normal  Judgment and thought content normal    Nursing note and vitals reviewed

## 2019-09-09 ENCOUNTER — TELEPHONE (OUTPATIENT)
Dept: HEMATOLOGY ONCOLOGY | Facility: CLINIC | Age: 74
End: 2019-09-09

## 2019-09-09 NOTE — TELEPHONE ENCOUNTER
Patient daughter called in stated that she wanted to change the appointment for 11/11/2019 for 11/12/2019 I advise the doctor will not be unavailable that day of the 12th  and Spencer Diego advise that she will keep the schedule appointment

## 2019-09-30 DIAGNOSIS — K25.4 GASTROINTESTINAL HEMORRHAGE ASSOCIATED WITH GASTRIC ULCER: ICD-10-CM

## 2019-09-30 DIAGNOSIS — S12.100A CLOSED ODONTOID FRACTURE, INITIAL ENCOUNTER (HCC): ICD-10-CM

## 2019-09-30 DIAGNOSIS — R79.89 LOW TSH LEVEL: ICD-10-CM

## 2019-09-30 RX ORDER — LEVOTHYROXINE SODIUM 0.05 MG/1
TABLET ORAL
Qty: 90 TABLET | Refills: 1 | Status: SHIPPED | OUTPATIENT
Start: 2019-09-30 | End: 2020-03-26

## 2019-09-30 RX ORDER — PANTOPRAZOLE SODIUM 40 MG/1
TABLET, DELAYED RELEASE ORAL
Qty: 180 TABLET | Refills: 1 | Status: SHIPPED | OUTPATIENT
Start: 2019-09-30 | End: 2020-03-26

## 2019-09-30 RX ORDER — METOPROLOL SUCCINATE 50 MG/1
TABLET, EXTENDED RELEASE ORAL
Qty: 90 TABLET | Refills: 1 | Status: SHIPPED | OUTPATIENT
Start: 2019-09-30 | End: 2020-03-26

## 2019-09-30 NOTE — TELEPHONE ENCOUNTER
Pt asking if we can verify her meds that were ordered by the pharmacy  Wasn't sure if she is still taking them? Please call pt

## 2019-10-03 DIAGNOSIS — C50.911 INVASIVE DUCTAL CARCINOMA OF BREAST, RIGHT (HCC): Primary | ICD-10-CM

## 2019-10-14 ENCOUNTER — OFFICE VISIT (OUTPATIENT)
Dept: SURGICAL ONCOLOGY | Facility: CLINIC | Age: 74
End: 2019-10-14
Payer: MEDICARE

## 2019-10-14 VITALS
WEIGHT: 145.8 LBS | RESPIRATION RATE: 16 BRPM | HEART RATE: 88 BPM | HEIGHT: 64 IN | SYSTOLIC BLOOD PRESSURE: 140 MMHG | DIASTOLIC BLOOD PRESSURE: 80 MMHG | TEMPERATURE: 99.4 F | BODY MASS INDEX: 24.89 KG/M2

## 2019-10-14 DIAGNOSIS — R92.2 DENSE BREAST TISSUE: ICD-10-CM

## 2019-10-14 DIAGNOSIS — Z12.39 BREAST CANCER SCREENING, HIGH RISK PATIENT: ICD-10-CM

## 2019-10-14 DIAGNOSIS — C50.911 INVASIVE DUCTAL CARCINOMA OF BREAST, RIGHT (HCC): Primary | ICD-10-CM

## 2019-10-14 DIAGNOSIS — Z79.811 USE OF ANASTROZOLE (ARIMIDEX): ICD-10-CM

## 2019-10-14 PROCEDURE — 99214 OFFICE O/P EST MOD 30 MIN: CPT | Performed by: SURGERY

## 2019-10-14 NOTE — PROGRESS NOTES
Surgical Oncology Follow Up       [facility]  CANCER CARE [facility] SURGICAL ONCOLOGY [facility] [address]    Maddy Olivarez  1945  [id number]  [address]  CANCER CARE [facility] SURGICAL ONCOLOGY [facility]    Chief Complaint   Patient presents with    Follow-up       Assessment/Plan   Diagnoses and all orders for this visit:    Invasive ductal carcinoma of breast, right ([address] )    Breast cancer screening, high risk patient  -     Mammo screening left w 3d & cad; Future    Dense breast tissue    Use of anastrozole (Arimidex)        Advance Care Planning/Advance Directives:  Discussed disease status, cancer treatment plans and/or cancer treatment goals with the patient  Oncology History:       Invasive ductal carcinoma of breast, right [facility])     Initial Diagnosis     Invasive ductal carcinoma of breast, right [facility])      2004 Surgery     lumpectomy      2004 -  Radiation     WBRT      2004 -  Hormone Therapy     Tamoxifen X 5 years      3/9/2016 Surgery     Right breast US guided core biopsy,  IDC      4/12/2016 Surgery     Right breast mastectomy, SLNB      5/13/2016 -  Hormone Therapy     Anastrazole  Dr Marissa Looney         History of Present Illness:  Breast cancer follow-up, continues on anastrozole, no breast referable concerns  -Interval History:  None    Review of Systems:  Review of Systems   Constitutional: Positive for unexpected weight change (Weight loss)  Negative for appetite change and fever  Eyes: Negative  Respiratory: Negative for shortness of breath  Cardiovascular: Negative  Gastrointestinal: Negative  Endocrine: Negative  Genitourinary: Negative  Musculoskeletal: Negative  Negative for arthralgias and myalgias  Skin: Negative  Allergic/Immunologic: Negative  Neurological: Negative  Hematological: Negative  Negative for adenopathy  Does not bruise/bleed easily  Psychiatric/Behavioral: Negative          Patient Active Problem List   Diagnosis    Anxiety    GERD (gastroesophageal reflux disease)    Essential hypertension    Hypothyroidism    Hyperlipidemia    Bilateral renal cysts    Ischemic colitis ([address] )    Hypokalemia    Thoracic aortic aneurysm (HCC)    Impaired fasting glucose    Esophageal reflux disease    Invasive ductal carcinoma of breast, right (HCC)    Use of anastrozole (Arimidex)    Closed odontoid fracture with type I morphology (HCC)    Closed nondisplaced fracture of fifth cervical vertebra (HCC)    Closed nondisplaced fracture of seventh cervical vertebra (HCC)    Syncope    Lung nodule seen on imaging study    Atherosclerosis    History of alcohol use    Hyponatremia    Breast cancer screening, high risk patient    Irregular cardiac rhythm    Atrial fibrillation (HCC)    Gastrointestinal hemorrhage associated with gastric ulcer    Iron deficiency anemia    Thrombocytopenia (HCC)    Acute non-recurrent maxillary sinusitis    Low TSH level    Confusion    Abnormal finding on MRI of brain    Left upper arm pain    HLD (hyperlipidemia)    Gastroesophageal reflux disease without esophagitis    Acute blood loss anemia    Peptic ulcer disease    Transient alteration of awareness    Alcohol use with intoxication (HCC)    Dense breast tissue    History of head injury    Arterial hypotension    Unresponsiveness    Leptomeningeal disease    Vision loss, bilateral     Past Medical History:   Diagnosis Date    Abnormal CT of the abdomen     Acute bronchitis     Anxiety     Appetite loss     Arthritis     Ascending aortic aneurysm (HCC)     Bilateral renal cysts     Cyst of ovary     Dysphagia     Esophageal reflux disease     Full dentures     GERD (gastroesophageal reflux disease)     Herpes zoster     Hyperlipidemia     Hypertension     Hypokalemia     Hypomagnesemia     Hypothyroidism     Impaired fasting glucose     Irritable bowel syndrome (IBS)     Ischemic colitis (HCC)     Knee pain     Limb alert care status     no BP/IV right arm    Osteoarthritis of right knee     Pneumonia     Post-op pain     Pulmonary nodules     Thoracic aortic aneurysm (HCC)     Unspecified ovarian cysts     Use of anastrozole (Arimidex)     Vasovagal syncope     Vitamin D deficiency     Wears glasses     Weight loss      Past Surgical History:   Procedure Laterality Date    APPENDECTOMY      pt states it was not removed    BREAST BIOPSY Right 03/02/2016    BREAST LUMPECTOMY Right 2004    BREAST SURGERY Right     Single lesion excision 1990 hyperplasia, 1st biopsy at 25yrs old was benign    CHOLECYSTECTOMY      COLONOSCOPY      COLONOSCOPY N/A 5/12/2017    Procedure: COLONOSCOPY with biopsy;  Surgeon: Sohail Coto MD;  Location: [facility]; Service:     ESOPHAGOGASTRODUODENOSCOPY N/A 1/19/2019    Procedure: ESOPHAGOGASTRODUODENOSCOPY (EGD) with 4cc of epinephrine injection and cauterized with gold probe; Surgeon: Nimisha Blanco MD;  Location: [facility];   Service: Gastroenterology    [facility] LUMBAR PUNCTURE  5/24/2019    HYSTERECTOMY  1977    IR LUMBAR PUNCTURE  2/21/2019    KNEE SURGERY Right     ID BIOPSY/EXCISION, LYMPH NODE(S) Right 4/12/2016    Procedure: BIOPSY LYMPH NODE SENTINEL @ 1200 LYMPHOSCINTIGRAPHY LYMPHATIC MAPPING;  Surgeon: Anshu Garcia MD;  Location: [facility];  Service: General    ID MASTECTOMY, SIMPLE, COMPLETE Right 4/12/2016    Procedure: MASTECTOMY SIMPLE;  Surgeon: Anshu Garcia MD;  Location: [facility];  Service: General    TUBAL LIGATION      US GUIDED BREAST BIOPSY RIGHT COMPLETE Right 3/9/2016     Family History   Problem Relation Age of Onset    Stroke Maternal Grandmother     Anemia Mother     Other Mother         disorders of blood and blood forming organs    Hypertension Mother     Stroke Father     Alcohol abuse Brother      Social History     Socioeconomic History    Marital status: Single     Spouse name: Not on file    Number of children: Not on file    Years of education: Not on file    Highest education level: Not on file   Occupational History    Occupation: retired   Social Needs    Financial resource strain: Not on file    Food insecurity:     Worry: Not on file     Inability: Not on file   Athletic Standard needs:     Medical: Not on file     Non-medical: Not on file   Tobacco Use    Smoking status: Never Smoker    Smokeless tobacco: Never Used    Tobacco comment: not interested   Substance and Sexual Activity    Alcohol use: Not Currently    Drug use: No    Sexual activity: Not Currently   Lifestyle    Physical activity:     Days per week: Not on file     Minutes per session: Not on file    Stress: Not on file   Relationships    Social connections:     Talks on phone: Not on file     Gets together: Not on file     Attends Che[employer] service: Not on file     Active member of club or organization: Not on file     Attends meetings of clubs or organizations: Not on file     Relationship status: Not on file    Intimate partner violence:     Fear of current or ex partner: Not on file     Emotionally abused: Not on file     Physically abused: Not on file     Forced sexual activity: Not on file   Other Topics Concern    Not on file   Social History Narrative    Not on file       Current Outpatient Medications:     anastrozole (ARIMIDEX) 1 mg tablet, TAKE 1 TABLET EVERY DAY, Disp: 90 tablet, Rfl: 3    cyanocobalamin (VITAMIN B-12) 1,000 mcg tablet, Take by mouth daily, Disp: , Rfl:     levothyroxine 50 mcg tablet, TAKE 1 TABLET DAILY IN THE EARLY MORNING, Disp: 90 tablet, Rfl: 1    metoprolol succinate (TOPROL-XL) 50 mg 24 hr tablet, TAKE 1 TABLET EVERY DAY, Disp: 90 tablet, Rfl: 1    pantoprazole (PROTONIX) 40 mg tablet, TAKE 1 TABLET 2 TIMES A DAY BEFORE MEALS, Disp: 180 tablet, Rfl: 1  Allergies   Allergen Reactions    Demerol [Meperidine] GI Intolerance and Other (See Comments)     Other reaction(s): Other (See Comments)  severe nausea  severe nausea  Nausea/vomiting    Nitroglycerin Anaphylaxis and Other (See Comments)     BP drops drastically    Lipitor [Atorvastatin] Other (See Comments)     Joint/muscle pain    Zocor [Simvastatin] Other (See Comments)     Joint/muscle pain       The following portions of the patient's history were reviewed and updated as appropriate: allergies, current medications, past family history, past medical history, past social history, past surgical history and problem list         Vitals:    10/14/19 1527   BP: 140/80   Pulse: 88   Resp: 16   Temp: 99 4 °F (37 4 °C)       Physical Exam   Constitutional: She is oriented to person, place, and time  She appears well-developed and well-nourished  HENT:   Head: Normocephalic and atraumatic  Cardiovascular: Normal heart sounds  Pulmonary/Chest: Breath sounds normal  Right breast exhibits skin change (Mastectomy scar)  Right breast exhibits no mass and no tenderness  Left breast exhibits no inverted nipple, no mass, no nipple discharge, no skin change and no tenderness  Abdominal: Soft  Lymphadenopathy:        Right axillary: No pectoral and no lateral adenopathy present  Left axillary: No pectoral and no lateral adenopathy present  Right: No supraclavicular adenopathy present  Left: No supraclavicular adenopathy present  Neurological: She is alert and oriented to person, place, and time  Psychiatric: She has a normal mood and affect  Discussion/Summary:  81-year-old female status post completion mastectomy of the right breast for recurrent carcinoma  She continues on anastrozole with no concerns  There is no evidence of disease based on examination today  She does continue to have dense breast tissue  I will therefore order a 3D screening mammogram of the left breast for April when she is due    I will see her again at that time or sooner should the need arise

## 2019-11-08 ENCOUNTER — TELEPHONE (OUTPATIENT)
Dept: NEUROLOGY | Facility: CLINIC | Age: 74
End: 2019-11-08

## 2019-11-11 ENCOUNTER — OFFICE VISIT (OUTPATIENT)
Dept: HEMATOLOGY ONCOLOGY | Facility: CLINIC | Age: 74
End: 2019-11-11
Payer: MEDICARE

## 2019-11-11 VITALS
HEIGHT: 64 IN | WEIGHT: 143 LBS | DIASTOLIC BLOOD PRESSURE: 98 MMHG | BODY MASS INDEX: 24.41 KG/M2 | SYSTOLIC BLOOD PRESSURE: 158 MMHG | RESPIRATION RATE: 18 BRPM | TEMPERATURE: 97.6 F

## 2019-11-11 DIAGNOSIS — C50.911 INVASIVE DUCTAL CARCINOMA OF BREAST, RIGHT (HCC): Primary | ICD-10-CM

## 2019-11-11 DIAGNOSIS — Z79.811 USE OF ANASTROZOLE (ARIMIDEX): ICD-10-CM

## 2019-11-11 PROCEDURE — 99214 OFFICE O/P EST MOD 30 MIN: CPT | Performed by: INTERNAL MEDICINE

## 2019-11-11 RX ORDER — ANASTROZOLE 1 MG/1
1 TABLET ORAL DAILY
Qty: 90 TABLET | Refills: 3 | Status: SHIPPED | OUTPATIENT
Start: 2019-11-11 | End: 2020-09-28 | Stop reason: SDUPTHER

## 2019-11-11 NOTE — PROGRESS NOTES
Hematology / Oncology Outpatient Follow Up Note    Suzanne Gonzalez 69 y o  female :1945 [id number]         Date:  2019    Assessment / Plan:  A 68 year old postmenopausal woman who has history of early stage right breast cancer, ER positive disease in   She underwent lumpectomy followed by radiation, as well as 5 years of adjuvant hormonal therapy with tamoxifen, completed in   She has stage IA recurrent right breast cancer, grade 1, ER/ID strongly positive, HER-2 negative disease  She underwent mastectomy resulting in MARIAELENA  She is currently on adjuvant hormonal therapy with anastrozole with no significant side effects  Clinically, she has no evidence recurrent disease  I recommended her to continue with anastrozole 1 mg once a day until May 2021 to complete 5 years of adjuvant hormonal therapy  She is in agreement with my recommendation  I will see her again in a year for routine follow-up        Subjective:      HPI: A 78year old postmenopausal woman who has history of atypical ductal hyperplasia in   She had excisional biopsy  She was not eligible for chemoprevention trial  Therefore, she was observed  In , she was diagnosed with early stage right breast cancer, ER positive disease  She underwent lumpectomy followed by radiation therapy  She took adjuvant tamoxifen for 5 years until   She was recently found to have abnormality in the right breast  Biopsy showed invasive ductal carcinoma, grade 2  She was seen by Dr David Romo, who did mastectomy with sentinel lymph node biopsy  She had 1 3 cm of invasive ductal carcinoma, grade 1  2 sentinel lymph node and 3 non-sentinel lymph node were all negative for metastatic disease  This was % positive, ID 95% positive, HER-2 negative disease  She did not have reconstruction  She presented today to discuss adjuvant treatment options  After the 5 years of tamoxifen, she did not take additional hormonal therapy  She feels well  However, she is somewhat anxious  She has no complaint of pain  She denied respiratory symptoms  Her performance status is normal  She has no family history of breast or ovarian cancer              Interval History:  A 68 year old postmenopausal woman with history of early stage right breast cancer, ER positive disease, diagnosed in 2002  She underwent lumpectomy followed by radiation as well as tamoxifen as adjuvant hormonal therapy until 2007  She was diagnosed with recurrent stage IA right breast cancer, grade 1, ER/ME positive HER-2 negative disease  She underwent mastectomy resulting in MARIAELENA  Since May 2016, she has been on adjuvant hormonal therapy with anastrozole  A in February 2019, she was hospitalized after the brief episode of confusion  MRI brain shows some sign of meningeal enhancement  She underwent lumbar puncture  She had elevated protein  However, CSF cytology was negative  She repeated MRI of the brain in summer of 2019  There was no evidence of brain metastasis  Conclusion was probably due to the previous subsequent hemorrhage which improved  She has no confusional episode  In her last 3 months, she has stable weight  She denied any pain  She is tolerating anastrozole well  She has no complaint hot flashes or musculoskeletal symptoms  Her performance status is normal                               Objective:      Primary Diagnosis:     1  Locally recurrent right breast cancer, stage IA (pT1c, pN0,M0) grade 1, ER/ME strongly positive, HER-2 negative disease  Diagnosed in April 2016   2  Early stage right breast cancer, ER positive disease   Diagnosed in 2002       Cancer Staging:  Cancer Staging  Invasive ductal carcinoma of breast, right ([facility] [address] )  Staging form: Breast, AJCC 7th Edition  - Pathologic: Stage IA (T1c, N0, cM0) - Signed by Miriam Torres MD on 4/9/2018           Previous Hematologic/ Oncologic Treatment:            Current Hematologic/ Oncologic Treatment:       Adjuvant hormonal therapy with anastrozole since May 2016       Disease Status:      MARIAELENA status post mastectomy      Test Results:     Pathology:     Mastectomy specimen showed 1 3 cm of invasive ductal carcinoma, grade 1  5 lymph nodes are negative for metastatic disease  % positive, CO 95% positive, HER-2 negative disease IA (pT1c, pN0,M0)        CSF cytology was negative for malignancy      Radiology:     Piper Levo in July 2016 showed normal bone density  Mammography in April 2019 was benign  BI-RADS 2     Laboratory:           Physical Exam:        General Appearance:    Alert, oriented          Eyes:    PERRL   Ears:    Normal external ear canals, both ears   Nose:   Nares normal, septum midline   Throat:   Mucosa moist  Pharynx without injection  Neck:   Supple         Lungs:     Clear to auscultation bilaterally   Chest Wall:    No tenderness or deformity    Heart:    Regular rate and rhythm         Abdomen:     Soft, non-tender, bowel sounds +, no organomegaly               Extremities:   Extremities no cyanosis or edema         Skin:   no rash or icterus  Lymph nodes:   Cervical, supraclavicular, and axillary nodes normal   Neurologic:   CNII-XII intact, normal strength, sensation and reflexes     Throughout             Breast exam:   status post right mastectomy without reconstruction  No palpable abnormality in the right chest wall  Left breast exam is negative             ROS: Review of Systems   All other systems reviewed and are negative  Imaging: No results found        Labs:   Lab Results   Component Value Date    WBC 5 55 02/22/2019    HGB 11 5 02/22/2019    HCT 35 1 02/22/2019    MCV 96 02/22/2019     05/24/2019     Lab Results   Component Value Date     (L) 01/06/2016    K 4 4 02/22/2019     02/22/2019    CO2 26 02/22/2019    ANIONGAP 9 01/06/2016    BUN 22 07/23/2019    CREATININE 0 90 07/23/2019    GLUCOSE 114 05/09/2017    GLUF 126 (H) 01/24/2018    CALCIUM 9 6 02/22/2019    AST 26 02/18/2019    ALT 28 02/18/2019    ALKPHOS 80 02/18/2019    PROT 6 8 01/06/2016    BILITOT 1 36 (H) 01/06/2016    EGFR 65 07/23/2019         Lab Results   Component Value Date    IRON 61 02/22/2019    TIBC 424 02/22/2019    FERRITIN 55 02/22/2019       Lab Results   Component Value Date    BRNQHSHJ38 611 02/22/2019       Lab Results   Component Value Date    FOLATE 19 1 (H) 02/22/2019         Current Medications: Reviewed  Allergies: Reviewed  PMH/FH/SH:  Reviewed      Vital Sign:    Body surface area is 1 7 meters squared      Wt Readings from Last 3 Encounters:   11/11/19 64 9 kg (143 lb)   10/14/19 66 1 kg (145 lb 12 8 oz)   08/12/19 65 3 kg (144 lb)        Temp Readings from Last 3 Encounters:   11/11/19 97 6 °F (36 4 °C) (Tympanic Core)   10/14/19 99 4 °F (37 4 °C) (Tympanic)   08/12/19 98 8 °F (37 1 °C) (Tympanic)        BP Readings from Last 3 Encounters:   11/11/19 158/98   10/14/19 140/80   08/12/19 116/64         Pulse Readings from Last 3 Encounters:   10/14/19 88   07/05/19 81   05/24/19 80     @LASTSAO2(3)@

## 2019-11-12 ENCOUNTER — OFFICE VISIT (OUTPATIENT)
Dept: NEUROLOGY | Facility: CLINIC | Age: 74
End: 2019-11-12
Payer: MEDICARE

## 2019-11-12 VITALS
HEIGHT: 64 IN | SYSTOLIC BLOOD PRESSURE: 169 MMHG | BODY MASS INDEX: 25.1 KG/M2 | WEIGHT: 147 LBS | HEART RATE: 84 BPM | DIASTOLIC BLOOD PRESSURE: 86 MMHG

## 2019-11-12 DIAGNOSIS — R41.0 CONFUSION: ICD-10-CM

## 2019-11-12 DIAGNOSIS — G96.198 LEPTOMENINGEAL DISEASE: Primary | ICD-10-CM

## 2019-11-12 DIAGNOSIS — G96.89 SUPERFICIAL SIDEROSIS OF CENTRAL NERVOUS SYSTEM: ICD-10-CM

## 2019-11-12 PROCEDURE — 99215 OFFICE O/P EST HI 40 MIN: CPT | Performed by: PSYCHIATRY & NEUROLOGY

## 2019-11-12 NOTE — PROGRESS NOTES
[facility]LE [facility]  PATIENT:  Timothy Lomax  MRN:  [id number]  :  1945  DATE OF SERVICE:  2019    Assessment/Plan:     Problem List Items Addressed This Visit        Nervous and Auditory    Confusion    Leptomeningeal disease - Primary    Relevant Orders    MRI brain with and without contrast    BUN    Creatinine, serum      Other Visit Diagnoses     Superficial siderosis of central nervous system        Relevant Orders    MRI brain with and without contrast         Mrs Marissa Strong has presented for follow up on abnormal MRI and confusional state  No concern for brain leptomeningeal carcinomatosis on repeated brain imaging in 2019, but superficial siderosis noted predominantly in her left hemisphere  I may consider intracranial hypotension related vascular dysfunction, with consequent hemosiderin deposition in her left fronto-parietal region  I am repeating MRI brain again, to follow up on her radiographic findings of leptomeningeal disease,  as patient has stable clinical function  Patient has stable neurologic exam, no signs of cognitive dysfunction or new focal neurologic deficit  Follow up with A[facility] is on as needed bases  May consider establishing her care with Stroke team if radiographic progression of siderosis noted  Subjective: abnormal brain imaging     HPI History of Present Illness  Mrs Marissa Strong has presented for follow up on abnormal brain imaging findings, with no new focal neurologic deficit has been described today  Patient had leptomeningeal enhancement with confusional state, with predominantly negative work up for malignancy and autoimmune encephalopathy  Patient had mildly elevated CSF protein, which might be seen with variable medical conditions- patient continues treatment with anastrozole   No concern for clinical progression, no events of decreased awareness or LOC; Patient feels great, clinical improvement noted         The following portions of the patient's history were reviewed and updated as appropriate:   She  has a past medical history of Abnormal CT of the abdomen, Acute bronchitis, Anxiety, Appetite loss, Arthritis, Ascending aortic aneurysm ([address] ), Bilateral renal cysts, Cyst of ovary, Dysphagia, Esophageal reflux disease, Full dentures, GERD (gastroesophageal reflux disease), Herpes zoster, Hyperlipidemia, Hypertension, Hypokalemia, Hypomagnesemia, Hypothyroidism, Impaired fasting glucose, Irritable bowel syndrome (IBS), Ischemic colitis ([address] ), Knee pain, Limb alert care status, Osteoarthritis of right knee, Pneumonia, Post-op pain, Pulmonary nodules, Thoracic aortic aneurysm ([address] ), Unspecified ovarian cysts, Use of anastrozole (Arimidex), Vasovagal syncope, Vitamin D deficiency, Wears glasses, and Weight loss    She   Patient Active Problem List    Diagnosis Date Noted    History of head injury 05/09/2019    Arterial hypotension 05/09/2019    Unresponsiveness 05/09/2019    Leptomeningeal disease 05/09/2019    Vision loss, bilateral 05/09/2019    Dense breast tissue 04/10/2019    Alcohol use with intoxication ([address] ) 03/01/2019    Gastroesophageal reflux disease without esophagitis 02/28/2019    Acute blood loss anemia 02/28/2019    Peptic ulcer disease 02/28/2019    Transient alteration of awareness     Left upper arm pain 02/21/2019    HLD (hyperlipidemia) 02/21/2019    Abnormal finding on MRI of brain 02/19/2019    Low TSH level 02/18/2019    Confusion 02/18/2019    Acute non-recurrent maxillary sinusitis 02/04/2019    Iron deficiency anemia 01/28/2019    Thrombocytopenia ([address] ) 01/28/2019    Gastrointestinal hemorrhage associated with gastric ulcer 01/18/2019    Irregular cardiac rhythm 10/22/2018    Atrial fibrillation ([address] ) 10/22/2018    Breast cancer screening, high risk patient 10/10/2018    Hyponatremia 07/31/2018    Lung nodule seen on imaging study 07/30/2018    Atherosclerosis 07/30/2018    History of alcohol use 07/30/2018    Closed odontoid fracture with type I morphology ([facility] ) 07/24/2018    Closed nondisplaced fracture of fifth cervical vertebra (HCC) 07/24/2018    Closed nondisplaced fracture of seventh cervical vertebra (HCC) 07/24/2018    Syncope 07/24/2018    Invasive ductal carcinoma of breast, right (HCC)     Use of anastrozole (Arimidex)     Thoracic aortic aneurysm ([facility]) 05/13/2017    Hypokalemia 05/12/2017    Ischemic colitis ([facility] ) 05/11/2017    Anxiety     GERD (gastroesophageal reflux disease)     Essential hypertension     Hypothyroidism     Hyperlipidemia     Bilateral renal cysts     Impaired fasting glucose 08/19/2014    Esophageal reflux disease 07/25/2012     She  has a past surgical history that includes Colonoscopy; Cholecystectomy; Tubal ligation; Appendectomy; pr mastectomy, simple, complete (Right, 4/12/2016); pr biopsy/excision, lymph node(s) (Right, 4/12/2016); Knee surgery (Right); Colonoscopy (N/A, 5/12/2017); Breast biopsy (Right, 03/02/2016); Breast surgery (Right); Breast lumpectomy (Right, 2004); US guided breast biopsy right complete (Right, 3/9/2016); Esophagogastroduodenoscopy (N/A, 1/19/2019); IR lumbar puncture (2/21/2019); Hysterectomy (1977); and FL lumbar puncture (5/24/2019)  Her family history includes Alcohol abuse in her brother; Anemia in her mother; Hypertension in her mother; Other in her mother; Stroke in her father and maternal grandmother  She  reports that she has never smoked  She has never used smokeless tobacco  She reports that she drank alcohol  She reports that she does not use drugs    Current Outpatient Medications   Medication Sig Dispense Refill    anastrozole (ARIMIDEX) 1 mg tablet Take 1 tablet (1 mg total) by mouth daily 90 tablet 3    cyanocobalamin (VITAMIN B-12) 1,000 mcg tablet Take by mouth daily      levothyroxine 50 mcg tablet TAKE 1 TABLET DAILY IN THE EARLY MORNING 90 tablet 1    metoprolol succinate (TOPROL-XL) 50 mg 24 hr tablet TAKE 1 TABLET EVERY DAY 90 tablet 1    pantoprazole (PROTONIX) 40 mg tablet TAKE 1 TABLET 2 TIMES A DAY BEFORE MEALS 180 tablet 1     No current facility-administered medications for this visit  Current Outpatient Medications on File Prior to Visit   Medication Sig    anastrozole (ARIMIDEX) 1 mg tablet Take 1 tablet (1 mg total) by mouth daily    cyanocobalamin (VITAMIN B-12) 1,000 mcg tablet Take by mouth daily    levothyroxine 50 mcg tablet TAKE 1 TABLET DAILY IN THE EARLY MORNING    metoprolol succinate (TOPROL-XL) 50 mg 24 hr tablet TAKE 1 TABLET EVERY DAY    pantoprazole (PROTONIX) 40 mg tablet TAKE 1 TABLET 2 TIMES A DAY BEFORE MEALS     No current facility-administered medications on file prior to visit  She is allergic to demerol [meperidine]; nitroglycerin; lipitor [atorvastatin]; and zocor [simvastatin]            Objective:    Blood pressure 169/86, pulse 84, height 5' 4" (1 626 m), weight 66 7 kg (147 lb)  Physical Exam/Neurological Exam  CONSTITUTIONAL: NAD, pleasant  NECK: supple, no lymphadenopathy, no thyromegaly, no JVD  CARDIOVASCULAR: RRR, normal S1S2, no murmurs, no rubs  RESP: clear to auscultation bilaterally, no wheezes/rhonchi/rales  ABDOMEN: soft, non tender, non distended  SKIN: no rash or skin lesions  EXTREMITIES: no edema, pulses 2+bilaterally  PSYCH: appropriate mood and affect  NEUROLOGIC COMPREHENSIVE EXAM: Patient is oriented to person, place and time, NAD; appropriate affect  CN II, III, IV, V, VI, VII,VIII,IX,X,XI-XII intact with EOMI, PERRLA, OKN intact, VF grossly intact, fundi poorly visualized secondary to pupillary constriction; symmetric face noted  Motor: 5/5 UE/LE bilateral symmetric; Sensory: intact to light touch and pinprick bilaterally; normal vibration sensation feet bilaterally; Coordination within normal limits on FTN and AMIRAH testing; DTR: 2/4 through, no Babinski, no clonus  Tandem gait is intact  Romberg: negative        ROS:  12 points of review of system was reviewed with the patient and was unremarkable with exception: see HPI  Review of Systems   Constitutional: Negative  Negative for appetite change and fever  HENT: Negative  Negative for hearing loss, tinnitus, trouble swallowing and voice change  Eyes: Negative  Negative for photophobia and pain  Respiratory: Negative  Negative for shortness of breath  Cardiovascular: Negative  Negative for palpitations  Gastrointestinal: Negative  Negative for nausea and vomiting  Endocrine: Negative  Negative for cold intolerance and heat intolerance  Genitourinary: Negative  Negative for dysuria, frequency and urgency  Musculoskeletal: Positive for myalgias  Negative for neck pain  Skin: Negative  Negative for rash  Neurological: Negative for dizziness, tremors, seizures, syncope, facial asymmetry, speech difficulty, weakness, light-headedness, numbness and headaches  Memory Loss / Pain while walking    Hematological: Negative  Does not bruise/bleed easily  Psychiatric/Behavioral: Negative  Negative for confusion, hallucinations and sleep disturbance (waking up at night, trouble falling asleep )

## 2019-11-20 ENCOUNTER — APPOINTMENT (OUTPATIENT)
Dept: LAB | Facility: HOSPITAL | Age: 74
End: 2019-11-20
Attending: PSYCHIATRY & NEUROLOGY
Payer: MEDICARE

## 2019-11-20 DIAGNOSIS — G96.198 LEPTOMENINGEAL DISEASE: ICD-10-CM

## 2019-11-20 LAB
BUN SERPL-MCNC: 21 MG/DL (ref 5–25)
CREAT SERPL-MCNC: 0.86 MG/DL (ref 0.6–1.3)
GFR SERPL CREATININE-BSD FRML MDRD: 68 ML/MIN/1.73SQ M

## 2019-11-20 PROCEDURE — 84520 ASSAY OF UREA NITROGEN: CPT

## 2019-11-20 PROCEDURE — 82565 ASSAY OF CREATININE: CPT

## 2019-11-20 PROCEDURE — 36415 COLL VENOUS BLD VENIPUNCTURE: CPT

## 2019-11-25 ENCOUNTER — OFFICE VISIT (OUTPATIENT)
Dept: FAMILY MEDICINE CLINIC | Facility: CLINIC | Age: 74
End: 2019-11-25
Payer: MEDICARE

## 2019-11-25 VITALS
HEIGHT: 64 IN | WEIGHT: 147 LBS | SYSTOLIC BLOOD PRESSURE: 130 MMHG | BODY MASS INDEX: 25.1 KG/M2 | DIASTOLIC BLOOD PRESSURE: 80 MMHG | TEMPERATURE: 98.2 F

## 2019-11-25 DIAGNOSIS — E03.8 HYPOTHYROIDISM DUE TO HASHIMOTO'S THYROIDITIS: ICD-10-CM

## 2019-11-25 DIAGNOSIS — Z23 NEED FOR IMMUNIZATION AGAINST INFLUENZA: Primary | ICD-10-CM

## 2019-11-25 DIAGNOSIS — E06.3 HYPOTHYROIDISM DUE TO HASHIMOTO'S THYROIDITIS: ICD-10-CM

## 2019-11-25 DIAGNOSIS — G96.198 LEPTOMENINGEAL DISEASE: ICD-10-CM

## 2019-11-25 DIAGNOSIS — E78.2 MIXED HYPERLIPIDEMIA: ICD-10-CM

## 2019-11-25 DIAGNOSIS — C50.911 INVASIVE DUCTAL CARCINOMA OF BREAST, RIGHT (HCC): ICD-10-CM

## 2019-11-25 DIAGNOSIS — R73.01 IMPAIRED FASTING GLUCOSE: ICD-10-CM

## 2019-11-25 DIAGNOSIS — I10 ESSENTIAL HYPERTENSION: ICD-10-CM

## 2019-11-25 PROCEDURE — G0008 ADMIN INFLUENZA VIRUS VAC: HCPCS

## 2019-11-25 PROCEDURE — 90662 IIV NO PRSV INCREASED AG IM: CPT

## 2019-11-25 PROCEDURE — 99214 OFFICE O/P EST MOD 30 MIN: CPT | Performed by: FAMILY MEDICINE

## 2019-11-25 NOTE — PROGRESS NOTES
Assessment/Plan:  The patient have MRI of the brain tomorrow  Patient will follow with Neurology  Other chronic conditions stable overall  The labs reviewed  Continue with current medication  Flu shot given at this time  Diagnoses and all orders for this visit:    Need for immunization against influenza  -     FLUZONE HIGH-DOSE: influenza vaccine, high-dose, preservative-free 0 5 mL    Hypothyroidism due to Hashimoto's thyroiditis    Impaired fasting glucose    Essential hypertension    Mixed hyperlipidemia    Invasive ductal carcinoma of breast, right (HCC)    Leptomeningeal disease            Subjective:        Patient ID: Anel Cerda is a 69 y o  female  Patient follow-up on hypothyroidism paired fasting glucose hypertension hyperlipidemia history of ductal carcinoma in situ the right  The patient the being worked up for leptomeningeal disease  Patient has MRI of the brain tomorrow  The no chest pain or shortness of breath  Patient has noticed some visual changes  The patient did see ophthalmologist   Patient status post laboratory studies  No problems urinating or defecating  No new focal weakness or numbness pain  The following portions of the patient's history were reviewed and updated as appropriate: allergies, current medications, past family history, past medical history, past social history, past surgical history and problem list       Review of Systems   Constitutional: Negative  HENT: Negative  Eyes: Positive for visual disturbance  Respiratory: Negative  Cardiovascular: Negative  Gastrointestinal: Negative  Endocrine: Negative  Genitourinary: Negative  Musculoskeletal: Negative  Skin: Negative  Allergic/Immunologic: Negative  Neurological: Negative  Negative for headaches  Hematological: Negative  Psychiatric/Behavioral: Negative              Objective:               /80 (BP Location: Left arm, Patient Position: Sitting, Cuff Size: Adult)   Temp 98 2 °F (36 8 °C) (Tympanic)   Ht 5' 4" (1 626 m)   Wt 66 7 kg (147 lb)   BMI 25 23 kg/m²          Physical Exam   Constitutional: She appears well-developed and well-nourished  No distress  HENT:   Head: Normocephalic  Right Ear: External ear normal    Left Ear: External ear normal    Mouth/Throat: Oropharynx is clear and moist  No oropharyngeal exudate  Eyes: Pupils are equal, round, and reactive to light  EOM are normal  Right eye exhibits no discharge  Left eye exhibits no discharge  No scleral icterus  Neck: Normal range of motion  Neck supple  No thyromegaly present  Cardiovascular: Normal rate, regular rhythm, normal heart sounds and intact distal pulses  Exam reveals no gallop and no friction rub  No murmur heard  Pulmonary/Chest: Effort normal and breath sounds normal  No respiratory distress  She has no wheezes  She has no rales  She exhibits no tenderness  Abdominal: Soft  Bowel sounds are normal  She exhibits no distension  There is no tenderness  There is no rebound and no guarding  Musculoskeletal: Normal range of motion  She exhibits no edema or tenderness  Lymphadenopathy:     She has no cervical adenopathy  Neurological: She is alert  No cranial nerve deficit  She exhibits normal muscle tone  Coordination normal    Skin: Skin is warm and dry  No rash noted  She is not diaphoretic  No erythema  No pallor  Psychiatric: She has a normal mood and affect  Her behavior is normal  Judgment and thought content normal    Nursing note and vitals reviewed

## 2019-11-26 ENCOUNTER — HOSPITAL ENCOUNTER (OUTPATIENT)
Dept: MRI IMAGING | Facility: HOSPITAL | Age: 74
Discharge: HOME | End: 2019-11-26
Attending: PSYCHIATRY & NEUROLOGY
Payer: MEDICARE

## 2019-11-26 DIAGNOSIS — G96.198 LEPTOMENINGEAL DISEASE: ICD-10-CM

## 2019-11-26 DIAGNOSIS — G96.89 SUPERFICIAL SIDEROSIS OF CENTRAL NERVOUS SYSTEM: ICD-10-CM

## 2019-11-26 PROCEDURE — A9585 GADOBUTROL INJECTION: HCPCS | Performed by: PSYCHIATRY & NEUROLOGY

## 2019-11-26 PROCEDURE — 70553 MRI BRAIN STEM W/O & W/DYE: CPT

## 2019-11-26 RX ADMIN — GADOBUTROL 6 ML: 604.72 INJECTION INTRAVENOUS at 15:07

## 2019-12-04 ENCOUNTER — TELEPHONE (OUTPATIENT)
Dept: NEUROLOGY | Facility: CLINIC | Age: 74
End: 2019-12-04

## 2020-03-25 DIAGNOSIS — R79.89 LOW TSH LEVEL: ICD-10-CM

## 2020-03-25 DIAGNOSIS — K25.4 GASTROINTESTINAL HEMORRHAGE ASSOCIATED WITH GASTRIC ULCER: ICD-10-CM

## 2020-03-25 DIAGNOSIS — S12.100A CLOSED ODONTOID FRACTURE, INITIAL ENCOUNTER (HCC): ICD-10-CM

## 2020-03-26 RX ORDER — METOPROLOL SUCCINATE 50 MG/1
TABLET, EXTENDED RELEASE ORAL
Qty: 90 TABLET | Refills: 0 | Status: SHIPPED | OUTPATIENT
Start: 2020-03-26 | End: 2020-06-29

## 2020-03-26 RX ORDER — PANTOPRAZOLE SODIUM 40 MG/1
TABLET, DELAYED RELEASE ORAL
Qty: 180 TABLET | Refills: 0 | Status: SHIPPED | OUTPATIENT
Start: 2020-03-26 | End: 2020-07-24

## 2020-03-26 RX ORDER — LEVOTHYROXINE SODIUM 0.05 MG/1
TABLET ORAL
Qty: 90 TABLET | Refills: 0 | Status: SHIPPED | OUTPATIENT
Start: 2020-03-26 | End: 2020-06-29

## 2020-04-22 ENCOUNTER — OFFICE VISIT (OUTPATIENT)
Dept: FAMILY MEDICINE CLINIC | Facility: CLINIC | Age: 75
End: 2020-04-22
Payer: MEDICARE

## 2020-04-22 VITALS
BODY MASS INDEX: 26.98 KG/M2 | SYSTOLIC BLOOD PRESSURE: 124 MMHG | HEIGHT: 64 IN | DIASTOLIC BLOOD PRESSURE: 80 MMHG | WEIGHT: 158 LBS | TEMPERATURE: 99.1 F

## 2020-04-22 DIAGNOSIS — C50.911 INVASIVE DUCTAL CARCINOMA OF BREAST, RIGHT (HCC): ICD-10-CM

## 2020-04-22 DIAGNOSIS — E78.2 MIXED HYPERLIPIDEMIA: ICD-10-CM

## 2020-04-22 DIAGNOSIS — I48.0 PAROXYSMAL ATRIAL FIBRILLATION (HCC): ICD-10-CM

## 2020-04-22 DIAGNOSIS — K21.00 GASTROESOPHAGEAL REFLUX DISEASE WITH ESOPHAGITIS: ICD-10-CM

## 2020-04-22 DIAGNOSIS — F10.929: ICD-10-CM

## 2020-04-22 DIAGNOSIS — M17.12 LOCALIZED OSTEOARTHRITIS OF LEFT KNEE: ICD-10-CM

## 2020-04-22 DIAGNOSIS — D69.6 THROMBOCYTOPENIA (HCC): ICD-10-CM

## 2020-04-22 DIAGNOSIS — E03.9 HYPOTHYROIDISM, UNSPECIFIED TYPE: Primary | ICD-10-CM

## 2020-04-22 DIAGNOSIS — I10 ESSENTIAL HYPERTENSION: ICD-10-CM

## 2020-04-22 PROCEDURE — 1160F RVW MEDS BY RX/DR IN RCRD: CPT | Performed by: FAMILY MEDICINE

## 2020-04-22 PROCEDURE — 1036F TOBACCO NON-USER: CPT | Performed by: FAMILY MEDICINE

## 2020-04-22 PROCEDURE — 4040F PNEUMOC VAC/ADMIN/RCVD: CPT | Performed by: FAMILY MEDICINE

## 2020-04-22 PROCEDURE — 3008F BODY MASS INDEX DOCD: CPT | Performed by: FAMILY MEDICINE

## 2020-04-22 PROCEDURE — 99214 OFFICE O/P EST MOD 30 MIN: CPT | Performed by: FAMILY MEDICINE

## 2020-04-22 PROCEDURE — 3079F DIAST BP 80-89 MM HG: CPT | Performed by: FAMILY MEDICINE

## 2020-04-22 PROCEDURE — 3074F SYST BP LT 130 MM HG: CPT | Performed by: FAMILY MEDICINE

## 2020-04-22 RX ORDER — PREDNISONE 10 MG/1
TABLET ORAL
Qty: 45 TABLET | Refills: 0 | Status: SHIPPED | OUTPATIENT
Start: 2020-04-22 | End: 2020-06-04

## 2020-06-04 ENCOUNTER — OFFICE VISIT (OUTPATIENT)
Dept: FAMILY MEDICINE CLINIC | Facility: CLINIC | Age: 75
End: 2020-06-04
Payer: MEDICARE

## 2020-06-04 VITALS
WEIGHT: 162 LBS | TEMPERATURE: 98.9 F | DIASTOLIC BLOOD PRESSURE: 72 MMHG | SYSTOLIC BLOOD PRESSURE: 128 MMHG | BODY MASS INDEX: 27.66 KG/M2 | HEIGHT: 64 IN

## 2020-06-04 DIAGNOSIS — M17.12 LOCALIZED OSTEOARTHRITIS OF LEFT KNEE: Primary | ICD-10-CM

## 2020-06-04 PROCEDURE — 20610 DRAIN/INJ JOINT/BURSA W/O US: CPT | Performed by: FAMILY MEDICINE

## 2020-06-04 PROCEDURE — 3074F SYST BP LT 130 MM HG: CPT | Performed by: FAMILY MEDICINE

## 2020-06-04 PROCEDURE — 99213 OFFICE O/P EST LOW 20 MIN: CPT | Performed by: FAMILY MEDICINE

## 2020-06-04 PROCEDURE — 4040F PNEUMOC VAC/ADMIN/RCVD: CPT | Performed by: FAMILY MEDICINE

## 2020-06-04 PROCEDURE — 1036F TOBACCO NON-USER: CPT | Performed by: FAMILY MEDICINE

## 2020-06-04 PROCEDURE — 1160F RVW MEDS BY RX/DR IN RCRD: CPT | Performed by: FAMILY MEDICINE

## 2020-06-04 PROCEDURE — 3078F DIAST BP <80 MM HG: CPT | Performed by: FAMILY MEDICINE

## 2020-06-04 PROCEDURE — 3008F BODY MASS INDEX DOCD: CPT | Performed by: FAMILY MEDICINE

## 2020-06-04 RX ORDER — METHYLPREDNISOLONE ACETATE 40 MG/ML
1 INJECTION, SUSPENSION INTRA-ARTICULAR; INTRALESIONAL; INTRAMUSCULAR; SOFT TISSUE
Status: COMPLETED | OUTPATIENT
Start: 2020-06-04 | End: 2020-06-04

## 2020-06-04 RX ORDER — LIDOCAINE HYDROCHLORIDE 10 MG/ML
2 INJECTION, SOLUTION INFILTRATION; PERINEURAL
Status: COMPLETED | OUTPATIENT
Start: 2020-06-04 | End: 2020-06-04

## 2020-06-04 RX ADMIN — METHYLPREDNISOLONE ACETATE 1 ML: 40 INJECTION, SUSPENSION INTRA-ARTICULAR; INTRALESIONAL; INTRAMUSCULAR; SOFT TISSUE at 09:34

## 2020-06-04 RX ADMIN — LIDOCAINE HYDROCHLORIDE 2 ML: 10 INJECTION, SOLUTION INFILTRATION; PERINEURAL at 09:34

## 2020-06-29 DIAGNOSIS — R79.89 LOW TSH LEVEL: ICD-10-CM

## 2020-06-29 DIAGNOSIS — S12.100A CLOSED ODONTOID FRACTURE, INITIAL ENCOUNTER (HCC): ICD-10-CM

## 2020-06-29 RX ORDER — METOPROLOL SUCCINATE 50 MG/1
TABLET, EXTENDED RELEASE ORAL
Qty: 90 TABLET | Refills: 0 | Status: SHIPPED | OUTPATIENT
Start: 2020-06-29 | End: 2020-09-28

## 2020-06-29 RX ORDER — LEVOTHYROXINE SODIUM 0.05 MG/1
TABLET ORAL
Qty: 90 TABLET | Refills: 0 | Status: SHIPPED | OUTPATIENT
Start: 2020-06-29 | End: 2020-09-28

## 2020-07-15 ENCOUNTER — HOSPITAL ENCOUNTER (OUTPATIENT)
Dept: MAMMOGRAPHY | Facility: MEDICAL CENTER | Age: 75
Discharge: HOME | End: 2020-07-15
Payer: MEDICARE

## 2020-07-15 VITALS — WEIGHT: 162 LBS | BODY MASS INDEX: 27.66 KG/M2 | HEIGHT: 64 IN

## 2020-07-15 DIAGNOSIS — Z12.39 BREAST CANCER SCREENING, HIGH RISK PATIENT: ICD-10-CM

## 2020-07-15 PROCEDURE — 77063 BREAST TOMOSYNTHESIS BI: CPT

## 2020-07-15 PROCEDURE — 77067 SCR MAMMO BI INCL CAD: CPT

## 2020-07-24 DIAGNOSIS — K25.4 GASTROINTESTINAL HEMORRHAGE ASSOCIATED WITH GASTRIC ULCER: ICD-10-CM

## 2020-07-24 RX ORDER — PANTOPRAZOLE SODIUM 40 MG/1
TABLET, DELAYED RELEASE ORAL
Qty: 180 TABLET | Refills: 0 | Status: SHIPPED | OUTPATIENT
Start: 2020-07-24 | End: 2020-09-28

## 2020-07-30 ENCOUNTER — OFFICE VISIT (OUTPATIENT)
Dept: SURGICAL ONCOLOGY | Facility: CLINIC | Age: 75
End: 2020-07-30
Payer: MEDICARE

## 2020-07-30 VITALS
TEMPERATURE: 97.5 F | WEIGHT: 160.4 LBS | SYSTOLIC BLOOD PRESSURE: 140 MMHG | BODY MASS INDEX: 27.39 KG/M2 | HEIGHT: 64 IN | DIASTOLIC BLOOD PRESSURE: 90 MMHG | RESPIRATION RATE: 18 BRPM | HEART RATE: 80 BPM

## 2020-07-30 DIAGNOSIS — C50.911 INVASIVE DUCTAL CARCINOMA OF BREAST, RIGHT (HCC): Primary | ICD-10-CM

## 2020-07-30 DIAGNOSIS — R92.2 DENSE BREAST TISSUE: ICD-10-CM

## 2020-07-30 DIAGNOSIS — Z79.811 USE OF ANASTROZOLE (ARIMIDEX): ICD-10-CM

## 2020-07-30 PROCEDURE — 4040F PNEUMOC VAC/ADMIN/RCVD: CPT | Performed by: SURGERY

## 2020-07-30 PROCEDURE — 1036F TOBACCO NON-USER: CPT | Performed by: SURGERY

## 2020-07-30 PROCEDURE — 1160F RVW MEDS BY RX/DR IN RCRD: CPT | Performed by: SURGERY

## 2020-07-30 PROCEDURE — 99214 OFFICE O/P EST MOD 30 MIN: CPT | Performed by: SURGERY

## 2020-07-30 PROCEDURE — 3008F BODY MASS INDEX DOCD: CPT | Performed by: SURGERY

## 2020-07-30 PROCEDURE — 3080F DIAST BP >= 90 MM HG: CPT | Performed by: SURGERY

## 2020-07-30 PROCEDURE — 3077F SYST BP >= 140 MM HG: CPT | Performed by: SURGERY

## 2020-07-30 NOTE — PROGRESS NOTES
Surgical Oncology Follow Up       [facility] [address]    Melina Martin  1945  [id number]  [facility] [address]    Chief Complaint   Patient presents with    Follow-up       Assessment/Plan   Diagnoses and all orders for this visit:    Invasive ductal carcinoma of breast, right ([address] )    Dense breast tissue    Use of anastrozole (Arimidex)        Advance Care Planning/Advance Directives:  Discussed disease status, cancer treatment plans and/or cancer treatment goals with the patient  Oncology History:       Invasive ductal carcinoma of breast, right [facility])     Initial Diagnosis     Invasive ductal carcinoma of breast, right [facility])      2004 Surgery     lumpectomy      2004 -  Radiation     WBRT      2004 -  Hormone Therapy     Tamoxifen X 5 years      3/9/2016 Surgery     Right breast US guided core biopsy,  IDC      4/12/2016 Surgery     Right breast mastectomy, SLNB      5/13/2016 -  Hormone Therapy     Anastrazole  Dr Rusty Pastor         History of Present Illness:  Breast cancer follow-up, continues on anastrozole, no concerns  -Interval History:  Recent mammogram    Review of Systems:  Review of Systems   Constitutional: Negative  Negative for appetite change and fever  Eyes: Negative  Respiratory: Negative for shortness of breath  Cardiovascular: Negative  Gastrointestinal: Negative  Endocrine: Negative  Genitourinary: Negative  Musculoskeletal: Negative  Negative for arthralgias and myalgias  Skin: Negative  Allergic/Immunologic: Negative  Neurological: Negative  Hematological: Negative  Negative for adenopathy  Does not bruise/bleed easily  Psychiatric/Behavioral: Negative          Patient Active Problem List   Diagnosis    Anxiety    GERD (gastroesophageal reflux disease)    Essential hypertension    Hypothyroidism    Hyperlipidemia    Bilateral renal cysts    [facility]schemic colitis [facility]    Hypokalemia    Thoracic aortic aneurysm (HCC)    Impaired fasting glucose    Esophageal reflux disease    Invasive ductal carcinoma of breast, right (HCC)    Use of anastrozole (Arimidex)    Closed odontoid fracture with type I morphology (HCC)    Closed nondisplaced fracture of fifth cervical vertebra (HCC)    Closed nondisplaced fracture of seventh cervical vertebra (HCC)    Syncope    Lung nodule seen on imaging study    Atherosclerosis    History of alcohol use    Hyponatremia    Breast cancer screening, high risk patient    Irregular cardiac rhythm    Atrial fibrillation (HCC)    Gastrointestinal hemorrhage associated with gastric ulcer    Iron deficiency anemia    Thrombocytopenia (HCC)    Acute non-recurrent maxillary sinusitis    Low TSH level    Confusion    Abnormal finding on MRI of brain    Left upper arm pain    HLD (hyperlipidemia)    Gastroesophageal reflux disease without esophagitis    Acute blood loss anemia    Peptic ulcer disease    Transient alteration of awareness    Alcohol use with intoxication (HCC)    Dense breast tissue    History of head injury    Arterial hypotension    Unresponsiveness    Leptomeningeal disease    Vision loss, bilateral    Localized osteoarthritis of left knee     Past Medical History:   Diagnosis Date    Abnormal CT of the abdomen     Acute bronchitis     Anxiety     Appetite loss     Arthritis     Ascending aortic aneurysm (HCC)     Bilateral renal cysts     Cyst of ovary     Dysphagia     Esophageal reflux disease     Full dentures     GERD (gastroesophageal reflux disease)     Herpes zoster     Hyperlipidemia     Hypertension     Hypokalemia     Hypomagnesemia     Hypothyroidism     Impaired fasting glucose     Irritable bowel syndrome (IBS)     Ischemic colitis (HCC)     Knee pain     Limb alert care status     no BP/IV right arm    Osteoarthritis of right knee     Pneumonia     Post-op pain     Pulmonary nodules     Thoracic aortic aneurysm (HCC)     Unspecified ovarian cysts     Use of anastrozole (Arimidex)     Vasovagal syncope     Vitamin D deficiency     Wears glasses     Weight loss      Past Surgical History:   Procedure Laterality Date    APPENDECTOMY      pt states it was not removed    BREAST BIOPSY Right 03/02/2016    BREAST LUMPECTOMY Right 2004    BREAST SURGERY Right     Single lesion excision 1990 hyperplasia, 1st biopsy at 25yrs old was benign    CHOLECYSTECTOMY      COLONOSCOPY      COLONOSCOPY N/A 5/12/2017    Procedure: COLONOSCOPY with biopsy;  Surgeon: Darion Saucedo MD;  Location: [facility]; Service:     ESOPHAGOGASTRODUODENOSCOPY N/A 1/19/2019    Procedure: ESOPHAGOGASTRODUODENOSCOPY (EGD) with 4cc of epinephrine injection and cauterized with gold probe; Surgeon: Herve Weber MD;  Location: [facility];   Service: Gastroenterology    [facility] LUMBAR PUNCTURE DIAGNOSTIC  5/24/2019    HYSTERECTOMY  1977    IR LUMBAR PUNCTURE  2/21/2019    KNEE SURGERY Right     [place] BIOPSY/EXCISION, LYMPH NODE(S) Right 4/12/2016    Procedure: BIOPSY LYMPH NODE SENTINEL @ 1200 LYMPHOSCINTIGRAPHY LYMPHATIC MAPPING;  Surgeon: Marlen Bonds MD;  Location: [facility];  Service: General    NY MASTECTOMY, SIMPLE, COMPLETE Right 4/12/2016    Procedure: MASTECTOMY SIMPLE;  Surgeon: Marlen Bonds MD;  [facility]: [facility];  Service: General    TUBAL LIGATION      US GUIDED BREAST BIOPSY RIGHT COMPLETE Right 3/9/2016     Family History   Problem Relation Age of Onset    Stroke Maternal Grandmother     Anemia Mother     Other Mother         disorders of blood and blood forming organs    Hypertension Mother     Stroke Father     Alcohol abuse Brother      Social History     Socioeconomic History    Marital status: Single     Spouse name: Not on file    Number of children: Not on file    Years of education: Not on file    Highest education level: Not on file   Occupational History    Occupation: retired   Social Needs    Financial resource strain: Not on file    Food insecurity:     Worry: Not on file     Inability: Not on file   [employer] needs:     Medical: Not on file     Non-medical: Not on file   Tobacco Use    Smoking status: Never Smoker    Smokeless tobacco: Never Used    Tobacco comment: not interested   Substance and Sexual Activity    Alcohol use: Not Currently    Drug use: No    Sexual activity: Not Currently   Lifestyle    Physical activity:     Days per week: Not on file     Minutes per session: Not on file    Stress: Not on file   Relationships    Social connections:     Talks on phone: Not on file     Gets together: Not on file     Attends Faith service: Not on file     Active member of club or organization: Not on file     Attends meetings of clubs or organizations: Not on file     Relationship status: Not on file    Intimate partner violence:     Fear of current or ex partner: Not on file     Emotionally abused: Not on file     Physically abused: Not on file     Forced sexual activity: Not on file   Other Topics Concern    Not on file   Social History Narrative    Not on file       Current Outpatient Medications:     anastrozole (ARIMIDEX) 1 mg tablet, Take 1 tablet (1 mg total) by mouth daily, Disp: 90 tablet, Rfl: 3    levothyroxine 50 mcg tablet, TAKE 1 TABLET DAILY IN THE EARLY MORNING, Disp: 90 tablet, Rfl: 0    metoprolol succinate (TOPROL-XL) 50 mg 24 hr tablet, TAKE 1 TABLET EVERY DAY, Disp: 90 tablet, Rfl: 0    pantoprazole (PROTONIX) 40 mg tablet, TAKE 1 TABLET TWICE DAILY BEFORE MEALS, Disp: 180 tablet, Rfl: 0    cyanocobalamin (VITAMIN B-12) 1,000 mcg tablet, Take by mouth daily, Disp: , Rfl:   Allergies   Allergen Reactions    Demerol [Meperidine] GI Intolerance and Other (See Comments)     Other reaction(s):  Other (See Comments)  severe nausea  severe nausea  Nausea/vomiting    Nitroglycerin Anaphylaxis and Other (See Comments)     BP drops drastically    Lipitor [Atorvastatin] Other (See Comments)     Joint/muscle pain    Zocor [Simvastatin] Other (See Comments)     Joint/muscle pain       The following portions of the patient's history were reviewed and updated as appropriate: allergies, current medications, past family history, past medical history, past social history, past surgical history and problem list         Vitals:    07/30/20 1035   BP: 140/90   Pulse: 80   Resp: 18   Temp: 97 5 °F (36 4 °C)       Physical Exam   Constitutional: She is oriented to person, place, and time  She appears well-developed and well-nourished  HENT:   Head: Normocephalic and atraumatic  Cardiovascular:   Murmur heard  Pulmonary/Chest: Breath sounds normal  Right breast exhibits skin change (Mastectomy scar)  Right breast exhibits no mass and no tenderness  Left breast exhibits no inverted nipple, no mass, no nipple discharge, no skin change and no tenderness  Abdominal: Soft  Lymphadenopathy:        Right axillary: No pectoral and no lateral adenopathy present  Left axillary: No pectoral and no lateral adenopathy present  Right: No supraclavicular adenopathy present  Left: No supraclavicular adenopathy present  Neurological: She is alert and oriented to person, place, and time  Psychiatric: She has a normal mood and affect  Results:  Labs:      Imaging  07/15/2020 left 3D screening mammogram is benign BI-RADS two with a density of three    I reviewed the above imaging data  Discussion/Summary:  61-year-old female status post right completion mastectomy for recurrent carcinoma  She continues on anastrozole  There is no evidence of disease based on examination today or contralateral mammogram   I will therefore see her again in six months or sooner should the need arise

## 2020-07-31 DIAGNOSIS — C50.911 INVASIVE DUCTAL CARCINOMA OF BREAST, FEMALE, RIGHT (HCC): Primary | ICD-10-CM

## 2020-08-31 ENCOUNTER — OFFICE VISIT (OUTPATIENT)
Dept: FAMILY MEDICINE CLINIC | Facility: CLINIC | Age: 75
End: 2020-08-31
Payer: MEDICARE

## 2020-08-31 VITALS
TEMPERATURE: 98.5 F | WEIGHT: 156 LBS | DIASTOLIC BLOOD PRESSURE: 82 MMHG | SYSTOLIC BLOOD PRESSURE: 134 MMHG | HEIGHT: 64 IN | BODY MASS INDEX: 26.63 KG/M2

## 2020-08-31 DIAGNOSIS — K21.9 GASTROESOPHAGEAL REFLUX DISEASE WITHOUT ESOPHAGITIS: Primary | ICD-10-CM

## 2020-08-31 DIAGNOSIS — M17.12 LOCALIZED OSTEOARTHRITIS OF LEFT KNEE: ICD-10-CM

## 2020-08-31 DIAGNOSIS — R73.01 IMPAIRED FASTING GLUCOSE: ICD-10-CM

## 2020-08-31 DIAGNOSIS — E03.9 HYPOTHYROIDISM, UNSPECIFIED TYPE: ICD-10-CM

## 2020-08-31 DIAGNOSIS — I48.0 PAROXYSMAL ATRIAL FIBRILLATION (HCC): ICD-10-CM

## 2020-08-31 DIAGNOSIS — I10 ESSENTIAL HYPERTENSION: ICD-10-CM

## 2020-08-31 PROCEDURE — 3079F DIAST BP 80-89 MM HG: CPT | Performed by: FAMILY MEDICINE

## 2020-08-31 PROCEDURE — 3075F SYST BP GE 130 - 139MM HG: CPT | Performed by: FAMILY MEDICINE

## 2020-08-31 PROCEDURE — 1036F TOBACCO NON-USER: CPT | Performed by: FAMILY MEDICINE

## 2020-08-31 PROCEDURE — 99214 OFFICE O/P EST MOD 30 MIN: CPT | Performed by: FAMILY MEDICINE

## 2020-08-31 PROCEDURE — 4040F PNEUMOC VAC/ADMIN/RCVD: CPT | Performed by: FAMILY MEDICINE

## 2020-08-31 PROCEDURE — 3008F BODY MASS INDEX DOCD: CPT | Performed by: FAMILY MEDICINE

## 2020-08-31 PROCEDURE — 1160F RVW MEDS BY RX/DR IN RCRD: CPT | Performed by: FAMILY MEDICINE

## 2020-08-31 NOTE — PROGRESS NOTES
Assessment/Plan: patient will follow-up with Orthopedics appropriately  The labs reviewed  Patient will be moving  Patient does not drive  Patient will go for laboratory studies  Patient continue with current regimen for chronic conditions listed  The refills will be given when needed  Follow-up in 4 months or as needed       Diagnoses and all orders for this visit:    Gastroesophageal reflux disease without esophagitis    Hypothyroidism, unspecified type    Impaired fasting glucose    Paroxysmal atrial fibrillation (HCC)    Essential hypertension    Localized osteoarthritis of left knee            Subjective:        Patient ID: Lionel Levy is a 69 y o  female  Patient GERD, hypothyroidism paired fasting glucose Afib in hypertension  The patient is stable overall  Patient will be moving to [facility] independent living apartments  Patient with ongoing left knee pain        The following portions of the patient's history were reviewed and updated as appropriate: allergies, current medications, past family history, past medical history, past social history, past surgical history and problem list       Review of Systems   Constitutional: Negative  HENT: Negative  Eyes: Negative  Respiratory: Negative  Cardiovascular: Negative  Gastrointestinal: Negative  Endocrine: Negative  Genitourinary: Negative  Musculoskeletal: Positive for arthralgias  Skin: Negative  Allergic/Immunologic: Negative  Neurological: Negative  Hematological: Negative  Psychiatric/Behavioral: Negative  Objective:               /82 (BP Location: Right arm, Patient Position: Sitting, Cuff Size: Adult)   Temp 98 5 °F (36 9 °C) (Tympanic)   Ht 5' 3 5" (1 613 m)   Wt 70 8 kg (156 lb)   BMI 27 20 kg/m²          Physical Exam  Vitals signs and nursing note reviewed  Constitutional:       General: She is not in acute distress  Appearance: Normal appearance  She is well-developed  She is not diaphoretic  HENT:      Head: Normocephalic and atraumatic  Right Ear: Tympanic membrane, ear canal and external ear normal       Left Ear: Tympanic membrane, ear canal and external ear normal       Nose: Nose normal  No congestion  Mouth/Throat:      Pharynx: No oropharyngeal exudate  Eyes:      General: No scleral icterus  Right eye: No discharge  Left eye: No discharge  Pupils: Pupils are equal, round, and reactive to light  Neck:      Musculoskeletal: Normal range of motion and neck supple  Thyroid: No thyromegaly  Cardiovascular:      Rate and Rhythm: Normal rate and regular rhythm  Heart sounds: Normal heart sounds  No murmur  No friction rub  No gallop  Pulmonary:      Effort: Pulmonary effort is normal  No respiratory distress  Breath sounds: Normal breath sounds  No wheezing or rales  Chest:      Chest wall: No tenderness  Abdominal:      General: Bowel sounds are normal  There is no distension  Palpations: Abdomen is soft  Tenderness: There is no abdominal tenderness  There is no guarding or rebound  Musculoskeletal: Normal range of motion  General: No tenderness or deformity  Lymphadenopathy:      Cervical: No cervical adenopathy  Skin:     General: Skin is warm and dry  Capillary Refill: Capillary refill takes 2 to 3 seconds  Coloration: Skin is not pale  Findings: No erythema or rash  Neurological:      General: No focal deficit present  Mental Status: She is alert and oriented to person, place, and time  Cranial Nerves: No cranial nerve deficit  Motor: No abnormal muscle tone  Coordination: Coordination normal    Psychiatric:         Mood and Affect: Mood normal          Behavior: Behavior normal          Thought Content:  Thought content normal          Judgment: Judgment normal

## 2020-09-28 ENCOUNTER — TELEPHONE (OUTPATIENT)
Dept: HEMATOLOGY ONCOLOGY | Facility: CLINIC | Age: 75
End: 2020-09-28

## 2020-09-28 DIAGNOSIS — C50.911 INVASIVE DUCTAL CARCINOMA OF BREAST, RIGHT (HCC): ICD-10-CM

## 2020-09-28 DIAGNOSIS — K25.4 GASTROINTESTINAL HEMORRHAGE ASSOCIATED WITH GASTRIC ULCER: ICD-10-CM

## 2020-09-28 DIAGNOSIS — R79.89 LOW TSH LEVEL: ICD-10-CM

## 2020-09-28 DIAGNOSIS — S12.100A CLOSED ODONTOID FRACTURE, INITIAL ENCOUNTER (HCC): ICD-10-CM

## 2020-09-28 DIAGNOSIS — Z79.811 USE OF ANASTROZOLE (ARIMIDEX): ICD-10-CM

## 2020-09-28 RX ORDER — LEVOTHYROXINE SODIUM 0.05 MG/1
TABLET ORAL
Qty: 90 TABLET | Refills: 0 | Status: SHIPPED | OUTPATIENT
Start: 2020-09-28 | End: 2021-01-14

## 2020-09-28 RX ORDER — ANASTROZOLE 1 MG/1
1 TABLET ORAL DAILY
Qty: 90 TABLET | Refills: 3 | Status: SHIPPED | OUTPATIENT
Start: 2020-09-28 | End: 2020-12-21

## 2020-09-28 RX ORDER — METOPROLOL SUCCINATE 50 MG/1
TABLET, EXTENDED RELEASE ORAL
Qty: 90 TABLET | Refills: 0 | Status: SHIPPED | OUTPATIENT
Start: 2020-09-28 | End: 2021-01-14

## 2020-09-28 RX ORDER — PANTOPRAZOLE SODIUM 40 MG/1
TABLET, DELAYED RELEASE ORAL
Qty: 180 TABLET | Refills: 0 | Status: SHIPPED | OUTPATIENT
Start: 2020-09-28 | End: 2021-01-14

## 2020-09-28 NOTE — TELEPHONE ENCOUNTER
Patient was advised prescription was pended to Dr Edwin Singleton  Patient verbalized understanding of above

## 2020-09-28 NOTE — TELEPHONE ENCOUNTER
Patient called and reported that she moved  Addressed updated in [facility]  Patient is requesting a refill on her Anastrozole  Confirmed 11/9/20 @ 1:40 PM with Dr Evelio Massey at [facility] office  Patient verbalized understand of above

## 2020-10-22 ENCOUNTER — TELEPHONE (OUTPATIENT)
Dept: HEMATOLOGY ONCOLOGY | Facility: CLINIC | Age: 75
End: 2020-10-22

## 2020-11-05 ENCOUNTER — TELEPHONE (OUTPATIENT)
Dept: HEMATOLOGY ONCOLOGY | Facility: CLINIC | Age: 75
End: 2020-11-05

## 2020-11-09 ENCOUNTER — OFFICE VISIT (OUTPATIENT)
Dept: HEMATOLOGY ONCOLOGY | Facility: CLINIC | Age: 75
End: 2020-11-09
Payer: MEDICARE

## 2020-11-09 VITALS
OXYGEN SATURATION: 96 % | TEMPERATURE: 99 F | WEIGHT: 157 LBS | DIASTOLIC BLOOD PRESSURE: 80 MMHG | BODY MASS INDEX: 26.8 KG/M2 | HEART RATE: 84 BPM | SYSTOLIC BLOOD PRESSURE: 138 MMHG | RESPIRATION RATE: 18 BRPM | HEIGHT: 64 IN

## 2020-11-09 DIAGNOSIS — C50.911 INVASIVE DUCTAL CARCINOMA OF BREAST, RIGHT (HCC): Primary | ICD-10-CM

## 2020-11-09 PROCEDURE — 99214 OFFICE O/P EST MOD 30 MIN: CPT | Performed by: INTERNAL MEDICINE

## 2020-12-21 ENCOUNTER — OFFICE VISIT (OUTPATIENT)
Dept: FAMILY MEDICINE CLINIC | Facility: CLINIC | Age: 75
End: 2020-12-21
Payer: MEDICARE

## 2020-12-21 VITALS
WEIGHT: 160 LBS | HEIGHT: 63 IN | DIASTOLIC BLOOD PRESSURE: 76 MMHG | BODY MASS INDEX: 28.35 KG/M2 | SYSTOLIC BLOOD PRESSURE: 122 MMHG | TEMPERATURE: 98.2 F

## 2020-12-21 DIAGNOSIS — M17.12 LOCALIZED OSTEOARTHRITIS OF LEFT KNEE: ICD-10-CM

## 2020-12-21 DIAGNOSIS — K21.9 GASTROESOPHAGEAL REFLUX DISEASE WITHOUT ESOPHAGITIS: ICD-10-CM

## 2020-12-21 DIAGNOSIS — H61.22 IMPACTED CERUMEN OF LEFT EAR: ICD-10-CM

## 2020-12-21 DIAGNOSIS — I71.2 THORACIC AORTIC ANEURYSM WITHOUT RUPTURE (HCC): ICD-10-CM

## 2020-12-21 DIAGNOSIS — I10 ESSENTIAL HYPERTENSION: ICD-10-CM

## 2020-12-21 DIAGNOSIS — E03.8 HYPOTHYROIDISM DUE TO HASHIMOTO'S THYROIDITIS: Primary | ICD-10-CM

## 2020-12-21 DIAGNOSIS — E06.3 HYPOTHYROIDISM DUE TO HASHIMOTO'S THYROIDITIS: Primary | ICD-10-CM

## 2020-12-21 DIAGNOSIS — I48.0 PAROXYSMAL ATRIAL FIBRILLATION (HCC): ICD-10-CM

## 2020-12-21 PROCEDURE — 99214 OFFICE O/P EST MOD 30 MIN: CPT | Performed by: FAMILY MEDICINE

## 2021-01-11 ENCOUNTER — CONSULT (OUTPATIENT)
Dept: OBGYN CLINIC | Facility: MEDICAL CENTER | Age: 76
End: 2021-01-11
Payer: MEDICARE

## 2021-01-11 ENCOUNTER — APPOINTMENT (OUTPATIENT)
Dept: RADIOLOGY | Facility: MEDICAL CENTER | Age: 76
End: 2021-01-11
Payer: MEDICARE

## 2021-01-11 VITALS
SYSTOLIC BLOOD PRESSURE: 149 MMHG | HEART RATE: 80 BPM | HEIGHT: 64 IN | DIASTOLIC BLOOD PRESSURE: 83 MMHG | BODY MASS INDEX: 25.61 KG/M2 | TEMPERATURE: 98.4 F | WEIGHT: 150 LBS

## 2021-01-11 DIAGNOSIS — M17.12 LOCALIZED OSTEOARTHRITIS OF LEFT KNEE: ICD-10-CM

## 2021-01-11 DIAGNOSIS — Z01.89 ENCOUNTER FOR LOWER EXTREMITY COMPARISON IMAGING STUDY: ICD-10-CM

## 2021-01-11 DIAGNOSIS — M17.12 PRIMARY OSTEOARTHRITIS OF LEFT KNEE: Primary | ICD-10-CM

## 2021-01-11 PROCEDURE — 73560 X-RAY EXAM OF KNEE 1 OR 2: CPT

## 2021-01-11 PROCEDURE — 20610 DRAIN/INJ JOINT/BURSA W/O US: CPT | Performed by: ORTHOPAEDIC SURGERY

## 2021-01-11 PROCEDURE — 99204 OFFICE O/P NEW MOD 45 MIN: CPT | Performed by: ORTHOPAEDIC SURGERY

## 2021-01-11 PROCEDURE — 73564 X-RAY EXAM KNEE 4 OR MORE: CPT

## 2021-01-11 RX ORDER — LIDOCAINE HYDROCHLORIDE 10 MG/ML
3 INJECTION, SOLUTION INFILTRATION; PERINEURAL
Status: COMPLETED | OUTPATIENT
Start: 2021-01-11 | End: 2021-01-11

## 2021-01-11 RX ORDER — METHYLPREDNISOLONE ACETATE 40 MG/ML
2 INJECTION, SUSPENSION INTRA-ARTICULAR; INTRALESIONAL; INTRAMUSCULAR; SOFT TISSUE
Status: COMPLETED | OUTPATIENT
Start: 2021-01-11 | End: 2021-01-11

## 2021-01-11 RX ADMIN — METHYLPREDNISOLONE ACETATE 2 ML: 40 INJECTION, SUSPENSION INTRA-ARTICULAR; INTRALESIONAL; INTRAMUSCULAR; SOFT TISSUE at 12:06

## 2021-01-11 RX ADMIN — LIDOCAINE HYDROCHLORIDE 3 ML: 10 INJECTION, SOLUTION INFILTRATION; PERINEURAL at 12:06

## 2021-01-11 NOTE — PROGRESS NOTES
Assessment/Plan     1  Primary osteoarthritis of left knee    2  Localized osteoarthritis of left knee    3  Encounter for lower extremity comparison imaging study      Orders Placed This Encounter   Procedures    Large joint arthrocentesis: L knee    XR knee 4+ vw left injury    XR knee 1 or 2 vw right    Ambulatory referral to Physical Therapy     · Patient has severe left knee osteoarthritis   · Discussed with patient conservative treatments: injections, physical therapy, visco supplementation injection, medications,   · Received left knee  steroid injection today  Patient knows to ice and avoid strenuous activity for 1-2 days if needed  Advised patient to document her progress from the injection  · Physical therapy order was given out today    · Advised patient modify activities   · Maintain a healthy weight   · May take Tylenol up to 3000 mg a day as needed for pain  Advised patient to use OTC NSAIDS sparingly  · Provided patient short hinged knee brace vs medial  brace  · May have visco supplementation injection in 2 months      · Patient is a surgical candidate for left TKA but would like to start with conservative treatments( she is not a smoker or a diabetic)   · She was made aware she will have to wait three months after injection to surgery  Return in about 6 weeks (around 2/22/2021) for Recheck Left knee   I answered all of the patient's questions during the visit and provided education of the patient's condition during the visit  The patient verbalized understanding of the information given and agrees with the plan  This note was dictated using [employer] software  It may contain errors including improperly dictated words  Please contact physician directly for any questions  History of Present Illness   Chief complaint:   Chief Complaint   Patient presents with    Left Knee - Pain       HPI: Leonora Peña is a 70 y o  female that c/o left knee pain    She was referred by her PCP Dr Alexander Villalba  She has been having left knee pain for two years, denies any falls or trauma  Patient has been treating with her PCP Dr Alexander Villalba and had a left knee steroid injection on 06/04/2020 and patient states she had no relief from the injection  Patient states she is having generalized constant achy pain left knee  She states sharp pain with activity  She does state occasional instability  Denies any locking, catching or popping  Pain is worse with walking, up and down stairs, and transitional positions  Patient has tried bracing in the past but caused discomfort  Patient is not taking any pain medications but can  Patient has no history of having any surgeries or physical therapy for the left knee  Pain level today is 6/10  She has hx of right knee arthroscopy for torn mensicus  ROS:    See HPI for musculoskeletal review     All other systems reviewed are negative     Historical Information   Past Medical History:   Diagnosis Date    Abnormal CT of the abdomen     Acute bronchitis     Anxiety     Appetite loss     Arthritis     Ascending aortic aneurysm (HCC)     Bilateral renal cysts     Cyst of ovary     Dysphagia     Esophageal reflux disease     Full dentures     GERD (gastroesophageal reflux disease)     Herpes zoster     Hyperlipidemia     Hypertension     Hypokalemia     Hypomagnesemia     Hypothyroidism     Impaired fasting glucose     Irritable bowel syndrome (IBS)     Ischemic colitis (HCC)     Knee pain     Limb alert care status     no BP/IV right arm    Osteoarthritis of right knee     Pneumonia     Post-op pain     Pulmonary nodules     Thoracic aortic aneurysm (HCC)     Unspecified ovarian cysts     Use of anastrozole (Arimidex)     Vasovagal syncope     Vitamin D deficiency     Wears glasses     Weight loss      Past Surgical History:   Procedure Laterality Date    APPENDECTOMY      pt states it was not removed    BREAST BIOPSY Right 03/02/2016    BREAST LUMPECTOMY Right 2004    BREAST SURGERY Right     Single lesion excision 1990 hyperplasia, 1st biopsy at 25yrs old was benign    CHOLECYSTECTOMY      COLONOSCOPY      COLONOSCOPY N/A 5/12/2017    Procedure: COLONOSCOPY with biopsy;  Surgeon: Juan Estrada MD;  [facility]; Service:     ESOPHAGOGASTRODUODENOSCOPY N/A 1/19/2019    Procedure: ESOPHAGOGASTRODUODENOSCOPY (EGD) with 4cc of epinephrine injection and cauterized with gold probe; Surgeon: Kirk Sandoval MD;  [facility]: [facility];   Service: Gastroenterology    [place] LUMBAR PUNCTURE DIAGNOSTIC  5/24/2019    HYSTERECTOMY  1977    IR LUMBAR PUNCTURE  2/21/2019    KNEE SURGERY Right     [place] BIOPSY/EXCISION, LYMPH NODE(S) Right 4/12/2016    Procedure: BIOPSY LYMPH NODE SENTINEL @ 1200 LYMPHOSCINTIGRAPHY LYMPHATIC MAPPING;  Surgeon: Laura Matute MD;  [facility];  Service: General    CA MASTECTOMY, SIMPLE, COMPLETE Right 4/12/2016    Procedure: MASTECTOMY SIMPLE;  Surgeon: Laura Matute MD;  [facility];  Service: General    TUBAL LIGATION      US GUIDED BREAST BIOPSY RIGHT COMPLETE Right 3/9/2016     Social History   Social History     Substance and Sexual Activity   Alcohol Use Not Currently     Social History     Substance and Sexual Activity   Drug Use No     Social History     Tobacco Use   Smoking Status Never Smoker   Smokeless Tobacco Never Used   Tobacco Comment    not interested     Family History:   Family History   Problem Relation Age of Onset    Stroke Maternal Grandmother     Anemia Mother     Other Mother         disorders of blood and blood forming organs    Hypertension Mother     Stroke Father     Alcohol abuse Brother        Current Outpatient Medications on File Prior to Visit   Medication Sig Dispense Refill    cyanocobalamin (VITAMIN B-12) 1,000 mcg tablet Take by mouth daily      levothyroxine 50 mcg tablet TAKE 1 TABLET DAILY IN THE EARLY MORNING 90 tablet 0    metoprolol succinate (TOPROL-XL) 50 mg 24 hr tablet TAKE 1 TABLET EVERY DAY 90 tablet 0    pantoprazole (PROTONIX) 40 mg tablet TAKE 1 TABLET TWICE DAILY BEFORE MEALS 180 tablet 0     No current facility-administered medications on file prior to visit  Allergies   Allergen Reactions    Demerol [Meperidine] GI Intolerance and Other (See Comments)     Other reaction(s): Other (See Comments)  severe nausea  severe nausea  Nausea/vomiting    Nitroglycerin Anaphylaxis and Other (See Comments)     BP drops drastically    Lipitor [Atorvastatin] Other (See Comments)     Joint/muscle pain    Zocor [Simvastatin] Other (See Comments)     Joint/muscle pain       Current Outpatient Medications on File Prior to Visit   Medication Sig Dispense Refill    cyanocobalamin (VITAMIN B-12) 1,000 mcg tablet Take by mouth daily      levothyroxine 50 mcg tablet TAKE 1 TABLET DAILY IN THE EARLY MORNING 90 tablet 0    metoprolol succinate (TOPROL-XL) 50 mg 24 hr tablet TAKE 1 TABLET EVERY DAY 90 tablet 0    pantoprazole (PROTONIX) 40 mg tablet TAKE 1 TABLET TWICE DAILY BEFORE MEALS 180 tablet 0     No current facility-administered medications on file prior to visit  Objective   Vitals: Blood pressure 149/83, pulse 80, temperature 98 4 °F (36 9 °C), height 5' 4" (1 626 m), weight 68 kg (150 lb)  ,Body mass index is 25 75 kg/m²      PE:  AAOx 3  WDWN  Hearing intact, no drainage from eyes  Regular rate  no audible wheezing  no abdominal distension  LE compartments soft, skin intact    leftknee:    Appearance:  Mild generalized  swelling   No ecchymosis  Varus  joint deformity   No effusion  Palpation/Tenderness:  +TTP over medial joint line  +  TTP over lateral joint line   + TTP over patella  + TTP over patellar tendon  + TTP over pes anserine bursa  Active Range of Motion:  AROM: 3-125 / PROM 3-150   Special Tests:  Valgus Stress Test:  negative  Varus Stress Test:  negative     No ipsilateral hip pain with ROM    leftLE:    Sensation grossly intact L4, S1   Palpable posterior tibial   pulse  AT/GS  intact    Imaging Studies: I have personally reviewed pertinent films in PACS  leftknee:  Severe DJD     Large joint arthrocentesis: L knee  Universal Protocol:  Consent: Verbal consent obtained  Risks and benefits: risks, benefits and alternatives were discussed  Consent given by: patient  Time out: Immediately prior to procedure a "time out" was called to verify the correct patient, procedure, equipment, support staff and site/side marked as required    Timeout called at: 1/11/2021 12:06 PM   Site marked: the operative site was marked  Supporting Documentation  Indications: pain   Procedure Details  Location: knee - L knee  Preparation: Patient was prepped and draped in the usual sterile fashion  Needle size: 22 G  Ultrasound guidance: no  Approach: anterolateral  Medications administered: 3 mL lidocaine 1 %; 2 mL methylPREDNISolone acetate 40 mg/mL    Patient tolerance: patient tolerated the procedure well with no immediate complications  Dressing:  Sterile dressing applied        Scribe Attestation    I,:  Lizett Flores am acting as a scribe while in the presence of the attending physician :       I,:  Minerva Fontana DO personally performed the services described in this documentation    as scribed in my presence :

## 2021-01-11 NOTE — LETTER
January 11, 2021     Qian Case, [id number] [id number]    Patient: Adeline Roy   YOB: 1945   Date of Visit: 1/11/2021       Dear Dr Jayme De Luna:    Thank you for referring Martha Perez to me for evaluation  Below are my notes for this consultation  If you have questions, please do not hesitate to call me  I look forward to following your patient along with you  Sincerely,        Ivan Marsh DO        CC: No Recipients  Ivan Marsh DO  1/11/2021  1:39 PM  Sign when Signing Visit   Assessment/Plan     1  Primary osteoarthritis of left knee    2  Localized osteoarthritis of left knee    3  Encounter for lower extremity comparison imaging study      Orders Placed This Encounter   Procedures    Large joint arthrocentesis: L knee    XR knee 4+ vw left injury    XR knee 1 or 2 vw right    Ambulatory referral to Physical Therapy     · Patient has severe left knee osteoarthritis   · Discussed with patient conservative treatments: injections, physical therapy, visco supplementation injection, medications,   · Received left knee  steroid injection today  Patient knows to ice and avoid strenuous activity for 1-2 days if needed  Advised patient to document her progress from the injection  · Physical therapy order was given out today    · Advised patient modify activities   · Maintain a healthy weight   · May take Tylenol up to 3000 mg a day as needed for pain  Advised patient to use OTC NSAIDS sparingly  · Provided patient short hinged knee brace vs medial  brace  · May have visco supplementation injection in 2 months      · Patient is a surgical candidate for left TKA but would like to start with conservative treatments( she is not a smoker or a diabetic)   · She was made aware she will have to wait three months after injection to surgery  Return in about 6 weeks (around 2/22/2021) for Recheck Left knee       I answered all of the patient's questions during the visit and provided education of the patient's condition during the visit  The patient verbalized understanding of the information given and agrees with the plan  This note was dictated using [employer] software  It may contain errors including improperly dictated words  Please contact physician directly for any questions  History of Present Illness   Chief complaint:   Chief Complaint   Patient presents with    Left Knee - Pain       HPI: Joan Mendes is a 70 y o  female that c/o left knee pain  She was referred by her PCP Dr Napoleon Em  She has been having left knee pain for two years, denies any falls or trauma  Patient has been treating with her PCP Dr Napoleon Em and had a left knee steroid injection on 06/04/2020 and patient states she had no relief from the injection  Patient states she is having generalized constant achy pain left knee  She states sharp pain with activity  She does state occasional instability  Denies any locking, catching or popping  Pain is worse with walking, up and down stairs, and transitional positions  Patient has tried bracing in the past but caused discomfort  Patient is not taking any pain medications but can  Patient has no history of having any surgeries or physical therapy for the left knee  Pain level today is 6/10  She has hx of right knee arthroscopy for torn mensicus  ROS:    See HPI for musculoskeletal review     All other systems reviewed are negative     Historical Information   Past Medical History:   Diagnosis Date    Abnormal CT of the abdomen     Acute bronchitis     Anxiety     Appetite loss     Arthritis     Ascending aortic aneurysm (HCC)     Bilateral renal cysts     Cyst of ovary     Dysphagia     Esophageal reflux disease     Full dentures     GERD (gastroesophageal reflux disease)     Herpes zoster     Hyperlipidemia     Hypertension     Hypokalemia     Hypomagnesemia     Hypothyroidism     Impaired fasting glucose     Irritable bowel syndrome (IBS)     Ischemic colitis ([facility] [address] )     Knee pain     Limb alert care status     no BP/IV right arm    Osteoarthritis of right knee     Pneumonia     Post-op pain     Pulmonary nodules     Thoracic aortic aneurysm (HCC)     Unspecified ovarian cysts     Use of anastrozole (Arimidex)     Vasovagal syncope     Vitamin D deficiency     Wears glasses     Weight loss      Past Surgical History:   Procedure Laterality Date    APPENDECTOMY      pt states it was not removed    BREAST BIOPSY Right 03/02/2016    BREAST LUMPECTOMY Right 2004    BREAST SURGERY Right     Single lesion excision 1990 hyperplasia, 1st biopsy at 25yrs old was benign    CHOLECYSTECTOMY      COLONOSCOPY      COLONOSCOPY N/A 5/12/2017    Procedure: COLONOSCOPY with biopsy;  Surgeon: Neela Zeng MD;  [facility]; Service:     ESOPHAGOGASTRODUODENOSCOPY N/A 1/19/2019    Procedure: ESOPHAGOGASTRODUODENOSCOPY (EGD) with 4cc of epinephrine injection and cauterized with gold probe; Surgeon: Lara Jones MD;  [facility];   Service: Gastroenterology    [facility] LUMBAR PUNCTURE DIAGNOSTIC  5/24/2019    HYSTERECTOMY  1977    IR LUMBAR PUNCTURE  2/21/2019    KNEE SURGERY Right     [place] BIOPSY/EXCISION, LYMPH NODE(S) Right 4/12/2016    Procedure: BIOPSY LYMPH NODE SENTINEL @ 1200 LYMPHOSCINTIGRAPHY LYMPHATIC MAPPING;  Surgeon: Ana Rosas MD;  [facility];  Service: General    [place] MASTECTOMY, SIMPLE, COMPLETE Right 4/12/2016    Procedure: MASTECTOMY SIMPLE;  Surgeon: Ana Rosas MD;  [facility];  Service: General    TUBAL LIGATION      US GUIDED BREAST BIOPSY RIGHT COMPLETE Right 3/9/2016     Social History   Social History     Substance and Sexual Activity   Alcohol Use Not Currently     Social History     Substance and Sexual Activity   Drug Use No     Social History     Tobacco Use   Smoking Status Never Smoker   Smokeless Tobacco Never Used   Tobacco Comment    not interested Family History:   Family History   Problem Relation Age of Onset    Stroke Maternal Grandmother     Anemia Mother     Other Mother         disorders of blood and blood forming organs    Hypertension Mother     Stroke Father     Alcohol abuse Brother        Current Outpatient Medications on File Prior to Visit   Medication Sig Dispense Refill    cyanocobalamin (VITAMIN B-12) 1,000 mcg tablet Take by mouth daily      levothyroxine 50 mcg tablet TAKE 1 TABLET DAILY IN THE EARLY MORNING 90 tablet 0    metoprolol succinate (TOPROL-XL) 50 mg 24 hr tablet TAKE 1 TABLET EVERY DAY 90 tablet 0    pantoprazole (PROTONIX) 40 mg tablet TAKE 1 TABLET TWICE DAILY BEFORE MEALS 180 tablet 0     No current facility-administered medications on file prior to visit  Allergies   Allergen Reactions    Demerol [Meperidine] GI Intolerance and Other (See Comments)     Other reaction(s): Other (See Comments)  severe nausea  severe nausea  Nausea/vomiting    Nitroglycerin Anaphylaxis and Other (See Comments)     BP drops drastically    Lipitor [Atorvastatin] Other (See Comments)     Joint/muscle pain    Zocor [Simvastatin] Other (See Comments)     Joint/muscle pain       Current Outpatient Medications on File Prior to Visit   Medication Sig Dispense Refill    cyanocobalamin (VITAMIN B-12) 1,000 mcg tablet Take by mouth daily      levothyroxine 50 mcg tablet TAKE 1 TABLET DAILY IN THE EARLY MORNING 90 tablet 0    metoprolol succinate (TOPROL-XL) 50 mg 24 hr tablet TAKE 1 TABLET EVERY DAY 90 tablet 0    pantoprazole (PROTONIX) 40 mg tablet TAKE 1 TABLET TWICE DAILY BEFORE MEALS 180 tablet 0     No current facility-administered medications on file prior to visit  Objective   Vitals: Blood pressure 149/83, pulse 80, temperature 98 4 °F (36 9 °C), height 5' 4" (1 626 m), weight 68 kg (150 lb)  ,Body mass index is 25 75 kg/m²      PE:  AAOx 3  WDWN  Hearing intact, no drainage from eyes  Regular rate  no audible wheezing  no abdominal distension  LE compartments soft, skin intact    leftknee:    Appearance:  Mild generalized  swelling   No ecchymosis  Varus  joint deformity   No effusion  Palpation/Tenderness:  +TTP over medial joint line  +  TTP over lateral joint line   + TTP over patella  + TTP over patellar tendon  + TTP over pes anserine bursa  Active Range of Motion:  AROM: 3-125 / PROM 3-150   Special Tests:  Valgus Stress Test:  negative  Varus Stress Test:  negative     No ipsilateral hip pain with ROM    leftLE:    Sensation grossly intact L4, S1   Palpable posterior tibial   pulse  AT/GS  intact    Imaging Studies: I have personally reviewed pertinent films in PACS  leftknee:  Severe DJD     Large joint arthrocentesis: L knee  Universal Protocol:  Consent: Verbal consent obtained  Risks and benefits: risks, benefits and alternatives were discussed  Consent given by: patient  Time out: Immediately prior to procedure a "time out" was called to verify the correct patient, procedure, equipment, support staff and site/side marked as required    Timeout called at: 1/11/2021 12:06 PM   Site marked: the operative site was marked  Supporting Documentation  Indications: pain   Procedure Details  Location: knee - L knee  Preparation: Patient was prepped and draped in the usual sterile fashion  Needle size: 22 G  Ultrasound guidance: no  Approach: anterolateral  Medications administered: 3 mL lidocaine 1 %; 2 mL methylPREDNISolone acetate 40 mg/mL    Patient tolerance: patient tolerated the procedure well with no immediate complications  Dressing:  Sterile dressing applied        Scribe Attestation    I,:  Genna Flores am acting as a scribe while in the presence of the attending physician :       I,:  Kenna Garnica DO personally performed the services described in this documentation    as scribed in my presence :

## 2021-01-14 DIAGNOSIS — K25.4 GASTROINTESTINAL HEMORRHAGE ASSOCIATED WITH GASTRIC ULCER: ICD-10-CM

## 2021-01-14 DIAGNOSIS — S12.100A CLOSED ODONTOID FRACTURE, INITIAL ENCOUNTER (HCC): ICD-10-CM

## 2021-01-14 DIAGNOSIS — R79.89 LOW TSH LEVEL: ICD-10-CM

## 2021-01-14 RX ORDER — PANTOPRAZOLE SODIUM 40 MG/1
TABLET, DELAYED RELEASE ORAL
Qty: 180 TABLET | Refills: 0 | Status: SHIPPED | OUTPATIENT
Start: 2021-01-14 | End: 2021-04-20 | Stop reason: SDUPTHER

## 2021-01-14 RX ORDER — LEVOTHYROXINE SODIUM 0.05 MG/1
TABLET ORAL
Qty: 90 TABLET | Refills: 0 | Status: SHIPPED | OUTPATIENT
Start: 2021-01-14 | End: 2021-04-20 | Stop reason: SDUPTHER

## 2021-01-14 RX ORDER — METOPROLOL SUCCINATE 50 MG/1
TABLET, EXTENDED RELEASE ORAL
Qty: 90 TABLET | Refills: 0 | Status: SHIPPED | OUTPATIENT
Start: 2021-01-14 | End: 2021-04-20 | Stop reason: SDUPTHER

## 2021-01-22 ENCOUNTER — TELEPHONE (OUTPATIENT)
Dept: SURGICAL ONCOLOGY | Facility: CLINIC | Age: 76
End: 2021-01-22

## 2021-01-25 ENCOUNTER — OFFICE VISIT (OUTPATIENT)
Dept: SURGICAL ONCOLOGY | Facility: CLINIC | Age: 76
End: 2021-01-25
Payer: MEDICARE

## 2021-01-25 VITALS
TEMPERATURE: 98.4 F | BODY MASS INDEX: 27.49 KG/M2 | HEART RATE: 89 BPM | WEIGHT: 161 LBS | DIASTOLIC BLOOD PRESSURE: 84 MMHG | HEIGHT: 64 IN | SYSTOLIC BLOOD PRESSURE: 130 MMHG

## 2021-01-25 DIAGNOSIS — C50.911 INVASIVE DUCTAL CARCINOMA OF BREAST, RIGHT (HCC): Primary | ICD-10-CM

## 2021-01-25 DIAGNOSIS — Z12.39 BREAST CANCER SCREENING, HIGH RISK PATIENT: ICD-10-CM

## 2021-01-25 DIAGNOSIS — Z79.811 USE OF ANASTROZOLE (ARIMIDEX): ICD-10-CM

## 2021-01-25 DIAGNOSIS — Z12.31 ENCOUNTER FOR SCREENING MAMMOGRAM FOR MALIGNANT NEOPLASM OF BREAST: ICD-10-CM

## 2021-01-25 DIAGNOSIS — R92.2 DENSE BREAST TISSUE: ICD-10-CM

## 2021-01-25 PROCEDURE — 99214 OFFICE O/P EST MOD 30 MIN: CPT | Performed by: SURGERY

## 2021-01-25 NOTE — PROGRESS NOTES
Surgical Oncology Follow Up       [facility]  [facility] [address]    Sandra Jacome  1945  [id number]  [address]  [facility] [place]  [place] [address]    Chief Complaint   Patient presents with    Follow-up       Assessment/Plan   Diagnoses and all orders for this visit:    Invasive ductal carcinoma of breast, right (HCC)    Use of anastrozole (Arimidex)    Breast cancer screening, high risk patient  -     Mammo screening left w 3d & cad; Future    Dense breast tissue    Encounter for screening mammogram for malignant neoplasm of breast   -     Mammo screening left w 3d & cad; Future        Advance Care Planning/Advance Directives:  Discussed disease status, cancer treatment plans and/or cancer treatment goals with the patient  Oncology History:    Oncology History   Invasive ductal carcinoma of breast, right [facility])    Initial Diagnosis    Invasive ductal carcinoma of breast, right [facility])     2004 Surgery    lumpectomy     2004 -  Radiation    WBRT     2004 -  Hormone Therapy    Tamoxifen X 5 years     3/9/2016 Surgery    Right breast US guided core biopsy,  IDC     4/12/2016 Surgery    Right breast mastectomy, SLNB     5/13/2016 -  Hormone Therapy    Anastrazole  Dr Marta Calles         History of Present Illness:  Breast cancer follow-up, no concerns, is finishing up her anastrozole  -Interval History:  As noted    Review of Systems:  Review of Systems   Constitutional: Negative  Negative for appetite change and fever  Eyes: Negative  Respiratory: Negative for shortness of breath  Cardiovascular: Negative  Gastrointestinal: Negative  Endocrine: Negative  Genitourinary: Negative  Musculoskeletal: Negative  Negative for arthralgias and myalgias  Skin: Negative  Allergic/Immunologic: Negative  Neurological: Negative      Hematological: Negative  Negative for adenopathy  Does not bruise/bleed easily  Psychiatric/Behavioral: Negative          Patient Active Problem List   Diagnosis    Anxiety    GERD (gastroesophageal reflux disease)    Essential hypertension    Hypothyroidism    Hyperlipidemia    Bilateral renal cysts    Ischemic colitis ([address] )    Hypokalemia    Thoracic aortic aneurysm (HCC)    Impaired fasting glucose    Esophageal reflux disease    Invasive ductal carcinoma of breast, right (HCC)    Use of anastrozole (Arimidex)    Closed odontoid fracture with type I morphology (HCC)    Closed nondisplaced fracture of fifth cervical vertebra (HCC)    Closed nondisplaced fracture of seventh cervical vertebra (HCC)    Syncope    Lung nodule seen on imaging study    Atherosclerosis    History of alcohol use    Hyponatremia    Breast cancer screening, high risk patient    Irregular cardiac rhythm    Atrial fibrillation (HCC)    Gastrointestinal hemorrhage associated with gastric ulcer    Iron deficiency anemia    Thrombocytopenia (HCC)    Acute non-recurrent maxillary sinusitis    Low TSH level    Confusion    Abnormal finding on MRI of brain    Left upper arm pain    HLD (hyperlipidemia)    Gastroesophageal reflux disease without esophagitis    Acute blood loss anemia    Peptic ulcer disease    Transient alteration of awareness    Alcohol use with intoxication (HCC)    Dense breast tissue    History of head injury    Arterial hypotension    Unresponsiveness    Leptomeningeal disease    Vision loss, bilateral    Localized osteoarthritis of left knee    Impacted cerumen of left ear     Past Medical History:   Diagnosis Date    Abnormal CT of the abdomen     Acute bronchitis     Anxiety     Appetite loss     Arthritis     Ascending aortic aneurysm (HCC)     Bilateral renal cysts     Cyst of ovary     Dysphagia     Esophageal reflux disease     Full dentures     GERD (gastroesophageal reflux disease)     Herpes zoster     Hyperlipidemia     Hypertension     Hypokalemia     Hypomagnesemia     Hypothyroidism     Impaired fasting glucose     Irritable bowel syndrome (IBS)     Ischemic colitis (HCC)     Knee pain     Limb alert care status     no BP/IV right arm    Osteoarthritis of right knee     Pneumonia     Post-op pain     Pulmonary nodules     Thoracic aortic aneurysm (HCC)     Unspecified ovarian cysts     Use of anastrozole (Arimidex)     Vasovagal syncope     Vitamin D deficiency     Wears glasses     Weight loss      Past Surgical History:   Procedure Laterality Date    APPENDECTOMY      pt states it was not removed    BREAST BIOPSY Right 03/02/2016    BREAST LUMPECTOMY Right 2004    BREAST SURGERY Right     Single lesion excision 1990 hyperplasia, 1st biopsy at 25yrs old was benign    CHOLECYSTECTOMY      COLONOSCOPY      COLONOSCOPY N/A 5/12/2017    Procedure: COLONOSCOPY with biopsy;  Surgeon: Harjinder Werner MD;  Location: [facility]; Service:     ESOPHAGOGASTRODUODENOSCOPY N/A 1/19/2019    Procedure: ESOPHAGOGASTRODUODENOSCOPY (EGD) with 4cc of epinephrine injection and cauterized with gold probe; Surgeon: Deepthi Ponce MD;  Location: [facility];   Service: Gastroenterology    [facility] LUMBAR PUNCTURE DIAGNOSTIC  5/24/2019    HYSTERECTOMY  1977    IR LUMBAR PUNCTURE  2/21/2019    KNEE SURGERY Right     DE BIOPSY/EXCISION, LYMPH NODE(S) Right 4/12/2016    Procedure: BIOPSY LYMPH NODE SENTINEL @ 1200 LYMPHOSCINTIGRAPHY LYMPHATIC MAPPING;  Surgeon: Sita Shahid MD;  [facility];  Service: General    DE MASTECTOMY, SIMPLE, COMPLETE Right 4/12/2016    Procedure: MASTECTOMY SIMPLE;  Surgeon: Sita Shahid MD;  [facility];  Service: General    TUBAL LIGATION      US GUIDED BREAST BIOPSY RIGHT COMPLETE Right 3/9/2016     Family History   Problem Relation Age of Onset    Stroke Maternal Grandmother     Anemia Mother    Leslee Richey Other Mother disorders of blood and blood forming organs    Hypertension Mother     Stroke Father     Alcohol abuse Brother      Social History     Socioeconomic History    Marital status: Single     Spouse name: Not on file    Number of children: Not on file    Years of education: Not on file    Highest education level: Not on file   Occupational History    Occupation: retired   Social Needs    Financial resource strain: Not on file    Food insecurity     Worry: Not on file     Inability: Not on file   [employer] needs     Medical: Not on file     Non-medical: Not on file   Tobacco Use    Smoking status: Never Smoker    Smokeless tobacco: Never Used    Tobacco comment: not interested   Substance and Sexual Activity    Alcohol use: Not Currently    Drug use: No    Sexual activity: Not Currently   Lifestyle    Physical activity     Days per week: Not on file     Minutes per session: Not on file    Stress: Not on file   Relationships    Social connections     Talks on phone: Not on file     Gets together: Not on file     Attends Buddhism service: Not on file     Active member of club or organization: Not on file     Attends meetings of clubs or organizations: Not on file     Relationship status: Not on file    Intimate partner violence     Fear of current or ex partner: Not on file     Emotionally abused: Not on file     Physically abused: Not on file     Forced sexual activity: Not on file   Other Topics Concern    Not on file   Social History Narrative    Not on file       Current Outpatient Medications:     cyanocobalamin (VITAMIN B-12) 1,000 mcg tablet, Take by mouth daily, Disp: , Rfl:     metoprolol succinate (TOPROL-XL) 50 mg 24 hr tablet, TAKE 1 TABLET EVERY DAY, Disp: 90 tablet, Rfl: 0    pantoprazole (PROTONIX) 40 mg tablet, TAKE 1 TABLET TWICE DAILY BEFORE MEALS, Disp: 180 tablet, Rfl: 0    levothyroxine 50 mcg tablet, TAKE 1 TABLET DAILY IN THE EARLY MORNING (Patient not taking: Reported on 1/25/2021), Disp: 90 tablet, Rfl: 0  Allergies   Allergen Reactions    Demerol [Meperidine] GI Intolerance and Other (See Comments)     Other reaction(s): Other (See Comments)  severe nausea  severe nausea  Nausea/vomiting    Nitroglycerin Anaphylaxis and Other (See Comments)     BP drops drastically    Lipitor [Atorvastatin] Other (See Comments)     Joint/muscle pain    Zocor [Simvastatin] Other (See Comments)     Joint/muscle pain       The following portions of the patient's history were reviewed and updated as appropriate: allergies, current medications, past family history, past medical history, past social history, past surgical history and problem list         Vitals:    01/25/21 1352   BP: 130/84   Pulse: 89   Temp: 98 4 °F (36 9 °C)       Physical Exam  Constitutional:       General: She is not in acute distress  Appearance: She is well-developed  HENT:      Head: Normocephalic and atraumatic  Cardiovascular:      Heart sounds: Normal heart sounds  Pulmonary:      Breath sounds: Normal breath sounds  Chest:      Breasts:         Right: Skin change (Mastectomy scar) present  No mass or tenderness  Left: No inverted nipple, mass, nipple discharge, skin change or tenderness  Abdominal:      Palpations: Abdomen is soft  Lymphadenopathy:      Upper Body:      Right upper body: No supraclavicular, axillary or pectoral adenopathy  Left upper body: No supraclavicular, axillary or pectoral adenopathy  Neurological:      Mental Status: She is alert and oriented to person, place, and time  Psychiatric:         Mood and Affect: Mood normal            Results:  Labs:      Imaging  07/15/2020 left 3D screening mammogram was benign BI-RADS two with a density of three    I reviewed the above imaging data  Discussion/Summary:  61-year-old female status post right mastectomy for recurrent carcinoma  She is completing her course of anastrozole    There is no evidence of disease based on examination today  I will make arrangements for her left-sided mammogram due in July  I will see her again in six months for another exam   At that time, if there are no concerns, I will start annual exams

## 2021-03-04 ENCOUNTER — OFFICE VISIT (OUTPATIENT)
Dept: OBGYN CLINIC | Facility: MEDICAL CENTER | Age: 76
End: 2021-03-04
Payer: MEDICARE

## 2021-03-04 VITALS
HEIGHT: 64 IN | WEIGHT: 163 LBS | SYSTOLIC BLOOD PRESSURE: 152 MMHG | DIASTOLIC BLOOD PRESSURE: 84 MMHG | BODY MASS INDEX: 27.83 KG/M2 | TEMPERATURE: 97.9 F | HEART RATE: 94 BPM

## 2021-03-04 DIAGNOSIS — M17.12 PRIMARY OSTEOARTHRITIS OF LEFT KNEE: Primary | ICD-10-CM

## 2021-03-04 PROCEDURE — 99213 OFFICE O/P EST LOW 20 MIN: CPT | Performed by: ORTHOPAEDIC SURGERY

## 2021-03-04 NOTE — PROGRESS NOTES
Assessment/Plan:  1  Primary osteoarthritis of left knee      Orders Placed This Encounter   Procedures    Injection procedure prior authorization     - Synvisc injection was ordered at today's appointment  - patient was reminded to continue her home exercise program  - advised patient to modify activities  - maintain a healthy weight  - may take Tylenol up to 3000 mg a day as needed for pain  Advised patient to use OTC NSAIDs sparingly  -  Patient is a surgical candidate for left TKA but would like to start with conservative treatments (she is not a smoker or a diabetic)  She did say at today's appointment that if she has minimum relief of her symptoms after viscosupplementation she is interested in moving forward with a TKA  Return for Recheck after monovisc comes in  I answered all of the patient's questions during the visit and provided education of the patient's condition during the visit  The patient verbalized understanding of the information given and agrees with the plan  This note was dictated using [employer] software  It may contain errors including improperly dictated words  Please contact physician directly for any questions  Subjective   Chief Complaint:   Chief Complaint   Patient presents with    Left Knee - Follow-up       HPI  Josefina Fleischer is a 70 y o  female who presents for follow up for  Follow-up evaluation of left knee  At her last appointment she was provided with a cortisone injection to her left knee which provided her with mild relief of her symptoms for couple of days  She states she experiences an intermittent left knee pain  She is unable to point to one  area of pain, she states it is her entire left knee  She states last week she did have her 1st COVID vaccination  She denies any further injury or trauma to her left knee  She denies any   Distal paresthesias  Review of Systems  ROS:    See HPI for musculoskeletal review     All other systems reviewed are negative     History:  Past Medical History:   Diagnosis Date    Abnormal CT of the abdomen     Acute bronchitis     Anxiety     Appetite loss     Arthritis     Ascending aortic aneurysm (HCC)     Bilateral renal cysts     Cyst of ovary     Dysphagia     Esophageal reflux disease     Full dentures     GERD (gastroesophageal reflux disease)     Herpes zoster     Hyperlipidemia     Hypertension     Hypokalemia     Hypomagnesemia     Hypothyroidism     Impaired fasting glucose     Irritable bowel syndrome (IBS)     Ischemic colitis (HCC)     Knee pain     Limb alert care status     no BP/IV right arm    Osteoarthritis of right knee     Pneumonia     Post-op pain     Pulmonary nodules     Thoracic aortic aneurysm (HCC)     Unspecified ovarian cysts     Use of anastrozole (Arimidex)     Vasovagal syncope     Vitamin D deficiency     Wears glasses     Weight loss      Past Surgical History:   Procedure Laterality Date    APPENDECTOMY      pt states it was not removed    BREAST BIOPSY Right 03/02/2016    BREAST LUMPECTOMY Right 2004    BREAST SURGERY Right     Single lesion excision 1990 hyperplasia, 1st biopsy at 25yrs old was benign    CHOLECYSTECTOMY      COLONOSCOPY      COLONOSCOPY N/A 5/12/2017    Procedure: COLONOSCOPY with biopsy;  Surgeon: Adithya Villagomez MD;  [facility]: [facility]; Service:     ESOPHAGOGASTRODUODENOSCOPY N/A 1/19/2019    Procedure: ESOPHAGOGASTRODUODENOSCOPY (EGD) with 4cc of epinephrine injection and cauterized with gold probe; Surgeon: Roscoe Melendez MD;  [facility];   Service: Gastroenterology    [facility] LUMBAR PUNCTURE DIAGNOSTIC  5/24/2019    HYSTERECTOMY  1977    IR LUMBAR PUNCTURE  2/21/2019    KNEE SURGERY Right     MS BIOPSY/EXCISION, LYMPH NODE(S) Right 4/12/2016    Procedure: BIOPSY LYMPH NODE SENTINEL @ 1200 LYMPHOSCINTIGRAPHY LYMPHATIC MAPPING;  Surgeon: Andrei Mccormick MD;  [facility];  Service: General    MS MASTECTOMY, SIMPLE, COMPLETE Right 4/12/2016    Procedure: MASTECTOMY SIMPLE;  Surgeon: Jolie Rosales MD;  [facility];  Service: [address] BREAST BIOPSY RIGHT COMPLETE Right 3/9/2016     Social History   Social History     Substance and Sexual Activity   Alcohol Use Not Currently     Social History     Substance and Sexual Activity   Drug Use No     Social History     Tobacco Use   Smoking Status Never Smoker   Smokeless Tobacco Never Used   Tobacco Comment    not interested     Family History:   Family History   Problem Relation Age of Onset    Stroke Maternal Grandmother     Anemia Mother     Other Mother         disorders of blood and blood forming organs    Hypertension Mother     Stroke Father     Alcohol abuse Brother        Current Outpatient Medications on File Prior to Visit   Medication Sig Dispense Refill    cyanocobalamin (VITAMIN B-12) 1,000 mcg tablet Take by mouth daily      levothyroxine 50 mcg tablet TAKE 1 TABLET DAILY IN THE EARLY MORNING (Patient not taking: Reported on 1/25/2021) 90 tablet 0    metoprolol succinate (TOPROL-XL) 50 mg 24 hr tablet TAKE 1 TABLET EVERY DAY 90 tablet 0    pantoprazole (PROTONIX) 40 mg tablet TAKE 1 TABLET TWICE DAILY BEFORE MEALS 180 tablet 0     No current facility-administered medications on file prior to visit  Allergies   Allergen Reactions    Demerol [Meperidine] GI Intolerance and Other (See Comments)     Other reaction(s):  Other (See Comments)  severe nausea  severe nausea  Nausea/vomiting    Nitroglycerin Anaphylaxis and Other (See Comments)     BP drops drastically    Lipitor [Atorvastatin] Other (See Comments)     Joint/muscle pain    Zocor [Simvastatin] Other (See Comments)     Joint/muscle pain        Objective     /84   Pulse 94   Temp 97 9 °F (36 6 °C)   Ht 5' 4" (1 626 m)   Wt 73 9 kg (163 lb)   BMI 27 98 kg/m²      PE:  AAOx 3  WDWN  Hearing intact, no drainage from eyes  no audible wheezing  no abdominal distension  LE compartments soft, skin intact    Ortho Exam:  left Knee:   No erythema  Generalized left knee swelling  no effusion  no warmth  AROM: 0-120  Stable to varus/valgus stress    Imaging Studies: I have personally reviewed pertinent reports      XR left knee: Severe DJD

## 2021-03-10 ENCOUNTER — OFFICE VISIT (OUTPATIENT)
Dept: FAMILY MEDICINE CLINIC | Facility: CLINIC | Age: 76
End: 2021-03-10
Payer: MEDICARE

## 2021-03-10 VITALS
HEIGHT: 64 IN | SYSTOLIC BLOOD PRESSURE: 128 MMHG | BODY MASS INDEX: 27.83 KG/M2 | WEIGHT: 163 LBS | TEMPERATURE: 98.1 F | DIASTOLIC BLOOD PRESSURE: 84 MMHG

## 2021-03-10 DIAGNOSIS — K64.1 GRADE II HEMORRHOIDS: Primary | ICD-10-CM

## 2021-03-10 DIAGNOSIS — D69.6 THROMBOCYTOPENIA (HCC): ICD-10-CM

## 2021-03-10 DIAGNOSIS — I48.0 PAROXYSMAL ATRIAL FIBRILLATION (HCC): ICD-10-CM

## 2021-03-10 DIAGNOSIS — F10.929: ICD-10-CM

## 2021-03-10 DIAGNOSIS — I71.2 THORACIC AORTIC ANEURYSM WITHOUT RUPTURE (HCC): ICD-10-CM

## 2021-03-10 PROCEDURE — 99213 OFFICE O/P EST LOW 20 MIN: CPT | Performed by: FAMILY MEDICINE

## 2021-03-10 RX ORDER — HYDROCORTISONE 25 MG/G
CREAM TOPICAL 2 TIMES DAILY
Qty: 28 G | Refills: 2 | Status: SHIPPED | OUTPATIENT
Start: 2021-03-10 | End: 2021-03-12

## 2021-03-10 NOTE — PROGRESS NOTES
Assessment/Plan: the patient use Anusol HC cream 3 times daily along with Sitz baths 3 times daily  To consider seeing colorectal specialist if not seeing improvement  Diagnoses and all orders for this visit:    Grade II hemorrhoids  -     hydrocortisone (ANUSOL-HC) 2 5 % rectal cream; Apply topically 2 (two) times a day    Alcohol use with intoxication ([address] )    Thoracic aortic aneurysm without rupture (HCC)    Paroxysmal atrial fibrillation (HCC)    Thrombocytopenia (HCC)            Subjective:        Patient ID: Leonora Peña is a 70 y o  female  Patient does notice bright red blood per rectum when wiping over the past few weeks  Patient does notice a slight lump that region  Patient with some loose stools also  No fevers or vomiting  Patient does notice burning in his anal region  The following portions of the patient's history were reviewed and updated as appropriate: allergies, current medications, past family history, past medical history, past social history, past surgical history and problem list       Review of Systems   Constitutional: Negative  HENT: Negative  Eyes: Negative  Respiratory: Negative  Cardiovascular: Negative  Gastrointestinal: Positive for rectal pain  Endocrine: Negative  Genitourinary: Negative  Musculoskeletal: Negative  Skin: Negative  Allergic/Immunologic: Negative  Neurological: Negative  Hematological: Negative  Psychiatric/Behavioral: Negative  Objective:      BMI Counseling: Body mass index is 27 98 kg/m²  The BMI is above normal  Nutrition recommendations include decreasing portion sizes  Exercise recommendations include moderate physical activity 150 minutes/week                 /84 (BP Location: Right arm, Patient Position: Sitting, Cuff Size: Adult)   Temp 98 1 °F (36 7 °C) (Tympanic)   Ht 5' 4" (1 626 m)   Wt 73 9 kg (163 lb)   BMI 27 98 kg/m²          Physical Exam  Vitals signs and nursing note reviewed  Exam conducted with a chaperone present  Constitutional:       General: She is not in acute distress  Appearance: Normal appearance  She is not ill-appearing, toxic-appearing or diaphoretic  HENT:      Head: Normocephalic and atraumatic  Abdominal:      General: There is no distension  Tenderness: There is no abdominal tenderness  Comments: Hemorrhoidal tissue noted   Skin:     Capillary Refill: Capillary refill takes less than 2 seconds  Neurological:      Mental Status: She is alert  Psychiatric:         Mood and Affect: Mood normal          Behavior: Behavior normal          Thought Content:  Thought content normal

## 2021-03-11 NOTE — TELEPHONE ENCOUNTER
Gabby Woodard called in stating that the [facility] no [facility] and they are wondering if something else can be sent in  Please review and advise  Thank you

## 2021-03-12 ENCOUNTER — TELEPHONE (OUTPATIENT)
Dept: FAMILY MEDICINE CLINIC | Facility: CLINIC | Age: 76
End: 2021-03-12

## 2021-03-12 DIAGNOSIS — K64.1 GRADE II HEMORRHOIDS: Primary | ICD-10-CM

## 2021-03-12 RX ORDER — HYDROCORTISONE 25 MG/G
CREAM TOPICAL 2 TIMES DAILY
Qty: 1 TUBE | Refills: 2 | Status: SHIPPED | OUTPATIENT
Start: 2021-03-12 | End: 2021-03-12

## 2021-03-12 RX ORDER — HYDROCORTISONE 25 MG/G
CREAM TOPICAL 2 TIMES DAILY
Qty: 1 TUBE | Refills: 2 | Status: SHIPPED | OUTPATIENT
Start: 2021-03-12 | End: 2021-04-20 | Stop reason: SDUPTHER

## 2021-03-12 NOTE — TELEPHONE ENCOUNTER
Pharmacy called Proctofoam not covered under her insurance but they will cover Proctopak or Proctosol hc  Please advise      [phone]

## 2021-03-22 ENCOUNTER — OFFICE VISIT (OUTPATIENT)
Dept: OBGYN CLINIC | Facility: MEDICAL CENTER | Age: 76
End: 2021-03-22
Payer: MEDICARE

## 2021-03-22 VITALS
DIASTOLIC BLOOD PRESSURE: 82 MMHG | TEMPERATURE: 97.8 F | SYSTOLIC BLOOD PRESSURE: 139 MMHG | HEIGHT: 64 IN | HEART RATE: 79 BPM | WEIGHT: 161 LBS | BODY MASS INDEX: 27.49 KG/M2

## 2021-03-22 DIAGNOSIS — M17.12 PRIMARY OSTEOARTHRITIS OF LEFT KNEE: Primary | ICD-10-CM

## 2021-03-22 PROCEDURE — 20610 DRAIN/INJ JOINT/BURSA W/O US: CPT | Performed by: PHYSICIAN ASSISTANT

## 2021-03-22 NOTE — PROGRESS NOTES
Assessment/Plan:  1  Primary osteoarthritis of left knee      Orders Placed This Encounter   Procedures    Large joint arthrocentesis       · Patient received left knee monovisc injection in the office today  Tolerated the procedure well  Advised to apply ice and avoid strenuous activity for 1-2 days as needed  · Continue tylenol as needed for pain  · Continue activity as tolerated  · Patient aware that she can try CSI again in 2 months or repeat VS in 6 months  Return in about 2 months (around 5/22/2021) for left knee   I answered all of the patient's questions during the visit and provided education of the patient's condition during the visit  The patient verbalized understanding of the information given and agrees with the plan  This note was dictated using [employer] software  It may contain errors including improperly dictated words  Please contact physician directly for any questions  Subjective   Chief Complaint:   Chief Complaint   Patient presents with    Left Knee - Follow-up       HPI  Viki Warren is a 70 y o  female who presents for follow up for left knee pain due to osteoarthritis  She is here today Monvisc injection  She states left knee pain is getting worse  She had a left knee steroid injection on 01/11/2021 and she states she had minimal relief the first few hours after the injection  She states constant pain over the anterior medial aspect the left knee  She denies instability and locking  She is currently not taking pain medications  She received her first [place] vaccination on 02/25/2021 but has not received her second one as of yet  Review of Systems  ROS:    See HPI for musculoskeletal review     All other systems reviewed are negative     History:  Past Medical History:   Diagnosis Date    Abnormal CT of the abdomen     Acute bronchitis     Anxiety     Appetite loss     Arthritis     Ascending aortic aneurysm (HCC)     Bilateral renal cysts     Cyst of ovary     Dysphagia     Esophageal reflux disease     Full dentures     GERD (gastroesophageal reflux disease)     Herpes zoster     Hyperlipidemia     Hypertension     Hypokalemia     Hypomagnesemia     Hypothyroidism     Impaired fasting glucose     Irritable bowel syndrome (IBS)     Ischemic colitis (HCC)     Knee pain     Limb alert care status     no BP/IV right arm    Osteoarthritis of right knee     Pneumonia     Post-op pain     Pulmonary nodules     Thoracic aortic aneurysm (HCC)     Unspecified ovarian cysts     Use of anastrozole (Arimidex)     Vasovagal syncope     Vitamin D deficiency     Wears glasses     Weight loss      Past Surgical History:   Procedure Laterality Date    APPENDECTOMY      pt states it was not removed    BREAST BIOPSY Right 03/02/2016    BREAST LUMPECTOMY Right 2004    BREAST SURGERY Right     Single lesion excision 1990 hyperplasia, 1st biopsy at 25yrs old was benign    CHOLECYSTECTOMY      COLONOSCOPY      COLONOSCOPY N/A 5/12/2017    Procedure: COLONOSCOPY with biopsy;  Surgeon: Jasvir Hernandes MD;  Location: [facility]; Service:     ESOPHAGOGASTRODUODENOSCOPY N/A 1/19/2019    Procedure: ESOPHAGOGASTRODUODENOSCOPY (EGD) with 4cc of epinephrine injection and cauterized with gold probe; Surgeon: Zack Arroyo MD;  Location: [facility];   Service: Gastroenterology    [facility] LUMBAR PUNCTURE DIAGNOSTIC  5/24/2019    HYSTERECTOMY  1977    IR LUMBAR PUNCTURE  2/21/2019    KNEE SURGERY Right     CA BIOPSY/EXCISION, LYMPH NODE(S) Right 4/12/2016    Procedure: BIOPSY LYMPH NODE SENTINEL @ 1200 LYMPHOSCINTIGRAPHY LYMPHATIC MAPPING;  Surgeon: Mariana Murdock MD;  Location: [facility];  Service: General    CA MASTECTOMY, SIMPLE, COMPLETE Right 4/12/2016    Procedure: MASTECTOMY SIMPLE;  Surgeon: Mariana Murdock MD;  Location: [facility];  Service: General    TUBAL LIGATION      US GUIDED BREAST BIOPSY RIGHT COMPLETE Right 3/9/2016     Social History   Social History     Substance and Sexual Activity   Alcohol Use Not Currently     Social History     Substance and Sexual Activity   Drug Use No     Social History     Tobacco Use   Smoking Status Never Smoker   Smokeless Tobacco Never Used   Tobacco Comment    not interested     Family History:   Family History   Problem Relation Age of Onset    Stroke Maternal Grandmother     Anemia Mother     Other Mother         disorders of blood and blood forming organs    Hypertension Mother     Stroke Father     Alcohol abuse Brother        Current Outpatient Medications on File Prior to Visit   Medication Sig Dispense Refill    cyanocobalamin (VITAMIN B-12) 1,000 mcg tablet Take by mouth daily      hydrocortisone (ANUSOL-HC) 2 5 % rectal cream Apply topically 2 (two) times a day 1 Tube 2    hydrocortisone-pramoxine (PROCTOFOAM-HC) 1-1 % FOAM rectal foam Insert 1 applicator into the rectum 2 (two) times a day As needed 1 Can 3    levothyroxine 50 mcg tablet TAKE 1 TABLET DAILY IN THE EARLY MORNING 90 tablet 0    metoprolol succinate (TOPROL-XL) 50 mg 24 hr tablet TAKE 1 TABLET EVERY DAY 90 tablet 0    pantoprazole (PROTONIX) 40 mg tablet TAKE 1 TABLET TWICE DAILY BEFORE MEALS 180 tablet 0     No current facility-administered medications on file prior to visit  Allergies   Allergen Reactions    Demerol [Meperidine] GI Intolerance and Other (See Comments)     Other reaction(s):  Other (See Comments)  severe nausea  severe nausea  Nausea/vomiting    Nitroglycerin Anaphylaxis and Other (See Comments)     BP drops drastically    Lipitor [Atorvastatin] Other (See Comments)     Joint/muscle pain    Zocor [Simvastatin] Other (See Comments)     Joint/muscle pain        Objective     /82   Pulse 79   Temp 97 8 °F (36 6 °C)   Ht 5' 4" (1 626 m)   Wt 73 kg (161 lb)   BMI 27 64 kg/m²      PE:  AAOx 3  WDWN  Hearing intact, no drainage from eyes  no audible wheezing  no abdominal distension  LE compartments soft, skin intact    Ortho Exam:  left Knee:   No erythema  no swelling  no effusion  no warmth  +TTP over lateral joint line  AROM: 0- 130  Stable to varus/valgus stress      Large joint arthrocentesis: L knee  Universal Protocol:  Consent: Verbal consent obtained    Risks and benefits: risks, benefits and alternatives were discussed  Consent given by: patient  Site marked: the operative site was marked  Supporting Documentation  Indications: pain   Procedure Details  Location: knee - L knee  Preparation: Patient was prepped and draped in the usual sterile fashion  Needle size: 22 G  Ultrasound guidance: no  Approach: anterolateral  Medications administered: 88 mg hyaluronan 88 MG/4ML    Patient tolerance: patient tolerated the procedure well with no immediate complications  Dressing:  Sterile dressing applied

## 2021-04-01 ENCOUNTER — APPOINTMENT (EMERGENCY)
Dept: CT IMAGING | Facility: HOSPITAL | Age: 76
End: 2021-04-01
Payer: MEDICARE

## 2021-04-01 ENCOUNTER — HOSPITAL ENCOUNTER (OUTPATIENT)
Facility: HOSPITAL | Age: 76
Setting detail: OBSERVATION
Discharge: HOME | End: 2021-04-02
Attending: EMERGENCY MEDICINE | Admitting: INTERNAL MEDICINE
Payer: MEDICARE

## 2021-04-01 DIAGNOSIS — K52.9 COLITIS: ICD-10-CM

## 2021-04-01 DIAGNOSIS — R55 SYNCOPE: Primary | ICD-10-CM

## 2021-04-01 PROBLEM — R42 POSTURAL DIZZINESS WITH PRESYNCOPE: Status: ACTIVE | Noted: 2021-04-01

## 2021-04-01 LAB
ALBUMIN SERPL BCP-MCNC: 3.3 G/DL (ref 3.5–5)
ALP SERPL-CCNC: 100 U/L (ref 46–116)
ALT SERPL W P-5'-P-CCNC: 15 U/L (ref 12–78)
ANION GAP SERPL CALCULATED.3IONS-SCNC: 9 MMOL/L (ref 4–13)
AST SERPL W P-5'-P-CCNC: 18 U/L (ref 5–45)
BASOPHILS # BLD AUTO: 0.04 THOUSANDS/ΜL (ref 0–0.1)
BASOPHILS NFR BLD AUTO: 0 % (ref 0–1)
BILIRUB SERPL-MCNC: 0.51 MG/DL (ref 0.2–1)
BUN SERPL-MCNC: 15 MG/DL (ref 5–25)
CALCIUM ALBUM COR SERPL-MCNC: 10.1 MG/DL (ref 8.3–10.1)
CALCIUM SERPL-MCNC: 9.5 MG/DL (ref 8.3–10.1)
CHLORIDE SERPL-SCNC: 100 MMOL/L (ref 100–108)
CO2 SERPL-SCNC: 28 MMOL/L (ref 21–32)
CREAT SERPL-MCNC: 1.02 MG/DL (ref 0.6–1.3)
EOSINOPHIL # BLD AUTO: 0.02 THOUSAND/ΜL (ref 0–0.61)
EOSINOPHIL NFR BLD AUTO: 0 % (ref 0–6)
ERYTHROCYTE [DISTWIDTH] IN BLOOD BY AUTOMATED COUNT: 13.4 % (ref 11.6–15.1)
GFR SERPL CREATININE-BSD FRML MDRD: 55 ML/MIN/1.73SQ M
GLUCOSE SERPL-MCNC: 111 MG/DL (ref 65–140)
HCT VFR BLD AUTO: 39.8 % (ref 34.8–46.1)
HGB BLD-MCNC: 12.5 G/DL (ref 11.5–15.4)
IMM GRANULOCYTES # BLD AUTO: 0.05 THOUSAND/UL (ref 0–0.2)
IMM GRANULOCYTES NFR BLD AUTO: 1 % (ref 0–2)
LIPASE SERPL-CCNC: 65 U/L (ref 73–393)
LYMPHOCYTES # BLD AUTO: 0.86 THOUSANDS/ΜL (ref 0.6–4.47)
LYMPHOCYTES NFR BLD AUTO: 8 % (ref 14–44)
MCH RBC QN AUTO: 27.9 PG (ref 26.8–34.3)
MCHC RBC AUTO-ENTMCNC: 31.4 G/DL (ref 31.4–37.4)
MCV RBC AUTO: 89 FL (ref 82–98)
MONOCYTES # BLD AUTO: 0.84 THOUSAND/ΜL (ref 0.17–1.22)
MONOCYTES NFR BLD AUTO: 8 % (ref 4–12)
NEUTROPHILS # BLD AUTO: 8.55 THOUSANDS/ΜL (ref 1.85–7.62)
NEUTS SEG NFR BLD AUTO: 83 % (ref 43–75)
NRBC BLD AUTO-RTO: 0 /100 WBCS
PLATELET # BLD AUTO: 213 THOUSANDS/UL (ref 149–390)
PLATELET # BLD AUTO: 232 THOUSANDS/UL (ref 149–390)
PMV BLD AUTO: 9.3 FL (ref 8.9–12.7)
PMV BLD AUTO: 9.6 FL (ref 8.9–12.7)
POTASSIUM SERPL-SCNC: 4.5 MMOL/L (ref 3.5–5.3)
PROT SERPL-MCNC: 7.1 G/DL (ref 6.4–8.2)
RBC # BLD AUTO: 4.48 MILLION/UL (ref 3.81–5.12)
SODIUM SERPL-SCNC: 137 MMOL/L (ref 136–145)
TROPONIN I SERPL-MCNC: <0.02 NG/ML
TSH SERPL DL<=0.05 MIU/L-ACNC: 1.56 UIU/ML (ref 0.36–3.74)
WBC # BLD AUTO: 10.36 THOUSAND/UL (ref 4.31–10.16)

## 2021-04-01 PROCEDURE — 84484 ASSAY OF TROPONIN QUANT: CPT | Performed by: INTERNAL MEDICINE

## 2021-04-01 PROCEDURE — G1004 CDSM NDSC: HCPCS

## 2021-04-01 PROCEDURE — 99285 EMERGENCY DEPT VISIT HI MDM: CPT | Performed by: EMERGENCY MEDICINE

## 2021-04-01 PROCEDURE — 74177 CT ABD & PELVIS W/CONTRAST: CPT

## 2021-04-01 PROCEDURE — 99285 EMERGENCY DEPT VISIT HI MDM: CPT

## 2021-04-01 PROCEDURE — 96360 HYDRATION IV INFUSION INIT: CPT

## 2021-04-01 PROCEDURE — 84484 ASSAY OF TROPONIN QUANT: CPT | Performed by: EMERGENCY MEDICINE

## 2021-04-01 PROCEDURE — 93005 ELECTROCARDIOGRAM TRACING: CPT

## 2021-04-01 PROCEDURE — 76705 ECHO EXAM OF ABDOMEN: CPT | Performed by: EMERGENCY MEDICINE

## 2021-04-01 PROCEDURE — 85049 AUTOMATED PLATELET COUNT: CPT | Performed by: INTERNAL MEDICINE

## 2021-04-01 PROCEDURE — 83690 ASSAY OF LIPASE: CPT | Performed by: EMERGENCY MEDICINE

## 2021-04-01 PROCEDURE — 99220 PR INITIAL OBSERVATION CARE/DAY 70 MINUTES: CPT | Performed by: INTERNAL MEDICINE

## 2021-04-01 PROCEDURE — 70450 CT HEAD/BRAIN W/O DYE: CPT

## 2021-04-01 PROCEDURE — 36415 COLL VENOUS BLD VENIPUNCTURE: CPT | Performed by: EMERGENCY MEDICINE

## 2021-04-01 PROCEDURE — 80053 COMPREHEN METABOLIC PANEL: CPT | Performed by: EMERGENCY MEDICINE

## 2021-04-01 PROCEDURE — 85025 COMPLETE CBC W/AUTO DIFF WBC: CPT | Performed by: EMERGENCY MEDICINE

## 2021-04-01 PROCEDURE — 84443 ASSAY THYROID STIM HORMONE: CPT | Performed by: INTERNAL MEDICINE

## 2021-04-01 RX ORDER — MAGNESIUM HYDROXIDE/ALUMINUM HYDROXICE/SIMETHICONE 120; 1200; 1200 MG/30ML; MG/30ML; MG/30ML
30 SUSPENSION ORAL EVERY 6 HOURS PRN
Status: DISCONTINUED | OUTPATIENT
Start: 2021-04-01 | End: 2021-04-02 | Stop reason: HOSPADM

## 2021-04-01 RX ORDER — ACETAMINOPHEN 325 MG/1
650 TABLET ORAL EVERY 6 HOURS PRN
Status: DISCONTINUED | OUTPATIENT
Start: 2021-04-01 | End: 2021-04-02 | Stop reason: HOSPADM

## 2021-04-01 RX ORDER — METOPROLOL SUCCINATE 50 MG/1
50 TABLET, EXTENDED RELEASE ORAL DAILY
Status: DISCONTINUED | OUTPATIENT
Start: 2021-04-02 | End: 2021-04-02 | Stop reason: HOSPADM

## 2021-04-01 RX ORDER — PANTOPRAZOLE SODIUM 40 MG/1
40 TABLET, DELAYED RELEASE ORAL
Status: DISCONTINUED | OUTPATIENT
Start: 2021-04-02 | End: 2021-04-02 | Stop reason: HOSPADM

## 2021-04-01 RX ORDER — ONDANSETRON 2 MG/ML
4 INJECTION INTRAMUSCULAR; INTRAVENOUS EVERY 6 HOURS PRN
Status: DISCONTINUED | OUTPATIENT
Start: 2021-04-01 | End: 2021-04-02 | Stop reason: HOSPADM

## 2021-04-01 RX ORDER — HEPARIN SODIUM 5000 [USP'U]/ML
5000 INJECTION, SOLUTION INTRAVENOUS; SUBCUTANEOUS EVERY 8 HOURS SCHEDULED
Status: DISCONTINUED | OUTPATIENT
Start: 2021-04-01 | End: 2021-04-02 | Stop reason: HOSPADM

## 2021-04-01 RX ORDER — LEVOTHYROXINE SODIUM 0.05 MG/1
50 TABLET ORAL
Status: DISCONTINUED | OUTPATIENT
Start: 2021-04-02 | End: 2021-04-02 | Stop reason: HOSPADM

## 2021-04-01 RX ORDER — DOCUSATE SODIUM 100 MG/1
100 CAPSULE, LIQUID FILLED ORAL 2 TIMES DAILY
Status: DISCONTINUED | OUTPATIENT
Start: 2021-04-01 | End: 2021-04-02 | Stop reason: HOSPADM

## 2021-04-01 RX ADMIN — HEPARIN SODIUM 5000 UNITS: 5000 INJECTION INTRAVENOUS; SUBCUTANEOUS at 23:12

## 2021-04-01 RX ADMIN — SODIUM CHLORIDE 1000 ML: 0.9 INJECTION, SOLUTION INTRAVENOUS at 19:41

## 2021-04-01 RX ADMIN — IOHEXOL 100 ML: 350 INJECTION, SOLUTION INTRAVENOUS at 20:40

## 2021-04-01 RX ADMIN — DOCUSATE SODIUM 100 MG: 100 CAPSULE, LIQUID FILLED ORAL at 23:12

## 2021-04-01 NOTE — ED PROVIDER NOTES
History  Chief Complaint   Patient presents with    Dizziness     Pt reports episode of dizziness and loss of bowel control  Pt reports increased dizziness today  Patient is a 57-year-old female with history atrial fibrillation, hypertension, hyperlipidemia that presents for evaluation of syncope  Patient says that over the past 2 weeks she has had intermittent abdominal cramping primarily left lower quadrant  He has also had loose bowel movements every day  She says that she has had about 5 episodes of diarrhea today  She denies any blood in the stool or melena this time  Patient says while she was walking to her commode today, she had the acute onset of syncope  She denies presyncopal symptoms including lightheadedness, chest pain, dyspnea  She believes that she was unconscious just for a couple moments  Unclear head trauma, no blood thinners or antiplatelet agents noted  Patient is Chuck Pallas had difficulty standing and was on the ground for approximately 20 minutes  Since that time, she complains of some mild lightheadedness but denies altered mental status, focal neuro deficit  Patient currently denies any chest pain or dyspnea  Prior to Admission Medications   Prescriptions Last Dose Informant Patient Reported? Taking?    cyanocobalamin (VITAMIN B-12) 1,000 mcg tablet Past Week at Unknown time Self Yes Yes   Sig: Take by mouth daily   hydrocortisone (ANUSOL-HC) 2 5 % rectal cream Unknown at Unknown time  No No   Sig: Apply topically 2 (two) times a day   hydrocortisone-pramoxine (PROCTOFOAM-HC) 1-1 % FOAM rectal foam Unknown at Unknown time  No No   Sig: Insert 1 applicator into the rectum 2 (two) times a day As needed   levothyroxine 50 mcg tablet 4/1/2021 at Unknown time Self No Yes   Sig: TAKE 1 TABLET DAILY IN THE EARLY MORNING   metoprolol succinate (TOPROL-XL) 50 mg 24 hr tablet 4/1/2021 at Unknown time Self No Yes   Sig: TAKE 1 TABLET EVERY DAY   pantoprazole (PROTONIX) 40 mg tablet 4/1/2021 at Unknown time Self No Yes   Sig: TAKE 1 TABLET TWICE DAILY BEFORE MEALS      Facility-Administered Medications: None       Past Medical History:   Diagnosis Date    Abnormal CT of the abdomen     Acute bronchitis     Anxiety     Appetite loss     Arthritis     Ascending aortic aneurysm (HCC)     Bilateral renal cysts     Cyst of ovary     Dysphagia     Esophageal reflux disease     Full dentures     GERD (gastroesophageal reflux disease)     Herpes zoster     Hyperlipidemia     Hypertension     Hypokalemia     Hypomagnesemia     Hypothyroidism     Impaired fasting glucose     Irritable bowel syndrome (IBS)     Ischemic colitis (HCC)     Knee pain     Limb alert care status     no BP/IV right arm    Osteoarthritis of right knee     Pneumonia     Post-op pain     Pulmonary nodules     Thoracic aortic aneurysm (HCC)     Unspecified ovarian cysts     Use of anastrozole (Arimidex)     Vasovagal syncope     Vitamin D deficiency     Wears glasses     Weight loss        Past Surgical History:   Procedure Laterality Date    APPENDECTOMY      pt states it was not removed    BREAST BIOPSY Right 03/02/2016    BREAST LUMPECTOMY Right 2004    BREAST SURGERY Right     Single lesion excision 1990 hyperplasia, 1st biopsy at 25yrs old was benign    CHOLECYSTECTOMY      COLONOSCOPY      COLONOSCOPY N/A 5/12/2017    Procedure: COLONOSCOPY with biopsy;  Surgeon: Pamela Nicole MD;  Location: [facility]; Service:     ESOPHAGOGASTRODUODENOSCOPY N/A 1/19/2019    Procedure: ESOPHAGOGASTRODUODENOSCOPY (EGD) with 4cc of epinephrine injection and cauterized with gold probe; Surgeon: Carmen Genao MD;  Location: [facility];   Service: Gastroenterology    [place] LUMBAR PUNCTURE DIAGNOSTIC  5/24/2019    HYSTERECTOMY  1977    IR LUMBAR PUNCTURE  2/21/2019    KNEE SURGERY Right     LA BIOPSY/EXCISION, LYMPH NODE(S) Right 4/12/2016    Procedure: BIOPSY LYMPH NODE SENTINEL @ 1200 LYMPHOSCINTIGRAPHY LYMPHATIC MAPPING;  Surgeon: Pari Potts MD;  Location: [facility];  Service: General    OR MASTECTOMY, SIMPLE, COMPLETE Right 4/12/2016    Procedure: MASTECTOMY SIMPLE;  Surgeon: Pari Potts MD;  [facility];  Service: General    TUBAL LIGATION      US GUIDED BREAST BIOPSY RIGHT COMPLETE Right 3/9/2016       Family History   Problem Relation Age of Onset    Stroke Maternal Grandmother     Anemia Mother     Other Mother         disorders of blood and blood forming organs    Hypertension Mother     Stroke Father     Alcohol abuse Brother      I have reviewed and agree with the history as documented  E-Cigarette/Vaping    E-Cigarette Use Never User      E-Cigarette/Vaping Substances    Nicotine No     THC No     CBD No     Flavoring No     Other No     Unknown No      Social History     Tobacco Use    Smoking status: Never Smoker    Smokeless tobacco: Never Used    Tobacco comment: not interested   Substance Use Topics    Alcohol use: Not Currently    Drug use: No        Review of Systems   Constitutional: Negative for chills, diaphoresis, fatigue and fever  HENT: Negative for facial swelling, sore throat and trouble swallowing  Respiratory: Negative for cough, chest tightness, shortness of breath and wheezing  Cardiovascular: Negative for chest pain  Gastrointestinal: Positive for abdominal pain and diarrhea  Negative for abdominal distention, nausea and vomiting  Genitourinary: Negative for dysuria  Musculoskeletal: Negative for back pain, neck pain and neck stiffness  Skin: Negative for color change, pallor, rash and wound  Neurological: Positive for syncope  Negative for weakness, light-headedness and numbness  Psychiatric/Behavioral: Negative for agitation  All other systems reviewed and are negative        Physical Exam  ED Triage Vitals   Temperature Pulse Respirations Blood Pressure SpO2   04/01/21 1708 04/01/21 1708 04/01/21 1708 04/01/21 1708 04/01/21 1708   98 7 °F (37 1 °C) 90 16 140/79 98 %      Temp Source Heart Rate Source Patient Position - Orthostatic VS BP Location FiO2 (%)   04/01/21 1708 04/01/21 1708 04/01/21 1708 04/01/21 1708 --   Oral Monitor Sitting Left arm       Pain Score       04/01/21 2054       No Pain             Orthostatic Vital Signs  Vitals:    04/01/21 1708 04/01/21 2054 04/01/21 2216 04/01/21 2237   BP: 140/79 138/79 162/97 146/85   Pulse: 90 85 92 92   Patient Position - Orthostatic VS: Sitting Lying Sitting Lying       Physical Exam  Vitals signs reviewed  Constitutional:       General: She is not in acute distress  Appearance: She is well-developed  HENT:      Head: Normocephalic  Eyes:      Pupils: Pupils are equal, round, and reactive to light  Neck:      Musculoskeletal: Normal range of motion and neck supple  Cardiovascular:      Rate and Rhythm: Normal rate and regular rhythm  Heart sounds: Normal heart sounds  Pulmonary:      Effort: Pulmonary effort is normal       Breath sounds: Normal breath sounds  Abdominal:      General: Bowel sounds are normal  There is no distension  Palpations: Abdomen is soft  Tenderness: There is abdominal tenderness  There is no guarding  Comments: Moderate left lower quadrant abdominal tenderness without rebound tenderness or guarding   Musculoskeletal: Normal range of motion  General: No tenderness or deformity  Skin:     General: Skin is warm and dry  Capillary Refill: Capillary refill takes less than 2 seconds  Neurological:      Mental Status: She is alert  Cranial Nerves: No cranial nerve deficit  Sensory: No sensory deficit  Comments: Alert oriented x2, GCS 14  Cranial nerves 2-12 intact  Strength 5/5 in all 4 extremities  Sensation intact throughout     Psychiatric:         Behavior: Behavior normal          Thought Content:  Thought content normal          Judgment: Judgment normal          ED Medications  Medications   levothyroxine tablet 50 mcg (has no administration in time range)   metoprolol succinate (TOPROL-XL) 24 hr tablet 50 mg (has no administration in time range)   pantoprazole (PROTONIX) EC tablet 40 mg (has no administration in time range)   acetaminophen (TYLENOL) tablet 650 mg (has no administration in time range)   docusate sodium (COLACE) capsule 100 mg (100 mg Oral Given 4/1/21 2312)   ondansetron (ZOFRAN) injection 4 mg (has no administration in time range)   aluminum-magnesium hydroxide-simethicone (MYLANTA) oral suspension 30 mL (has no administration in time range)   heparin (porcine) subcutaneous injection 5,000 Units (5,000 Units Subcutaneous Given 4/1/21 2312)   sodium chloride 0 9 % bolus 1,000 mL (1,000 mL Intravenous New Bag 4/1/21 1941)   iohexol (OMNIPAQUE) 350 MG/ML injection (SINGLE-DOSE) 100 mL (100 mL Intravenous Given 4/1/21 2040)       Diagnostic Studies  Results Reviewed     Procedure Component Value Units Date/Time    Troponin I [[id number]]  (Normal) Collected: 04/01/21 2341    Lab Status: Final result Specimen: Blood from Arm, Left Updated: 04/02/21 0011     Troponin I <0 02 ng/mL     TSH, 3rd generation with Free T4 reflex [[id number]]  (Normal) Collected: 04/01/21 1911    Lab Status: Final result Specimen: Blood from Arm, Left Updated: 04/01/21 2355     TSH 3RD GENERATON 1 562 uIU/mL     Narrative:      Patients undergoing fluorescein dye angiography may retain small amounts of fluorescein in the body for 48-72 hours post procedure  Samples containing fluorescein can produce falsely depressed TSH values  If the patient had this procedure,a specimen should be resubmitted post fluorescein clearance        Platelet count [[id number]]  (Normal) Collected: 04/01/21 2341    Lab Status: Final result Specimen: Blood from Arm, Left Updated: 04/01/21 2352     Platelets 058 Thousands/uL      MPV 9 3 fL     Troponin I [[id number]]     Lab Status: No result Specimen: Blood Stool Enteric Bacterial Panel by PCR [[id number]]     Lab Status: No result Specimen: Stool     Troponin I [[id number]]  (Normal) Collected: 04/01/21 1911    Lab Status: Final result Specimen: Blood from Arm, Left Updated: 04/01/21 1943     Troponin I <0 02 ng/mL     CMP [[id number]]  (Abnormal) Collected: 04/01/21 1911    Lab Status: Final result Specimen: Blood from Arm, Left Updated: 04/01/21 1943     Sodium 137 mmol/L      Potassium 4 5 mmol/L      Chloride 100 mmol/L      CO2 28 mmol/L      ANION GAP 9 mmol/L      BUN 15 mg/dL      Creatinine 1 02 mg/dL      Glucose 111 mg/dL      Calcium 9 5 mg/dL      Corrected Calcium 10 1 mg/dL      AST 18 U/L      ALT 15 U/L      Alkaline Phosphatase 100 U/L      Total Protein 7 1 g/dL      Albumin 3 3 g/dL      Total Bilirubin 0 51 mg/dL      eGFR 55 ml/min/1 73sq m     Narrative:      [place] guidelines for Chronic Kidney Disease (CKD):     Stage 1 with normal or high GFR (GFR > 90 mL/min/1 73 square meters)    Stage 2 Mild CKD (GFR = 60-89 mL/min/1 73 square meters)    Stage 3A Moderate CKD (GFR = 45-59 mL/min/1 73 square meters)    Stage 3B Moderate CKD (GFR = 30-44 mL/min/1 73 square meters)    Stage 4 Severe CKD (GFR = 15-29 mL/min/1 73 square meters)    Stage 5 End Stage CKD (GFR <15 mL/min/1 73 square meters)  Note: GFR calculation is accurate only with a steady state creatinine    Lipase [[id number]]  (Abnormal) Collected: 04/01/21 1911    Lab Status: Final result Specimen: Blood from Arm, Left Updated: 04/01/21 1943     Lipase 65 u/L     CBC and differential [[id number]]  (Abnormal) Collected: 04/01/21 1911    Lab Status: Final result Specimen: Blood from Arm, Left Updated: 04/01/21 1921     WBC 10 36 Thousand/uL      RBC 4 48 Million/uL      Hemoglobin 12 5 g/dL      Hematocrit 39 8 %      MCV 89 fL      MCH 27 9 pg      MCHC 31 4 g/dL      RDW 13 4 %      MPV 9 6 fL      Platelets 145 Thousands/uL      nRBC 0 /100 WBCs Neutrophils Relative 83 %      Immat GRANS % 1 %      Lymphocytes Relative 8 %      Monocytes Relative 8 %      Eosinophils Relative 0 %      Basophils Relative 0 %      Neutrophils Absolute 8 55 Thousands/µL      Immature Grans Absolute 0 05 Thousand/uL      Lymphocytes Absolute 0 86 Thousands/µL      Monocytes Absolute 0 84 Thousand/µL      Eosinophils Absolute 0 02 Thousand/µL      Basophils Absolute 0 04 Thousands/µL                  CT head without contrast   Final Result by Miriam Mars MD (04/01 2053)      No acute intracranial abnormality  Mild cerebral chronic microangiopathy  Workstation performed: [id number]         CT abdomen pelvis with contrast   Final Result by Angela Ramírez MD (04/01 2057)      Moderate thickening of the distal descending colon and proximal/mid sigmoid colon in keeping with a nonspecific segmental colitis  Extensive colonic diverticulosis without more focal inflammatory changes to indicate acute diverticulitis  The study was marked in [facility] for immediate notification                 Workstation performed: [id number]               Procedures  ECG 12 Lead Documentation Only    Date/Time: 4/1/2021 7:32 PM  Performed by: Argelia Givens MD  Authorized by: Argelia Givens MD     ECG reviewed by me, the ED Provider: yes    Patient location:  ED  Previous ECG:     Previous ECG:  Compared to current    Similarity:  No change    Comparison to cardiac monitor: Yes    Interpretation:     Interpretation: normal    Rate:     ECG rate assessment: normal    Rhythm:     Rhythm: sinus rhythm    Ectopy:     Ectopy: none    QRS:     QRS axis:  Normal    QRS intervals:  Normal  Conduction:     Conduction: normal    ST segments:     ST segments:  Normal  T waves:     T waves: flattening      Flattening:  III  POC AAA US    Date/Time: 4/1/2021 8:13 PM  Performed by: Argelia Givens MD  Authorized by: Argelia Givens MD     Patient location:  ED  Performing Provider:  Resident  Other Assisting Provider: No    Procedure details:     Exam Type:  Diagnostic    Indications: abdominal pain and syncope      Views Obtained:  Transverse proximal, transverse mid view, transverse distal view and sagittal (longitudinal) view    Image quality: diagnostic      Image availability:  Images available in PACS  Findings:     Abdominal Aorta Findings: normal      Intra-abdominal fluid: not identified    Interpretation:     Aortic ultrasound impression: aorta normal            ED Course                             SBIRT 22yo+      Most Recent Value   SBIRT (24 yo +)   In order to provide better care to our patients, we are screening all of our patients for alcohol and drug use  Would it be okay to ask you these screening questions? Yes Filed at: 04/01/2021 1812   Initial Alcohol Screen: US AUDIT-C    1  How often do you have a drink containing alcohol?  0 Filed at: 04/01/2021 1812   2  How many drinks containing alcohol do you have on a typical day you are drinking? 0 Filed at: 04/01/2021 1812   3a  Male UNDER 65: How often do you have five or more drinks on one occasion? 0 Filed at: 04/01/2021 1812   3b  FEMALE Any Age, or MALE 65+: How often do you have 4 or more drinks on one occassion? 0 Filed at: 04/01/2021 1812   Audit-C Score  0 Filed at: 04/01/2021 1812   JULIANN: How many times in the past year have you    Used an illegal drug or used a prescription medication for non-medical reasons? Never Filed at: 04/01/2021 1812                MDM  Number of Diagnoses or Management Options  Colitis:   Syncope:   Diagnosis management comments: Patient is a 81-year-old female who presents for evaluation of abdominal pain, diarrhea, syncope  Abdominal pain was found to be due to nonspecific colitis  Otherwise, blood work was unremarkable  Secondary to the patient's age, risk factors, living alone, we will admit for syncope workup with telemetry    Patient was hemodynamically stable throughout her time in the emergency department  Disposition  Final diagnoses:   Syncope   Colitis     Time reflects when diagnosis was documented in both MDM as applicable and the Disposition within this note     Time User Action Codes Description Comment    4/1/2021  9:05 PM Omelia Castellani Add [R55] Syncope     4/1/2021  9:05 PM Omelia Castellani Add [K52 9] Colitis       ED Disposition     ED Disposition Condition Date/Time Comment    Admit Stable Thu Apr 1, 2021  9:05 PM Case was discussed with FERN and the patient's admission status was agreed to be Admission Status: observation status to the service of Dr Suresh Schilling   Follow-up Information    None         Current Discharge Medication List      CONTINUE these medications which have NOT CHANGED    Details   cyanocobalamin (VITAMIN B-12) 1,000 mcg tablet Take by mouth daily      levothyroxine 50 mcg tablet TAKE 1 TABLET DAILY IN THE EARLY MORNING  Qty: 90 tablet, Refills: 0    Associated Diagnoses: Low TSH level      metoprolol succinate (TOPROL-XL) 50 mg 24 hr tablet TAKE 1 TABLET EVERY DAY  Qty: 90 tablet, Refills: 0    Associated Diagnoses: Closed odontoid fracture, initial encounter ([facility])      pantoprazole (PROTONIX) 40 mg tablet TAKE 1 TABLET TWICE DAILY BEFORE MEALS  Qty: 180 tablet, Refills: 0    Associated Diagnoses: Gastrointestinal hemorrhage associated with gastric ulcer      hydrocortisone (ANUSOL-HC) 2 5 % rectal cream Apply topically 2 (two) times a day  Qty: 1 Tube, Refills: 2    Associated Diagnoses: Grade II hemorrhoids      hydrocortisone-pramoxine (PROCTOFOAM-HC) 1-1 % FOAM rectal foam Insert 1 applicator into the rectum 2 (two) times a day As needed  Qty: 1 Can, Refills: 3    Associated Diagnoses: Grade II hemorrhoids           No discharge procedures on file  PDMP Review     None           ED Provider  Attending physically available and evaluated Ravitremayne Modesto HERNÁNDEZ managed the patient along with the ED Attending      Electronically Signed by         Deborah Nava MD  04/02/21 3450

## 2021-04-01 NOTE — ED ATTENDING ATTESTATION
4/1/2021  I, Selina Castro MD, saw and evaluated the patient  I have discussed the patient with the resident/non-physician practitioner and agree with the resident's/non-physician practitioner's findings, Plan of Care, and MDM as documented in the resident's/non-physician practitioner's note, except where noted  All available labs and Radiology studies were reviewed  I was present for key portions of any procedure(s) performed by the resident/non-physician practitioner and I was immediately available to provide assistance  At this point I agree with the current assessment done in the Emergency Department  I have conducted an independent evaluation of this patient a history and physical is as follows:      69 y/o  F presents fo revaluation of syncopal episode x 1 and abd pain  10 systems reviewed and otherwise neg  On exam no distress, lungs nml heent nml neuro nml neck nml cardiac nml lungs nml abd +ttp   MDM: syncope and abd pain-will ct head to r/o acute cns pathology, check abd labs, ct a/p, admit  ED Course         Critical Care Time  Procedures

## 2021-04-02 VITALS
BODY MASS INDEX: 28.08 KG/M2 | OXYGEN SATURATION: 97 % | WEIGHT: 163.58 LBS | SYSTOLIC BLOOD PRESSURE: 122 MMHG | HEART RATE: 83 BPM | RESPIRATION RATE: 18 BRPM | DIASTOLIC BLOOD PRESSURE: 76 MMHG | TEMPERATURE: 97.7 F

## 2021-04-02 LAB
ANION GAP SERPL CALCULATED.3IONS-SCNC: 8 MMOL/L (ref 4–13)
ATRIAL RATE: 87 BPM
BUN SERPL-MCNC: 15 MG/DL (ref 5–25)
CALCIUM SERPL-MCNC: 9 MG/DL (ref 8.3–10.1)
CHLORIDE SERPL-SCNC: 105 MMOL/L (ref 100–108)
CO2 SERPL-SCNC: 28 MMOL/L (ref 21–32)
CREAT SERPL-MCNC: 1.15 MG/DL (ref 0.6–1.3)
ERYTHROCYTE [DISTWIDTH] IN BLOOD BY AUTOMATED COUNT: 13.5 % (ref 11.6–15.1)
GFR SERPL CREATININE-BSD FRML MDRD: 47 ML/MIN/1.73SQ M
GLUCOSE SERPL-MCNC: 118 MG/DL (ref 65–140)
HCT VFR BLD AUTO: 34.5 % (ref 34.8–46.1)
HGB BLD-MCNC: 11.2 G/DL (ref 11.5–15.4)
MCH RBC QN AUTO: 28.7 PG (ref 26.8–34.3)
MCHC RBC AUTO-ENTMCNC: 32.5 G/DL (ref 31.4–37.4)
MCV RBC AUTO: 89 FL (ref 82–98)
P AXIS: 52 DEGREES
PLATELET # BLD AUTO: 208 THOUSANDS/UL (ref 149–390)
PMV BLD AUTO: 9.5 FL (ref 8.9–12.7)
POTASSIUM SERPL-SCNC: 4 MMOL/L (ref 3.5–5.3)
PR INTERVAL: 208 MS
QRS AXIS: -11 DEGREES
QRSD INTERVAL: 88 MS
QT INTERVAL: 336 MS
QTC INTERVAL: 404 MS
RBC # BLD AUTO: 3.9 MILLION/UL (ref 3.81–5.12)
SODIUM SERPL-SCNC: 141 MMOL/L (ref 136–145)
T WAVE AXIS: 49 DEGREES
TROPONIN I SERPL-MCNC: <0.02 NG/ML
TROPONIN I SERPL-MCNC: <0.02 NG/ML
VENTRICULAR RATE: 87 BPM
WBC # BLD AUTO: 7.34 THOUSAND/UL (ref 4.31–10.16)

## 2021-04-02 PROCEDURE — 85027 COMPLETE CBC AUTOMATED: CPT | Performed by: INTERNAL MEDICINE

## 2021-04-02 PROCEDURE — 93010 ELECTROCARDIOGRAM REPORT: CPT | Performed by: INTERNAL MEDICINE

## 2021-04-02 PROCEDURE — 84484 ASSAY OF TROPONIN QUANT: CPT | Performed by: INTERNAL MEDICINE

## 2021-04-02 PROCEDURE — 99217 PR OBSERVATION CARE DISCHARGE MANAGEMENT: CPT | Performed by: PHYSICIAN ASSISTANT

## 2021-04-02 PROCEDURE — 80048 BASIC METABOLIC PNL TOTAL CA: CPT | Performed by: INTERNAL MEDICINE

## 2021-04-02 RX ADMIN — DOCUSATE SODIUM 100 MG: 100 CAPSULE, LIQUID FILLED ORAL at 08:59

## 2021-04-02 RX ADMIN — LEVOTHYROXINE SODIUM 50 MCG: 50 TABLET ORAL at 06:11

## 2021-04-02 RX ADMIN — METOPROLOL SUCCINATE 50 MG: 50 TABLET, EXTENDED RELEASE ORAL at 08:59

## 2021-04-02 RX ADMIN — PANTOPRAZOLE SODIUM 40 MG: 40 TABLET, DELAYED RELEASE ORAL at 06:13

## 2021-04-02 NOTE — DISCHARGE SUMMARY
[facility]  Discharge- David Isabel 1945, 70 y o  female MRN: [id number]  Unit/Bed#: E5 -01 Encounter: [id number]  Primary Care Provider: Lamont Novoa DO   Date and time admitted to hospital: 4/1/2021  6:10 PM    * Postural dizziness with presyncope  Assessment & Plan  Patient presented with presyncopal episode after having multiple consecutive diarrheal episodes  Does have history of atrial fibrillation but not on anticoagulation due to previous history of GI bleeding  Telemetry reviewed without events  Serial cardiac enzymes negative x3  Patient denies actual syncopal episode, suspect related to volume depletion  S/p rehydration with IVF, patient is back to baseline  No further diarrhea  Does not present with infectious picture  See plan for colitis below  Colitis  Assessment & Plan  As evidence by CT scan finding of colitis, no abscess seen  No sign of diverticulitis  Likely secondary to diarrheal illness of unclear etiology  While admitted, patient had no further diarrhea  Is tolerating p o  Intake without difficulty  No nausea or vomiting, no fevers, no abdominal pain  No indication for antibiotics  Suspect self-limited food-borne diarrhea related to seafood meal   No recent antibiotics to suggest C diff  Diarrhea has resolved  Atrial fibrillation [facility])  Assessment & Plan  Ventricular rate controlled  Not on anticoagulation pre-hospital, per medical record was because of previous GI bleed      History of alcohol use  Assessment & Plan  In remission, not currently drinking    Invasive ductal carcinoma of breast, right [facility])  Assessment & Plan  In remission, followed by [id number] [place] [address] with plans to continue anastrozole until May 2021 to complete 5 years of adjuvant hormonal therapy   - does not appear to be on the medication currently and should follow-up to discuss initiation      Thoracic aortic aneurysm [facility])  Assessment & Plan  Followed in the outpatient setting    Hypothyroidism  Assessment & Plan  Resume Synthroid  Recheck TSH 1 562      Discharging Physician / Practitioner: Gary Bell PA-C  PCP: Debra Elizalde DO  Admission Date:   Admission Orders (From admission, onward)     Ordered        04/01/21 2106  Place in Observation  Once                   Discharge Date: 04/02/21    Resolved Problems  Date Reviewed: 4/2/2021    None          Consultations During Hospital Stay:  · None    Procedures Performed:   · None    Significant Findings / Test Results:   · CT scan showing colitis    Incidental Findings:   · None     Test Results Pending at Discharge (will require follow up): · None     Outpatient Tests Requested:  · None    Complications:  None    Reason for Admission:  Syncopal workup/dizziness    Hospital Course:     Sara Mathis is a 70 y o  female patient who originally presented to the hospital on 4/1/2021 due to presyncope after having multiple diarrheal episodes  Patient states she had a seafood meal at [employer] and subsequently had multiple watery bowel movements and then felt lightheaded  She denies passing out or striking head  Due to persistence of dizziness, she presented to ED for evaluation  CT head unremarkable on presentation  CT abdomen pelvis showed moderate thickening of distal descending colon and proximal/mid sigmoid colon consistent with colitis  The patient presented without fever  She had no abdominal pain  She had mild leukocytosis of 10 36 on presentation which resolved on repeat  She was monitored off of antibiotics  She was rehydrated in monitored overnight on telemetry with serial troponins  No significant findings from either of these  TSH within normal limits  On 04/02/2021, patient feels back to her baseline  She has no abdominal pain still and is able to tolerate full diet without nausea or vomiting  She has had no further diarrhea  No more dizziness or syncopal episodes    She is requesting discharge  Given lack of concerning findings and hemodynamic stability, will discharge patient to home at this time  Patient ambulating room without difficulty and does not require PT/OT evaluation  Please see above list of diagnoses and related plan for additional information  Condition at Discharge: good     Discharge Day Visit / Exam:     Subjective:  Feels fine  Believe she was dehydrated and feels better after the fluids  No abdominal pain  No more diarrhea  Able to eat and has good appetite  No nausea or vomiting  No fevers or chills  No shortness of breath  Would like to go home  Vitals: Blood Pressure: 122/76 (04/02/21 0712)  Pulse: 83 (04/02/21 0712)  Temperature: 97 7 °F (36 5 °C) (04/02/21 0712)  Temp Source: Temporal (04/02/21 6495)  Respirations: 18 (04/02/21 0090)  Weight - Scale: 74 2 kg (163 lb 9 3 oz) (04/02/21 0542)  SpO2: 97 % (04/02/21 3064)  Exam:   Physical Exam  Vitals signs and nursing note reviewed  Exam conducted with a chaperone present  Constitutional:       General: She is not in acute distress  Appearance: Normal appearance  She is well-developed  Comments: The patient is pleasant and fully conversational   She is in no acute distress  Well-appearing  HENT:      Head: Normocephalic and atraumatic  Eyes:      Conjunctiva/sclera: Conjunctivae normal    Neck:      Musculoskeletal: Neck supple  Cardiovascular:      Rate and Rhythm: Normal rate and regular rhythm  Heart sounds: No murmur  Pulmonary:      Effort: Pulmonary effort is normal  No respiratory distress  Breath sounds: Normal breath sounds  Abdominal:      Palpations: Abdomen is soft  Tenderness: There is no abdominal tenderness  Skin:     General: Skin is warm and dry  Neurological:      Mental Status: She is alert  Discharge instructions/Information to patient and family:   See after visit summary for information provided to patient and family        Provisions for Follow-Up Care:  See after visit summary for information related to follow-up care and any pertinent home health orders  Disposition:     Home    For Discharges to [facility] SNF:   · Not Applicable to this Patient - Not Applicable to this Patient    Planned Readmission: none     Discharge Statement:  I spent 45 minutes discharging the patient  This time was spent on the day of discharge  I had direct contact with the patient on the day of discharge  Greater than 50% of the total time was spent examining patient, answering all patient questions, arranging and discussing plan of care with patient as well as directly providing post-discharge instructions  Additional time then spent on discharge activities  Discharge Medications:  See after visit summary for reconciled discharge medications provided to patient and family        ** Please Note: This note has been constructed using a voice recognition system **

## 2021-04-02 NOTE — ASSESSMENT & PLAN NOTE
Patient presented with presyncopal episode after having multiple consecutive diarrheal episodes  Does have history of atrial fibrillation but not on anticoagulation due to previous history of GI bleeding  Telemetry reviewed without events  Serial cardiac enzymes negative x3  Patient denies actual syncopal episode, suspect related to volume depletion  S/p rehydration with IVF, patient is back to baseline  No further diarrhea  Does not present with infectious picture  See plan for colitis below

## 2021-04-02 NOTE — UTILIZATION REVIEW
Initial Clinical Review    Admission: Date/Time/Statement:   Admission Orders (From admission, onward)     Ordered        04/01/21 2106  Place in Observation  Once                   Orders Placed This Encounter   Procedures    Place in Observation     Standing Status:   Standing     Number of Occurrences:   1     Order Specific Question:   Level of Care     Answer:   Med Surg [16]     ED Arrival Information     Expected Arrival Acuity Means of Arrival Escorted By Service Admission Type    - 4/1/2021 16:53 Urgent Walk-In Spouse Hospitalist Urgent    Arrival Complaint    abdominal pain        Chief Complaint   Patient presents with    Dizziness     Pt reports episode of dizziness and loss of bowel control  Pt reports increased dizziness today  Assessment/Plan:Flower Mann is a 70 y o  female who presents with presyncopal episode after having multiple episodes of diarrhea  Patient reports she had 7-8 bowel movements, she denies any recent antibiotic use  She states she had [employer] the night before, but did not notice that any of the seafood appeared to be tainted  She did have a few of her friends who ate similar food and did not develop any food borne illness  Patient does live alone, and due to her lightheadedness and dizziness she presented for evaluation  She denies any passing out       Patient's past medical history is notable for breast cancer, she also has a history of thoracic aortic aneurysm which is being followed in the outpatient setting     She has a history of hypothyroidism as well as remote history of alcohol use      She denies any chest pain, no nausea, no vomiting   * Postural dizziness with presyncope  Assessment & Plan  Patient presented with presyncopal episode after having diarrheal episode  Does have history of atrial fibrillation but not on anticoagulation due to previous history of GI bleeding  Will monitor on telemetry  Serial cardiac enzymes have been ordered  Patient denies actual syncopal episode, could potentially been vasovagal or volume depletion  Gentle intravenous fluid has been given           Colitis  Assessment & Plan  Likely secondary to diarrheal illness of unclear etiology  Will check stool pathogen assay, colonoscopy if no improvement  Hold on antibiotics for now  Patient without abdominal pain, potentially could been food borne with meal at [employer]  No recent antibiotics to suggest C diff     ED Triage Vitals   Temperature Pulse Respirations Blood Pressure SpO2   04/01/21 1708 04/01/21 1708 04/01/21 1708 04/01/21 1708 04/01/21 1708   98 7 °F (37 1 °C) 90 16 140/79 98 %      Temp Source Heart Rate Source Patient Position - Orthostatic VS BP Location FiO2 (%)   04/01/21 1708 04/01/21 1708 04/01/21 1708 04/01/21 1708 --   Oral Monitor Sitting Left arm       Pain Score       04/01/21 2054       No Pain          Wt Readings from Last 1 Encounters:   04/02/21 74 2 kg (163 lb 9 3 oz)     Additional Vital Signs:   Date/Time  Temp  Pulse  Resp  BP  MAP (mmHg)  SpO2  O2 Device  Patient Position - Orthostatic VS   04/02/21 0712  97 7 °F (36 5 °C)  83  18  122/76  83  97 %  --  Lying   04/01/21 2237  98 5 °F (36 9 °C)  92  18  146/85  --  96 %  None (Room air)  Lying   04/01/21 2216  --  92  18  162/97  --  94 %  None (Room air)  Sitting   04/01/21 2054  --  85  18  138/79  --  96 %  None (Room air)  Lying       Pertinent Labs/Diagnostic Test Results:        CT head without contrast   Final Result by Misbah Kidd MD (04/01 2053)      No acute intracranial abnormality  Mild cerebral chronic microangiopathy  Workstation performed: [id number]         CT abdomen pelvis with contrast   Final Result by Jerad Jo MD (04/01 2057)      Moderate thickening of the distal descending colon and proximal/mid sigmoid colon in keeping with a nonspecific segmental colitis        Extensive colonic diverticulosis without more focal inflammatory changes to indicate acute diverticulitis  The study was marked in [facility] for immediate notification                 Workstation performed: [id number]           4/1 EKG nsr  Results from last 7 days   Lab Units 04/02/21  0312 04/01/21  2341 04/01/21 1911   WBC Thousand/uL 7 34  --  10 36*   HEMOGLOBIN g/dL 11 2*  --  12 5   HEMATOCRIT % 34 5*  --  39 8   PLATELETS Thousands/uL 208 232 213   NEUTROS ABS Thousands/µL  --   --  8 55*         Results from last 7 days   Lab Units 04/02/21  0312 04/01/21  1911   SODIUM mmol/L 141 137   POTASSIUM mmol/L 4 0 4 5   CHLORIDE mmol/L 105 100   CO2 mmol/L 28 28   ANION GAP mmol/L 8 9   BUN mg/dL 15 15   CREATININE mg/dL 1 15 1 02   EGFR ml/min/1 73sq m 47 55   CALCIUM mg/dL 9 0 9 5     Results from last 7 days   Lab Units 04/01/21  1911   AST U/L 18   ALT U/L 15   ALK PHOS U/L 100   TOTAL PROTEIN g/dL 7 1   ALBUMIN g/dL 3 3*   TOTAL BILIRUBIN mg/dL 0 51         Results from last 7 days   Lab Units 04/02/21  0312 04/01/21  1911   GLUCOSE RANDOM mg/dL 118 111             No results found for: BETA-HYDROXYBUTYRATE                   Results from last 7 days   Lab Units 04/02/21  0312 04/01/21  2341 04/01/21 1911   TROPONIN I ng/mL <0 02 <0 02 <0 02             Results from last 7 days   Lab Units 04/01/21 1911   TSH 3RD GENERATON uIU/mL 1 562                                 Results from last 7 days   Lab Units 04/01/21 1911   LIPASE u/L 65*                                                           ED Treatment:   Medication Administration from 04/01/2021 1653 to 04/01/2021 2241       Date/Time Order Dose Route Action Action by Comments     04/01/2021 1941 sodium chloride 0 9 % bolus 1,000 mL 1,000 mL Intravenous New Beryl Casas      04/01/2021 2040 iohexol (OMNIPAQUE) 350 MG/ML injection (SINGLE-DOSE) 100 mL 100 mL Intravenous Given Alcario Pen         Past Medical History:   Diagnosis Date    Abnormal CT of the abdomen     Acute bronchitis     Anxiety     Appetite loss     Arthritis     Ascending aortic aneurysm (HCC)     Bilateral renal cysts     Cyst of ovary     Dysphagia     Esophageal reflux disease     Full dentures     GERD (gastroesophageal reflux disease)     Herpes zoster     Hyperlipidemia     Hypertension     Hypokalemia     Hypomagnesemia     Hypothyroidism     Impaired fasting glucose     Irritable bowel syndrome (IBS)     Ischemic colitis (HCC)     Knee pain     Limb alert care status     no BP/IV right arm    Osteoarthritis of right knee     Pneumonia     Post-op pain     Pulmonary nodules     Thoracic aortic aneurysm (HCC)     Unspecified ovarian cysts     Use of anastrozole (Arimidex)     Vasovagal syncope     Vitamin D deficiency     Wears glasses     Weight loss      Present on Admission:   Atrial fibrillation (HCC)   Hypothyroidism   Thoracic aortic aneurysm (HCC)   Invasive ductal carcinoma of breast, right (HCC)      Admitting Diagnosis: Dizziness [R42]  Colitis [K52 9]  Syncope [R55]  Age/Sex: 70 y o  female  Admission Orders:  Scheduled Medications:  docusate sodium, 100 mg, Oral, BID  heparin (porcine), 5,000 Units, Subcutaneous, Q8H [address]  levothyroxine, 50 mcg, Oral, Early Morning  metoprolol succinate, 50 mg, Oral, Daily  pantoprazole, 40 mg, Oral, BID AC      Continuous IV Infusions:     PRN Meds:  acetaminophen, 650 mg, Oral, Q6H PRN  aluminum-magnesium hydroxide-simethicone, 30 mL, Oral, Q6H PRN  ondansetron, 4 mg, Intravenous, Q6H PRN    Tele  scd    None    Network Utilization Review Department  ATTENTION: Please call with any questions or concerns to [phone] and carefully listen to the prompts so that you are directed to the right person  All voicemails are confidential   Hoa Dominguez all requests for admission clinical reviews, approved or denied determinations and any other requests to dedicated fax number below belonging to the campus where the patient is receiving treatment   List of dedicated fax numbers for the Facilities:  Gunnar Mooney NAME UR FAX NUMBER   ADMISSION DENIALS (Administrative/Medical Necessity) [phone]   PARENT CHILD HEALTH (Maternity/NICU/Pediatrics) [address] [facility],[address]  [phone]   Ashwin Eller [address] ([facility]) [address] [address] [place] [address] [phone]   [address] [phone]

## 2021-04-02 NOTE — PLAN OF CARE
Problem: Potential for Falls  Goal: Patient will remain free of falls  Description: INTERVENTIONS:  - Assess patient frequently for physical needs  -  Identify cognitive and physical deficits and behaviors that affect risk of falls    -  [place] fall precautions as indicated by assessment   - Educate patient/family on patient safety including physical limitations  - Instruct patient to call for assistance with activity based on assessment  - Modify environment to reduce risk of injury  - Consider OT/PT consult to assist with strengthening/mobility  Outcome: Progressing

## 2021-04-02 NOTE — ASSESSMENT & PLAN NOTE
As evidence by CT scan finding of colitis, no abscess seen  No sign of diverticulitis  Likely secondary to diarrheal illness of unclear etiology  While admitted, patient had no further diarrhea  Is tolerating p o  Intake without difficulty  No nausea or vomiting, no fevers, no abdominal pain  No indication for antibiotics  Suspect self-limited food-borne diarrhea related to seafood meal   No recent antibiotics to suggest C diff  Diarrhea has resolved

## 2021-04-02 NOTE — ASSESSMENT & PLAN NOTE
Likely secondary to diarrheal illness of unclear etiology  Will check stool pathogen assay, colonoscopy if no improvement  Hold on antibiotics for now  Patient without abdominal pain, potentially could been food borne with meal at [employer]  No recent antibiotics to suggest C diff

## 2021-04-02 NOTE — ASSESSMENT & PLAN NOTE
In remission, followed by [address] [place] [address] with plans to continue anastrozole until May 2021 to complete 5 years of adjuvant hormonal therapy   - does not appear to be on the medication currently and should follow-up to discuss initiation

## 2021-04-02 NOTE — CASE MANAGEMENT
Discharge planning:     [facility] was notified at morning rounds that pt will be discharging today  Pt will be returning to her previous environment at [facility] independent living  Transportation will be provided by pt's friend    [facility] department will continue to follow through pt's D/C

## 2021-04-02 NOTE — ASSESSMENT & PLAN NOTE
Ventricular rate controlled  Not on anticoagulation pre-hospital, per medical record was because of previous GI bleed

## 2021-04-02 NOTE — H&P
[address] [facility] 1945, 70 y o  female MRN: [id number]  Unit/Bed#: HERIBERTO Encounter: [id number]  Primary Care Provider: Jurgen Durbin DO   Date and time admitted to hospital: 4/1/2021  6:10 PM    Assessment and Plan  * Postural dizziness with presyncope  Assessment & Plan  Patient presented with presyncopal episode after having diarrheal episode  Does have history of atrial fibrillation but not on anticoagulation due to previous history of GI bleeding  Will monitor on telemetry  Serial cardiac enzymes have been ordered  Patient denies actual syncopal episode, could potentially been vasovagal or volume depletion  Gentle intravenous fluid has been given        Colitis  Assessment & Plan  Likely secondary to diarrheal illness of unclear etiology  Will check stool pathogen assay, colonoscopy if no improvement  Hold on antibiotics for now  Patient without abdominal pain, potentially could been food borne with meal at [employer]  No recent antibiotics to suggest C diff    Atrial fibrillation ([address] )  Assessment & Plan  Ventricular rate controlled  Not on anticoagulation pre-hospital, per medical record was because of previous GI bleed      History of alcohol use  Assessment & Plan  In remission, not currently drinking    Invasive ductal carcinoma of breast, right [facility])  Assessment & Plan  In remission, followed by [address] [place] [address] with plans to continue anastrozole until May 2021 to complete 5 years of adjuvant hormonal therapy   - does not appear to be on the medication currently and should follow-up to discuss initiation      Thoracic aortic aneurysm [facility])  Assessment & Plan  Followed in the outpatient setting    Hypothyroidism  Assessment & Plan  Resume Synthroid  Recheck TSH and free T4        Code Status: Level 1 - Full Code **    VTE Prophylaxis: Heparin  / sequential compression device     POLST: There is no POLST form on file for this patient (pre-hospital)  Discussion Left message to call back. Please advise, thank you. with family:  Family member at bedside    Anticipated Length of Stay:  Patient will be admitted on an Observation basis with an anticipated length of stay of  less than 2 midnights  Justification for Hospital Stay: Postural dizziness with presyncope     Total Time for Visit, including Counseling / Coordination of Care: 45 minutes  Greater than 50% of this total time spent on direct patient counseling and coordination of care  Chief Complaint:     Chief Complaint   Patient presents with    Dizziness     Pt reports episode of dizziness and loss of bowel control  Pt reports increased dizziness today  History of Present Illness:    Abhijit Grossman is a 70 y o  female who presents with presyncopal episode after having multiple episodes of diarrhea  Patient reports she had 7-8 bowel movements, she denies any recent antibiotic use  She states she had [employer] the night before, but did not notice that any of the seafood appeared to be tainted  She did have a few of her friends who ate similar food and did not develop any food borne illness  Patient does live alone, and due to her lightheadedness and dizziness she presented for evaluation  She denies any passing out  Patient's past medical history is notable for breast cancer, she also has a history of thoracic aortic aneurysm which is being followed in the outpatient setting     She has a history of hypothyroidism as well as remote history of alcohol use  She denies any chest pain, no nausea, no vomiting      Review of Systems:    A complete and comprehensive 14 point organ system review was performed and all other systems are negative other than stated above in the HPI    Past Medical and Surgical History:     Past Medical History:   Diagnosis Date    Abnormal CT of the abdomen     Acute bronchitis     Anxiety     Appetite loss     Arthritis     Ascending aortic aneurysm (HCC)     Bilateral renal cysts     Cyst of ovary     Dysphagia     Esophageal reflux disease     Full dentures     GERD (gastroesophageal reflux disease)     Herpes zoster     Hyperlipidemia     Hypertension     Hypokalemia     Hypomagnesemia     Hypothyroidism     Impaired fasting glucose     Irritable bowel syndrome (IBS)     Ischemic colitis (HCC)     Knee pain     Limb alert care status     no BP/IV right arm    Osteoarthritis of right knee     Pneumonia     Post-op pain     Pulmonary nodules     Thoracic aortic aneurysm (HCC)     Unspecified ovarian cysts     Use of anastrozole (Arimidex)     Vasovagal syncope     Vitamin D deficiency     Wears glasses     Weight loss        Past Surgical History:   Procedure Laterality Date    APPENDECTOMY      pt states it was not removed    BREAST BIOPSY Right 03/02/2016    BREAST LUMPECTOMY Right 2004    BREAST SURGERY Right     Single lesion excision 1990 hyperplasia, 1st biopsy at 25yrs old was benign    CHOLECYSTECTOMY      COLONOSCOPY      COLONOSCOPY N/A 5/12/2017    Procedure: COLONOSCOPY with biopsy;  Surgeon: Jake Faustin MD;  Location: [facility]; Service:     ESOPHAGOGASTRODUODENOSCOPY N/A 1/19/2019    Procedure: ESOPHAGOGASTRODUODENOSCOPY (EGD) with 4cc of epinephrine injection and cauterized with gold probe; Surgeon: Carito Zarate MD;  Location: [facility];   Service: Gastroenterology    [facility] LUMBAR PUNCTURE DIAGNOSTIC  5/24/2019    HYSTERECTOMY  1977    IR LUMBAR PUNCTURE  2/21/2019    KNEE SURGERY Right     NM BIOPSY/EXCISION, LYMPH NODE(S) Right 4/12/2016    Procedure: BIOPSY LYMPH NODE SENTINEL @ 1200 LYMPHOSCINTIGRAPHY LYMPHATIC MAPPING;  Surgeon: Martin Bernal MD;  Location: [facility];  Service: General    NM MASTECTOMY, SIMPLE, COMPLETE Right 4/12/2016    Procedure: MASTECTOMY SIMPLE;  Surgeon: Martin Bernal MD;  [facility];  Service: General    TUBAL LIGATION      US GUIDED BREAST BIOPSY RIGHT COMPLETE Right 3/9/2016       Meds/Allergies:    Prior to Admission medications    Medication Sig Start Date End Date Taking? Authorizing Provider   cyanocobalamin (VITAMIN B-12) 1,000 mcg tablet Take by mouth daily    Historical Provider, MD   hydrocortisone (ANUSOL-HC) 2 5 % rectal cream Apply topically 2 (two) times a day 3/12/21   Yary Hathaway, DO   hydrocortisone-pramoxine (PROCTOFOAM-HC) 1-1 % FOAM rectal foam Insert 1 applicator into the rectum 2 (two) times a day As needed 3/11/21   Yary Hathaway, DO   levothyroxine 50 mcg tablet TAKE 1 TABLET DAILY IN THE EARLY MORNING 1/14/21   Yary Hathaway, DO   metoprolol succinate (TOPROL-XL) 50 mg 24 hr tablet TAKE 1 TABLET EVERY DAY 1/14/21   Yary Hathaway, DO   pantoprazole (PROTONIX) 40 mg tablet TAKE 1 TABLET TWICE DAILY BEFORE MEALS 1/14/21   Yary Hathaway, DO     I have reviewed home medications with patient personally  Allergies: Allergies   Allergen Reactions    Demerol [Meperidine] GI Intolerance and Other (See Comments)     Other reaction(s):  Other (See Comments)  severe nausea  severe nausea  Nausea/vomiting    Nitroglycerin Anaphylaxis and Other (See Comments)     BP drops drastically    Lipitor [Atorvastatin] Other (See Comments)     Joint/muscle pain    Zocor [Simvastatin] Other (See Comments)     Joint/muscle pain       Social History:     Marital Status: Single   Occupation:  Retired    Substance Use History:   Social History     Substance and Sexual Activity   Alcohol Use Not Currently     Social History     Tobacco Use   Smoking Status Never Smoker   Smokeless Tobacco Never Used   Tobacco Comment    not interested     Social History     Substance and Sexual Activity   Drug Use No       Family History:    Family History   Problem Relation Age of Onset    Stroke Maternal Grandmother     Anemia Mother     Other Mother         disorders of blood and blood forming organs    Hypertension Mother     Stroke Father     Alcohol abuse Brother        Physical Exam:     Vitals:   Blood Pressure: 162/97 (04/01/21 2216)  Pulse: 92 (04/01/21 2216)  Temperature: 98 7 °F (37 1 °C) (04/01/21 1708)  Temp Source: Oral (04/01/21 1708)  Respirations: 18 (04/01/21 2216)  Weight - Scale: 73 8 kg (162 lb 11 2 oz) (04/01/21 1708)  SpO2: 94 % (04/01/21 2216)      General: well appearing, no acute distress  HEENT: atraumatic, PERRLA, moist mucosa, normal pharynx, normal tonsils and adenoids, normal tongue, no fluid in sinuses  Neck: Trachea midline, no carotid bruit, no masses  Respiratory: normal chest wall expansion, CTA B, no r/r/w, no rubs  Cardiovascular: RRR, no m/r/g, Normal S1 and S2  Abdomen: Soft, non-tender, non-distended, normal bowel sounds in all quadrants, no hepatosplenomegaly, no tympany  Rectal: deferred  Musculoskeletal: normal ROM in upper and lower extremities  Integumentary: warm, dry, and pink, with no rash, purpura, or petechia  Heme/Lymph: no lymphadenopathy, no bruises  Neurological: Cranial Nerves II-XII grossly intact  Psychiatric: cooperative with normal mood, affect, and cognition    Additional Data:     Lab Results: I have personally reviewed pertinent reports  Results from last 7 days   Lab Units 04/01/21 1911   WBC Thousand/uL 10 36*   HEMOGLOBIN g/dL 12 5   HEMATOCRIT % 39 8   PLATELETS Thousands/uL 213   NEUTROS PCT % 83*   LYMPHS PCT % 8*   MONOS PCT % 8   EOS PCT % 0     Results from last 7 days   Lab Units 04/01/21 1911   SODIUM mmol/L 137   POTASSIUM mmol/L 4 5   CHLORIDE mmol/L 100   CO2 mmol/L 28   BUN mg/dL 15   CREATININE mg/dL 1 02   ANION GAP mmol/L 9   CALCIUM mg/dL 9 5   ALBUMIN g/dL 3 3*   TOTAL BILIRUBIN mg/dL 0 51   ALK PHOS U/L 100   ALT U/L 15   AST U/L 18   GLUCOSE RANDOM mg/dL 111                       Imaging: I have personally reviewed pertinent reports  CT head without contrast   Final Result by Tam Andrea MD (04/01 2053)      No acute intracranial abnormality  Mild cerebral chronic microangiopathy                    Workstation performed: [id number]         CT abdomen pelvis with contrast   Final Result by Rosanne López MD (04/01 2057)      Moderate thickening of the distal descending colon and proximal/mid sigmoid colon in keeping with a nonspecific segmental colitis  Extensive colonic diverticulosis without more focal inflammatory changes to indicate acute diverticulitis  The study was marked in [facility] for immediate notification  Workstation performed: [id number]             EKG, Pathology, and Other Studies Reviewed on Admission:   · Reviewed CT scan of the abdomen with evidence of colonic diverticulosis    EKG unavailable for review at the time of this dictation    Allscripts / VersionEye Records Reviewed: Yes     ** Please Note: This note was completed in part utilizing [employer] Software  Grammatical errors, random word insertions, spelling mistakes, and incomplete sentences may be an occasional consequence of this system secondary to software limitations, ambient noise, and hardware issues  If you have any questions or concerns about the content, text, or information contained within the body of this dictation, please contact the provider for clarification  **

## 2021-04-02 NOTE — ASSESSMENT & PLAN NOTE
Patient presented with presyncopal episode after having diarrheal episode  Does have history of atrial fibrillation but not on anticoagulation due to previous history of GI bleeding  Will monitor on telemetry  Serial cardiac enzymes have been ordered  Patient denies actual syncopal episode, could potentially been vasovagal or volume depletion  Gentle intravenous fluid has been given

## 2021-04-02 NOTE — ASSESSMENT & PLAN NOTE
In remission, followed by [address] [place] [address] with plans to continue anastrozole until May 2021 to complete 5 years of adjuvant hormonal therapy   - does not appear to be on the medication currently and should follow-up to discuss initiation

## 2021-04-05 ENCOUNTER — TRANSITIONAL CARE MANAGEMENT (OUTPATIENT)
Dept: FAMILY MEDICINE CLINIC | Facility: CLINIC | Age: 76
End: 2021-04-05

## 2021-04-12 DIAGNOSIS — K64.1 GRADE II HEMORRHOIDS: ICD-10-CM

## 2021-04-19 ENCOUNTER — OFFICE VISIT (OUTPATIENT)
Dept: FAMILY MEDICINE CLINIC | Facility: CLINIC | Age: 76
End: 2021-04-19
Payer: MEDICARE

## 2021-04-19 VITALS
SYSTOLIC BLOOD PRESSURE: 134 MMHG | BODY MASS INDEX: 27.25 KG/M2 | DIASTOLIC BLOOD PRESSURE: 80 MMHG | TEMPERATURE: 98.1 F | HEIGHT: 64 IN | WEIGHT: 159.6 LBS

## 2021-04-19 DIAGNOSIS — R55 POSTURAL DIZZINESS WITH PRESYNCOPE: Primary | ICD-10-CM

## 2021-04-19 DIAGNOSIS — E06.3 HYPOTHYROIDISM DUE TO HASHIMOTO'S THYROIDITIS: ICD-10-CM

## 2021-04-19 DIAGNOSIS — R42 POSTURAL DIZZINESS WITH PRESYNCOPE: Primary | ICD-10-CM

## 2021-04-19 DIAGNOSIS — I71.2 THORACIC AORTIC ANEURYSM WITHOUT RUPTURE (HCC): ICD-10-CM

## 2021-04-19 DIAGNOSIS — K64.1 GRADE II HEMORRHOIDS: ICD-10-CM

## 2021-04-19 DIAGNOSIS — I48.0 PAROXYSMAL ATRIAL FIBRILLATION (HCC): ICD-10-CM

## 2021-04-19 DIAGNOSIS — E03.8 HYPOTHYROIDISM DUE TO HASHIMOTO'S THYROIDITIS: ICD-10-CM

## 2021-04-19 DIAGNOSIS — K59.1 FUNCTIONAL DIARRHEA: ICD-10-CM

## 2021-04-19 PROCEDURE — 99495 TRANSJ CARE MGMT MOD F2F 14D: CPT | Performed by: FAMILY MEDICINE

## 2021-04-19 NOTE — PROGRESS NOTES
Assessment/Plan: patient will see Colorectal specialty  The patient's dizziness and Afib stable at this time  Thoracic aneurysm stable  Hypothyroidism stable  Continue with current medications  Follow-up per routine  Diagnoses and all orders for this visit:    Postural dizziness with presyncope    Functional diarrhea  -     Ambulatory referral to Colorectal Surgery; Future    Paroxysmal atrial fibrillation [facility])    Thoracic aortic aneurysm without rupture (HCC)    Hypothyroidism due to Hashimoto's thyroiditis    Grade II hemorrhoids  -     Ambulatory referral to Colorectal Surgery; Future            Subjective:        Patient ID: Hudson Serrano is a 70 y o  female  Patient is here status post hospitalization from April 1st toe 2nd for persistent diarrhea which ultimately listed for roughly 24 hours overnight during admission  Patient was given IV fluids  Patient did have some dizziness and presyncopal event leading up to this  Patient other conditions include Afib as well as thoracic aneurysm as well as hypothyroidism  Diarrhea had resolved the following day period CT scan was done which was reviewed which shows some descending colon thickening of the wall  The patient with some constipation but no diarrhea at this time  No fevers patient with history of hemorrhoids  Patient occasionally feels queasy  No vomiting  No fever  The following portions of the patient's history were reviewed and updated as appropriate: allergies, current medications, past family history, past medical history, past social history, past surgical history and problem list       Review of Systems   Constitutional: Negative  HENT: Negative  Eyes: Negative  Respiratory: Negative  Cardiovascular: Negative  Gastrointestinal: Positive for constipation  Negative for abdominal pain  Endocrine: Negative  Genitourinary: Negative  Musculoskeletal: Negative  Skin: Negative      Allergic/Immunologic: Negative  Neurological: Negative  Hematological: Negative  Psychiatric/Behavioral: Negative  Objective:      BMI Counseling: Body mass index is 27 4 kg/m²  The BMI is above normal  Nutrition recommendations include decreasing portion sizes  Exercise recommendations include exercising 3-5 times per week  Depression Screening and Follow-up Plan: Clincally patient does not have depression  No treatment is required  /80 (BP Location: Right arm, Patient Position: Sitting, Cuff Size: Standard)   Temp 98 1 °F (36 7 °C) (Tympanic)   Ht 5' 4" (1 626 m)   Wt 72 4 kg (159 lb 9 6 oz)   BMI 27 40 kg/m²          Physical Exam  Vitals signs and nursing note reviewed  Constitutional:       General: She is not in acute distress  Appearance: Normal appearance  She is not ill-appearing, toxic-appearing or diaphoretic  HENT:      Head: Normocephalic and atraumatic  Right Ear: Tympanic membrane, ear canal and external ear normal  There is no impacted cerumen  Left Ear: Tympanic membrane, ear canal and external ear normal  There is no impacted cerumen  Nose: Nose normal  No congestion or rhinorrhea  Mouth/Throat:      Mouth: Mucous membranes are moist       Pharynx: No oropharyngeal exudate or posterior oropharyngeal erythema  Eyes:      General: No scleral icterus  Right eye: No discharge  Left eye: No discharge  Extraocular Movements: Extraocular movements intact  Conjunctiva/sclera: Conjunctivae normal       Pupils: Pupils are equal, round, and reactive to light  Neck:      Musculoskeletal: Normal range of motion and neck supple  No neck rigidity or muscular tenderness  Vascular: No carotid bruit  Cardiovascular:      Rate and Rhythm: Normal rate and regular rhythm  Pulses: Normal pulses  Heart sounds: Normal heart sounds  No murmur  No friction rub  No gallop      Pulmonary:      Effort: Pulmonary effort is normal  No respiratory distress  Breath sounds: Normal breath sounds  No stridor  No wheezing, rhonchi or rales  Chest:      Chest wall: No tenderness  Abdominal:      General: Abdomen is flat  Bowel sounds are normal  There is no distension  Palpations: Abdomen is soft  Tenderness: There is no abdominal tenderness  There is no guarding or rebound  Musculoskeletal: Normal range of motion  General: No swelling, tenderness, deformity or signs of injury  Right lower leg: No edema  Left lower leg: No edema  Lymphadenopathy:      Cervical: No cervical adenopathy  Skin:     General: Skin is warm and dry  Capillary Refill: Capillary refill takes less than 2 seconds  Coloration: Skin is not jaundiced  Findings: No bruising, erythema, lesion or rash  Neurological:      Mental Status: She is alert and oriented to person, place, and time  Mental status is at baseline  Cranial Nerves: No cranial nerve deficit  Sensory: No sensory deficit  Motor: No weakness  Coordination: Coordination normal       Gait: Gait normal    Psychiatric:         Mood and Affect: Mood normal          Behavior: Behavior normal          Thought Content:  Thought content normal          Judgment: Judgment normal

## 2021-04-20 DIAGNOSIS — K25.4 GASTROINTESTINAL HEMORRHAGE ASSOCIATED WITH GASTRIC ULCER: ICD-10-CM

## 2021-04-20 DIAGNOSIS — S12.100A CLOSED ODONTOID FRACTURE, INITIAL ENCOUNTER (HCC): ICD-10-CM

## 2021-04-20 DIAGNOSIS — R79.89 LOW TSH LEVEL: ICD-10-CM

## 2021-04-20 RX ORDER — HYDROCORTISONE 25 MG/G
CREAM TOPICAL 2 TIMES DAILY
Qty: 1 TUBE | Refills: 5 | Status: SHIPPED | OUTPATIENT
Start: 2021-04-20 | End: 2021-04-23 | Stop reason: SDUPTHER

## 2021-04-20 RX ORDER — METOPROLOL SUCCINATE 50 MG/1
50 TABLET, EXTENDED RELEASE ORAL DAILY
Qty: 90 TABLET | Refills: 1 | Status: ON HOLD | OUTPATIENT
Start: 2021-04-20 | End: 2021-06-18 | Stop reason: SDUPTHER

## 2021-04-20 RX ORDER — LEVOTHYROXINE SODIUM 0.05 MG/1
50 TABLET ORAL DAILY
Qty: 90 TABLET | Refills: 1 | Status: ON HOLD | OUTPATIENT
Start: 2021-04-20 | End: 2021-06-18 | Stop reason: SDUPTHER

## 2021-04-20 RX ORDER — PANTOPRAZOLE SODIUM 40 MG/1
40 TABLET, DELAYED RELEASE ORAL
Qty: 180 TABLET | Refills: 1 | Status: ON HOLD | OUTPATIENT
Start: 2021-04-20 | End: 2021-06-18 | Stop reason: SDUPTHER

## 2021-04-23 DIAGNOSIS — K64.1 GRADE II HEMORRHOIDS: ICD-10-CM

## 2021-04-23 RX ORDER — HYDROCORTISONE 25 MG/G
CREAM TOPICAL 2 TIMES DAILY
Qty: 1 TUBE | Refills: 5 | Status: SHIPPED | OUTPATIENT
Start: 2021-04-23 | End: 2021-06-18 | Stop reason: HOSPADM

## 2021-05-18 ENCOUNTER — LAB REQUISITION (OUTPATIENT)
Dept: LAB | Facility: HOSPITAL | Age: 76
End: 2021-05-18
Payer: MEDICARE

## 2021-05-18 DIAGNOSIS — K63.89 OTHER SPECIFIED DISEASES OF INTESTINE: ICD-10-CM

## 2021-05-18 DIAGNOSIS — R19.4 CHANGE IN BOWEL HABIT: ICD-10-CM

## 2021-05-18 PROCEDURE — 88341 IMHCHEM/IMCYTCHM EA ADD ANTB: CPT | Performed by: PATHOLOGY

## 2021-05-18 PROCEDURE — 88342 IMHCHEM/IMCYTCHM 1ST ANTB: CPT | Performed by: PATHOLOGY

## 2021-05-18 PROCEDURE — 88305 TISSUE EXAM BY PATHOLOGIST: CPT | Performed by: PATHOLOGY

## 2021-06-07 ENCOUNTER — APPOINTMENT (OUTPATIENT)
Dept: LAB | Facility: MEDICAL CENTER | Age: 76
End: 2021-06-07
Payer: MEDICARE

## 2021-06-07 ENCOUNTER — TRANSCRIBE ORDERS (OUTPATIENT)
Dept: ADMINISTRATIVE | Facility: HOSPITAL | Age: 76
End: 2021-06-07

## 2021-06-07 DIAGNOSIS — K51.919 ULCERATIVE COLITIS WITH COMPLICATION, UNSPECIFIED LOCATION (HCC): ICD-10-CM

## 2021-06-07 DIAGNOSIS — K51.919 ULCERATIVE COLITIS WITH COMPLICATION, UNSPECIFIED LOCATION (HCC): Primary | ICD-10-CM

## 2021-06-07 LAB — HBV SURFACE AG SER QL: NORMAL

## 2021-06-07 PROCEDURE — 87340 HEPATITIS B SURFACE AG IA: CPT

## 2021-06-07 PROCEDURE — 86480 TB TEST CELL IMMUN MEASURE: CPT

## 2021-06-07 PROCEDURE — 36415 COLL VENOUS BLD VENIPUNCTURE: CPT

## 2021-06-10 LAB
GAMMA INTERFERON BACKGROUND BLD IA-ACNC: 0.07 IU/ML
M TB IFN-G BLD-IMP: NEGATIVE
M TB IFN-G CD4+ BCKGRND COR BLD-ACNC: 0 IU/ML
M TB IFN-G CD4+ BCKGRND COR BLD-ACNC: 0 IU/ML
MITOGEN IGNF BCKGRD COR BLD-ACNC: 2.69 IU/ML

## 2021-06-11 ENCOUNTER — HOSPITAL ENCOUNTER (INPATIENT)
Facility: HOSPITAL | Age: 76
LOS: 6 days | Discharge: HOME | DRG: 392 | End: 2021-06-18
Attending: EMERGENCY MEDICINE | Admitting: FAMILY MEDICINE
Payer: MEDICARE

## 2021-06-11 ENCOUNTER — APPOINTMENT (EMERGENCY)
Dept: CT IMAGING | Facility: HOSPITAL | Age: 76
DRG: 392 | End: 2021-06-11
Payer: MEDICARE

## 2021-06-11 DIAGNOSIS — K52.9 COLITIS: Primary | ICD-10-CM

## 2021-06-11 DIAGNOSIS — R41.89 COGNITIVE IMPAIRMENT: ICD-10-CM

## 2021-06-11 DIAGNOSIS — S12.100A CLOSED ODONTOID FRACTURE, INITIAL ENCOUNTER (HCC): ICD-10-CM

## 2021-06-11 DIAGNOSIS — R79.89 LOW TSH LEVEL: ICD-10-CM

## 2021-06-11 DIAGNOSIS — R19.7 DIARRHEA: ICD-10-CM

## 2021-06-11 DIAGNOSIS — K25.4 GASTROINTESTINAL HEMORRHAGE ASSOCIATED WITH GASTRIC ULCER: ICD-10-CM

## 2021-06-11 DIAGNOSIS — F41.9 ANXIETY: ICD-10-CM

## 2021-06-11 LAB
ALBUMIN SERPL BCP-MCNC: 3 G/DL (ref 3.5–5)
ALP SERPL-CCNC: 92 U/L (ref 46–116)
ALT SERPL W P-5'-P-CCNC: 16 U/L (ref 12–78)
ANION GAP SERPL CALCULATED.3IONS-SCNC: 11 MMOL/L (ref 4–13)
APTT PPP: 33 SECONDS (ref 23–37)
AST SERPL W P-5'-P-CCNC: 16 U/L (ref 5–45)
BACTERIA UR QL AUTO: ABNORMAL /HPF
BASOPHILS # BLD AUTO: 0.04 THOUSANDS/ΜL (ref 0–0.1)
BASOPHILS NFR BLD AUTO: 1 % (ref 0–1)
BILIRUB SERPL-MCNC: 0.5 MG/DL (ref 0.2–1)
BILIRUB UR QL STRIP: NEGATIVE
BUN SERPL-MCNC: 11 MG/DL (ref 5–25)
CALCIUM ALBUM COR SERPL-MCNC: 9.9 MG/DL (ref 8.3–10.1)
CALCIUM SERPL-MCNC: 9.1 MG/DL (ref 8.3–10.1)
CHLORIDE SERPL-SCNC: 98 MMOL/L (ref 100–108)
CLARITY UR: CLEAR
CO2 SERPL-SCNC: 27 MMOL/L (ref 21–32)
COLOR UR: YELLOW
CREAT SERPL-MCNC: 1.12 MG/DL (ref 0.6–1.3)
EOSINOPHIL # BLD AUTO: 0.15 THOUSAND/ΜL (ref 0–0.61)
EOSINOPHIL NFR BLD AUTO: 2 % (ref 0–6)
ERYTHROCYTE [DISTWIDTH] IN BLOOD BY AUTOMATED COUNT: 13.7 % (ref 11.6–15.1)
GFR SERPL CREATININE-BSD FRML MDRD: 49 ML/MIN/1.73SQ M
GLUCOSE SERPL-MCNC: 109 MG/DL (ref 65–140)
GLUCOSE UR STRIP-MCNC: NEGATIVE MG/DL
HCT VFR BLD AUTO: 40.4 % (ref 34.8–46.1)
HGB BLD-MCNC: 12.9 G/DL (ref 11.5–15.4)
HGB UR QL STRIP.AUTO: NEGATIVE
IMM GRANULOCYTES # BLD AUTO: 0.06 THOUSAND/UL (ref 0–0.2)
IMM GRANULOCYTES NFR BLD AUTO: 1 % (ref 0–2)
INR PPP: 1.05 (ref 0.84–1.19)
KETONES UR STRIP-MCNC: NEGATIVE MG/DL
LACTATE SERPL-SCNC: 1.1 MMOL/L (ref 0.5–2)
LEUKOCYTE ESTERASE UR QL STRIP: ABNORMAL
LYMPHOCYTES # BLD AUTO: 0.74 THOUSANDS/ΜL (ref 0.6–4.47)
LYMPHOCYTES NFR BLD AUTO: 9 % (ref 14–44)
MCH RBC QN AUTO: 27.8 PG (ref 26.8–34.3)
MCHC RBC AUTO-ENTMCNC: 31.9 G/DL (ref 31.4–37.4)
MCV RBC AUTO: 87 FL (ref 82–98)
MONOCYTES # BLD AUTO: 0.64 THOUSAND/ΜL (ref 0.17–1.22)
MONOCYTES NFR BLD AUTO: 8 % (ref 4–12)
NEUTROPHILS # BLD AUTO: 6.77 THOUSANDS/ΜL (ref 1.85–7.62)
NEUTS SEG NFR BLD AUTO: 79 % (ref 43–75)
NITRITE UR QL STRIP: NEGATIVE
NON-SQ EPI CELLS URNS QL MICRO: ABNORMAL /HPF
NRBC BLD AUTO-RTO: 0 /100 WBCS
PH UR STRIP.AUTO: 6 [PH] (ref 4.5–8)
PLATELET # BLD AUTO: 276 THOUSANDS/UL (ref 149–390)
PMV BLD AUTO: 8.9 FL (ref 8.9–12.7)
POTASSIUM SERPL-SCNC: 3.2 MMOL/L (ref 3.5–5.3)
PROCALCITONIN SERPL-MCNC: 0.16 NG/ML
PROT SERPL-MCNC: 7.4 G/DL (ref 6.4–8.2)
PROT UR STRIP-MCNC: NEGATIVE MG/DL
PROTHROMBIN TIME: 13.5 SECONDS (ref 11.6–14.5)
RBC # BLD AUTO: 4.64 MILLION/UL (ref 3.81–5.12)
RBC #/AREA URNS AUTO: ABNORMAL /HPF
SODIUM SERPL-SCNC: 136 MMOL/L (ref 136–145)
SP GR UR STRIP.AUTO: 1.01 (ref 1–1.03)
UROBILINOGEN UR QL STRIP.AUTO: 0.2 E.U./DL
WBC # BLD AUTO: 8.4 THOUSAND/UL (ref 4.31–10.16)
WBC #/AREA URNS AUTO: ABNORMAL /HPF

## 2021-06-11 PROCEDURE — 99285 EMERGENCY DEPT VISIT HI MDM: CPT

## 2021-06-11 PROCEDURE — 85025 COMPLETE CBC W/AUTO DIFF WBC: CPT | Performed by: EMERGENCY MEDICINE

## 2021-06-11 PROCEDURE — 96365 THER/PROPH/DIAG IV INF INIT: CPT

## 2021-06-11 PROCEDURE — 80053 COMPREHEN METABOLIC PANEL: CPT | Performed by: EMERGENCY MEDICINE

## 2021-06-11 PROCEDURE — 99284 EMERGENCY DEPT VISIT MOD MDM: CPT | Performed by: EMERGENCY MEDICINE

## 2021-06-11 PROCEDURE — 85610 PROTHROMBIN TIME: CPT | Performed by: EMERGENCY MEDICINE

## 2021-06-11 PROCEDURE — 83605 ASSAY OF LACTIC ACID: CPT | Performed by: EMERGENCY MEDICINE

## 2021-06-11 PROCEDURE — 74177 CT ABD & PELVIS W/CONTRAST: CPT

## 2021-06-11 PROCEDURE — 99220 PR INITIAL OBSERVATION CARE/DAY 70 MINUTES: CPT | Performed by: PHYSICIAN ASSISTANT

## 2021-06-11 PROCEDURE — 1124F ACP DISCUSS-NO DSCNMKR DOCD: CPT | Performed by: EMERGENCY MEDICINE

## 2021-06-11 PROCEDURE — 81001 URINALYSIS AUTO W/SCOPE: CPT

## 2021-06-11 PROCEDURE — 85730 THROMBOPLASTIN TIME PARTIAL: CPT | Performed by: EMERGENCY MEDICINE

## 2021-06-11 PROCEDURE — 87040 BLOOD CULTURE FOR BACTERIA: CPT | Performed by: EMERGENCY MEDICINE

## 2021-06-11 PROCEDURE — 36415 COLL VENOUS BLD VENIPUNCTURE: CPT | Performed by: EMERGENCY MEDICINE

## 2021-06-11 PROCEDURE — 84145 PROCALCITONIN (PCT): CPT | Performed by: EMERGENCY MEDICINE

## 2021-06-11 PROCEDURE — 96366 THER/PROPH/DIAG IV INF ADDON: CPT

## 2021-06-11 RX ORDER — PANTOPRAZOLE SODIUM 40 MG/1
40 TABLET, DELAYED RELEASE ORAL
Status: DISCONTINUED | OUTPATIENT
Start: 2021-06-12 | End: 2021-06-18 | Stop reason: HOSPADM

## 2021-06-11 RX ORDER — ACETAMINOPHEN 325 MG/1
650 TABLET ORAL EVERY 6 HOURS PRN
Status: DISCONTINUED | OUTPATIENT
Start: 2021-06-11 | End: 2021-06-18 | Stop reason: HOSPADM

## 2021-06-11 RX ORDER — MAGNESIUM HYDROXIDE/ALUMINUM HYDROXICE/SIMETHICONE 120; 1200; 1200 MG/30ML; MG/30ML; MG/30ML
30 SUSPENSION ORAL EVERY 6 HOURS PRN
Status: DISCONTINUED | OUTPATIENT
Start: 2021-06-11 | End: 2021-06-18 | Stop reason: HOSPADM

## 2021-06-11 RX ORDER — METOPROLOL SUCCINATE 50 MG/1
50 TABLET, EXTENDED RELEASE ORAL DAILY
Status: DISCONTINUED | OUTPATIENT
Start: 2021-06-12 | End: 2021-06-18 | Stop reason: HOSPADM

## 2021-06-11 RX ORDER — SODIUM CHLORIDE 9 MG/ML
100 INJECTION, SOLUTION INTRAVENOUS CONTINUOUS
Status: DISCONTINUED | OUTPATIENT
Start: 2021-06-11 | End: 2021-06-12

## 2021-06-11 RX ORDER — LEVOTHYROXINE SODIUM 0.05 MG/1
50 TABLET ORAL
Status: DISCONTINUED | OUTPATIENT
Start: 2021-06-12 | End: 2021-06-18 | Stop reason: HOSPADM

## 2021-06-11 RX ORDER — ACETAMINOPHEN 325 MG/1
650 TABLET ORAL ONCE
Status: COMPLETED | OUTPATIENT
Start: 2021-06-11 | End: 2021-06-11

## 2021-06-11 RX ORDER — ONDANSETRON 2 MG/ML
4 INJECTION INTRAMUSCULAR; INTRAVENOUS EVERY 6 HOURS PRN
Status: DISCONTINUED | OUTPATIENT
Start: 2021-06-11 | End: 2021-06-18 | Stop reason: HOSPADM

## 2021-06-11 RX ADMIN — SODIUM CHLORIDE 100 ML/HR: 0.9 INJECTION, SOLUTION INTRAVENOUS at 23:15

## 2021-06-11 RX ADMIN — IOHEXOL 100 ML: 350 INJECTION, SOLUTION INTRAVENOUS at 18:47

## 2021-06-11 RX ADMIN — SODIUM CHLORIDE, SODIUM LACTATE, POTASSIUM CHLORIDE, AND CALCIUM CHLORIDE 1000 ML: .6; .31; .03; .02 INJECTION, SOLUTION INTRAVENOUS at 16:56

## 2021-06-11 RX ADMIN — ACETAMINOPHEN 650 MG: 325 TABLET, FILM COATED ORAL at 16:55

## 2021-06-11 NOTE — ED PROVIDER NOTES
History  Chief Complaint   Patient presents with    Bleeding/Bruising     Pt has been to a GI specialsit for "holes" and " the blood just keeps coming " Pt aware and alert in [facility]  This is a 81-year-old female with a history of hypertension, hyperlipidemia, hypothyroidism, colitis who presents with worsening diarrhea and hematochezia  For several months now, the patient has been experiencing diarrhea and intermittent episodes of hematochezia  She was admitted on April 1st for colitis  She improved with IV hydration and was discharged with Colorectal surgery follow-up  Patient was seen by Colorectal surgery on April 29th  Per their note, there is concern for inflammatory colitis given her symptoms  She had a colonoscopy performed on 5/18/21 which revealed patchy abnormal mucosa in the anus, rectum, sigmoid colon, and descending colon  Biopsies revealed acute and chronic inflammation and mucosal ulceration with concern for "Florid infectious colitis, ischemic colitis versus inflammatory bowel disease"  Patient states that 4 days ago, she was placed on mesalamine and she started to experience worsening of her bloody bowel movements and diarrhea  Patient has also been feeling "cold" over the past 4 days  States that she has been losing weight and has been unable to eat anything  She presents for evaluation  Denies fever, nausea/vomiting, lightheadedness/dizziness, numbness/weakness, headache, change in vision, URI symptoms, neck pain, chest pain, palpitations, shortness of breath, cough, back pain, flank pain, melena, dysuria, hematuria, abnormal vaginal discharge/bleeding  Prior to Admission Medications   Prescriptions Last Dose Informant Patient Reported? Taking?    cyanocobalamin (VITAMIN B-12) 1,000 mcg tablet  Self Yes Yes   Sig: Take by mouth daily   hydrocortisone (ANUSOL-HC) 2 5 % rectal cream   No Yes   Sig: Apply topically 2 (two) times a day   levothyroxine 50 mcg tablet   No Yes Sig: Take 1 tablet (50 mcg total) by mouth daily   metoprolol succinate (TOPROL-XL) 50 mg 24 hr tablet   No Yes   Sig: Take 1 tablet (50 mg total) by mouth daily   pantoprazole (PROTONIX) 40 mg tablet   No Yes   Sig: Take 1 tablet (40 mg total) by mouth 2 (two) times a day before meals      Facility-Administered Medications: None       Past Medical History:   Diagnosis Date    Abnormal CT of the abdomen     Acute bronchitis     Anxiety     Appetite loss     Arthritis     Ascending aortic aneurysm (HCC)     Bilateral renal cysts     Cyst of ovary     Dysphagia     Esophageal reflux disease     Full dentures     GERD (gastroesophageal reflux disease)     Herpes zoster     Hyperlipidemia     Hypertension     Hypokalemia     Hypomagnesemia     Hypothyroidism     Impaired fasting glucose     Irritable bowel syndrome (IBS)     Ischemic colitis (HCC)     Knee pain     Limb alert care status     no BP/IV right arm    Osteoarthritis of right knee     Pneumonia     Post-op pain     Pulmonary nodules     Thoracic aortic aneurysm (HCC)     Unspecified ovarian cysts     Use of anastrozole (Arimidex)     Vasovagal syncope     Vitamin D deficiency     Wears glasses     Weight loss        Past Surgical History:   Procedure Laterality Date    APPENDECTOMY      pt states it was not removed    BREAST BIOPSY Right 03/02/2016    BREAST LUMPECTOMY Right 2004    BREAST SURGERY Right     Single lesion excision 1990 hyperplasia, 1st biopsy at 25yrs old was benign    CHOLECYSTECTOMY      COLONOSCOPY      COLONOSCOPY N/A 5/12/2017    Procedure: COLONOSCOPY with biopsy;  Surgeon: Gopal Gonzalez MD;  Location: [facility]; Service:     ESOPHAGOGASTRODUODENOSCOPY N/A 1/19/2019    Procedure: ESOPHAGOGASTRODUODENOSCOPY (EGD) with 4cc of epinephrine injection and cauterized with gold probe; Surgeon: Jose Nolasco MD;  Location: [facility];   Service: Gastroenterology    FL LUMBAR PUNCTURE DIAGNOSTIC 5/24/2019    HYSTERECTOMY  1977    IR LUMBAR PUNCTURE  2/21/2019    KNEE SURGERY Right     ME BIOPSY/EXCISION, LYMPH NODE(S) Right 4/12/2016    Procedure: BIOPSY LYMPH NODE SENTINEL @ 1200 LYMPHOSCINTIGRAPHY LYMPHATIC MAPPING;  Surgeon: Pari Potts MD;  Location: [facility];  Service: General    ME MASTECTOMY, SIMPLE, COMPLETE Right 4/12/2016    Procedure: MASTECTOMY SIMPLE;  Surgeon: Pari Potts MD;  [facility];  Service: General    TUBAL LIGATION      US GUIDED BREAST BIOPSY RIGHT COMPLETE Right 3/9/2016       Family History   Problem Relation Age of Onset    Stroke Maternal Grandmother     Anemia Mother     Other Mother         disorders of blood and blood forming organs    Hypertension Mother     Stroke Father     Alcohol abuse Brother     Colon cancer Neg Hx      I have reviewed and agree with the history as documented  E-Cigarette/Vaping    E-Cigarette Use Never User      E-Cigarette/Vaping Substances    Nicotine No     THC No     CBD No     Flavoring No     Other No     Unknown No      Social History     Tobacco Use    Smoking status: Never Smoker    Smokeless tobacco: Never Used    Tobacco comment: not interested   Substance Use Topics    Alcohol use: Not Currently    Drug use: No       Review of Systems   Constitutional: Positive for chills  Negative for fever  HENT: Negative for rhinorrhea, sore throat and trouble swallowing  Eyes: Negative for photophobia and visual disturbance  Respiratory: Negative for cough, chest tightness and shortness of breath  Cardiovascular: Negative for chest pain, palpitations and leg swelling  Gastrointestinal: Positive for abdominal pain, blood in stool, diarrhea and rectal pain  Negative for nausea and vomiting  Endocrine: Negative for polyuria  Genitourinary: Negative for dysuria, flank pain, hematuria, vaginal bleeding and vaginal discharge  Musculoskeletal: Negative for back pain and neck pain     Skin: Negative for color change and rash  Allergic/Immunologic: Negative for immunocompromised state  Neurological: Negative for dizziness, weakness, light-headedness, numbness and headaches  All other systems reviewed and are negative  Physical Exam  Physical Exam  Constitutional:       General: She is not in acute distress  Appearance: Normal appearance  She is well-developed  HENT:      Mouth/Throat:      Pharynx: Uvula midline  Eyes:      Conjunctiva/sclera: Conjunctivae normal       Pupils: Pupils are equal, round, and reactive to light  Neck:      Thyroid: No thyroid mass or thyromegaly  Trachea: Trachea normal    Cardiovascular:      Rate and Rhythm: Regular rhythm  Tachycardia present  Heart sounds: Normal heart sounds  No murmur  Pulmonary:      Effort: Pulmonary effort is normal       Breath sounds: Normal breath sounds  Abdominal:      General: Bowel sounds are normal       Palpations: Abdomen is soft  Tenderness: There is generalized abdominal tenderness  There is no guarding or rebound  Genitourinary:     Comments: Multiple large perianal skin tags visualized  Skin:     General: Skin is warm and dry  Neurological:      Mental Status: She is alert  Psychiatric:         Speech: Speech normal          Behavior: Behavior normal  Behavior is cooperative  Thought Content:  Thought content normal          Vital Signs  ED Triage Vitals   Temperature Pulse Respirations Blood Pressure SpO2   06/11/21 1525 06/11/21 1525 06/11/21 1525 06/11/21 1525 06/11/21 1525   (!) 101 °F (38 3 °C) (!) 108 18 160/76 97 %      Temp Source Heart Rate Source Patient Position - Orthostatic VS BP Location FiO2 (%)   06/11/21 1525 06/11/21 1659 06/11/21 1525 06/11/21 1525 --   Oral Monitor Sitting Right arm       Pain Score       06/11/21 1525       2           Vitals:    06/11/21 1659 06/11/21 1811 06/11/21 1920 06/11/21 2041   BP: 138/84 125/75 130/70 129/75   Pulse: 100 91 93 88   Patient Position - Orthostatic VS: Lying Lying Lying Lying         Visual Acuity      ED Medications  Medications   lactated ringers bolus 1,000 mL (1,000 mL Intravenous New Bag 6/11/21 1656)   acetaminophen (TYLENOL) tablet 650 mg (650 mg Oral Given 6/11/21 1655)   iohexol (OMNIPAQUE) 350 MG/ML injection (SINGLE-DOSE) 100 mL (100 mL Intravenous Given 6/11/21 1847)       Diagnostic Studies  Results Reviewed     Procedure Component Value Units Date/Time    Urine Microscopic [[id number]]  (Abnormal) Collected: 06/11/21 1931    Lab Status: Final result Specimen: Urine, Clean Catch Updated: 06/11/21 1951     RBC, UA None Seen /hpf      WBC, UA 0-1 /hpf      Epithelial Cells Occasional /hpf      Bacteria, UA Occasional /hpf     Urine Macroscopic, POC [[id number]]  (Abnormal) Collected: 06/11/21 1931    Lab Status: Final result Specimen: Urine Updated: 06/11/21 1932     Color, UA Yellow     Clarity, UA Clear     pH, UA 6 0     Leukocytes, UA Trace     Nitrite, UA Negative     Protein, UA Negative mg/dl      Glucose, UA Negative mg/dl      Ketones, UA Negative mg/dl      Urobilinogen, UA 0 2 E U /dl      Bilirubin, UA Negative     Blood, UA Negative     Specific Gravity, UA 1 015    Narrative:      CLINITEK RESULT    Lactic Acid [[id number]]  (Normal) Collected: 06/11/21 1713    Lab Status: Final result Specimen: Blood from Arm, Right Updated: 06/11/21 1742     LACTIC ACID 1 1 mmol/L     Narrative:      Result may be elevated if tourniquet was used during collection  Procalcitonin with AM Reflex [[id number]] Collected: 06/11/21 1713    Lab Status: In process Specimen: Blood from Arm, Right Updated: 06/11/21 1721    Blood culture #2 [[id number]] Collected: 06/11/21 1713    Lab Status:  In process Specimen: Blood from Arm, Right Updated: 06/11/21 17852 [facility] [[id number]]  (Normal) Collected: 06/11/21 1653    Lab Status: Final result Specimen: Blood from Arm, Right Updated: 06/11/21 1717     Protime 13 5 seconds      INR 1 05 APTT [[id number]]  (Normal) Collected: 06/11/21 1653    Lab Status: Final result Specimen: Blood from Arm, Right Updated: 06/11/21 1717     PTT 33 seconds     Comprehensive metabolic panel [[id number]]  (Abnormal) Collected: 06/11/21 1653    Lab Status: Final result Specimen: Blood from Arm, Right Updated: 06/11/21 1714     Sodium 136 mmol/L      Potassium 3 2 mmol/L      Chloride 98 mmol/L      CO2 27 mmol/L      ANION GAP 11 mmol/L      BUN 11 mg/dL      Creatinine 1 12 mg/dL      Glucose 109 mg/dL      Calcium 9 1 mg/dL      Corrected Calcium 9 9 mg/dL      AST 16 U/L      ALT 16 U/L      Alkaline Phosphatase 92 U/L      Total Protein 7 4 g/dL      Albumin 3 0 g/dL      Total Bilirubin 0 50 mg/dL      eGFR 49 ml/min/1 73sq m     Narrative:      [place] guidelines for Chronic Kidney Disease (CKD):     Stage 1 with normal or high GFR (GFR > 90 mL/min/1 73 square meters)    Stage 2 Mild CKD (GFR = 60-89 mL/min/1 73 square meters)    Stage 3A Moderate CKD (GFR = 45-59 mL/min/1 73 square meters)    Stage 3B Moderate CKD (GFR = 30-44 mL/min/1 73 square meters)    Stage 4 Severe CKD (GFR = 15-29 mL/min/1 73 square meters)    Stage 5 End Stage CKD (GFR <15 mL/min/1 73 square meters)  Note: GFR calculation is accurate only with a steady state creatinine    Blood culture #1 [[id number]] Collected: 06/11/21 1704    Lab Status:  In process Specimen: Blood from Arm, Right Updated: 06/11/21 1707    CBC and differential [[id number]]  (Abnormal) Collected: 06/11/21 1653    Lab Status: Final result Specimen: Blood from Arm, Right Updated: 06/11/21 1700     WBC 8 40 Thousand/uL      RBC 4 64 Million/uL      Hemoglobin 12 9 g/dL      Hematocrit 40 4 %      MCV 87 fL      MCH 27 8 pg      MCHC 31 9 g/dL      RDW 13 7 %      MPV 8 9 fL      Platelets 018 Thousands/uL      nRBC 0 /100 WBCs      Neutrophils Relative 79 %      Immat GRANS % 1 %      Lymphocytes Relative 9 %      Monocytes Relative 8 % Eosinophils Relative 2 %      Basophils Relative 1 %      Neutrophils Absolute 6 77 Thousands/µL      Immature Grans Absolute 0 06 Thousand/uL      Lymphocytes Absolute 0 74 Thousands/µL      Monocytes Absolute 0 64 Thousand/µL      Eosinophils Absolute 0 15 Thousand/µL      Basophils Absolute 0 04 Thousands/µL     Stool Enteric Bacterial Panel by PCR [[id number]]     Lab Status: No result Specimen: Stool     Clostridium difficile toxin by PCR with EIA [[id number]]     Lab Status: No result Specimen: Stool                  CT abdomen pelvis with contrast   Final Result by Akhil Cardenas MD (06/11 1925)      1  Sigmoid diverticulosis without evidence of acute diverticulitis  2   No evidence of colitis or other acute abnormality in the abdomen or pelvis  Resolution of mural thickening in the sigmoid since a CT from 4/1/2021  Workstation performed: [id number]                    Procedures  Procedures         ED Course  ED Course as of Jun 11 2050 Fri Jun 11, 2021   1715 stable   eGFR: 52                             SBIRT 22yo+      Most Recent Value   SBIRT (25 yo +)   In order to provide better care to our patients, we are screening all of our patients for alcohol and drug use  Would it be okay to ask you these screening questions? No Filed at: 06/11/2021 1643                    [facility]  Number of Diagnoses or Management Options  Diagnosis management comments: Concern for infectious colitis  Will check labs, CT abdomen/pelvis with contrast   IV fluids  Stool and C diff cultures  I anticipate admission        Disposition  Final diagnoses:   Colitis   Diarrhea     Time reflects when diagnosis was documented in both MDM as applicable and the Disposition within this note     Time User Action Codes Description Comment    6/11/2021  8:30 PM Kerri Decatur Add [K52 9] Colitis     6/11/2021  8:30 PM Kerri Dec[place] Add [R19 7] Diarrhea       ED Disposition     ED Disposition Condition Date/Time Comment Admit Stable Fri Jun 11, 2021  8:30 PM         Follow-up Information    None         Patient's Medications   Discharge Prescriptions    No medications on file     No discharge procedures on file      PDMP Review     None          ED Provider  Electronically Signed by           Juan Godoy MD  06/11/21 2050

## 2021-06-11 NOTE — ED NOTES
Pt transported to [address] [place] [address], [address] [place] [address]  06/11/21 1903

## 2021-06-11 NOTE — ED NOTES
Pt's IV was found to be pulled out  IV catheter was fully intact        Cleave Comber  06/11/21 4950       Cleave Comber  06/11/21 3099

## 2021-06-12 LAB
ANION GAP SERPL CALCULATED.3IONS-SCNC: 12 MMOL/L (ref 4–13)
BUN SERPL-MCNC: 10 MG/DL (ref 5–25)
C DIFF TOX B TCDB STL QL NAA+PROBE: NEGATIVE
CALCIUM SERPL-MCNC: 8.2 MG/DL (ref 8.3–10.1)
CHLORIDE SERPL-SCNC: 104 MMOL/L (ref 100–108)
CO2 SERPL-SCNC: 24 MMOL/L (ref 21–32)
CREAT SERPL-MCNC: 0.96 MG/DL (ref 0.6–1.3)
ERYTHROCYTE [DISTWIDTH] IN BLOOD BY AUTOMATED COUNT: 13.6 % (ref 11.6–15.1)
GFR SERPL CREATININE-BSD FRML MDRD: 59 ML/MIN/1.73SQ M
GLUCOSE P FAST SERPL-MCNC: 117 MG/DL (ref 65–99)
GLUCOSE SERPL-MCNC: 117 MG/DL (ref 65–140)
HCT VFR BLD AUTO: 35 % (ref 34.8–46.1)
HGB BLD-MCNC: 11.3 G/DL (ref 11.5–15.4)
MCH RBC QN AUTO: 27.9 PG (ref 26.8–34.3)
MCHC RBC AUTO-ENTMCNC: 32.3 G/DL (ref 31.4–37.4)
MCV RBC AUTO: 86 FL (ref 82–98)
PLATELET # BLD AUTO: 190 THOUSANDS/UL (ref 149–390)
PMV BLD AUTO: 8.8 FL (ref 8.9–12.7)
POTASSIUM SERPL-SCNC: 2.9 MMOL/L (ref 3.5–5.3)
PROCALCITONIN SERPL-MCNC: 0.16 NG/ML
RBC # BLD AUTO: 4.05 MILLION/UL (ref 3.81–5.12)
SODIUM SERPL-SCNC: 140 MMOL/L (ref 136–145)
TSH SERPL DL<=0.05 MIU/L-ACNC: 0.76 UIU/ML (ref 0.36–3.74)
WBC # BLD AUTO: 6.08 THOUSAND/UL (ref 4.31–10.16)

## 2021-06-12 PROCEDURE — 87493 C DIFF AMPLIFIED PROBE: CPT | Performed by: EMERGENCY MEDICINE

## 2021-06-12 PROCEDURE — 87177 OVA AND PARASITES SMEARS: CPT | Performed by: PHYSICIAN ASSISTANT

## 2021-06-12 PROCEDURE — 87209 SMEAR COMPLEX STAIN: CPT | Performed by: PHYSICIAN ASSISTANT

## 2021-06-12 PROCEDURE — 85027 COMPLETE CBC AUTOMATED: CPT | Performed by: PHYSICIAN ASSISTANT

## 2021-06-12 PROCEDURE — 80048 BASIC METABOLIC PNL TOTAL CA: CPT | Performed by: PHYSICIAN ASSISTANT

## 2021-06-12 PROCEDURE — 99222 1ST HOSP IP/OBS MODERATE 55: CPT | Performed by: PHYSICIAN ASSISTANT

## 2021-06-12 PROCEDURE — 97163 PT EVAL HIGH COMPLEX 45 MIN: CPT | Performed by: PHYSICAL THERAPIST

## 2021-06-12 PROCEDURE — 84145 PROCALCITONIN (PCT): CPT | Performed by: EMERGENCY MEDICINE

## 2021-06-12 PROCEDURE — 99232 SBSQ HOSP IP/OBS MODERATE 35: CPT | Performed by: FAMILY MEDICINE

## 2021-06-12 PROCEDURE — 84443 ASSAY THYROID STIM HORMONE: CPT | Performed by: PHYSICIAN ASSISTANT

## 2021-06-12 RX ORDER — POTASSIUM CHLORIDE 14.9 MG/ML
20 INJECTION INTRAVENOUS ONCE
Status: COMPLETED | OUTPATIENT
Start: 2021-06-12 | End: 2021-06-12

## 2021-06-12 RX ORDER — POTASSIUM CHLORIDE 20 MEQ/1
40 TABLET, EXTENDED RELEASE ORAL ONCE
Status: COMPLETED | OUTPATIENT
Start: 2021-06-12 | End: 2021-06-12

## 2021-06-12 RX ADMIN — POTASSIUM CHLORIDE 20 MEQ: 14.9 INJECTION, SOLUTION INTRAVENOUS at 07:15

## 2021-06-12 RX ADMIN — METOPROLOL SUCCINATE 50 MG: 50 TABLET, EXTENDED RELEASE ORAL at 08:08

## 2021-06-12 RX ADMIN — SODIUM CHLORIDE 100 ML/HR: 0.9 INJECTION, SOLUTION INTRAVENOUS at 12:07

## 2021-06-12 RX ADMIN — PANTOPRAZOLE SODIUM 40 MG: 40 TABLET, DELAYED RELEASE ORAL at 16:10

## 2021-06-12 RX ADMIN — PANTOPRAZOLE SODIUM 40 MG: 40 TABLET, DELAYED RELEASE ORAL at 06:15

## 2021-06-12 RX ADMIN — POTASSIUM CHLORIDE 40 MEQ: 1500 TABLET, EXTENDED RELEASE ORAL at 05:31

## 2021-06-12 RX ADMIN — LEVOTHYROXINE SODIUM 50 MCG: 50 TABLET ORAL at 05:31

## 2021-06-12 NOTE — ASSESSMENT & PLAN NOTE
Called mom and informed her that the ortho team is trying to get a hold of her to schedule an appointment for tomorrow.  Gave mom number to call and information, mom states she will call to schedule appointment.     Recurrent episodes of intermittent diarrhea x 1 year  Reports on average 2 BMs/day, recently almost always with BRBPR  Pt was started on Sulfalazine 4 days ago for presumed IBD given colonoscopy findings, reports symptoms acutely worsened since  Also complains of poor p o  Intake over the last 4 days  She has some abdominal pain associated with this  Follows with Dr Ethan Herbert from colorectal surgery  Denies nausea, vomiting, abdominal pain  Presented with tachycardia and febrile to 101 ° F  WBC 8  Procalcitonin negative x 2  Enteric stool PCR pending  C diff stool test negative    Colonoscopy 5/28/21 - abnormal mucosa in the anus, rectum, sigmoid, descending colon consistent with colitis  · Biopsies noted with acute and chronic inflammation, no malignancy/dysplasia  The differential diagnosis include florid infectious colitis, ischemic colitis  vs inflammatory bowel disease  CT abdomen (6/11/21) - Sigmoid diverticulosis without evidence of acute diverticulitis  No evidence of colitis or other acute abnormality in the abdomen or pelvis    Resolution of mural thickening in the sigmoid since a CT from 4/1/2021      Plan:  - Infectious less likely given chronicity of symptoms, will hold off on abx for time being  - follow-up stool tests  - IVF hydration   - Hold sulfalazine due to no improvement  - GI recommends colonoscopy on Monday  -patient may need biologic therapy if proven to have inflammatory bowel disease

## 2021-06-12 NOTE — H&P
Gamaliel Conroy 1945, 70 y o  female MRN: [id number]  Unit/Bed#: Faye [facility] [address] 227-01 Encounter: [id number]  Primary Care Provider: Marah Self DO   Date and time admitted to hospital: 6/11/2021  4:28 PM    Colitis  Assessment & Plan  Recurrent episodes of intermittent diarrhea x 1 year  Reports on average 2 BMs/day, recently almost always with BRBPR  Pt was started on Sulfalazine 4 days ago for presumed IBD given colonoscopy findings, reports symptoms acutely worsened since  Also complains of poor p o  Intake over the last 4 days  She has some abdominal pain associated with this  Follows with Dr Tatum Soto from colorectal surgery  Denies nausea, vomiting, abdominal pain  Presented with tachycardia and febrile to 101 ° F  WBC 8  Procalcitonin pending  Enteric stool PCR pending  C diff stool test pending    Colonoscopy 5/28/21 - abnormal mucosa in the anus, rectum, sigmoid, descending colon consistent with colitis  · Biopsies noted with acute and chronic inflammation, no malignancy/dysplasia  The differential diagnosis include florid infectious colitis, ischemic colitis  vs inflammatory bowel disease  CT abdomen (6/11/21) - Sigmoid diverticulosis without evidence of acute diverticulitis  No evidence of colitis or other acute abnormality in the abdomen or pelvis  Resolution of mural thickening in the sigmoid since a CT from 4/1/2021      Plan:  - Infectious less likely given chronicity of symptoms, will hold off on abx for time being  - follow-up stool tests  - IVF hydration   - Hold sulfalazine until seen by GI  - GI consult  Appreciate recs      Atrial fibrillation ([address] )  Assessment & Plan  Currently in NSR  Continue Toprol-XL 50mg daily    Invasive ductal carcinoma of breast, right [facility])  Assessment & Plan  S/p right mastectomy  In remission, followed by [facility]     Hypothyroidism  Assessment & Plan  Continue levothyroxine  TSH pending    Essential hypertension  Assessment & Plan  Continue Toprol XL    VTE Prophylaxis: Pharmacologic VTE Prophylaxis contraindicated due to Lower GI bleed  / sequential compression device   Code Status:  Level 1 full code  POLST: There is no POLST form on file for this patient (pre-hospital)  Discussion with family:  Patient    Anticipated Length of Stay:  Patient will be admitted on an Observation basis with an anticipated length of stay of  less than 2 midnights  Justification for Hospital Stay:  Diarrhea    Total Time for Visit, including Counseling / Coordination of Care: 30 minutes  Greater than 50% of this total time spent on direct patient counseling and coordination of care  Chief Complaint:   Diarrhea    History of Present Illness:    Abhijit Grossman is a 70 y o  female with PMH HTN, HLD, hypothyroidism, chronic diarrhea, breast cancer who presents with worsening diarrheal symptoms x4 days  Patient has been having chronic issues with diarrhea over the last year, with associated abdominal pain and BRBPR  She was following with colorectal surgery for this, would recently ordered a colonoscopy which showed abnormal patches of mucosa in the anus, rectum, sigmoid, descending colon  She was referred to a GI specialist by Colorectal started patient on sulfasalazine 4 days ago, which is around the same time her symptoms acutely worsened  She notes poor p o  Intake over those last 4 days as well as worsening abdominal pain, increased diarrhea, increased BRBPR  She does report subjective fevers at home over the last couple of days, reports feeling "hot and cold" and sweating  She denies nausea, vomiting, chest pain, shortness of breath, lightheadedness, syncope, paresthesias, changes in urinary habits  Arrival to the ED, patient met SIRS criteria with tachycardia and fever to T-max of 101° F  Tachycardia and fever resolved with fluids and acetaminophen  Lab work was unremarkable except potassium was 3 2    Lactic was 1 1, WBC 8 4  She had a CT abdomen which showed no acute intra-abdominal pathology, notes resolution of inflammation around sigmoid  Review of Systems:    Review of Systems   Constitutional: Positive for diaphoresis and fever  Negative for appetite change, chills and fatigue  HENT: Negative for ear pain, sore throat and trouble swallowing  Eyes: Negative for visual disturbance  Respiratory: Negative for cough, chest tightness, shortness of breath and wheezing  Cardiovascular: Negative for chest pain, palpitations and leg swelling  Gastrointestinal: Positive for abdominal pain, blood in stool, diarrhea and rectal pain  Negative for abdominal distention, nausea and vomiting  Endocrine: Negative  Genitourinary: Negative for dysuria  Musculoskeletal: Negative for gait problem and myalgias  Skin: Negative for pallor  Allergic/Immunologic: Negative for immunocompromised state  Neurological: Negative for dizziness, syncope, light-headedness, numbness and headaches         Past Medical and Surgical History:     Past Medical History:   Diagnosis Date    Abnormal CT of the abdomen     Acute bronchitis     Anxiety     Appetite loss     Arthritis     Ascending aortic aneurysm (HCC)     Bilateral renal cysts     Cyst of ovary     Dysphagia     Esophageal reflux disease     Full dentures     GERD (gastroesophageal reflux disease)     Herpes zoster     Hyperlipidemia     Hypertension     Hypokalemia     Hypomagnesemia     Hypothyroidism     Impaired fasting glucose     Irritable bowel syndrome (IBS)     Ischemic colitis (HCC)     Knee pain     Limb alert care status     no BP/IV right arm    Osteoarthritis of right knee     Pneumonia     Post-op pain     Pulmonary nodules     Thoracic aortic aneurysm (HCC)     Unspecified ovarian cysts     Use of anastrozole (Arimidex)     Vasovagal syncope     Vitamin D deficiency     Wears glasses     Weight loss        Past Surgical History:   Procedure Laterality Date    APPENDECTOMY      pt states it was not removed    BREAST BIOPSY Right 03/02/2016    BREAST LUMPECTOMY Right 2004    BREAST SURGERY Right     Single lesion excision 1990 hyperplasia, 1st biopsy at 25yrs old was benign    CHOLECYSTECTOMY      COLONOSCOPY      COLONOSCOPY N/A 5/12/2017    Procedure: COLONOSCOPY with biopsy;  Surgeon: Gopal Gonzalez MD;  Location: [facility]; Service:     ESOPHAGOGASTRODUODENOSCOPY N/A 1/19/2019    Procedure: ESOPHAGOGASTRODUODENOSCOPY (EGD) with 4cc of epinephrine injection and cauterized with gold probe; Surgeon: Jose Nolasco MD;  Location: [facility]; Service: Gastroenterology    [facility] LUMBAR PUNCTURE DIAGNOSTIC  5/24/2019    HYSTERECTOMY  1977    IR LUMBAR PUNCTURE  2/21/2019    KNEE SURGERY Right     KS BIOPSY/EXCISION, LYMPH NODE(S) Right 4/12/2016    Procedure: BIOPSY LYMPH NODE SENTINEL @ 1200 LYMPHOSCINTIGRAPHY LYMPHATIC MAPPING;  Surgeon: Castro Everett MD;  Location: [facility];  Service: General    KS MASTECTOMY, SIMPLE, COMPLETE Right 4/12/2016    Procedure: MASTECTOMY SIMPLE;  Surgeon: Castro Everett MD;  [facility];  Service: General    TUBAL LIGATION      US GUIDED BREAST BIOPSY RIGHT COMPLETE Right 3/9/2016       Meds/Allergies:    Prior to Admission medications    Medication Sig Start Date End Date Taking?  Authorizing Provider   cyanocobalamin (VITAMIN B-12) 1,000 mcg tablet Take by mouth daily   Yes Historical Provider, MD   hydrocortisone (ANUSOL-HC) 2 5 % rectal cream Apply topically 2 (two) times a day 4/23/21  Yes Dana Hart DO   levothyroxine 50 mcg tablet Take 1 tablet (50 mcg total) by mouth daily 4/20/21  Yes Dana Hart DO   metoprolol succinate (TOPROL-XL) 50 mg 24 hr tablet Take 1 tablet (50 mg total) by mouth daily 4/20/21  Yes Dana Hart DO   pantoprazole (PROTONIX) 40 mg tablet Take 1 tablet (40 mg total) by mouth 2 (two) times a day before meals 4/20/21  Yes Dana Hart, DO     I have reviewed home medications with patient personally  Allergies: Allergies   Allergen Reactions    Demerol [Meperidine] GI Intolerance and Other (See Comments)     Other reaction(s): Other (See Comments)  severe nausea  severe nausea  Nausea/vomiting    Nitroglycerin Anaphylaxis and Other (See Comments)     BP drops drastically    Lipitor [Atorvastatin] Other (See Comments)     Joint/muscle pain    Zocor [Simvastatin] Other (See Comments)     Joint/muscle pain       Social History:     Marital Status: Single   Occupation:  Noncontributory  Patient Pre-hospital Living Situation:  Home  Patient Pre-hospital Level of Mobility:  Independent  Patient Pre-hospital Diet Restrictions:  None  Substance Use History:   Social History     Substance and Sexual Activity   Alcohol Use Not Currently     Social History     Tobacco Use   Smoking Status Never Smoker   Smokeless Tobacco Never Used   Tobacco Comment    not interested     Social History     Substance and Sexual Activity   Drug Use No       Family History:    non-contributory    Physical Exam:     Vitals:   Blood Pressure: 128/81 (06/11/21 2133)  Pulse: 89 (06/11/21 2133)  Temperature: 98 4 °F (36 9 °C) (06/11/21 2133)  Temp Source: Oral (06/11/21 1525)  Respirations: 20 (06/11/21 2133)  Weight - Scale: 67 2 kg (148 lb 2 4 oz) (06/11/21 1525)  SpO2: 97 % (06/11/21 2133)    Physical Exam  Vitals signs and nursing note reviewed  Constitutional:       Appearance: Normal appearance  HENT:      Head: Normocephalic and atraumatic  Mouth/Throat:      Mouth: Mucous membranes are moist       Pharynx: Oropharynx is clear  No oropharyngeal exudate  Eyes:      Extraocular Movements: Extraocular movements intact  Cardiovascular:      Rate and Rhythm: Normal rate and regular rhythm  Pulses: Normal pulses  Heart sounds: Normal heart sounds  No murmur  No friction rub  No gallop      Pulmonary:      Effort: Pulmonary effort is normal  No respiratory distress  Breath sounds: Normal breath sounds  No stridor  No wheezing or rales  Abdominal:      General: Abdomen is flat  Bowel sounds are normal  There is no distension  Palpations: Abdomen is soft  Tenderness: There is no abdominal tenderness  Musculoskeletal:      Right lower leg: No edema  Left lower leg: No edema  Skin:     General: Skin is warm and dry  Neurological:      General: No focal deficit present  Mental Status: She is alert and oriented to person, place, and time  Additional Data:     Lab Results: I have personally reviewed pertinent reports  Results from last 7 days   Lab Units 06/11/21  1653   WBC Thousand/uL 8 40   HEMOGLOBIN g/dL 12 9   HEMATOCRIT % 40 4   PLATELETS Thousands/uL 276   NEUTROS PCT % 79*   LYMPHS PCT % 9*   MONOS PCT % 8   EOS PCT % 2     Results from last 7 days   Lab Units 06/11/21  1653   SODIUM mmol/L 136   POTASSIUM mmol/L 3 2*   CHLORIDE mmol/L 98*   CO2 mmol/L 27   BUN mg/dL 11   CREATININE mg/dL 1 12   ANION GAP mmol/L 11   CALCIUM mg/dL 9 1   ALBUMIN g/dL 3 0*   TOTAL BILIRUBIN mg/dL 0 50   ALK PHOS U/L 92   ALT U/L 16   AST U/L 16   GLUCOSE RANDOM mg/dL 109     Results from last 7 days   Lab Units 06/11/21  1653   INR  1 05             Results from last 7 days   Lab Units 06/11/21  1713   LACTIC ACID mmol/L 1 1       Imaging: I have personally reviewed pertinent reports  CT abdomen pelvis with contrast   Final Result by Nishi Morgan MD (06/11 1925)      1  Sigmoid diverticulosis without evidence of acute diverticulitis  2   No evidence of colitis or other acute abnormality in the abdomen or pelvis  Resolution of mural thickening in the sigmoid since a CT from 4/1/2021              Workstation performed: [id number]             EKG, Pathology, and Other Studies Reviewed on Admission:   · EKG  · CT abdomen    Allscripts / Epic Records Reviewed: Yes     ** Please Note: This note has been constructed using a voice recognition system   **

## 2021-06-12 NOTE — PROGRESS NOTES
[address] [place] [address]  Progress Note - Elo Sampson 1945, 70 y o  female MRN: [id number]  Unit/Bed#: Faye Godinez [address] 227-01 Encounter: [id number]  Primary Care Provider: Diya Agarwal DO   Date and time admitted to hospital: 6/11/2021  4:28 PM    Colitis  Assessment & Plan  Recurrent episodes of intermittent diarrhea x 1 year  Reports on average 2 BMs/day, recently almost always with BRBPR  Pt was started on Sulfalazine 4 days ago for presumed IBD given colonoscopy findings, reports symptoms acutely worsened since  Also complains of poor p o  Intake over the last 4 days  She has some abdominal pain associated with this  Follows with Dr Master Burk from colorectal surgery  Denies nausea, vomiting, abdominal pain  Presented with tachycardia and febrile to 101 ° F  WBC 8  Procalcitonin negative x 2  Enteric stool PCR pending  C diff stool test negative    Colonoscopy 5/28/21 - abnormal mucosa in the anus, rectum, sigmoid, descending colon consistent with colitis  · Biopsies noted with acute and chronic inflammation, no malignancy/dysplasia  The differential diagnosis include florid infectious colitis, ischemic colitis  vs inflammatory bowel disease  CT abdomen (6/11/21) - Sigmoid diverticulosis without evidence of acute diverticulitis  No evidence of colitis or other acute abnormality in the abdomen or pelvis    Resolution of mural thickening in the sigmoid since a CT from 4/1/2021      Plan:  - Infectious less likely given chronicity of symptoms, will hold off on abx for time being  - follow-up stool tests  - IVF hydration   - Hold sulfalazine due to no improvement  - GI recommends colonoscopy on Monday  -patient may need biologic therapy if proven to have inflammatory bowel disease    Atrial fibrillation ([facility] [address] )  Assessment & Plan  Currently in NSR  Continue Toprol-XL 50mg daily    Invasive ductal carcinoma of breast, right [facility])  Assessment & Plan  S/p right mastectomy  In remission, followed by [facility]     Hypokalemia  Assessment & Plan  Potassium 2 9 this morning  Replaced  Will recheck tomorrow  Hypothyroidism  Assessment & Plan  Continue levothyroxine  TSH normal    Essential hypertension  Assessment & Plan  Continue Toprol XL    Anxiety  Assessment & Plan  Persistent  Outpatient treatment recommended        VTE Pharmacologic Prophylaxis:   Pharmacologic: Pharmacologic VTE Prophylaxis contraindicated due to Lower GI bleed  Mechanical VTE Prophylaxis in Place: Yes    Patient Centered Rounds: I have performed bedside rounds with nursing staff today  Discussions with Specialists or Other Care Team Provider:  GI    Education and Discussions with Family / Patient:  Patient's daughter Isabella Light    Time Spent for Care: 20 minutes  More than 50% of total time spent on counseling and coordination of care as described above  Current Length of Stay: 0 day(s)    Current Patient Status: Inpatient   Certification Statement: The patient will continue to require additional inpatient hospital stay due to Need for colonoscopy    Discharge Plan:  48 hours    Code Status: Level 1 - Full Code      Subjective:   Patient seen and examined  She is still concerned about "the pills that she took which made her depressed"  Denies any current complaints  Objective:     Vitals:   Temp (24hrs), Av °F (37 2 °C), Min:97 8 °F (36 6 °C), Max:100 8 °F (38 2 °C)    Temp:  [97 8 °F (36 6 °C)-100 8 °F (38 2 °C)] 100 8 °F (38 2 °C)  HR:  [] 100  Resp:  [17-20] 17  BP: (125-130)/(70-81) 129/80  SpO2:  [95 %-99 %] 95 %  Body mass index is 25 43 kg/m²  Input and Output Summary (last 24 hours): Intake/Output Summary (Last 24 hours) at 2021 1701  Last data filed at 2021 1207  Gross per 24 hour   Intake 1980 ml   Output --   Net 1980 ml       Physical Exam:     Physical Exam  Constitutional:       Appearance: She is well-developed  Cardiovascular:      Rate and Rhythm: Normal rate     Pulmonary: Effort: Pulmonary effort is normal       Breath sounds: Normal breath sounds  Abdominal:      Palpations: Abdomen is soft  Tenderness: There is no abdominal tenderness  Skin:     General: Skin is warm  Findings: No erythema  Neurological:      Mental Status: She is alert  Psychiatric:         Mood and Affect: Mood is anxious  Additional Data:     Labs:    Results from last 7 days   Lab Units 06/12/21  0534 06/11/21  1653   WBC Thousand/uL 6 08 8 40   HEMOGLOBIN g/dL 11 3* 12 9   HEMATOCRIT % 35 0 40 4   PLATELETS Thousands/uL 190 276   NEUTROS PCT %  --  79*   LYMPHS PCT %  --  9*   MONOS PCT %  --  8   EOS PCT %  --  2     Results from last 7 days   Lab Units 06/12/21  0534 06/11/21  1653   SODIUM mmol/L 140 136   POTASSIUM mmol/L 2 9* 3 2*   CHLORIDE mmol/L 104 98*   CO2 mmol/L 24 27   BUN mg/dL 10 11   CREATININE mg/dL 0 96 1 12   ANION GAP mmol/L 12 11   CALCIUM mg/dL 8 2* 9 1   ALBUMIN g/dL  --  3 0*   TOTAL BILIRUBIN mg/dL  --  0 50   ALK PHOS U/L  --  92   ALT U/L  --  16   AST U/L  --  16   GLUCOSE RANDOM mg/dL 117 109     Results from last 7 days   Lab Units 06/11/21  1653   INR  1 05             Results from last 7 days   Lab Units 06/12/21  0534 06/11/21  1713   LACTIC ACID mmol/L  --  1 1   PROCALCITONIN ng/ml 0 16 0 16           * I Have Reviewed All Lab Data Listed Above  * Additional Pertinent Lab Tests Reviewed: All Labs Within Last 24 Hours Reviewed    Imaging:    CT abdomen  Recent Cultures (last 7 days):     Results from last 7 days   Lab Units 06/12/21  0742 06/11/21  1713 06/11/21  1704   BLOOD CULTURE   --  Received in Microbiology Lab  Culture in Progress  Received in Microbiology Lab  Culture in Progress     C DIFF TOXIN B BY PCR  Negative  --   --        Last 24 Hours Medication List:   Current Facility-Administered Medications   Medication Dose Route Frequency Provider Last Rate    acetaminophen  650 mg Oral Q6H PRN Taylor Martinez PA-C      aluminum-magnesium hydroxide-simethicone  30 mL Oral Q6H PRN Andrew Michael PA-C      levothyroxine  50 mcg Oral Early Morning Andrew Michael [place]      metoprolol succinate  50 mg Oral Daily Andrew Michael [place]      ondansetron  4 mg Intravenous Q6H PRN Andrew Michael PA-C      pantoprazole  40 mg Oral BID [facility] Andrew Michael PA-C          Today, Patient Was Seen By: Mustapha Rodriguez MD    ** Please Note: Dictation voice to text software may have been used in the creation of this document   **

## 2021-06-12 NOTE — ASSESSMENT & PLAN NOTE
S/p right mastectomy  In remission, followed by [id number] [place] [address] Avenue

## 2021-06-12 NOTE — PHYSICAL THERAPY NOTE
Physical Therapy Evaluation      Patient Active Problem List   Diagnosis    Anxiety    GERD (gastroesophageal reflux disease)    Essential hypertension    Hypothyroidism    Hyperlipidemia    Bilateral renal cysts    Ischemic colitis ([address] )    Hypokalemia    Thoracic aortic aneurysm (HCC)    Impaired fasting glucose    Esophageal reflux disease    Invasive ductal carcinoma of breast, right (HCC)    Use of anastrozole (Arimidex)    Closed odontoid fracture with type I morphology (HCC)    Closed nondisplaced fracture of fifth cervical vertebra (HCC)    Closed nondisplaced fracture of seventh cervical vertebra (HCC)    Syncope    Lung nodule seen on imaging study    Atherosclerosis    History of alcohol use    Hyponatremia    Breast cancer screening, high risk patient    Irregular cardiac rhythm    Atrial fibrillation (HCC)    Gastrointestinal hemorrhage associated with gastric ulcer    Iron deficiency anemia    Thrombocytopenia (HCC)    Acute non-recurrent maxillary sinusitis    Low TSH level    Confusion    Abnormal finding on MRI of brain    Left upper arm pain    HLD (hyperlipidemia)    Gastroesophageal reflux disease without esophagitis    Acute blood loss anemia    Peptic ulcer disease    Transient alteration of awareness    Alcohol use with intoxication (HCC)    Dense breast tissue    History of head injury    Arterial hypotension    Unresponsiveness    Leptomeningeal disease    Vision loss, bilateral    Localized osteoarthritis of left knee    Impacted cerumen of left ear    Grade II hemorrhoids    Postural dizziness with presyncope    Colitis    Functional diarrhea       Past Medical History:   Diagnosis Date    Abnormal CT of the abdomen     Acute bronchitis     Anxiety     Appetite loss     Arthritis     Ascending aortic aneurysm (HCC)     Bilateral renal cysts     Cyst of ovary     Dysphagia     Esophageal reflux disease     Full dentures     GERD (gastroesophageal reflux disease)     Herpes zoster     Hyperlipidemia     Hypertension     Hypokalemia     Hypomagnesemia     Hypothyroidism     Impaired fasting glucose     Irritable bowel syndrome (IBS)     Ischemic colitis (HCC)     Knee pain     Limb alert care status     no BP/IV right arm    Osteoarthritis of right knee     Pneumonia     Post-op pain     Pulmonary nodules     Thoracic aortic aneurysm (HCC)     Unspecified ovarian cysts     Use of anastrozole (Arimidex)     Vasovagal syncope     Vitamin D deficiency     Wears glasses     Weight loss        Past Surgical History:   Procedure Laterality Date    APPENDECTOMY      pt states it was not removed    BREAST BIOPSY Right 03/02/2016    BREAST LUMPECTOMY Right 2004    BREAST SURGERY Right     Single lesion excision 1990 hyperplasia, 1st biopsy at 25yrs old was benign    CHOLECYSTECTOMY      COLONOSCOPY      COLONOSCOPY N/A 5/12/2017    Procedure: COLONOSCOPY with biopsy;  Surgeon: Ariane Brown MD;  [facility]; Service:     ESOPHAGOGASTRODUODENOSCOPY N/A 1/19/2019    Procedure: ESOPHAGOGASTRODUODENOSCOPY (EGD) with 4cc of epinephrine injection and cauterized with gold probe; Surgeon: Korin Alonzo MD;  [facility];   Service: Gastroenterology    [facility] LUMBAR PUNCTURE DIAGNOSTIC  5/24/2019    HYSTERECTOMY  1977    IR LUMBAR PUNCTURE  2/21/2019    KNEE SURGERY Right     MO BIOPSY/EXCISION, LYMPH NODE(S) Right 4/12/2016    Procedure: BIOPSY LYMPH NODE SENTINEL @ 1200 LYMPHOSCINTIGRAPHY LYMPHATIC MAPPING;  Surgeon: Mariana Mora MD;  [facility];  Service: General    MO MASTECTOMY, SIMPLE, COMPLETE Right 4/12/2016    Procedure: MASTECTOMY SIMPLE;  Surgeon: Mariana Mora MD;  [facility];  Service: General    TUBAL LIGATION      US GUIDED BREAST BIOPSY RIGHT COMPLETE Right 3/9/2016        06/12/21 1034   PT Last Visit   PT Visit Date 06/12/21   Note Type   Note type Evaluation Pain Assessment   Pain Assessment Tool Pain Assessment not indicated - pt denies pain   Home Living   Type of [address] One level  (1 stair to enter)   [employer] in [facility] [address]   Prior Function   Level of [place] Independent with ADLs and functional mobility   Lives With Alone   Receives Help From Family;Friend(s); [address] [place] [address] in the last 6 months 0   Comments pt denies recent falls  is indep without assistive device  has rw from prior use  pt reports being very distraught over her current problems   Restrictions/Precautions   Weight Bearing Precautions Per Order No   Other Precautions Contact/isolation;Cognitive;Multiple lines; Fall Risk   General   Family/Caregiver Present No   Cognition   Overall Cognitive Status Impaired   Orientation Level Oriented to person;Oriented to place   RUE Assessment   RUE Assessment WFL   LUE Assessment   LUE Assessment WFL   RLE Assessment   RLE Assessment X  (str 4/5)   LLE Assessment   LLE Assessment X  (str 4/5)   Coordination   Movements are Fluid and Coordinated 1   Sensation WFL   Bed Mobility   Rolling R 7  Independent   Rolling L 7  Independent   Supine to Sit 7  Independent   Sit to Supine 7  Independent   Transfers   Sit to Stand Unable to assess  (pt refused, reported severe fatigue)   Balance   Static Sitting Good   Dynamic Sitting Fair   Endurance Deficit   Endurance Deficit Yes   Endurance Deficit Description fatigue   Activity Tolerance   Activity Tolerance Treatment limited secondary to medical complications (Comment)   Nurse Made Aware yes- notified pt was tearful  concerned that she wont get better   Assessment   Prognosis Good   Problem List Decreased strength;Decreased endurance; Impaired balance;Decreased mobility; Decreased cognition; Impaired judgement;Decreased safety awareness   Assessment pt admitted with complaint of brbpr, diarrhea  dx r/o c diff  also tachycardic, fever  pt referred to PT  pt was indep at home but having diarrhea for a long time now  pt reports it is hampering her lifestyle and happiness  pt does not uase assistive device  support from mostly firends and neighbors  pt demonstrated moderate functional limitations due to prior debility and recent exacerbation  pt was indep with bed mobility but only tolerated sitting for a few minutes  pt reports she did not have the energy to try standing or walking  needed repeated cues to perform motor testing  pt became tearful, concerned that she has not gotten better and does not want to live with this condition as bad as it is right now  pt 's nurse notified  pt will need skilled PT, will defer d/c recommendations pending OOB mobility assessment  pt has current deficits in strength, locomotion, GI function, cognition and activity tolerance   Barriers to Discharge Decreased caregiver support   Goals   Patient Goals none offered   STG Expiration Date 06/23/21   Short Term Goal #1 indep wtih bed mobility, improve activity tolerance to 45 minutes  iprove strength by 1/2 to 1 grade, demonstrate good safety practices  Plan   Treatment/Interventions Functional transfer training;LE strengthening/ROM; Therapeutic exercise; Endurance training;Cognitive reorientation;Patient/family training;Equipment eval/education; Bed mobility;Gait training; Compensatory technique education;Spoke to nursing   PT Frequency   (3-5x/week)   Recommendation   PT Discharge Recommendation   (defer at present)   PT - OK to Discharge No   AM-PAC Basic Mobility Inpatient   Turning in Bed Without Bedrails 4   Lying on Back to Sitting on Edge of Flat Bed 4   Moving Bed to Chair 2   Standing Up From Chair 2   Walk in Room 2   Climb 3-5 Stairs 1   Basic Mobility Inpatient Raw Score 15   Basic Mobility Standardized Score 36 97   History: co - morbidities, fall risk, use of assistive device, assist for iadl's, cognition, multiple lines  Exam: impairments in locomotion, musculoskeletal, balance,GI function, cognition   Clinical: unstable/unpredictable  Complexity:high        Malik Cea, PT

## 2021-06-12 NOTE — CONSULTS
Consultation - [facility]) [facility]  Sherri Solares 70 y o  female MRN: [id number]  Unit/Bed#: [address] Encounter: [id number]        Consults    ASSESSMENT/ PLAN:    65-year-old female with past medical history of hypertension, hypothyroidism, breast cancer, atrial fibrillation being evaluated for diarrhea/colitis    1  Diarrhea:  2  Colitis:  Worsening diarrhea and hematochezia over the past few months  Colonoscopy 05/18/2021 with findings concerning for inflammatory bowel disease  She was started on sulfasalazine by Colorectal surgery but her diarrhea continues to worsen  Repeat CT this admission negative with improvement in the mural thickening in the sigmoid colon from prior CT 04/01/2021  Given worsening symptoms despite sulfasalazine, recommend checking stool studies rule out infectious cause and plan for repeat EGD and colonoscopy to evaluate for inflammatory bowel disease on Monday  If confirmed inflammatory bowel disease, will likely require IV steroids and biologic therapy with discontinuation of sulfasalazine  -f/u stool studies   -regular diet okay for today  -clear liquid diet tomorrow and bowel prep  -EGD and colonoscopy planned for Monday  -plan discussed with patient's daughter, Isabella Light  -plan discussed with primary team      Reason for Consult / Principal Problem:  Colitis    HPI:  65-year-old female with past medical history of hypertension, hypothyroidism, breast cancer, atrial fibrillation who presented to the emergency room with worsening diarrhea and hematochezia  She was admitted 4/1 with colitis and improved with IV hydration and was discharged planned follow-up with Colorectal surgery  She had a colonoscopy performed 05/18/2021 which revealed patchy abnormal mucosa in the anus, rectum, sigmoid colon and descending colon    Biopsies revealed acute and chronic inflammation mucosal ulceration with concern for infectious colitis, ischemic colitis versus inflammatory bowel disease  She was started on mesalamine but her symptoms worsened bring her back to the emergency room  She had a CT scan this admission with no evidence of colitis or other acute abnormality in the abdomen or pelvis  Resolution of mural thickening in the sigmoid since the CT from 04/01/2021  Despite improvement in her CT findings, her diarrhea has worsened  REVIEW OF SYSTEMS:     CONSTITUTIONAL: Denies any fever, chills, or rigors  Good appetite, and no recent weight loss  HEENT: No earache or tinnitus  Denies hearing loss or visual disturbances  CARDIOVASCULAR: No chest pain or palpitations  RESPIRATORY: Denies any cough, hemoptysis, shortness of breath or dyspnea on exertion  GASTROINTESTINAL: As noted in the History of Present Illness  GENITOURINARY: No problems with urination  Denies any hematuria or dysuria  NEUROLOGIC: No dizziness or vertigo, denies headaches  MUSCULOSKELETAL: Denies any muscle or joint pain  SKIN: Denies skin rashes or itching  ENDOCRINE: Denies excessive thirst  Denies intolerance to heat or cold  PSYCHOSOCIAL: Denies depression or anxiety  Denies any recent memory loss         Historical Information   Past Medical History:   Diagnosis Date    Abnormal CT of the abdomen     Acute bronchitis     Anxiety     Appetite loss     Arthritis     Ascending aortic aneurysm (HCC)     Bilateral renal cysts     Cyst of ovary     Dysphagia     Esophageal reflux disease     Full dentures     GERD (gastroesophageal reflux disease)     Herpes zoster     Hyperlipidemia     Hypertension     Hypokalemia     Hypomagnesemia     Hypothyroidism     Impaired fasting glucose     Irritable bowel syndrome (IBS)     Ischemic colitis (HCC)     Knee pain     Limb alert care status     no BP/IV right arm    Osteoarthritis of right knee     Pneumonia     Post-op pain     Pulmonary nodules     Thoracic aortic aneurysm (HCC)     Unspecified ovarian cysts     Use of anastrozole (Arimidex)     Vasovagal syncope     Vitamin D deficiency     Wears glasses     Weight loss      Past Surgical History:   Procedure Laterality Date    APPENDECTOMY      pt states it was not removed    BREAST BIOPSY Right 03/02/2016    BREAST LUMPECTOMY Right 2004    BREAST SURGERY Right     Single lesion excision 1990 hyperplasia, 1st biopsy at 25yrs old was benign    CHOLECYSTECTOMY      COLONOSCOPY      COLONOSCOPY N/A 5/12/2017    Procedure: COLONOSCOPY with biopsy;  Surgeon: Patricia Wilson MD;  [facility]; Service:     ESOPHAGOGASTRODUODENOSCOPY N/A 1/19/2019    Procedure: ESOPHAGOGASTRODUODENOSCOPY (EGD) with 4cc of epinephrine injection and cauterized with gold probe; Surgeon: Don Banerjee MD;  [facility];   Service: Gastroenterology    [facility] LUMBAR PUNCTURE DIAGNOSTIC  5/24/2019    HYSTERECTOMY  1977    IR LUMBAR PUNCTURE  2/21/2019    KNEE SURGERY Right     PA BIOPSY/EXCISION, LYMPH NODE(S) Right 4/12/2016    Procedure: BIOPSY LYMPH NODE SENTINEL @ 1200 LYMPHOSCINTIGRAPHY LYMPHATIC MAPPING;  Surgeon: Anson Paulson MD;  [facility];  Service: General    PA MASTECTOMY, SIMPLE, COMPLETE Right 4/12/2016    Procedure: MASTECTOMY SIMPLE;  Surgeon: Anson Paulson MD;  [facility];  Service: General    TUBAL LIGATION      US GUIDED BREAST BIOPSY RIGHT COMPLETE Right 3/9/2016     Social History   Social History     Substance and Sexual Activity   Alcohol Use Not Currently     Social History     Substance and Sexual Activity   Drug Use No     Social History     Tobacco Use   Smoking Status Never Smoker   Smokeless Tobacco Never Used   Tobacco Comment    not interested     Family History   Problem Relation Age of Onset    Stroke Maternal Grandmother     Anemia Mother     Other Mother         disorders of blood and blood forming organs    Hypertension Mother     Stroke Father     Alcohol abuse Brother     Colon cancer Neg Hx Meds/Allergies     Medications Prior to Admission   Medication    hydrocortisone (ANUSOL-HC) 2 5 % rectal cream    pantoprazole (PROTONIX) 40 mg tablet    cyanocobalamin (VITAMIN B-12) 1,000 mcg tablet    levothyroxine 50 mcg tablet    metoprolol succinate (TOPROL-XL) 50 mg 24 hr tablet     Current Facility-Administered Medications   Medication Dose Route Frequency    acetaminophen (TYLENOL) tablet 650 mg  650 mg Oral Q6H PRN    aluminum-magnesium hydroxide-simethicone (MYLANTA) oral suspension 30 mL  30 mL Oral Q6H PRN    levothyroxine tablet 50 mcg  50 mcg Oral Early Morning    metoprolol succinate (TOPROL-XL) 24 hr tablet 50 mg  50 mg Oral Daily    ondansetron (ZOFRAN) injection 4 mg  4 mg Intravenous Q6H PRN    pantoprazole (PROTONIX) EC tablet 40 mg  40 mg Oral BID AC    sodium chloride 0 9 % infusion  100 mL/hr Intravenous Continuous       Allergies   Allergen Reactions    Demerol [Meperidine] GI Intolerance and Other (See Comments)     Other reaction(s): Other (See Comments)  severe nausea  severe nausea  Nausea/vomiting    Nitroglycerin Anaphylaxis and Other (See Comments)     BP drops drastically    Lipitor [Atorvastatin] Other (See Comments)     Joint/muscle pain    Zocor [Simvastatin] Other (See Comments)     Joint/muscle pain           Objective     Blood pressure 129/81, pulse 97, temperature 97 8 °F (36 6 °C), resp  rate 17, weight 67 2 kg (148 lb 2 4 oz), SpO2 98 %  Intake/Output Summary (Last 24 hours) at 6/12/2021 1235  Last data filed at 6/12/2021 1207  Gross per 24 hour   Intake 1980 ml   Output --   Net 1980 ml         PHYSICAL EXAM:      General Appearance:   A&Ox3, cooperative, no distress, appears stated age    HEENT:   Normocephalic, atraumatic      Neck:  Supple, symmetrical, trachea midline   Lungs:   Clear to auscultation bilaterally; no rales, rhonchi or wheezing    Heart[de-identified]   S1 and S2 normal; regular rate and rhythm; no murmur, rub, or gallop     Abdomen:   Soft, non-tender, non-distended; normal bowel sounds; no masses, no organomegaly    Genitalia:   Deferred    Rectal:   Deferred    Extremities:  No cyanosis, clubbing or edema    Pulses:  2+ and symmetric all extremities    Skin:  Skin color, texture, turgor normal, no rashes or lesions          Lab Results:   Admission on 06/11/2021   Component Date Value    WBC 06/11/2021 8 40     RBC 06/11/2021 4 64     Hemoglobin 06/11/2021 12 9     Hematocrit 06/11/2021 40 4     MCV 06/11/2021 87     MCH 06/11/2021 27 8     MCHC 06/11/2021 31 9     RDW 06/11/2021 13 7     MPV 06/11/2021 8 9     Platelets 20/12/7784 276     nRBC 06/11/2021 0     Neutrophils Relative 06/11/2021 79*    Immat GRANS % 06/11/2021 1     Lymphocytes Relative 06/11/2021 9*    Monocytes Relative 06/11/2021 8     Eosinophils Relative 06/11/2021 2     Basophils Relative 06/11/2021 1     Neutrophils Absolute 06/11/2021 6 77     Immature Grans Absolute 06/11/2021 0 06     Lymphocytes Absolute 06/11/2021 0 74     Monocytes Absolute 06/11/2021 0 64     Eosinophils Absolute 06/11/2021 0 15     Basophils Absolute 06/11/2021 0 04     Sodium 06/11/2021 136     Potassium 06/11/2021 3 2*    Chloride 06/11/2021 98*    CO2 06/11/2021 27     ANION GAP 06/11/2021 11     BUN 06/11/2021 11     Creatinine 06/11/2021 1 12     Glucose 06/11/2021 109     Calcium 06/11/2021 9 1     Corrected Calcium 06/11/2021 9 9     AST 06/11/2021 16     ALT 06/11/2021 16     Alkaline Phosphatase 06/11/2021 92     Total Protein 06/11/2021 7 4     Albumin 06/11/2021 3 0*    Total Bilirubin 06/11/2021 0 50     eGFR 06/11/2021 49     LACTIC ACID 06/11/2021 1 1     Protime 06/11/2021 13 5     INR 06/11/2021 1 05     PTT 06/11/2021 33     Procalcitonin 06/11/2021 0 16     Blood Culture 06/11/2021 Received in Microbiology Lab  Culture in Progress   Blood Culture 06/11/2021 Received in Microbiology Lab  Culture in Progress       Color, UA 06/11/2021 Yellow     Clarity, UA 06/11/2021 Clear     pH, UA 06/11/2021 6 0     Leukocytes, UA 06/11/2021 Trace*    Nitrite, UA 06/11/2021 Negative     Protein, UA 06/11/2021 Negative     Glucose, UA 06/11/2021 Negative     Ketones, UA 06/11/2021 Negative     Urobilinogen, UA 06/11/2021 0 2     Bilirubin, UA 06/11/2021 Negative     Blood, UA 06/11/2021 Negative     Specific Gravity, UA 06/11/2021 1 015     RBC, UA 06/11/2021 None Seen     WBC, UA 06/11/2021 0-1*    Epithelial Cells 06/11/2021 Occasional     Bacteria, UA 06/11/2021 Occasional     TSH 3RD GENERATON 06/12/2021 0 760     WBC 06/12/2021 6 08     RBC 06/12/2021 4 05     Hemoglobin 06/12/2021 11 3*    Hematocrit 06/12/2021 35 0     MCV 06/12/2021 86     MCH 06/12/2021 27 9     MCHC 06/12/2021 32 3     RDW 06/12/2021 13 6     Platelets 41/17/7245 190     MPV 06/12/2021 8 8*    Sodium 06/12/2021 140     Potassium 06/12/2021 2 9*    Chloride 06/12/2021 104     CO2 06/12/2021 24     ANION GAP 06/12/2021 12     BUN 06/12/2021 10     Creatinine 06/12/2021 0 96     Glucose 06/12/2021 117     Glucose, Fasting 06/12/2021 117*    Calcium 06/12/2021 8 2*    eGFR 06/12/2021 59        Imaging Studies: I have personally reviewed pertinent imaging studies  Ct Abdomen Pelvis With Contrast    Result Date: 6/11/2021  Impression: 1  Sigmoid diverticulosis without evidence of acute diverticulitis  2   No evidence of colitis or other acute abnormality in the abdomen or pelvis  Resolution of mural thickening in the sigmoid since a CT from 4/1/2021  This patient was seen and examined by Dr Shae Faustin  All elizalde medical decisions were made by Dr Shae Faustin  Thank you for allowing us to participate in the care of this patient  We will follow up closely with you

## 2021-06-12 NOTE — PLAN OF CARE
Problem: PHYSICAL THERAPY ADULT  Goal: Performs mobility at highest level of function for planned discharge setting  See evaluation for individualized goals  Description: Treatment/Interventions: Functional transfer training, LE strengthening/ROM, Therapeutic exercise, Endurance training, Cognitive reorientation, Patient/family training, Equipment eval/education, Bed mobility, Gait training, Compensatory technique education, Spoke to nursing          See flowsheet documentation for full assessment, interventions and recommendations  Note: Prognosis: Good  Problem List: Decreased strength, Decreased endurance, Impaired balance, Decreased mobility, Decreased cognition, Impaired judgement, Decreased safety awareness  Assessment: pt admitted with complaint of brbpr, diarrhea  dx r/o c diff  also tachycardic, fever  pt referred to PT  pt was indep at home but having diarrhea for a long time now  pt reports it is hampering her lifestyle and happiness  pt does not uase assistive device  support from mostly firends and neighbors  pt demonstrated moderate functional limitations due to prior debility and recent exacerbation  pt was indep with bed mobility but only tolerated sitting for a few minutes  pt reports she did not have the energy to try standing or walking  needed repeated cues to perform motor testing  pt became tearful, concerned that she has not gotten better and does not want to live with this condition as bad as it is right now  pt 's nurse notified  pt will need skilled PT, will defer d/c recommendations pending OOB mobility assessment  pt has current deficits in strength, locomotion, GI function, cognition and activity tolerance  Barriers to Discharge: Decreased caregiver support        PT Discharge Recommendation: (defer at present)     PT - OK to Discharge: No    See flowsheet documentation for full assessment

## 2021-06-12 NOTE — ASSESSMENT & PLAN NOTE
Recurrent episodes of intermittent diarrhea x 1 year  Reports on average 2 BMs/day, recently almost always with BRBPR  Pt was started on Sulfalazine 4 days ago for presumed IBD given colonoscopy findings, reports symptoms acutely worsened since  Also complains of poor p o  Intake over the last 4 days  She has some abdominal pain associated with this  Follows with Dr Radha Garcia from colorectal surgery  Denies nausea, vomiting, abdominal pain  Presented with tachycardia and febrile to 101 ° F  WBC 8  Procalcitonin pending  Enteric stool PCR pending  C diff stool test pending    Colonoscopy 5/28/21 - abnormal mucosa in the anus, rectum, sigmoid, descending colon consistent with colitis  · Biopsies noted with acute and chronic inflammation, no malignancy/dysplasia  The differential diagnosis include florid infectious colitis, ischemic colitis  vs inflammatory bowel disease  CT abdomen (6/11/21) - Sigmoid diverticulosis without evidence of acute diverticulitis  No evidence of colitis or other acute abnormality in the abdomen or pelvis  Resolution of mural thickening in the sigmoid since a CT from 4/1/2021      Plan:  - Infectious less likely given chronicity of symptoms, will hold off on abx for time being  - follow-up stool tests  - IVF hydration   - Hold sulfalazine until seen by GI  - GI consult  Appreciate recs

## 2021-06-13 LAB
ANION GAP SERPL CALCULATED.3IONS-SCNC: 8 MMOL/L (ref 4–13)
BUN SERPL-MCNC: 10 MG/DL (ref 5–25)
CALCIUM SERPL-MCNC: 8.5 MG/DL (ref 8.3–10.1)
CHLORIDE SERPL-SCNC: 105 MMOL/L (ref 100–108)
CO2 SERPL-SCNC: 26 MMOL/L (ref 21–32)
CREAT SERPL-MCNC: 0.87 MG/DL (ref 0.6–1.3)
GFR SERPL CREATININE-BSD FRML MDRD: 66 ML/MIN/1.73SQ M
GLUCOSE SERPL-MCNC: 90 MG/DL (ref 65–140)
POTASSIUM SERPL-SCNC: 3 MMOL/L (ref 3.5–5.3)
SODIUM SERPL-SCNC: 139 MMOL/L (ref 136–145)

## 2021-06-13 PROCEDURE — 99232 SBSQ HOSP IP/OBS MODERATE 35: CPT | Performed by: FAMILY MEDICINE

## 2021-06-13 PROCEDURE — 80048 BASIC METABOLIC PNL TOTAL CA: CPT | Performed by: FAMILY MEDICINE

## 2021-06-13 RX ORDER — POTASSIUM CHLORIDE 20 MEQ/1
40 TABLET, EXTENDED RELEASE ORAL ONCE
Status: COMPLETED | OUTPATIENT
Start: 2021-06-13 | End: 2021-06-13

## 2021-06-13 RX ORDER — POLYETHYLENE GLYCOL 3350, SODIUM CHLORIDE, SODIUM BICARBONATE, POTASSIUM CHLORIDE 420; 11.2; 5.72; 1.48 G/4L; G/4L; G/4L; G/4L
4000 POWDER, FOR SOLUTION ORAL ONCE
Status: COMPLETED | OUTPATIENT
Start: 2021-06-13 | End: 2021-06-13

## 2021-06-13 RX ADMIN — POLYETHYLENE GLYCOL 3350, SODIUM CHLORIDE, SODIUM BICARBONATE AND POTASSIUM CHLORIDE 4000 ML: 420; 5.72; 11.2; 1.48 POWDER, FOR SOLUTION ORAL at 17:01

## 2021-06-13 RX ADMIN — PANTOPRAZOLE SODIUM 40 MG: 40 TABLET, DELAYED RELEASE ORAL at 06:09

## 2021-06-13 RX ADMIN — METOPROLOL SUCCINATE 50 MG: 50 TABLET, EXTENDED RELEASE ORAL at 09:04

## 2021-06-13 RX ADMIN — POTASSIUM CHLORIDE 40 MEQ: 1500 TABLET, EXTENDED RELEASE ORAL at 12:56

## 2021-06-13 RX ADMIN — POTASSIUM CHLORIDE 40 MEQ: 1500 TABLET, EXTENDED RELEASE ORAL at 09:03

## 2021-06-13 RX ADMIN — PANTOPRAZOLE SODIUM 40 MG: 40 TABLET, DELAYED RELEASE ORAL at 15:37

## 2021-06-13 RX ADMIN — LEVOTHYROXINE SODIUM 50 MCG: 50 TABLET ORAL at 05:18

## 2021-06-13 NOTE — ASSESSMENT & PLAN NOTE
S/p right mastectomy  In remission, followed by [id number] [place] [address] Avenue

## 2021-06-13 NOTE — ASSESSMENT & PLAN NOTE
Recurrent episodes of intermittent diarrhea x 1 year  Reports on average 2 BMs/day, recently almost always with BRBPR  Pt was started on Sulfalazine 4 days ago for presumed IBD given colonoscopy findings, reports symptoms acutely worsened since  Also complains of poor p o  Intake over the last 4 days  She has some abdominal pain associated with this  Follows with Dr [place] from colorectal surgery  Denies nausea, vomiting, abdominal pain  Presented with tachycardia and febrile to 101 ° F  WBC 8  Procalcitonin negative x 2  Enteric stool PCR pending  C diff stool test negative    Colonoscopy 5/28/21 - abnormal mucosa in the anus, rectum, sigmoid, descending colon consistent with colitis  · Biopsies noted with acute and chronic inflammation, no malignancy/dysplasia  The differential diagnosis include florid infectious colitis, ischemic colitis  vs inflammatory bowel disease  CT abdomen (6/11/21) - Sigmoid diverticulosis without evidence of acute diverticulitis  No evidence of colitis or other acute abnormality in the abdomen or pelvis    Resolution of mural thickening in the sigmoid since a CT from 4/1/2021      Plan:  - Infectious less likely given chronicity of symptoms, will hold off on abx for time being  - follow-up stool tests  - Hold sulfalazine due to no improvement  - GI recommends colonoscopy on Monday  -patient may need biologic therapy if proven to have inflammatory bowel disease

## 2021-06-13 NOTE — PLAN OF CARE
Problem: Potential for Falls  Goal: Patient will remain free of falls  Description: INTERVENTIONS:  - Assess patient frequently for physical needs  -  Identify cognitive and physical deficits and behaviors that affect risk of falls    -  [place] fall precautions as indicated by assessment   - Educate patient/family on patient safety including physical limitations  - Instruct patient to call for assistance with activity based on assessment  - Modify environment to reduce risk of injury  - Consider OT/PT consult to assist with strengthening/mobility  Outcome: Progressing     Problem: PAIN - ADULT  Goal: Verbalizes/displays adequate comfort level or baseline comfort level  Description: Interventions:  - Encourage patient to monitor pain and request assistance  - Assess pain using appropriate pain scale  - Administer analgesics based on type and severity of pain and evaluate response  - Implement non-pharmacological measures as appropriate and evaluate response  - Consider cultural and social influences on pain and pain management  - Notify physician/advanced practitioner if interventions unsuccessful or patient reports new pain  Outcome: Progressing     Problem: INFECTION - ADULT  Goal: Absence or prevention of progression during hospitalization  Description: INTERVENTIONS:  - Assess and monitor for signs and symptoms of infection  - Monitor lab/diagnostic results  - Monitor all insertion sites, i e  indwelling lines, tubes, and drains  - Monitor endotracheal if appropriate and nasal secretions for changes in amount and color  - [place] appropriate cooling/warming therapies per order  - Administer medications as ordered  - Instruct and encourage patient and family to use good hand hygiene technique  - Identify and instruct in appropriate isolation precautions for identified infection/condition  Outcome: Progressing  Goal: Absence of fever/infection during neutropenic period  Description: INTERVENTIONS:  - Monitor WBC    Outcome: Progressing     Problem: SAFETY ADULT  Goal: Patient will remain free of falls  Description: INTERVENTIONS:  - Assess patient frequently for physical needs  -  Identify cognitive and physical deficits and behaviors that affect risk of falls    -  [place] fall precautions as indicated by assessment   - Educate patient/family on patient safety including physical limitations  - Instruct patient to call for assistance with activity based on assessment  - Modify environment to reduce risk of injury  - Consider OT/PT consult to assist with strengthening/mobility  Outcome: Progressing  Goal: Maintain or return to baseline ADL function  Description: INTERVENTIONS:  -  Assess patient's ability to carry out ADLs; assess patient's baseline for ADL function and identify physical deficits which impact ability to perform ADLs (bathing, care of mouth/teeth, toileting, grooming, dressing, etc )  - Assess/evaluate cause of self-care deficits   - Assess range of motion  - Assess patient's mobility; develop plan if impaired  - Assess patient's need for assistive devices and provide as appropriate  - Encourage maximum independence but intervene and supervise when necessary  - Involve family in performance of ADLs  - Assess for home care needs following discharge   - Consider OT consult to assist with ADL evaluation and planning for discharge  - Provide patient education as appropriate  Outcome: Progressing  Goal: Maintain or return mobility status to optimal level  Description: INTERVENTIONS:  - Assess patient's baseline mobility status (ambulation, transfers, stairs, etc )    - Identify cognitive and physical deficits and behaviors that affect mobility  - Identify mobility aids required to assist with transfers and/or ambulation (gait belt, sit-to-stand, lift, walker, cane, etc )  - [place] fall precautions as indicated by assessment  - Record patient progress and toleration of activity level on Mobility SBAR; progress patient to next Phase/Stage  - Instruct patient to call for assistance with activity based on assessment  - Consider rehabilitation consult to assist with strengthening/weightbearing, etc   Outcome: Progressing     Problem: DISCHARGE PLANNING  Goal: Discharge to home or other facility with appropriate resources  Description: INTERVENTIONS:  - Identify barriers to discharge w/patient and caregiver  - Arrange for needed discharge resources and transportation as appropriate  - Identify discharge learning needs (meds, wound care, etc )  - Arrange for interpretive services to assist at discharge as needed  - Refer to Case Management Department for coordinating discharge planning if the patient needs post-hospital services based on physician/advanced practitioner order or complex needs related to functional status, cognitive ability, or social support system  Outcome: Progressing     Problem: Knowledge Deficit  Goal: Patient/family/caregiver demonstrates understanding of disease process, treatment plan, medications, and discharge instructions  Description: Complete learning assessment and assess knowledge base    Interventions:  - Provide teaching at level of understanding  - Provide teaching via preferred learning methods  Outcome: Progressing     Problem: NEUROSENSORY - ADULT  Goal: Achieves stable or improved neurological status  Description: INTERVENTIONS  - Monitor and report changes in neurological status  - Monitor vital signs such as temperature, blood pressure, glucose, and any other labs ordered   - Initiate measures to prevent increased intracranial pressure  - Monitor for seizure activity and implement precautions if appropriate      Outcome: Progressing  Goal: Remains free of injury related to seizures activity  Description: INTERVENTIONS  - Maintain airway, patient safety  and administer oxygen as ordered  - Monitor patient for seizure activity, document and report duration and description of seizure to physician/advanced practitioner  - If seizure occurs,  ensure patient safety during seizure  - Reorient patient post seizure  - Seizure pads on all 4 side rails  - Instruct patient/family to notify RN of any seizure activity including if an aura is experienced  - Instruct patient/family to call for assistance with activity based on nursing assessment  - Administer anti-seizure medications if ordered    Outcome: Progressing  Goal: Achieves maximal functionality and self care  Description: INTERVENTIONS  - Monitor swallowing and airway patency with patient fatigue and changes in neurological status  - Encourage and assist patient to increase activity and self care     - Encourage visually impaired, hearing impaired and aphasic patients to use assistive/communication devices  Outcome: Progressing     Problem: CARDIOVASCULAR - ADULT  Goal: Maintains optimal cardiac output and hemodynamic stability  Description: INTERVENTIONS:  - Monitor I/O, vital signs and rhythm  - Monitor for S/S and trends of decreased cardiac output  - Administer and titrate ordered vasoactive medications to optimize hemodynamic stability  - Assess quality of pulses, skin color and temperature  - Assess for signs of decreased coronary artery perfusion  - Instruct patient to report change in severity of symptoms  Outcome: Progressing  Goal: Absence of cardiac dysrhythmias or at baseline rhythm  Description: INTERVENTIONS:  - Continuous cardiac monitoring, vital signs, obtain 12 lead EKG if ordered  - Administer antiarrhythmic and heart rate control medications as ordered  - Monitor electrolytes and administer replacement therapy as ordered  Outcome: Progressing     Problem: RESPIRATORY - ADULT  Goal: Achieves optimal ventilation and oxygenation  Description: INTERVENTIONS:  - Assess for changes in respiratory status  - Assess for changes in mentation and behavior  - Position to facilitate oxygenation and minimize respiratory effort  - Oxygen administered by appropriate delivery if ordered  - Initiate smoking cessation education as indicated  - Encourage broncho-pulmonary hygiene including cough, deep breathe, Incentive Spirometry  - Assess the need for suctioning and aspirate as needed  - Assess and instruct to report SOB or any respiratory difficulty  - Respiratory Therapy support as indicated  Outcome: Progressing     Problem: GASTROINTESTINAL - ADULT  Goal: Minimal or absence of nausea and/or vomiting  Description: INTERVENTIONS:  - Administer IV fluids if ordered to ensure adequate hydration  - Maintain NPO status until nausea and vomiting are resolved  - Nasogastric tube if ordered  - Administer ordered antiemetic medications as needed  - Provide nonpharmacologic comfort measures as appropriate  - Advance diet as tolerated, if ordered  - Consider nutrition services referral to assist patient with adequate nutrition and appropriate food choices  Outcome: Progressing  Goal: Maintains or returns to baseline bowel function  Description: INTERVENTIONS:  - Assess bowel function  - Encourage oral fluids to ensure adequate hydration  - Administer IV fluids if ordered to ensure adequate hydration  - Administer ordered medications as needed  - Encourage mobilization and activity  - Consider nutritional services referral to assist patient with adequate nutrition and appropriate food choices  Outcome: Progressing  Goal: Maintains adequate nutritional intake  Description: INTERVENTIONS:  - Monitor percentage of each meal consumed  - Identify factors contributing to decreased intake, treat as appropriate  - Assist with meals as needed  - Monitor I&O, weight, and lab values if indicated  - Obtain nutrition services referral as needed  Outcome: Progressing     Problem: GENITOURINARY - ADULT  Goal: Maintains or returns to baseline urinary function  Description: INTERVENTIONS:  - Assess urinary function  - Encourage oral fluids to ensure adequate hydration if ordered  - Administer IV fluids as ordered to ensure adequate hydration  - Administer ordered medications as needed  - Offer frequent toileting  - Follow urinary retention protocol if ordered  Outcome: Progressing  Goal: Absence of urinary retention  Description: INTERVENTIONS:  - Assess patients ability to void and empty bladder  - Monitor I/O  - Bladder scan as needed  - Discuss with physician/AP medications to alleviate retention as needed  - Discuss catheterization for long term situations as appropriate  Outcome: Progressing     Problem: METABOLIC, FLUID AND ELECTROLYTES - ADULT  Goal: Electrolytes maintained within normal limits  Description: INTERVENTIONS:  - Monitor labs and assess patient for signs and symptoms of electrolyte imbalances  - Administer electrolyte replacement as ordered  - Monitor response to electrolyte replacements, including repeat lab results as appropriate  - Instruct patient on fluid and nutrition as appropriate  Outcome: Progressing  Goal: Fluid balance maintained  Description: INTERVENTIONS:  - Monitor labs   - Monitor I/O and WT  - Instruct patient on fluid and nutrition as appropriate  - Assess for signs & symptoms of volume excess or deficit  Outcome: Progressing  Goal: Glucose maintained within target range  Description: INTERVENTIONS:  - Monitor Blood Glucose as ordered  - Assess for signs and symptoms of hyperglycemia and hypoglycemia  - Administer ordered medications to maintain glucose within target range  - Assess nutritional intake and initiate nutrition service referral as needed  Outcome: Progressing     Problem: SKIN/TISSUE INTEGRITY - ADULT  Goal: Skin integrity remains intact  Description: INTERVENTIONS  - Identify patients at risk for skin breakdown  - Assess and monitor skin integrity  - Assess and monitor nutrition and hydration status  - Monitor labs (i e  albumin)  - Assess for incontinence   - Turn and reposition patient  - Assist with mobility/ambulation  - Relieve pressure over bony prominences  - Avoid friction and shearing  - Provide appropriate hygiene as needed including keeping skin clean and dry  - Evaluate need for skin moisturizer/barrier cream  - Collaborate with interdisciplinary team (i e  Nutrition, Rehabilitation, etc )   - Patient/family teaching  Outcome: Progressing  Goal: Incision(s), wounds(s) or drain site(s) healing without S/S of infection  Description: INTERVENTIONS  - Assess and document risk factors for skin impairment   - Assess and document dressing, incision, wound bed, drain sites and surrounding tissue  - Consider nutrition services referral as needed  - Oral mucous membranes remain intact  - Provide patient/ family education  Outcome: Progressing  Goal: Oral mucous membranes remain intact  Description: INTERVENTIONS  - Assess oral mucosa and hygiene practices  - Implement preventative oral hygiene regimen  - Implement oral medicated treatments as ordered  - Initiate Nutrition services referral as needed  Outcome: Progressing     Problem: HEMATOLOGIC - ADULT  Goal: Maintains hematologic stability  Description: INTERVENTIONS  - Assess for signs and symptoms of bleeding or hemorrhage  - Monitor labs  - Administer supportive blood products/factors as ordered and appropriate  Outcome: Progressing     Problem: MUSCULOSKELETAL - ADULT  Goal: Maintain or return mobility to safest level of function  Description: INTERVENTIONS:  - Assess patient's ability to carry out ADLs; assess patient's baseline for ADL function and identify physical deficits which impact ability to perform ADLs (bathing, care of mouth/teeth, toileting, grooming, dressing, etc )  - Assess/evaluate cause of self-care deficits   - Assess range of motion  - Assess patient's mobility  - Assess patient's need for assistive devices and provide as appropriate  - Encourage maximum independence but intervene and supervise when necessary  - Involve family in performance of ADLs  - Assess for home care needs following discharge   - Consider OT consult to assist with ADL evaluation and planning for discharge  - Provide patient education as appropriate  Outcome: Progressing  Goal: Maintain proper alignment of affected body part  Description: INTERVENTIONS:  - Support, maintain and protect limb and body alignment  - Provide patient/ family with appropriate education  Outcome: Progressing     Problem: Nutrition/Hydration-ADULT  Goal: Nutrient/Hydration intake appropriate for improving, restoring or maintaining nutritional needs  Description: Monitor and assess patient's nutrition/hydration status for malnutrition  Collaborate with interdisciplinary team and initiate plan and interventions as ordered  Monitor patient's weight and dietary intake as ordered or per policy  Utilize nutrition screening tool and intervene as necessary  Determine patient's food preferences and provide high-protein, high-caloric foods as appropriate       INTERVENTIONS:  - Monitor oral intake, urinary output, labs, and treatment plans  - Assess nutrition and hydration status and recommend course of action  - Evaluate amount of meals eaten  - Assist patient with eating if necessary   - Allow adequate time for meals  - Recommend/ encourage appropriate diets, oral nutritional supplements, and vitamin/mineral supplements  - Order, calculate, and assess calorie counts as needed  - Recommend, monitor, and adjust tube feedings and TPN/PPN based on assessed needs  - Assess need for intravenous fluids  - Provide specific nutrition/hydration education as appropriate  - Include patient/family/caregiver in decisions related to nutrition  Outcome: Progressing

## 2021-06-13 NOTE — PLAN OF CARE
Problem: Potential for Falls  Goal: Patient will remain free of falls  Description: INTERVENTIONS:  - Assess patient frequently for physical needs  -  Identify cognitive and physical deficits and behaviors that affect risk of falls    -  [place] fall precautions as indicated by assessment   - Educate patient/family on patient safety including physical limitations  - Instruct patient to call for assistance with activity based on assessment  - Modify environment to reduce risk of injury  - Consider OT/PT consult to assist with strengthening/mobility  Outcome: Progressing     Problem: PAIN - ADULT  Goal: Verbalizes/displays adequate comfort level or baseline comfort level  Description: Interventions:  - Encourage patient to monitor pain and request assistance  - Assess pain using appropriate pain scale  - Administer analgesics based on type and severity of pain and evaluate response  - Implement non-pharmacological measures as appropriate and evaluate response  - Consider cultural and social influences on pain and pain management  - Notify physician/advanced practitioner if interventions unsuccessful or patient reports new pain  Outcome: Progressing     Problem: INFECTION - ADULT  Goal: Absence or prevention of progression during hospitalization  Description: INTERVENTIONS:  - Assess and monitor for signs and symptoms of infection  - Monitor lab/diagnostic results  - Monitor all insertion sites, i e  indwelling lines, tubes, and drains  - Monitor endotracheal if appropriate and nasal secretions for changes in amount and color  - [place] appropriate cooling/warming therapies per order  - Administer medications as ordered  - Instruct and encourage patient and family to use good hand hygiene technique  - Identify and instruct in appropriate isolation precautions for identified infection/condition  Outcome: Progressing  Goal: Absence of fever/infection during neutropenic period  Description: INTERVENTIONS:  - Monitor WBC    Outcome: Progressing     Problem: SAFETY ADULT  Goal: Patient will remain free of falls  Description: INTERVENTIONS:  - Assess patient frequently for physical needs  -  Identify cognitive and physical deficits and behaviors that affect risk of falls    -  [place] fall precautions as indicated by assessment   - Educate patient/family on patient safety including physical limitations  - Instruct patient to call for assistance with activity based on assessment  - Modify environment to reduce risk of injury  - Consider OT/PT consult to assist with strengthening/mobility  Outcome: Progressing  Goal: Maintain or return to baseline ADL function  Description: INTERVENTIONS:  -  Assess patient's ability to carry out ADLs; assess patient's baseline for ADL function and identify physical deficits which impact ability to perform ADLs (bathing, care of mouth/teeth, toileting, grooming, dressing, etc )  - Assess/evaluate cause of self-care deficits   - Assess range of motion  - Assess patient's mobility; develop plan if impaired  - Assess patient's need for assistive devices and provide as appropriate  - Encourage maximum independence but intervene and supervise when necessary  - Involve family in performance of ADLs  - Assess for home care needs following discharge   - Consider OT consult to assist with ADL evaluation and planning for discharge  - Provide patient education as appropriate  Outcome: Progressing  Goal: Maintain or return mobility status to optimal level  Description: INTERVENTIONS:  - Assess patient's baseline mobility status (ambulation, transfers, stairs, etc )    - Identify cognitive and physical deficits and behaviors that affect mobility  - Identify mobility aids required to assist with transfers and/or ambulation (gait belt, sit-to-stand, lift, walker, cane, etc )  - [place] fall precautions as indicated by assessment  - Record patient progress and toleration of activity level on Mobility SBAR; progress patient to next Phase/Stage  - Instruct patient to call for assistance with activity based on assessment  - Consider rehabilitation consult to assist with strengthening/weightbearing, etc   Outcome: Progressing     Problem: DISCHARGE PLANNING  Goal: Discharge to home or other facility with appropriate resources  Description: INTERVENTIONS:  - Identify barriers to discharge w/patient and caregiver  - Arrange for needed discharge resources and transportation as appropriate  - Identify discharge learning needs (meds, wound care, etc )  - Arrange for interpretive services to assist at discharge as needed  - Refer to Case Management Department for coordinating discharge planning if the patient needs post-hospital services based on physician/advanced practitioner order or complex needs related to functional status, cognitive ability, or social support system  Outcome: Progressing     Problem: Knowledge Deficit  Goal: Patient/family/caregiver demonstrates understanding of disease process, treatment plan, medications, and discharge instructions  Description: Complete learning assessment and assess knowledge base    Interventions:  - Provide teaching at level of understanding  - Provide teaching via preferred learning methods  Outcome: Progressing     Problem: NEUROSENSORY - ADULT  Goal: Achieves stable or improved neurological status  Description: INTERVENTIONS  - Monitor and report changes in neurological status  - Monitor vital signs such as temperature, blood pressure, glucose, and any other labs ordered   - Initiate measures to prevent increased intracranial pressure  - Monitor for seizure activity and implement precautions if appropriate      Outcome: Progressing  Goal: Remains free of injury related to seizures activity  Description: INTERVENTIONS  - Maintain airway, patient safety  and administer oxygen as ordered  - Monitor patient for seizure activity, document and report duration and description of seizure to physician/advanced practitioner  - If seizure occurs,  ensure patient safety during seizure  - Reorient patient post seizure  - Seizure pads on all 4 side rails  - Instruct patient/family to notify RN of any seizure activity including if an aura is experienced  - Instruct patient/family to call for assistance with activity based on nursing assessment  - Administer anti-seizure medications if ordered    Outcome: Progressing  Goal: Achieves maximal functionality and self care  Description: INTERVENTIONS  - Monitor swallowing and airway patency with patient fatigue and changes in neurological status  - Encourage and assist patient to increase activity and self care     - Encourage visually impaired, hearing impaired and aphasic patients to use assistive/communication devices  Outcome: Progressing     Problem: CARDIOVASCULAR - ADULT  Goal: Maintains optimal cardiac output and hemodynamic stability  Description: INTERVENTIONS:  - Monitor I/O, vital signs and rhythm  - Monitor for S/S and trends of decreased cardiac output  - Administer and titrate ordered vasoactive medications to optimize hemodynamic stability  - Assess quality of pulses, skin color and temperature  - Assess for signs of decreased coronary artery perfusion  - Instruct patient to report change in severity of symptoms  Outcome: Progressing  Goal: Absence of cardiac dysrhythmias or at baseline rhythm  Description: INTERVENTIONS:  - Continuous cardiac monitoring, vital signs, obtain 12 lead EKG if ordered  - Administer antiarrhythmic and heart rate control medications as ordered  - Monitor electrolytes and administer replacement therapy as ordered  Outcome: Progressing     Problem: RESPIRATORY - ADULT  Goal: Achieves optimal ventilation and oxygenation  Description: INTERVENTIONS:  - Assess for changes in respiratory status  - Assess for changes in mentation and behavior  - Position to facilitate oxygenation and minimize respiratory effort  - Oxygen administered by appropriate delivery if ordered  - Initiate smoking cessation education as indicated  - Encourage broncho-pulmonary hygiene including cough, deep breathe, Incentive Spirometry  - Assess the need for suctioning and aspirate as needed  - Assess and instruct to report SOB or any respiratory difficulty  - Respiratory Therapy support as indicated  Outcome: Progressing     Problem: GASTROINTESTINAL - ADULT  Goal: Minimal or absence of nausea and/or vomiting  Description: INTERVENTIONS:  - Administer IV fluids if ordered to ensure adequate hydration  - Maintain NPO status until nausea and vomiting are resolved  - Nasogastric tube if ordered  - Administer ordered antiemetic medications as needed  - Provide nonpharmacologic comfort measures as appropriate  - Advance diet as tolerated, if ordered  - Consider nutrition services referral to assist patient with adequate nutrition and appropriate food choices  Outcome: Progressing  Goal: Maintains or returns to baseline bowel function  Description: INTERVENTIONS:  - Assess bowel function  - Encourage oral fluids to ensure adequate hydration  - Administer IV fluids if ordered to ensure adequate hydration  - Administer ordered medications as needed  - Encourage mobilization and activity  - Consider nutritional services referral to assist patient with adequate nutrition and appropriate food choices  Outcome: Progressing  Goal: Maintains adequate nutritional intake  Description: INTERVENTIONS:  - Monitor percentage of each meal consumed  - Identify factors contributing to decreased intake, treat as appropriate  - Assist with meals as needed  - Monitor I&O, weight, and lab values if indicated  - Obtain nutrition services referral as needed  Outcome: Progressing     Problem: GENITOURINARY - ADULT  Goal: Maintains or returns to baseline urinary function  Description: INTERVENTIONS:  - Assess urinary function  - Encourage oral fluids to ensure adequate hydration if ordered  - Administer IV fluids as ordered to ensure adequate hydration  - Administer ordered medications as needed  - Offer frequent toileting  - Follow urinary retention protocol if ordered  Outcome: Progressing  Goal: Absence of urinary retention  Description: INTERVENTIONS:  - Assess patients ability to void and empty bladder  - Monitor I/O  - Bladder scan as needed  - Discuss with physician/AP medications to alleviate retention as needed  - Discuss catheterization for long term situations as appropriate  Outcome: Progressing     Problem: METABOLIC, FLUID AND ELECTROLYTES - ADULT  Goal: Electrolytes maintained within normal limits  Description: INTERVENTIONS:  - Monitor labs and assess patient for signs and symptoms of electrolyte imbalances  - Administer electrolyte replacement as ordered  - Monitor response to electrolyte replacements, including repeat lab results as appropriate  - Instruct patient on fluid and nutrition as appropriate  Outcome: Progressing  Goal: Fluid balance maintained  Description: INTERVENTIONS:  - Monitor labs   - Monitor I/O and WT  - Instruct patient on fluid and nutrition as appropriate  - Assess for signs & symptoms of volume excess or deficit  Outcome: Progressing  Goal: Glucose maintained within target range  Description: INTERVENTIONS:  - Monitor Blood Glucose as ordered  - Assess for signs and symptoms of hyperglycemia and hypoglycemia  - Administer ordered medications to maintain glucose within target range  - Assess nutritional intake and initiate nutrition service referral as needed  Outcome: Progressing     Problem: SKIN/TISSUE INTEGRITY - ADULT  Goal: Skin integrity remains intact  Description: INTERVENTIONS  - Identify patients at risk for skin breakdown  - Assess and monitor skin integrity  - Assess and monitor nutrition and hydration status  - Monitor labs (i e  albumin)  - Assess for incontinence   - Turn and reposition patient  - Assist with mobility/ambulation  - Relieve pressure over bony prominences  - Avoid friction and shearing  - Provide appropriate hygiene as needed including keeping skin clean and dry  - Evaluate need for skin moisturizer/barrier cream  - Collaborate with interdisciplinary team (i e  Nutrition, Rehabilitation, etc )   - Patient/family teaching  Outcome: Progressing  Goal: Incision(s), wounds(s) or drain site(s) healing without S/S of infection  Description: INTERVENTIONS  - Assess and document risk factors for skin impairment   - Assess and document dressing, incision, wound bed, drain sites and surrounding tissue  - Consider nutrition services referral as needed  - Oral mucous membranes remain intact  - Provide patient/ family education  Outcome: Progressing  Goal: Oral mucous membranes remain intact  Description: INTERVENTIONS  - Assess oral mucosa and hygiene practices  - Implement preventative oral hygiene regimen  - Implement oral medicated treatments as ordered  - Initiate Nutrition services referral as needed  Outcome: Progressing     Problem: HEMATOLOGIC - ADULT  Goal: Maintains hematologic stability  Description: INTERVENTIONS  - Assess for signs and symptoms of bleeding or hemorrhage  - Monitor labs  - Administer supportive blood products/factors as ordered and appropriate  Outcome: Progressing     Problem: MUSCULOSKELETAL - ADULT  Goal: Maintain or return mobility to safest level of function  Description: INTERVENTIONS:  - Assess patient's ability to carry out ADLs; assess patient's baseline for ADL function and identify physical deficits which impact ability to perform ADLs (bathing, care of mouth/teeth, toileting, grooming, dressing, etc )  - Assess/evaluate cause of self-care deficits   - Assess range of motion  - Assess patient's mobility  - Assess patient's need for assistive devices and provide as appropriate  - Encourage maximum independence but intervene and supervise when necessary  - Involve family in performance of ADLs  - Assess for home care needs following discharge   - Consider OT consult to assist with ADL evaluation and planning for discharge  - Provide patient education as appropriate  Outcome: Progressing  Goal: Maintain proper alignment of affected body part  Description: INTERVENTIONS:  - Support, maintain and protect limb and body alignment  - Provide patient/ family with appropriate education  Outcome: Progressing     Problem: Nutrition/Hydration-ADULT  Goal: Nutrient/Hydration intake appropriate for improving, restoring or maintaining nutritional needs  Description: Monitor and assess patient's nutrition/hydration status for malnutrition  Collaborate with interdisciplinary team and initiate plan and interventions as ordered  Monitor patient's weight and dietary intake as ordered or per policy  Utilize nutrition screening tool and intervene as necessary  Determine patient's food preferences and provide high-protein, high-caloric foods as appropriate       INTERVENTIONS:  - Monitor oral intake, urinary output, labs, and treatment plans  - Assess nutrition and hydration status and recommend course of action  - Evaluate amount of meals eaten  - Assist patient with eating if necessary   - Allow adequate time for meals  - Recommend/ encourage appropriate diets, oral nutritional supplements, and vitamin/mineral supplements  - Order, calculate, and assess calorie counts as needed  - Recommend, monitor, and adjust tube feedings and TPN/PPN based on assessed needs  - Assess need for intravenous fluids  - Provide specific nutrition/hydration education as appropriate  - Include patient/family/caregiver in decisions related to nutrition  Outcome: Progressing

## 2021-06-13 NOTE — PROGRESS NOTES
[address] [facility]  Progress Note - Disha Bailey 1945, 70 y o  female MRN: [id number]  Unit/Bed#: [facility] [address] [place] [address] Encounter: [id number]  Primary Care Provider: Stefan Westbrook DO   Date and time admitted to hospital: 6/11/2021  4:28 PM    * Colitis  Assessment & Plan  Recurrent episodes of intermittent diarrhea x 1 year  Reports on average 2 BMs/day, recently almost always with BRBPR  Pt was started on Sulfalazine 4 days ago for presumed IBD given colonoscopy findings, reports symptoms acutely worsened since  Also complains of poor p o  Intake over the last 4 days  She has some abdominal pain associated with this  Follows with Dr Ethan Herbert from colorectal surgery  Denies nausea, vomiting, abdominal pain  Presented with tachycardia and febrile to 101 ° F  WBC 8  Procalcitonin negative x 2  Enteric stool PCR pending  C diff stool test negative    Colonoscopy 5/28/21 - abnormal mucosa in the anus, rectum, sigmoid, descending colon consistent with colitis  · Biopsies noted with acute and chronic inflammation, no malignancy/dysplasia  The differential diagnosis include florid infectious colitis, ischemic colitis  vs inflammatory bowel disease  CT abdomen (6/11/21) - Sigmoid diverticulosis without evidence of acute diverticulitis  No evidence of colitis or other acute abnormality in the abdomen or pelvis    Resolution of mural thickening in the sigmoid since a CT from 4/1/2021      Plan:  - Infectious less likely given chronicity of symptoms, will hold off on abx for time being  - follow-up stool tests  - Hold sulfalazine due to no improvement  - GI recommends colonoscopy on Monday  -patient may need biologic therapy if proven to have inflammatory bowel disease    Atrial fibrillation ([address] )  Assessment & Plan  Currently in NSR  Continue Toprol-XL 50mg daily    Invasive ductal carcinoma of breast, right [facility])  Assessment & Plan  S/p right mastectomy  In remission, followed by [id number] [facility] Hypokalemia  Assessment & Plan  Potassium 3 0 this morning  Replaced again  Will recheck tomorrow  Hypothyroidism  Assessment & Plan  Continue levothyroxine  TSH normal    Essential hypertension  Assessment & Plan  Continue Toprol XL    Anxiety  Assessment & Plan  Persistent  Outpatient treatment recommended        VTE Pharmacologic Prophylaxis:   Pharmacologic: Pharmacologic VTE Prophylaxis contraindicated due to GI bleed  Mechanical VTE Prophylaxis in Place: Yes    Patient Centered Rounds: I have performed bedside rounds with nursing staff today  Discussions with Specialists or Other Care Team Provider: nursing    Education and Discussions with Family / Patient: patient    Time Spent for Care: 20 minutes  More than 50% of total time spent on counseling and coordination of care as described above  Current Length of Stay: 1 day(s)    Current Patient Status: Inpatient   Certification Statement: The patient will continue to require additional inpatient hospital stay due to colonoscopy    Discharge Plan: 24-48 hrs    Code Status: Level 1 - Full Code      Subjective:   Fern Neil is anxious and frustrated because she has been having numerous bowel movements today  Objective:     Vitals:   Temp (24hrs), Av 7 °F (37 6 °C), Min:98 5 °F (36 9 °C), Max:100 8 °F (38 2 °C)    Temp:  [98 5 °F (36 9 °C)-100 8 °F (38 2 °C)] 98 5 °F (36 9 °C)  HR:  [] 103  Resp:  [18-20] 18  BP: (120-129)/(77-80) 120/77  SpO2:  [95 %-97 %] 96 %  Body mass index is 25 43 kg/m²  Input and Output Summary (last 24 hours):     No intake or output data in the 24 hours ending 21 1536    Physical Exam:     Physical Exam  Constitutional:       Appearance: She is well-developed  HENT:      Head: Normocephalic and atraumatic  Cardiovascular:      Rate and Rhythm: Normal rate and regular rhythm  Pulmonary:      Effort: Pulmonary effort is normal       Breath sounds: Normal breath sounds     Abdominal:      Palpations: Abdomen is soft  Skin:     General: Skin is warm  Findings: No erythema  Neurological:      Mental Status: She is alert  Psychiatric:         Behavior: Behavior normal            Additional Data:     Labs:    Results from last 7 days   Lab Units 06/12/21  0534 06/11/21  1653   WBC Thousand/uL 6 08 8 40   HEMOGLOBIN g/dL 11 3* 12 9   HEMATOCRIT % 35 0 40 4   PLATELETS Thousands/uL 190 276   NEUTROS PCT %  --  79*   LYMPHS PCT %  --  9*   MONOS PCT %  --  8   EOS PCT %  --  2     Results from last 7 days   Lab Units 06/13/21  0438 06/11/21  1653   SODIUM mmol/L 139 136   POTASSIUM mmol/L 3 0* 3 2*   CHLORIDE mmol/L 105 98*   CO2 mmol/L 26 27   BUN mg/dL 10 11   CREATININE mg/dL 0 87 1 12   ANION GAP mmol/L 8 11   CALCIUM mg/dL 8 5 9 1   ALBUMIN g/dL  --  3 0*   TOTAL BILIRUBIN mg/dL  --  0 50   ALK PHOS U/L  --  92   ALT U/L  --  16   AST U/L  --  16   GLUCOSE RANDOM mg/dL 90 109     Results from last 7 days   Lab Units 06/11/21  1653   INR  1 05             Results from last 7 days   Lab Units 06/12/21  0534 06/11/21  1713   LACTIC ACID mmol/L  --  1 1   PROCALCITONIN ng/ml 0 16 0 16           * I Have Reviewed All Lab Data Listed Above  * Additional Pertinent Lab Tests Reviewed: All Labs Within Last 24 Hours Reviewed    Imaging:      Recent Cultures (last 7 days):     Results from last 7 days   Lab Units 06/12/21  0742 06/11/21  1713 06/11/21  1704   BLOOD CULTURE   --  No Growth at 24 hrs  No Growth at 24 hrs     C DIFF TOXIN B BY PCR  Negative  --   --        Last 24 Hours Medication List:   Current Facility-Administered Medications   Medication Dose Route Frequency Provider Last Rate    acetaminophen  650 mg Oral Q6H PRN Ros Fat, PA-JANET      aluminum-magnesium hydroxide-simethicone  30 mL Oral Q6H PRN Ros FatPADMINI      levothyroxine  50 mcg Oral Early Morning Ros Fat [place]      metoprolol succinate  50 mg Oral Daily Ros FatPADMINI      ondansetron  4 mg Intravenous Q6H PRN PADMINI Burnett      pantoprazole  40 mg Oral BID AC PADMINI Burnett      polyethylene glycol-electrolytes  4,000 mL Oral Once Terra Whitehead PA-C          Today, Patient Was Seen By: Tamra Wood MD    ** Please Note: Dictation voice to text software may have been used in the creation of this document   **

## 2021-06-14 ENCOUNTER — ANESTHESIA (INPATIENT)
Dept: GASTROENTEROLOGY | Facility: HOSPITAL | Age: 76
DRG: 392 | End: 2021-06-14
Payer: MEDICARE

## 2021-06-14 ENCOUNTER — APPOINTMENT (INPATIENT)
Dept: GASTROENTEROLOGY | Facility: HOSPITAL | Age: 76
DRG: 392 | End: 2021-06-14
Payer: MEDICARE

## 2021-06-14 ENCOUNTER — ANESTHESIA EVENT (INPATIENT)
Dept: GASTROENTEROLOGY | Facility: HOSPITAL | Age: 76
DRG: 392 | End: 2021-06-14
Payer: MEDICARE

## 2021-06-14 PROBLEM — Z71.89 ACP (ADVANCE CARE PLANNING): Status: ACTIVE | Noted: 2021-06-14

## 2021-06-14 PROBLEM — Z91.89 AT RISK FOR DELIRIUM: Status: ACTIVE | Noted: 2021-06-14

## 2021-06-14 PROBLEM — R19.7 DIARRHEA: Status: ACTIVE | Noted: 2021-04-01

## 2021-06-14 LAB
ANION GAP SERPL CALCULATED.3IONS-SCNC: 13 MMOL/L (ref 4–13)
BASOPHILS # BLD AUTO: 0.03 THOUSANDS/ΜL (ref 0–0.1)
BASOPHILS NFR BLD AUTO: 1 % (ref 0–1)
BUN SERPL-MCNC: 10 MG/DL (ref 5–25)
CALCIUM SERPL-MCNC: 8.5 MG/DL (ref 8.3–10.1)
CHLORIDE SERPL-SCNC: 105 MMOL/L (ref 100–108)
CO2 SERPL-SCNC: 21 MMOL/L (ref 21–32)
CREAT SERPL-MCNC: 0.84 MG/DL (ref 0.6–1.3)
EOSINOPHIL # BLD AUTO: 0.32 THOUSAND/ΜL (ref 0–0.61)
EOSINOPHIL NFR BLD AUTO: 7 % (ref 0–6)
ERYTHROCYTE [DISTWIDTH] IN BLOOD BY AUTOMATED COUNT: 13.8 % (ref 11.6–15.1)
GFR SERPL CREATININE-BSD FRML MDRD: 69 ML/MIN/1.73SQ M
GLUCOSE SERPL-MCNC: 75 MG/DL (ref 65–140)
HCT VFR BLD AUTO: 34.3 % (ref 34.8–46.1)
HGB BLD-MCNC: 11.1 G/DL (ref 11.5–15.4)
IMM GRANULOCYTES # BLD AUTO: 0.02 THOUSAND/UL (ref 0–0.2)
IMM GRANULOCYTES NFR BLD AUTO: 0 % (ref 0–2)
LYMPHOCYTES # BLD AUTO: 1.18 THOUSANDS/ΜL (ref 0.6–4.47)
LYMPHOCYTES NFR BLD AUTO: 26 % (ref 14–44)
MCH RBC QN AUTO: 28 PG (ref 26.8–34.3)
MCHC RBC AUTO-ENTMCNC: 32.4 G/DL (ref 31.4–37.4)
MCV RBC AUTO: 87 FL (ref 82–98)
MONOCYTES # BLD AUTO: 0.46 THOUSAND/ΜL (ref 0.17–1.22)
MONOCYTES NFR BLD AUTO: 10 % (ref 4–12)
NEUTROPHILS # BLD AUTO: 2.49 THOUSANDS/ΜL (ref 1.85–7.62)
NEUTS SEG NFR BLD AUTO: 56 % (ref 43–75)
NRBC BLD AUTO-RTO: 0 /100 WBCS
PLATELET # BLD AUTO: 212 THOUSANDS/UL (ref 149–390)
PMV BLD AUTO: 9.3 FL (ref 8.9–12.7)
POTASSIUM SERPL-SCNC: 3.6 MMOL/L (ref 3.5–5.3)
RBC # BLD AUTO: 3.96 MILLION/UL (ref 3.81–5.12)
SODIUM SERPL-SCNC: 139 MMOL/L (ref 136–145)
WBC # BLD AUTO: 4.5 THOUSAND/UL (ref 4.31–10.16)

## 2021-06-14 PROCEDURE — 97110 THERAPEUTIC EXERCISES: CPT

## 2021-06-14 PROCEDURE — 0DBN8ZX EXCISION OF SIGMOID COLON, VIA NATURAL OR ARTIFICIAL OPENING ENDOSCOPIC, DIAGNOSTIC: ICD-10-PCS | Performed by: INTERNAL MEDICINE

## 2021-06-14 PROCEDURE — 99222 1ST HOSP IP/OBS MODERATE 55: CPT | Performed by: FAMILY MEDICINE

## 2021-06-14 PROCEDURE — 97166 OT EVAL MOD COMPLEX 45 MIN: CPT

## 2021-06-14 PROCEDURE — 85025 COMPLETE CBC W/AUTO DIFF WBC: CPT | Performed by: FAMILY MEDICINE

## 2021-06-14 PROCEDURE — 0DB98ZX EXCISION OF DUODENUM, VIA NATURAL OR ARTIFICIAL OPENING ENDOSCOPIC, DIAGNOSTIC: ICD-10-PCS | Performed by: INTERNAL MEDICINE

## 2021-06-14 PROCEDURE — 80048 BASIC METABOLIC PNL TOTAL CA: CPT | Performed by: FAMILY MEDICINE

## 2021-06-14 PROCEDURE — 45380 COLONOSCOPY AND BIOPSY: CPT | Performed by: INTERNAL MEDICINE

## 2021-06-14 PROCEDURE — 88305 TISSUE EXAM BY PATHOLOGIST: CPT | Performed by: PATHOLOGY

## 2021-06-14 PROCEDURE — 0DBP8ZX EXCISION OF RECTUM, VIA NATURAL OR ARTIFICIAL OPENING ENDOSCOPIC, DIAGNOSTIC: ICD-10-PCS | Performed by: INTERNAL MEDICINE

## 2021-06-14 PROCEDURE — 0DBK8ZZ EXCISION OF ASCENDING COLON, VIA NATURAL OR ARTIFICIAL OPENING ENDOSCOPIC: ICD-10-PCS | Performed by: INTERNAL MEDICINE

## 2021-06-14 PROCEDURE — 43239 EGD BIOPSY SINGLE/MULTIPLE: CPT | Performed by: INTERNAL MEDICINE

## 2021-06-14 PROCEDURE — 99232 SBSQ HOSP IP/OBS MODERATE 35: CPT | Performed by: PHYSICIAN ASSISTANT

## 2021-06-14 PROCEDURE — 45385 COLONOSCOPY W/LESION REMOVAL: CPT | Performed by: INTERNAL MEDICINE

## 2021-06-14 PROCEDURE — 0DB78ZX EXCISION OF STOMACH, PYLORUS, VIA NATURAL OR ARTIFICIAL OPENING ENDOSCOPIC, DIAGNOSTIC: ICD-10-PCS | Performed by: INTERNAL MEDICINE

## 2021-06-14 PROCEDURE — 0DBM8ZZ EXCISION OF DESCENDING COLON, VIA NATURAL OR ARTIFICIAL OPENING ENDOSCOPIC: ICD-10-PCS | Performed by: INTERNAL MEDICINE

## 2021-06-14 RX ORDER — SODIUM CHLORIDE 9 MG/ML
125 INJECTION, SOLUTION INTRAVENOUS CONTINUOUS
Status: CANCELLED | OUTPATIENT
Start: 2021-06-14

## 2021-06-14 RX ORDER — PROPOFOL 10 MG/ML
INJECTION, EMULSION INTRAVENOUS AS NEEDED
Status: DISCONTINUED | OUTPATIENT
Start: 2021-06-14 | End: 2021-06-14

## 2021-06-14 RX ORDER — LIDOCAINE HYDROCHLORIDE 20 MG/ML
INJECTION, SOLUTION EPIDURAL; INFILTRATION; INTRACAUDAL; PERINEURAL AS NEEDED
Status: DISCONTINUED | OUTPATIENT
Start: 2021-06-14 | End: 2021-06-14

## 2021-06-14 RX ORDER — SODIUM CHLORIDE 9 MG/ML
125 INJECTION, SOLUTION INTRAVENOUS CONTINUOUS
Status: DISCONTINUED | OUTPATIENT
Start: 2021-06-14 | End: 2021-06-14

## 2021-06-14 RX ADMIN — PROPOFOL 40 MG: 10 INJECTION, EMULSION INTRAVENOUS at 16:15

## 2021-06-14 RX ADMIN — PROPOFOL 100 MG: 10 INJECTION, EMULSION INTRAVENOUS at 15:59

## 2021-06-14 RX ADMIN — PROPOFOL 50 MG: 10 INJECTION, EMULSION INTRAVENOUS at 16:11

## 2021-06-14 RX ADMIN — PROPOFOL 30 MG: 10 INJECTION, EMULSION INTRAVENOUS at 16:24

## 2021-06-14 RX ADMIN — PHENYLEPHRINE HYDROCHLORIDE 100 MCG: 10 INJECTION INTRAVENOUS at 16:15

## 2021-06-14 RX ADMIN — PANTOPRAZOLE SODIUM 40 MG: 40 TABLET, DELAYED RELEASE ORAL at 17:20

## 2021-06-14 RX ADMIN — SODIUM CHLORIDE: 0.9 INJECTION, SOLUTION INTRAVENOUS at 15:45

## 2021-06-14 RX ADMIN — PROPOFOL 40 MG: 10 INJECTION, EMULSION INTRAVENOUS at 16:01

## 2021-06-14 RX ADMIN — PROPOFOL 30 MG: 10 INJECTION, EMULSION INTRAVENOUS at 16:18

## 2021-06-14 RX ADMIN — METOPROLOL SUCCINATE 50 MG: 50 TABLET, EXTENDED RELEASE ORAL at 08:48

## 2021-06-14 RX ADMIN — PROPOFOL 30 MG: 10 INJECTION, EMULSION INTRAVENOUS at 16:05

## 2021-06-14 RX ADMIN — LEVOTHYROXINE SODIUM 50 MCG: 50 TABLET ORAL at 05:46

## 2021-06-14 RX ADMIN — LIDOCAINE HYDROCHLORIDE 50 MG: 20 INJECTION, SOLUTION EPIDURAL; INFILTRATION; INTRACAUDAL; PERINEURAL at 15:59

## 2021-06-14 RX ADMIN — PROPOFOL 30 MG: 10 INJECTION, EMULSION INTRAVENOUS at 16:08

## 2021-06-14 RX ADMIN — PANTOPRAZOLE SODIUM 40 MG: 40 TABLET, DELAYED RELEASE ORAL at 06:09

## 2021-06-14 NOTE — PHYSICAL THERAPY NOTE
PHYSICAL THERAPY NOTE          Patient Name: Lilo Bowers  [id number] Date: 6/14/2021   Time: 5367-6078       06/14/21 1135   PT Last Visit   PT Visit Date 06/14/21   Note Type   Note Type Treatment for insurance authorization   Pain Assessment   Pain Assessment Tool Pain Assessment not indicated - pt denies pain   Pain Score No Pain   Restrictions/Precautions   Other Precautions Cognitive   General   Chart Reviewed Yes   Additional Pertinent History pending colonoscopy   Response to Previous Treatment Patient unable to report, no changes reported from family or staff   Cognition   Overall Cognitive Status Impaired   Arousal/Participation Cooperative   Attention Attends with cues to redirect   Orientation Level Oriented to person;Oriented to place;Oriented to time;Disoriented to situation   Memory Decreased recall of recent events   Following Commands Follows one step commands with increased time or repetition   Comments ?hx dementia   Subjective   Subjective pt agreeable to PT   Bed Mobility   Supine to Sit 7  Independent   Transfers   Sit to Stand 5  Supervision   Stand to Sit 5  Supervision   Ambulation/Elevation   Gait pattern WNL; Short stride   Gait Assistance 5  Supervision   Additional items Verbal cues  (for guidance)   Assistive Device None   Distance >300'   Stair Management Assistance Not tested   Balance   Static Sitting Normal   Dynamic Sitting Good   Static Standing Fair +   Dynamic Standing Fair   Ambulatory Fair   Endurance Deficit   Endurance Deficit No   Activity Tolerance   Activity Tolerance Patient tolerated treatment well   Nurse Made Aware Alfredo OROZCO; OT, CM, PA   Exercises   Balance training  perfomed changes in gait speed, 360 degree turn, and heel to toe walking during ambulation at S level   Assessment   Prognosis Good   Problem List Decreased cognition; Impaired judgement;Decreased safety awareness   Assessment Lyndsey Skill was seen for a f/u session  Able to assess OOB mobility given improved mood this session  Performs all functional mobility tasks independently  No LOB noted with dynamic gait activities  Did have to slow gait speed for heel to toe walking but tolerated well  However maintain supervision at times given cognitive deficits impacting safety awareness, recall and ability to dual task  Patient requires cues for guidance given impaired cognition  Observed patient to have difficulty w word finding and forming coherent sentences as well throughout session  Does not demonstrate need for further skilled IP PT at this time  Recommend continued mobilization on unit w nsg staff as tolerated  Will defer to OT evaluation for formal cognitive assessment; likely would benefit from increased supervision in home environment given abovementioned cognitive deficits impacting safety  Barriers to Discharge Decreased caregiver support   Barriers to Discharge Comments lives alone   Goals   Patient Goals to go home   PT Treatment Day 1   Plan   PT Frequency Other (Comment)  (d/c PT)   Recommendation   PT Discharge Recommendation No rehabilitation needs  (see OT eval for formal cognitive assessment)   PT - OK to Discharge Yes  (with appropriate level of supervision at home)   Additional Comments The patient's AM-PAC Basic Mobility Inpatient Short Form Raw Score is 23, Standardized Score is 50  88  A standardized score less than 42 9 suggests the patient may benefit from discharge to home  Please also refer to the recommendation of the Physical Therapist for safe discharge planning     AM-PAC Basic Mobility Inpatient   Turning in Bed Without Bedrails 4   Lying on Back to Sitting on Edge of Flat Bed 4   Moving Bed to Chair 4   Standing Up From Chair 4   Walk in Room 4   Climb 3-5 Stairs 3   Basic Mobility Inpatient Raw Score 23   Basic Mobility Standardized Score 50 88     Diogenes Forward, PT

## 2021-06-14 NOTE — ASSESSMENT & PLAN NOTE
- patient seems in better spirits today, seems calmer, insists in going home  No hallucinations reported today  - consider checking EKG for QTC interval, if within normal limits may use 12 5 mg Seroquel b i d  as needed for hallucinations  - if patient is refusing her p o  medication may use Zyprexa 2 5 mg IM q 12 hours as needed  -Patient is high risk of delirium due to cognitive impairment, change in environment, anxiety at baseline, ISIS, diarrhea   -Initiate delirium precautions  -maintain normal sleep/wake cycle  Consider starting melatonin 3 mg po QHS- patient reports insomnia  -minimize overnight interruptions, group overnight vitals/labs/nursing checks as possible  -dim lights, close blinds and turn off tv to minimize stimulation and encourage sleep environment in evenings  -ensure that pain is well controlled , consider Tylenol 975mg Q8H scheduled and /or lidocaine patches b/l knees (Patient reports pain with ambulation)  -monitor for fecal and urinary retention which may precipitate delirium  -encourage early mobilization and ambulation  -provide frequent reorientation and redirection  -encourage family and friends at the bedside to help calm patient if anxious  -avoid medications which may precipitate or worsen delirium such as tramadol, benzodiazepine, anticholinergics, and antihistaminics  -encourage hydration and nutrition , assist with feeding if needed  -redirect unwanted behaviors as first line, avoid physical restraints  - continue Lexapro 5 mg daily and may increase to 10 mg in a week if well tolerated    Increase dose of gabapentin to 200 mg p o  t i d

## 2021-06-14 NOTE — ASSESSMENT & PLAN NOTE
Patient was able to tell me today the date, the location and her date of birth  Of note she was looking had the board for -date and location  She lives at assisted living [facility] level  She is independent with all ADLs, needs assistance with IADLs  Maintain sleep-wake cycle  Encourage p o  intake  Consider checking B12 level, reviewed TSH level which is decreased for her age  I will recommend decreasing the dose of levothyroxine to 37 5  Mcg and recheck TSH level in 4-6 weeks  Encourage out of bed as tolerated  Reorient as needed  Patient seen slightly anxious  Continue Lexapro  5 mg daily and increase to 10 mg in a week if well tolerated  Consider increasing gabapentin to 200 mg p o  t i d  Pending discharge to long-term care facility

## 2021-06-14 NOTE — ANESTHESIA POSTPROCEDURE EVALUATION
Post-Op Assessment Note    CV Status:  Stable  Pain Score: 2    Pain management: adequate     Mental Status:  Alert and awake   Hydration Status:  Euvolemic   PONV Controlled:  Controlled   Airway Patency:  Patent      Post Op Vitals Reviewed: Yes      Staff: Anesthesiologist         No complications documented      /62 (06/14/21 1646)    Temp      Pulse 87 (06/14/21 1646)   Resp 20 (06/14/21 1646)    SpO2 99 % (06/14/21 1646)

## 2021-06-14 NOTE — PLAN OF CARE
Problem: Potential for Falls  Goal: Patient will remain free of falls  Description: INTERVENTIONS:  - Assess patient frequently for physical needs  -  Identify cognitive and physical deficits and behaviors that affect risk of falls    -  [place] fall precautions as indicated by assessment   - Educate patient/family on patient safety including physical limitations  - Instruct patient to call for assistance with activity based on assessment  - Modify environment to reduce risk of injury  - Consider OT/PT consult to assist with strengthening/mobility  Outcome: Progressing     Problem: PAIN - ADULT  Goal: Verbalizes/displays adequate comfort level or baseline comfort level  Description: Interventions:  - Encourage patient to monitor pain and request assistance  - Assess pain using appropriate pain scale  - Administer analgesics based on type and severity of pain and evaluate response  - Implement non-pharmacological measures as appropriate and evaluate response  - Consider cultural and social influences on pain and pain management  - Notify physician/advanced practitioner if interventions unsuccessful or patient reports new pain  Outcome: Progressing     Problem: INFECTION - ADULT  Goal: Absence or prevention of progression during hospitalization  Description: INTERVENTIONS:  - Assess and monitor for signs and symptoms of infection  - Monitor lab/diagnostic results  - Monitor all insertion sites, i e  indwelling lines, tubes, and drains  - Monitor endotracheal if appropriate and nasal secretions for changes in amount and color  - [place] appropriate cooling/warming therapies per order  - Administer medications as ordered  - Instruct and encourage patient and family to use good hand hygiene technique  - Identify and instruct in appropriate isolation precautions for identified infection/condition  Outcome: Progressing  Goal: Absence of fever/infection during neutropenic period  Description: INTERVENTIONS:  - Monitor WBC    Outcome: Progressing     Problem: SAFETY ADULT  Goal: Patient will remain free of falls  Description: INTERVENTIONS:  - Assess patient frequently for physical needs  -  Identify cognitive and physical deficits and behaviors that affect risk of falls    -  [place] fall precautions as indicated by assessment   - Educate patient/family on patient safety including physical limitations  - Instruct patient to call for assistance with activity based on assessment  - Modify environment to reduce risk of injury  - Consider OT/PT consult to assist with strengthening/mobility  Outcome: Progressing  Goal: Maintain or return to baseline ADL function  Description: INTERVENTIONS:  -  Assess patient's ability to carry out ADLs; assess patient's baseline for ADL function and identify physical deficits which impact ability to perform ADLs (bathing, care of mouth/teeth, toileting, grooming, dressing, etc )  - Assess/evaluate cause of self-care deficits   - Assess range of motion  - Assess patient's mobility; develop plan if impaired  - Assess patient's need for assistive devices and provide as appropriate  - Encourage maximum independence but intervene and supervise when necessary  - Involve family in performance of ADLs  - Assess for home care needs following discharge   - Consider OT consult to assist with ADL evaluation and planning for discharge  - Provide patient education as appropriate  Outcome: Progressing  Goal: Maintain or return mobility status to optimal level  Description: INTERVENTIONS:  - Assess patient's baseline mobility status (ambulation, transfers, stairs, etc )    - Identify cognitive and physical deficits and behaviors that affect mobility  - Identify mobility aids required to assist with transfers and/or ambulation (gait belt, sit-to-stand, lift, walker, cane, etc )  - [place] fall precautions as indicated by assessment  - Record patient progress and toleration of activity level on Mobility SBAR; progress patient to next Phase/Stage  - Instruct patient to call for assistance with activity based on assessment  - Consider rehabilitation consult to assist with strengthening/weightbearing, etc   Outcome: Progressing     Problem: DISCHARGE PLANNING  Goal: Discharge to home or other facility with appropriate resources  Description: INTERVENTIONS:  - Identify barriers to discharge w/patient and caregiver  - Arrange for needed discharge resources and transportation as appropriate  - Identify discharge learning needs (meds, wound care, etc )  - Arrange for interpretive services to assist at discharge as needed  - Refer to Case Management Department for coordinating discharge planning if the patient needs post-hospital services based on physician/advanced practitioner order or complex needs related to functional status, cognitive ability, or social support system  Outcome: Progressing     Problem: Knowledge Deficit  Goal: Patient/family/caregiver demonstrates understanding of disease process, treatment plan, medications, and discharge instructions  Description: Complete learning assessment and assess knowledge base    Interventions:  - Provide teaching at level of understanding  - Provide teaching via preferred learning methods  Outcome: Progressing     Problem: NEUROSENSORY - ADULT  Goal: Achieves stable or improved neurological status  Description: INTERVENTIONS  - Monitor and report changes in neurological status  - Monitor vital signs such as temperature, blood pressure, glucose, and any other labs ordered   - Initiate measures to prevent increased intracranial pressure  - Monitor for seizure activity and implement precautions if appropriate      Outcome: Progressing  Goal: Remains free of injury related to seizures activity  Description: INTERVENTIONS  - Maintain airway, patient safety  and administer oxygen as ordered  - Monitor patient for seizure activity, document and report duration and description of seizure to physician/advanced practitioner  - If seizure occurs,  ensure patient safety during seizure  - Reorient patient post seizure  - Seizure pads on all 4 side rails  - Instruct patient/family to notify RN of any seizure activity including if an aura is experienced  - Instruct patient/family to call for assistance with activity based on nursing assessment  - Administer anti-seizure medications if ordered    Outcome: Progressing  Goal: Achieves maximal functionality and self care  Description: INTERVENTIONS  - Monitor swallowing and airway patency with patient fatigue and changes in neurological status  - Encourage and assist patient to increase activity and self care     - Encourage visually impaired, hearing impaired and aphasic patients to use assistive/communication devices  Outcome: Progressing     Problem: CARDIOVASCULAR - ADULT  Goal: Maintains optimal cardiac output and hemodynamic stability  Description: INTERVENTIONS:  - Monitor I/O, vital signs and rhythm  - Monitor for S/S and trends of decreased cardiac output  - Administer and titrate ordered vasoactive medications to optimize hemodynamic stability  - Assess quality of pulses, skin color and temperature  - Assess for signs of decreased coronary artery perfusion  - Instruct patient to report change in severity of symptoms  Outcome: Progressing  Goal: Absence of cardiac dysrhythmias or at baseline rhythm  Description: INTERVENTIONS:  - Continuous cardiac monitoring, vital signs, obtain 12 lead EKG if ordered  - Administer antiarrhythmic and heart rate control medications as ordered  - Monitor electrolytes and administer replacement therapy as ordered  Outcome: Progressing     Problem: RESPIRATORY - ADULT  Goal: Achieves optimal ventilation and oxygenation  Description: INTERVENTIONS:  - Assess for changes in respiratory status  - Assess for changes in mentation and behavior  - Position to facilitate oxygenation and minimize respiratory effort  - Oxygen administered by appropriate delivery if ordered  - Initiate smoking cessation education as indicated  - Encourage broncho-pulmonary hygiene including cough, deep breathe, Incentive Spirometry  - Assess the need for suctioning and aspirate as needed  - Assess and instruct to report SOB or any respiratory difficulty  - Respiratory Therapy support as indicated  Outcome: Progressing     Problem: GASTROINTESTINAL - ADULT  Goal: Minimal or absence of nausea and/or vomiting  Description: INTERVENTIONS:  - Administer IV fluids if ordered to ensure adequate hydration  - Maintain NPO status until nausea and vomiting are resolved  - Nasogastric tube if ordered  - Administer ordered antiemetic medications as needed  - Provide nonpharmacologic comfort measures as appropriate  - Advance diet as tolerated, if ordered  - Consider nutrition services referral to assist patient with adequate nutrition and appropriate food choices  Outcome: Progressing  Goal: Maintains or returns to baseline bowel function  Description: INTERVENTIONS:  - Assess bowel function  - Encourage oral fluids to ensure adequate hydration  - Administer IV fluids if ordered to ensure adequate hydration  - Administer ordered medications as needed  - Encourage mobilization and activity  - Consider nutritional services referral to assist patient with adequate nutrition and appropriate food choices  Outcome: Progressing  Goal: Maintains adequate nutritional intake  Description: INTERVENTIONS:  - Monitor percentage of each meal consumed  - Identify factors contributing to decreased intake, treat as appropriate  - Assist with meals as needed  - Monitor I&O, weight, and lab values if indicated  - Obtain nutrition services referral as needed  Outcome: Progressing     Problem: GENITOURINARY - ADULT  Goal: Maintains or returns to baseline urinary function  Description: INTERVENTIONS:  - Assess urinary function  - Encourage oral fluids to ensure adequate hydration if ordered  - Administer IV fluids as ordered to ensure adequate hydration  - Administer ordered medications as needed  - Offer frequent toileting  - Follow urinary retention protocol if ordered  Outcome: Progressing  Goal: Absence of urinary retention  Description: INTERVENTIONS:  - Assess patients ability to void and empty bladder  - Monitor I/O  - Bladder scan as needed  - Discuss with physician/AP medications to alleviate retention as needed  - Discuss catheterization for long term situations as appropriate  Outcome: Progressing     Problem: METABOLIC, FLUID AND ELECTROLYTES - ADULT  Goal: Electrolytes maintained within normal limits  Description: INTERVENTIONS:  - Monitor labs and assess patient for signs and symptoms of electrolyte imbalances  - Administer electrolyte replacement as ordered  - Monitor response to electrolyte replacements, including repeat lab results as appropriate  - Instruct patient on fluid and nutrition as appropriate  Outcome: Progressing  Goal: Fluid balance maintained  Description: INTERVENTIONS:  - Monitor labs   - Monitor I/O and WT  - Instruct patient on fluid and nutrition as appropriate  - Assess for signs & symptoms of volume excess or deficit  Outcome: Progressing  Goal: Glucose maintained within target range  Description: INTERVENTIONS:  - Monitor Blood Glucose as ordered  - Assess for signs and symptoms of hyperglycemia and hypoglycemia  - Administer ordered medications to maintain glucose within target range  - Assess nutritional intake and initiate nutrition service referral as needed  Outcome: Progressing     Problem: SKIN/TISSUE INTEGRITY - ADULT  Goal: Skin integrity remains intact  Description: INTERVENTIONS  - Identify patients at risk for skin breakdown  - Assess and monitor skin integrity  - Assess and monitor nutrition and hydration status  - Monitor labs (i e  albumin)  - Assess for incontinence   - Turn and reposition patient  - Assist with mobility/ambulation  - Relieve pressure over bony prominences  - Avoid friction and shearing  - Provide appropriate hygiene as needed including keeping skin clean and dry  - Evaluate need for skin moisturizer/barrier cream  - Collaborate with interdisciplinary team (i e  Nutrition, Rehabilitation, etc )   - Patient/family teaching  Outcome: Progressing  Goal: Incision(s), wounds(s) or drain site(s) healing without S/S of infection  Description: INTERVENTIONS  - Assess and document risk factors for skin impairment   - Assess and document dressing, incision, wound bed, drain sites and surrounding tissue  - Consider nutrition services referral as needed  - Oral mucous membranes remain intact  - Provide patient/ family education  Outcome: Progressing  Goal: Oral mucous membranes remain intact  Description: INTERVENTIONS  - Assess oral mucosa and hygiene practices  - Implement preventative oral hygiene regimen  - Implement oral medicated treatments as ordered  - Initiate Nutrition services referral as needed  Outcome: Progressing     Problem: HEMATOLOGIC - ADULT  Goal: Maintains hematologic stability  Description: INTERVENTIONS  - Assess for signs and symptoms of bleeding or hemorrhage  - Monitor labs  - Administer supportive blood products/factors as ordered and appropriate  Outcome: Progressing     Problem: MUSCULOSKELETAL - ADULT  Goal: Maintain or return mobility to safest level of function  Description: INTERVENTIONS:  - Assess patient's ability to carry out ADLs; assess patient's baseline for ADL function and identify physical deficits which impact ability to perform ADLs (bathing, care of mouth/teeth, toileting, grooming, dressing, etc )  - Assess/evaluate cause of self-care deficits   - Assess range of motion  - Assess patient's mobility  - Assess patient's need for assistive devices and provide as appropriate  - Encourage maximum independence but intervene and supervise when necessary  - Involve family in performance of ADLs  - Assess for home care needs following discharge   - Consider OT consult to assist with ADL evaluation and planning for discharge  - Provide patient education as appropriate  Outcome: Progressing  Goal: Maintain proper alignment of affected body part  Description: INTERVENTIONS:  - Support, maintain and protect limb and body alignment  - Provide patient/ family with appropriate education  Outcome: Progressing     Problem: Nutrition/Hydration-ADULT  Goal: Nutrient/Hydration intake appropriate for improving, restoring or maintaining nutritional needs  Description: Monitor and assess patient's nutrition/hydration status for malnutrition  Collaborate with interdisciplinary team and initiate plan and interventions as ordered  Monitor patient's weight and dietary intake as ordered or per policy  Utilize nutrition screening tool and intervene as necessary  Determine patient's food preferences and provide high-protein, high-caloric foods as appropriate       INTERVENTIONS:  - Monitor oral intake, urinary output, labs, and treatment plans  - Assess nutrition and hydration status and recommend course of action  - Evaluate amount of meals eaten  - Assist patient with eating if necessary   - Allow adequate time for meals  - Recommend/ encourage appropriate diets, oral nutritional supplements, and vitamin/mineral supplements  - Order, calculate, and assess calorie counts as needed  - Recommend, monitor, and adjust tube feedings and TPN/PPN based on assessed needs  - Assess need for intravenous fluids  - Provide specific nutrition/hydration education as appropriate  - Include patient/family/caregiver in decisions related to nutrition  Outcome: Progressing

## 2021-06-14 NOTE — ASSESSMENT & PLAN NOTE
Patient lives alone in an apartments at [facility] to geriatrics   PT/OT recs   She is oriented to person and place, she reads month and year off the white board

## 2021-06-14 NOTE — ASSESSMENT & PLAN NOTE
Patient was able to tell me today that the location and her date of birth today during the encounter  Of note she was looking had the board for -date and location  She lives at [facility] level  She is independent with all ADLs, needs assistance with IADLs  Maintain sleep-wake cycle  Encourage p o  intake  Consider checking B12 level, review TSH level which is decreased for her age  I will recommend decreasing the dose of levothyroxine to 37 5  Mcg and recheck TSH level in 4-6 weeks  Encourage out of bed as tolerated  Reorient as needed  Patient seen slightly anxious  Consider starting low-dose Lexapro for example start 5 mg daily and increase to 10 mg in a week if well tolerated

## 2021-06-14 NOTE — CONSULTS
Consultation - Sharif ARREGUIN Slifer 70 y o  female MRN: [id number]  [facility] [id number] Encounter: [id number]      Assessment/Plan     ACP (advance care planning)  Assessment & Plan  As per discussion with the patient, her daughter Zakiya Toledo is her POA pain she states that she has a living  Will  Discussed with the daughter over the phone and she stated that her mom wish is to be DNR, no aggressive measures  Encouraged Zakiya Toledo to bring the [facility] next time she visits  At risk for delirium  Assessment & Plan    -Patient is high risk of delirium due to cognitive impairment, change in environment, anxiety at baseline, ISIS, diarrhea   -Initiate delirium precautions  -maintain normal sleep/wake cycle  -minimize overnight interruptions, group overnight vitals/labs/nursing checks as possible  -dim lights, close blinds and turn off tv to minimize stimulation and encourage sleep environment in evenings  -ensure that pain is well controlled , consider Tylenol 975mg Q8H scheduled   -monitor for fecal and urinary retention which may precipitate delirium  -encourage early mobilization and ambulation  -provide frequent reorientation and redirection  -encourage family and friends at the bedside to help calm patient if anxious  -avoid medications which may precipitate or worsen delirium such as tramadol, benzodiazepine, anticholinergics, and antihistaminics  -encourage hydration and nutrition , assist with feeding if needed  -redirect unwanted behaviors as first line, avoid physical restraints  - consider treating anxiety as mentioned above start Lexapro 5 mg daily and may increase to 10 mg in a week if well tolerated  May add small dose gabapentin, start with 100 mg p o  t i d       Cognitive impairment  Assessment & Plan  Patient was able to tell me today that the location and her date of birth today during the encounter  Of note she was looking had the board for -date and location    She lives at assisted living facility [facility] level  She is independent with all ADLs, needs assistance with IADLs  Maintain sleep-wake cycle  Encourage p o  intake  Consider checking B12 level, review TSH level which is decreased for her age  I will recommend decreasing the dose of levothyroxine to 37 5  Mcg and recheck TSH level in 4-6 weeks  Encourage out of bed as tolerated  Reorient as needed  Patient seen slightly anxious  Consider starting low-dose Lexapro for example start 5 mg daily and increase to 10 mg in a week if well tolerated  Hypothyroidism  Assessment & Plan  TSH in the lower side for patient age  I would recommend decreasing levothyroxine to 37 5 mcg daily and repeat a TSH in 4-6 weeks  Anxiety  Assessment & Plan  Consider adding Lexapro 5 mg daily  Increase to 10 mg daily if well tolerated in about a week  Continue supportive care  History of Present Illness   Physician Requesting Consult: Jose Luis Terrell MD  Reason for Consult / Principal Problem: dementia  Hx and PE limited by: dementia  Additional history obtained from: daughter Saleem Kaiser      HPI: Akilah Renae is a 70y o  year old female who presents with diarrhea, blood in stool, ISIS  Patient resides at independent living [facility]  She is known to have dementia , no behaviors reported  Patient is independent with ambulation and no recent falls reported  Patient seen and examined at bedside status post endoscopy and colonoscopy, tolerated the procedure well  She denies abdominal pain nausea vomiting fever chills chest pain shortness of breath cough  Has for nursing staff she had diarrhea mixed with blood and today  She reports insomnia  Seems slightly anxious during the encounter and slightly confused and tangential during the conversation  She reports weight loss for the past year as per patient 20-30 lb due to decreased in appetite nausea abdominal pain    As per discussion with her daughter Elvis Stable who is her POA patient is slightly confused at baseline  Discussed goals of care and her daughter endorsed that patient would like to be DNR  Consults    Review of Systems   Constitutional: Negative for chills and fever  HENT: Negative for congestion and rhinorrhea  Respiratory: Negative for cough, shortness of breath and wheezing  Cardiovascular: Positive for leg swelling  Negative for chest pain and palpitations  Gastrointestinal: Negative for abdominal pain and constipation  Endocrine: Negative for cold intolerance  Genitourinary: Negative for difficulty urinating, dysuria and hematuria  Musculoskeletal: Negative for gait problem  Skin: Negative for wound  Allergic/Immunologic: Negative for environmental allergies  Neurological: Negative for dizziness and seizures  Hematological: Does not bruise/bleed easily  Psychiatric/Behavioral: Negative for behavioral problems and sleep disturbance  The patient is nervous/anxious            Historical Information   Past Medical History:   Diagnosis Date    Abnormal CT of the abdomen     Acute bronchitis     Anxiety     Appetite loss     Arthritis     Ascending aortic aneurysm (HCC)     Bilateral renal cysts     Cyst of ovary     Disease of thyroid gland     Dysphagia     Esophageal reflux disease     Full dentures     GERD (gastroesophageal reflux disease)     Herpes zoster     Hyperlipidemia     Hypertension     Hypokalemia     Hypomagnesemia     Hypothyroidism     Impaired fasting glucose     Irritable bowel syndrome (IBS)     Ischemic colitis (HCC)     Knee pain     Limb alert care status     no BP/IV right arm    Osteoarthritis of right knee     Pneumonia     Post-op pain     Pulmonary nodules     Thoracic aortic aneurysm (HCC)     Unspecified ovarian cysts     Use of anastrozole (Arimidex)     Vasovagal syncope     Vitamin D deficiency     Wears glasses     Weight loss      Past Surgical History: Procedure Laterality Date    APPENDECTOMY      pt states it was not removed    BREAST BIOPSY Right 03/02/2016    BREAST LUMPECTOMY Right 2004    BREAST SURGERY Right     Single lesion excision 1990 hyperplasia, 1st biopsy at 25yrs old was benign    CHOLECYSTECTOMY      COLONOSCOPY      COLONOSCOPY N/A 5/12/2017    Procedure: COLONOSCOPY with biopsy;  Surgeon: Martha Weir MD;  [facility]; Service:     ESOPHAGOGASTRODUODENOSCOPY N/A 1/19/2019    Procedure: ESOPHAGOGASTRODUODENOSCOPY (EGD) with 4cc of epinephrine injection and cauterized with gold probe; Surgeon: Igor Gonzales MD;  [facility];   Service: Gastroenterology    [place] LUMBAR PUNCTURE DIAGNOSTIC  5/24/2019    HYSTERECTOMY  1977    IR LUMBAR PUNCTURE  2/21/2019    KNEE SURGERY Right     WA BIOPSY/EXCISION, LYMPH NODE(S) Right 4/12/2016    Procedure: BIOPSY LYMPH NODE SENTINEL @ 1200 LYMPHOSCINTIGRAPHY LYMPHATIC MAPPING;  Surgeon: Tiffanie King MD;  [facility];  Service: General    WA MASTECTOMY, SIMPLE, COMPLETE Right 4/12/2016    Procedure: MASTECTOMY SIMPLE;  Surgeon: Tiffanie King MD;  [facility];  Service: General    TUBAL LIGATION      US GUIDED BREAST BIOPSY RIGHT COMPLETE Right 3/9/2016     Social History   Social History     Substance and Sexual Activity   Alcohol Use Not Currently     Social History     Substance and Sexual Activity   Drug Use No     Social History     Tobacco Use   Smoking Status Never Smoker   Smokeless Tobacco Never Used   Tobacco Comment    not interested     Family History:   Family History   Problem Relation Age of Onset    Stroke Maternal Grandmother     Anemia Mother     Other Mother         disorders of blood and blood forming organs    Hypertension Mother     Stroke Father     Alcohol abuse Brother     Colon cancer Neg Hx        Meds/Allergies   Protonix 40 mg daily   Metoprolol succinate 50 mg daily  Levothyroxine 50 mcg     Allergies   Allergen Reactions  Demerol [Meperidine] GI Intolerance and Other (See Comments)     Other reaction(s): Other (See Comments)  severe nausea  severe nausea  Nausea/vomiting    Nitroglycerin Anaphylaxis and Other (See Comments)     BP drops drastically    Lipitor [Atorvastatin] Other (See Comments)     Joint/muscle pain    Zocor [Simvastatin] Other (See Comments)     Joint/muscle pain       Objective     Intake/Output Summary (Last 24 hours) at 6/14/2021 1848  Last data filed at 6/14/2021 1629  Gross per 24 hour   Intake 600 ml   Output --   Net 600 ml     Invasive Devices     Peripheral Intravenous Line            Peripheral IV 06/14/21 Left Hand <1 day                Physical Exam  Vitals and nursing note reviewed  Constitutional:       General: She is not in acute distress  Appearance: She is well-developed  HENT:      Head: Normocephalic and atraumatic  Ears:      Comments: [place]     Mouth/Throat:      Comments: dentures  Eyes:      Conjunctiva/sclera: Conjunctivae normal    Cardiovascular:      Rate and Rhythm: Normal rate and regular rhythm  Heart sounds: No murmur heard  Pulmonary:      Effort: Pulmonary effort is normal  No respiratory distress  Breath sounds: Normal breath sounds  Abdominal:      Palpations: Abdomen is soft  Tenderness: There is no abdominal tenderness  Musculoskeletal:         General: No tenderness  Cervical back: Neck supple  Right lower leg: Edema (trace) present  Left lower leg: Edema (trace) present  Skin:     General: Skin is warm and dry  Neurological:      Mental Status: She is alert and oriented to person, place, and time  Mental status is at baseline        Comments: Patient was able to tell me date  and location however she was checking the board   Psychiatric:      Comments: Anxious  tangential         Lab Results:   I have personally reviewed pertinent lab results including the following:  - CBC, CMP, TSH, C diff    I have personally reviewed the following imaging study reports in PACS:  - endoscopy, colonoscopy          VTE Prophylaxis: Sequential compression device (Venodyne)     Code Status: Level 1 - Full Code  Advance Directive and Living Will: Yes    Power of :  daughter and Meet Casanova and Social Support: daughter and Annika Myrick her friend  CM Handoff Comments: 6/14 Colitis- resume diet  DC PLAN: Home w/ 24/7 care 2' cognition (no PT needs)    ?  IF WOULD HAVE ADDED SUPPORT AT HOME      Goals of Care: return to [facility]

## 2021-06-14 NOTE — OCCUPATIONAL THERAPY NOTE
Occupational Therapy Evaluation     Patient Name: Diane Barksdale  ZZHOR'Q Date: 6/14/2021  Problem List  Principal Problem:    Diarrhea  Active Problems:    Anxiety    Essential hypertension    Hypothyroidism    Hypokalemia    Invasive ductal carcinoma of breast, right (HCC)    Atrial fibrillation (HCC)    Cognitive impairment    Past Medical History  Past Medical History:   Diagnosis Date    Abnormal CT of the abdomen     Acute bronchitis     Anxiety     Appetite loss     Arthritis     Ascending aortic aneurysm (HCC)     Bilateral renal cysts     Cyst of ovary     Disease of thyroid gland     Dysphagia     Esophageal reflux disease     Full dentures     GERD (gastroesophageal reflux disease)     Herpes zoster     Hyperlipidemia     Hypertension     Hypokalemia     Hypomagnesemia     Hypothyroidism     Impaired fasting glucose     Irritable bowel syndrome (IBS)     Ischemic colitis (HCC)     Knee pain     Limb alert care status     no BP/IV right arm    Osteoarthritis of right knee     Pneumonia     Post-op pain     Pulmonary nodules     Thoracic aortic aneurysm (HCC)     Unspecified ovarian cysts     Use of anastrozole (Arimidex)     Vasovagal syncope     Vitamin D deficiency     Wears glasses     Weight loss      Past Surgical History  Past Surgical History:   Procedure Laterality Date    APPENDECTOMY      pt states it was not removed    BREAST BIOPSY Right 03/02/2016    BREAST LUMPECTOMY Right 2004    BREAST SURGERY Right     Single lesion excision 1990 hyperplasia, 1st biopsy at 25yrs old was benign    CHOLECYSTECTOMY      COLONOSCOPY      COLONOSCOPY N/A 5/12/2017    Procedure: COLONOSCOPY with biopsy;  Surgeon: Ruby Gerber MD;  [facility]; Service:     ESOPHAGOGASTRODUODENOSCOPY N/A 1/19/2019    Procedure: ESOPHAGOGASTRODUODENOSCOPY (EGD) with 4cc of epinephrine injection and cauterized with gold probe;   Surgeon: Zain Garcia MD;  [facility];  Service: Gastroenterology    [facility] LUMBAR PUNCTURE DIAGNOSTIC  5/24/2019    HYSTERECTOMY  1977    IR LUMBAR PUNCTURE  2/21/2019    KNEE SURGERY Right     MO BIOPSY/EXCISION, LYMPH NODE(S) Right 4/12/2016    Procedure: BIOPSY LYMPH NODE SENTINEL @ 1200 LYMPHOSCINTIGRAPHY LYMPHATIC MAPPING;  Surgeon: Mariana Mora MD;  [facility];  Service: General    MO MASTECTOMY, SIMPLE, COMPLETE Right 4/12/2016    Procedure: MASTECTOMY SIMPLE;  Surgeon: Mariana Mora MD;  [facility];  Service: General    TUBAL LIGATION      US GUIDED BREAST BIOPSY RIGHT COMPLETE Right 3/9/2016             06/14/21 1140   OT Last Visit   OT Visit Date 06/14/21   Note Type   Note type Evaluation   Restrictions/Precautions   Weight Bearing Precautions Per Order No   Other Precautions Cognitive   Pain Assessment   Pain Assessment Tool Pain Assessment not indicated - pt denies pain   Pain Score No Pain   Home Living   Type of Home Apartment  ([facility])   Home Layout One level;Performs ADLs on one level; Able to live on main level with bedroom/bathroom  (no RAHAT)   Bathroom Shower/Tub Walk-in shower   Bathroom Toilet Standard   Bathroom Equipment Grab bars in shower; Shower chair   [address] [facility]  (not using PTA)   Additional Comments pt independent w/ ADLs, IADLS; reports does own medications   Prior Function   Level of [place] Independent with ADLs and functional mobility   Lives With Alone   Receives Help From Family;Friend(s); Neighbor   ADL Assistance Independent   IADLs Independent   Falls in the last 6 months 0   Vocational Retired   Comments pt reports daughter does call visit her weekly   Lifestyle   Autonomy per pt independent w/ ADLs, independent w/ functional transfers and mobility w/ no AD, independent w/ IADLs   Reciprocal Relationships alone   Service to Others retired   Intrinsic Gratification going to activities   Subjective   Subjective "I am okay"   ADL Where Assessed Chair   Eating Assistance 6  Modified independent   Grooming Assistance 6  Modified Independent   UB Bathing Assistance 6  Modified Independent   LB Bathing Assistance 5  Supervision/Setup   UB Dressing Assistance 6  Modified independent   LB Dressing Assistance 6  Modified independent   Toileting Assistance  6  Modified independent   Functional Assistance 6  Modified independent   Bed Mobility   Supine to Sit 7  Independent   Sit to Supine 7  Independent   Transfers   Sit to Stand 5  Supervision   Stand to Sit 5  Supervision   Functional Mobility   Functional Mobility 5  Supervision   Additional Comments w/ no AD   Balance   Static Sitting Normal   Dynamic Sitting Good   Static Standing Fair +   Dynamic Standing Fair   Ambulatory Fair   Activity Tolerance   Activity Tolerance Patient tolerated treatment well   Nurse Made Aware appropriate to see per Chantal OROZCO   RUE Assessment   RUE Assessment WFL  (4/5)   LUE Assessment   LUE Assessment WFL  (4/5)   Hand Function   Gross Motor Coordination Functional   Fine Motor Coordination Functional   Sensation   Light Touch No apparent deficits   Proprioception   Proprioception No apparent deficits   Vision-Basic Assessment   Current Vision No visual deficits   Vision - Complex Assessment   Ocular Range of Motion WFL   Acuity Able to read clock/calendar on wall without difficulty   Perception   Inattention/Neglect Appears intact   Cognition   Overall Cognitive Status Impaired   Arousal/Participation Responsive; Cooperative   Attention Attends with cues to redirect   Orientation Level Oriented to person;Oriented to place;Oriented to time  (environmental cues for time)   Memory Decreased recall of recent events   Following Commands Follows one step commands with increased time or repetition   Comments pt w/ dementia at baseline, word finding difficulties, impaired insight into deficits; unable to repeat 5 words when asked   Cognition Assessment Tools ACLS   Score 3  8   Assessment   Assessment Pt is a 70 y o  female seen for OT evaluation s/p admit to [facility] on 6/11/2021 w/ Colitis  Comorbidities affecting pt's functional performance at time of assessment include: atrial fibrillation, invasive ductal carcinoma of breast right, hypokalemia  Personal factors affecting pt at time of IE include: lives alone,poor historian  Prior to admission, pt was living alone in apt and reports independent w/ ADLs, independent w/ functional transfers and mobility w/ no AD, independent w/ IADLs (does own medication management)  Upon evaluation: Pt requires MOD I UB ADLs, supervision LB ADLs, supervision toileting, supervision transfers and mobility 2* the following deficits impacting occupational performance: impaired STM, impaired insight, decreased problem solving  Pt appears to be at baseline for ADLs, functional transfers and mobility  Administered Kirby Conteh Cognitive Level Screen (ACLS)  PT scored 3 8/6 0 indicating 24 house supervision is recommended to gather supplies needed for ADLs, to check results and to remove dangerous objects; please see below for further recommendations  Pt reports having STM deficits at baseline  From OT standpoint, recommendation at time of d/c would be home w/ 24/7 support/superivison  The patient's raw score on the AM-PAC Daily Activity inpatient short form is 21, standardized score is 44 27, greater than 39 4  Patients at this level are likely to benefit from discharge to home  Please refer to the recommendation of the Occupational Therapist for safe discharge planning     Goals   Patient Goals "to go home"   Plan   OT Frequency Eval only   Recommendation   OT Discharge Recommendation   (home w/ 24/7 support/supervision)   OT - OK to Discharge Yes  (when medically stable)   AM-PAC Daily Activity Inpatient   Lower Body Dressing 3   Bathing 3   Toileting 3   Upper Body Dressing 4   Grooming 4   Eating 4   Daily Activity Raw Score 21   Daily Activity Standardized Score (Calc for Raw Score >=11) 44 27   AM-PAC Applied Cognition Inpatient   Following a Speech/Presentation 3   Understanding Ordinary Conversation 3   Taking Medications 2   Remembering Where Things Are Placed or Put Away 2   Remembering List of 4-5 Errands 1   Taking Care of Complicated Tasks 1   Applied Cognition Raw Score 12   Applied Cognition Standardized Score 28 82   Modified Hutchins Scale   Modified Hutchins Scale 4     Documentation completed by:   Madiha Vegas, MS, OTR/L  3 8    Administered [place] Cognitive Level Screen (ACLS)  PT scored 3 8/6 0 indicating 24 house supervision is recommended to gather supplies needed for ADLs, to check results and to remove dangerous objects  Behavior:  May not ask for assistance  May abandon tasks  May be disoriented to day and date and will likely not retain information when told  Needs frequent redirection to tasks  Pace is slow & univariant  May insist on engaging in activity with potential harm (ie: driving)  Grooming:  [place], brushes hair or shaves until all areas are covered  Recognizes completion of tasks  May attempt to style hair but may miss back of head  May use all available products  Recommend checking every few minutes if left alone  Dressing:  May learn to dress at a particular time of the day  Dons common articles of clothing slowly with some errors in sequence  Recognizes when dressing is completed  May stop when a problem occurs  May argue with caregiver suggestions to change selections  Bathing:  Covers all visible body parts with soap and rinses it off  May forget hidden parts or steps  Is aware of task completion  Expect waste if allowed to measure amounts  Do not leave unsupervised for more than a few minutes  Walking/Exercising:  Ambulates within familiar living area or short distances like the backyard  May be unable to retrace steps if lost   May run/walk until tired or stopped     Impulsive actions may result in falls  Eating:   May learn to present self at dining area at regular meal times or follow routine for passing dishes  Does not engage in social conversation at the table  Check food and beverage temperatures  Cannot follow dietary restrictions  Toileting:  May complete sequence of toileting with 1 or 2 steps left out  May forget to zip pants or wash hands  Medications: May be trained to take medications at a particular time of the day  May be able to tell when medications have been altered  Store medications in secure location away from the individual in child proof containers  Supervise to ensure compliance  Use of adaptive devices:  May need frequent redirection for safe carryover in use of assistive device  Do not expect to recall safety precautions  Housekeeping:  No awareness of need for housekeeping  May follow directed sequence of actions like putting away dishes  Do not expect effective results  Food Preparation:  Does not plan for food  May be trained not to eat from refrigerator or present self in dining area  May be able to make a simple sandwich with 1-2 ingredients  Watch for distractibility  Restrict access to sharp tools  Spending money:  May be trained to do the simple steps in a transaction with a fixed amount of money  Recognizes the completion of transaction  May be trained to ask for assistance  If given money, may give away or lose it  Shopping: Follows a guide to shopping areas  Looks into windows or at displays with no intent to purchase  May take items without paying  May fail to notice price or if they have enough money to pay  Do not leave the individual unsupervised in shopping areas  Laundry:   May fold all available items or drape all laundry on clothes line without redirection  Traveling: Able to sit in car for periods up to 1 hour  May express awareness of destination after arrival   Accompany individual on all trips    Telephoning:  May be trained to complete a simple telephone call involving a new number and procedure  May be unable to determine when to call  Does not  when emergency exists and may call 911 at inappropriate times  Driving:  Should not operate a motor vehicle

## 2021-06-14 NOTE — ANESTHESIA PREPROCEDURE EVALUATION
Procedure:  COLONOSCOPY  EGD    Relevant Problems   CARDIO   (+) Atrial fibrillation (HCC)   (+) Essential hypertension   (+) HLD (hyperlipidemia)   (+) Hyperlipidemia   (+) Thoracic aortic aneurysm (HCC)      ENDO   (+) Hypothyroidism      GI/HEPATIC   (+) Esophageal reflux disease   (+) GERD (gastroesophageal reflux disease)   (+) Gastroesophageal reflux disease without esophagitis   (+) Gastrointestinal hemorrhage associated with gastric ulcer   (+) Peptic ulcer disease      /RENAL   (+) Bilateral renal cysts      GYN   (+) Invasive ductal carcinoma of breast, right (HCC)      HEMATOLOGY   (+) Acute blood loss anemia   (+) Iron deficiency anemia   (+) Thrombocytopenia (HCC)      MUSCULOSKELETAL   (+) Localized osteoarthritis of left knee      NEURO/PSYCH   (+) Anxiety   (+) History of alcohol use   (+) History of head injury        Physical Exam    Airway    Mallampati score: II  TM Distance: >3 FB  Neck ROM: full     Dental   No notable dental hx     Cardiovascular  Rhythm: regular, Rate: normal, Cardiovascular exam normal    Pulmonary  Pulmonary exam normal Breath sounds clear to auscultation,     Other Findings        Anesthesia Plan  ASA Score- 3 Emergent    Anesthesia Type- general and IV sedation with anesthesia with ASA Monitors  Additional Monitors:   Airway Plan:     Comment: Hgb=11 1  Hx AFib, in NSR in ER  Ishaan Perry Plan Factors-    Chart reviewed  EKG reviewed  Existing labs reviewed  Patient summary reviewed  Patient is not a current smoker  Patient instructed to abstain from smoking on day of procedure  Patient did not smoke on day of surgery  Induction- intravenous  Postoperative Plan-     Informed Consent- Anesthetic plan and risks discussed with patient

## 2021-06-14 NOTE — ASSESSMENT & PLAN NOTE
Recurrent episodes of intermittent diarrhea x 1 year  Reports on average 2 BMs/day, recently almost always with BRBPR  Pt was started on Sulfalazine 4 days ago for presumed IBD given colonoscopy findings in May 2021 showing patchy erythema and ulcerations of left colon with biopsies appearing consult with possible Crohn's  Presents with ongoing abdominal pain  Follows with Dr Ryan Medrano from colorectal surgery  Colonoscopy 5/28/21 - abnormal mucosa in the anus, rectum, sigmoid, descending colon consistent with colitis  · Biopsies noted with acute and chronic inflammation, no malignancy/dysplasia  The differential diagnosis include florid infectious colitis, ischemic colitis  vs inflammatory bowel disease  · CT abdomen (6/11/21) - Sigmoid diverticulosis without evidence of acute diverticulitis  No evidence of colitis or other acute abnormality in the abdomen or pelvis    Resolution of mural thickening in the sigmoid since a CT from 4/1/2021      GI consulted  NPO for EGD and colonoscopy today  Procalcitonin negative x 2  C diff negative  Pending ova and parasite  Follow-up results of scopes, if consistent with IBD she may require biologic treatment in the future

## 2021-06-14 NOTE — ASSESSMENT & PLAN NOTE
As per discussion with the patient, her daughter Ariadna Viveros is her POA pain she states that she has a living  Will  Discussed with the daughter over the phone and she stated that her mom wish is to be DNR, no aggressive measures  Encouraged Ariadna Viveros to bring the [facility] next time she visits

## 2021-06-14 NOTE — ASSESSMENT & PLAN NOTE
Continue Lexapro 5 mg daily  Increase to 10 mg daily if well tolerated in about a week  Consider increasing gabapentin to 200 mg p o  t i d    Continue supportive care

## 2021-06-14 NOTE — PLAN OF CARE
Problem: PHYSICAL THERAPY ADULT  Goal: Performs mobility at highest level of function for planned discharge setting  See evaluation for individualized goals  Description: Treatment/Interventions: Functional transfer training, LE strengthening/ROM, Therapeutic exercise, Endurance training, Cognitive reorientation, Patient/family training, Equipment eval/education, Bed mobility, Gait training, Compensatory technique education, Spoke to nursing          See flowsheet documentation for full assessment, interventions and recommendations  Outcome: Adequate for Discharge  Note: Prognosis: Good  Problem List: Decreased cognition, Impaired judgement, Decreased safety awareness  Assessment: Jay Argueta was seen for a f/u session  Able to assess OOB mobility given improved mood this session  Performs all functional mobility tasks independently  No LOB noted with dynamic gait activities  Did have to slow gait speed for heel to toe walking but tolerated well  However maintain supervision at times given cognitive deficits impacting safety awareness, recall and ability to dual task  Patient requires cues for guidance given impaired cognition  Observed patient to have difficulty w word finding and forming coherent sentences as well throughout session  Does not demonstrate need for further skilled IP PT at this time  Recommend continued mobilization on unit w nsg staff as tolerated  Will defer to OT evaluation for formal cognitive assessment; likely would benefit from increased supervision in home environment given abovementioned cognitive deficits impacting safety  Barriers to Discharge: Decreased caregiver support  Barriers to Discharge Comments: lives alone     PT Discharge Recommendation: No rehabilitation needs (see OT eval for formal cognitive assessment)     PT - OK to Discharge: Yes (with appropriate level of supervision at home)    See flowsheet documentation for full assessment

## 2021-06-14 NOTE — ASSESSMENT & PLAN NOTE
TSH in the lower side for patient age  I would recommend decreasing levothyroxine to 37 5 mcg daily and repeat a TSH in 4-6 weeks

## 2021-06-14 NOTE — PROGRESS NOTES
[address] [place] [address]  Progress Note - Chelsi Ro 1945, 70 y o  female MRN: [id number]  Unit/Bed#: [address] 227-01 Encounter: [id number]  Primary Care Provider: Lizet Villegas DO   Date and time admitted to hospital: 6/11/2021  4:28 PM    * Diarrhea  Assessment & Plan  Recurrent episodes of intermittent diarrhea x 1 year  Reports on average 2 BMs/day, recently almost always with BRBPR  Pt was started on Sulfalazine 4 days ago for presumed IBD given colonoscopy findings in May 2021 showing patchy erythema and ulcerations of left colon with biopsies appearing consult with possible Crohn's  Presents with ongoing abdominal pain  Follows with Dr Shoaib Child from colorectal surgery  Colonoscopy 5/28/21 - abnormal mucosa in the anus, rectum, sigmoid, descending colon consistent with colitis  · Biopsies noted with acute and chronic inflammation, no malignancy/dysplasia  The differential diagnosis include florid infectious colitis, ischemic colitis  vs inflammatory bowel disease  · CT abdomen (6/11/21) - Sigmoid diverticulosis without evidence of acute diverticulitis  No evidence of colitis or other acute abnormality in the abdomen or pelvis    Resolution of mural thickening in the sigmoid since a CT from 4/1/2021      GI consulted  NPO for EGD and colonoscopy today  Procalcitonin negative x 2  C diff negative  Pending ova and parasite  Follow-up results of scopes, if consistent with IBD she may require biologic treatment in the future    Cognitive impairment  Assessment & Plan  Patient lives alone in an apartments at [facility] to geriatrics   PT/OT recs   She is oriented to person and place, she reads month and year off the white board     Atrial fibrillation ([facility] [address] )  Assessment & Plan  Currently in NSR  Continue Toprol-XL 50 mg daily  She is not on anticoagulation prior to admission    Invasive ductal carcinoma of breast, right [facility])  Assessment & Plan  S/p right mastectomy  followed by [facility]     Hypothyroidism  Assessment & Plan  Continue levothyroxine  TSH normal    Essential hypertension  Assessment & Plan  Continue Toprol XL 50 mg daily  Monitor vital signs    VTE Pharmacologic Prophylaxis:   Pharmacologic: Pharmacologic VTE Prophylaxis contraindicated due to rectabl bleeding   Mechanical VTE Prophylaxis in Place: Yes    Patient Centered Rounds: I have performed bedside rounds with nursing staff today  Discussions with Specialists or Other Care Team Provider: discussed with CM     Education and Discussions with Family / Patient: patient at the bedside and spokew with daughter on the phone and reviewed plan of care     Time Spent for Care: 20 minutes  More than 50% of total time spent on counseling and coordination of care as described above  Current Length of Stay: 2 day(s)    Current Patient Status: Inpatient   Certification Statement: The patient will continue to require additional inpatient hospital stay due to diarrhea, pending egd and colonoscopy     Discharge Plan: pendign results from scopes     Code Status: Level 1 - Full Code      Subjective:   Patient was walking around room upon entering  She denies acute pain at this time admits to chronic abdominal pain  She denies chest pain or palpitations fevers or chills  She lives alone at apartments at [facility]  Objective:     Vitals:   Temp (24hrs), Av 3 °F (36 8 °C), Min:98 °F (36 7 °C), Max:98 5 °F (36 9 °C)    Temp:  [98 °F (36 7 °C)-98 5 °F (36 9 °C)] 98 °F (36 7 °C)  HR:  [] 105  Resp:  [18-20] 20  BP: (120-137)/(76-82) 137/82  SpO2:  [96 %-98 %] 97 %  Body mass index is 25 43 kg/m²  Input and Output Summary (last 24 hours):     No intake or output data in the 24 hours ending 21 1217    Physical Exam:     Physical Exam  Vitals and nursing note reviewed  Constitutional:       General: She is not in acute distress  HENT:      Head: Normocephalic     Eyes:      Conjunctiva/sclera: Conjunctivae normal    Cardiovascular:      Rate and Rhythm: Normal rate and regular rhythm  Pulmonary:      Effort: Pulmonary effort is normal       Breath sounds: Normal breath sounds  Abdominal:      General: Bowel sounds are normal       Palpations: Abdomen is soft  Tenderness: There is abdominal tenderness (generalized thorughout )  Rebound: voluntary guarding    Musculoskeletal:      Right lower leg: No edema  Left lower leg: No edema  Skin:     General: Skin is warm  Neurological:      Mental Status: She is alert  Mental status is at baseline  Comments: oriented to person, reads month and year off white board, states she is at [facility]   Psychiatric:         Cognition and Memory: Cognition is impaired  Memory is impaired  Additional Data:     Labs:    Results from last 7 days   Lab Units 06/14/21  0551   WBC Thousand/uL 4 50   HEMOGLOBIN g/dL 11 1*   HEMATOCRIT % 34 3*   PLATELETS Thousands/uL 212   NEUTROS PCT % 56   LYMPHS PCT % 26   MONOS PCT % 10   EOS PCT % 7*     Results from last 7 days   Lab Units 06/14/21  0551 06/11/21  1653   SODIUM mmol/L 139 136   POTASSIUM mmol/L 3 6 3 2*   CHLORIDE mmol/L 105 98*   CO2 mmol/L 21 27   BUN mg/dL 10 11   CREATININE mg/dL 0 84 1 12   ANION GAP mmol/L 13 11   CALCIUM mg/dL 8 5 9 1   ALBUMIN g/dL  --  3 0*   TOTAL BILIRUBIN mg/dL  --  0 50   ALK PHOS U/L  --  92   ALT U/L  --  16   AST U/L  --  16   GLUCOSE RANDOM mg/dL 75 109     Results from last 7 days   Lab Units 06/11/21  1653   INR  1 05             Results from last 7 days   Lab Units 06/12/21  0534 06/11/21  1713   LACTIC ACID mmol/L  --  1 1   PROCALCITONIN ng/ml 0 16 0 16           * I Have Reviewed All Lab Data Listed Above  * Additional Pertinent Lab Tests Reviewed:  All Labs Within Last 24 Hours Reviewed    Imaging:    Imaging Reports Reviewed Today Include: reviewed  Imaging Personally Reviewed by Myself Includes:  Pending egd and colonoscopy     Recent Cultures (last 7 days):     Results from last 7 days   Lab Units 06/12/21  0742 06/11/21  1713 06/11/21  1704   BLOOD CULTURE   --  No Growth at 48 hrs  No Growth at 48 hrs  C DIFF TOXIN B BY PCR  Negative  --   --        Last 24 Hours Medication List:   Current Facility-Administered Medications   Medication Dose Route Frequency Provider Last Rate    acetaminophen  650 mg Oral Q6H PRN Rashid Arevalo PA-C      aluminum-magnesium hydroxide-simethicone  30 mL Oral Q6H PRN Rashid Arevalo PA-C      levothyroxine  50 mcg Oral Early Morning Rashid Arevalo PA-C      metoprolol succinate  50 mg Oral Daily Rashid Arevalo [place]      ondansetron  4 mg Intravenous Q6H PRN Rashid Arevalo PA-C      pantoprazole  40 mg Oral BID  Rashid Arevalo PA-C          Today, Patient Was Seen By: Graciela Robles PA-C    ** Please Note: Dictation voice to text software may have been used in the creation of this document   **

## 2021-06-15 LAB
ERYTHROCYTE [DISTWIDTH] IN BLOOD BY AUTOMATED COUNT: 13.8 % (ref 11.6–15.1)
HCT VFR BLD AUTO: 32.7 % (ref 34.8–46.1)
HGB BLD-MCNC: 10.6 G/DL (ref 11.5–15.4)
MCH RBC QN AUTO: 27.8 PG (ref 26.8–34.3)
MCHC RBC AUTO-ENTMCNC: 32.4 G/DL (ref 31.4–37.4)
MCV RBC AUTO: 86 FL (ref 82–98)
PLATELET # BLD AUTO: 249 THOUSANDS/UL (ref 149–390)
PMV BLD AUTO: 9.1 FL (ref 8.9–12.7)
RBC # BLD AUTO: 3.81 MILLION/UL (ref 3.81–5.12)
WBC # BLD AUTO: 4.14 THOUSAND/UL (ref 4.31–10.16)

## 2021-06-15 PROCEDURE — 99232 SBSQ HOSP IP/OBS MODERATE 35: CPT | Performed by: FAMILY MEDICINE

## 2021-06-15 PROCEDURE — 85027 COMPLETE CBC AUTOMATED: CPT | Performed by: PHYSICIAN ASSISTANT

## 2021-06-15 PROCEDURE — 99232 SBSQ HOSP IP/OBS MODERATE 35: CPT | Performed by: PHYSICIAN ASSISTANT

## 2021-06-15 PROCEDURE — 99232 SBSQ HOSP IP/OBS MODERATE 35: CPT | Performed by: INTERNAL MEDICINE

## 2021-06-15 RX ORDER — ESCITALOPRAM OXALATE 10 MG/1
5 TABLET ORAL DAILY
Status: DISCONTINUED | OUTPATIENT
Start: 2021-06-15 | End: 2021-06-18 | Stop reason: HOSPADM

## 2021-06-15 RX ORDER — HYDROCORTISONE ACETATE 25 MG/1
25 SUPPOSITORY RECTAL
Status: DISCONTINUED | OUTPATIENT
Start: 2021-06-15 | End: 2021-06-18 | Stop reason: HOSPADM

## 2021-06-15 RX ORDER — GABAPENTIN 100 MG/1
100 CAPSULE ORAL 3 TIMES DAILY
Status: DISCONTINUED | OUTPATIENT
Start: 2021-06-15 | End: 2021-06-18 | Stop reason: HOSPADM

## 2021-06-15 RX ADMIN — GABAPENTIN 100 MG: 100 CAPSULE ORAL at 21:23

## 2021-06-15 RX ADMIN — GABAPENTIN 100 MG: 100 CAPSULE ORAL at 16:07

## 2021-06-15 RX ADMIN — ESCITALOPRAM OXALATE 5 MG: 10 TABLET ORAL at 10:07

## 2021-06-15 RX ADMIN — PANTOPRAZOLE SODIUM 40 MG: 40 TABLET, DELAYED RELEASE ORAL at 16:07

## 2021-06-15 RX ADMIN — GABAPENTIN 100 MG: 100 CAPSULE ORAL at 10:07

## 2021-06-15 RX ADMIN — METOPROLOL SUCCINATE 50 MG: 50 TABLET, EXTENDED RELEASE ORAL at 08:10

## 2021-06-15 RX ADMIN — PANTOPRAZOLE SODIUM 40 MG: 40 TABLET, DELAYED RELEASE ORAL at 06:13

## 2021-06-15 RX ADMIN — LEVOTHYROXINE SODIUM 50 MCG: 50 TABLET ORAL at 06:13

## 2021-06-15 RX ADMIN — HYDROCORTISONE ACETATE 25 MG: 25 SUPPOSITORY RECTAL at 21:24

## 2021-06-15 NOTE — ASSESSMENT & PLAN NOTE
Recurrent episodes of intermittent diarrhea x 1 year  Reports on average 2 BMs/day, recently almost always with BRBPR  Pt was started on Sulfalazine 4 days ago for presumed IBD given colonoscopy findings in May 2021 showing patchy erythema and ulcerations of left colon with biopsies appearing consult with possible Crohn's  Presents with ongoing abdominal pain  Follows with Dr Sanjay Quiles from colorectal surgery  Colonoscopy 5/28/21 - abnormal mucosa in the anus, rectum, sigmoid, descending colon consistent with colitis  · Biopsies noted with acute and chronic inflammation, no malignancy/dysplasia  The differential diagnosis include florid infectious colitis, ischemic colitis  vs inflammatory bowel disease  · CT abdomen (6/11/21) - Sigmoid diverticulosis without evidence of acute diverticulitis  No evidence of colitis or other acute abnormality in the abdomen or pelvis    Resolution of mural thickening in the sigmoid since a CT from 4/1/2021      GI consulted  Underwent EGD and repeat colonoscopy 6/14/21  · Colonoscopy: two polyps removed in ascending and descending colon, multiple large and severe tensive diverticula causing moderate luminal narrowing in sigmoid colon, Localized edematous and scarred mucosa in the sigmoid colon and patchy nodular and scarred mucosa with loss of vascular patten in the rectum s/p biopsies   · EGD: esophagus normal, small hiatal hernia, erythematous and nodular mucosa in antrum s/p bx, random bx taken in duodenum   Procalcitonin negative x 2  C diff negative  Pending ova and parasite  Regular diet   Repeat colonoscopy recommended in 6 months due to colitis   Monitor for any overt GI bleeding  Follow-up with Dr Sanjay Quiles outpatient

## 2021-06-15 NOTE — PROGRESS NOTES
Progress note - Gastroenterology   Abhijit Ngoc 70 y o  female MRN: [id number]  Unit/Bed#: [address] 227-01 Encounter: [id number]    ASSESSMENT and PLAN    68-year-old female with a history of chronic diarrhea presents with worsening diarrheal symptoms x4 days  Diarrhea, rectal bleeding:  Patient seen at the bedside and appears to have increased confusion  She is alert to self  Rambling communication when asked questions  She states that she is eating well today, no diarrhea or bleeding noted  EGD and colonoscopy performed 06/14/2021 for colitis  EGD: Normal esophagus, small hiatal hernia, erythematous nodule mucosa in the antrum; biopsy taken  Duodenal bulb and 2nd part of duodenum appeared normal: biopsy taken  Colonoscopy: Terminal ileum appeared normal, 2 polyps in the ascending and descending colon removed  Multiple large severe extensive diverticula causing moderate in the sigmoid colon  Localized edematous and scarred mucosa the sigmoid colon; biopsy taken  Patchy nodule and scarred mucosa loss of vascular pattern in the rectum; biopsy taken  Differential diagnosis of ischemic colitis versus IBD  - awaiting pathology results  - follow-up with Dr Jenna Lamb  - monitor for overt GI bleeding  - resume regular diet  - repeat screening colonoscopy in 6 months due to colitis    Chief Complaint   Patient presents with    Bleeding/Bruising     Pt has been to a GI specialsit for "holes" and " the blood just keeps coming " Pt aware and alert in [facility]  SUBJECTIVE/HPI   68-year-old female with a history of hypertension, hyperlipidemia, hypothyroidism, chronic diarrhea and breast cancer admitted with worsening diarrheal symptoms x4 days  She is currently followed by Colorectal surgery       /81 (BP Location: Left arm)   Pulse 103   Temp 97 9 °F (36 6 °C) (Oral)   Resp 18   Ht 5' 4" (1 626 m)   Wt 67 2 kg (148 lb 2 4 oz)   SpO2 97%   BMI 25 43 kg/m²     PHYSICALEXAM  General appearance: alert self, rambling disorganized communication  Eyes: PERLLA, EOMI, no icterus   Head: Normocephalic, without obvious abnormality, atraumatic  Lungs: clear to auscultation bilaterally  Heart: regular rate and rhythm, S1, S2 normal, no murmur, click, rub or gallop  Abdomen: soft, lower abdomen tender with palpation; bowel sounds normal; no masses,  no organomegaly  Extremities: extremities normal, atraumatic, no cyanosis or edema  Neurologic: Grossly normal    Lab Results   Component Value Date    GLUCOSE 114 05/09/2017    CALCIUM 8 5 06/14/2021     (L) 01/06/2016    K 3 6 06/14/2021    CO2 21 06/14/2021     06/14/2021    BUN 10 06/14/2021    CREATININE 0 84 06/14/2021     Lab Results   Component Value Date    WBC 4 14 (L) 06/15/2021    HGB 10 6 (L) 06/15/2021    HCT 32 7 (L) 06/15/2021    MCV 86 06/15/2021     06/15/2021     Lab Results   Component Value Date    ALT 16 06/11/2021    AST 16 06/11/2021    ALKPHOS 92 06/11/2021    BILITOT 1 36 (H) 01/06/2016     No results found for: AMYLASE  Lab Results   Component Value Date    LIPASE 65 (L) 04/01/2021     Lab Results   Component Value Date    IRON 61 02/22/2019    TIBC 424 02/22/2019    FERRITIN 55 02/22/2019     Lab Results   Component Value Date    INR 1 05 06/11/2021

## 2021-06-15 NOTE — ASSESSMENT & PLAN NOTE
Patient lives alone in independent apartment near [address]   She is oriented to self and place   She reads date off white board but when asked the month and year without reading it off board she is unable to tell me   She is agitated this morning and hung up on her daughter this morning   She has poor insight and poor decision making at this time   Spoke with daughter at length and she has noticed a recent significant decline prior to admission as well   OT recommending 24/7 supervision   CM following for safe discharge plan   Daughter agrees that she can not return back to living independently at this time   Consult to geriatrics appreciated

## 2021-06-15 NOTE — PROGRESS NOTES
[facility]  Progress Note - Amara King 1945, 70 y o  female MRN: [id number]  Unit/Bed#: [facility] 87 227-01 Encounter: [id number]  Primary Care Provider: Kalli Escoto DO   Date and time admitted to hospital: 6/11/2021  4:28 PM    * Diarrhea  Assessment & Plan  Recurrent episodes of intermittent diarrhea x 1 year  Reports on average 2 BMs/day, recently almost always with BRBPR  Pt was started on Sulfalazine 4 days ago for presumed IBD given colonoscopy findings in May 2021 showing patchy erythema and ulcerations of left colon with biopsies appearing consult with possible Crohn's  Presents with ongoing abdominal pain  Follows with Dr Corazon Bell from colorectal surgery  Colonoscopy 5/28/21 - abnormal mucosa in the anus, rectum, sigmoid, descending colon consistent with colitis  · Biopsies noted with acute and chronic inflammation, no malignancy/dysplasia  The differential diagnosis include florid infectious colitis, ischemic colitis  vs inflammatory bowel disease  · CT abdomen (6/11/21) - Sigmoid diverticulosis without evidence of acute diverticulitis  No evidence of colitis or other acute abnormality in the abdomen or pelvis    Resolution of mural thickening in the sigmoid since a CT from 4/1/2021      GI consulted  Underwent EGD and repeat colonoscopy 6/14/21  · Colonoscopy: two polyps removed in ascending and descending colon, multiple large and severe tensive diverticula causing moderate luminal narrowing in sigmoid colon, Localized edematous and scarred mucosa in the sigmoid colon and patchy nodular and scarred mucosa with loss of vascular patten in the rectum s/p biopsies   · EGD: esophagus normal, small hiatal hernia, erythematous and nodular mucosa in antrum s/p bx, random bx taken in duodenum   Procalcitonin negative x 2  C diff negative  Pending ova and parasite  Regular diet   Repeat colonoscopy recommended in 6 months due to colitis   Monitor for any overt GI bleeding  Follow-up with Dr Salomón Levy outpatient     Cognitive impairment  Assessment & Plan  Patient lives alone in independent apartment near [address]   She is oriented to self and place   She reads date off white board but when asked the month and year without reading it off board she is unable to tell me   She is agitated this morning and hung up on her daughter this morning   She has poor insight and poor decision making at this time   Spoke with daughter at length and she has noticed a recent significant decline prior to admission as well   OT recommending 24/7 supervision   CM following for safe discharge plan   Daughter agrees that she can not return back to living independently at this time   Consult to geriatrics appreciated       Acute blood loss anemia  Assessment & Plan  See colonoscopy an EGD results as above  Monitor for any overt GI bleeding    Lab Results   Component Value Date    HGB 10 6 (L) 06/15/2021    HGB 11 1 (L) 06/14/2021    HGB 11 3 (L) 06/12/2021    HGB 12 9 06/11/2021    HGB 11 2 (L) 04/02/2021    HGB 12 5 04/01/2021         Atrial fibrillation ([facility] [address] )  Assessment & Plan  Currently in NSR  Continue Toprol-XL 50 mg daily  She is not on anticoagulation prior to admission    Invasive ductal carcinoma of breast, right ([facility] [address] )  Assessment & Plan  S/p right mastectomy  followed by [facility]     Hypothyroidism  Assessment & Plan  Continue levothyroxine  TSH normal    Essential hypertension  Assessment & Plan  Continue Toprol XL 50 mg daily  Monitor vital signs    Anxiety  Assessment & Plan  Seen by geriatrics   Recommending starting on lexapro 5 mg daily   Gabapentin 100 mg tid   Discussed with daughter and will initiate today       VTE Pharmacologic Prophylaxis:   Pharmacologic: Pharmacologic VTE Prophylaxis contraindicated due to holding for now for any GI bleeding   Mechanical VTE Prophylaxis in Place: Yes    Patient Centered Rounds: I have performed bedside rounds with nursing staff today  Discussions with Specialists or Other Care Team Provider:  Case management    Education and Discussions with Family / Patient: f the patient at the bedside, spoke with patient's daughter Brianda Paul on the phone and reviewed plan of care extensively    Time Spent for Care: 20 minutes  More than 50% of total time spent on counseling and coordination of care as described above  Current Length of Stay: 3 day(s)    Current Patient Status: Inpatient   Certification Statement: The patient will continue to require additional inpatient hospital stay due to Safe discharge planning    Discharge Plan:  Safe discharge planning    Code Status: Level 1 - Full Code      Subjective:   Haley Boateng is very agitated this morning  She does not really want to speak with me and states she has had it  She is not making a lot of sense this morning  Her speech is clear  She seems to have very poor insight  She denies any pain at this time  She states she ate breakfast   She denies any nausea or vomiting  No chest pain or shortness of breath  I spoke with her daughter Brianda Paul extensively regarding my concern about patient living independently with her recent cognitive decline  Her daughter states that over the past several weeks she has noticed a significant to decline in her cognitive impairment and agrees that she can no longer live independently  Objective:     Vitals:   Temp (24hrs), Av 6 °F (37 °C), Min:97 9 °F (36 6 °C), Max:99 6 °F (37 6 °C)    Temp:  [97 9 °F (36 6 °C)-99 6 °F (37 6 °C)] 97 9 °F (36 6 °C)  HR:  [] 103  Resp:  [18-20] 18  BP: ()/(52-90) 111/81  SpO2:  [97 %-99 %] 97 %  Body mass index is 25 43 kg/m²  Input and Output Summary (last 24 hours):        Intake/Output Summary (Last 24 hours) at 6/15/2021 1142  Last data filed at 6/15/2021 0942  Gross per 24 hour   Intake 1080 ml   Output --   Net 1080 ml       Physical Exam:     Physical Exam  Vitals and nursing note reviewed  Constitutional:       General: She is not in acute distress  HENT:      Head: Normocephalic  Eyes:      Conjunctiva/sclera: Conjunctivae normal    Cardiovascular:      Rate and Rhythm: Normal rate and regular rhythm  Pulmonary:      Effort: Pulmonary effort is normal  No respiratory distress  Breath sounds: Normal breath sounds  No wheezing  Abdominal:      General: Bowel sounds are normal       Palpations: Abdomen is soft  Tenderness: There is no abdominal tenderness  There is no guarding or rebound  Musculoskeletal:      Right lower leg: No edema  Left lower leg: No edema  Neurological:      Mental Status: She is alert  Comments: Oriented to person and place only   Psychiatric:         Behavior: Behavior is agitated  Cognition and Memory: Cognition is impaired  Memory is impaired  Additional Data:     Labs:    Results from last 7 days   Lab Units 06/15/21  0443 06/14/21  0551   WBC Thousand/uL 4 14* 4 50   HEMOGLOBIN g/dL 10 6* 11 1*   HEMATOCRIT % 32 7* 34 3*   PLATELETS Thousands/uL 249 212   NEUTROS PCT %  --  56   LYMPHS PCT %  --  26   MONOS PCT %  --  10   EOS PCT %  --  7*     Results from last 7 days   Lab Units 06/14/21  0551 06/11/21  1653   SODIUM mmol/L 139 136   POTASSIUM mmol/L 3 6 3 2*   CHLORIDE mmol/L 105 98*   CO2 mmol/L 21 27   BUN mg/dL 10 11   CREATININE mg/dL 0 84 1 12   ANION GAP mmol/L 13 11   CALCIUM mg/dL 8 5 9 1   ALBUMIN g/dL  --  3 0*   TOTAL BILIRUBIN mg/dL  --  0 50   ALK PHOS U/L  --  92   ALT U/L  --  16   AST U/L  --  16   GLUCOSE RANDOM mg/dL 75 109     Results from last 7 days   Lab Units 06/11/21  1653   INR  1 05             Results from last 7 days   Lab Units 06/12/21  0534 06/11/21  1713   LACTIC ACID mmol/L  --  1 1   PROCALCITONIN ng/ml 0 16 0 16           * I Have Reviewed All Lab Data Listed Above  * Additional Pertinent Lab Tests Reviewed:  All Labs Within Last 24 Hours Reviewed    Imaging:    Imaging Reports Reviewed Today Include:   Imaging Personally Reviewed by Myself Includes:      Recent Cultures (last 7 days):     Results from last 7 days   Lab Units 06/12/21  0742 06/11/21  1713 06/11/21  1704   BLOOD CULTURE   --  No Growth at 72 hrs  No Growth at 72 hrs  C DIFF TOXIN B BY PCR  Negative  --   --        Last 24 Hours Medication List:   Current Facility-Administered Medications   Medication Dose Route Frequency Provider Last Rate    acetaminophen  650 mg Oral Q6H PRN Gabe Rai PA-C      aluminum-magnesium hydroxide-simethicone  30 mL Oral Q6H PRN Gabe Rai PA-C      escitalopram  5 mg Oral Daily Radha Sanchez PA-C      gabapentin  100 mg Oral TID Krishna Alex PA-C      levothyroxine  50 mcg Oral Early Morning Gabe Rai PA-C      metoprolol succinate  50 mg Oral Daily Gabe Rai [place]      ondansetron  4 mg Intravenous Q6H PRN Gabe Rai PA-C      pantoprazole  40 mg Oral BID  Gabe Rai PA-C          Today, Patient Was Seen By: Krishna Alex PA-C    ** Please Note: Dictation voice to text software may have been used in the creation of this document   **

## 2021-06-15 NOTE — PROGRESS NOTES
Progress Note - Geriatric Medicine   Aruna Grade 70 y o  female MRN: [id number]  Unit/Bed#: [facility] 87 227-01 Encounter: [id number]      Assessment/Plan:  ACP (advance care planning)  Assessment & Plan  As per discussion with the patient, her daughter Rosy Samayoa is her POA pain she states that she has a living  Will  Discussed with the daughter over the phone and she stated that her mom wish is to be DNR, no aggressive measures  Encouraged Rosy Samayoa to bring the [facility] next time she visits  At risk for delirium  Assessment & Plan  -patient noted to have change in mental status today, very anxious and as per nursing she had visual hallucinations  - consider checking EKG for QTC interval, if within normal limits may use 12 5 mg Seroquel b i d  as needed for hallucinations  - if patient is refusing her p o  medication may use Zyprexa 2 5 mg IM q 12 hours as needed  -Patient is high risk of delirium due to cognitive impairment, change in environment, anxiety at baseline, ISIS, diarrhea   -Initiate delirium precautions  -maintain normal sleep/wake cycle  -minimize overnight interruptions, group overnight vitals/labs/nursing checks as possible  -dim lights, close blinds and turn off tv to minimize stimulation and encourage sleep environment in evenings  -ensure that pain is well controlled , consider Tylenol 975mg Q8H scheduled   -monitor for fecal and urinary retention which may precipitate delirium  -encourage early mobilization and ambulation  -provide frequent reorientation and redirection  -encourage family and friends at the bedside to help calm patient if anxious  -avoid medications which may precipitate or worsen delirium such as tramadol, benzodiazepine, anticholinergics, and antihistaminics  -encourage hydration and nutrition , assist with feeding if needed  -redirect unwanted behaviors as first line, avoid physical restraints    - continue Lexapro 5 mg daily and may increase to 10 mg in a week if well tolerated  Increase dose of gabapentin to 200 mg p o  t i d     Cognitive impairment  Assessment & Plan  Patient was able to tell me today the date, the location and her date of birth  Of note she was looking had the board for -date and location  She lives at [facility] living [facility] level  She is independent with all ADLs, needs assistance with IADLs  Maintain sleep-wake cycle  Encourage p o  intake  Consider checking B12 level, reviewed TSH level which is decreased for her age  I will recommend decreasing the dose of levothyroxine to 37 5  Mcg and recheck TSH level in 4-6 weeks  Encourage out of bed as tolerated  Reorient as needed  Patient seen slightly anxious  Continue Lexapro  5 mg daily and increase to 10 mg in a week if well tolerated  Consider increasing gabapentin to 200 mg p o  t i d  Hypothyroidism  Assessment & Plan  TSH in the lower side for patient age  I would recommend decreasing levothyroxine to 37 5 mcg daily and repeat a TSH in 4-6 weeks  Anxiety  Assessment & Plan  Continue Lexapro 5 mg daily  Increase to 10 mg daily if well tolerated in about a week  Consider increasing gabapentin to 200 mg p o  t i d  Continue supportive care     Subjective:   Patient seen and examined at bedside she seems more confused today she is really upset because her daughter is trying to sell her house  As per nursing staff she has visual hallucinations that started last night  She is very anxious during the encounter an repeats constantly "its my house"  Discussed with her daughter over the phone who stated that the patient called her earlier today and she was very confused and angry with her  She also mentioned that the patient never behaved like this in the past       Review of Systems   Constitutional: Negative for chills, fatigue and fever  HENT: Negative for congestion and trouble swallowing      Respiratory: Negative for cough, shortness of breath and wheezing  Cardiovascular: Negative for chest pain, palpitations and leg swelling  Gastrointestinal: Negative for abdominal pain, constipation and diarrhea  Genitourinary: Negative for dysuria and hematuria  Musculoskeletal: Negative for gait problem  Skin: Negative for wound  Neurological: Negative for dizziness and headaches  Hematological: Does not bruise/bleed easily  Psychiatric/Behavioral: Positive for hallucinations  Negative for behavioral problems and sleep disturbance  The patient is nervous/anxious  Objective:     Vitals: Blood pressure 111/81, pulse 103, temperature 97 9 °F (36 6 °C), temperature source Oral, resp  rate 18, height 5' 4" (1 626 m), weight 67 2 kg (148 lb 2 4 oz), SpO2 97 %  ,Body mass index is 25 43 kg/m²  Intake/Output Summary (Last 24 hours) at 6/15/2021 1235  Last data filed at 6/15/2021 0942  Gross per 24 hour   Intake 1080 ml   Output --   Net 1080 ml       Current Medications: Reviewed    Physical Exam:   Physical Exam  Vitals and nursing note reviewed  Constitutional:       General: She is not in acute distress  Appearance: She is well-developed  HENT:      Head: Normocephalic and atraumatic  Mouth/Throat:      Comments: dentures  Eyes:      Conjunctiva/sclera: Conjunctivae normal    Cardiovascular:      Rate and Rhythm: Regular rhythm  Tachycardia present  Heart sounds: No murmur heard  Pulmonary:      Effort: Pulmonary effort is normal  No respiratory distress  Breath sounds: Normal breath sounds  Abdominal:      Palpations: Abdomen is soft  Tenderness: There is no abdominal tenderness  Musculoskeletal:         General: No tenderness  Cervical back: Normal range of motion and neck supple  Right lower leg: No edema  Left lower leg: No edema  Skin:     General: Skin is warm and dry  Neurological:      Mental Status: She is alert  Mental status is at baseline  She is disoriented     Psychiatric: Comments: Agitated, describing visual hallucinations          Invasive Devices     Peripheral Intravenous Line            Peripheral IV 06/14/21 Left Hand <1 day                Lab, Imaging and other studies: I have personally reviewed pertinent reports

## 2021-06-15 NOTE — ASSESSMENT & PLAN NOTE
Seen by geriatrics   Recommending starting on lexapro 5 mg daily   Gabapentin 100 mg tid   Discussed with daughter and will initiate today

## 2021-06-15 NOTE — ASSESSMENT & PLAN NOTE
See colonoscopy an EGD results as above  Monitor for any overt GI bleeding    Lab Results   Component Value Date    HGB 10 6 (L) 06/15/2021    HGB 11 1 (L) 06/14/2021    HGB 11 3 (L) 06/12/2021    HGB 12 9 06/11/2021    HGB 11 2 (L) 04/02/2021    HGB 12 5 04/01/2021

## 2021-06-16 PROBLEM — K62.5 RECTAL BLEEDING: Status: ACTIVE | Noted: 2021-06-16

## 2021-06-16 PROBLEM — G47.01 INSOMNIA DUE TO MEDICAL CONDITION: Status: ACTIVE | Noted: 2021-06-16

## 2021-06-16 PROBLEM — E87.6 HYPOKALEMIA: Status: RESOLVED | Noted: 2017-05-12 | Resolved: 2021-06-16

## 2021-06-16 PROBLEM — R65.10 SIRS (SYSTEMIC INFLAMMATORY RESPONSE SYNDROME) (HCC): Status: ACTIVE | Noted: 2021-06-16

## 2021-06-16 LAB
BACTERIA BLD CULT: NORMAL
BACTERIA BLD CULT: NORMAL
HCT VFR BLD AUTO: 34.6 % (ref 34.8–46.1)
HGB BLD-MCNC: 11 G/DL (ref 11.5–15.4)

## 2021-06-16 PROCEDURE — 85018 HEMOGLOBIN: CPT | Performed by: PHYSICIAN ASSISTANT

## 2021-06-16 PROCEDURE — 99232 SBSQ HOSP IP/OBS MODERATE 35: CPT | Performed by: FAMILY MEDICINE

## 2021-06-16 PROCEDURE — 85014 HEMATOCRIT: CPT | Performed by: PHYSICIAN ASSISTANT

## 2021-06-16 PROCEDURE — 99232 SBSQ HOSP IP/OBS MODERATE 35: CPT | Performed by: PHYSICIAN ASSISTANT

## 2021-06-16 RX ADMIN — LEVOTHYROXINE SODIUM 50 MCG: 50 TABLET ORAL at 06:21

## 2021-06-16 RX ADMIN — PANTOPRAZOLE SODIUM 40 MG: 40 TABLET, DELAYED RELEASE ORAL at 06:22

## 2021-06-16 RX ADMIN — GABAPENTIN 100 MG: 100 CAPSULE ORAL at 17:00

## 2021-06-16 RX ADMIN — HYDROCORTISONE ACETATE 25 MG: 25 SUPPOSITORY RECTAL at 21:37

## 2021-06-16 RX ADMIN — GABAPENTIN 100 MG: 100 CAPSULE ORAL at 21:37

## 2021-06-16 RX ADMIN — ESCITALOPRAM OXALATE 5 MG: 10 TABLET ORAL at 08:05

## 2021-06-16 RX ADMIN — GABAPENTIN 100 MG: 100 CAPSULE ORAL at 08:04

## 2021-06-16 RX ADMIN — METOPROLOL SUCCINATE 50 MG: 50 TABLET, EXTENDED RELEASE ORAL at 08:04

## 2021-06-16 RX ADMIN — PANTOPRAZOLE SODIUM 40 MG: 40 TABLET, DELAYED RELEASE ORAL at 17:00

## 2021-06-16 NOTE — CASE MANAGEMENT
GMLOS: no expected d/c              LOS: 4  BUNDLED? no  UNPLANNED READMISSION LEVEL: low  30 DAY READMISSION? No    Pt admitted with colitis found to have cognitive decline with neuropsych deeming pt to lack capacity  Advance Care Planning     Pt has a Living Will/POA from 4/13/2016 within EHR deeming pt's daughter Olya Dunham [phone] as her POA healthcare/ financials- per Dtr she found a copy done with pt's  from 2018 showing her as primary [facility] and pt's friend Lindsay Cartwright [phone]  as the 1st alternative- she will bring in to hospital to have it scanned into EHR      Pt without MH hx having ETOH use in the past last used in 2019 per Dtr  She lives along at [address] [place] [address] Road with her mentation declining especially in the last few months  Pt was independent with care and household chores having her friend Katie Camarena assist when needed including driving self to appointments  PCP is Dr Kip Sorto and she uses [facility] delivery for maintenance medications and [facility] for acute needed mediations  Adolfoboris Bryce agrees pt is unable to live independently needing 24/7 supervision and is interested in detention with information for [facility] provided and permission given to contact liaison so to assist with direct placement from hospital   Understands pt is medically stable to d/c to facility as soon as one found- pt is unsafe to d/c home alone at this point  Will continue to follow to assist with dc poc

## 2021-06-16 NOTE — ASSESSMENT & PLAN NOTE
Patient lives alone in independent apartment near [address]   She is oriented to self and place   She reads date off white board but when asked the month and year without reading it off board she is unable to tell me   She was agitated yesterday morning and hung up on her daughter   She has poor insight and poor decision making at this time   Spoke with daughter at length and she has noticed a recent significant decline prior to admission as well   OT recommending 24/7 supervision   CM following for safe discharge plan   Daughter agrees that she can not return back to living independently at this time   Consult to geriatrics appreciated   Started on lexapro 5m g once daily, can consider incerased to 10 mg once daily in 1 week   Also started on gabapentin 100 mg tid, can consider increasing to 200 mg tid   Check ECG today to monitor QTC for consideration of starting po seroquel   Supportive care, redirection and reorientation

## 2021-06-16 NOTE — ASSESSMENT & PLAN NOTE
Present on admission in setting of diarrhea and rectal bleeding   2/2 colitis as above   treated with IVF hydration   No indication for abx   cdiff negative   Blood cultures negative   Now afebrile   Resolved

## 2021-06-16 NOTE — ASSESSMENT & PLAN NOTE
Patient denies any symptoms currently  Vital signs stable  No diarrhea or blood in the stool reported    Continue management as per primary team

## 2021-06-16 NOTE — ASSESSMENT & PLAN NOTE
Currently in NSR  Continue Toprol-XL 50 mg daily  She is not on anticoagulation prior to admission due to rectal bleeding

## 2021-06-16 NOTE — ASSESSMENT & PLAN NOTE
2/2 colitis   See #1 above   Monitor hgb    Lab Results   Component Value Date    HGB 11 0 (L) 06/16/2021    HGB 10 6 (L) 06/15/2021    HGB 11 1 (L) 06/14/2021    HGB 11 3 (L) 06/12/2021    HGB 12 9 06/11/2021

## 2021-06-16 NOTE — ASSESSMENT & PLAN NOTE
Recurrent episodes of intermittent diarrhea x 1 year  Reports on average 2 BMs/day, recently almost always with BRBPR  Pt was started on Sulfalazine 4 days ago for presumed ischemic vs inflammatory colitis given colonoscopy findings in May 2021 showing patchy erythema and ulcerations of left colon with biopsies appearing consult with possible Crohn's  Presents with ongoing abdominal pain  Follows with Dr Tatum Soto from colorectal surgery  Colonoscopy 5/28/21 - abnormal mucosa in the anus, rectum, sigmoid, descending colon consistent with colitis  · Biopsies noted with acute and chronic inflammation, no malignancy/dysplasia  The differential diagnosis include florid infectious colitis, ischemic colitis  vs inflammatory bowel disease  · CT abdomen (6/11/21) - Sigmoid diverticulosis without evidence of acute diverticulitis  No evidence of colitis or other acute abnormality in the abdomen or pelvis    Resolution of mural thickening in the sigmoid since a CT from 4/1/2021      GI consulted  Underwent EGD and repeat colonoscopy 6/14/21  · Colonoscopy: two polyps removed in ascending and descending colon, multiple large and severe tensive diverticula causing moderate luminal narrowing in sigmoid colon, Localized edematous and scarred mucosa in the sigmoid colon and patchy nodular and scarred mucosa with loss of vascular patten in the rectum s/p biopsies   · Improvement in inflammation but with persistent edema/colitis in sigmoid - Follow up biopsies and pathology   · Started on hydrocortisone suppositories  · EGD: esophagus normal, small hiatal hernia, erythematous and nodular mucosa in antrum s/p bx, random bx taken in duodenum   Procalcitonin negative x 2  C diff negative  Pending ova and parasite  Blood cultures negative   Regular diet   Repeat colonoscopy recommended in 6 months due to colitis   Monitor for any overt GI bleeding  Follow-up with Dr Tatum Soto outpatient

## 2021-06-16 NOTE — ASSESSMENT & PLAN NOTE
See colonoscopy an EGD results as above  2/2 rectal bleeding from colitis   Monitor for any overt GI bleeding  hgb stable today     Lab Results   Component Value Date    HGB 11 0 (L) 06/16/2021    HGB 10 6 (L) 06/15/2021    HGB 11 1 (L) 06/14/2021    HGB 11 3 (L) 06/12/2021    HGB 12 9 06/11/2021    HGB 11 2 (L) 04/02/2021

## 2021-06-16 NOTE — ASSESSMENT & PLAN NOTE
Seen by geriatrics   Recommending starting on lexapro 5 mg daily, started on 6/15/21  Gabapentin 100 mg tid also started during this admission   Discussed initiation of medications with both patient and daughter

## 2021-06-16 NOTE — PROGRESS NOTES
Progress Note - Geriatric Medicine   Jann Dhaliwal 70 y o  female MRN: [id number]  Unit/Bed#: [facility] 68 2 -01 Encounter: [id number]      Assessment/Plan:    Insomnia due to medical condition  Assessment & Plan  Consider starting melatonin 3 mg p o  q h s  Avoid caffeine in use especially in the afternoon  ACP (advance care planning)  Assessment & Plan  As per discussion with the patient, her daughter Britta Rinaldi is her POA pain she states that she has a living  Will  Discussed with the daughter over the phone and she stated that her mom wish is to be DNR, no aggressive measures  Encouraged Britta Rinaldi to bring the [facility] next time she visits  At risk for delirium  Assessment & Plan  -patient noted to have change in mental status today, very anxious and as per nursing she had visual hallucinations  - consider checking EKG for QTC interval, if within normal limits may use 12 5 mg Seroquel b i d  as needed for hallucinations  - if patient is refusing her p o  medication may use Zyprexa 2 5 mg IM q 12 hours as needed  -Patient is high risk of delirium due to cognitive impairment, change in environment, anxiety at baseline, ISIS, diarrhea   -Initiate delirium precautions  -maintain normal sleep/wake cycle   Consider starting melatonin 3 mg po QHS- patient reports insomnia  -minimize overnight interruptions, group overnight vitals/labs/nursing checks as possible  -dim lights, close blinds and turn off tv to minimize stimulation and encourage sleep environment in evenings  -ensure that pain is well controlled , consider Tylenol 975mg Q8H scheduled and /or lidocaine patches b/l knees (Patient reports pain with ambulation)  -monitor for fecal and urinary retention which may precipitate delirium  -encourage early mobilization and ambulation  -provide frequent reorientation and redirection  -encourage family and friends at the bedside to help calm patient if anxious  -avoid medications which may precipitate or worsen delirium such as tramadol, benzodiazepine, anticholinergics, and antihistaminics  -encourage hydration and nutrition , assist with feeding if needed  -redirect unwanted behaviors as first line, avoid physical restraints  - continue Lexapro 5 mg daily and may increase to 10 mg in a week if well tolerated  Increase dose of gabapentin to 200 mg p o  t i d     Cognitive impairment  Assessment & Plan  Patient was able to tell me today the date, the location and her date of birth  Of note she was looking had the board for -date and location  She lives at [facility] living [facility] level  She is independent with all ADLs, needs assistance with IADLs  Maintain sleep-wake cycle  Encourage p o  intake  Consider checking B12 level, reviewed TSH level which is decreased for her age  I will recommend decreasing the dose of levothyroxine to 37 5  Mcg and recheck TSH level in 4-6 weeks  Encourage out of bed as tolerated  Reorient as needed  Patient seen slightly anxious  Continue Lexapro  5 mg daily and increase to 10 mg in a week if well tolerated  Consider increasing gabapentin to 200 mg p o  t i d  Pending discharge to long-term care facility  Hypothyroidism  Assessment & Plan  TSH in the lower side for patient age  I would recommend decreasing levothyroxine to 37 5 mcg daily and repeat a TSH in 4-6 weeks  Anxiety  Assessment & Plan  Continue Lexapro 5 mg daily  Increase to 10 mg daily if well tolerated in about a week  Consider increasing gabapentin to 200 mg p o  t i d  Continue supportive care     * Colitis  Assessment & Plan  Patient denies any symptoms currently  Vital signs stable  No diarrhea or blood in the stool reported  Continue management as per primary team     Subjective:   Patient seen and examined at bedside since last anxious today, effusion still present  She reported insomnia  States that her appetite is decreased at baseline    Denies diarrhea, no bowel movement today, denies abdominal pain nausea  She reports chronic knee pain aggravated by ambulation left more than right  As per nursing staff she continues to have visual hallucinations intermittently  Review of Systems   Constitutional: Positive for appetite change  Negative for chills and fever  HENT: Negative for congestion and rhinorrhea  Respiratory: Negative for cough, shortness of breath and wheezing  Cardiovascular: Negative for chest pain, palpitations and leg swelling  Gastrointestinal: Negative for abdominal pain, constipation and diarrhea  Genitourinary: Negative for dysuria and hematuria  Musculoskeletal: Negative for gait problem  Skin: Negative for wound  Neurological: Negative for dizziness  Hematological: Does not bruise/bleed easily  Psychiatric/Behavioral: Positive for dysphoric mood and sleep disturbance  The patient is nervous/anxious  Objective:     Vitals: Blood pressure 132/93, pulse 100, temperature 97 9 °F (36 6 °C), temperature source Oral, resp  rate 18, height 5' 4" (1 626 m), weight 67 2 kg (148 lb 2 4 oz), SpO2 96 %  ,Body mass index is 25 43 kg/m²  Intake/Output Summary (Last 24 hours) at 6/16/2021 0906  Last data filed at 6/15/2021 0942  Gross per 24 hour   Intake 480 ml   Output --   Net 480 ml       Current Medications: Reviewed    Physical Exam:   Physical Exam  Vitals and nursing note reviewed  Constitutional:       General: She is not in acute distress  Appearance: She is well-developed  Comments: Frail looking   HENT:      Head: Normocephalic and atraumatic  Mouth/Throat:      Comments: dentures  Eyes:      Conjunctiva/sclera: Conjunctivae normal    Cardiovascular:      Rate and Rhythm: Normal rate and regular rhythm  Heart sounds: No murmur heard  Pulmonary:      Effort: Pulmonary effort is normal  No respiratory distress  Breath sounds: Normal breath sounds  Abdominal:      Palpations: Abdomen is soft  Tenderness: There is no abdominal tenderness  Musculoskeletal:         General: Swelling (left knee) and tenderness present  Cervical back: Neck supple  Right lower leg: No edema  Left lower leg: No edema  Skin:     General: Skin is warm and dry  Neurological:      Mental Status: She is alert  Mental status is at baseline  She is disoriented  Comments: AAOx2   Psychiatric:      Comments: anxious          Invasive Devices     Peripheral Intravenous Line            Peripheral IV 06/14/21 Left Hand 1 day                Lab, Imaging and other studies: I have personally reviewed pertinent reports

## 2021-06-16 NOTE — PROGRESS NOTES
[facility]ly [facility], [facility]  Progress Note - Akilah Renae 1945, 70 y o  female MRN: [id number]  Unit/Bed#: Jose L 68 2 [address] 87 [id number] Encounter: [id number]  Primary Care Provider: Dawson Hatfield DO   Date and time admitted to hospital: 6/11/2021  4:28 PM    * Colitis  Assessment & Plan  Recurrent episodes of intermittent diarrhea x 1 year  Reports on average 2 BMs/day, recently almost always with BRBPR  Pt was started on Sulfalazine 4 days ago for presumed ischemic vs inflammatory colitis given colonoscopy findings in May 2021 showing patchy erythema and ulcerations of left colon with biopsies appearing consult with possible Crohn's  Presents with ongoing abdominal pain  Follows with Dr Salomón Levy from colorectal surgery  Colonoscopy 5/28/21 - abnormal mucosa in the anus, rectum, sigmoid, descending colon consistent with colitis  · Biopsies noted with acute and chronic inflammation, no malignancy/dysplasia  The differential diagnosis include florid infectious colitis, ischemic colitis  vs inflammatory bowel disease  · CT abdomen (6/11/21) - Sigmoid diverticulosis without evidence of acute diverticulitis  No evidence of colitis or other acute abnormality in the abdomen or pelvis    Resolution of mural thickening in the sigmoid since a CT from 4/1/2021      GI consulted  Underwent EGD and repeat colonoscopy 6/14/21  · Colonoscopy: two polyps removed in ascending and descending colon, multiple large and severe tensive diverticula causing moderate luminal narrowing in sigmoid colon, Localized edematous and scarred mucosa in the sigmoid colon and patchy nodular and scarred mucosa with loss of vascular patten in the rectum s/p biopsies   · Improvement in inflammation but with persistent edema/colitis in sigmoid - Follow up biopsies and pathology   · Started on hydrocortisone suppositories  · EGD: esophagus normal, small hiatal hernia, erythematous and nodular mucosa in antrum s/p bx, random bx taken in duodenum Procalcitonin negative x 2  C diff negative  Pending ova and parasite  Blood cultures negative   Regular diet   Repeat colonoscopy recommended in 6 months due to colitis   Monitor for any overt GI bleeding  Follow-up with Dr Radha Garcia outpatient     Cognitive impairment  Assessment & Plan  Patient lives alone in independent apartment near [address]   She is oriented to self and place   She reads date off white board but when asked the month and year without reading it off board she is unable to tell me   She was agitated yesterday morning and hung up on her daughter   She has poor insight and poor decision making at this time   Spoke with daughter at length and she has noticed a recent significant decline prior to admission as well   OT recommending 24/7 supervision   CM following for safe discharge plan   Daughter agrees that she can not return back to living independently at this time   Consult to geriatrics appreciated   Started on lexapro 5m g once daily, can consider incerased to 10 mg once daily in 1 week   Also started on gabapentin 100 mg tid, can consider increasing to 200 mg tid   Check ECG today to monitor QTC for consideration of starting po seroquel   Supportive care, redirection and reorientation       Rectal bleeding  Assessment & Plan  2/2 colitis   See #1 above   Monitor hgb    Lab Results   Component Value Date    HGB 11 0 (L) 06/16/2021    HGB 10 6 (L) 06/15/2021    HGB 11 1 (L) 06/14/2021    HGB 11 3 (L) 06/12/2021    HGB 12 9 06/11/2021         SIRS (systemic inflammatory response syndrome) ([facility] [address] )  Assessment & Plan  Present on admission in setting of diarrhea and rectal bleeding   2/2 colitis as above   treated with IVF hydration   No indication for abx   cdiff negative   Blood cultures negative   Now afebrile   Resolved     Acute blood loss anemia  Assessment & Plan  See colonoscopy an EGD results as above  2/2 rectal bleeding from colitis   Monitor for any overt GI bleeding  hgb stable today     Lab Results   Component Value Date    HGB 11 0 (L) 06/16/2021    HGB 10 6 (L) 06/15/2021    HGB 11 1 (L) 06/14/2021    HGB 11 3 (L) 06/12/2021    HGB 12 9 06/11/2021    HGB 11 2 (L) 04/02/2021         Atrial fibrillation (HCC)  Assessment & Plan  Currently in NSR  Continue Toprol-XL 50 mg daily  She is not on anticoagulation prior to admission due to rectal bleeding     Invasive ductal carcinoma of breast, right [facility])  Assessment & Plan  S/p right mastectomy  followed by [facility]     Hypothyroidism  Assessment & Plan  Continue levothyroxine  TSH normal    Essential hypertension  Assessment & Plan  Continue Toprol XL 50 mg daily  Monitor vital signs    Anxiety  Assessment & Plan  Seen by geriatrics   Recommending starting on lexapro 5 mg daily, started on 6/15/21  Gabapentin 100 mg tid also started during this admission   Discussed initiation of medications with both patient and daughter       VTE Pharmacologic Prophylaxis:   Pharmacologic: Pharmacologic VTE Prophylaxis contraindicated due to rectal bleeding   Mechanical VTE Prophylaxis in Place: Yes    Patient Centered Rounds: I have performed bedside rounds with nursing staff today  Discussions with Specialists or Other Care Team Provider: will discuss CM    Education and Discussions with Family / Patient: patient at the [facility]  Daughter Lino Oneill on the phone     Time Spent for Care: 20 minutes  More than 50% of total time spent on counseling and coordination of care as described above  Current Length of Stay: 4 day(s)    Current Patient Status: Inpatient   Certification Statement: The patient will continue to require additional inpatient hospital stay due to safe discharge planning     Discharge Plan: patient can no longer live independently, CM working on safe discharge plan     Code Status: Level 1 - Full Code      Subjective:   Magan Louise is pleasant today sitting up eating pancakes  seh denies any nitish   Denies BM today but had one yesterday  No N/V  No chest pain  Denies fevers or chills  I spoke with her daughter Janell Zimmerman again today and she agrees she cannot return to independent living  She will await a call from case management to discuss a better discharge plan for her mother safety  Objective:     Vitals:   Temp (24hrs), Av 9 °F (36 6 °C), Min:97 6 °F (36 4 °C), Max:98 1 °F (36 7 °C)    Temp:  [97 6 °F (36 4 °C)-98 1 °F (36 7 °C)] 97 9 °F (36 6 °C)  HR:  [] 100  Resp:  [17-22] 18  BP: (114-132)/(82-93) 132/93  SpO2:  [95 %-97 %] 96 %  Body mass index is 25 43 kg/m²  Input and Output Summary (last 24 hours): Intake/Output Summary (Last 24 hours) at 2021 0825  Last data filed at 6/15/2021 0942  Gross per 24 hour   Intake 480 ml   Output --   Net 480 ml       Physical Exam:     Physical Exam  Vitals and nursing note reviewed  Eyes:      Conjunctiva/sclera: Conjunctivae normal    Cardiovascular:      Rate and Rhythm: Normal rate and regular rhythm  Pulmonary:      Effort: Pulmonary effort is normal       Breath sounds: Normal breath sounds  Abdominal:      General: Bowel sounds are normal       Palpations: Abdomen is soft  Tenderness: There is no abdominal tenderness  Musculoskeletal:      Right lower leg: No edema  Left lower leg: No edema  Skin:     General: Skin is warm  Neurological:      Mental Status: She is alert  Comments: Oriented to person and place   Psychiatric:         Cognition and Memory: Cognition is impaired  Memory is impaired  Comments: She is pleasant this morning, impaired insight   Waxes and wanes            Additional Data:     Labs:    Results from last 7 days   Lab Units 21  0459 06/15/21  0443 21  0551   WBC Thousand/uL  --  4 14* 4 50   HEMOGLOBIN g/dL 11 0* 10 6* 11 1*   HEMATOCRIT % 34 6* 32 7* 34 3*   PLATELETS Thousands/uL  --  249 212   NEUTROS PCT %  --   --  56   LYMPHS PCT %  --   --  26   MONOS PCT %  --   --  10   EOS PCT %  --   -- 7*     Results from last 7 days   Lab Units 06/14/21  0551 06/11/21  1653   SODIUM mmol/L 139 136   POTASSIUM mmol/L 3 6 3 2*   CHLORIDE mmol/L 105 98*   CO2 mmol/L 21 27   BUN mg/dL 10 11   CREATININE mg/dL 0 84 1 12   ANION GAP mmol/L 13 11   CALCIUM mg/dL 8 5 9 1   ALBUMIN g/dL  --  3 0*   TOTAL BILIRUBIN mg/dL  --  0 50   ALK PHOS U/L  --  92   ALT U/L  --  16   AST U/L  --  16   GLUCOSE RANDOM mg/dL 75 109     Results from last 7 days   Lab Units 06/11/21  1653   INR  1 05             Results from last 7 days   Lab Units 06/12/21  0534 06/11/21  1713   LACTIC ACID mmol/L  --  1 1   PROCALCITONIN ng/ml 0 16 0 16           * I Have Reviewed All Lab Data Listed Above  * Additional Pertinent Lab Tests Reviewed: All Labs Within Last 24 Hours Reviewed    Imaging:    Imaging Reports Reviewed Today Include: reviewed  Imaging Personally Reviewed by Myself Includes:  pendign ECG at the time of this note     Recent Cultures (last 7 days):     Results from last 7 days   Lab Units 06/12/21  0742 06/11/21  1713 06/11/21  1704   BLOOD CULTURE   --  No Growth After 4 Days  No Growth After 4 Days     C DIFF TOXIN B BY PCR  Negative  --   --        Last 24 Hours Medication List:   Current Facility-Administered Medications   Medication Dose Route Frequency Provider Last Rate    acetaminophen  650 mg Oral Q6H PRN Zack Torres PA-C      aluminum-magnesium hydroxide-simethicone  30 mL Oral Q6H PRN Zack Torres PA-C      escitalopram  5 mg Oral Daily Radha Sanchez PA-C      gabapentin  100 mg Oral TID John Carrero PA-C      hydrocortisone  25 mg Rectal HS Nena Jones PA-C      levothyroxine  50 mcg Oral Early Morning Zack Torres PA-C      metoprolol succinate  50 mg Oral Daily Zack Torres [place]      ondansetron  4 mg Intravenous Q6H PRN Zack Torres PA-C      pantoprazole  40 mg Oral BID [facility] Zack Torres PA-C          Today, Patient Was Seen By: John Carrero PA-C    ** Please Note: Dictation voice to text software may have been used in the creation of this document   **

## 2021-06-17 LAB
O+P STL CONC: NORMAL
SARS-COV-2 RNA RESP QL NAA+PROBE: NEGATIVE

## 2021-06-17 PROCEDURE — 99232 SBSQ HOSP IP/OBS MODERATE 35: CPT | Performed by: FAMILY MEDICINE

## 2021-06-17 PROCEDURE — U0005 INFEC AGEN DETEC AMPLI PROBE: HCPCS | Performed by: INTERNAL MEDICINE

## 2021-06-17 PROCEDURE — 99232 SBSQ HOSP IP/OBS MODERATE 35: CPT | Performed by: INTERNAL MEDICINE

## 2021-06-17 PROCEDURE — U0003 INFECTIOUS AGENT DETECTION BY NUCLEIC ACID (DNA OR RNA); SEVERE ACUTE RESPIRATORY SYNDROME CORONAVIRUS 2 (SARS-COV-2) (CORONAVIRUS DISEASE [COVID-19]), AMPLIFIED PROBE TECHNIQUE, MAKING USE OF HIGH THROUGHPUT TECHNOLOGIES AS DESCRIBED BY CMS-2020-01-R: HCPCS | Performed by: INTERNAL MEDICINE

## 2021-06-17 RX ADMIN — METOPROLOL SUCCINATE 50 MG: 50 TABLET, EXTENDED RELEASE ORAL at 08:43

## 2021-06-17 RX ADMIN — GABAPENTIN 100 MG: 100 CAPSULE ORAL at 08:43

## 2021-06-17 RX ADMIN — PANTOPRAZOLE SODIUM 40 MG: 40 TABLET, DELAYED RELEASE ORAL at 06:14

## 2021-06-17 RX ADMIN — GABAPENTIN 100 MG: 100 CAPSULE ORAL at 17:00

## 2021-06-17 RX ADMIN — GABAPENTIN 100 MG: 100 CAPSULE ORAL at 22:00

## 2021-06-17 RX ADMIN — ESCITALOPRAM OXALATE 5 MG: 10 TABLET ORAL at 08:43

## 2021-06-17 RX ADMIN — LEVOTHYROXINE SODIUM 50 MCG: 50 TABLET ORAL at 06:14

## 2021-06-17 RX ADMIN — PANTOPRAZOLE SODIUM 40 MG: 40 TABLET, DELAYED RELEASE ORAL at 17:00

## 2021-06-17 NOTE — PROGRESS NOTES
Progress Note - Geriatric Medicine   Vivien Mckeon 70 y o  female MRN: [id number]  Unit/Bed#: Kirill Thompson 2 -01 Encounter: [id number]      Assessment/Plan:  Insomnia due to medical condition  Assessment & Plan  Consider starting melatonin 3 mg p o  q h s  Avoid caffeine in use especially in the afternoon  ACP (advance care planning)  Assessment & Plan  As per discussion with the patient, her daughter Uday Saldivar is her POA pain she states that she has a living  Will  Discussed with the daughter over the phone and she stated that her mom wish is to be DNR, no aggressive measures  Encouraged Uday Saldivar to bring the [facility] next time she visits  At risk for delirium  Assessment & Plan  - patient seems in better spirits today, seems calmer, insists in going home  No hallucinations reported today  - consider checking EKG for QTC interval, if within normal limits may use 12 5 mg Seroquel b i d  as needed for hallucinations  - if patient is refusing her p o  medication may use Zyprexa 2 5 mg IM q 12 hours as needed  -Patient is high risk of delirium due to cognitive impairment, change in environment, anxiety at baseline, ISIS, diarrhea   -Initiate delirium precautions  -maintain normal sleep/wake cycle   Consider starting melatonin 3 mg po QHS- patient reports insomnia  -minimize overnight interruptions, group overnight vitals/labs/nursing checks as possible  -dim lights, close blinds and turn off tv to minimize stimulation and encourage sleep environment in evenings  -ensure that pain is well controlled , consider Tylenol 975mg Q8H scheduled and /or lidocaine patches b/l knees (Patient reports pain with ambulation)  -monitor for fecal and urinary retention which may precipitate delirium  -encourage early mobilization and ambulation  -provide frequent reorientation and redirection  -encourage family and friends at the bedside to help calm patient if anxious  -avoid medications which may precipitate or worsen delirium such as tramadol, benzodiazepine, anticholinergics, and antihistaminics  -encourage hydration and nutrition , assist with feeding if needed  -redirect unwanted behaviors as first line, avoid physical restraints  - continue Lexapro 5 mg daily and may increase to 10 mg in a week if well tolerated  Increase dose of gabapentin to 200 mg p o  t i d     Cognitive impairment  Assessment & Plan  Patient was able to tell me today the date, the location and her date of birth  Of note she was looking had the board for -date and location  She lives at [facility] living [facility] level  She is independent with all ADLs, needs assistance with IADLs  Maintain sleep-wake cycle  Encourage p o  intake  Consider checking B12 level, reviewed TSH level which is decreased for her age  I will recommend decreasing the dose of levothyroxine to 37 5  Mcg and recheck TSH level in 4-6 weeks  Encourage out of bed as tolerated  Reorient as needed  Patient seen slightly anxious  Continue Lexapro  5 mg daily and increase to 10 mg in a week if well tolerated  Consider increasing gabapentin to 200 mg p o  t i d  Pending discharge to long-term care facility  Hypothyroidism  Assessment & Plan  TSH in the lower side for patient age  I would recommend decreasing levothyroxine to 37 5 mcg daily and repeat a TSH in 4-6 weeks  Anxiety  Assessment & Plan  Continue Lexapro 5 mg daily  Increase to 10 mg daily if well tolerated in about a week  Consider increasing gabapentin to 200 mg p o  t i d  Continue supportive care     * Colitis  Assessment & Plan  Patient denies any symptoms currently  Vital signs stable  No diarrhea or blood in the stool reported  Continue management as per primary team     Subjective:    Patient seen examined at bedside she seems in a better mood today, still asking when she is going to go home    She reports good sleep, good appetite, no diarrhea and states that her last bowel movement was yesterday  Review of Systems   Constitutional: Negative for chills and fever  HENT: Negative for congestion and rhinorrhea  Respiratory: Negative for cough, shortness of breath and wheezing  Cardiovascular: Negative for chest pain, palpitations and leg swelling  Gastrointestinal: Negative for abdominal pain, constipation and diarrhea  Endocrine: Negative for cold intolerance  Genitourinary: Negative for difficulty urinating, dysuria and hematuria  Musculoskeletal: Negative for gait problem  Skin: Negative for wound  Allergic/Immunologic: Negative for environmental allergies  Neurological: Negative for dizziness and seizures  Psychiatric/Behavioral: Positive for dysphoric mood  Negative for sleep disturbance  The patient is nervous/anxious  Objective:     Vitals: Blood pressure (!) 157/106, pulse 100, temperature 98 2 °F (36 8 °C), temperature source Oral, resp  rate 18, height 5' 4" (1 626 m), weight 67 2 kg (148 lb 2 4 oz), SpO2 94 %  ,Body mass index is 25 43 kg/m²  No intake or output data in the 24 hours ending 06/17/21 5359    Current Medications: Reviewed    Physical Exam:   Physical Exam  Vitals and nursing note reviewed  Constitutional:       General: She is not in acute distress  Appearance: She is well-developed  HENT:      Head: Normocephalic and atraumatic  Mouth/Throat:      Comments: dentures  Eyes:      Conjunctiva/sclera: Conjunctivae normal    Cardiovascular:      Rate and Rhythm: Normal rate and regular rhythm  Heart sounds: No murmur heard  Pulmonary:      Effort: Pulmonary effort is normal  No respiratory distress  Breath sounds: Normal breath sounds  Abdominal:      Palpations: Abdomen is soft  Tenderness: There is no abdominal tenderness  Musculoskeletal:      Cervical back: Normal range of motion and neck supple  Right lower leg: No edema  Left lower leg: No edema  Skin:     General: Skin is warm and dry  Neurological:      Mental Status: She is alert  Mental status is at baseline  Comments: AAOx2   Psychiatric:      Comments: Slightly anxious- improved from previous          Invasive Devices     Peripheral Intravenous Line            Peripheral IV 06/14/21 Left Hand 3 days                Lab, Imaging and other studies: I have personally reviewed pertinent reports

## 2021-06-17 NOTE — ASSESSMENT & PLAN NOTE
2/2 colitis   See #1 above   Monitor hgb    Lab Results   Component Value Date    HGB 11 0 (L) 06/16/2021    HGB 10 6 (L) 06/15/2021    HGB 11 1 (L) 06/14/2021    HGB 11 3 (L) 06/12/2021    HGB 12 9 06/11/2021     Hemoglobin stable

## 2021-06-17 NOTE — ASSESSMENT & PLAN NOTE
Recurrent episodes of intermittent diarrhea x 1 year  Reports on average 2 BMs/day, recently almost always with BRBPR  Pt was started on Sulfalazine 4 days ago for presumed ischemic vs inflammatory colitis given colonoscopy findings in May 2021 showing patchy erythema and ulcerations of left colon with biopsies appearing consult with possible Crohn's  Presents with ongoing abdominal pain  Follows with Dr Ethan Herbert from colorectal surgery  Colonoscopy 5/28/21 - abnormal mucosa in the anus, rectum, sigmoid, descending colon consistent with colitis  · Biopsies noted with acute and chronic inflammation, no malignancy/dysplasia  The differential diagnosis include florid infectious colitis, ischemic colitis  vs inflammatory bowel disease  · CT abdomen (6/11/21) - Sigmoid diverticulosis without evidence of acute diverticulitis  No evidence of colitis or other acute abnormality in the abdomen or pelvis    Resolution of mural thickening in the sigmoid since a CT from 4/1/2021      GI consulted  Underwent EGD and repeat colonoscopy 6/14/21  · Colonoscopy: two polyps removed in ascending and descending colon, multiple large and severe tensive diverticula causing moderate luminal narrowing in sigmoid colon, Localized edematous and scarred mucosa in the sigmoid colon and patchy nodular and scarred mucosa with loss of vascular patten in the rectum s/p biopsies   · Improvement in inflammation but with persistent edema/colitis in sigmoid - Follow up biopsies and pathology   · Started on hydrocortisone suppositories  · EGD: esophagus normal, small hiatal hernia, erythematous and nodular mucosa in antrum s/p bx, random bx taken in duodenum   Procalcitonin negative x 2  C diff negative  Pending ova and parasite  Blood cultures negative   Regular diet   Repeat colonoscopy recommended in 6 months due to colitis   Monitor for any overt GI bleeding  Follow-up with Dr Ethan Herbert outpatient

## 2021-06-17 NOTE — CASE MANAGEMENT
Pt accepted at [facility] for Friday admission- informed pt's daughter/POA of same with agreement to d/c-discussed IMM#2 with verbal signature provided-form completed and to be scanned into EHR  Covid negative test sent to GOR[facility] at [facility] as requested  Transportation discussed with daughter requesting pt to be transferred using BLS due to Dementia and current impulsiveness with delusional thinking- [facility] BLS to transport at [facility]5 Friday with attending/RN/Facility/pt's daughter made aware of same  CMN completed, sent to [facility] and placed in dc folder  Will send AVS/Scripts to facility in a m when completed

## 2021-06-17 NOTE — ASSESSMENT & PLAN NOTE
Patient lives alone in independent apartment near [address]   She is oriented to self and place   She reads date off white board but when asked the month and year without reading it off board she is unable to tell me     She has poor insight and poor decision making at this time   Spoke with daughter at length and she has noticed a recent significant decline prior to admission as well   OT recommending 24/7 supervision   CM following for safe discharge plan   Daughter agrees that she can not return back to living independently at this time   Consult to geriatrics appreciated   Patient likely has underlying undiagnosed dementia    Will need formal cognitive evaluation upon discharge  Started on lexapro 5m g once daily, can consider incerased to 10 mg once daily in 1 week   Also started on gabapentin 100 mg tid, can consider increasing to 200 mg tid   For agitation or hallucination can consider addition of po seroquel as needed - was not started here  Supportive care, redirection and reorientation     Discharge to [facility] today

## 2021-06-17 NOTE — CASE MANAGEMENT
Pt's daughter, Dilan Benitez, has toured [employer] this day with her choosing for pt to go to facility from hospital   DME completed and awaiting to hear from [facility] for fax number to send to them as well as timeframe for admission  Will continue to follow to assist with dc poc  Addendum:  Call to Dilan Benitez informing of likely d/c Friday as long as facility is able to accept after clinical review  Clinical faxed to [facility]OR[facility] as requested  Will follow up in a m for ability to d/c to facility

## 2021-06-17 NOTE — ASSESSMENT & PLAN NOTE
Recurrent episodes of intermittent diarrhea x 1 year  Reports on average 2 BMs/day, recently almost always with BRBPR  Pt was started on Sulfalazine 4 days ago for presumed ischemic vs inflammatory colitis given colonoscopy findings in May 2021 showing patchy erythema and ulcerations of left colon with biopsies appearing consult with possible Crohn's  Presents with ongoing abdominal pain  Follows with Dr Jo-Ann Lawrence from colorectal surgery  Colonoscopy 5/28/21 - abnormal mucosa in the anus, rectum, sigmoid, descending colon consistent with colitis  · Biopsies noted with acute and chronic inflammation, no malignancy/dysplasia  The differential diagnosis include florid infectious colitis, ischemic colitis  vs inflammatory bowel disease  · CT abdomen (6/11/21) - Sigmoid diverticulosis without evidence of acute diverticulitis  No evidence of colitis or other acute abnormality in the abdomen or pelvis    Resolution of mural thickening in the sigmoid since a CT from 4/1/2021      GI consulted  Underwent EGD and repeat colonoscopy 6/14/21  · Colonoscopy: two polyps removed in ascending and descending colon, multiple large and severe tensive diverticula causing moderate luminal narrowing in sigmoid colon, Localized edematous and scarred mucosa in the sigmoid colon and patchy nodular and scarred mucosa with loss of vascular patten in the rectum s/p biopsies   · Improvement in inflammation but with persistent edema/colitis in sigmoid - Follow up biopsies and pathology   · Started on hydrocortisone suppositories  · Follow up pathology with Dr Jo-Ann Lawrence outpatient   · EGD: esophagus normal, small hiatal hernia, erythematous and nodular mucosa in antrum s/p bx, random bx taken in duodenum   Procalcitonin negative x 2  C diff negative, O& P negative   Blood cultures negative   Regular diet   Repeat colonoscopy recommended in 6 months due to colitis   Follow-up with Dr Jo-Ann Lawrence outpatient

## 2021-06-17 NOTE — ASSESSMENT & PLAN NOTE
Seen by geriatrics   Recommending starting on lexapro 5 mg daily, started on 6/15/21  Gabapentin 100 mg tid also started during this admission   Discussed initiation of medications with both patient and daughter   Titrate outpatient as needed

## 2021-06-17 NOTE — PROGRESS NOTES
[address]  Progress Note - Benito Greer 1945, 70 y o  female MRN: [id number]  Unit/Bed#: [address] [id number] Encounter: [id number]  Primary Care Provider: Toñito Rodriguez DO   Date and time admitted to hospital: 6/11/2021  4:28 PM    * Colitis  Assessment & Plan  Recurrent episodes of intermittent diarrhea x 1 year  Reports on average 2 BMs/day, recently almost always with BRBPR  Pt was started on Sulfalazine 4 days ago for presumed ischemic vs inflammatory colitis given colonoscopy findings in May 2021 showing patchy erythema and ulcerations of left colon with biopsies appearing consult with possible Crohn's  Presents with ongoing abdominal pain  Follows with Dr Winter Castro from colorectal surgery  Colonoscopy 5/28/21 - abnormal mucosa in the anus, rectum, sigmoid, descending colon consistent with colitis  · Biopsies noted with acute and chronic inflammation, no malignancy/dysplasia  The differential diagnosis include florid infectious colitis, ischemic colitis  vs inflammatory bowel disease  · CT abdomen (6/11/21) - Sigmoid diverticulosis without evidence of acute diverticulitis  No evidence of colitis or other acute abnormality in the abdomen or pelvis    Resolution of mural thickening in the sigmoid since a CT from 4/1/2021      GI consulted  Underwent EGD and repeat colonoscopy 6/14/21  · Colonoscopy: two polyps removed in ascending and descending colon, multiple large and severe tensive diverticula causing moderate luminal narrowing in sigmoid colon, Localized edematous and scarred mucosa in the sigmoid colon and patchy nodular and scarred mucosa with loss of vascular patten in the rectum s/p biopsies   · Improvement in inflammation but with persistent edema/colitis in sigmoid - Follow up biopsies and pathology   · Started on hydrocortisone suppositories  · EGD: esophagus normal, small hiatal hernia, erythematous and nodular mucosa in antrum s/p bx, random bx taken in duodenum Procalcitonin negative x 2  C diff negative  Pending ova and parasite  Blood cultures negative   Regular diet   Repeat colonoscopy recommended in 6 months due to colitis   Monitor for any overt GI bleeding  Follow-up with Dr Master Burk outpatient     Rectal bleeding  Assessment & Plan  2/2 colitis   See #1 above   Monitor hgb    Lab Results   Component Value Date    HGB 11 0 (L) 06/16/2021    HGB 10 6 (L) 06/15/2021    HGB 11 1 (L) 06/14/2021    HGB 11 3 (L) 06/12/2021    HGB 12 9 06/11/2021     Hemoglobin stable    SIRS (systemic inflammatory response syndrome) (HCC)  Assessment & Plan  Present on admission in setting of diarrhea and rectal bleeding   2/2 colitis as above   treated with IVF hydration   No indication for abx   cdiff negative   Blood cultures negative   Now afebrile   Resolved     Cognitive impairment  Assessment & Plan  Patient lives alone in independent apartment near [address]   She is oriented to self and place   She reads date off white board but when asked the month and year without reading it off board she is unable to tell me     She has poor insight and poor decision making at this time   Spoke with daughter at length and she has noticed a recent significant decline prior to admission as well   OT recommending 24/7 supervision   CM following for safe discharge plan   Daughter agrees that she can not return back to living independently at this time   Consult to geriatrics appreciated   Patient likely has underlying undiagnosed dementia    Will need formal cognitive evaluation upon discharge  Started on lexapro 5m g once daily, can consider incerased to 10 mg once daily in 1 week   Also started on gabapentin 100 mg tid, can consider increasing to 200 mg tid   Check ECG today to monitor QTC for consideration of starting po seroquel   Supportive care, redirection and reorientation       Acute blood loss anemia  Assessment & Plan  See colonoscopy an EGD results as above  2/2 rectal bleeding from colitis   Monitor for any overt GI bleeding  hgb stable today     Lab Results   Component Value Date    HGB 11 0 (L) 06/16/2021    HGB 10 6 (L) 06/15/2021    HGB 11 1 (L) 06/14/2021    HGB 11 3 (L) 06/12/2021    HGB 12 9 06/11/2021    HGB 11 2 (L) 04/02/2021         Atrial fibrillation (HCC)  Assessment & Plan  Currently in NSR  Continue Toprol-XL 50 mg daily  She is not on anticoagulation prior to admission due to rectal bleeding     Invasive ductal carcinoma of breast, right [facility])  Assessment & Plan  S/p right mastectomy  followed by [facility]     Hypothyroidism  Assessment & Plan  Continue levothyroxine  TSH normal    Essential hypertension  Assessment & Plan  Continue Toprol XL 50 mg daily  Monitor vital signs    Anxiety  Assessment & Plan  Seen by geriatrics   Recommending starting on lexapro 5 mg daily, started on 6/15/21  Gabapentin 100 mg tid also started during this admission   Discussed initiation of medications with both patient and daughter       VTE Pharmacologic Prophylaxis:   Pharmacologic: Pharmacologic VTE Prophylaxis contraindicated due to Rectal bleeding   Mechanical VTE Prophylaxis in Place: No    Patient Centered Rounds: I have performed bedside rounds with nursing staff today  Discussions with Specialists or Other Care Team Provider:  Discussed with case management and nursing    Education and Discussions with Family / Patient:  Discussed with patient at bedside and daughter  Elizabeth Bell over the phone    Time Spent for Care: 20 minutes  More than 50% of total time spent on counseling and coordination of care as described above  Current Length of Stay: 5 day(s)    Current Patient Status: Inpatient   Certification Statement: The patient will continue to require additional inpatient hospital stay due to Await placement at assisted living facility    Discharge Plan:   To assisted living facility when bed available    Code Status: Level 1 - Full Code      Subjective: Patient seen and examined  Does not offer any complaints  Nursing reports intermittent confusion  No further rectal bleeding or diarrhea noted  Objective:     Vitals:   Temp (24hrs), Av °F (36 7 °C), Min:97 9 °F (36 6 °C), Max:98 3 °F (36 8 °C)    Temp:  [97 9 °F (36 6 °C)-98 3 °F (36 8 °C)] 98 3 °F (36 8 °C)  HR:  [] 94  Resp:  [17-18] 18  BP: (131-137)/(92-95) 137/92  SpO2:  [92 %-96 %] 92 %  Body mass index is 25 43 kg/m²  Input and Output Summary (last 24 hours):     No intake or output data in the 24 hours ending 21 1323    Physical Exam:     Physical Exam  Constitutional:       General: She is not in acute distress  HENT:      Head: Normocephalic  Nose: Nose normal       Mouth/Throat:      Mouth: Mucous membranes are moist    Eyes:      Extraocular Movements: Extraocular movements intact  Pupils: Pupils are equal, round, and reactive to light  Cardiovascular:      Rate and Rhythm: Normal rate and regular rhythm  Pulses: Normal pulses  Pulmonary:      Effort: Pulmonary effort is normal       Breath sounds: Normal breath sounds  Abdominal:      General: Abdomen is flat  Bowel sounds are normal       Palpations: Abdomen is soft  Musculoskeletal:         General: No swelling or tenderness  Cervical back: Neck supple  Skin:     General: Skin is warm  Coloration: Skin is pale  Neurological:      Mental Status: She is alert        Comments: Oriented to self and place  Waxing and waning Confusion noted by nursing  Currently following simple commands and does not appear to have any agitation         Additional Data:     Labs:    Results from last 7 days   Lab Units 21  0459 06/15/21  0443 21  0551   WBC Thousand/uL  --  4 14* 4 50   HEMOGLOBIN g/dL 11 0* 10 6* 11 1*   HEMATOCRIT % 34 6* 32 7* 34 3*   PLATELETS Thousands/uL  --  249 212   NEUTROS PCT %  --   --  56   LYMPHS PCT %  --   --  26   MONOS PCT %  --   --  10   EOS PCT %  --   --  7* Results from last 7 days   Lab Units 06/14/21  0551 06/11/21  1653   SODIUM mmol/L 139 136   POTASSIUM mmol/L 3 6 3 2*   CHLORIDE mmol/L 105 98*   CO2 mmol/L 21 27   BUN mg/dL 10 11   CREATININE mg/dL 0 84 1 12   ANION GAP mmol/L 13 11   CALCIUM mg/dL 8 5 9 1   ALBUMIN g/dL  --  3 0*   TOTAL BILIRUBIN mg/dL  --  0 50   ALK PHOS U/L  --  92   ALT U/L  --  16   AST U/L  --  16   GLUCOSE RANDOM mg/dL 75 109     Results from last 7 days   Lab Units 06/11/21  1653   INR  1 05             Results from last 7 days   Lab Units 06/12/21  0534 06/11/21  1713   LACTIC ACID mmol/L  --  1 1   PROCALCITONIN ng/ml 0 16 0 16           * I Have Reviewed All Lab Data Listed Above  * Additional Pertinent Lab Tests Reviewed: Real 66 Admission Reviewed    Imaging:    Imaging Reports Reviewed Today Include: NA  Recent Cultures (last 7 days):     Results from last 7 days   Lab Units 06/12/21  0742 06/11/21  1713 06/11/21  1704   BLOOD CULTURE   --  No Growth After 5 Days  No Growth After 5 Days  C DIFF TOXIN B BY PCR  Negative  --   --        Last 24 Hours Medication List:   Current Facility-Administered Medications   Medication Dose Route Frequency Provider Last Rate    acetaminophen  650 mg Oral Q6H PRN Tami Ventura PA-C      aluminum-magnesium hydroxide-simethicone  30 mL Oral Q6H PRN Tami Ventura PA-C      escitalopram  5 mg Oral Daily Radha Sanchez PA-C      gabapentin  100 mg Oral TID Bing Santoyo PA-C      hydrocortisone  25 mg Rectal HS Nena Jones PA-C      levothyroxine  50 mcg Oral Early Morning Tami Ventura PA-C      metoprolol succinate  50 mg Oral Daily Tami Ventura PA-C      ondansetron  4 mg Intravenous Q6H PRN Tami Ventura PA-C      pantoprazole  40 mg Oral BID AC Tami Ventura PA-C          Today, Patient Was Seen By: Tanner Stevenson MD    ** Please Note: Dictation voice to text software may have been used in the creation of this document  **

## 2021-06-17 NOTE — ASSESSMENT & PLAN NOTE
See colonoscopy an EGD results as above  2/2 rectal bleeding from colitis   No evidence of overt GI bleeding  hgb stable today     Lab Results   Component Value Date    HGB 11 0 (L) 06/16/2021    HGB 10 6 (L) 06/15/2021    HGB 11 1 (L) 06/14/2021    HGB 11 3 (L) 06/12/2021    HGB 12 9 06/11/2021    HGB 11 2 (L) 04/02/2021

## 2021-06-18 ENCOUNTER — TRANSITIONAL CARE MANAGEMENT (OUTPATIENT)
Dept: FAMILY MEDICINE CLINIC | Facility: CLINIC | Age: 76
End: 2021-06-18

## 2021-06-18 VITALS
OXYGEN SATURATION: 96 % | SYSTOLIC BLOOD PRESSURE: 153 MMHG | TEMPERATURE: 98 F | DIASTOLIC BLOOD PRESSURE: 104 MMHG | BODY MASS INDEX: 25.29 KG/M2 | HEART RATE: 93 BPM | RESPIRATION RATE: 18 BRPM | HEIGHT: 64 IN | WEIGHT: 148.15 LBS

## 2021-06-18 PROBLEM — K62.5 RECTAL BLEEDING: Status: RESOLVED | Noted: 2021-06-16 | Resolved: 2021-06-18

## 2021-06-18 PROBLEM — R65.10 SIRS (SYSTEMIC INFLAMMATORY RESPONSE SYNDROME) (HCC): Status: RESOLVED | Noted: 2021-06-16 | Resolved: 2021-06-18

## 2021-06-18 PROCEDURE — 99239 HOSP IP/OBS DSCHRG MGMT >30: CPT | Performed by: PHYSICIAN ASSISTANT

## 2021-06-18 RX ORDER — LEVOTHYROXINE SODIUM 0.05 MG/1
50 TABLET ORAL DAILY
Qty: 90 TABLET | Refills: 0 | Status: SHIPPED | OUTPATIENT
Start: 2021-06-18 | End: 2021-07-25

## 2021-06-18 RX ORDER — HYDROCORTISONE ACETATE 25 MG/1
25 SUPPOSITORY RECTAL
Qty: 12 SUPPOSITORY | Refills: 0 | Status: SHIPPED | OUTPATIENT
Start: 2021-06-18 | End: 2021-07-05 | Stop reason: HOSPADM

## 2021-06-18 RX ORDER — GABAPENTIN 100 MG/1
100 CAPSULE ORAL 3 TIMES DAILY
Qty: 90 CAPSULE | Refills: 0 | Status: SHIPPED | OUTPATIENT
Start: 2021-06-18 | End: 2021-07-16

## 2021-06-18 RX ORDER — PANTOPRAZOLE SODIUM 40 MG/1
40 TABLET, DELAYED RELEASE ORAL
Qty: 180 TABLET | Refills: 0 | Status: SHIPPED | OUTPATIENT
Start: 2021-06-18 | End: 2021-07-25

## 2021-06-18 RX ORDER — ESCITALOPRAM OXALATE 5 MG/1
5 TABLET ORAL DAILY
Qty: 30 TABLET | Refills: 0 | Status: SHIPPED | OUTPATIENT
Start: 2021-06-18 | End: 2021-07-16

## 2021-06-18 RX ORDER — METOPROLOL SUCCINATE 50 MG/1
50 TABLET, EXTENDED RELEASE ORAL DAILY
Qty: 90 TABLET | Refills: 0 | Status: SHIPPED | OUTPATIENT
Start: 2021-06-18 | End: 2021-07-25

## 2021-06-18 RX ADMIN — PANTOPRAZOLE SODIUM 40 MG: 40 TABLET, DELAYED RELEASE ORAL at 06:29

## 2021-06-18 RX ADMIN — LEVOTHYROXINE SODIUM 50 MCG: 50 TABLET ORAL at 05:51

## 2021-06-18 RX ADMIN — METOPROLOL SUCCINATE 50 MG: 50 TABLET, EXTENDED RELEASE ORAL at 08:10

## 2021-06-18 RX ADMIN — GABAPENTIN 100 MG: 100 CAPSULE ORAL at 08:10

## 2021-06-18 RX ADMIN — ESCITALOPRAM OXALATE 5 MG: 10 TABLET ORAL at 08:10

## 2021-06-18 NOTE — DISCHARGE SUMMARY
[facility]  Discharge- Kayley Villagran 1945, 70 y o  female MRN: [id number]  Unit/Bed#: [facility] 87 227-01 Encounter: [id number]  Primary Care Provider: Malathi Christian DO   Date and time admitted to hospital: 6/11/2021  4:28 PM    * Colitis  Assessment & Plan  Recurrent episodes of intermittent diarrhea x 1 year  Reports on average 2 BMs/day, recently almost always with BRBPR  Pt was started on Sulfalazine 4 days ago for presumed ischemic vs inflammatory colitis given colonoscopy findings in May 2021 showing patchy erythema and ulcerations of left colon with biopsies appearing consult with possible Crohn's  Presents with ongoing abdominal pain  Follows with Dr Jo-Ann Lawrence from colorectal surgery  Colonoscopy 5/28/21 - abnormal mucosa in the anus, rectum, sigmoid, descending colon consistent with colitis  · Biopsies noted with acute and chronic inflammation, no malignancy/dysplasia  The differential diagnosis include florid infectious colitis, ischemic colitis  vs inflammatory bowel disease  · CT abdomen (6/11/21) - Sigmoid diverticulosis without evidence of acute diverticulitis  No evidence of colitis or other acute abnormality in the abdomen or pelvis    Resolution of mural thickening in the sigmoid since a CT from 4/1/2021      GI consulted  Underwent EGD and repeat colonoscopy 6/14/21  · Colonoscopy: two polyps removed in ascending and descending colon, multiple large and severe tensive diverticula causing moderate luminal narrowing in sigmoid colon, Localized edematous and scarred mucosa in the sigmoid colon and patchy nodular and scarred mucosa with loss of vascular patten in the rectum s/p biopsies   · Improvement in inflammation but with persistent edema/colitis in sigmoid - Follow up biopsies and pathology   · Started on hydrocortisone suppositories  · Follow up pathology with Dr Jo-Ann Lawrence outpatient   · EGD: esophagus normal, small hiatal hernia, erythematous and nodular mucosa in antrum s/p bx, random bx taken in duodenum   Procalcitonin negative x 2  C diff negative, O& P negative   Blood cultures negative   Regular diet   Repeat colonoscopy recommended in 6 months due to colitis   Follow-up with Dr Lyla Molina outpatient     Cognitive impairment  [address] [place] [address]  Patient lives alone in independent apartment near [address]   She is oriented to self and place   She reads date off white board but when asked the month and year without reading it off board she is unable to tell me     She has poor insight and poor decision making at this time   Spoke with daughter at length and she has noticed a recent significant decline prior to admission as well   OT recommending 24/7 supervision   CM following for safe discharge plan   Daughter agrees that she can not return back to living independently at this time   Consult to geriatrics appreciated   Patient likely has underlying undiagnosed dementia    Will need formal cognitive evaluation upon discharge  Started on lexapro 5m g once daily, can consider incerased to 10 mg once daily in 1 week   Also started on gabapentin 100 mg tid, can consider increasing to 200 mg tid   For agitation or hallucination can consider addition of po seroquel as needed - was not started here  Supportive care, redirection and reorientation     Discharge to [facility] today       Acute blood loss anemia  Assessment & Plan  See colonoscopy an EGD results as above  2/2 rectal bleeding from colitis   No evidence of overt GI bleeding  hgb stable today     Lab Results   Component Value Date    HGB 11 0 (L) 06/16/2021    HGB 10 6 (L) 06/15/2021    HGB 11 1 (L) 06/14/2021    HGB 11 3 (L) 06/12/2021    HGB 12 9 06/11/2021    HGB 11 2 (L) 04/02/2021         Atrial fibrillation (HCC)  Assessment & Plan  Currently in NSR  Continue Toprol-XL 50 mg daily  She is not on anticoagulation prior to admission due to rectal bleeding     Invasive ductal carcinoma of breast, right [facility])  Assessment & Plan  S/p right mastectomy  followed by [facility]     Hypothyroidism  Assessment & Plan  Continue levothyroxine  TSH normal    Essential hypertension  Assessment & Plan  Continue Toprol XL 50 mg daily      Anxiety  Assessment & Plan  Seen by geriatrics   Recommending starting on lexapro 5 mg daily, started on 6/15/21  Gabapentin 100 mg tid also started during this admission   Discussed initiation of medications with both patient and daughter   Titrate outpatient as needed       Rectal bleeding-resolved as of 6/18/2021  Assessment & Plan  2/2 colitis   See #1 above   Monitor hgb    Lab Results   Component Value Date    HGB 11 0 (L) 06/16/2021    HGB 10 6 (L) 06/15/2021    HGB 11 1 (L) 06/14/2021    HGB 11 3 (L) 06/12/2021    HGB 12 9 06/11/2021     Hemoglobin stable    SIRS (systemic inflammatory response syndrome) ([facility])-resolved as of 6/18/2021  Assessment & Plan  Present on admission in setting of diarrhea and rectal bleeding   2/2 colitis as above   treated with IVF hydration   No indication for abx   cdiff negative   Blood cultures negative   Now afebrile   Resolved       Discharging Physician / Practitioner: Lul Johnson PA-C  PCP: Go Domingo DO  Admission Date:   Admission Orders (From admission, onward)     Ordered        06/12/21 1444  Inpatient Admission  Once         06/11/21 2050  Place in Observation  Once                   Discharge Date: 06/18/21    Medical Problems     Resolved Problems  Date Reviewed: 6/18/2021        Resolved    Hypokalemia 6/16/2021     Resolved by  Lul Johnson PA-C    SIRS (systemic inflammatory response syndrome) ([facility] [address] ) 6/18/2021     Resolved by  Lul Johnson PA-C    Rectal bleeding 6/18/2021     Resolved by  Lul Johnson PA-C                Consultations During Hospital Stay:  · Gastroenterology   · Geriatrics     Procedures Performed:   · Colonoscopy 6/14/21:   FINDINGS:  · The terminal ileum appeared normal   · Two polyps measuring 5-9 mm in the ascending colon and descending colon; removed en bloc by cold snare and retrieved specimen  · Multiple large, severe extensive diverticula causing moderate luminal narrowing in the sigmoid colon  · Localized edematous and scarred mucosa in the sigmoid colon; performed cold forceps biopsy  · Patchy nodular and scarred mucosa with loss of vascular pattern in the rectum; performed cold forceps biopsy        EVENTS:      Procedure Events   Event Event Time   ENDO CECUM REACHED 6/14/2021  4:14 PM   ENDO SCOPE OUT TIME 6/14/2021  4:27 PM         SPECIMENS:  ID Type Source Tests Collected by Time Destination   1 : Duodenal biopsy  Tissue Duodenum TISSUE EXAM Pearl Duverney, MD 6/14/2021  4:01 PM     2 : antral nodule biopsy Tissue Stomach TISSUE EXAM Pearl Duverney, MD 6/14/2021  4:02 PM     3 : Ascending colon polyp Tissue Large Intestine, Right/Ascending Colon TISSUE EXAM Pearl Duverney, MD 6/14/2021  4:18 PM     4 : Descending colon polyp Tissue Large Intestine, Left/Descending Colon TISSUE EXAM Pearl Duverney, MD 6/14/2021  4:18 PM     5 : Recto-sigmoid biopsy  colitis Tissue Colon TISSUE EXAM Pearl Duverney, MD 6/14/2021  4:21 PM     6 : Rectal biopsy Tissue Colon TISSUE EXAM Pearl Duverney, MD 6/14/2021  4:25 PM           EQUIPMENT:              IMPRESSION:  · The terminal ileum appeared normal   · Two polyps measuring 5-9 mm in the ascending colon and descending colon; removed en bloc by cold snare and retrieved specimen  · Multiple large, severe extensive diverticula causing moderate luminal narrowing in the sigmoid colon  · Localized edematous and scarred mucosa in the sigmoid colon; performed cold forceps biopsy  · Patchy nodular and scarred mucosa with loss of vascular pattern in the rectum; performed cold forceps biopsy     RECOMMENDATION:  Await pathology results  Repeat screening colonoscopy in 6 months due to colitis    F/u with Dr Gosia Garcia regular diet    · EGD 6/14/21  FINDINGS:  · The esophagus appeared normal   · Small hiatal hernia  · Erythematous and nodular mucosa in the antrum; performed cold forceps biopsy  · The duodenal bulb and 2nd part of the duodenum appeared normal  Performed random biopsy         SPECIMENS:  ID Type Source Tests Collected by Time Destination   1 : Duodenal biopsy  Tissue Duodenum TISSUE EXAM Pola Maya MD 6/14/2021  4:01 PM     2 : antral nodule biopsy Tissue Stomach TISSUE EXAM Pola Maya MD 6/14/2021  4:02 PM     3 : Ascending colon polyp Tissue Large Intestine, Right/Ascending Colon TISSUE EXAM Pola Maya MD 6/14/2021  4:18 PM     4 : Descending colon polyp Tissue Large Intestine, Left/Descending Colon TISSUE EXAM Pola Maya MD 6/14/2021  4:18 PM     5 : Recto-sigmoid biopsy  colitis Tissue Colon TISSUE EXAM Pola Maya MD 6/14/2021  4:21 PM     6 : Rectal biopsy Tissue Colon TISSUE EXAM Pola Maya MD 6/14/2021  4:25 PM           IMPRESSION:  · The esophagus appeared normal   · Small hiatal hernia  · Erythematous and nodular mucosa in the antrum; performed cold forceps biopsy  · The duodenal bulb and 2nd part of the duodenum appeared normal  Performed random biopsy  Significant Findings / Test Results:   · CT abdomen pelvis:   FINDINGS:     ABDOMEN     LOWER CHEST:  Mild subsegmental atelectasis or scarring in the lung bases      LIVER/BILIARY TREE:  Unremarkable      GALLBLADDER:  Postcholecystectomy      SPLEEN:  Unremarkable      PANCREAS:  Unremarkable      ADRENAL GLANDS:  Unremarkable      KIDNEYS/URETERS:  Multiple bilateral renal cysts  No calculus or hydronephrosis      STOMACH AND BOWEL:  Stomach and small intestine unremarkable  Sigmoid diverticulosis without evidence of acute diverticulitis  Improvement of mural thickening in the sigmoid colon since the CT from 4/1/2021  No evidence of new areas of colitis    No   intramural gas      APPENDIX:  No findings to suggest appendicitis      ABDOMINOPELVIC CAVITY: No lymphadenopathy or mass  No ascites or discrete fluid collection  No extraluminal gas            VESSELS:  Atherosclerotic changes are present  No evidence of aneurysm      PELVIS     REPRODUCTIVE ORGANS:  Post hysterectomy      URINARY BLADDER:  Unremarkable      ABDOMINAL WALL/INGUINAL REGIONS:  Unremarkable      OSSEOUS STRUCTURES:  No acute fracture or destructive osseous lesion      IMPRESSION:     1  Sigmoid diverticulosis without evidence of acute diverticulitis      2  No evidence of colitis or other acute abnormality in the abdomen or pelvis  Resolution of mural thickening in the sigmoid since a CT from 4/1/2021  · C diff PCR negative   · Ova and parasite negative   · Blood cultures negative   · COVID -19 negative   ·     Test Results Pending at Discharge (will require follow up):   · CBC in 1 week      Outpatient Tests Requested:  · Dr Bj Lopez   · PCP within 1 week for post hospital follow up   · Geriatrics     Complications:  Cognitive impairment, SIRS, rectal bleeding     Reason for Admission: colitis     Hospital Course:     Nasir Ruiz is a 70 y o  female patient who originally presented to the hospital on 6/11/2021 due to intermittent diarrhea for one year with BRBPR  She presented with worsening abdominal pain  Gastroenterology was consulted  Diarrhea-rectal bleeding-with evidence of ischemic versus inflammatory colitis was seen on colonoscopy in May  She was started outpatient on sulfalazine given the findings on previous colonoscopy  Given ongoing symptoms, she underwent repeat EGD and colonoscopy (see full reports above) this admission on 6/14/21 with significant improvement in inflammation  but with persistent edema/colitis seen in the sigmoid colon and erythematous/ulcerative changes in the rectum  Biopsies were taken which will need follow up outpatient upon discharge   She did not have any evidence of overt bleeding after scopes and hemoglobin remained stable without need for blood transfusion  She will need repeat colonoscopy in 6-8 weeks and will follow up with Dr Corazon Bell outpatient  She was started on hydrocortisone suppositories for treatment of rectal disease  She tolerated diet and maintained good oral intake  Prior to admission patient was living alone at independent living facility near [address]  Patient was seen by PT/OT while hospitalized and occupational therapy recommendations were for 24/7 supervision versus SNF placement given cognitive impairment with likely underlying dementia  She was seen in consultation with Geriatrics  During this admission she was started on lexapro 5 mg daily and gabapentin 100 mg tid  Extensive discussion were had with her daughter regarding patient's recent decline and she was in agreement that she can no longer live alone  Case management was consulted who has arranged for patient to be discharge to [facility] for safe discharge plan  She will need formal cognitive testing outpatient and ongoing follow up with Geriatrics and her PCP within 1 week for post hospital follow up  Please see above list of diagnoses and related plan for additional information  Condition at Discharge: stable     Discharge Day Visit / Exam:     Subjective:  Michael Sevilla is resting in bed without acute complaints  No chest pain, SOB, fevers, chills, abdominal pain, nausea, vomiting, rectal bleeding, diarrhea, urinary complaints  Vitals: Blood Pressure:  137/92   Pulse: 93 (06/18/21 0713)  Temperature: 98 °F (36 7 °C) (06/18/21 0713)  Temp Source: Oral (06/17/21 1427)  Respirations: 18 (06/18/21 0713)  Height: 5' 4" (162 6 cm) (06/12/21 1824)  Weight - Scale: 67 2 kg (148 lb 2 4 oz) (06/11/21 1525)  SpO2: 96 % (06/18/21 0713)  Exam:   Physical Exam  Vitals and nursing note reviewed  Constitutional:       General: She is not in acute distress  Appearance: She is not ill-appearing     HENT:      Head: Normocephalic  Eyes:      Conjunctiva/sclera: Conjunctivae normal    Cardiovascular:      Rate and Rhythm: Normal rate and regular rhythm  Pulmonary:      Effort: Pulmonary effort is normal       Breath sounds: Normal breath sounds  Abdominal:      General: Bowel sounds are normal       Palpations: Abdomen is soft  Tenderness: There is no abdominal tenderness  There is no guarding or rebound  Musculoskeletal:      Right lower leg: No edema  Left lower leg: No edema  Neurological:      Mental Status: She is alert  Comments: Oriented to person, can read date and month off white board   Psychiatric:         Cognition and Memory: Cognition is impaired  Memory is impaired  Discharge instructions/Information to patient and family:   See after visit summary for information provided to patient and family  Provisions for Follow-Up Care:  See after visit summary for information related to follow-up care and any pertinent home health orders  Disposition:     Other: [facility]     For Discharges to   [facility] SN[facility]:   · Not Applicable to this Patient - Not Applicable to this Patient    Planned Readmission: none     Discharge Statement:  I spent 40 minutes discharging the patient  This time was spent on the day of discharge  I had direct contact with the patient on the day of discharge  Greater than 50% of the total time was spent examining patient, answering all patient questions, arranging and discussing plan of care with patient as well as directly providing post-discharge instructions  Additional time then spent on discharge activities  Discharge Medications:  See after visit summary for reconciled discharge medications provided to patient and family        ** Please Note: This note has been constructed using a voice recognition system **

## 2021-06-18 NOTE — CASE MANAGEMENT
AVS/Scripts emailed to Maegan Warner at [facility] as requested- originals to accompany pt to facility

## 2021-06-18 NOTE — PLAN OF CARE
Problem: Potential for Falls  Goal: Patient will remain free of falls  Description: INTERVENTIONS:  - Assess patient frequently for physical needs  -  Identify cognitive and physical deficits and behaviors that affect risk of falls    -  Dep[place] fall precautions as indicated by assessment   - Educate patient/family on patient safety including physical limitations  - Instruct patient to call for assistance with activity based on assessment  - Modify environment to reduce risk of injury  - Consider OT/PT consult to assist with strengthening/mobility  Outcome: Progressing     Problem: PAIN - ADULT  Goal: Verbalizes/displays adequate comfort level or baseline comfort level  Description: Interventions:  - Encourage patient to monitor pain and request assistance  - Assess pain using appropriate pain scale  - Administer analgesics based on type and severity of pain and evaluate response  - Implement non-pharmacological measures as appropriate and evaluate response  - Consider cultural and social influences on pain and pain management  - Notify physician/advanced practitioner if interventions unsuccessful or patient reports new pain  Outcome: Progressing     Problem: INFECTION - ADULT  Goal: Absence or prevention of progression during hospitalization  Description: INTERVENTIONS:  - Assess and monitor for signs and symptoms of infection  - Monitor lab/diagnostic results  - Monitor all insertion sites, i e  indwelling lines, tubes, and drains  - Monitor endotracheal if appropriate and nasal secretions for changes in amount and color  - Deposit appropriate cooling/warming therapies per order  - Administer medications as ordered  - Instruct and encourage patient and family to use good hand hygiene technique  - Identify and instruct in appropriate isolation precautions for identified infection/condition  Outcome: Progressing  Goal: Absence of fever/infection during neutropenic period  Description: INTERVENTIONS:  - Monitor WBC    Outcome: Progressing     Problem: SAFETY ADULT  Goal: Patient will remain free of falls  Description: INTERVENTIONS:  - Assess patient frequently for physical needs  -  Identify cognitive and physical deficits and behaviors that affect risk of falls    -  [place] fall precautions as indicated by assessment   - Educate patient/family on patient safety including physical limitations  - Instruct patient to call for assistance with activity based on assessment  - Modify environment to reduce risk of injury  - Consider OT/PT consult to assist with strengthening/mobility  Outcome: Progressing  Goal: Maintain or return to baseline ADL function  Description: INTERVENTIONS:  -  Assess patient's ability to carry out ADLs; assess patient's baseline for ADL function and identify physical deficits which impact ability to perform ADLs (bathing, care of mouth/teeth, toileting, grooming, dressing, etc )  - Assess/evaluate cause of self-care deficits   - Assess range of motion  - Assess patient's mobility; develop plan if impaired  - Assess patient's need for assistive devices and provide as appropriate  - Encourage maximum independence but intervene and supervise when necessary  - Involve family in performance of ADLs  - Assess for home care needs following discharge   - Consider OT consult to assist with ADL evaluation and planning for discharge  - Provide patient education as appropriate  Outcome: Progressing  Goal: Maintain or return mobility status to optimal level  Description: INTERVENTIONS:  - Assess patient's baseline mobility status (ambulation, transfers, stairs, etc )    - Identify cognitive and physical deficits and behaviors that affect mobility  - Identify mobility aids required to assist with transfers and/or ambulation (gait belt, sit-to-stand, lift, walker, cane, etc )  - [place] fall precautions as indicated by assessment  - Record patient progress and toleration of activity level on Mobility SBAR; progress patient to next Phase/Stage  - Instruct patient to call for assistance with activity based on assessment  - Consider rehabilitation consult to assist with strengthening/weightbearing, etc   Outcome: Progressing     Problem: DISCHARGE PLANNING  Goal: Discharge to home or other facility with appropriate resources  Description: INTERVENTIONS:  - Identify barriers to discharge w/patient and caregiver  - Arrange for needed discharge resources and transportation as appropriate  - Identify discharge learning needs (meds, wound care, etc )  - Arrange for interpretive services to assist at discharge as needed  - Refer to Case Management Department for coordinating discharge planning if the patient needs post-hospital services based on physician/advanced practitioner order or complex needs related to functional status, cognitive ability, or social support system  Outcome: Progressing     Problem: Knowledge Deficit  Goal: Patient/family/caregiver demonstrates understanding of disease process, treatment plan, medications, and discharge instructions  Description: Complete learning assessment and assess knowledge base    Interventions:  - Provide teaching at level of understanding  - Provide teaching via preferred learning methods  Outcome: Progressing     Problem: NEUROSENSORY - ADULT  Goal: Achieves stable or improved neurological status  Description: INTERVENTIONS  - Monitor and report changes in neurological status  - Monitor vital signs such as temperature, blood pressure, glucose, and any other labs ordered   - Initiate measures to prevent increased intracranial pressure  - Monitor for seizure activity and implement precautions if appropriate      Outcome: Progressing  Goal: Remains free of injury related to seizures activity  Description: INTERVENTIONS  - Maintain airway, patient safety  and administer oxygen as ordered  - Monitor patient for seizure activity, document and report duration and description of seizure to physician/advanced practitioner  - If seizure occurs,  ensure patient safety during seizure  - Reorient patient post seizure  - Seizure pads on all 4 side rails  - Instruct patient/family to notify RN of any seizure activity including if an aura is experienced  - Instruct patient/family to call for assistance with activity based on nursing assessment  - Administer anti-seizure medications if ordered    Outcome: Progressing  Goal: Achieves maximal functionality and self care  Description: INTERVENTIONS  - Monitor swallowing and airway patency with patient fatigue and changes in neurological status  - Encourage and assist patient to increase activity and self care     - Encourage visually impaired, hearing impaired and aphasic patients to use assistive/communication devices  Outcome: Progressing     Problem: CARDIOVASCULAR - ADULT  Goal: Maintains optimal cardiac output and hemodynamic stability  Description: INTERVENTIONS:  - Monitor I/O, vital signs and rhythm  - Monitor for S/S and trends of decreased cardiac output  - Administer and titrate ordered vasoactive medications to optimize hemodynamic stability  - Assess quality of pulses, skin color and temperature  - Assess for signs of decreased coronary artery perfusion  - Instruct patient to report change in severity of symptoms  Outcome: Progressing  Goal: Absence of cardiac dysrhythmias or at baseline rhythm  Description: INTERVENTIONS:  - Continuous cardiac monitoring, vital signs, obtain 12 lead EKG if ordered  - Administer antiarrhythmic and heart rate control medications as ordered  - Monitor electrolytes and administer replacement therapy as ordered  Outcome: Progressing     Problem: RESPIRATORY - ADULT  Goal: Achieves optimal ventilation and oxygenation  Description: INTERVENTIONS:  - Assess for changes in respiratory status  - Assess for changes in mentation and behavior  - Position to facilitate oxygenation and minimize respiratory effort  - Oxygen administered by appropriate delivery if ordered  - Initiate smoking cessation education as indicated  - Encourage broncho-pulmonary hygiene including cough, deep breathe, Incentive Spirometry  - Assess the need for suctioning and aspirate as needed  - Assess and instruct to report SOB or any respiratory difficulty  - Respiratory Therapy support as indicated  Outcome: Progressing     Problem: GASTROINTESTINAL - ADULT  Goal: Minimal or absence of nausea and/or vomiting  Description: INTERVENTIONS:  - Administer IV fluids if ordered to ensure adequate hydration  - Maintain NPO status until nausea and vomiting are resolved  - Nasogastric tube if ordered  - Administer ordered antiemetic medications as needed  - Provide nonpharmacologic comfort measures as appropriate  - Advance diet as tolerated, if ordered  - Consider nutrition services referral to assist patient with adequate nutrition and appropriate food choices  Outcome: Progressing  Goal: Maintains or returns to baseline bowel function  Description: INTERVENTIONS:  - Assess bowel function  - Encourage oral fluids to ensure adequate hydration  - Administer IV fluids if ordered to ensure adequate hydration  - Administer ordered medications as needed  - Encourage mobilization and activity  - Consider nutritional services referral to assist patient with adequate nutrition and appropriate food choices  Outcome: Progressing  Goal: Maintains adequate nutritional intake  Description: INTERVENTIONS:  - Monitor percentage of each meal consumed  - Identify factors contributing to decreased intake, treat as appropriate  - Assist with meals as needed  - Monitor I&O, weight, and lab values if indicated  - Obtain nutrition services referral as needed  Outcome: Progressing     Problem: GENITOURINARY - ADULT  Goal: Maintains or returns to baseline urinary function  Description: INTERVENTIONS:  - Assess urinary function  - Encourage oral fluids to ensure adequate hydration if ordered  - Administer IV fluids as ordered to ensure adequate hydration  - Administer ordered medications as needed  - Offer frequent toileting  - Follow urinary retention protocol if ordered  Outcome: Progressing  Goal: Absence of urinary retention  Description: INTERVENTIONS:  - Assess patients ability to void and empty bladder  - Monitor I/O  - Bladder scan as needed  - Discuss with physician/AP medications to alleviate retention as needed  - Discuss catheterization for long term situations as appropriate  Outcome: Progressing     Problem: METABOLIC, FLUID AND ELECTROLYTES - ADULT  Goal: Electrolytes maintained within normal limits  Description: INTERVENTIONS:  - Monitor labs and assess patient for signs and symptoms of electrolyte imbalances  - Administer electrolyte replacement as ordered  - Monitor response to electrolyte replacements, including repeat lab results as appropriate  - Instruct patient on fluid and nutrition as appropriate  Outcome: Progressing  Goal: Fluid balance maintained  Description: INTERVENTIONS:  - Monitor labs   - Monitor I/O and WT  - Instruct patient on fluid and nutrition as appropriate  - Assess for signs & symptoms of volume excess or deficit  Outcome: Progressing  Goal: Glucose maintained within target range  Description: INTERVENTIONS:  - Monitor Blood Glucose as ordered  - Assess for signs and symptoms of hyperglycemia and hypoglycemia  - Administer ordered medications to maintain glucose within target range  - Assess nutritional intake and initiate nutrition service referral as needed  Outcome: Progressing     Problem: SKIN/TISSUE INTEGRITY - ADULT  Goal: Skin integrity remains intact  Description: INTERVENTIONS  - Identify patients at risk for skin breakdown  - Assess and monitor skin integrity  - Assess and monitor nutrition and hydration status  - Monitor labs (i e  albumin)  - Assess for incontinence   - Turn and reposition patient  - Assist with mobility/ambulation  - Relieve pressure over bony prominences  - Avoid friction and shearing  - Provide appropriate hygiene as needed including keeping skin clean and dry  - Evaluate need for skin moisturizer/barrier cream  - Collaborate with interdisciplinary team (i e  Nutrition, Rehabilitation, etc )   - Patient/family teaching  Outcome: Progressing  Goal: Incision(s), wounds(s) or drain site(s) healing without S/S of infection  Description: INTERVENTIONS  - Assess and document risk factors for skin impairment   - Assess and document dressing, incision, wound bed, drain sites and surrounding tissue  - Consider nutrition services referral as needed  - Oral mucous membranes remain intact  - Provide patient/ family education  Outcome: Progressing  Goal: Oral mucous membranes remain intact  Description: INTERVENTIONS  - Assess oral mucosa and hygiene practices  - Implement preventative oral hygiene regimen  - Implement oral medicated treatments as ordered  - Initiate Nutrition services referral as needed  Outcome: Progressing     Problem: HEMATOLOGIC - ADULT  Goal: Maintains hematologic stability  Description: INTERVENTIONS  - Assess for signs and symptoms of bleeding or hemorrhage  - Monitor labs  - Administer supportive blood products/factors as ordered and appropriate  Outcome: Progressing     Problem: MUSCULOSKELETAL - ADULT  Goal: Maintain or return mobility to safest level of function  Description: INTERVENTIONS:  - Assess patient's ability to carry out ADLs; assess patient's baseline for ADL function and identify physical deficits which impact ability to perform ADLs (bathing, care of mouth/teeth, toileting, grooming, dressing, etc )  - Assess/evaluate cause of self-care deficits   - Assess range of motion  - Assess patient's mobility  - Assess patient's need for assistive devices and provide as appropriate  - Encourage maximum independence but intervene and supervise when necessary  - Involve family in performance of ADLs  - Assess for home care needs following discharge   - Consider OT consult to assist with ADL evaluation and planning for discharge  - Provide patient education as appropriate  Outcome: Progressing  Goal: Maintain proper alignment of affected body part  Description: INTERVENTIONS:  - Support, maintain and protect limb and body alignment  - Provide patient/ family with appropriate education  Outcome: Progressing     Problem: Nutrition/Hydration-ADULT  Goal: Nutrient/Hydration intake appropriate for improving, restoring or maintaining nutritional needs  Description: Monitor and assess patient's nutrition/hydration status for malnutrition  Collaborate with interdisciplinary team and initiate plan and interventions as ordered  Monitor patient's weight and dietary intake as ordered or per policy  Utilize nutrition screening tool and intervene as necessary  Determine patient's food preferences and provide high-protein, high-caloric foods as appropriate       INTERVENTIONS:  - Monitor oral intake, urinary output, labs, and treatment plans  - Assess nutrition and hydration status and recommend course of action  - Evaluate amount of meals eaten  - Assist patient with eating if necessary   - Allow adequate time for meals  - Recommend/ encourage appropriate diets, oral nutritional supplements, and vitamin/mineral supplements  - Order, calculate, and assess calorie counts as needed  - Recommend, monitor, and adjust tube feedings and TPN/PPN based on assessed needs  - Assess need for intravenous fluids  - Provide specific nutrition/hydration education as appropriate  - Include patient/family/caregiver in decisions related to nutrition  Outcome: Progressing

## 2021-06-18 NOTE — PLAN OF CARE
Problem: Potential for Falls  Goal: Patient will remain free of falls  Description: INTERVENTIONS:  - Assess patient frequently for physical needs  -  Identify cognitive and physical deficits and behaviors that affect risk of falls    -  [place] fall precautions as indicated by assessment   - Educate patient/family on patient safety including physical limitations  - Instruct patient to call for assistance with activity based on assessment  - Modify environment to reduce risk of injury  - Consider OT/PT consult to assist with strengthening/mobility  6/18/2021 1144 by Claudette Burk RN  Outcome: Adequate for Discharge  6/18/2021 1100 by Claudette Burk RN  Outcome: Progressing     Problem: PAIN - ADULT  Goal: Verbalizes/displays adequate comfort level or baseline comfort level  Description: Interventions:  - Encourage patient to monitor pain and request assistance  - Assess pain using appropriate pain scale  - Administer analgesics based on type and severity of pain and evaluate response  - Implement non-pharmacological measures as appropriate and evaluate response  - Consider cultural and social influences on pain and pain management  - Notify physician/advanced practitioner if interventions unsuccessful or patient reports new pain  6/18/2021 1144 by Claudette Burk RN  Outcome: Adequate for Discharge  6/18/2021 1100 by Claudette Burk RN  Outcome: Progressing     Problem: INFECTION - ADULT  Goal: Absence or prevention of progression during hospitalization  Description: INTERVENTIONS:  - Assess and monitor for signs and symptoms of infection  - Monitor lab/diagnostic results  - Monitor all insertion sites, i e  indwelling lines, tubes, and drains  - Monitor endotracheal if appropriate and nasal secretions for changes in amount and color  - [place] appropriate cooling/warming therapies per order  - Administer medications as ordered  - Instruct and encourage patient and family to use good hand hygiene technique  - Identify and instruct in appropriate isolation precautions for identified infection/condition  6/18/2021 1144 by Claudette Burk RN  Outcome: Adequate for Discharge  6/18/2021 1100 by Claudette Burk RN  Outcome: Progressing  Goal: Absence of fever/infection during neutropenic period  Description: INTERVENTIONS:  - Monitor WBC    6/18/2021 1144 by Claudette Burk RN  Outcome: Adequate for Discharge  6/18/2021 1100 by Claudette Burk RN  Outcome: Progressing     Problem: SAFETY ADULT  Goal: Patient will remain free of falls  Description: INTERVENTIONS:  - Assess patient frequently for physical needs  -  Identify cognitive and physical deficits and behaviors that affect risk of falls    -  [place] fall precautions as indicated by assessment   - Educate patient/family on patient safety including physical limitations  - Instruct patient to call for assistance with activity based on assessment  - Modify environment to reduce risk of injury  - Consider OT/PT consult to assist with strengthening/mobility  6/18/2021 1144 by Claudette Burk RN  Outcome: Adequate for Discharge  6/18/2021 1100 by Claudette Burk RN  Outcome: Progressing  Goal: Maintain or return to baseline ADL function  Description: INTERVENTIONS:  -  Assess patient's ability to carry out ADLs; assess patient's baseline for ADL function and identify physical deficits which impact ability to perform ADLs (bathing, care of mouth/teeth, toileting, grooming, dressing, etc )  - Assess/evaluate cause of self-care deficits   - Assess range of motion  - Assess patient's mobility; develop plan if impaired  - Assess patient's need for assistive devices and provide as appropriate  - Encourage maximum independence but intervene and supervise when necessary  - Involve family in performance of ADLs  - Assess for home care needs following discharge   - Consider OT consult to assist with ADL evaluation and planning for discharge  - Provide patient education as appropriate  6/18/2021 1144 by Kimmy Conley Fabienne Brody RN  Outcome: Adequate for Discharge  6/18/2021 1100 by Etienne Yarbrough RN  Outcome: Progressing  Goal: Maintain or return mobility status to optimal level  Description: INTERVENTIONS:  - Assess patient's baseline mobility status (ambulation, transfers, stairs, etc )    - Identify cognitive and physical deficits and behaviors that affect mobility  - Identify mobility aids required to assist with transfers and/or ambulation (gait belt, sit-to-stand, lift, walker, cane, etc )  - [place] fall precautions as indicated by assessment  - Record patient progress and toleration of activity level on Mobility SBAR; progress patient to next Phase/Stage  - Instruct patient to call for assistance with activity based on assessment  - Consider rehabilitation consult to assist with strengthening/weightbearing, etc   6/18/2021 1144 by Etienne Yarbrough RN  Outcome: Adequate for Discharge  6/18/2021 1100 by Etienne Yarbrough RN  Outcome: Progressing     Problem: DISCHARGE PLANNING  Goal: Discharge to home or other facility with appropriate resources  Description: INTERVENTIONS:  - Identify barriers to discharge w/patient and caregiver  - Arrange for needed discharge resources and transportation as appropriate  - Identify discharge learning needs (meds, wound care, etc )  - Arrange for interpretive services to assist at discharge as needed  - Refer to Case Management Department for coordinating discharge planning if the patient needs post-hospital services based on physician/advanced practitioner order or complex needs related to functional status, cognitive ability, or social support system  6/18/2021 1144 by Etienne Yarbrough RN  Outcome: Adequate for Discharge  6/18/2021 1100 by Etienne Yarbrough RN  Outcome: Progressing     Problem: Knowledge Deficit  Goal: Patient/family/caregiver demonstrates understanding of disease process, treatment plan, medications, and discharge instructions  Description: Complete learning assessment and assess knowledge base   Interventions:  - Provide teaching at level of understanding  - Provide teaching via preferred learning methods  6/18/2021 1144 by Ney Mac RN  Outcome: Adequate for Discharge  6/18/2021 1100 by Ney Mac RN  Outcome: Progressing     Problem: NEUROSENSORY - ADULT  Goal: Achieves stable or improved neurological status  Description: INTERVENTIONS  - Monitor and report changes in neurological status  - Monitor vital signs such as temperature, blood pressure, glucose, and any other labs ordered   - Initiate measures to prevent increased intracranial pressure  - Monitor for seizure activity and implement precautions if appropriate      6/18/2021 1144 by Ney Mac RN  Outcome: Adequate for Discharge  6/18/2021 1100 by Ney Mac RN  Outcome: Progressing  Goal: Remains free of injury related to seizures activity  Description: INTERVENTIONS  - Maintain airway, patient safety  and administer oxygen as ordered  - Monitor patient for seizure activity, document and report duration and description of seizure to physician/advanced practitioner  - If seizure occurs,  ensure patient safety during seizure  - Reorient patient post seizure  - Seizure pads on all 4 side rails  - Instruct patient/family to notify RN of any seizure activity including if an aura is experienced  - Instruct patient/family to call for assistance with activity based on nursing assessment  - Administer anti-seizure medications if ordered    6/18/2021 1144 by Ney Mac RN  Outcome: Adequate for Discharge  6/18/2021 1100 by Ney Mac RN  Outcome: Progressing  Goal: Achieves maximal functionality and self care  Description: INTERVENTIONS  - Monitor swallowing and airway patency with patient fatigue and changes in neurological status  - Encourage and assist patient to increase activity and self care     - Encourage visually impaired, hearing impaired and aphasic patients to use assistive/communication devices  6/18/2021 1144 by Ney Mac RN  Outcome: Adequate for Discharge  6/18/2021 1100 by Brenda Francisco RN  Outcome: Progressing     Problem: CARDIOVASCULAR - ADULT  Goal: Maintains optimal cardiac output and hemodynamic stability  Description: INTERVENTIONS:  - Monitor I/O, vital signs and rhythm  - Monitor for S/S and trends of decreased cardiac output  - Administer and titrate ordered vasoactive medications to optimize hemodynamic stability  - Assess quality of pulses, skin color and temperature  - Assess for signs of decreased coronary artery perfusion  - Instruct patient to report change in severity of symptoms  6/18/2021 1144 by Brenda Francisco RN  Outcome: Adequate for Discharge  6/18/2021 1100 by Brenda Francisco RN  Outcome: Progressing  Goal: Absence of cardiac dysrhythmias or at baseline rhythm  Description: INTERVENTIONS:  - Continuous cardiac monitoring, vital signs, obtain 12 lead EKG if ordered  - Administer antiarrhythmic and heart rate control medications as ordered  - Monitor electrolytes and administer replacement therapy as ordered  6/18/2021 1144 by Brenda Francisco RN  Outcome: Adequate for Discharge  6/18/2021 1100 by Brenda Francisco RN  Outcome: Progressing     Problem: RESPIRATORY - ADULT  Goal: Achieves optimal ventilation and oxygenation  Description: INTERVENTIONS:  - Assess for changes in respiratory status  - Assess for changes in mentation and behavior  - Position to facilitate oxygenation and minimize respiratory effort  - Oxygen administered by appropriate delivery if ordered  - Initiate smoking cessation education as indicated  - Encourage broncho-pulmonary hygiene including cough, deep breathe, Incentive Spirometry  - Assess the need for suctioning and aspirate as needed  - Assess and instruct to report SOB or any respiratory difficulty  - Respiratory Therapy support as indicated  6/18/2021 1144 by Brenda Francisco RN  Outcome: Adequate for Discharge  6/18/2021 1100 by Brenda Francisco RN  Outcome: Progressing     Problem: GASTROINTESTINAL - ADULT  Goal: Minimal or absence of nausea and/or vomiting  Description: INTERVENTIONS:  - Administer IV fluids if ordered to ensure adequate hydration  - Maintain NPO status until nausea and vomiting are resolved  - Nasogastric tube if ordered  - Administer ordered antiemetic medications as needed  - Provide nonpharmacologic comfort measures as appropriate  - Advance diet as tolerated, if ordered  - Consider nutrition services referral to assist patient with adequate nutrition and appropriate food choices  6/18/2021 1144 by Brain Shaikh RN  Outcome: Adequate for Discharge  6/18/2021 1100 by Brain Shaikh RN  Outcome: Progressing  Goal: Maintains or returns to baseline bowel function  Description: INTERVENTIONS:  - Assess bowel function  - Encourage oral fluids to ensure adequate hydration  - Administer IV fluids if ordered to ensure adequate hydration  - Administer ordered medications as needed  - Encourage mobilization and activity  - Consider nutritional services referral to assist patient with adequate nutrition and appropriate food choices  6/18/2021 1144 by Brain Shaikh RN  Outcome: Adequate for Discharge  6/18/2021 1100 by Brain Shaikh RN  Outcome: Progressing  Goal: Maintains adequate nutritional intake  Description: INTERVENTIONS:  - Monitor percentage of each meal consumed  - Identify factors contributing to decreased intake, treat as appropriate  - Assist with meals as needed  - Monitor I&O, weight, and lab values if indicated  - Obtain nutrition services referral as needed  6/18/2021 1144 by Brain Shaikh RN  Outcome: Adequate for Discharge  6/18/2021 1100 by Brain Shaikh RN  Outcome: Progressing     Problem: GENITOURINARY - ADULT  Goal: Maintains or returns to baseline urinary function  Description: INTERVENTIONS:  - Assess urinary function  - Encourage oral fluids to ensure adequate hydration if ordered  - Administer IV fluids as ordered to ensure adequate hydration  - Administer ordered medications as needed  - Offer frequent toileting  - Follow urinary retention protocol if ordered  6/18/2021 1144 by Valentino Shavers, RN  Outcome: Adequate for Discharge  6/18/2021 1100 by Valentino Shavers, RN  Outcome: Progressing  Goal: Absence of urinary retention  Description: INTERVENTIONS:  - Assess patients ability to void and empty bladder  - Monitor I/O  - Bladder scan as needed  - Discuss with physician/AP medications to alleviate retention as needed  - Discuss catheterization for long term situations as appropriate  6/18/2021 1144 by Valentino Shavers, RN  Outcome: Adequate for Discharge  6/18/2021 1100 by Valentino Shavers, RN  Outcome: Progressing     Problem: METABOLIC, FLUID AND ELECTROLYTES - ADULT  Goal: Electrolytes maintained within normal limits  Description: INTERVENTIONS:  - Monitor labs and assess patient for signs and symptoms of electrolyte imbalances  - Administer electrolyte replacement as ordered  - Monitor response to electrolyte replacements, including repeat lab results as appropriate  - Instruct patient on fluid and nutrition as appropriate  6/18/2021 1144 by Valentino Shavers, RN  Outcome: Adequate for Discharge  6/18/2021 1100 by Valentino Shavers, RN  Outcome: Progressing  Goal: Fluid balance maintained  Description: INTERVENTIONS:  - Monitor labs   - Monitor I/O and WT  - Instruct patient on fluid and nutrition as appropriate  - Assess for signs & symptoms of volume excess or deficit  6/18/2021 1144 by Valentino Shavers, RN  Outcome: Adequate for Discharge  6/18/2021 1100 by Valentino Shavers, RN  Outcome: Progressing  Goal: Glucose maintained within target range  Description: INTERVENTIONS:  - Monitor Blood Glucose as ordered  - Assess for signs and symptoms of hyperglycemia and hypoglycemia  - Administer ordered medications to maintain glucose within target range  - Assess nutritional intake and initiate nutrition service referral as needed  6/18/2021 1144 by Valentino Shavers, RN  Outcome: Adequate for Discharge  6/18/2021 1100 by Raquel Lee RN  Outcome: Progressing     Problem: SKIN/TISSUE INTEGRITY - ADULT  Goal: Skin integrity remains intact  Description: INTERVENTIONS  - Identify patients at risk for skin breakdown  - Assess and monitor skin integrity  - Assess and monitor nutrition and hydration status  - Monitor labs (i e  albumin)  - Assess for incontinence   - Turn and reposition patient  - Assist with mobility/ambulation  - Relieve pressure over bony prominences  - Avoid friction and shearing  - Provide appropriate hygiene as needed including keeping skin clean and dry  - Evaluate need for skin moisturizer/barrier cream  - Collaborate with interdisciplinary team (i e  Nutrition, Rehabilitation, etc )   - Patient/family teaching  6/18/2021 1144 by Raquel Lee RN  Outcome: Adequate for Discharge  6/18/2021 1100 by Raquel Lee RN  Outcome: Progressing  Goal: Incision(s), wounds(s) or drain site(s) healing without S/S of infection  Description: INTERVENTIONS  - Assess and document risk factors for skin impairment   - Assess and document dressing, incision, wound bed, drain sites and surrounding tissue  - Consider nutrition services referral as needed  - Oral mucous membranes remain intact  - Provide patient/ family education  6/18/2021 1144 by Raquel Lee RN  Outcome: Adequate for Discharge  6/18/2021 1100 by Raquel Lee RN  Outcome: Progressing  Goal: Oral mucous membranes remain intact  Description: INTERVENTIONS  - Assess oral mucosa and hygiene practices  - Implement preventative oral hygiene regimen  - Implement oral medicated treatments as ordered  - Initiate Nutrition services referral as needed  6/18/2021 1144 by Raquel Lee RN  Outcome: Adequate for Discharge  6/18/2021 1100 by Raquel Lee RN  Outcome: Progressing     Problem: HEMATOLOGIC - ADULT  Goal: Maintains hematologic stability  Description: INTERVENTIONS  - Assess for signs and symptoms of bleeding or hemorrhage  - Monitor labs  - Administer supportive blood products/factors as ordered and appropriate  6/18/2021 1144 by Stacia Lim RN  Outcome: Adequate for Discharge  6/18/2021 1100 by Stacia Lim RN  Outcome: Progressing     Problem: MUSCULOSKELETAL - ADULT  Goal: Maintain or return mobility to safest level of function  Description: INTERVENTIONS:  - Assess patient's ability to carry out ADLs; assess patient's baseline for ADL function and identify physical deficits which impact ability to perform ADLs (bathing, care of mouth/teeth, toileting, grooming, dressing, etc )  - Assess/evaluate cause of self-care deficits   - Assess range of motion  - Assess patient's mobility  - Assess patient's need for assistive devices and provide as appropriate  - Encourage maximum independence but intervene and supervise when necessary  - Involve family in performance of ADLs  - Assess for home care needs following discharge   - Consider OT consult to assist with ADL evaluation and planning for discharge  - Provide patient education as appropriate  6/18/2021 1144 by Stacia Lim RN  Outcome: Adequate for Discharge  6/18/2021 1100 by Stacia Lim RN  Outcome: Progressing  Goal: Maintain proper alignment of affected body part  Description: INTERVENTIONS:  - Support, maintain and protect limb and body alignment  - Provide patient/ family with appropriate education  6/18/2021 1144 by Stacia Lim RN  Outcome: Adequate for Discharge  6/18/2021 1100 by Stacia Lim RN  Outcome: Progressing     Problem: Nutrition/Hydration-ADULT  Goal: Nutrient/Hydration intake appropriate for improving, restoring or maintaining nutritional needs  Description: Monitor and assess patient's nutrition/hydration status for malnutrition  Collaborate with interdisciplinary team and initiate plan and interventions as ordered  Monitor patient's weight and dietary intake as ordered or per policy  Utilize nutrition screening tool and intervene as necessary   Determine patient's food preferences and provide high-protein, high-caloric foods as appropriate       INTERVENTIONS:  - Monitor oral intake, urinary output, labs, and treatment plans  - Assess nutrition and hydration status and recommend course of action  - Evaluate amount of meals eaten  - Assist patient with eating if necessary   - Allow adequate time for meals  - Recommend/ encourage appropriate diets, oral nutritional supplements, and vitamin/mineral supplements  - Order, calculate, and assess calorie counts as needed  - Recommend, monitor, and adjust tube feedings and TPN/PPN based on assessed needs  - Assess need for intravenous fluids  - Provide specific nutrition/hydration education as appropriate  - Include patient/family/caregiver in decisions related to nutrition  6/18/2021 1144 by Alex Han RN  Outcome: Adequate for Discharge  6/18/2021 1100 by Alex Han RN  Outcome: Progressing

## 2021-06-18 NOTE — PLAN OF CARE
Problem: Potential for Falls  Goal: Patient will remain free of falls  Description: INTERVENTIONS:  - Assess patient frequently for physical needs  -  Identify cognitive and physical deficits and behaviors that affect risk of falls    -  [place] fall precautions as indicated by assessment   - Educate patient/family on patient safety including physical limitations  - Instruct patient to call for assistance with activity based on assessment  - Modify environment to reduce risk of injury  - Consider OT/PT consult to assist with strengthening/mobility  Outcome: Progressing     Problem: PAIN - ADULT  Goal: Verbalizes/displays adequate comfort level or baseline comfort level  Description: Interventions:  - Encourage patient to monitor pain and request assistance  - Assess pain using appropriate pain scale  - Administer analgesics based on type and severity of pain and evaluate response  - Implement non-pharmacological measures as appropriate and evaluate response  - Consider cultural and social influences on pain and pain management  - Notify physician/advanced practitioner if interventions unsuccessful or patient reports new pain  Outcome: Progressing     Problem: INFECTION - ADULT  Goal: Absence or prevention of progression during hospitalization  Description: INTERVENTIONS:  - Assess and monitor for signs and symptoms of infection  - Monitor lab/diagnostic results  - Monitor all insertion sites, i e  indwelling lines, tubes, and drains  - Monitor endotracheal if appropriate and nasal secretions for changes in amount and color  - [place] appropriate cooling/warming therapies per order  - Administer medications as ordered  - Instruct and encourage patient and family to use good hand hygiene technique  - Identify and instruct in appropriate isolation precautions for identified infection/condition  Outcome: Progressing  Goal: Absence of fever/infection during neutropenic period  Description: INTERVENTIONS:  - Monitor WBC    Outcome: Progressing     Problem: SAFETY ADULT  Goal: Patient will remain free of falls  Description: INTERVENTIONS:  - Assess patient frequently for physical needs  -  Identify cognitive and physical deficits and behaviors that affect risk of falls    -  [place] fall precautions as indicated by assessment   - Educate patient/family on patient safety including physical limitations  - Instruct patient to call for assistance with activity based on assessment  - Modify environment to reduce risk of injury  - Consider OT/PT consult to assist with strengthening/mobility  Outcome: Progressing  Goal: Maintain or return to baseline ADL function  Description: INTERVENTIONS:  -  Assess patient's ability to carry out ADLs; assess patient's baseline for ADL function and identify physical deficits which impact ability to perform ADLs (bathing, care of mouth/teeth, toileting, grooming, dressing, etc )  - Assess/evaluate cause of self-care deficits   - Assess range of motion  - Assess patient's mobility; develop plan if impaired  - Assess patient's need for assistive devices and provide as appropriate  - Encourage maximum independence but intervene and supervise when necessary  - Involve family in performance of ADLs  - Assess for home care needs following discharge   - Consider OT consult to assist with ADL evaluation and planning for discharge  - Provide patient education as appropriate  Outcome: Progressing  Goal: Maintain or return mobility status to optimal level  Description: INTERVENTIONS:  - Assess patient's baseline mobility status (ambulation, transfers, stairs, etc )    - Identify cognitive and physical deficits and behaviors that affect mobility  - Identify mobility aids required to assist with transfers and/or ambulation (gait belt, sit-to-stand, lift, walker, cane, etc )  - [place] fall precautions as indicated by assessment  - Record patient progress and toleration of activity level on Mobility SBAR; progress patient to next Phase/Stage  - Instruct patient to call for assistance with activity based on assessment  - Consider rehabilitation consult to assist with strengthening/weightbearing, etc   Outcome: Progressing     Problem: DISCHARGE PLANNING  Goal: Discharge to home or other facility with appropriate resources  Description: INTERVENTIONS:  - Identify barriers to discharge w/patient and caregiver  - Arrange for needed discharge resources and transportation as appropriate  - Identify discharge learning needs (meds, wound care, etc )  - Arrange for interpretive services to assist at discharge as needed  - Refer to Case Management Department for coordinating discharge planning if the patient needs post-hospital services based on physician/advanced practitioner order or complex needs related to functional status, cognitive ability, or social support system  Outcome: Progressing     Problem: Knowledge Deficit  Goal: Patient/family/caregiver demonstrates understanding of disease process, treatment plan, medications, and discharge instructions  Description: Complete learning assessment and assess knowledge base    Interventions:  - Provide teaching at level of understanding  - Provide teaching via preferred learning methods  Outcome: Progressing     Problem: NEUROSENSORY - ADULT  Goal: Achieves stable or improved neurological status  Description: INTERVENTIONS  - Monitor and report changes in neurological status  - Monitor vital signs such as temperature, blood pressure, glucose, and any other labs ordered   - Initiate measures to prevent increased intracranial pressure  - Monitor for seizure activity and implement precautions if appropriate      Outcome: Progressing  Goal: Remains free of injury related to seizures activity  Description: INTERVENTIONS  - Maintain airway, patient safety  and administer oxygen as ordered  - Monitor patient for seizure activity, document and report duration and description of seizure to physician/advanced practitioner  - If seizure occurs,  ensure patient safety during seizure  - Reorient patient post seizure  - Seizure pads on all 4 side rails  - Instruct patient/family to notify RN of any seizure activity including if an aura is experienced  - Instruct patient/family to call for assistance with activity based on nursing assessment  - Administer anti-seizure medications if ordered    Outcome: Progressing  Goal: Achieves maximal functionality and self care  Description: INTERVENTIONS  - Monitor swallowing and airway patency with patient fatigue and changes in neurological status  - Encourage and assist patient to increase activity and self care     - Encourage visually impaired, hearing impaired and aphasic patients to use assistive/communication devices  Outcome: Progressing     Problem: CARDIOVASCULAR - ADULT  Goal: Maintains optimal cardiac output and hemodynamic stability  Description: INTERVENTIONS:  - Monitor I/O, vital signs and rhythm  - Monitor for S/S and trends of decreased cardiac output  - Administer and titrate ordered vasoactive medications to optimize hemodynamic stability  - Assess quality of pulses, skin color and temperature  - Assess for signs of decreased coronary artery perfusion  - Instruct patient to report change in severity of symptoms  Outcome: Progressing  Goal: Absence of cardiac dysrhythmias or at baseline rhythm  Description: INTERVENTIONS:  - Continuous cardiac monitoring, vital signs, obtain 12 lead EKG if ordered  - Administer antiarrhythmic and heart rate control medications as ordered  - Monitor electrolytes and administer replacement therapy as ordered  Outcome: Progressing     Problem: RESPIRATORY - ADULT  Goal: Achieves optimal ventilation and oxygenation  Description: INTERVENTIONS:  - Assess for changes in respiratory status  - Assess for changes in mentation and behavior  - Position to facilitate oxygenation and minimize respiratory effort  - Oxygen administered by appropriate delivery if ordered  - Initiate smoking cessation education as indicated  - Encourage broncho-pulmonary hygiene including cough, deep breathe, Incentive Spirometry  - Assess the need for suctioning and aspirate as needed  - Assess and instruct to report SOB or any respiratory difficulty  - Respiratory Therapy support as indicated  Outcome: Progressing     Problem: GASTROINTESTINAL - ADULT  Goal: Minimal or absence of nausea and/or vomiting  Description: INTERVENTIONS:  - Administer IV fluids if ordered to ensure adequate hydration  - Maintain NPO status until nausea and vomiting are resolved  - Nasogastric tube if ordered  - Administer ordered antiemetic medications as needed  - Provide nonpharmacologic comfort measures as appropriate  - Advance diet as tolerated, if ordered  - Consider nutrition services referral to assist patient with adequate nutrition and appropriate food choices  Outcome: Progressing  Goal: Maintains or returns to baseline bowel function  Description: INTERVENTIONS:  - Assess bowel function  - Encourage oral fluids to ensure adequate hydration  - Administer IV fluids if ordered to ensure adequate hydration  - Administer ordered medications as needed  - Encourage mobilization and activity  - Consider nutritional services referral to assist patient with adequate nutrition and appropriate food choices  Outcome: Progressing  Goal: Maintains adequate nutritional intake  Description: INTERVENTIONS:  - Monitor percentage of each meal consumed  - Identify factors contributing to decreased intake, treat as appropriate  - Assist with meals as needed  - Monitor I&O, weight, and lab values if indicated  - Obtain nutrition services referral as needed  Outcome: Progressing     Problem: GENITOURINARY - ADULT  Goal: Maintains or returns to baseline urinary function  Description: INTERVENTIONS:  - Assess urinary function  - Encourage oral fluids to ensure adequate hydration if ordered  - Administer IV fluids as ordered to ensure adequate hydration  - Administer ordered medications as needed  - Offer frequent toileting  - Follow urinary retention protocol if ordered  Outcome: Progressing  Goal: Absence of urinary retention  Description: INTERVENTIONS:  - Assess patients ability to void and empty bladder  - Monitor I/O  - Bladder scan as needed  - Discuss with physician/AP medications to alleviate retention as needed  - Discuss catheterization for long term situations as appropriate  Outcome: Progressing     Problem: METABOLIC, FLUID AND ELECTROLYTES - ADULT  Goal: Electrolytes maintained within normal limits  Description: INTERVENTIONS:  - Monitor labs and assess patient for signs and symptoms of electrolyte imbalances  - Administer electrolyte replacement as ordered  - Monitor response to electrolyte replacements, including repeat lab results as appropriate  - Instruct patient on fluid and nutrition as appropriate  Outcome: Progressing  Goal: Fluid balance maintained  Description: INTERVENTIONS:  - Monitor labs   - Monitor I/O and WT  - Instruct patient on fluid and nutrition as appropriate  - Assess for signs & symptoms of volume excess or deficit  Outcome: Progressing  Goal: Glucose maintained within target range  Description: INTERVENTIONS:  - Monitor Blood Glucose as ordered  - Assess for signs and symptoms of hyperglycemia and hypoglycemia  - Administer ordered medications to maintain glucose within target range  - Assess nutritional intake and initiate nutrition service referral as needed  Outcome: Progressing     Problem: SKIN/TISSUE INTEGRITY - ADULT  Goal: Skin integrity remains intact  Description: INTERVENTIONS  - Identify patients at risk for skin breakdown  - Assess and monitor skin integrity  - Assess and monitor nutrition and hydration status  - Monitor labs (i e  albumin)  - Assess for incontinence   - Turn and reposition patient  - Assist with mobility/ambulation  - Relieve pressure over bony prominences  - Avoid friction and shearing  - Provide appropriate hygiene as needed including keeping skin clean and dry  - Evaluate need for skin moisturizer/barrier cream  - Collaborate with interdisciplinary team (i e  Nutrition, Rehabilitation, etc )   - Patient/family teaching  Outcome: Progressing  Goal: Incision(s), wounds(s) or drain site(s) healing without S/S of infection  Description: INTERVENTIONS  - Assess and document risk factors for skin impairment   - Assess and document dressing, incision, wound bed, drain sites and surrounding tissue  - Consider nutrition services referral as needed  - Oral mucous membranes remain intact  - Provide patient/ family education  Outcome: Progressing  Goal: Oral mucous membranes remain intact  Description: INTERVENTIONS  - Assess oral mucosa and hygiene practices  - Implement preventative oral hygiene regimen  - Implement oral medicated treatments as ordered  - Initiate Nutrition services referral as needed  Outcome: Progressing     Problem: HEMATOLOGIC - ADULT  Goal: Maintains hematologic stability  Description: INTERVENTIONS  - Assess for signs and symptoms of bleeding or hemorrhage  - Monitor labs  - Administer supportive blood products/factors as ordered and appropriate  Outcome: Progressing     Problem: MUSCULOSKELETAL - ADULT  Goal: Maintain or return mobility to safest level of function  Description: INTERVENTIONS:  - Assess patient's ability to carry out ADLs; assess patient's baseline for ADL function and identify physical deficits which impact ability to perform ADLs (bathing, care of mouth/teeth, toileting, grooming, dressing, etc )  - Assess/evaluate cause of self-care deficits   - Assess range of motion  - Assess patient's mobility  - Assess patient's need for assistive devices and provide as appropriate  - Encourage maximum independence but intervene and supervise when necessary  - Involve family in performance of ADLs  - Assess for home care needs following discharge   - Consider OT consult to assist with ADL evaluation and planning for discharge  - Provide patient education as appropriate  Outcome: Progressing  Goal: Maintain proper alignment of affected body part  Description: INTERVENTIONS:  - Support, maintain and protect limb and body alignment  - Provide patient/ family with appropriate education  Outcome: Progressing     Problem: Nutrition/Hydration-ADULT  Goal: Nutrient/Hydration intake appropriate for improving, restoring or maintaining nutritional needs  Description: Monitor and assess patient's nutrition/hydration status for malnutrition  Collaborate with interdisciplinary team and initiate plan and interventions as ordered  Monitor patient's weight and dietary intake as ordered or per policy  Utilize nutrition screening tool and intervene as necessary  Determine patient's food preferences and provide high-protein, high-caloric foods as appropriate       INTERVENTIONS:  - Monitor oral intake, urinary output, labs, and treatment plans  - Assess nutrition and hydration status and recommend course of action  - Evaluate amount of meals eaten  - Assist patient with eating if necessary   - Allow adequate time for meals  - Recommend/ encourage appropriate diets, oral nutritional supplements, and vitamin/mineral supplements  - Order, calculate, and assess calorie counts as needed  - Recommend, monitor, and adjust tube feedings and TPN/PPN based on assessed needs  - Assess need for intravenous fluids  - Provide specific nutrition/hydration education as appropriate  - Include patient/family/caregiver in decisions related to nutrition  Outcome: Progressing

## 2021-06-18 NOTE — NURSING NOTE
Patient discharged to [facility] via [facility], IV removed, patient left with all belongings  AVS and scripts physical copies sent with patient

## 2021-06-18 NOTE — DISCHARGE INSTRUCTIONS
Diarrhea-rectal bleeding-with evidence of ischemic versus inflammatory colitis seen on colonoscopy in May, with significant improvement in inflammation, but with persistent edema/colitis seen in the sigmoid s/p biopsies as well as erythematous/ulcerative changes in the rectum  Hgb remains stable without need for transfusion while hospitalized  Follow up biopsies with Dr Deysi Park outpatient  Recommend repeat colonoscopy in 6-8 weeks (can f/u with Dr Deysi Park)  Start hydrocortisone suppositories for treatment of rectal disease

## 2021-06-21 ENCOUNTER — TELEPHONE (OUTPATIENT)
Dept: GASTROENTEROLOGY | Facility: CLINIC | Age: 76
End: 2021-06-21

## 2021-06-21 NOTE — TELEPHONE ENCOUNTER
----- Message from PADMINI Feng sent at 6/20/2021 12:09 PM EDT -----  Please schedule hospital f/up in 6-8 weeks with Dr Candis Barriga or myself

## 2021-07-01 ENCOUNTER — HOSPITAL ENCOUNTER (INPATIENT)
Facility: HOSPITAL | Age: 76
LOS: 4 days | Discharge: HOME | DRG: 379 | End: 2021-07-05
Attending: EMERGENCY MEDICINE | Admitting: INTERNAL MEDICINE
Payer: MEDICARE

## 2021-07-01 ENCOUNTER — APPOINTMENT (EMERGENCY)
Dept: CT IMAGING | Facility: HOSPITAL | Age: 76
DRG: 379 | End: 2021-07-01
Payer: MEDICARE

## 2021-07-01 DIAGNOSIS — K92.1 HEMATOCHEZIA: Primary | ICD-10-CM

## 2021-07-01 DIAGNOSIS — R41.89 COGNITIVE IMPAIRMENT: ICD-10-CM

## 2021-07-01 DIAGNOSIS — N28.1 KIDNEY CYSTS: ICD-10-CM

## 2021-07-01 DIAGNOSIS — K52.9 COLITIS: ICD-10-CM

## 2021-07-01 DIAGNOSIS — K57.90 DIVERTICULOSIS: ICD-10-CM

## 2021-07-01 DIAGNOSIS — G47.01 INSOMNIA DUE TO MEDICAL CONDITION: ICD-10-CM

## 2021-07-01 LAB
ALBUMIN SERPL BCP-MCNC: 3.1 G/DL (ref 3.5–5)
ALP SERPL-CCNC: 90 U/L (ref 46–116)
ALT SERPL W P-5'-P-CCNC: 18 U/L (ref 12–78)
ANION GAP SERPL CALCULATED.3IONS-SCNC: 4 MMOL/L (ref 4–13)
APTT PPP: 30 SECONDS (ref 23–37)
AST SERPL W P-5'-P-CCNC: 16 U/L (ref 5–45)
BACTERIA UR QL AUTO: ABNORMAL /HPF
BASOPHILS # BLD AUTO: 0.04 THOUSANDS/ΜL (ref 0–0.1)
BASOPHILS NFR BLD AUTO: 1 % (ref 0–1)
BILIRUB SERPL-MCNC: 0.59 MG/DL (ref 0.2–1)
BILIRUB UR QL STRIP: NEGATIVE
BUN SERPL-MCNC: 12 MG/DL (ref 5–25)
CALCIUM ALBUM COR SERPL-MCNC: 10 MG/DL (ref 8.3–10.1)
CALCIUM SERPL-MCNC: 9.3 MG/DL (ref 8.3–10.1)
CHLORIDE SERPL-SCNC: 103 MMOL/L (ref 100–108)
CLARITY UR: CLEAR
CO2 SERPL-SCNC: 30 MMOL/L (ref 21–32)
COLOR UR: YELLOW
CREAT SERPL-MCNC: 0.99 MG/DL (ref 0.6–1.3)
EOSINOPHIL # BLD AUTO: 0.09 THOUSAND/ΜL (ref 0–0.61)
EOSINOPHIL NFR BLD AUTO: 2 % (ref 0–6)
ERYTHROCYTE [DISTWIDTH] IN BLOOD BY AUTOMATED COUNT: 14.9 % (ref 11.6–15.1)
GFR SERPL CREATININE-BSD FRML MDRD: 57 ML/MIN/1.73SQ M
GLUCOSE SERPL-MCNC: 112 MG/DL (ref 65–140)
GLUCOSE UR STRIP-MCNC: NEGATIVE MG/DL
HCT VFR BLD AUTO: 37.5 % (ref 34.8–46.1)
HGB BLD-MCNC: 11.8 G/DL (ref 11.5–15.4)
HGB UR QL STRIP.AUTO: ABNORMAL
IMM GRANULOCYTES # BLD AUTO: 0.02 THOUSAND/UL (ref 0–0.2)
IMM GRANULOCYTES NFR BLD AUTO: 0 % (ref 0–2)
INR PPP: 0.97 (ref 0.84–1.19)
KETONES UR STRIP-MCNC: NEGATIVE MG/DL
LEUKOCYTE ESTERASE UR QL STRIP: NEGATIVE
LYMPHOCYTES # BLD AUTO: 1.13 THOUSANDS/ΜL (ref 0.6–4.47)
LYMPHOCYTES NFR BLD AUTO: 21 % (ref 14–44)
MCH RBC QN AUTO: 27.9 PG (ref 26.8–34.3)
MCHC RBC AUTO-ENTMCNC: 31.5 G/DL (ref 31.4–37.4)
MCV RBC AUTO: 89 FL (ref 82–98)
MONOCYTES # BLD AUTO: 0.69 THOUSAND/ΜL (ref 0.17–1.22)
MONOCYTES NFR BLD AUTO: 13 % (ref 4–12)
NEUTROPHILS # BLD AUTO: 3.53 THOUSANDS/ΜL (ref 1.85–7.62)
NEUTS SEG NFR BLD AUTO: 63 % (ref 43–75)
NITRITE UR QL STRIP: NEGATIVE
NON-SQ EPI CELLS URNS QL MICRO: ABNORMAL /HPF
NRBC BLD AUTO-RTO: 0 /100 WBCS
PH UR STRIP.AUTO: 7 [PH] (ref 4.5–8)
PLATELET # BLD AUTO: 208 THOUSANDS/UL (ref 149–390)
PMV BLD AUTO: 10.1 FL (ref 8.9–12.7)
POTASSIUM SERPL-SCNC: 4.7 MMOL/L (ref 3.5–5.3)
PROT SERPL-MCNC: 7 G/DL (ref 6.4–8.2)
PROT UR STRIP-MCNC: NEGATIVE MG/DL
PROTHROMBIN TIME: 12.7 SECONDS (ref 11.6–14.5)
RBC # BLD AUTO: 4.23 MILLION/UL (ref 3.81–5.12)
RBC #/AREA URNS AUTO: ABNORMAL /HPF
SODIUM SERPL-SCNC: 137 MMOL/L (ref 136–145)
SP GR UR STRIP.AUTO: 1.01 (ref 1–1.03)
UROBILINOGEN UR QL STRIP.AUTO: 1 E.U./DL
WBC # BLD AUTO: 5.5 THOUSAND/UL (ref 4.31–10.16)
WBC #/AREA URNS AUTO: ABNORMAL /HPF

## 2021-07-01 PROCEDURE — 74177 CT ABD & PELVIS W/CONTRAST: CPT

## 2021-07-01 PROCEDURE — 99223 1ST HOSP IP/OBS HIGH 75: CPT | Performed by: INTERNAL MEDICINE

## 2021-07-01 PROCEDURE — 81001 URINALYSIS AUTO W/SCOPE: CPT

## 2021-07-01 PROCEDURE — 36415 COLL VENOUS BLD VENIPUNCTURE: CPT | Performed by: PHYSICIAN ASSISTANT

## 2021-07-01 PROCEDURE — 99285 EMERGENCY DEPT VISIT HI MDM: CPT | Performed by: PHYSICIAN ASSISTANT

## 2021-07-01 PROCEDURE — 82272 OCCULT BLD FECES 1-3 TESTS: CPT

## 2021-07-01 PROCEDURE — 85730 THROMBOPLASTIN TIME PARTIAL: CPT | Performed by: PHYSICIAN ASSISTANT

## 2021-07-01 PROCEDURE — 99285 EMERGENCY DEPT VISIT HI MDM: CPT

## 2021-07-01 PROCEDURE — 80053 COMPREHEN METABOLIC PANEL: CPT | Performed by: PHYSICIAN ASSISTANT

## 2021-07-01 PROCEDURE — 87086 URINE CULTURE/COLONY COUNT: CPT

## 2021-07-01 PROCEDURE — 85025 COMPLETE CBC W/AUTO DIFF WBC: CPT | Performed by: PHYSICIAN ASSISTANT

## 2021-07-01 PROCEDURE — 83993 ASSAY FOR CALPROTECTIN FECAL: CPT | Performed by: INTERNAL MEDICINE

## 2021-07-01 PROCEDURE — 85610 PROTHROMBIN TIME: CPT | Performed by: PHYSICIAN ASSISTANT

## 2021-07-01 RX ORDER — GABAPENTIN 100 MG/1
100 CAPSULE ORAL 3 TIMES DAILY
Status: DISCONTINUED | OUTPATIENT
Start: 2021-07-01 | End: 2021-07-05 | Stop reason: HOSPADM

## 2021-07-01 RX ORDER — PANTOPRAZOLE SODIUM 40 MG/1
40 TABLET, DELAYED RELEASE ORAL
Status: DISCONTINUED | OUTPATIENT
Start: 2021-07-02 | End: 2021-07-05 | Stop reason: HOSPADM

## 2021-07-01 RX ORDER — DICYCLOMINE HCL 20 MG
20 TABLET ORAL 4 TIMES DAILY PRN
Status: DISCONTINUED | OUTPATIENT
Start: 2021-07-01 | End: 2021-07-05 | Stop reason: HOSPADM

## 2021-07-01 RX ORDER — METOPROLOL SUCCINATE 50 MG/1
50 TABLET, EXTENDED RELEASE ORAL DAILY
Status: DISCONTINUED | OUTPATIENT
Start: 2021-07-02 | End: 2021-07-05 | Stop reason: HOSPADM

## 2021-07-01 RX ORDER — ESCITALOPRAM OXALATE 5 MG/1
5 TABLET ORAL DAILY
Status: DISCONTINUED | OUTPATIENT
Start: 2021-07-02 | End: 2021-07-05 | Stop reason: HOSPADM

## 2021-07-01 RX ORDER — LEVOTHYROXINE SODIUM 0.05 MG/1
50 TABLET ORAL
Status: DISCONTINUED | OUTPATIENT
Start: 2021-07-02 | End: 2021-07-05 | Stop reason: HOSPADM

## 2021-07-01 RX ORDER — MESALAMINE 1000 MG/1
1000 SUPPOSITORY RECTAL
Status: DISCONTINUED | OUTPATIENT
Start: 2021-07-01 | End: 2021-07-03

## 2021-07-01 RX ADMIN — PSYLLIUM HUSK 1 PACKET: 3.4 POWDER ORAL at 16:00

## 2021-07-01 RX ADMIN — MESALAMINE 1000 MG: 250 CAPSULE ORAL at 17:27

## 2021-07-01 RX ADMIN — IOHEXOL 100 ML: 350 INJECTION, SOLUTION INTRAVENOUS at 08:42

## 2021-07-01 NOTE — ASSESSMENT & PLAN NOTE
This is a 62-year-old female with history of hypertension, hyperlipidemia, anxiety, GERD, atrial fibrillation not on anticoagulation, cognitive impairment presenting with blood per rectum  Patient was recently admitted on 06/11/2021 for intermittent diarrhea for 1 year with bright red blood per rectum  Was evaluated by GI underwent colonoscopy which revealed polyps, multiple diverticula, edematous and scarred mucosa in sigmoid colon  EGD revealed normal esophagus, small hiatal hernia, erythematous and nodular mucosa  Patient started on hydrocortisone suppositories and returned stating she has been having bright red blood per rectum for the past 6 days and abdominal cramping      · CT abdomen pelvis reveals no acute findings, focal under distention of sigmoid likely accounts for mild wall thickening compatible with chronic diverticulosis  · GI consultation recommendations appreciated  · Differentials include inflammatory bowel disease, ischemic colitis or segmental colitis associated with diverticulosis  · Patient started on mesalamine on Bentyl for abdominal cramping  · Clear liquid diet  · Hemoglobin stable at 11 8, Will monitor H&H

## 2021-07-01 NOTE — CONSULTS
Consultation -  [facility]  Kellie Fox 70 y o  female MRN: [id number]  Unit/Bed#: ED 15 Encounter: [id number]        ASSESSMENT/PLAN:   Abdominal pain  Rectal bleeding  Abnormal CT scan    Patient has been seen multiple times in the last several months due to rectal bleeding and abdominal pain  She has had several CT scan showing colitis as well as 2 recent colonoscopy he has with acute and chronic changes  The differential is still not exactly clear but does include inflammatory bowel disease, ischemic colitis, segmental colitis, associated with diverticular disease  Given her dementia it would be difficult to monitor closely in an outpatient setting and therefore would recommend admission   -Metamucil daily  -start mesalamine  -Canasa suppositories  -follow H&H  -discontinue hydrocortisone suppository  -check fecal calprotectin  -clear liquid diet  -Bentyl as needed for pain    Inpatient consult to gastroenterology  Consult performed by: Taz Palacio PA-C  Consult ordered by: Gurjit Ruvalcaba PA-C          Reason for Consult / Principal Problem:  Abdominal pain/hematochezia    HPI: Kellie Fox is a 70y o  year old female with a PMH of hypertension, hypothyroid, breast cancer, atrial fibrillation, not on anticoagulation, dementia who presents with abdominal pain and hematochezia  She has been seen several times in the past few months for same symptoms she has difficult historian due to her dementia but states she has intermittent lower abdominal pain associated with diarrhea and rectal bleeding  She had a colonoscopy in 2017 showing beefy red erythema and edema with superficial ulceration in the splenic flexure compatible with ischemic colitis  Biopsy showed benign colonic mucosa with fibrinopurulent exudate as well as chronic inflammatory and atrophic changes compatible with ischemic colitis    She then had a colonoscopy in May with Dr Carlotta Deleon and was found to have patchy abnormal mucosa in the anus, rectum, sigmoid and descending colon  Biopsy showed acute and chronic inflammation, cryptitis, crypt abscess formation and mucosal ulceration as well as a focal noncaseating granuloma  Colonoscopy with again was repeated on 06/14 showing multiple large, severe diverticula causing moderate luminal narrowing in the sigmoid, localized edematous and scarred mucosa in the sigmoid, patchy nodular and scarred mucosa with loss of vascular pattern in the rectum  Biopsy showed again focal active cryptitis and chronic injurious changes  It sounds as if the patient was started on either sulfasalazine or mesalamine, unclear which 1 however she states this caused her to feel weird and disoriented so she stopped taking it  She was then placed on hydrocortisone suppositories, unclear if she is using these  She also admits to fecal incontinence and having to wear a pad        Review of Systems: as per HPI  Review of Systems   Unable to perform ROS: Dementia       Historical Information   Past Medical History:   Diagnosis Date    Abnormal CT of the abdomen     Acute bronchitis     Anxiety     Appetite loss     Arthritis     Ascending aortic aneurysm (HCC)     Bilateral renal cysts     Cyst of ovary     Disease of thyroid gland     Dysphagia     Esophageal reflux disease     Full dentures     GERD (gastroesophageal reflux disease)     Herpes zoster     Hyperlipidemia     Hypertension     Hypokalemia     Hypomagnesemia     Hypothyroidism     Impaired fasting glucose     Irritable bowel syndrome (IBS)     Ischemic colitis (HCC)     Knee pain     Limb alert care status     no BP/IV right arm    Osteoarthritis of right knee     Pneumonia     Post-op pain     Pulmonary nodules     Thoracic aortic aneurysm (HCC)     Unspecified ovarian cysts     Use of anastrozole (Arimidex)     Vasovagal syncope     Vitamin D deficiency     Wears glasses     Weight loss      Past Surgical History:   Procedure Laterality Date    APPENDECTOMY      pt states it was not removed    BREAST BIOPSY Right 03/02/2016    BREAST LUMPECTOMY Right 2004    BREAST SURGERY Right     Single lesion excision 1990 hyperplasia, 1st biopsy at 25yrs old was benign    CHOLECYSTECTOMY      COLONOSCOPY      COLONOSCOPY N/A 5/12/2017    Procedure: COLONOSCOPY with biopsy;  Surgeon: Melody Heller MD;  Location: [facility]; Service:     ESOPHAGOGASTRODUODENOSCOPY N/A 1/19/2019    Procedure: ESOPHAGOGASTRODUODENOSCOPY (EGD) with 4cc of epinephrine injection and cauterized with gold probe; Surgeon: Blake Arias MD;  Location: [facility]; Service: Gastroenterology    [place] LUMBAR PUNCTURE DIAGNOSTIC  5/24/2019    HYSTERECTOMY  1977    IR LUMBAR PUNCTURE  2/21/2019    KNEE SURGERY Right     AL BIOPSY/EXCISION, LYMPH NODE(S) Right 4/12/2016    Procedure: BIOPSY LYMPH NODE SENTINEL @ 1200 LYMPHOSCINTIGRAPHY LYMPHATIC MAPPING;  Surgeon: Emily Resendez MD;  Location: [facility];  Service: General    AL MASTECTOMY, SIMPLE, COMPLETE Right 4/12/2016    Procedure: MASTECTOMY SIMPLE;  Surgeon: Emily Resendez MD;  Location: [facility];  Service: General    TUBAL LIGATION      US GUIDED BREAST BIOPSY RIGHT COMPLETE Right 3/9/2016     Social History   Social History     Substance and Sexual Activity   Alcohol Use Not Currently     Social History     Substance and Sexual Activity   Drug Use No     Social History     Tobacco Use   Smoking Status Never Smoker   Smokeless Tobacco Never Used   Tobacco Comment    not interested     Family History   Problem Relation Age of Onset    Stroke Maternal Grandmother     Anemia Mother     Other Mother         disorders of blood and blood forming organs    Hypertension Mother     Stroke Father     Alcohol abuse Brother     Colon cancer Neg Hx        Meds/Allergies     (Not in a hospital admission)    No current facility-administered medications for this encounter         Allergies Allergen Reactions    Demerol [Meperidine] GI Intolerance and Other (See Comments)     Other reaction(s): Other (See Comments)  severe nausea  severe nausea  Nausea/vomiting    Nitroglycerin Anaphylaxis and Other (See Comments)     BP drops drastically    Lipitor [Atorvastatin] Other (See Comments)     Joint/muscle pain    Zocor [Simvastatin] Other (See Comments)     Joint/muscle pain       Objective     Blood pressure 151/79, pulse 76, temperature 98 4 °F (36 9 °C), temperature source Oral, resp  rate 16, weight 64 kg (141 lb 1 5 oz), SpO2 99 %  No intake or output data in the 24 hours ending 07/01/21 1503    PHYSICAL EXAM     Physical Exam  Constitutional:       Appearance: Normal appearance  She is well-developed  HENT:      Head: Normocephalic and atraumatic  Eyes:      Conjunctiva/sclera: Conjunctivae normal    Cardiovascular:      Rate and Rhythm: Normal rate and regular rhythm  Pulmonary:      Effort: Pulmonary effort is normal       Breath sounds: Normal breath sounds  Abdominal:      General: Bowel sounds are normal  There is no distension  Palpations: Abdomen is soft  Tenderness: There is abdominal tenderness (Left lower quadrant to deep palpation)  Musculoskeletal:         General: Normal range of motion  Cervical back: Normal range of motion  Skin:     General: Skin is warm and dry  Neurological:      Mental Status: She is alert and oriented to person, place, and time     Psychiatric:         Mood and Affect: Mood normal          Behavior: Behavior normal          Lab Results:   CBC:   Lab Results   Component Value Date    WBC 5 50 07/01/2021    HGB 11 8 07/01/2021    HCT 37 5 07/01/2021    MCV 89 07/01/2021     07/01/2021    MCH 27 9 07/01/2021    MCHC 31 5 07/01/2021    RDW 14 9 07/01/2021    MPV 10 1 07/01/2021    NRBC 0 07/01/2021   ,   CMP:   Lab Results   Component Value Date    K 4 7 07/01/2021     07/01/2021    CO2 30 07/01/2021    BUN 12 07/01/2021    CREATININE 0 99 07/01/2021    CALCIUM 9 3 07/01/2021    AST 16 07/01/2021    ALT 18 07/01/2021    ALKPHOS 90 07/01/2021    EGFR 57 07/01/2021   ,   Lipase: No results found for: LIPASE,  PT/INR:   Lab Results   Component Value Date    INR 0 97 07/01/2021   ,   Imaging Studies: I have personally reviewed pertinent reports  CT abd/pelvis:    No acute findings  Similar appearance to 6/11/2021      Chronic, nonemergent findings above         STOMACH AND BOWEL:  Focal under distention of the sigmoid likely accounts for appearance of mild wall thickening, otherwise similar to the prior study, most compatible with chronic diverticulosis  No surrounding fatty infiltrative change  No bowel dilatation or air-fluid levels

## 2021-07-01 NOTE — NURSING NOTE
Patient had bridging orders from the ER for regular diet  Patient ordered food   changed diet to clear liquid  [facility] was called and asked to cancel the regular diet tray and to place order for clear liquid  Patient received regular diet tray and ate it before it was noticed by nursing staff  [place] and  made aware of the error

## 2021-07-01 NOTE — CASE MANAGEMENT
Patient identified as a CODE R   Discherged from this facility on June 18, 2021  Patient reports abdominal pain and  rectal bleeding for the last 6 days, and after colonoscopy 3 weeks ago,  CT Scan pending  Discharge is pending

## 2021-07-01 NOTE — PLAN OF CARE
Problem: MOBILITY - ADULT  Goal: Maintain or return to baseline ADL function  Description: INTERVENTIONS:  -  Assess patient's ability to carry out ADLs; assess patient's baseline for ADL function and identify physical deficits which impact ability to perform ADLs (bathing, care of mouth/teeth, toileting, grooming, dressing, etc )  - Assess/evaluate cause of self-care deficits   - Assess range of motion  - Assess patient's mobility; develop plan if impaired  - Assess patient's need for assistive devices and provide as appropriate  - Encourage maximum independence but intervene and supervise when necessary  - Involve family in performance of ADLs  - Assess for home care needs following discharge   - Consider OT consult to assist with ADL evaluation and planning for discharge  - Provide patient education as appropriate  Outcome: Progressing  Goal: Maintains/Returns to pre admission functional level  Description: INTERVENTIONS:  - Perform BMAT or MOVE assessment daily    - Set and communicate daily mobility goal to care team and patient/family/caregiver  - Collaborate with rehabilitation services on mobility goals if consulted  - Perform Range of Motion 3 times a day  - Reposition patient every 2 hours    - Dangle patient 3 times a day  - Stand patient 3 times a day  - Ambulate patient 3 times a day  - Out of bed to chair 3 times a day   - Out of bed for meals 3 times a day  - Out of bed for toileting  - Record patient progress and toleration of activity level   Outcome: Progressing     Problem: Potential for Falls  Goal: Patient will remain free of falls  Description: INTERVENTIONS:  - Educate patient/family on patient safety including physical limitations  - Instruct patient to call for assistance with activity   - Consult OT/PT to assist with strengthening/mobility   - Keep Call bell within reach  - Keep bed low and locked with side rails adjusted as appropriate  - Keep care items and personal belongings within reach  - Initiate and maintain comfort rounds  - Make Fall Risk Sign visible to staff  - Offer Toileting every 2 Hours, in advance of need  - Initiate/Maintain BED alarm  - Obtain necessary fall risk management equipment: Hassan Shone  - Apply yellow socks and bracelet for high fall risk patients  - Consider moving patient to room near nurses station  Outcome: Progressing     Problem: GASTROINTESTINAL - ADULT  Goal: Minimal or absence of nausea and/or vomiting  Description: INTERVENTIONS:  - Administer IV fluids if ordered to ensure adequate hydration  - Maintain NPO status until nausea and vomiting are resolved  - Nasogastric tube if ordered  - Administer ordered antiemetic medications as needed  - Provide nonpharmacologic comfort measures as appropriate  - Advance diet as tolerated, if ordered  - Consider nutrition services referral to assist patient with adequate nutrition and appropriate food choices  Outcome: Progressing  Goal: Maintains or returns to baseline bowel function  Description: INTERVENTIONS:  - Assess bowel function  - Encourage oral fluids to ensure adequate hydration  - Administer IV fluids if ordered to ensure adequate hydration  - Administer ordered medications as needed  - Encourage mobilization and activity  - Consider nutritional services referral to assist patient with adequate nutrition and appropriate food choices  Outcome: Progressing  Goal: Maintains adequate nutritional intake  Description: INTERVENTIONS:  - Monitor percentage of each meal consumed  - Identify factors contributing to decreased intake, treat as appropriate  - Assist with meals as needed  - Monitor I&O, weight, and lab values if indicated  - Obtain nutrition services referral as needed  Outcome: Progressing  Goal: Oral mucous membranes remain intact  Description: INTERVENTIONS  - Assess oral mucosa and hygiene practices  - Implement preventative oral hygiene regimen  - Implement oral medicated treatments as ordered  - Initiate Nutrition services referral as needed  Outcome: Progressing     Problem: HEMATOLOGIC - ADULT  Goal: Maintains hematologic stability  Description: INTERVENTIONS  - Assess for signs and symptoms of bleeding or hemorrhage  - Monitor labs  - Administer supportive blood products/factors as ordered and appropriate  Outcome: Progressing     Problem: SAFETY ADULT  Goal: Maintain or return to baseline ADL function  Description: INTERVENTIONS:  -  Assess patient's ability to carry out ADLs; assess patient's baseline for ADL function and identify physical deficits which impact ability to perform ADLs (bathing, care of mouth/teeth, toileting, grooming, dressing, etc )  - Assess/evaluate cause of self-care deficits   - Assess range of motion  - Assess patient's mobility; develop plan if impaired  - Assess patient's need for assistive devices and provide as appropriate  - Encourage maximum independence but intervene and supervise when necessary  - Involve family in performance of ADLs  - Assess for home care needs following discharge   - Consider OT consult to assist with ADL evaluation and planning for discharge  - Provide patient education as appropriate  Outcome: Progressing  Goal: Maintains/Returns to pre admission functional level  Description: INTERVENTIONS:  - Perform BMAT or MOVE assessment daily    - Set and communicate daily mobility goal to care team and patient/family/caregiver  - Collaborate with rehabilitation services on mobility goals if consulted  - Perform Range of Motion 3 times a day  - Reposition patient every 2 hours    - Dangle patient 3 times a day  - Stand patient 3 times a day  - Ambulate patient 3 times a day  - Out of bed to chair 3 times a day   - Out of bed for meals 3 times a day  - Out of bed for toileting  - Record patient progress and toleration of activity level   Outcome: Progressing  Goal: Patient will remain free of falls  Description: INTERVENTIONS:  - Educate patient/family on patient safety including physical limitations  - Instruct patient to call for assistance with activity   - Consult OT/PT to assist with strengthening/mobility   - Keep Call bell within reach  - Keep bed low and locked with side rails adjusted as appropriate  - Keep care items and personal belongings within reach  - Initiate and maintain comfort rounds  - Make Fall Risk Sign visible to staff  - Offer Toileting every 2 Hours, in advance of need  - Initiate/Maintain  bed alarm  - Obtain necessary fall risk management equipment: Walker  - Apply yellow socks and bracelet for high fall risk patients  - Consider moving patient to room near nurses station  Outcome: Progressing     Problem: DISCHARGE PLANNING  Goal: Discharge to home or other facility with appropriate resources  Description: INTERVENTIONS:  - Identify barriers to discharge w/patient and caregiver  - Arrange for needed discharge resources and transportation as appropriate  - Identify discharge learning needs (meds, wound care, etc )  - Arrange for interpretive services to assist at discharge as needed  - Refer to Case Management Department for coordinating discharge planning if the patient needs post-hospital services based on physician/advanced practitioner order or complex needs related to functional status, cognitive ability, or social support system  Outcome: Progressing     Problem: Knowledge Deficit  Goal: Patient/family/caregiver demonstrates understanding of disease process, treatment plan, medications, and discharge instructions  Description: Complete learning assessment and assess knowledge base    Interventions:  - Provide teaching at level of understanding  - Provide teaching via preferred learning methods  Outcome: Progressing

## 2021-07-01 NOTE — CASE MANAGEMENT
Patient identified as a CODE R    Patient reports rectal bleeding for the last 6 days, intermittent after colonoscopy 3 weeks ago, abdominal pain  CT Scan completed  Case consulted with assigned RN this writer was informed patient is for medical admission

## 2021-07-01 NOTE — ED NOTES
Patient requesting to go home and have something to eat/drink   Provider at bedside updating patient that she is awaiting GI specialist to see her first       Messi Kay RN  07/01/21 6527

## 2021-07-01 NOTE — H&P
Gamaliel Conroy 1945, 70 y o  female MRN: [id number]  Unit/Bed#: E5 -01 Encounter: [id number]  Primary Care Provider: Anthony Stallings DO   Date and time admitted to hospital: 7/1/2021  7:45 AM    * Hematochezia  Assessment & Plan  This is a 70-year-old female with history of hypertension, hyperlipidemia, anxiety, GERD, atrial fibrillation not on anticoagulation, cognitive impairment presenting with blood per rectum  Patient was recently admitted on 06/11/2021 for intermittent diarrhea for 1 year with bright red blood per rectum  Was evaluated by GI underwent colonoscopy which revealed polyps, multiple diverticula, edematous and scarred mucosa in sigmoid colon  EGD revealed normal esophagus, small hiatal hernia, erythematous and nodular mucosa  Patient started on hydrocortisone suppositories and returned stating she has been having bright red blood per rectum for the past 6 days and abdominal cramping  · CT abdomen pelvis reveals no acute findings, focal under distention of sigmoid likely accounts for mild wall thickening compatible with chronic diverticulosis  · GI consultation recommendations appreciated  · Differentials include inflammatory bowel disease, ischemic colitis or segmental colitis associated with diverticulosis  · Patient started on mesalamine on Bentyl for abdominal cramping  · Clear liquid diet  · Hemoglobin stable at 11 8, Will monitor H&H    Cognitive impairment  Assessment & Plan  · Patient lives in [facility] oriented to self only at this point    Patient has poor insight and poor decision-making capacity  · Patient likely has underlying undiagnosed dementia  · Continue Lexapro 5 mg daily    Atrial fibrillation (HCC)  Assessment & Plan  · Continue Toprol 50 mg daily  · Not anticoagulated prior to admission due to rectal bleeding    Hypothyroidism  Assessment & Plan  · Continue levothyroxine    Essential hypertension  Assessment & Plan  · Continue metoprolol succinate 50 mg daily    GERD (gastroesophageal reflux disease)  Assessment & Plan  · Continue PPI    Anxiety  Assessment & Plan  · Continue Lexapro        VTE Prophylaxis: Contraindicated due to GI bleed  / sequential compression device   Code Status: FULL  POLST: There is no POLST form on file for this patient (pre-hospital)    Anticipated Length of Stay:  Patient will be admitted on an Inpatient basis with an anticipated length of stay of  greater than 2 midnights  Justification for Hospital Stay:  GI bleed    Total Time for Visit, including Counseling / Coordination of Care: 30 minutes  Greater than 50% of this total time spent on direct patient counseling and coordination of care  Chief Complaint:   Rectal bleeding    History of Present Illness:    Aleyda Ram is a 70 y o  female who presents with abdominal pain and bright red blood per rectum  Patient hashistory of hypertension, hyperlipidemia, anxiety, GERD, atrial fibrillation not on anticoagulation, cognitive impairment presenting with blood per rectum  Patient was recently admitted on 06/11/2021 for intermittent diarrhea for 1 year with bright red blood per rectum  Was evaluated by GI underwent colonoscopy  Patient started on hydrocortisone suppositories and returned stating she has been having bright red blood per rectum for the past 6 days and abdominal cramping  Review of Systems:    Review of Systems   Constitutional: Negative  HENT: Negative  Eyes: Negative  Respiratory: Negative  Cardiovascular: Negative  Gastrointestinal: Positive for abdominal pain and blood in stool  Endocrine: Negative  Genitourinary: Negative  Musculoskeletal: Negative  Skin: Negative  Allergic/Immunologic: Negative  Neurological: Negative  Hematological: Negative  Psychiatric/Behavioral: Negative          Past Medical and Surgical History:     Past Medical History:   Diagnosis Date    Abnormal CT of the abdomen     Acute bronchitis     Anxiety     Appetite loss     Arthritis     Ascending aortic aneurysm (HCC)     Bilateral renal cysts     Cyst of ovary     Disease of thyroid gland     Dysphagia     Esophageal reflux disease     Full dentures     GERD (gastroesophageal reflux disease)     Herpes zoster     Hyperlipidemia     Hypertension     Hypokalemia     Hypomagnesemia     Hypothyroidism     Impaired fasting glucose     Irritable bowel syndrome (IBS)     Ischemic colitis (HCC)     Knee pain     Limb alert care status     no BP/IV right arm    Osteoarthritis of right knee     Pneumonia     Post-op pain     Pulmonary nodules     Thoracic aortic aneurysm (HCC)     Unspecified ovarian cysts     Use of anastrozole (Arimidex)     Vasovagal syncope     Vitamin D deficiency     Wears glasses     Weight loss        Past Surgical History:   Procedure Laterality Date    APPENDECTOMY      pt states it was not removed    BREAST BIOPSY Right 03/02/2016    BREAST LUMPECTOMY Right 2004    BREAST SURGERY Right     Single lesion excision 1990 hyperplasia, 1st biopsy at 25yrs old was benign    CHOLECYSTECTOMY      COLONOSCOPY      COLONOSCOPY N/A 5/12/2017    Procedure: COLONOSCOPY with biopsy;  Surgeon: Erick Juarez MD;  Location: [facility]; Service:     ESOPHAGOGASTRODUODENOSCOPY N/A 1/19/2019    Procedure: ESOPHAGOGASTRODUODENOSCOPY (EGD) with 4cc of epinephrine injection and cauterized with gold probe; Surgeon: Hamlet Landa MD;  Location: [facility];   Service: Gastroenterology    [facility] LUMBAR PUNCTURE DIAGNOSTIC  5/24/2019    HYSTERECTOMY  1977    IR LUMBAR PUNCTURE  2/21/2019    KNEE SURGERY Right     IL BIOPSY/EXCISION, LYMPH NODE(S) Right 4/12/2016    Procedure: BIOPSY LYMPH NODE SENTINEL @ 1200 LYMPHOSCINTIGRAPHY LYMPHATIC MAPPING;  Surgeon: Kin Henderson MD;  Location: [facility];  Service: General    IL MASTECTOMY, SIMPLE, COMPLETE Right 4/12/2016    Procedure: MASTECTOMY SIMPLE;  Surgeon: Elieser Clifton MD;  Location[facility];  Service: General    TUBAL LIGATION      US GUIDED BREAST BIOPSY RIGHT COMPLETE Right 3/9/2016       Meds/Allergies:    Prior to Admission medications    Medication Sig Start Date End Date Taking? Authorizing Provider   cyanocobalamin (VITAMIN B-12) 1,000 mcg tablet Take by mouth daily    Historical Provider, MD   escitalopram (LEXAPRO) 5 mg tablet Take 1 tablet (5 mg total) by mouth daily 6/18/21   Crystal Busby PA-C   gabapentin (NEURONTIN) 100 mg capsule Take 1 capsule (100 mg total) by mouth 3 (three) times a day 6/18/21 7/18/21  Crystal Busby PA-C   hydrocortisone (ANUSOL-HC) 25 mg suppository Insert 1 suppository (25 mg total) into the rectum daily at bedtime 6/18/21   Crystal Busby PA-C   hydrocortisone (ANUSOL-HC) 25 mg suppository Insert 1 suppository (25 mg total) into the rectum daily at bedtime 6/20/21 8/1/21  Edi Potter PA-C   levothyroxine 50 mcg tablet Take 1 tablet (50 mcg total) by mouth daily 6/18/21   Crystal Busby PA-C   metoprolol succinate (TOPROL-XL) 50 mg 24 hr tablet Take 1 tablet (50 mg total) by mouth daily 6/18/21   Crystal Busby PA-C   pantoprazole (PROTONIX) 40 mg tablet Take 1 tablet (40 mg total) by mouth 2 (two) times a day before meals 6/18/21   Crystal Busby PA-C     I have reviewed home medications using allscripts  Allergies: Allergies   Allergen Reactions    Demerol [Meperidine] GI Intolerance and Other (See Comments)     Other reaction(s):  Other (See Comments)  severe nausea  severe nausea  Nausea/vomiting    Nitroglycerin Anaphylaxis and Other (See Comments)     BP drops drastically    Lipitor [Atorvastatin] Other (See Comments)     Joint/muscle pain    Zocor [Simvastatin] Other (See Comments)     Joint/muscle pain       Social History:     Social History     Substance and Sexual Activity   Alcohol Use Not Currently     Social History     Tobacco Use   Smoking Status Never Smoker   Smokeless Tobacco Never Used   Tobacco Comment    not interested     Social History     Substance and Sexual Activity   Drug Use No       Family History:    Family History   Problem Relation Age of Onset    Stroke Maternal Grandmother     Anemia Mother     Other Mother         disorders of blood and blood forming organs    Hypertension Mother     Stroke Father     Alcohol abuse Brother     Colon cancer Neg Hx        Physical Exam:     Vitals:   Blood Pressure: 159/96 (07/01/21 1537)  Pulse: 90 (07/01/21 1537)  Temperature: 98 4 °F (36 9 °C) (07/01/21 0750)  Temp Source: Oral (07/01/21 0750)  Respirations: 16 (07/01/21 1537)  Weight - Scale: 64 kg (141 lb 1 5 oz) (07/01/21 0750)  SpO2: 98 % (07/01/21 1537)    Constitutional: Patient is oriented to self only  HEENT:  Normocephalic, atraumatic  Cardiovascular: Normal S1S2, RRR, No murmurs/rubs/gallops appreciated  Pulmonary:  Bilateral air entry, No rhonchi/rales/wheezing appreciated  Abdominal: Soft, Bowel sounds present, Non-tender, Non-distended  Extremities:  No cyanosis, clubbing or edema  Neurological: Cranial nerves II-XII grossly intact, sensation intact, otherwise no focal neurological symptoms  Additional Data:     Lab Results: I have personally reviewed pertinent reports  Results from last 7 days   Lab Units 07/01/21  0805   WBC Thousand/uL 5 50   HEMOGLOBIN g/dL 11 8   HEMATOCRIT % 37 5   PLATELETS Thousands/uL 208   NEUTROS PCT % 63   LYMPHS PCT % 21   MONOS PCT % 13*   EOS PCT % 2     Results from last 7 days   Lab Units 07/01/21  0805   POTASSIUM mmol/L 4 7   CHLORIDE mmol/L 103   CO2 mmol/L 30   BUN mg/dL 12   CREATININE mg/dL 0 99   CALCIUM mg/dL 9 3   ALK PHOS U/L 90   ALT U/L 18   AST U/L 16     Results from last 7 days   Lab Units 07/01/21  0805   INR  0 97       Imaging: I have personally reviewed pertinent reports        EGD    Result Date: 6/14/2021  Narrative: [id number] [address]  [address] [facility] [phone] DATE OF SERVICE: 6/14/21 PHYSICIAN(S): Gretel Velazquez MD - Attending Physician INDICATION: Colitis POST-OP DIAGNOSIS: See the impression below  PREPROCEDURE: Informed consent was obtained for the procedure, including sedation  Risks of perforation, hemorrhage, adverse drug reaction and aspiration were discussed  The patient was placed in the left lateral decubitus position  Patient was explained about the risks and benefits of the procedure  Risks including but not limited to bleeding, infection, and perforation were explained in detail  Also explained about less than 100% sensitivity with the exam and other alternatives  DETAILS OF PROCEDURE: Patient was taken to the procedure room where a time out was performed to confirm correct patient and correct procedure  The patient underwent monitored anesthesia care, which was administered by an anesthesia professional  The patient's blood pressure, heart rate, level of consciousness, respirations and oxygen were monitored throughout the procedure  The scope was advanced to the second part of the duodenum  Retroflexion was performed in the fundus  The patient experienced no blood loss  The procedure was not difficult  The patient tolerated the procedure well  There were no apparent complications  ANESTHESIA INFORMATION: ASA: III Anesthesia Type: General, IV Sedation with Anesthesia MEDICATIONS: No administrations occurring from 1542 to 1629 on 06/14/21 FINDINGS: The esophagus appeared normal  Small hiatal hernia Erythematous and nodular mucosa in the antrum; performed cold forceps biopsy The duodenal bulb and 2nd part of the duodenum appeared normal  Performed random biopsy   SPECIMENS: ID Type Source Tests Collected by Time Destination 1 : Duodenal biopsy  Tissue Duodenum TISSUE EXAM Gretel Velazquez MD 6/14/2021  4:01 PM  2 : antral nodule biopsy Tissue Stomach TISSUE EXAM Gretel Velazquez MD 6/14/2021  4:02 PM  3 : Ascending colon polyp throughout the procedure  A digital rectal exam was performed  The scope was introduced through the anus and advanced to the terminal ileum  Retroflexion was performed in the rectum  The quality of bowel preparation was evaluated using the [facility] Bowel Preparation Scale with scores of: right colon = 2, transverse colon = 2, left colon = 2  The total BBPS score was 6  Bowel prep was adequate  The patient experienced no blood loss  The procedure was not difficult  The patient tolerated the procedure well  There were no apparent complications  ANESTHESIA INFORMATION: ASA: III Anesthesia Type: General, IV Sedation with Anesthesia MEDICATIONS: No administrations occurring from 1542 to 1629 on 06/14/21 FINDINGS: The terminal ileum appeared normal  Two polyps measuring 5-9 mm in the ascending colon and descending colon; removed en bloc by cold snare and retrieved specimen Multiple large, severe extensive diverticula causing moderate luminal narrowing in the sigmoid colon Localized edematous and scarred mucosa in the sigmoid colon; performed cold forceps biopsy Patchy nodular and scarred mucosa with loss of vascular pattern in the rectum; performed cold forceps biopsy EVENTS: Procedure Events Event Event Time ENDO CECUM REACHED 6/14/2021  4:14 PM ENDO SCOPE OUT TIME 6/14/2021  4:27 PM SPECIMENS: ID Type Source Tests Collected by Time Destination 1 : Duodenal biopsy  Tissue Duodenum TISSUE EXAM Loi Ware MD 6/14/2021  4:01 PM  2 : antral nodule biopsy Tissue Stomach TISSUE EXAM Loi Ware MD 6/14/2021  4:02 PM  3 : Ascending colon polyp Tissue Large Intestine, Right/Ascending Colon TISSUE EXAM Loi Ware MD 6/14/2021  4:18 PM  4 : Descending colon polyp Tissue Large Intestine, Left/Descending Colon TISSUE EXAM Loi Ware MD 6/14/2021  4:18 PM  5 : Recto-sigmoid biopsy   colitis Tissue Colon TISSUE EXAM Loi Ware MD 6/14/2021  4:21 PM  6 : Rectal biopsy Tissue Colon TISSUE EXAM Loi Ware MD 6/14/2021 4:25 PM  EQUIPMENT:     Impression: The terminal ileum appeared normal  Two polyps measuring 5-9 mm in the ascending colon and descending colon; removed en bloc by cold snare and retrieved specimen Multiple large, severe extensive diverticula causing moderate luminal narrowing in the sigmoid colon Localized edematous and scarred mucosa in the sigmoid colon; performed cold forceps biopsy Patchy nodular and scarred mucosa with loss of vascular pattern in the rectum; performed cold forceps biopsy RECOMMENDATION: Await pathology results Repeat screening colonoscopy in 6 months due to colitis  F/u with Dr Eros Collazo Monitor for overt GI bleeding Resume regular diet Return to floor Liliya Pinto MD    CT abdomen pelvis with contrast    Result Date: 7/1/2021  Narrative: CT ABDOMEN AND PELVIS WITH IV CONTRAST INDICATION:   hematochezia, abd pain  History of cholecystectomy, appendectomy, simple mastectomy and tubal ligation  COMPARISON:  6/11/2021 TECHNIQUE:  CT examination of the abdomen and pelvis was performed  In addition to portal venous phase postcontrast scanning through the abdomen and pelvis, delayed phase postcontrast scanning was performed through the upper abdominal viscera  Axial, sagittal, and coronal 2D reformatted images were created from the source data and submitted for interpretation  Radiation dose length product (DLP) for this visit:  650 mGy-cm   This examination, like all CT scans performed in the [facility], was performed utilizing techniques to minimize radiation dose exposure, including the use of iterative reconstruction and automated exposure control  IV Contrast:  100 mL of iohexol (OMNIPAQUE) Enteric Contrast:  Enteric contrast was not administered  FINDINGS: ABDOMEN LOWER CHEST:  No acute findings  LIVER/BILIARY TREE:  Within normal limits  GALLBLADDER:  surgically absent  SPLEEN:  Within normal limits  Posterior splenule  PANCREAS:  Within normal limits   ADRENAL GLANDS: Within normal limits  KIDNEYS/URETERS:  Similar innumerable cysts and tiny hypodensities too small to characterize, but statistically likely cysts  No acute findings  STOMACH AND BOWEL:  Focal under distention of the sigmoid likely accounts for appearance of mild wall thickening, otherwise similar to the prior study, most compatible with chronic diverticulosis  No surrounding fatty infiltrative change  No bowel dilatation or air-fluid levels  APPENDIX:  appendectomy  ABDOMINOPELVIC CAVITY:  No abnormal air, fluid or enlarged lymph nodes  VESSELS:  Atherosclerotic changes  No aneurysm or acute finding  PELVIS: REPRODUCTIVE ORGANS:  Uterus not identified  No adnexal masses  URINARY BLADDER:  Within normal limits  ABDOMINAL WALL/INGUINAL REGIONS:  No acute findings  OSSEOUS STRUCTURES:  No acute or suspicious findings  Impression: No acute findings  Similar appearance to 6/11/2021  Chronic, nonemergent findings above  Workstation performed: [id number]     CT abdomen pelvis with contrast    Result Date: 6/11/2021  Narrative: CT ABDOMEN AND PELVIS WITH IV CONTRAST INDICATION:   h/o undifferentiated colitis, worsening diarrhea and blood in stool, fever  COMPARISON:  CT abdomen and pelvis from April 1, 2021  TECHNIQUE:  CT examination of the abdomen and pelvis was performed  Axial, sagittal, and coronal 2D reformatted images were created from the source data and submitted for interpretation  Radiation dose length product (DLP) for this visit:  389 mGy-cm   This examination, like all CT scans performed in the [facility], was performed utilizing techniques to minimize radiation dose exposure, including the use of iterative reconstruction and automated exposure control  IV Contrast:  100 mL of iohexol (OMNIPAQUE) Enteric Contrast:  Enteric contrast was not administered  FINDINGS: ABDOMEN LOWER CHEST:  Mild subsegmental atelectasis or scarring in the lung bases  LIVER/BILIARY TREE:  Unremarkable  GALLBLADDER:  Postcholecystectomy  SPLEEN:  Unremarkable  PANCREAS:  Unremarkable  ADRENAL GLANDS:  Unremarkable  KIDNEYS/URETERS:  Multiple bilateral renal cysts  No calculus or hydronephrosis  STOMACH AND BOWEL:  Stomach and small intestine unremarkable  Sigmoid diverticulosis without evidence of acute diverticulitis  Improvement of mural thickening in the sigmoid colon since the CT from 4/1/2021  No evidence of new areas of colitis  No intramural gas  APPENDIX:  No findings to suggest appendicitis  ABDOMINOPELVIC CAVITY: No lymphadenopathy or mass  No ascites or discrete fluid collection  No extraluminal gas  VESSELS:  Atherosclerotic changes are present  No evidence of aneurysm  PELVIS REPRODUCTIVE ORGANS:  Post hysterectomy  URINARY BLADDER:  Unremarkable  ABDOMINAL WALL/INGUINAL REGIONS:  Unremarkable  OSSEOUS STRUCTURES:  No acute fracture or destructive osseous lesion  Impression: 1  Sigmoid diverticulosis without evidence of acute diverticulitis  2   No evidence of colitis or other acute abnormality in the abdomen or pelvis  Resolution of mural thickening in the sigmoid since a CT from 4/1/2021  Workstation performed: [id number]       [id number] / [place] Records Reviewed: Yes     ** Please Note: This note has been constructed using a voice recognition system   **

## 2021-07-01 NOTE — ASSESSMENT & PLAN NOTE
· Patient lives in [facility] oriented to self only at this point    Patient has poor insight and poor decision-making capacity  · Patient likely has underlying undiagnosed dementia  · Continue Lexapro 5 mg daily

## 2021-07-01 NOTE — ED PROVIDER NOTES
History  Chief Complaint   Patient presents with    Rectal Bleeding     Pt reports bright red rectal bleeding x 6 days  Pt reports intermittent bleeding since her colonoscopy 3 weeks ago  Pt also reports generalized abd pain      Patient is a 69 y/o female anxiety, arthritis, aortic aneurysm, renal cysts, ovary cysts, GERD, HTN, HLD, hypothyroidism, IBS, ischemic colitis, presenting to the ED via EMS from [facility] for evaluation of abdominal pain and rectal bleeding  Pt was admitted to [address] [place] [address] on 6/11, had colonoscopy on 6/14, had 2 polyps removed  Pt states she began with BRBPR 6 days ago as well as yellow diarrhea and has been having lower abdominal pain  Pt is A&Ox2 at baseline, daughter is POA  History provided by:  Patient, relative, EMS personnel and nursing home (daughter)  History limited by:  Dementia      Prior to Admission Medications   Prescriptions Last Dose Informant Patient Reported? Taking?    cyanocobalamin (VITAMIN B-12) 1,000 mcg tablet  Self Yes No   Sig: Take by mouth daily   escitalopram (LEXAPRO) 5 mg tablet   No No   Sig: Take 1 tablet (5 mg total) by mouth daily   gabapentin (NEURONTIN) 100 mg capsule   No No   Sig: Take 1 capsule (100 mg total) by mouth 3 (three) times a day   hydrocortisone (ANUSOL-HC) 25 mg suppository   No No   Sig: Insert 1 suppository (25 mg total) into the rectum daily at bedtime   hydrocortisone (ANUSOL-HC) 25 mg suppository   No No   Sig: Insert 1 suppository (25 mg total) into the rectum daily at bedtime   levothyroxine 50 mcg tablet   No No   Sig: Take 1 tablet (50 mcg total) by mouth daily   metoprolol succinate (TOPROL-XL) 50 mg 24 hr tablet   No No   Sig: Take 1 tablet (50 mg total) by mouth daily   pantoprazole (PROTONIX) 40 mg tablet   No No   Sig: Take 1 tablet (40 mg total) by mouth 2 (two) times a day before meals      Facility-Administered Medications: None       Past Medical History:   Diagnosis Date    Abnormal CT of the abdomen  Acute bronchitis     Anxiety     Appetite loss     Arthritis     Ascending aortic aneurysm (HCC)     Bilateral renal cysts     Cyst of ovary     Disease of thyroid gland     Dysphagia     Esophageal reflux disease     Full dentures     GERD (gastroesophageal reflux disease)     Herpes zoster     Hyperlipidemia     Hypertension     Hypokalemia     Hypomagnesemia     Hypothyroidism     Impaired fasting glucose     Irritable bowel syndrome (IBS)     Ischemic colitis (HCC)     Knee pain     Limb alert care status     no BP/IV right arm    Osteoarthritis of right knee     Pneumonia     Post-op pain     Pulmonary nodules     Thoracic aortic aneurysm (HCC)     Unspecified ovarian cysts     Use of anastrozole (Arimidex)     Vasovagal syncope     Vitamin D deficiency     Wears glasses     Weight loss        Past Surgical History:   Procedure Laterality Date    APPENDECTOMY      pt states it was not removed    BREAST BIOPSY Right 03/02/2016    BREAST LUMPECTOMY Right 2004    BREAST SURGERY Right     Single lesion excision 1990 hyperplasia, 1st biopsy at 25yrs old was benign    CHOLECYSTECTOMY      COLONOSCOPY      COLONOSCOPY N/A 5/12/2017    Procedure: COLONOSCOPY with biopsy;  Surgeon: David Mac MD;  Location: [facility]; Service:     ESOPHAGOGASTRODUODENOSCOPY N/A 1/19/2019    Procedure: ESOPHAGOGASTRODUODENOSCOPY (EGD) with 4cc of epinephrine injection and cauterized with gold probe; Surgeon: Shanti Bhagat MD;  Location: [facility];   Service: Gastroenterology    [facility] LUMBAR PUNCTURE DIAGNOSTIC  5/24/2019    HYSTERECTOMY  1977    IR LUMBAR PUNCTURE  2/21/2019    KNEE SURGERY Right     WI BIOPSY/EXCISION, LYMPH NODE(S) Right 4/12/2016    Procedure: BIOPSY LYMPH NODE SENTINEL @ 1200 LYMPHOSCINTIGRAPHY LYMPHATIC MAPPING;  Surgeon: Neo Mccarthy MD;  [facility];  Service: General    WI MASTECTOMY, SIMPLE, COMPLETE Right 4/12/2016    Procedure: MASTECTOMY SIMPLE;  Surgeon: Morgan Bell MD;  [facility];  Service: General    TUBAL LIGATION      US GUIDED BREAST BIOPSY RIGHT COMPLETE Right 3/9/2016       Family History   Problem Relation Age of Onset    Stroke Maternal Grandmother     Anemia Mother     Other Mother         disorders of blood and blood forming organs    Hypertension Mother     Stroke Father     Alcohol abuse Brother     Colon cancer Neg Hx      I have reviewed and agree with the history as documented  E-Cigarette/Vaping    E-Cigarette Use Never User      E-Cigarette/Vaping Substances    Nicotine No     THC No     CBD No     Flavoring No     Other No     Unknown No      Social History     Tobacco Use    Smoking status: Never Smoker    Smokeless tobacco: Never Used    Tobacco comment: not interested   Vaping Use    Vaping Use: Never used   Substance Use Topics    Alcohol use: Not Currently    Drug use: No       Review of Systems   Unable to perform ROS: Dementia       Physical Exam  Physical Exam  Exam conducted with a chaperone present  Constitutional:       General: She is not in acute distress  Appearance: She is well-developed  She is not diaphoretic  HENT:      Head: Normocephalic and atraumatic  Right Ear: External ear normal       Left Ear: External ear normal       Nose: Nose normal       Mouth/Throat:      Lips: Pink  Mouth: Mucous membranes are moist    Eyes:      Extraocular Movements: Extraocular movements intact  Conjunctiva/sclera: Conjunctivae normal    Cardiovascular:      Rate and Rhythm: Normal rate and regular rhythm  Pulmonary:      Effort: Pulmonary effort is normal       Breath sounds: Normal breath sounds  No decreased breath sounds, wheezing, rhonchi or rales  Abdominal:      General: Abdomen is flat  There is no distension  Palpations: Abdomen is soft  Tenderness: There is no abdominal tenderness  There is no guarding or rebound     Genitourinary:     Rectum: Guaiac result positive (positive)  No anal fissure, external hemorrhoid or internal hemorrhoid  Comments: Yellow stool noted in diaper  Musculoskeletal:      Cervical back: Normal range of motion and neck supple  Skin:     General: Skin is warm  Capillary Refill: Capillary refill takes less than 2 seconds  Coloration: Skin is not jaundiced or pale  Findings: No rash  Neurological:      Mental Status: She is alert  GCS: GCS eye subscore is 4  GCS verbal subscore is 5  GCS motor subscore is 6  Sensory: Sensation is intact  Motor: Motor function is intact        Comments: A&O x2 (person and time)   Psychiatric:         Mood and Affect: Mood and affect normal          Speech: Speech normal          Vital Signs  ED Triage Vitals   Temperature Pulse Respirations Blood Pressure SpO2   07/01/21 0750 07/01/21 0749 07/01/21 0749 07/01/21 0751 07/01/21 0749   98 4 °F (36 9 °C) 86 16 140/80 98 %      Temp Source Heart Rate Source Patient Position - Orthostatic VS BP Location FiO2 (%)   07/01/21 0750 07/01/21 0749 07/01/21 0749 07/01/21 0749 --   Oral Monitor Sitting Right arm       Pain Score       07/01/21 1257       No Pain           Vitals:    07/04/21 0601 07/04/21 1517 07/04/21 2331 07/05/21 0712   BP: 118/70 138/73 109/65 128/78   Pulse: 59 63 70 69   Patient Position - Orthostatic VS:  Lying           Visual Acuity  Visual Acuity      Most Recent Value   L Pupil Size (mm)  2   R Pupil Size (mm)  2   L Pupil Shape  Round   R Pupil Shape  Round          ED Medications  Medications   psyllium (METAMUCIL) 1 packet (1 packet Oral Refused 7/5/21 1055)   mesalamine (PENTASA) ER capsule 1,000 mg (1,000 mg Oral Not Given 7/5/21 1137)   dicyclomine (BENTYL) tablet 20 mg (has no administration in time range)   escitalopram (LEXAPRO) tablet 5 mg (5 mg Oral Given 7/5/21 1057)   gabapentin (NEURONTIN) capsule 100 mg (100 mg Oral Given 7/5/21 1057)   levothyroxine tablet 50 mcg (50 mcg Oral Given 7/5/21 0537)   metoprolol succinate (TOPROL-XL) 24 hr tablet 50 mg (50 mg Oral Given 7/5/21 1057)   pantoprazole (PROTONIX) EC tablet 40 mg (40 mg Oral Given 7/5/21 0537)   QUEtiapine (SEROquel) tablet 12 5 mg (12 5 mg Oral Given 7/4/21 2203)   mesalamine (CANASA) rectal suppository 1,000 mg (1,000 mg Rectal Given 7/5/21 0536)   iohexol (OMNIPAQUE) 350 MG/ML injection (SINGLE-DOSE) 100 mL (100 mL Intravenous Given 7/1/21 0842)   potassium chloride (K-DUR,KLOR-CON) CR tablet 20 mEq (20 mEq Oral Given 7/3/21 1552)       Diagnostic Studies  Results Reviewed     Procedure Component Value Units Date/Time    Urine culture [[id number]] Collected: 07/01/21 1259    Lab Status: Final result Specimen: Urine, Clean Catch Updated: 07/03/21 0722     Urine Culture >100,000 cfu/ml     Calprotectin,Fecal [[id number]] Collected: 07/01/21 1849    Lab Status:  In process Specimen: Stool from Rectum Updated: 07/01/21 1856    Urine Microscopic [[id number]]  (Abnormal) Collected: 07/01/21 1259    Lab Status: Final result Specimen: Urine, Clean Catch Updated: 07/01/21 1321     RBC, UA 0-1 /hpf      WBC, UA 10-20 /hpf      Epithelial Cells Occasional /hpf      Bacteria, UA Occasional /hpf     Urine Macroscopic, POC [[id number]]  (Abnormal) Collected: 07/01/21 1259    Lab Status: Final result Specimen: Urine Updated: 07/01/21 1300     Color, UA Yellow     Clarity, UA Clear     pH, UA 7 0     Leukocytes, UA Negative     Nitrite, UA Negative     Protein, UA Negative mg/dl      Glucose, UA Negative mg/dl      Ketones, UA Negative mg/dl      Urobilinogen, UA 1 0 E U /dl      Bilirubin, UA Negative     Blood, UA Trace     Specific [place], UA 1 015    Narrative:      CLINITEK RESULT    Comprehensive metabolic panel [[id number]]  (Abnormal) Collected: 07/01/21 0805    Lab Status: Final result Specimen: Blood from Arm, Left Updated: 07/01/21 0826     Sodium 137 mmol/L      Potassium 4 7 mmol/L      Chloride 103 mmol/L      CO2 30 mmol/L      ANION GAP 4 mmol/L      BUN 12 mg/dL      Creatinine 0 99 mg/dL      Glucose 112 mg/dL      Calcium 9 3 mg/dL      Corrected Calcium 10 0 mg/dL      AST 16 U/L      ALT 18 U/L      Alkaline Phosphatase 90 U/L      Total Protein 7 0 g/dL      Albumin 3 1 g/dL      Total Bilirubin 0 59 mg/dL      eGFR 57 ml/min/1 73sq m     Narrative:      [place] guidelines for Chronic Kidney Disease (CKD):     Stage 1 with normal or high GFR (GFR > 90 mL/min/1 73 square meters)    Stage 2 Mild CKD (GFR = 60-89 mL/min/1 73 square meters)    Stage 3A Moderate CKD (GFR = 45-59 mL/min/1 73 square meters)    Stage 3B Moderate CKD (GFR = 30-44 mL/min/1 73 square meters)    Stage 4 Severe CKD (GFR = 15-29 mL/min/1 73 square meters)    Stage 5 End Stage CKD (GFR <15 mL/min/1 73 square meters)  Note: GFR calculation is accurate only with a steady state creatinine    Protime-INR [[id number]]  (Normal) Collected: 07/01/21 0805    Lab Status: Final result Specimen: Blood from Arm, Left Updated: 07/01/21 0825     Protime 12 7 seconds      INR 0 97    APTT [[id number]]  (Normal) Collected: 07/01/21 0805    Lab Status: Final result Specimen: Blood from Arm, Left Updated: 07/01/21 0825     PTT 30 seconds     CBC and differential [[id number]]  (Abnormal) Collected: 07/01/21 0805    Lab Status: Final result Specimen: Blood from Arm, Left Updated: 07/01/21 0811     WBC 5 50 Thousand/uL      RBC 4 23 Million/uL      Hemoglobin 11 8 g/dL      Hematocrit 37 5 %      MCV 89 fL      MCH 27 9 pg      MCHC 31 5 g/dL      RDW 14 9 %      MPV 10 1 fL      Platelets 178 Thousands/uL      nRBC 0 /100 WBCs      Neutrophils Relative 63 %      Immat GRANS % 0 %      Lymphocytes Relative 21 %      Monocytes Relative 13 %      Eosinophils Relative 2 %      Basophils Relative 1 %      Neutrophils Absolute 3 53 Thousands/µL      Immature Grans Absolute 0 02 Thousand/uL      Lymphocytes Absolute 1 13 Thousands/µL      Monocytes Absolute 0 69 Thousand/µL      Eosinophils Absolute 0 09 Thousand/µL      Basophils Absolute 0 04 Thousands/µL                  CT abdomen pelvis with contrast   Final Result by Salomon Joya MD (07/01 1005)      No acute findings  Similar appearance to 6/11/2021  Chronic, nonemergent findings above  Workstation performed: [id number]                    Procedures  Procedures         ED Course  ED Course as of Jul 05 1653   Thu Jul 01, 2021   0808 Hemoccult positive - no gross rectal bleeding      0813 Hemoglobin: 11 8   1054 TT to GI on-call      1209 GI on-call, Ita Diaz PA-C, will see patient in the ED      1414 GI saw and evaluated patient in ED, recommend she stays for medication adjustment and hemoglobin trend  1415 Patient A&Ox2, refusing admission, although believes she is in [facility] right now, spoke with POA, patient's daughter who agrees patient needs to stay in the hospital      1452 Discussed with SLIM  We had a detailed discussion of the patient's condition and case, including need for admission   Accepts to their service   Bed request/bridging orders placed  SBIRT 20yo+      Most Recent Value   SBIRT (22 yo +)   In order to provide better care to our patients, we are screening all of our patients for alcohol and drug use  Would it be okay to ask you these screening questions? No Filed at: 07/01/2021 0753   Initial Alcohol Screen: US AUDIT-C    1  How often do you have a drink containing alcohol?  0 Filed at: 07/01/2021 0753   2  How many drinks containing alcohol do you have on a typical day you are drinking? 0 Filed at: 07/01/2021 0753   3a  Male UNDER 65: How often do you have five or more drinks on one occasion? 0 Filed at: 07/01/2021 0753   3b  FEMALE Any Age, or MALE 65+: How often do you have 4 or more drinks on one occassion?   0 Filed at: 07/01/2021 0753   Audit-C Score  0 Filed at: 07/01/2021 4201   JULIANN: How many times in the past year have you    Used an illegal drug or used a prescription medication for non-medical reasons? Never Filed at: 07/01/2021 0753                    MDM  Number of Diagnoses or Management Options  Cognitive impairment: established and worsening  Diverticulosis: established and worsening  Hematochezia: established and worsening  Kidney cysts: new and requires workup  Diagnosis management comments: Patient is a 69 y/o female anxiety, arthritis, aortic aneurysm, renal cysts, ovary cysts, GERD, HTN, HLD, hypothyroidism, IBS, ischemic colitis, presenting to the ED via EMS from [facility] for evaluation of abdominal pain and rectal bleeding  Pt was admitted to [address] [place] [address] on 6/11, had colonoscopy on 6/14, had 2 polyps removed  Pt states she began with BRBPR 6 days ago as well as yellow diarrhea and has been having lower abdominal pain  Pt is A&Ox2 at baseline, daughter is POA  Hemoglobin stable at 11 8  Hemoccult positive - no gross rectal bleeding  CT a/p shows No acute findings  Similar appearance to 6/11/2021  Chronic, nonemergent findings above  GI saw and evaluated patient in ED, recommend she stays for medication adjustment and hemoglobin trend  Patient A&Ox2, refusing admission, although believes she is in [facility] right now, spoke with POA, patient's daughter who agrees patient needs to stay in the hospital     Discussed with SLIM  We had a detailed discussion of the patient's condition and case, including need for admission   Accepts to their service   Bed request/bridging orders placed         Disposition  Final diagnoses:   Hematochezia   Diverticulosis   Cognitive impairment   Kidney cysts     Time reflects when diagnosis was documented in both MDM as applicable and the Disposition within this note     Time User Action Codes Description Comment    7/1/2021 12:32 PM Tommie Shirley Add [K92 1] Hematochezia     7/1/2021 12:33 PM Tommie Fern Add [K57 90] Diverticulosis     7/1/2021  2:49 PM Audrey Petite Add [R41 89] Cognitive impairment     7/1/2021  2:51 PM Audrey Petite Add [N28 1] Kidney cysts     7/5/2021 10:46 AM Lynda Hinojosa Add [G47 01] Insomnia due to medical condition     7/5/2021 10:46 AM Lilo Brochure Add [K52 9] Colitis       ED Disposition     ED Disposition Condition Date/Time Comment    Admit Stable Thu Jul 1, 2021  2:49 PM Case was discussed with FERN and the patient's admission status was agreed to be Admission Status: inpatient status to the service of Dr Adam Chapman MD Documentation      Most Recent Value   Accepting Facility Name, [address] [place] [address]      RN Documentation      Most Recent Value   Accepting Facility Name, [address] [place] [address]   Level of Care  Basic life support   Transfer Date  07/05/21      Follow-up Information    None         Discharge Medication List as of 7/5/2021  1:33 PM      START taking these medications    Details   mesalamine (ROWASA) 4 g Insert 60 mL (4 g total) into the rectum daily at bedtime, Starting Mon 7/5/2021, Until Wed 8/4/2021, No Print      psyllium (METAMUCIL) packet Take 1 packet by mouth daily, Starting Mon 7/5/2021, No Print      QUEtiapine (SEROquel) 25 mg tablet Take 0 5 tablets (12 5 mg total) by mouth daily at bedtime, Starting Mon 7/5/2021, No Print      sulfaSALAzine (AZULFIDINE) 500 mg tablet Take 2 tablets (1,000 mg total) by mouth 3 (three) times a day, Starting Mon 7/5/2021, Until Wed 8/4/2021, No Print         CONTINUE these medications which have NOT CHANGED    Details   cyanocobalamin (VITAMIN B-12) 1,000 mcg tablet Take by mouth daily, Historical Med      escitalopram (LEXAPRO) 5 mg tablet Take 1 tablet (5 mg total) by mouth daily, Starting Fri 6/18/2021, Print      gabapentin (NEURONTIN) 100 mg capsule Take 1 capsule (100 mg total) by mouth 3 (three) times a day, Starting Fri 6/18/2021, Until Sun 7/18/2021, Print      levothyroxine 50 mcg tablet Take 1 tablet (50 mcg total) by mouth daily, Starting Fri 6/18/2021, Print      metoprolol succinate (TOPROL-XL) 50 mg 24 hr tablet Take 1 tablet (50 mg total) by mouth daily, Starting Fri 6/18/2021, Print      pantoprazole (PROTONIX) 40 mg tablet Take 1 tablet (40 mg total) by mouth 2 (two) times a day before meals, Starting Fri 6/18/2021, Print         STOP taking these medications       hydrocortisone (ANUSOL-HC) 25 mg suppository Comments:   Reason for Stopping:         hydrocortisone (ANUSOL-HC) 25 mg suppository Comments:   Reason for Stopping:         mesalamine (CANASA) 1,000 mg suppository Comments:   Reason for Stopping:         mesalamine (PENTASA) 500 mg CR capsule Comments:   Reason for Stopping:             Outpatient Discharge Orders   Discharge Diet     Discharge Condtion:  Stabilized     Activity:  As Tolerated       PDMP Review     None          ED Provider  Electronically Signed by           Paulette Caballero PA-C  07/05/21 6761

## 2021-07-02 PROCEDURE — 99232 SBSQ HOSP IP/OBS MODERATE 35: CPT | Performed by: PHYSICIAN ASSISTANT

## 2021-07-02 PROCEDURE — 99232 SBSQ HOSP IP/OBS MODERATE 35: CPT | Performed by: INTERNAL MEDICINE

## 2021-07-02 RX ADMIN — PANTOPRAZOLE SODIUM 40 MG: 40 TABLET, DELAYED RELEASE ORAL at 18:05

## 2021-07-02 RX ADMIN — MESALAMINE 1000 MG: 250 CAPSULE ORAL at 10:20

## 2021-07-02 RX ADMIN — MESALAMINE 1000 MG: 250 CAPSULE ORAL at 12:49

## 2021-07-02 RX ADMIN — PANTOPRAZOLE SODIUM 40 MG: 40 TABLET, DELAYED RELEASE ORAL at 06:03

## 2021-07-02 RX ADMIN — ESCITALOPRAM OXALATE 5 MG: 5 TABLET, FILM COATED ORAL at 12:49

## 2021-07-02 RX ADMIN — LEVOTHYROXINE SODIUM 50 MCG: 50 TABLET ORAL at 06:02

## 2021-07-02 RX ADMIN — METOPROLOL SUCCINATE 50 MG: 50 TABLET, EXTENDED RELEASE ORAL at 10:15

## 2021-07-02 RX ADMIN — MESALAMINE 1000 MG: 250 CAPSULE ORAL at 18:05

## 2021-07-02 NOTE — ASSESSMENT & PLAN NOTE
· Patient lives in [facility] oriented to self only at this point    Patient has poor insight and poor decision-making capacity  · Patient likely has underlying undiagnosed dementia  · Continue Lexapro 5 mg daily

## 2021-07-02 NOTE — ASSESSMENT & PLAN NOTE
· Patient initially presented to the hospital with complaint of bright red blood per rectum x6 days with associated abdominal cramping  · CT abdomen pelvis showing no acute findings, focal under distention of sigmoid likely accounting for mild wall thickening compatible with chronic diverticulosis  · GI following  · Started on mesalamine, Canasa suppositories and Metamucil  · Continue to follow H&H  · Follow-up fecal calprotectin  · Diet as tolerated  · P r n   Bentyl

## 2021-07-02 NOTE — NURSING NOTE
Patient refused evening medication  RN attempted to educate patient on the importance and reason for taking medication   Patient continued to refuse medication

## 2021-07-02 NOTE — CASE MANAGEMENT
GMLOS: 1              LOS: 1  BUNDLED? No  UNPLANNED READMISSION LEVEL: 15 low  30 DAY READMISSION? Yes, KARELY reports re-admission due to abdominal pain and bright red blood per rectum  Patient was admitted on 7/1/21 with Hematochezia  Patient is confused  SW made a PC to patient's dtr  Emmanuel Bee ([phone]) to conduct a general CM assessment and discuss discharge planning  Per Emmanuel Bee, patient is currently residing at [address] [place] [address]- she has only been there for two weeks  At [facility] patient is independent with most of her ADL's and facility aids are there to assist  Dtr is unaware if patient is receiving home therapy  She reports that [address] uses [address]  Her PCP is Vivien Pavon but dtr reports she has now switched PCP to the MD at the facility  Dtr reports patient is demented/confused  No hx of MH or D&A  POA is dtr Emmanuel Bee and patient's friend transport her to medical appointments  [facility] does not accept patient back to their facilities during the weekend due to short staffing  All RX medication needs to be fax over to [facility] [employer] [facility]  CM dept will continue to follow

## 2021-07-02 NOTE — PLAN OF CARE
Problem: Potential for Falls  Goal: Patient will remain free of falls  Description: INTERVENTIONS:  - Educate patient/family on patient safety including physical limitations  - Instruct patient to call for assistance with activity   - Consult OT/PT to assist with strengthening/mobility   - Keep Call bell within reach  - Keep bed low and locked with side rails adjusted as appropriate  - Keep care items and personal belongings within reach  - Initiate and maintain comfort rounds  - Make Fall Risk Sign visible to staff  - Offer Toileting every 2 Hours, in advance of need  - Initiate/Maintain bed alarm  - Obtain necessary fall risk management equipment: bed in low position, non skid sock, fall risk bracelet, bed alarm  Problem: GASTROINTESTINAL - ADULT  Goal: Minimal or absence of nausea and/or vomiting  Description: INTERVENTIONS:  - Administer IV fluids if ordered to ensure adequate hydration  - Maintain NPO status until nausea and vomiting are resolved  - Nasogastric tube if ordered  - Administer ordered antiemetic medications as needed  - Provide nonpharmacologic comfort measures as appropriate  - Advance diet as tolerated, if ordered  - Consider nutrition services referral to assist patient with adequate nutrition and appropriate food choices  7/2/2021 0428 by Lisa Lee RN  Outcome: Progressing  7/2/2021 0428 by Lisa Lee RN  Outcome: Progressing     Problem: GASTROINTESTINAL - ADULT  Goal: Maintains or returns to baseline bowel function  Description: INTERVENTIONS:  - Assess bowel function  - Encourage oral fluids to ensure adequate hydration  - Administer IV fluids if ordered to ensure adequate hydration  - Administer ordered medications as needed  - Encourage mobilization and activity  - Consider nutritional services referral to assist patient with adequate nutrition and appropriate food choices  7/2/2021 0428 by Lisa Lee RN  Outcome: Progressing  7/2/2021 0428 by Lisa Lee RN  Outcome: Progressing     Problem: GASTROINTESTINAL - ADULT  Goal: Maintains adequate nutritional intake  Description: INTERVENTIONS:  - Monitor percentage of each meal consumed  - Identify factors contributing to decreased intake, treat as appropriate  - Assist with meals as needed  - Monitor I&O, weight, and lab values if indicated  - Obtain nutrition services referral as needed  7/2/2021 0428 by Pia Elias RN  Outcome: Progressing  7/2/2021 0428 by Pia Elias RN  Outcome: Progressing     Problem: GASTROINTESTINAL - ADULT  Goal: Oral mucous membranes remain intact  Description: INTERVENTIONS  - Assess oral mucosa and hygiene practices  - Implement preventative oral hygiene regimen  - Implement oral medicated treatments as ordered  - Initiate Nutrition services referral as needed  7/2/2021 0428 by Pia Elias RN  Outcome: Progressing  7/2/2021 0428 by Pia Elias RN  Outcome: Progressing     Problem: HEMATOLOGIC - ADULT  Goal: Maintains hematologic stability  Description: INTERVENTIONS  - Assess for signs and symptoms of bleeding or hemorrhage  - Monitor labs  - Administer supportive blood products/factors as ordered and appropriate  7/2/2021 0428 by Pia Elias RN  Outcome: Progressing  7/2/2021 0428 by Pia Elias RN  Outcome: Progressing     Problem: HEMATOLOGIC - ADULT  Goal: Maintains hematologic stability  Description: INTERVENTIONS  - Assess for signs and symptoms of bleeding or hemorrhage  - Monitor labs  - Administer supportive blood products/factors as ordered and appropriate  7/2/2021 0428 by Pia Elias RN  Outcome: Progressing     Problem: HEMATOLOGIC - ADULT  Goal: Maintains hematologic stability  Description: INTERVENTIONS  - Assess for signs and symptoms of bleeding or hemorrhage  - Monitor labs  - Administer supportive blood products/factors as ordered and appropriate  7/2/2021 0428 by Pia Elias RN  Outcome: Progressing     Problem: DISCHARGE PLANNING  Goal: Discharge to home or other facility with appropriate resources  Description: INTERVENTIONS:  - Identify barriers to discharge w/patient and caregiver  - Arrange for needed discharge resources and transportation as appropriate  - Identify discharge learning needs (meds, wound care, etc )  - Arrange for interpretive services to assist at discharge as needed  - Refer to Case Management Department for coordinating discharge planning if the patient needs post-hospital services based on physician/advanced practitioner order or complex needs related to functional status, cognitive ability, or social support system  7/2/2021 0428 by Kurt Goodpasture, RN  Outcome: Progressing  7/2/2021 0428 by Kurt Goodpasture, RN  Outcome: Progressing     Problem: DISCHARGE PLANNING  Goal: Discharge to home or other facility with appropriate resources  Description: INTERVENTIONS:  - Identify barriers to discharge w/patient and caregiver  - Arrange for needed discharge resources and transportation as appropriate  - Identify discharge learning needs (meds, wound care, etc )  - Arrange for interpretive services to assist at discharge as needed  - Refer to Case Management Department for coordinating discharge planning if the patient needs post-hospital services based on physician/advanced practitioner order or complex needs related to functional status, cognitive ability, or social support system  7/2/2021 0428 by Kurt Goodpasture, RN  Outcome: Progressing     Problem: DISCHARGE PLANNING  Goal: Discharge to home or other facility with appropriate resources  Description: INTERVENTIONS:  - Identify barriers to discharge w/patient and caregiver  - Arrange for needed discharge resources and transportation as appropriate  - Identify discharge learning needs (meds, wound care, etc )  - Arrange for interpretive services to assist at discharge as needed  - Refer to Case Management Department for coordinating discharge planning if the patient needs post-hospital services based on physician/advanced practitioner order or complex needs related to functional status, cognitive ability, or social support system  7/2/2021 0428 by Tresa Barros RN  Outcome: Progressing     Problem: Knowledge Deficit  Goal: Patient/family/caregiver demonstrates understanding of disease process, treatment plan, medications, and discharge instructions  Description: Complete learning assessment and assess knowledge base  Interventions:  - Provide teaching at level of understanding  - Provide teaching via preferred learning methods  7/2/2021 0428 by Tresa Barros RN  Outcome: Progressing  7/2/2021 0428 by Tresa Barros RN  Outcome: Progressing     Problem: Knowledge Deficit  Goal: Patient/family/caregiver demonstrates understanding of disease process, treatment plan, medications, and discharge instructions  Description: Complete learning assessment and assess knowledge base  Interventions:  - Provide teaching at level of understanding  - Provide teaching via preferred learning methods  7/2/2021 0428 by Tresa Barros RN  Outcome: Progressing     Problem: Knowledge Deficit  Goal: Patient/family/caregiver demonstrates understanding of disease process, treatment plan, medications, and discharge instructions  Description: Complete learning assessment and assess knowledge base    Interventions:  - Provide teaching at level of understanding  - Provide teaching via preferred learning methods  7/2/2021 0428 by Tresa Barros RN  Outcome: Progressing     - Apply yellow socks and bracelet for high fall risk patients  - Consider moving patient to room near nurses station  7/2/2021 0428 by Tresa Barros RN  Outcome: Progressing  7/2/2021 0428 by Tresa Barros RN  Outcome: Progressing    Problem: MOBILITY - ADULT  Goal: Maintain or return to baseline ADL function  Description: INTERVENTIONS:  -  Assess patient's ability to carry out ADLs; assess patient's baseline for ADL function and identify physical deficits which impact ability to perform ADLs (bathing, care of mouth/teeth, toileting, grooming, dressing, etc )  - Assess/evaluate cause of self-care deficits   - Assess range of motion  - Assess patient's mobility; develop plan if impaired  - Assess patient's need for assistive devices and provide as appropriate  - Encourage maximum independence but intervene and supervise when necessary  - Involve family in performance of ADLs  - Assess for home care needs following discharge   - Consider OT consult to assist with ADL evaluation and planning for discharge  - Provide patient education as appropriate  7/2/2021 0428 by Leslye Esposito RN  Outcome: Progressing  7/2/2021 0428 by Leslye Esposito RN  Outcome: Progressing     Problem: MOBILITY - ADULT  Goal: Maintain or return to baseline ADL function  Description: INTERVENTIONS:  -  Assess patient's ability to carry out ADLs; assess patient's baseline for ADL function and identify physical deficits which impact ability to perform ADLs (bathing, care of mouth/teeth, toileting, grooming, dressing, etc )  - Assess/evaluate cause of self-care deficits   - Assess range of motion  - Assess patient's mobility; develop plan if impaired  - Assess patient's need for assistive devices and provide as appropriate  - Encourage maximum independence but intervene and supervise when necessary  - Involve family in performance of ADLs  - Assess for home care needs following discharge   - Consider OT consult to assist with ADL evaluation and planning for discharge  - Provide patient education as appropriate  7/2/2021 0428 by Leslye Esposito RN  Outcome: Progressing

## 2021-07-02 NOTE — PROGRESS NOTES
Progress Note - Chavo Garcia 70 y o  female MRN: [id number]    Unit/Bed#: E5 -01 Encounter: [id number]         Assessment/ Plan:  Abdominal pain  Rectal bleeding  Abnormal CT scan     Patient has been seen multiple times in the last several months due to rectal bleeding and abdominal pain  She has had several CT scan showing colitis as well as 2 recent colonoscopy's with acute and chronic changes  The differential is still not exactly clear but does include inflammatory bowel disease, ischemic colitis, segmental colitis, associated with diverticular disease  Given her dementia it was recommened that she be admitted to help start treatment, however she has been uncooperative, refusing meds, blood work, etc    -Metamucil daily  -start mesalamine  -Canasa suppositories  -follow H&H if able  -f/u fecal calprotectin  -diet as tolerated  -Bentyl as needed for pain      Subjective:   Pt denies abd pain or bleeding  Objective:     Vitals: Blood pressure 129/74, pulse 67, temperature 98 2 °F (36 8 °C), resp  rate 17, weight 64 kg (141 lb 1 5 oz), SpO2 97 %  ,Body mass index is 24 22 kg/m²          Physical Exam: General appearance: Awake, alert, no distress, confused  Lungs: clear to auscultation bilaterally  Heart: regular rate and rhythm  Abdomen: soft, non-tender; bowel sounds normal; no masses,  no organomegaly  Skin: Skin color, texture, turgor normal  No rashes or lesions     Invasive Devices     Peripheral Intravenous Line            Peripheral IV 07/01/21 Left Antecubital 1 day                  Lab, Imaging and other studies: pt refused labs

## 2021-07-02 NOTE — NURSING NOTE
PCA went in the morning to attempted to obtain morning blood work  Patient began to refuse blood work  RN went in to explain the importance of blood work  Patient began to get agressive with staff and stating she wants to leave  Staff was able to calm patient down and now is resting in bed comfortably, call bell within reach and bed alarm on  Patient continues to refuse blood work

## 2021-07-02 NOTE — PROGRESS NOTES
[facility]  Progress Note - Kierra Dress 1945, 70 y o  female MRN: [id number]  Unit/Bed#: E5 -01 Encounter: [id number]  Primary Care Provider: Agustina Blanc DO   Date and time admitted to hospital: 7/1/2021  7:45 AM    * Hematochezia  Assessment & Plan  · Patient initially presented to the hospital with complaint of bright red blood per rectum x6 days with associated abdominal cramping  · CT abdomen pelvis showing no acute findings, focal under distention of sigmoid likely accounting for mild wall thickening compatible with chronic diverticulosis  · GI following  · Started on mesalamine, Canasa suppositories and Metamucil  · Continue to follow H&H  · Follow-up fecal calprotectin  · Diet as tolerated  · P r n  Bentyl    Cognitive impairment  Assessment & Plan  · Patient lives in [facility] oriented to self only at this point  Patient has poor insight and poor decision-making capacity  · Patient likely has underlying undiagnosed dementia  · Continue Lexapro 5 mg daily    Atrial fibrillation (HCC)  Assessment & Plan  · Continue Toprol 50 mg daily  · Not anticoagulated prior to admission due to rectal bleeding    Hypothyroidism  Assessment & Plan  · Continue levothyroxine    Essential hypertension  Assessment & Plan  · Continue metoprolol succinate 50 mg daily    GERD (gastroesophageal reflux disease)  Assessment & Plan  · Continue PPI    Anxiety  Assessment & Plan  · Continue Lexapro      VTE Pharmacologic Prophylaxis: VTE Score: 3 Moderate Risk (Score 3-4) - Pharmacological DVT Prophylaxis Contraindicated  Sequential Compression Devices Ordered  Patient Centered Rounds: I performed bedside rounds with nursing staff today  Discussions with Specialists or Other Care Team Provider: GI     Education and Discussions with Family / Patient: Updated  (daughter) via phone  Time Spent for Care: 30 minutes   More than 50% of total time spent on counseling and coordination of care as described above  Current Length of Stay: 1 day(s)  Current Patient Status: Inpatient   Certification Statement: The patient will continue to require additional inpatient hospital stay due to rectal bleeding requiring continued work up and treatment   Discharge Plan: Anticipate discharge in 48-72 hrs to prior assisted or independent living facility  Code Status: Level 1 - Full Code    Subjective:   Patient notes she is worried about her post it note this morning noting she has not where to put it where it won't get stolen  Denies any pain  Does note she needed a colonoscopy a few months ago but denies any known rectal bleeding today  Objective:     Vitals:   Temp (24hrs), Av 2 °F (36 8 °C), Min:98 1 °F (36 7 °C), Max:98 2 °F (36 8 °C)    Temp:  [98 1 °F (36 7 °C)-98 2 °F (36 8 °C)] 98 2 °F (36 8 °C)  HR:  [67-90] 67  Resp:  [16-18] 17  BP: (129-159)/(74-96) 129/74  SpO2:  [97 %-98 %] 97 %  Body mass index is 24 22 kg/m²  Input and Output Summary (last 24 hours):   No intake or output data in the 24 hours ending 21 1425    Physical Exam:   Physical Exam  Vitals reviewed  Constitutional:       General: She is not in acute distress  HENT:      Head: Normocephalic and atraumatic  Eyes:      General: No scleral icterus  Conjunctiva/sclera: Conjunctivae normal    Cardiovascular:      Rate and Rhythm: Normal rate and regular rhythm  Heart sounds: No murmur heard  Pulmonary:      Effort: Pulmonary effort is normal  No respiratory distress  Breath sounds: Normal breath sounds  Abdominal:      General: Bowel sounds are normal  There is no distension  Palpations: Abdomen is soft  Tenderness: There is no abdominal tenderness  Musculoskeletal:      Cervical back: Neck supple  Right lower leg: No edema  Left lower leg: No edema  Skin:     General: Skin is warm and dry  Neurological:      Mental Status: She is alert   She is disoriented        Comments: Oriented to person   Psychiatric:         Mood and Affect: Mood normal          Behavior: Behavior normal           Additional Data:     Labs:  Results from last 7 days   Lab Units 07/01/21  0805   WBC Thousand/uL 5 50   HEMOGLOBIN g/dL 11 8   HEMATOCRIT % 37 5   PLATELETS Thousands/uL 208   NEUTROS PCT % 63   LYMPHS PCT % 21   MONOS PCT % 13*   EOS PCT % 2     Results from last 7 days   Lab Units 07/01/21  0805   SODIUM mmol/L 137   POTASSIUM mmol/L 4 7   CHLORIDE mmol/L 103   CO2 mmol/L 30   BUN mg/dL 12   CREATININE mg/dL 0 99   ANION GAP mmol/L 4   CALCIUM mg/dL 9 3   ALBUMIN g/dL 3 1*   TOTAL BILIRUBIN mg/dL 0 59   ALK PHOS U/L 90   ALT U/L 18   AST U/L 16   GLUCOSE RANDOM mg/dL 112     Results from last 7 days   Lab Units 07/01/21  0805   INR  0 97                   Lines/Drains:  Invasive Devices     Peripheral Intravenous Line            Peripheral IV 07/01/21 Left Antecubital 1 day                      Imaging: Reviewed radiology reports from this admission including: abdominal/pelvic CT    Recent Cultures (last 7 days):   Results from last 7 days   Lab Units 07/01/21  1259   URINE CULTURE  Culture too young- will reincubate       Last 24 Hours Medication List:   Current Facility-Administered Medications   Medication Dose Route Frequency Provider Last Rate    dicyclomine  20 mg Oral 4x Daily PRN Fatemeh Gonzales MD      escitalopram  5 mg Oral Daily Fatemeh Gonzales MD      gabapentin  100 mg Oral TID Fatemeh Gonzales MD      levothyroxine  50 mcg Oral Early Morning Fatemeh Gonzales MD      mesalamine  1,000 mg Rectal HS Fatemeh Gonzales MD      mesalamine  1,000 mg Oral 4x Daily Fatemeh Gonzales MD      metoprolol succinate  50 mg Oral Daily Fatemeh Gonzales MD      pantoprazole  40 mg Oral BID AC Fatemeh Gonzales MD      psyllium  1 packet Oral Daily Fatemeh Gonzales MD          Today, Patient Was Seen By: Josh Fenton PA-C    **Please Note: This note may have been constructed using a voice recognition system  **

## 2021-07-03 LAB
ALBUMIN SERPL BCP-MCNC: 2.8 G/DL (ref 3.5–5)
ALP SERPL-CCNC: 85 U/L (ref 46–116)
ALT SERPL W P-5'-P-CCNC: 17 U/L (ref 12–78)
ANION GAP SERPL CALCULATED.3IONS-SCNC: 9 MMOL/L (ref 4–13)
AST SERPL W P-5'-P-CCNC: 13 U/L (ref 5–45)
BACTERIA UR CULT: NORMAL
BASOPHILS # BLD AUTO: 0.03 THOUSANDS/ΜL (ref 0–0.1)
BASOPHILS NFR BLD AUTO: 1 % (ref 0–1)
BILIRUB SERPL-MCNC: 0.56 MG/DL (ref 0.2–1)
BUN SERPL-MCNC: 8 MG/DL (ref 5–25)
CALCIUM ALBUM COR SERPL-MCNC: 9.8 MG/DL (ref 8.3–10.1)
CALCIUM SERPL-MCNC: 8.8 MG/DL (ref 8.3–10.1)
CHLORIDE SERPL-SCNC: 101 MMOL/L (ref 100–108)
CO2 SERPL-SCNC: 26 MMOL/L (ref 21–32)
CREAT SERPL-MCNC: 0.86 MG/DL (ref 0.6–1.3)
EOSINOPHIL # BLD AUTO: 0.36 THOUSAND/ΜL (ref 0–0.61)
EOSINOPHIL NFR BLD AUTO: 7 % (ref 0–6)
ERYTHROCYTE [DISTWIDTH] IN BLOOD BY AUTOMATED COUNT: 14.8 % (ref 11.6–15.1)
GFR SERPL CREATININE-BSD FRML MDRD: 67 ML/MIN/1.73SQ M
GLUCOSE SERPL-MCNC: 103 MG/DL (ref 65–140)
HCT VFR BLD AUTO: 37.8 % (ref 34.8–46.1)
HGB BLD-MCNC: 12.2 G/DL (ref 11.5–15.4)
IMM GRANULOCYTES # BLD AUTO: 0.02 THOUSAND/UL (ref 0–0.2)
IMM GRANULOCYTES NFR BLD AUTO: 0 % (ref 0–2)
LYMPHOCYTES # BLD AUTO: 0.99 THOUSANDS/ΜL (ref 0.6–4.47)
LYMPHOCYTES NFR BLD AUTO: 19 % (ref 14–44)
MCH RBC QN AUTO: 27.8 PG (ref 26.8–34.3)
MCHC RBC AUTO-ENTMCNC: 32.3 G/DL (ref 31.4–37.4)
MCV RBC AUTO: 86 FL (ref 82–98)
MONOCYTES # BLD AUTO: 0.6 THOUSAND/ΜL (ref 0.17–1.22)
MONOCYTES NFR BLD AUTO: 12 % (ref 4–12)
NEUTROPHILS # BLD AUTO: 3.24 THOUSANDS/ΜL (ref 1.85–7.62)
NEUTS SEG NFR BLD AUTO: 61 % (ref 43–75)
NRBC BLD AUTO-RTO: 0 /100 WBCS
PLATELET # BLD AUTO: 201 THOUSANDS/UL (ref 149–390)
PMV BLD AUTO: 9.2 FL (ref 8.9–12.7)
POTASSIUM SERPL-SCNC: 3.3 MMOL/L (ref 3.5–5.3)
PROT SERPL-MCNC: 6.5 G/DL (ref 6.4–8.2)
RBC # BLD AUTO: 4.39 MILLION/UL (ref 3.81–5.12)
SODIUM SERPL-SCNC: 136 MMOL/L (ref 136–145)
WBC # BLD AUTO: 5.24 THOUSAND/UL (ref 4.31–10.16)

## 2021-07-03 PROCEDURE — 80053 COMPREHEN METABOLIC PANEL: CPT | Performed by: PHYSICIAN ASSISTANT

## 2021-07-03 PROCEDURE — 99232 SBSQ HOSP IP/OBS MODERATE 35: CPT | Performed by: PHYSICIAN ASSISTANT

## 2021-07-03 PROCEDURE — 85025 COMPLETE CBC W/AUTO DIFF WBC: CPT | Performed by: PHYSICIAN ASSISTANT

## 2021-07-03 RX ORDER — QUETIAPINE FUMARATE 25 MG/1
12.5 TABLET, FILM COATED ORAL
Status: DISCONTINUED | OUTPATIENT
Start: 2021-07-03 | End: 2021-07-05 | Stop reason: HOSPADM

## 2021-07-03 RX ORDER — POTASSIUM CHLORIDE 20 MEQ/1
20 TABLET, EXTENDED RELEASE ORAL ONCE
Status: COMPLETED | OUTPATIENT
Start: 2021-07-03 | End: 2021-07-03

## 2021-07-03 RX ORDER — MESALAMINE 1000 MG/1
1000 SUPPOSITORY RECTAL
Status: DISCONTINUED | OUTPATIENT
Start: 2021-07-03 | End: 2021-07-05 | Stop reason: HOSPADM

## 2021-07-03 RX ADMIN — MESALAMINE 1000 MG: 250 CAPSULE ORAL at 21:43

## 2021-07-03 RX ADMIN — METOPROLOL SUCCINATE 50 MG: 50 TABLET, EXTENDED RELEASE ORAL at 08:32

## 2021-07-03 RX ADMIN — PANTOPRAZOLE SODIUM 40 MG: 40 TABLET, DELAYED RELEASE ORAL at 05:48

## 2021-07-03 RX ADMIN — MESALAMINE 1000 MG: 250 CAPSULE ORAL at 14:08

## 2021-07-03 RX ADMIN — MESALAMINE 1000 MG: 250 CAPSULE ORAL at 19:35

## 2021-07-03 RX ADMIN — ESCITALOPRAM OXALATE 5 MG: 5 TABLET, FILM COATED ORAL at 08:32

## 2021-07-03 RX ADMIN — LEVOTHYROXINE SODIUM 50 MCG: 50 TABLET ORAL at 05:48

## 2021-07-03 RX ADMIN — MESALAMINE 1000 MG: 250 CAPSULE ORAL at 08:32

## 2021-07-03 RX ADMIN — PANTOPRAZOLE SODIUM 40 MG: 40 TABLET, DELAYED RELEASE ORAL at 15:52

## 2021-07-03 RX ADMIN — POTASSIUM CHLORIDE 20 MEQ: 1500 TABLET, EXTENDED RELEASE ORAL at 15:52

## 2021-07-03 RX ADMIN — MESALAMINE 1000 MG: 1000 SUPPOSITORY RECTAL at 14:11

## 2021-07-03 NOTE — PROGRESS NOTES
[facility]  Progress Note - Job Part 1945, 70 y o  female MRN: [id number]  Unit/Bed#: E5 -01 Encounter: [id number]  Primary Care Provider: Alexa Mcnair DO   Date and time admitted to hospital: 7/1/2021  7:45 AM    * Hematochezia  Assessment & Plan  · Patient initially presented to the hospital with complaint of bright red blood per rectum x6 days with associated abdominal cramping  · CT abdomen pelvis showing no acute findings, focal under distention of sigmoid likely accounting for mild wall thickening compatible with chronic diverticulosis  · GI following  · Started on mesalamine, Canasa suppositories and Metamucil  · Hemoglobin stable today   · Follow-up fecal calprotectin  · Diet as tolerated  · P r n  Bentyl  · Will switch Canasa suppository to early morning in hopes this will increase patient compliance    Cognitive impairment  Assessment & Plan  · Patient lives in [facility] oriented to self only at this point  Patient has poor insight and poor decision-making capacity  · Patient likely has underlying undiagnosed dementia  · Continue Lexapro 5 mg daily  · Seroquel 12 5 HS added last night     Atrial fibrillation (HCC)  Assessment & Plan  · Continue Toprol 50 mg daily  · Not anticoagulated prior to admission due to rectal bleeding    Hypothyroidism  Assessment & Plan  · Continue levothyroxine    Essential hypertension  Assessment & Plan  · Continue metoprolol succinate 50 mg daily    GERD (gastroesophageal reflux disease)  Assessment & Plan  · Continue PPI    Anxiety  Assessment & Plan  · Continue Lexapro      VTE Pharmacologic Prophylaxis: VTE Score: 3 Moderate Risk (Score 3-4) - Pharmacological DVT Prophylaxis Contraindicated  Sequential Compression Devices Ordered  Patient Centered Rounds: I performed bedside rounds with nursing staff today    Discussions with Specialists or Other Care Team Provider: GI     Education and Discussions with Family / Patient: updated patient at bedside  Time Spent for Care: 30 minutes  More than 50% of total time spent on counseling and coordination of care as described above  Current Length of Stay: 2 day(s)  Current Patient Status: Inpatient   Certification Statement: The patient will continue to require additional inpatient hospital stay due to GI bleeding requiring continued monitoring   Discharge Plan: Anticipate discharge in 48 hrs to prior assisted or independent living facility  Code Status: Level 1 - Full Code    Subjective:   Patient notes she is doing okay today  Ate lunch without difficulty but notes she did not like the food  Does believe she had some bleeding this AM, but notes it was less than prior to admission  Objective:     Vitals:   Temp (24hrs), Av °F (36 7 °C), Min:97 6 °F (36 4 °C), Max:98 1 °F (36 7 °C)    Temp:  [97 6 °F (36 4 °C)-98 1 °F (36 7 °C)] 97 6 °F (36 4 °C)  HR:  [61-90] 61  Resp:  [18-20] 18  BP: (119-129)/(66-89) 119/66  SpO2:  [97 %-98 %] 98 %  Body mass index is 24 22 kg/m²  Input and Output Summary (last 24 hours): Intake/Output Summary (Last 24 hours) at 7/3/2021 1348  Last data filed at 2021 1634  Gross per 24 hour   Intake --   Output 800 ml   Net -800 ml       Physical Exam:   Physical Exam  Vitals reviewed  Constitutional:       General: She is not in acute distress  HENT:      Head: Normocephalic and atraumatic  Eyes:      General: No scleral icterus  Conjunctiva/sclera: Conjunctivae normal    Cardiovascular:      Rate and Rhythm: Normal rate and regular rhythm  Heart sounds: No murmur heard  Pulmonary:      Effort: Pulmonary effort is normal  No respiratory distress  Breath sounds: Normal breath sounds  Abdominal:      General: Bowel sounds are normal  There is no distension  Palpations: Abdomen is soft  Tenderness: There is no abdominal tenderness  Musculoskeletal:      Cervical back: Neck supple        Right lower leg: No edema  Left lower leg: No edema  Skin:     General: Skin is warm and dry  Neurological:      Mental Status: She is alert  She is disoriented  Psychiatric:         Mood and Affect: Mood normal          Behavior: Behavior normal           Additional Data:     Labs:  Results from last 7 days   Lab Units 07/03/21  0555   WBC Thousand/uL 5 24   HEMOGLOBIN g/dL 12 2   HEMATOCRIT % 37 8   PLATELETS Thousands/uL 201   NEUTROS PCT % 61   LYMPHS PCT % 19   MONOS PCT % 12   EOS PCT % 7*     Results from last 7 days   Lab Units 07/03/21  0555   SODIUM mmol/L 136   POTASSIUM mmol/L 3 3*   CHLORIDE mmol/L 101   CO2 mmol/L 26   BUN mg/dL 8   CREATININE mg/dL 0 86   ANION GAP mmol/L 9   CALCIUM mg/dL 8 8   ALBUMIN g/dL 2 8*   TOTAL BILIRUBIN mg/dL 0 56   ALK PHOS U/L 85   ALT U/L 17   AST U/L 13   GLUCOSE RANDOM mg/dL 103     Results from last 7 days   Lab Units 07/01/21  0805   INR  0 97                   Lines/Drains:  Invasive Devices     Peripheral Intravenous Line            Peripheral IV 07/01/21 Left Antecubital 2 days                      Imaging: No pertinent imaging reviewed      Recent Cultures (last 7 days):   Results from last 7 days   Lab Units 07/01/21  1259   URINE CULTURE  >100,000 cfu/ml        Last 24 Hours Medication List:   Current Facility-Administered Medications   Medication Dose Route Frequency Provider Last Rate    dicyclomine  20 mg Oral 4x Daily PRN Kari Edgar MD      escitalopram  5 mg Oral Daily Kari Edgar MD      gabapentin  100 mg Oral TID Kari Edgar MD      levothyroxine  50 mcg Oral Early Morning Kari Edgar MD      mesalamine  1,000 mg Rectal Early Morning Matt Malin PA-C      mesalamine  1,000 mg Oral 4x Daily Kari Edgar MD      metoprolol succinate  50 mg Oral Daily Kari Edgar MD      pantoprazole  40 mg Oral BID AC Kari Edgar MD      psyllium  1 packet Oral Daily Kari Edgar MD      QUEtiapine  12 5 mg Oral HS Cecilia Potter PA-C          Today, Patient Was Seen By: Dilan Nugent PA-C    **Please Note: This note may have been constructed using a voice recognition system  **

## 2021-07-03 NOTE — ASSESSMENT & PLAN NOTE
· Patient lives in [facility] oriented to self only at this point    Patient has poor insight and poor decision-making capacity  · Patient likely has underlying undiagnosed dementia  · Continue Lexapro 5 mg daily  · Seroquel 12 5 HS added last night

## 2021-07-03 NOTE — ASSESSMENT & PLAN NOTE
· Patient initially presented to the hospital with complaint of bright red blood per rectum x6 days with associated abdominal cramping  · CT abdomen pelvis showing no acute findings, focal under distention of sigmoid likely accounting for mild wall thickening compatible with chronic diverticulosis  · GI following  · Started on mesalamine, Canasa suppositories and Metamucil  · Hemoglobin stable today   · Follow-up fecal calprotectin  · Diet as tolerated  · P r n   Bentyl  · Will switch Canasa suppository to early morning in hopes this will increase patient compliance

## 2021-07-04 PROCEDURE — 99232 SBSQ HOSP IP/OBS MODERATE 35: CPT | Performed by: PHYSICIAN ASSISTANT

## 2021-07-04 RX ADMIN — MESALAMINE 1000 MG: 250 CAPSULE ORAL at 09:27

## 2021-07-04 RX ADMIN — LEVOTHYROXINE SODIUM 50 MCG: 50 TABLET ORAL at 05:59

## 2021-07-04 RX ADMIN — PANTOPRAZOLE SODIUM 40 MG: 40 TABLET, DELAYED RELEASE ORAL at 16:47

## 2021-07-04 RX ADMIN — METOPROLOL SUCCINATE 50 MG: 50 TABLET, EXTENDED RELEASE ORAL at 09:27

## 2021-07-04 RX ADMIN — ESCITALOPRAM OXALATE 5 MG: 5 TABLET, FILM COATED ORAL at 09:27

## 2021-07-04 RX ADMIN — MESALAMINE 1000 MG: 250 CAPSULE ORAL at 12:35

## 2021-07-04 RX ADMIN — QUETIAPINE FUMARATE 12.5 MG: 25 TABLET ORAL at 22:03

## 2021-07-04 RX ADMIN — MESALAMINE 1000 MG: 1000 SUPPOSITORY RECTAL at 05:59

## 2021-07-04 RX ADMIN — MESALAMINE 1000 MG: 250 CAPSULE ORAL at 16:47

## 2021-07-04 RX ADMIN — PANTOPRAZOLE SODIUM 40 MG: 40 TABLET, DELAYED RELEASE ORAL at 05:58

## 2021-07-04 RX ADMIN — MESALAMINE 1000 MG: 250 CAPSULE ORAL at 22:03

## 2021-07-04 NOTE — PLAN OF CARE
Problem: MOBILITY - ADULT  Goal: Maintain or return to baseline ADL function  Description: INTERVENTIONS:  -  Assess patient's ability to carry out ADLs; assess patient's baseline for ADL function and identify physical deficits which impact ability to perform ADLs (bathing, care of mouth/teeth, toileting, grooming, dressing, etc )  - Assess/evaluate cause of self-care deficits   - Assess range of motion  - Assess patient's mobility; develop plan if impaired  - Assess patient's need for assistive devices and provide as appropriate  - Encourage maximum independence but intervene and supervise when necessary  - Involve family in performance of ADLs  - Assess for home care needs following discharge   - Consider OT consult to assist with ADL evaluation and planning for discharge  - Provide patient education as appropriate  Outcome: Progressing  Goal: Maintains/Returns to pre admission functional level  Description: INTERVENTIONS:  - Perform BMAT or MOVE assessment daily    - Set and communicate daily mobility goal to care team and patient/family/caregiver     - Collaborate with rehabilitation services on mobility goals if consulted    - Out of bed for toileting  - Record patient progress and toleration of activity level   Outcome: Progressing     Problem: Potential for Falls  Goal: Patient will remain free of falls  Description: INTERVENTIONS:  - Educate patient/family on patient safety including physical limitations  - Instruct patient to call for assistance with activity   - Consult OT/PT to assist with strengthening/mobility   - Keep Call bell within reach  - Keep bed low and locked with side rails adjusted as appropriate  - Keep care items and personal belongings within reach  - Initiate and maintain comfort rounds  - Make Fall Risk Sign visible to staff    - Apply yellow socks and bracelet for high fall risk patients  - Consider moving patient to room near nurses station  Outcome: Progressing     Problem: GASTROINTESTINAL - ADULT  Goal: Minimal or absence of nausea and/or vomiting  Description: INTERVENTIONS:  - Administer IV fluids if ordered to ensure adequate hydration  - Maintain NPO status until nausea and vomiting are resolved  - Nasogastric tube if ordered  - Administer ordered antiemetic medications as needed  - Provide nonpharmacologic comfort measures as appropriate  - Advance diet as tolerated, if ordered  - Consider nutrition services referral to assist patient with adequate nutrition and appropriate food choices  Outcome: Progressing  Goal: Maintains or returns to baseline bowel function  Description: INTERVENTIONS:  - Assess bowel function  - Encourage oral fluids to ensure adequate hydration  - Administer IV fluids if ordered to ensure adequate hydration  - Administer ordered medications as needed  - Encourage mobilization and activity  - Consider nutritional services referral to assist patient with adequate nutrition and appropriate food choices  Outcome: Progressing  Goal: Maintains adequate nutritional intake  Description: INTERVENTIONS:  - Monitor percentage of each meal consumed  - Identify factors contributing to decreased intake, treat as appropriate  - Assist with meals as needed  - Monitor I&O, weight, and lab values if indicated  - Obtain nutrition services referral as needed  Outcome: Progressing  Goal: Oral mucous membranes remain intact  Description: INTERVENTIONS  - Assess oral mucosa and hygiene practices  - Implement preventative oral hygiene regimen  - Implement oral medicated treatments as ordered  - Initiate Nutrition services referral as needed  Outcome: Progressing     Problem: HEMATOLOGIC - ADULT  Goal: Maintains hematologic stability  Description: INTERVENTIONS  - Assess for signs and symptoms of bleeding or hemorrhage  - Monitor labs  - Administer supportive blood products/factors as ordered and appropriate  Outcome: Progressing     Problem: SAFETY ADULT  Goal: Maintain or return to baseline ADL function  Description: INTERVENTIONS:  -  Assess patient's ability to carry out ADLs; assess patient's baseline for ADL function and identify physical deficits which impact ability to perform ADLs (bathing, care of mouth/teeth, toileting, grooming, dressing, etc )  - Assess/evaluate cause of self-care deficits   - Assess range of motion  - Assess patient's mobility; develop plan if impaired  - Assess patient's need for assistive devices and provide as appropriate  - Encourage maximum independence but intervene and supervise when necessary  - Involve family in performance of ADLs  - Assess for home care needs following discharge   - Consider OT consult to assist with ADL evaluation and planning for discharge  - Provide patient education as appropriate  Outcome: Progressing  Goal: Maintains/Returns to pre admission functional level  Description: INTERVENTIONS:  - Perform BMAT or MOVE assessment daily    - Set and communicate daily mobility goal to care team and patient/family/caregiver     - Collaborate with rehabilitation services on mobility goals if consulted    - Out of bed for toileting  - Record patient progress and toleration of activity level   Outcome: Progressing  Goal: Patient will remain free of falls  Description: INTERVENTIONS:  - Educate patient/family on patient safety including physical limitations  - Instruct patient to call for assistance with activity   - Consult OT/PT to assist with strengthening/mobility   - Keep Call bell within reach  - Keep bed low and locked with side rails adjusted as appropriate  - Keep care items and personal belongings within reach  - Initiate and maintain comfort rounds  - Make Fall Risk Sign visible to staff    - Apply yellow socks and bracelet for high fall risk patients  - Consider moving patient to room near nurses station  Outcome: Progressing     Problem: DISCHARGE PLANNING  Goal: Discharge to home or other facility with appropriate resources  Description: INTERVENTIONS:  - Identify barriers to discharge w/patient and caregiver  - Arrange for needed discharge resources and transportation as appropriate  - Identify discharge learning needs (meds, wound care, etc )  - Arrange for interpretive services to assist at discharge as needed  - Refer to Case Management Department for coordinating discharge planning if the patient needs post-hospital services based on physician/advanced practitioner order or complex needs related to functional status, cognitive ability, or social support system  Outcome: Progressing     Problem: Knowledge Deficit  Goal: Patient/family/caregiver demonstrates understanding of disease process, treatment plan, medications, and discharge instructions  Description: Complete learning assessment and assess knowledge base    Interventions:  - Provide teaching at level of understanding  - Provide teaching via preferred learning methods  Outcome: Progressing

## 2021-07-04 NOTE — ASSESSMENT & PLAN NOTE
· Patient initially presented to the hospital with complaint of bright red blood per rectum x6 days with associated abdominal cramping  · CT abdomen pelvis showing no acute findings, focal under distention of sigmoid likely accounting for mild wall thickening compatible with chronic diverticulosis  · GI following   · Started on mesalamine, Canasa suppositories and Metamucil  · Hemoglobin stable   · Follow-up fecal calprotectin  · Diet as tolerated- tolerating oral diet without difficulty   · P r n   Bentyl  · Will switch Canasa suppository to early morning in hopes this will increase patient compliance- agreeable to take this medication in the morning instead

## 2021-07-04 NOTE — PROGRESS NOTES
[facility]  Progress Note - Lord Hayes 1945, 70 y o  female MRN: [id number]  Unit/Bed#: E5 -01 Encounter: [id number]  Primary Care Provider: Adelaide Calderon DO   Date and time admitted to hospital: 7/1/2021  7:45 AM    * Hematochezia  Assessment & Plan  · Patient initially presented to the hospital with complaint of bright red blood per rectum x6 days with associated abdominal cramping  · CT abdomen pelvis showing no acute findings, focal under distention of sigmoid likely accounting for mild wall thickening compatible with chronic diverticulosis  · GI following   · Started on mesalamine, Canasa suppositories and Metamucil  · Hemoglobin stable   · Follow-up fecal calprotectin  · Diet as tolerated- tolerating oral diet without difficulty   · P r n  Bentyl  · Will switch Canasa suppository to early morning in hopes this will increase patient compliance- agreeable to take this medication in the morning instead     Cognitive impairment  Assessment & Plan  · Patient lives in [facility] oriented to self only at this point  Patient has poor insight and poor decision-making capacity  · Patient likely has underlying undiagnosed dementia  · Continue Lexapro 5 mg daily  · Seroquel 12 5 HS added at night     Atrial fibrillation (HCC)  Assessment & Plan  · Continue Toprol 50 mg daily  · Not anticoagulated prior to admission due to rectal bleeding    Hypothyroidism  Assessment & Plan  · Continue levothyroxine    Essential hypertension  Assessment & Plan  · Continue metoprolol succinate 50 mg daily    GERD (gastroesophageal reflux disease)  Assessment & Plan  · Continue PPI    Anxiety  Assessment & Plan  · Continue Lexapro      VTE Pharmacologic Prophylaxis: VTE Score: 3 Moderate Risk (Score 3-4) - Pharmacological DVT Prophylaxis Contraindicated  Sequential Compression Devices Ordered  Patient Centered Rounds: I performed bedside rounds with nursing staff today    Discussions with Specialists or Other Care Team Provider: nursing     Education and Discussions with Family / Patient: updated patient at bedside  Time Spent for Care: 30 minutes  More than 50% of total time spent on counseling and coordination of care as described above  Current Length of Stay: 3 day(s)  Current Patient Status: Inpatient   Certification Statement: The patient will continue to require additional inpatient hospital stay due to GI bleeding requiring monitoring  Discharge Plan: Anticipate discharge in 24-48 hrs to prior assisted or independent living facility  Facility unable to accept on weekends, will try for discharge tomorrow  Code Status: Level 1 - Full Code    Subjective:   Patient notes she is doing well today  Notes fewer bowel movements overnight and denies blood in stool  She notes she is eating well and feels ready to return home  Objective:     Vitals:   Temp (24hrs), Av °F (36 7 °C), Min:97 2 °F (36 2 °C), Max:98 4 °F (36 9 °C)    Temp:  [97 2 °F (36 2 °C)-98 4 °F (36 9 °C)] 98 4 °F (36 9 °C)  HR:  [59-64] 59  Resp:  [16-18] 16  BP: (116-123)/(62-72) 118/70  SpO2:  [96 %-98 %] 97 %  Body mass index is 24 22 kg/m²  Input and Output Summary (last 24 hours): Intake/Output Summary (Last 24 hours) at 2021 1044  Last data filed at 7/3/2021 1900  Gross per 24 hour   Intake 360 ml   Output --   Net 360 ml       Physical Exam:   Physical Exam  Vitals reviewed  Constitutional:       General: She is not in acute distress  HENT:      Head: Normocephalic and atraumatic  Eyes:      General: No scleral icterus  Conjunctiva/sclera: Conjunctivae normal    Cardiovascular:      Rate and Rhythm: Normal rate and regular rhythm  Heart sounds: No murmur heard  Pulmonary:      Effort: Pulmonary effort is normal       Breath sounds: Normal breath sounds  Abdominal:      General: Bowel sounds are normal  There is no distension  Palpations: Abdomen is soft        Tenderness: There is no abdominal tenderness  Musculoskeletal:      Cervical back: Neck supple  Right lower leg: No edema  Left lower leg: No edema  Skin:     General: Skin is warm and dry  Neurological:      Mental Status: She is alert  Mental status is at baseline  Psychiatric:         Mood and Affect: Mood normal          Behavior: Behavior normal           Additional Data:     Labs:  Results from last 7 days   Lab Units 07/03/21  0555   WBC Thousand/uL 5 24   HEMOGLOBIN g/dL 12 2   HEMATOCRIT % 37 8   PLATELETS Thousands/uL 201   NEUTROS PCT % 61   LYMPHS PCT % 19   MONOS PCT % 12   EOS PCT % 7*     Results from last 7 days   Lab Units 07/03/21  0555   SODIUM mmol/L 136   POTASSIUM mmol/L 3 3*   CHLORIDE mmol/L 101   CO2 mmol/L 26   BUN mg/dL 8   CREATININE mg/dL 0 86   ANION GAP mmol/L 9   CALCIUM mg/dL 8 8   ALBUMIN g/dL 2 8*   TOTAL BILIRUBIN mg/dL 0 56   ALK PHOS U/L 85   ALT U/L 17   AST U/L 13   GLUCOSE RANDOM mg/dL 103     Results from last 7 days   Lab Units 07/01/21  0805   INR  0 97                   Lines/Drains:  Invasive Devices     Peripheral Intravenous Line            Peripheral IV 07/01/21 Left Antecubital 3 days                      Imaging: No pertinent imaging reviewed      Recent Cultures (last 7 days):   Results from last 7 days   Lab Units 07/01/21  1259   URINE CULTURE  >100,000 cfu/ml        Last 24 Hours Medication List:   Current Facility-Administered Medications   Medication Dose Route Frequency Provider Last Rate    dicyclomine  20 mg Oral 4x Daily PRN Dianne Keller MD      escitalopram  5 mg Oral Daily Dianne Keller MD      gabapentin  100 mg Oral TID Dianne Keller MD      levothyroxine  50 mcg Oral Early Morning Dianne Keller MD      mesalamine  1,000 mg Rectal Early Morning Vernon Stone PA-C      mesalamine  1,000 mg Oral 4x Daily Dianne Keller MD      metoprolol succinate  50 mg Oral Daily Dianne Keller MD      pantoprazole  40 mg Oral BID MD Yancy Newman psyllium  1 packet Oral Daily Frances Moyer MD      QUEtiapine  12 5 mg Oral HS Ely Barker PA-C          Today, Patient Was Seen By: Romulo Gallardo PA-C    **Please Note: This note may have been constructed using a voice recognition system  **

## 2021-07-04 NOTE — ASSESSMENT & PLAN NOTE
· Patient lives in [facility] oriented to self only at this point    Patient has poor insight and poor decision-making capacity  · Patient likely has underlying undiagnosed dementia  · Continue Lexapro 5 mg daily  · Seroquel 12 5 HS added at night

## 2021-07-05 VITALS
SYSTOLIC BLOOD PRESSURE: 128 MMHG | TEMPERATURE: 98.4 F | BODY MASS INDEX: 24.22 KG/M2 | RESPIRATION RATE: 17 BRPM | DIASTOLIC BLOOD PRESSURE: 78 MMHG | WEIGHT: 141.09 LBS | HEART RATE: 69 BPM | OXYGEN SATURATION: 97 %

## 2021-07-05 LAB — SARS-COV-2 RNA RESP QL NAA+PROBE: NEGATIVE

## 2021-07-05 PROCEDURE — U0005 INFEC AGEN DETEC AMPLI PROBE: HCPCS | Performed by: PHYSICIAN ASSISTANT

## 2021-07-05 PROCEDURE — 99239 HOSP IP/OBS DSCHRG MGMT >30: CPT | Performed by: PHYSICIAN ASSISTANT

## 2021-07-05 PROCEDURE — U0003 INFECTIOUS AGENT DETECTION BY NUCLEIC ACID (DNA OR RNA); SEVERE ACUTE RESPIRATORY SYNDROME CORONAVIRUS 2 (SARS-COV-2) (CORONAVIRUS DISEASE [COVID-19]), AMPLIFIED PROBE TECHNIQUE, MAKING USE OF HIGH THROUGHPUT TECHNOLOGIES AS DESCRIBED BY CMS-2020-01-R: HCPCS | Performed by: PHYSICIAN ASSISTANT

## 2021-07-05 RX ORDER — SULFASALAZINE 500 MG/1
1000 TABLET ORAL 3 TIMES DAILY
Qty: 180 TABLET | Refills: 0 | Status: SHIPPED
Start: 2021-07-05 | End: 2021-07-25

## 2021-07-05 RX ORDER — MESALAMINE 500 MG/1
1000 CAPSULE, EXTENDED RELEASE ORAL 4 TIMES DAILY
Qty: 240 CAPSULE | Refills: 0 | Status: SHIPPED
Start: 2021-07-05 | End: 2021-07-05 | Stop reason: HOSPADM

## 2021-07-05 RX ORDER — MESALAMINE 1000 MG/1
1000 SUPPOSITORY RECTAL
Qty: 30 SUPPOSITORY | Refills: 0 | Status: SHIPPED
Start: 2021-07-05 | End: 2021-07-05 | Stop reason: HOSPADM

## 2021-07-05 RX ORDER — MESALAMINE 4 G/60ML
4 ENEMA RECTAL
Qty: 1800 ML | Refills: 0 | Status: SHIPPED
Start: 2021-07-05 | End: 2021-07-25

## 2021-07-05 RX ORDER — QUETIAPINE FUMARATE 25 MG/1
12.5 TABLET, FILM COATED ORAL
Qty: 15 TABLET | Refills: 0 | Status: SHIPPED
Start: 2021-07-05 | End: 2021-07-25

## 2021-07-05 RX ADMIN — GABAPENTIN 100 MG: 100 CAPSULE ORAL at 10:57

## 2021-07-05 RX ADMIN — PANTOPRAZOLE SODIUM 40 MG: 40 TABLET, DELAYED RELEASE ORAL at 05:37

## 2021-07-05 RX ADMIN — MESALAMINE 1000 MG: 1000 SUPPOSITORY RECTAL at 05:36

## 2021-07-05 RX ADMIN — ESCITALOPRAM OXALATE 5 MG: 5 TABLET, FILM COATED ORAL at 10:57

## 2021-07-05 RX ADMIN — METOPROLOL SUCCINATE 50 MG: 50 TABLET, EXTENDED RELEASE ORAL at 10:57

## 2021-07-05 RX ADMIN — MESALAMINE 1000 MG: 250 CAPSULE ORAL at 10:58

## 2021-07-05 RX ADMIN — LEVOTHYROXINE SODIUM 50 MCG: 50 TABLET ORAL at 05:37

## 2021-07-05 NOTE — CASE MANAGEMENT
Per attending PADMINI, patient medically stable for discharge and can return to [facility]  A PC was made to [facility] to confirm patient can returned today  Per facility, patient is able to return today at any time  SW made a PC to [facility] to set up a BLS transport  BLS is set for 1:15-1:30PM  SLIM and patient's assigned nurse have been notified  Report and fax numbers have been listed on facility contacts  Medical necessity done and placed in patient's chart  IMM reviewed with POA  POA agree with discharge determination  IMM placed in scan bin  At this time there are no other CM needs

## 2021-07-05 NOTE — ASSESSMENT & PLAN NOTE
· Patient lives in [facility] oriented to self only at this point    Patient has poor insight and poor decision-making capacity  · Patient likely has underlying undiagnosed dementia  · Continue Lexapro 5 mg daily  · Seroquel 12 5 HS added

## 2021-07-05 NOTE — NURSING NOTE
Discharge instructions and scripts faxed to [facility]  Discharged back to [facility] via S

## 2021-07-05 NOTE — TRANSPORTATION MEDICAL NECESSITY
Section I - General Information    Name of Patient: Olga Genao                 : 1945    Medicare #: [id number]  Transport Date: 21 (PCS is valid for round trips on this date and for all repetitive trips in the 60-day range as noted below )  Origin: [address]                                                         [facility] [address] [facility]  Is the pt's stay covered under Medicare Part A (PPS/DRG)   [x]     Closest appropriate facility? If no, why is transport to more distant facility required? Yes  If hospice pt, is this transport related to pt's terminal illness? NA       Section II - Medical Necessity Questionnaire  Ambulance transportation is medically necessary only if other means of transport are contraindicated or would be potentially harmful to the patient  To meet this requirement, the patient must either be "bed confined" or suffer from a condition such that transport by means other than ambulance is contraindicated by the patient's condition  The following questions must be answered by the medical professional signing below for this form to be valid:    1)  Describe the MEDICAL CONDITION (physical and/or mental) of this patient AT [address] [place] [address] that requires the patient to be transported in an ambulance and why transport by other means is contraindicated by the patient's condition: demented/confused, decreased safety awareness, decreased cognition  2) Is the patient "bed confined" as defined below? No  To be "be confined" the patient must satisfy all three of the following conditions: (1) unable to get up from bed without Assistance; AND (2) unable to ambulate; AND (3) unable to sit in a chair or wheelchair  3) Can this patient safely be transported by car or wheelchair van (i e , seated during transport without a medical attendant or monitoring)?    No    4) In addition to completing questions 1-3 above, please check any of the following conditions that apply*:   *Note: supporting documentation for any boxes checked must be maintained in the patient's medical records  If hosp-hosp transfer, describe services needed at 2nd facility not available at 1st facility? Patient is confused  Medical attendant required   Other(specify) patient is demented      Section III - Signature of Physician or Healthcare Professional  I certify that the above information is true and correct based on my evaluation of this patient, and represent that the patient requires transport by ambulance and that other forms of transport are contraindicated  I understand that this information will be used by the Centers for Medicare and Medicaid Services (CMS) to support the determination of medical necessity for ambulance services, and I represent that I have personal knowledge of the patient's condition at time of transport  []  If this box is checked, I also certify that the patient is physically or mentally incapable of signing the ambulance service's claim and that the institution with which I am affiliated has furnished care, services, or assistance to the patient  My signature below is made on behalf of the patient pursuant to 42 CFR §424 36(b)(4)  In accordance with 42 CFR §424 37, the specific reason(s) that the patient is physically or mentally incapable of signing the claim form is as follows: Drew Cole of Physician* or Healthcare Professional__Lesley Reyes-Cerda,MSW____________________________________________________________  Signature Date 07/05/21 (For scheduled repetitive transports, this form is not valid for transports performed more than 60 days after this date)    Printed Name & Credentials of Physician or Healthcare Professional (MD, DO, RN, etc )_Lesley Reyes-Cerda, MSW_______________________________  *Form must be signed by patient's attending physician for scheduled, repetitive transports   For non-repetitive, unscheduled ambulance transports, if unable to obtain the signature of the attending physician, any of the following may sign (choose appropriate option below)  [] Physician Assistant []  Clinical Nurse Specialist []  Registered Nurse  []  Nurse Practitioner  [x] Discharge Planner

## 2021-07-05 NOTE — ASSESSMENT & PLAN NOTE
· Patient initially presented to the hospital with complaint of bright red blood per rectum x6 days with associated abdominal cramping  · CT abdomen pelvis showing no acute findings, focal under distention of sigmoid likely accounting for mild wall thickening compatible with chronic diverticulosis  · Seen in consult by GI while inpatient  · Started on mesalamine, Canasa suppositories and Metamucil  · Hemoglobin remained stable  · Follow-up fecal calprotectin  · Diet as tolerated- tolerating oral diet without difficulty

## 2021-07-05 NOTE — DISCHARGE SUMMARY
[address]  Discharge- Quentin Hemp 1945, 70 y o  female MRN: [id number]  Unit/Bed#: E5 -01 Encounter: [id number]  Primary Care Provider: Suhail Benitez DO   Date and time admitted to hospital: 7/1/2021  7:45 AM    * Hematochezia  Assessment & Plan  · Patient initially presented to the hospital with complaint of bright red blood per rectum x6 days with associated abdominal cramping  · CT abdomen pelvis showing no acute findings, focal under distention of sigmoid likely accounting for mild wall thickening compatible with chronic diverticulosis  · Seen in consult by GI while inpatient  · Started on mesalamine, Canasa suppositories and Metamucil  · Hemoglobin remained stable  · Follow-up fecal calprotectin  · Diet as tolerated- tolerating oral diet without difficulty     Cognitive impairment  Assessment & Plan  · Patient lives in [address] [place] [address] oriented to self only at this point    Patient has poor insight and poor decision-making capacity  · Patient likely has underlying undiagnosed dementia  · Continue Lexapro 5 mg daily  · Seroquel 12 5 HS added     Atrial fibrillation (HCC)  Assessment & Plan  · Continue Toprol 50 mg daily  · Not anticoagulated prior to admission due to rectal bleeding    Hypothyroidism  Assessment & Plan  · Continue levothyroxine    Essential hypertension  Assessment & Plan  · Continue metoprolol succinate 50 mg daily    GERD (gastroesophageal reflux disease)  Assessment & Plan  · Continue PPI    Anxiety  Assessment & Plan  · Continue Lexapro    Medical Problems     Resolved Problems  Date Reviewed: 7/5/2021    None              Discharging Physician / Practitioner: Marika Jaimes PA-C  PCP: Suhail Benitez DO  Admission Date:   Admission Orders (From admission, onward)     Ordered        07/01/21 1452  Inpatient Admission  Once                   Discharge Date: 07/05/21    Consultations During Hospital Stay:  · GI    Procedures Performed:   CT abdomen pelvis with contrast    Result Date: 7/1/2021  Impression: No acute findings  Similar appearance to 6/11/2021  Chronic, nonemergent findings above  Workstation performed: [id number]       Significant Findings / Test Results:   · See above    Incidental Findings:   · None     Test Results Pending at Discharge (will require follow up): · Fecal calprotectin     Outpatient Tests Requested:  · Outpatient GI follow-up    Complications:  None    Reason for Admission:  GI bleed    Hospital Course:   Ailin Ackerman is a 70 y o  female patient who originally presented to the hospital on 7/1/2021 due to rectal bleeding  Patient is on the hospital with complaint of rectal bleeding  Was seen in consult by GI who altered some of patient's GI medications  Throughout hospitalization, diet was slowly advanced as tolerated  On day of discharge, patient was tolerating full oral diet without difficulty  While inpatient, patient began having much more infrequent and formed bowel movements without blood  Patient to be discharged back to assisted living facility  Mesalamine scripts sent to pharmacy  Please see above list of diagnoses and related plan for additional information  Condition at Discharge: stable    Discharge Day Visit / Exam:   Subjective:  Patient reports she is doing okay today  Denies any pain  Vitals: Blood Pressure: 128/78 (07/05/21 0712)  Pulse: 69 (07/05/21 0712)  Temperature: 98 4 °F (36 9 °C) (07/05/21 0712)  Temp Source: Oral (07/04/21 1517)  Respirations: 17 (07/05/21 0712)  Weight - Scale: 64 kg (141 lb 1 5 oz) (07/01/21 0750)  SpO2: 97 % (07/05/21 2152)  Exam:   Physical Exam  Vitals reviewed  Constitutional:       General: She is not in acute distress  HENT:      Head: Normocephalic and atraumatic  Eyes:      General: No scleral icterus  Conjunctiva/sclera: Conjunctivae normal    Cardiovascular:      Rate and Rhythm: Normal rate and regular rhythm  Heart sounds: No murmur heard  Pulmonary:      Effort: Pulmonary effort is normal  No respiratory distress  Breath sounds: Normal breath sounds  Abdominal:      General: Bowel sounds are normal  There is no distension  Palpations: Abdomen is soft  Tenderness: There is no abdominal tenderness  Musculoskeletal:      Cervical back: Neck supple  Right lower leg: No edema  Left lower leg: No edema  Skin:     General: Skin is warm and dry  Neurological:      Mental Status: She is alert  Mental status is at baseline  Cranial Nerves: No cranial nerve deficit  Psychiatric:         Mood and Affect: Mood normal          Behavior: Behavior normal           Discharge instructions/Information to patient and family:   See after visit summary for information provided to patient and family  Provisions for Follow-Up Care:  See after visit summary for information related to follow-up care and any pertinent home health orders  Disposition:   Assisted Living Facility at [facility]    Planned Readmission:  None     Discharge Statement:  I spent 35 minutes discharging the patient  This time was spent on the day of discharge  I had direct contact with the patient on the day of discharge  Greater than 50% of the total time was spent examining patient, answering all patient questions, arranging and discussing plan of care with patient as well as directly providing post-discharge instructions  Additional time then spent on discharge activities  Discharge Medications:  See after visit summary for reconciled discharge medications provided to patient and/or family        **Please Note: This note may have been constructed using a voice recognition system**

## 2021-07-06 ENCOUNTER — TELEPHONE (OUTPATIENT)
Dept: HEMATOLOGY ONCOLOGY | Facility: CLINIC | Age: 76
End: 2021-07-06

## 2021-07-06 ENCOUNTER — TELEPHONE (OUTPATIENT)
Dept: GASTROENTEROLOGY | Facility: MEDICAL CENTER | Age: 76
End: 2021-07-06

## 2021-07-06 LAB — CALPROTECTIN STL-MCNT: 385 UG/G (ref 0–120)

## 2021-07-06 NOTE — TELEPHONE ENCOUNTER
Queenie from [facility] calling in to reschedule patients appointment with Dr Yancy Perales on 8/2/21 at 2:00pm  Queenie states the daughter is not available for that date and can only do Friday appointments  Informed Queenie that Dr Yancy Perales is not in the office on Friday's  Informed Queenie that I will have someone call her to set up an appointment for the patient  [facility] and [facility] needs to be confirmed by them  Best call back number [phone]

## 2021-07-06 NOTE — TELEPHONE ENCOUNTER
----- Message from Sj Lester PA-C sent at 7/5/2021 12:43 PM EDT -----  Patient being discharged from the hospital today, please arrange FU in 2-3 weeks    Thanks!   Karen

## 2021-07-08 ENCOUNTER — TRANSITIONAL CARE MANAGEMENT (OUTPATIENT)
Dept: FAMILY MEDICINE CLINIC | Facility: CLINIC | Age: 76
End: 2021-07-08

## 2021-07-08 NOTE — TELEPHONE ENCOUNTER
Queenie from patient's nursing home called back to schedule, scheduled for 08/27 -- offered sooner appt's, pt's family unable to make them

## 2021-07-16 ENCOUNTER — HOSPITAL ENCOUNTER (OUTPATIENT)
Dept: MAMMOGRAPHY | Facility: MEDICAL CENTER | Age: 76
Discharge: HOME | End: 2021-07-16
Payer: MEDICARE

## 2021-07-16 VITALS — BODY MASS INDEX: 24.98 KG/M2 | HEIGHT: 63 IN | WEIGHT: 141 LBS

## 2021-07-16 DIAGNOSIS — Z12.39 BREAST CANCER SCREENING, HIGH RISK PATIENT: ICD-10-CM

## 2021-07-16 DIAGNOSIS — Z12.31 ENCOUNTER FOR SCREENING MAMMOGRAM FOR MALIGNANT NEOPLASM OF BREAST: ICD-10-CM

## 2021-07-16 DIAGNOSIS — Z12.31 ENCOUNTER FOR SCREENING MAMMOGRAM FOR HIGH-RISK PATIENT: ICD-10-CM

## 2021-07-16 PROCEDURE — 77063 BREAST TOMOSYNTHESIS BI: CPT

## 2021-07-16 PROCEDURE — 77067 SCR MAMMO BI INCL CAD: CPT

## 2021-07-23 DIAGNOSIS — S12.100A CLOSED ODONTOID FRACTURE, INITIAL ENCOUNTER (HCC): ICD-10-CM

## 2021-07-23 DIAGNOSIS — R79.89 LOW TSH LEVEL: ICD-10-CM

## 2021-07-23 DIAGNOSIS — K52.9 COLITIS: ICD-10-CM

## 2021-07-23 DIAGNOSIS — K92.1 HEMATOCHEZIA: ICD-10-CM

## 2021-07-23 DIAGNOSIS — G47.01 INSOMNIA DUE TO MEDICAL CONDITION: ICD-10-CM

## 2021-07-23 DIAGNOSIS — K25.4 GASTROINTESTINAL HEMORRHAGE ASSOCIATED WITH GASTRIC ULCER: ICD-10-CM

## 2021-07-25 RX ORDER — METOPROLOL SUCCINATE 50 MG/1
TABLET, EXTENDED RELEASE ORAL
Qty: 30 TABLET | Refills: 10 | Status: SHIPPED | OUTPATIENT
Start: 2021-07-25

## 2021-07-25 RX ORDER — QUETIAPINE FUMARATE 25 MG/1
TABLET, FILM COATED ORAL
Qty: 15 TABLET | Refills: 10 | Status: SHIPPED | OUTPATIENT
Start: 2021-07-25

## 2021-07-25 RX ORDER — MESALAMINE 4 G/60ML
ENEMA RECTAL
Qty: 1680 ML | Refills: 10 | Status: SHIPPED | OUTPATIENT
Start: 2021-07-25 | End: 2021-12-13

## 2021-07-25 RX ORDER — LEVOTHYROXINE SODIUM 0.05 MG/1
TABLET ORAL
Qty: 30 TABLET | Refills: 10 | Status: SHIPPED | OUTPATIENT
Start: 2021-07-25

## 2021-07-25 RX ORDER — SULFASALAZINE 500 MG/1
TABLET ORAL
Qty: 180 TABLET | Refills: 10 | Status: SHIPPED | OUTPATIENT
Start: 2021-07-25

## 2021-07-25 RX ORDER — PANTOPRAZOLE SODIUM 40 MG/1
TABLET, DELAYED RELEASE ORAL
Qty: 60 TABLET | Refills: 10 | Status: SHIPPED | OUTPATIENT
Start: 2021-07-25

## 2021-07-27 ENCOUNTER — TELEPHONE (OUTPATIENT)
Dept: HEMATOLOGY ONCOLOGY | Facility: CLINIC | Age: 76
End: 2021-07-27

## 2021-07-27 ENCOUNTER — TELEPHONE (OUTPATIENT)
Dept: SURGICAL ONCOLOGY | Facility: CLINIC | Age: 76
End: 2021-07-27

## 2021-07-27 NOTE — TELEPHONE ENCOUNTER
Flower's daughter called and shared she needed to change Flower's appt for her mammogram and would like to also change her appt with Dr Leisa Tidwell for following celestinoo  Argelia Martin  Appt R/S to 08/17/21 at 3:45 PM

## 2021-07-27 NOTE — TELEPHONE ENCOUNTER
Patient's daughter Alan Ivan called stating Mammogram was rescheduled for 8-  Alan Ivan would like to know if Dr Chu's appt will now need to be rescheduled   Please call Alan Ivan back at [phone]

## 2021-08-04 ENCOUNTER — APPOINTMENT (EMERGENCY)
Dept: CT IMAGING | Facility: HOSPITAL | Age: 76
End: 2021-08-04
Payer: MEDICARE

## 2021-08-04 ENCOUNTER — HOSPITAL ENCOUNTER (EMERGENCY)
Facility: HOSPITAL | Age: 76
Discharge: NURSING, REHAB OR LONG-TERM CARE | End: 2021-08-05
Attending: EMERGENCY MEDICINE
Payer: MEDICARE

## 2021-08-04 VITALS
RESPIRATION RATE: 16 BRPM | HEART RATE: 90 BPM | DIASTOLIC BLOOD PRESSURE: 71 MMHG | OXYGEN SATURATION: 99 % | SYSTOLIC BLOOD PRESSURE: 135 MMHG | TEMPERATURE: 99 F

## 2021-08-04 DIAGNOSIS — E87.6 HYPOKALEMIA: ICD-10-CM

## 2021-08-04 DIAGNOSIS — R11.10 EMESIS: ICD-10-CM

## 2021-08-04 DIAGNOSIS — K57.32 DIVERTICULITIS OF COLON: Primary | ICD-10-CM

## 2021-08-04 LAB
ALBUMIN SERPL BCP-MCNC: 3 G/DL (ref 3.5–5)
ALP SERPL-CCNC: 71 U/L (ref 46–116)
ALT SERPL W P-5'-P-CCNC: 12 U/L (ref 12–78)
ANION GAP SERPL CALCULATED.3IONS-SCNC: 9 MMOL/L (ref 4–13)
AST SERPL W P-5'-P-CCNC: 13 U/L (ref 5–45)
ATRIAL RATE: 91 BPM
BASOPHILS # BLD AUTO: 0.03 THOUSANDS/ΜL (ref 0–0.1)
BASOPHILS NFR BLD AUTO: 1 % (ref 0–1)
BILIRUB SERPL-MCNC: 0.35 MG/DL (ref 0.2–1)
BUN SERPL-MCNC: 9 MG/DL (ref 5–25)
CALCIUM ALBUM COR SERPL-MCNC: 9.4 MG/DL (ref 8.3–10.1)
CALCIUM SERPL-MCNC: 8.6 MG/DL (ref 8.3–10.1)
CHLORIDE SERPL-SCNC: 95 MMOL/L (ref 100–108)
CO2 SERPL-SCNC: 26 MMOL/L (ref 21–32)
CREAT SERPL-MCNC: 0.89 MG/DL (ref 0.6–1.3)
EOSINOPHIL # BLD AUTO: 0.07 THOUSAND/ΜL (ref 0–0.61)
EOSINOPHIL NFR BLD AUTO: 1 % (ref 0–6)
ERYTHROCYTE [DISTWIDTH] IN BLOOD BY AUTOMATED COUNT: 15.3 % (ref 11.6–15.1)
GFR SERPL CREATININE-BSD FRML MDRD: 65 ML/MIN/1.73SQ M
GLUCOSE SERPL-MCNC: 125 MG/DL (ref 65–140)
HCT VFR BLD AUTO: 36.4 % (ref 34.8–46.1)
HGB BLD-MCNC: 11.6 G/DL (ref 11.5–15.4)
IMM GRANULOCYTES # BLD AUTO: 0.03 THOUSAND/UL (ref 0–0.2)
IMM GRANULOCYTES NFR BLD AUTO: 1 % (ref 0–2)
LIPASE SERPL-CCNC: 115 U/L (ref 73–393)
LYMPHOCYTES # BLD AUTO: 0.52 THOUSANDS/ΜL (ref 0.6–4.47)
LYMPHOCYTES NFR BLD AUTO: 11 % (ref 14–44)
MCH RBC QN AUTO: 27.6 PG (ref 26.8–34.3)
MCHC RBC AUTO-ENTMCNC: 31.9 G/DL (ref 31.4–37.4)
MCV RBC AUTO: 87 FL (ref 82–98)
MONOCYTES # BLD AUTO: 0.4 THOUSAND/ΜL (ref 0.17–1.22)
MONOCYTES NFR BLD AUTO: 8 % (ref 4–12)
NEUTROPHILS # BLD AUTO: 3.85 THOUSANDS/ΜL (ref 1.85–7.62)
NEUTS SEG NFR BLD AUTO: 78 % (ref 43–75)
NRBC BLD AUTO-RTO: 0 /100 WBCS
P AXIS: 55 DEGREES
PLATELET # BLD AUTO: 169 THOUSANDS/UL (ref 149–390)
PMV BLD AUTO: 9.3 FL (ref 8.9–12.7)
POTASSIUM SERPL-SCNC: 3.3 MMOL/L (ref 3.5–5.3)
PR INTERVAL: 184 MS
PROT SERPL-MCNC: 6.5 G/DL (ref 6.4–8.2)
QRS AXIS: -40 DEGREES
QRSD INTERVAL: 96 MS
QT INTERVAL: 342 MS
QTC INTERVAL: 420 MS
RBC # BLD AUTO: 4.2 MILLION/UL (ref 3.81–5.12)
SARS-COV-2 RNA RESP QL NAA+PROBE: NEGATIVE
SODIUM SERPL-SCNC: 130 MMOL/L (ref 136–145)
T WAVE AXIS: 36 DEGREES
TROPONIN I SERPL-MCNC: <0.02 NG/ML
VENTRICULAR RATE: 91 BPM
WBC # BLD AUTO: 4.9 THOUSAND/UL (ref 4.31–10.16)

## 2021-08-04 PROCEDURE — 80053 COMPREHEN METABOLIC PANEL: CPT | Performed by: EMERGENCY MEDICINE

## 2021-08-04 PROCEDURE — 96374 THER/PROPH/DIAG INJ IV PUSH: CPT

## 2021-08-04 PROCEDURE — 85025 COMPLETE CBC W/AUTO DIFF WBC: CPT | Performed by: EMERGENCY MEDICINE

## 2021-08-04 PROCEDURE — 70450 CT HEAD/BRAIN W/O DYE: CPT

## 2021-08-04 PROCEDURE — 74177 CT ABD & PELVIS W/CONTRAST: CPT

## 2021-08-04 PROCEDURE — 71260 CT THORAX DX C+: CPT

## 2021-08-04 PROCEDURE — 83690 ASSAY OF LIPASE: CPT | Performed by: INTERNAL MEDICINE

## 2021-08-04 PROCEDURE — U0003 INFECTIOUS AGENT DETECTION BY NUCLEIC ACID (DNA OR RNA); SEVERE ACUTE RESPIRATORY SYNDROME CORONAVIRUS 2 (SARS-COV-2) (CORONAVIRUS DISEASE [COVID-19]), AMPLIFIED PROBE TECHNIQUE, MAKING USE OF HIGH THROUGHPUT TECHNOLOGIES AS DESCRIBED BY CMS-2020-01-R: HCPCS | Performed by: INTERNAL MEDICINE

## 2021-08-04 PROCEDURE — 99285 EMERGENCY DEPT VISIT HI MDM: CPT

## 2021-08-04 PROCEDURE — 36415 COLL VENOUS BLD VENIPUNCTURE: CPT

## 2021-08-04 PROCEDURE — 99284 EMERGENCY DEPT VISIT MOD MDM: CPT | Performed by: EMERGENCY MEDICINE

## 2021-08-04 PROCEDURE — U0005 INFEC AGEN DETEC AMPLI PROBE: HCPCS | Performed by: INTERNAL MEDICINE

## 2021-08-04 PROCEDURE — 93005 ELECTROCARDIOGRAM TRACING: CPT

## 2021-08-04 PROCEDURE — 93010 ELECTROCARDIOGRAM REPORT: CPT | Performed by: INTERNAL MEDICINE

## 2021-08-04 PROCEDURE — G1004 CDSM NDSC: HCPCS

## 2021-08-04 PROCEDURE — 84484 ASSAY OF TROPONIN QUANT: CPT | Performed by: EMERGENCY MEDICINE

## 2021-08-04 RX ORDER — POTASSIUM CHLORIDE 20 MEQ/1
20 TABLET, EXTENDED RELEASE ORAL ONCE
Status: COMPLETED | OUTPATIENT
Start: 2021-08-04 | End: 2021-08-04

## 2021-08-04 RX ORDER — ONDANSETRON 4 MG/1
4 TABLET, ORALLY DISINTEGRATING ORAL EVERY 8 HOURS PRN
Qty: 20 TABLET | Refills: 0 | Status: SHIPPED | OUTPATIENT
Start: 2021-08-04 | End: 2021-10-26

## 2021-08-04 RX ORDER — ONDANSETRON 2 MG/ML
4 INJECTION INTRAMUSCULAR; INTRAVENOUS ONCE
Status: COMPLETED | OUTPATIENT
Start: 2021-08-04 | End: 2021-08-04

## 2021-08-04 RX ORDER — ACETAMINOPHEN 325 MG/1
650 TABLET ORAL EVERY 6 HOURS PRN
COMMUNITY

## 2021-08-04 RX ORDER — AMOXICILLIN AND CLAVULANATE POTASSIUM 875; 125 MG/1; MG/1
1 TABLET, FILM COATED ORAL ONCE
Status: COMPLETED | OUTPATIENT
Start: 2021-08-04 | End: 2021-08-04

## 2021-08-04 RX ORDER — AMOXICILLIN AND CLAVULANATE POTASSIUM 875; 125 MG/1; MG/1
1 TABLET, FILM COATED ORAL EVERY 12 HOURS
Qty: 14 TABLET | Refills: 0 | Status: SHIPPED | OUTPATIENT
Start: 2021-08-04 | End: 2021-08-11

## 2021-08-04 RX ORDER — MAGNESIUM HYDROXIDE/ALUMINUM HYDROXICE/SIMETHICONE 120; 1200; 1200 MG/30ML; MG/30ML; MG/30ML
30 SUSPENSION ORAL EVERY 4 HOURS PRN
COMMUNITY

## 2021-08-04 RX ORDER — AZITHROMYCIN 250 MG/1
500 TABLET, FILM COATED ORAL EVERY 24 HOURS
COMMUNITY
End: 2021-12-13

## 2021-08-04 RX ADMIN — IOHEXOL 100 ML: 350 INJECTION, SOLUTION INTRAVENOUS at 19:40

## 2021-08-04 RX ADMIN — AMOXICILLIN AND CLAVULANATE POTASSIUM 1 TABLET: 875; 125 TABLET, FILM COATED ORAL at 20:59

## 2021-08-04 RX ADMIN — POTASSIUM CHLORIDE 20 MEQ: 1500 TABLET, EXTENDED RELEASE ORAL at 21:03

## 2021-08-04 RX ADMIN — ONDANSETRON 4 MG: 2 INJECTION INTRAMUSCULAR; INTRAVENOUS at 21:03

## 2021-08-04 NOTE — ED PROVIDER NOTES
History  Chief Complaint   Patient presents with    Syncope     pt comes from [facility] was vomiting and had a syncopal episode at the dining table  pt awake and alert at this time  hx of dementia  c/o abdominal pain and nausea  HPI  68-year-old female history dementia, renal cells, thyroid disease, GERD, hypertension, pulmonary nodules presents to ED for evaluation of syncope, nausea and vomiting  Patient has a history of dementia and has limited ability to provide history  She reports she has been having a sore throat, cough with a little chest pain over last 4-5 days  He reports she was nauseous and vomiting over the last few days  She states she passed out after vomiting at the dining table  Patient denies headache, visual changes, dizziness, fevers, chills, palpitations, diarrhea, melena, hematochezia, dysuria, new skin rashes or numbness or tingling of the extremities  Prior to Admission Medications   Prescriptions Last Dose Informant Patient Reported? Taking?    QUEtiapine (SEROquel) 25 mg tablet   No Yes   Sig: TAKE 1/2 OF A TABLET BY MOUTH EVERY NIGHT AT BEDTIME   acetaminophen (TYLENOL) 325 mg tablet   Yes Yes   Sig: Take 650 mg by mouth every 6 (six) hours as needed for mild pain   aluminum-magnesium hydroxide-simethicone (Linda-Lanta) 200-200-20 mg/5 mL suspension   Yes Yes   Sig: Take 30 mL by mouth every 4 (four) hours as needed for indigestion or heartburn   azithromycin (ZITHROMAX) 250 mg tablet   Yes Yes   Sig: Take 500 mg by mouth every 24 hours   cyanocobalamin (VITAMIN B-12) 1,000 mcg tablet  Self Yes No   Sig: Take by mouth daily   escitalopram (LEXAPRO) 5 mg tablet   No Yes   Sig: TAKE ONE TABLET BY MOUTH EVERY DAY   gabapentin (NEURONTIN) 100 mg capsule   No Yes   Sig: TAKE ONE CAPSULE BY MOUTH THREE TIMES A DAY   levothyroxine 50 mcg tablet   No Yes   Sig: TAKE ONE TABLET BY MOUTH EVERY DAY   mesalamine (ROWASA) 4 g   No Yes   Sig: INSERT 60ML(S)(4G) INTO THE RECTUM EVERY NIGHT AT BEDTIME   metoprolol succinate (TOPROL-XL) 50 mg 24 hr tablet   No Yes   Sig: TAKE ONE TABLET BY MOUTH EVERY DAY   pantoprazole (PROTONIX) 40 mg tablet   No Yes   Sig: TAKE ONE TABLET BY MOUTH TWO TIMES A DAY BEFORE MEALS   psyllium (METAMUCIL) packet   No Yes   Sig: Take 1 packet by mouth daily   sulfaSALAzine (AZULFIDINE) 500 mg tablet   No Yes   Sig: TAKE TWO TABLETS BY MOUTH THREE TIMES A DAY AFTER MEALS      Facility-Administered Medications: None       Past Medical History:   Diagnosis Date    Abnormal CT of the abdomen     Acute bronchitis     Anxiety     Appetite loss     Arthritis     Ascending aortic aneurysm (HCC)     Bilateral renal cysts     Breast cancer ([facility] [address] ) 2016    Cyst of ovary     Disease of thyroid gland     Dysphagia     Esophageal reflux disease     Full dentures     GERD (gastroesophageal reflux disease)     Herpes zoster     Hyperlipidemia     Hypertension     Hypokalemia     Hypomagnesemia     Hypothyroidism     Impaired fasting glucose     Irritable bowel syndrome (IBS)     Ischemic colitis (HCC)     Knee pain     Limb alert care status     no BP/IV right arm    Osteoarthritis of right knee     Pneumonia     Post-op pain     Pulmonary nodules     Thoracic aortic aneurysm (HCC)     Unspecified ovarian cysts     Use of anastrozole (Arimidex)     Vasovagal syncope     Vitamin D deficiency     Wears glasses     Weight loss        Past Surgical History:   Procedure Laterality Date    APPENDECTOMY      pt states it was not removed    BREAST BIOPSY Right 03/02/2016    BREAST LUMPECTOMY Right 2004    BREAST SURGERY Right     Single lesion excision 1990 hyperplasia, 1st biopsy at 25yrs old was benign    CHOLECYSTECTOMY      COLONOSCOPY      COLONOSCOPY N/A 5/12/2017    Procedure: COLONOSCOPY with biopsy;  Surgeon: Flo Eng MD;  Location: [facility];   Service:     ESOPHAGOGASTRODUODENOSCOPY N/A 1/19/2019    Procedure: ESOPHAGOGASTRODUODENOSCOPY (EGD) with 4cc of epinephrine injection and cauterized with gold probe; Surgeon: Raul France MD;  Location: [facility]; Service: Gastroenterology    [place] LUMBAR PUNCTURE DIAGNOSTIC  5/24/2019    HYSTERECTOMY  1977    IR LUMBAR PUNCTURE  2/21/2019    KNEE SURGERY Right     IN BIOPSY/EXCISION, LYMPH NODE(S) Right 4/12/2016    Procedure: BIOPSY LYMPH NODE SENTINEL @ 1200 LYMPHOSCINTIGRAPHY LYMPHATIC MAPPING;  Surgeon: Nina Natarajan MD;  [facility];  Service: General    IN MASTECTOMY, SIMPLE, COMPLETE Right 4/12/2016    Procedure: MASTECTOMY SIMPLE;  Surgeon: Nina Natarajan MD;  [facility];  Service: General    TUBAL LIGATION      US GUIDED BREAST BIOPSY RIGHT COMPLETE Right 3/9/2016       Family History   Problem Relation Age of Onset    Stroke Maternal Grandmother     Anemia Mother     Other Mother         disorders of blood and blood forming organs    Hypertension Mother     Stroke Father     Alcohol abuse Brother     Colon cancer Neg Hx      I have reviewed and agree with the history as documented  E-Cigarette/Vaping    E-Cigarette Use Never User      E-Cigarette/Vaping Substances    Nicotine No     THC No     CBD No     Flavoring No     Other No     Unknown No      Social History     Tobacco Use    Smoking status: Never Smoker    Smokeless tobacco: Never Used    Tobacco comment: not interested   Vaping Use    Vaping Use: Never used   Substance Use Topics    Alcohol use: Not Currently    Drug use: No        Review of Systems   All other systems reviewed and are negative        Physical Exam  ED Triage Vitals   Temperature Pulse Respirations Blood Pressure SpO2   08/04/21 1755 08/04/21 1747 08/04/21 1747 08/04/21 1747 08/04/21 1747   99 °F (37 2 °C) 97 16 167/79 95 %      Temp Source Heart Rate Source Patient Position - Orthostatic VS BP Location FiO2 (%)   08/04/21 1755 08/04/21 2021 08/04/21 2021 08/04/21 2021 --   Oral Monitor Lying Left arm       Pain Score       --                    Orthostatic Vital Signs  Vitals:    08/04/21 1747 08/04/21 2021   BP: 167/79 135/71   Pulse: 97 90   Patient Position - Orthostatic VS:  Lying       Physical Exam   PHYSICAL EXAM    Constitutional:  Well developed, well nourished, no acute distress, non-toxic appearance    HEENT:  Conjunctiva normal  Oropharynx moist  Respiratory:  No respiratory distress, normal breath sounds  Cardiovascular:  Normal rate, normal rhythm, no murmurs  GI:  Soft, nondistended, normal bowel sounds, TTP over LLQ  :  No costovertebral angle tenderness   Musculoskeletal:  No edema, no tenderness, no deformities  Integument:  Well hydrated, no rash   Lymphatic:  No lymphadenopathy noted   Neurologic:  Alert & oriented x1, normal motor function, normal sensory function, no focal deficits noted   Psychiatric:  Speech and behavior appropriate       ED Medications  Medications   iohexol (OMNIPAQUE) 350 MG/ML injection (SINGLE-DOSE) 100 mL (100 mL Intravenous Given 8/4/21 1940)   amoxicillin-clavulanate (AUGMENTIN) 875-125 mg per tablet 1 tablet (1 tablet Oral Given 8/4/21 2059)   ondansetron (ZOFRAN) injection 4 mg (4 mg Intravenous Given 8/4/21 2103)   potassium chloride (K-DUR,KLOR-CON) CR tablet 20 mEq (20 mEq Oral Given 8/4/21 2103)       Diagnostic Studies  Results Reviewed     Procedure Component Value Units Date/Time    [facility] [[id number]]  (Normal) Collected: 08/04/21 1901    Lab Status: Final result Specimen: Nares from Nasopharyngeal Swab Updated: 08/04/21 2016     SARS-CoV-2 Negative    Narrative: The specimen collection materials, transport medium, and/or testing methodology utilized in the production of these test results have been proven to be reliable in a limited validation with an abbreviated program under the Emergency Utilization Authorization provided by the FDA    Testing reported as "Presumptive positive" will be confirmed with secondary testing to ensure result accuracy  Clinical caution and judgement should be used with the interpretation of these results with consideration of the clinical impression and other laboratory testing  Testing reported as "Positive" or "Negative" has been proven to be accurate according to standard laboratory validation requirements  All testing is performed with control materials showing appropriate reactivity at standard intervals        Lipase [[id number]]  (Normal) Collected: 08/04/21 1754    Lab Status: Final result Specimen: Blood from Arm, Left Updated: 08/04/21 2013     Lipase 115 u/L     Comprehensive metabolic panel [[id number]]  (Abnormal) Collected: 08/04/21 1754    Lab Status: Final result Specimen: Blood from Arm, Left Updated: 08/04/21 1905     Sodium 130 mmol/L      Potassium 3 3 mmol/L      Chloride 95 mmol/L      CO2 26 mmol/L      ANION GAP 9 mmol/L      BUN 9 mg/dL      Creatinine 0 89 mg/dL      Glucose 125 mg/dL      Calcium 8 6 mg/dL      Corrected Calcium 9 4 mg/dL      AST 13 U/L      ALT 12 U/L      Alkaline Phosphatase 71 U/L      Total Protein 6 5 g/dL      Albumin 3 0 g/dL      Total Bilirubin 0 35 mg/dL      eGFR 65 ml/min/1 73sq m     Narrative:      [place] guidelines for Chronic Kidney Disease (CKD):     Stage 1 with normal or high GFR (GFR > 90 mL/min/1 73 square meters)    Stage 2 Mild CKD (GFR = 60-89 mL/min/1 73 square meters)    Stage 3A Moderate CKD (GFR = 45-59 mL/min/1 73 square meters)    Stage 3B Moderate CKD (GFR = 30-44 mL/min/1 73 square meters)    Stage 4 Severe CKD (GFR = 15-29 mL/min/1 73 square meters)    Stage 5 End Stage CKD (GFR <15 mL/min/1 73 square meters)  Note: GFR calculation is accurate only with a steady state creatinine    Troponin I [[id number]]  (Normal) Collected: 08/04/21 1754    Lab Status: Final result Specimen: Blood from Arm, Left Updated: 08/04/21 1857     Troponin I <0 02 ng/mL     CBC and differential [[id number]]  (Abnormal) Collected: 08/04/21 1754    Lab Status: Final result Specimen: Blood from Arm, Left Updated: 08/04/21 1816     WBC 4 90 Thousand/uL      RBC 4 20 Million/uL      Hemoglobin 11 6 g/dL      Hematocrit 36 4 %      MCV 87 fL      MCH 27 6 pg      MCHC 31 9 g/dL      RDW 15 3 %      MPV 9 3 fL      Platelets 488 Thousands/uL      nRBC 0 /100 WBCs      Neutrophils Relative 78 %      Immat GRANS % 1 %      Lymphocytes Relative 11 %      Monocytes Relative 8 %      Eosinophils Relative 1 %      Basophils Relative 1 %      Neutrophils Absolute 3 85 Thousands/µL      Immature Grans Absolute 0 03 Thousand/uL      Lymphocytes Absolute 0 52 Thousands/µL      Monocytes Absolute 0 40 Thousand/µL      Eosinophils Absolute 0 07 Thousand/µL      Basophils Absolute 0 03 Thousands/µL                  CT chest abdomen pelvis w contrast   Final Result by Hayde Maldonado MD (08/04 2002)      Mild diverticulitis in the anterior left lower quadrant without an intra-abdominal abscess  Dilation of the ascending aorta measuring up to 4 5 cm in caliber is unchanged since April 18, 2017         Workstation performed: [id number]         CT head without contrast   Final Result by Hayde Maldonado MD (08/04 2007)      No acute intracranial abnormality  Mild microangiopathic changes are similar to the April 1, 2021 CT  Workstation performed: [id number]               Procedures  Procedures      ED Course  ED Course as of Aug 04 2329   Wed Aug 04, 2021   1900 EKG: normal EKG, normal sinus rhythm                                  SBIRT 20yo+      Most Recent Value   SBIRT (25 yo +)   In order to provide better care to our patients, we are screening all of our patients for alcohol and drug use  Would it be okay to ask you these screening questions?   No Filed at: 08/04/2021 1803                MDM  Number of Diagnoses or Management Options  Diverticulitis of colon  Emesis  Hypokalemia  Diagnosis management comments: 65-year-old female history dementia, renal cells, thyroid disease, GERD, hypertension, pulmonary nodules presents to ED for evaluation of syncope, emesis, cough, sore throat and chest pain  EKG unremarkable  Labs  CT head, chest, abd, pelvis    Workup was significant for diverticulitis  Patient started on Augmentin in the ED and written a prescription for home  Patient also given Zofran prescription for home if nausea or or vomiting persists  Prior to discharge patient was comfortable  We discussed findings and treatment and she reported understanding         Amount and/or Complexity of Data Reviewed  Clinical lab tests: ordered and reviewed  Tests in the radiology section of CPT®: ordered and reviewed  Review and summarize past medical records: yes  Discuss the patient with other providers: yes    Risk of Complications, Morbidity, and/or Mortality  Presenting problems: moderate  Diagnostic procedures: moderate  Management options: moderate    Patient Progress  Patient progress: improved      Disposition  Final diagnoses:   Diverticulitis of colon   Emesis   Hypokalemia     Time reflects when diagnosis was documented in both MDM as applicable and the Disposition within this note     Time User Action Codes Description Comment    8/4/2021  8:51 PM Maryfrances Prayer Add [K57 32] Diverticulitis of colon     8/4/2021  8:51 PM Maryfrances Prayer Add [R11 10] Emesis     8/4/2021  9:01 PM [employer] Prayer Add [E87 6] Hypokalemia       ED Disposition     ED Disposition Condition Date/Time Comment    Discharge Fair Wed Aug 4, 2021  8:51 PM Nishi Segovia discharge to home/self care              Follow-up Information     Follow up With Specialties Details Why Contact Winnie Zamarripa DO Family Medicine Call  As needed [place] [address]  [phone]            Patient's Medications   Discharge Prescriptions    AMOXICILLIN-CLAVULANATE (AUGMENTIN) 875-125 MG PER TABLET    Take 1 tablet by mouth every 12 (twelve) hours for 7 days       Start Date: 8/4/2021  End Date: 8/11/2021       Order Dose: 1 tablet       Quantity: 14 tablet    Refills: 0    ONDANSETRON (ZOFRAN-ODT) 4 MG DISINTEGRATING TABLET    Take 1 tablet (4 mg total) by mouth every 8 (eight) hours as needed for nausea or vomiting       Start Date: 8/4/2021  End Date: --       Order Dose: 4 mg       Quantity: 20 tablet    Refills: 0     No discharge procedures on file  PDMP Review     None           ED Provider  Attending physically available and evaluated Darian Saldana I managed the patient along with the ED Attending      Electronically Signed by         David Prado MD  08/04/21 4972

## 2021-08-04 NOTE — ED ATTENDING ATTESTATION
8/4/2021  ITerra MD, saw and evaluated the patient  I have discussed the patient with the resident/non-physician practitioner and agree with the resident's/non-physician practitioner's findings, Plan of Care, and MDM as documented in the resident's/non-physician practitioner's note, except where noted  All available labs and Radiology studies were reviewed  I was present for key portions of any procedure(s) performed by the resident/non-physician practitioner and I was immediately available to provide assistance  At this point I agree with the current assessment done in the Emergency Department  I have conducted an independent evaluation of this patient a history and physical is as follows:  Patient from NH, c/o abd pain, vomiting, passed out, has yan having cough and sore throat for days,, no fever, chest pain, dyspnea  On exam, no acute distress, VSS, PERRL, no cranial nerve or focal neuro deficits, lungs CTA, heart sounds regular, abdomen soft, tenderness left lower quadrant  Impression:  Abdominal pain vomiting, syncope, possible diverticulitis, ureteric colic, will check labs, EKG, CT head, chest abdomen pelvis to rule out any acute pathology  ED Course     Labs did not show any acute abnormality, mild hypokalemia, potassium replacement was given  CT scan shows noncomplicated diverticulitis, antibiotic was given, stable for discharge back to nursing home on antibiotic, follow-up with GI      Critical Care Time  Procedures

## 2021-08-05 NOTE — ED NOTES
Called [facility] for update  No p/u at this time  [facility] will call back when transportation becomes available        Jerry Grant RN  08/05/21 0010

## 2021-08-05 NOTE — ED NOTES
Patient reports hunger  Nurse provided ham sandwich and milk       Joseph Gonsalez RN  08/04/21 4199

## 2021-08-05 NOTE — ED NOTES
ELIZABETH notified for pt transport and reported they would call back ER with time of      Joseph Gonsalez, ERNESTO  08/04/21 1144

## 2021-08-05 NOTE — ED NOTES
Pt is resting at this time  Respirations regular  Even chest rise and fall  No signs of distress  Will continue to monitor        Natty Morejon, ERNESTO  08/04/21 4516

## 2021-08-11 DIAGNOSIS — K52.9 COLITIS: Primary | ICD-10-CM

## 2021-08-11 RX ORDER — AVOBENZONE, HOMOSALATE, OCTISALATE, OCTOCRYLENE, AND OXYBENZONE 29.4; 147; 49; 25.4; 58.8 MG/ML; MG/ML; MG/ML; MG/ML; MG/ML
LOTION TOPICAL
Qty: 44 EACH | Refills: 10 | Status: SHIPPED | OUTPATIENT
Start: 2021-08-11 | End: 2021-12-13

## 2021-08-13 ENCOUNTER — HOSPITAL ENCOUNTER (OUTPATIENT)
Dept: MAMMOGRAPHY | Facility: CLINIC | Age: 76
Discharge: HOME | End: 2021-08-13
Payer: MEDICARE

## 2021-08-13 VITALS — BODY MASS INDEX: 24.98 KG/M2 | HEIGHT: 63 IN | WEIGHT: 141 LBS

## 2021-08-13 DIAGNOSIS — R92.8 ABNORMAL SCREENING MAMMOGRAM: ICD-10-CM

## 2021-08-13 PROCEDURE — 77065 DX MAMMO INCL CAD UNI: CPT

## 2021-08-17 ENCOUNTER — OFFICE VISIT (OUTPATIENT)
Dept: SURGICAL ONCOLOGY | Facility: CLINIC | Age: 76
End: 2021-08-17
Payer: MEDICARE

## 2021-08-17 VITALS
WEIGHT: 136.2 LBS | RESPIRATION RATE: 18 BRPM | TEMPERATURE: 98.9 F | BODY MASS INDEX: 24.13 KG/M2 | HEART RATE: 106 BPM | OXYGEN SATURATION: 98 % | HEIGHT: 63 IN | SYSTOLIC BLOOD PRESSURE: 126 MMHG | DIASTOLIC BLOOD PRESSURE: 78 MMHG

## 2021-08-17 DIAGNOSIS — Z08 ENCOUNTER FOR FOLLOW-UP SURVEILLANCE OF BREAST CANCER: Primary | ICD-10-CM

## 2021-08-17 DIAGNOSIS — Z85.3 ENCOUNTER FOR FOLLOW-UP SURVEILLANCE OF BREAST CANCER: Primary | ICD-10-CM

## 2021-08-17 DIAGNOSIS — R92.0 ABNORMAL MAMMOGRAM WITH MICROCALCIFICATION: ICD-10-CM

## 2021-08-17 DIAGNOSIS — R92.2 DENSE BREAST TISSUE: ICD-10-CM

## 2021-08-17 DIAGNOSIS — Z85.3 PERSONAL HISTORY OF ADENOCARCINOMA OF BREAST: ICD-10-CM

## 2021-08-17 DIAGNOSIS — Z78.9 NEED FOR FOLLOW-UP BY SOCIAL WORKER: ICD-10-CM

## 2021-08-17 PROCEDURE — 99213 OFFICE O/P EST LOW 20 MIN: CPT | Performed by: SURGERY

## 2021-08-17 NOTE — PROGRESS NOTES
Surgical Oncology Follow Up       [facility]  [facility] [address]    Lord Hayes  1945  [id number]  [facility] SURGICAL ONCOLOGY [place]  [place] [address]    Chief Complaint   Patient presents with    Follow-up       Assessment/Plan   Diagnoses and all orders for this visit:    Encounter for follow-up surveillance of breast cancer    Need for follow-up by   -     Ambulatory referral to oncology social worker; Future    Personal history of adenocarcinoma of breast    Dense breast tissue    Abnormal mammogram with microcalcification        Advance Care Planning/Advance Directives:  Discussed disease status, cancer treatment plans and/or cancer treatment goals with the patient  Oncology History:    Oncology History   Personal history of adenocarcinoma of breast    Initial Diagnosis    Invasive ductal carcinoma of breast, right [facility])     2004 Surgery    lumpectomy     2004 -  Radiation    WBRT     2004 -  Hormone Therapy    Tamoxifen X 5 years     3/9/2016 Surgery    Right breast US guided core biopsy,  IDC     4/12/2016 Surgery    Right breast mastectomy, SLNB     5/13/2016 -  Hormone Therapy    Anastrazole  Dr Prosper Ga         History of Present Illness:  Breast cancer follow-up, completed her anastrozole, states that  Overall she is does not feel well and is upset about her living situation  -Interval History: recent mammogram    Review of Systems:  Review of Systems   Constitutional: Negative  Negative for appetite change and fever  Eyes: Negative  Respiratory: Negative for shortness of breath  Cardiovascular: Negative  Gastrointestinal: Negative  Endocrine: Negative  Genitourinary: Negative  Musculoskeletal: Negative  Negative for arthralgias and myalgias  Skin: Negative  Allergic/Immunologic: Negative      Neurological: Negative  Hematological: Negative  Negative for adenopathy  Does not bruise/bleed easily  Psychiatric/Behavioral: Negative          Patient Active Problem List   Diagnosis    Anxiety    GERD (gastroesophageal reflux disease)    Essential hypertension    Hypothyroidism    Hyperlipidemia    Bilateral renal cysts    Ischemic colitis ([facility] [address] )    Thoracic aortic aneurysm (HCC)    Impaired fasting glucose    Esophageal reflux disease    Personal history of adenocarcinoma of breast    Closed odontoid fracture with type I morphology ([facility] [address] )    Closed nondisplaced fracture of fifth cervical vertebra (HCC)    Closed nondisplaced fracture of seventh cervical vertebra (HCC)    Syncope    Lung nodule seen on imaging study    Atherosclerosis    History of alcohol use    Hyponatremia    Breast cancer screening, high risk patient    Irregular cardiac rhythm    Atrial fibrillation (HCC)    Gastrointestinal hemorrhage associated with gastric ulcer    Iron deficiency anemia    Thrombocytopenia (HCC)    Acute non-recurrent maxillary sinusitis    Low TSH level    Confusion    Abnormal finding on MRI of brain    Left upper arm pain    HLD (hyperlipidemia)    Gastroesophageal reflux disease without esophagitis    Acute blood loss anemia    Peptic ulcer disease    Transient alteration of awareness    Alcohol use with intoxication (HCC)    Dense breast tissue    History of head injury    Arterial hypotension    Cognitive impairment    Leptomeningeal disease    Vision loss, bilateral    Localized osteoarthritis of left knee    Impacted cerumen of left ear    Grade II hemorrhoids    Postural dizziness with presyncope    Colitis    Functional diarrhea    At risk for delirium    ACP (advance care planning)    Insomnia due to medical condition    Hematochezia    Abnormal mammogram with microcalcification    Encounter for follow-up surveillance of breast cancer     Past Medical History: Diagnosis Date    Abnormal CT of the abdomen     Acute bronchitis     Anxiety     Appetite loss     Arthritis     Ascending aortic aneurysm (HCC)     Bilateral renal cysts     Cyst of ovary     Disease of thyroid gland     Dysphagia     Esophageal reflux disease     Full dentures     GERD (gastroesophageal reflux disease)     Herpes zoster     Hyperlipidemia     Hypertension     Hypokalemia     Hypomagnesemia     Hypothyroidism     Impaired fasting glucose     Irritable bowel syndrome (IBS)     Ischemic colitis (HCC)     Knee pain     Limb alert care status     no BP/IV right arm    Osteoarthritis of right knee     Pneumonia     Post-op pain     Pulmonary nodules     Thoracic aortic aneurysm (HCC)     Unspecified ovarian cysts     Use of anastrozole (Arimidex)     Vasovagal syncope     Vitamin D deficiency     Wears glasses     Weight loss      Past Surgical History:   Procedure Laterality Date    APPENDECTOMY      pt states it was not removed    BREAST BIOPSY Right 03/02/2016    BREAST LUMPECTOMY Right 2004    BREAST SURGERY Right     Single lesion excision 1990 hyperplasia, 1st biopsy at 25yrs old was benign    CHOLECYSTECTOMY      COLONOSCOPY      COLONOSCOPY N/A 5/12/2017    Procedure: COLONOSCOPY with biopsy;  Surgeon: Marlo Miller MD;  Location: [facility]; Service:     ESOPHAGOGASTRODUODENOSCOPY N/A 1/19/2019    Procedure: ESOPHAGOGASTRODUODENOSCOPY (EGD) with 4cc of epinephrine injection and cauterized with gold probe; Surgeon: Sean Gaston MD;  Location: [facility];   Service: Gastroenterology    [facility] LUMBAR PUNCTURE DIAGNOSTIC  5/24/2019    HYSTERECTOMY  1977    IR LUMBAR PUNCTURE  2/21/2019    KNEE SURGERY Right     OR BIOPSY/EXCISION, LYMPH NODE(S) Right 4/12/2016    Procedure: BIOPSY LYMPH NODE SENTINEL @ 1200 LYMPHOSCINTIGRAPHY LYMPHATIC MAPPING;  Surgeon: Eldon Griggs MD;  Location: [facility];  Service: General    OR MASTECTOMY, SIMPLE, COMPLETE Right 4/12/2016    Procedure: MASTECTOMY SIMPLE;  Surgeon: Patt Gurrola MD;  Location: [facility];  Service: General    TUBAL LIGATION      US GUIDED BREAST BIOPSY RIGHT COMPLETE Right 3/9/2016     Family History   Problem Relation Age of Onset    Stroke Maternal Grandmother     Anemia Mother     Other Mother         disorders of blood and blood forming organs    Hypertension Mother     Stroke Father     Alcohol abuse Brother     Colon cancer Neg Hx      Social History     Socioeconomic History    Marital status: Single     Spouse name: Not on file    Number of children: Not on file    Years of education: Not on file    Highest education level: Not on file   Occupational History    Occupation: retired   Tobacco Use    Smoking status: Never Smoker    Smokeless tobacco: Never Used    Tobacco comment: not interested   Vaping Use    Vaping Use: Never used   Substance and Sexual Activity    Alcohol use: Not Currently    Drug use: No    Sexual activity: Not Currently   Other Topics Concern    Not on file   Social History Narrative    Not on file     Social Determinants of Health     Financial Resource Strain:     Difficulty of Paying Living Expenses:    Food Insecurity:     Worried About [address] in the Last Year:     [address] [employer] in the Last Year:    Transportation Needs:     Lack of Transportation (Medical):      Lack of Transportation (Non-Medical):    Physical Activity:     Days of Exercise per Week:     Minutes of Exercise per Session:    Stress:     Feeling of Stress :    Social Connections:     Frequency of Communication with Friends and Family:     Frequency of Social Gatherings with Friends and Family:     Attends [employer] Services:     Active Member of Clubs or Organizations:     Attends Club or Organization Meetings:     Marital Status:    Intimate Partner Violence:     Fear of Current or Ex-Partner:     Emotionally Abused:     Physically Abused:     Sexually Abused:        Current Outpatient Medications:     acetaminophen (TYLENOL) 325 mg tablet, Take 650 mg by mouth every 6 (six) hours as needed for mild pain, Disp: , Rfl:     aluminum-magnesium hydroxide-simethicone (Linda-Lanta) 200-200-20 mg/5 mL suspension, Take 30 mL by mouth every 4 (four) hours as needed for indigestion or heartburn, Disp: , Rfl:     azithromycin (ZITHROMAX) 250 mg tablet, Take 500 mg by mouth every 24 hours, Disp: , Rfl:     cyanocobalamin (VITAMIN B-12) 1,000 mcg tablet, Take by mouth daily, Disp: , Rfl:     escitalopram (LEXAPRO) 5 mg tablet, TAKE ONE TABLET BY MOUTH EVERY DAY, Disp: 30 tablet, Rfl: 10    gabapentin (NEURONTIN) 100 mg capsule, TAKE ONE CAPSULE BY MOUTH THREE TIMES A DAY, Disp: 90 capsule, Rfl: 10    levothyroxine 50 mcg tablet, TAKE ONE TABLET BY MOUTH EVERY DAY, Disp: 30 tablet, Rfl: 10    mesalamine (ROWASA) 4 g, INSERT 60ML(S)(4G) INTO THE RECTUM EVERY NIGHT AT BEDTIME, Disp: 1680 mL, Rfl: 10    Metamucil Fiber 51 7 % PACK, MIX 1 PACKET IN 6-8 OUNCES OF WATER AND CONSUME BY MOUTH ONCE DAILY, Disp: 44 each, Rfl: 10    metoprolol succinate (TOPROL-XL) 50 mg 24 hr tablet, TAKE ONE TABLET BY MOUTH EVERY DAY, Disp: 30 tablet, Rfl: 10    ondansetron (ZOFRAN-ODT) 4 mg disintegrating tablet, Take 1 tablet (4 mg total) by mouth every 8 (eight) hours as needed for nausea or vomiting, Disp: 20 tablet, Rfl: 0    pantoprazole (PROTONIX) 40 mg tablet, TAKE ONE TABLET BY MOUTH TWO TIMES A DAY BEFORE MEALS, Disp: 60 tablet, Rfl: 10    psyllium (METAMUCIL) packet, Take 1 packet by mouth daily, Disp: 30 packet, Rfl: 0    QUEtiapine (SEROquel) 25 mg tablet, TAKE 1/2 OF A TABLET BY MOUTH EVERY NIGHT AT BEDTIME, Disp: 15 tablet, Rfl: 10    sulfaSALAzine (AZULFIDINE) 500 mg tablet, TAKE TWO TABLETS BY MOUTH THREE TIMES A DAY AFTER MEALS, Disp: 180 tablet, Rfl: 10  Allergies   Allergen Reactions    Demerol [Meperidine] GI Intolerance and Other (See Comments)     Other reaction(s): Other (See Comments)  severe nausea  severe nausea  Nausea/vomiting    Nitroglycerin Anaphylaxis and Other (See Comments)     BP drops drastically    Lipitor [Atorvastatin] Other (See Comments)     Joint/muscle pain    Zocor [Simvastatin] Other (See Comments)     Joint/muscle pain       The following portions of the patient's history were reviewed and updated as appropriate: allergies, current medications, past family history, past medical history, past social history, past surgical history and problem list         Vitals:    08/17/21 1539   BP: 126/78   Pulse: (!) 106   Resp: 18   Temp: 98 9 °F (37 2 °C)   SpO2: 98%       Physical Exam  Constitutional:       General: She is not in acute distress  Appearance: She is well-developed  HENT:      Head: Normocephalic and atraumatic  Cardiovascular:      Heart sounds: Normal heart sounds  Pulmonary:      Breath sounds: Normal breath sounds  Chest:      Breasts:         Right: Skin change (  Mastectomy scar) present  No mass or tenderness  Left: No inverted nipple, mass, nipple discharge, skin change or tenderness  Abdominal:      Palpations: Abdomen is soft  Lymphadenopathy:      Upper Body:      Right upper body: No supraclavicular, axillary or pectoral adenopathy  Left upper body: No supraclavicular, axillary or pectoral adenopathy  Neurological:      Mental Status: She is alert  Results:  Labs:      Imaging   07/16/2021 left 3D screening mammogram was a BI-RADS 0 secondary to calcifications on the left side   08/13/2021 left diagnostic mammogram was a BI-RADS three with a density of three secondary to the calcifications    I reviewed the above imaging data  Discussion/Summary: 81-year-old female status post right mastectomy for a recurrent invasive ductal carcinoma  She completed five years of anastrozole a few months ago  She has a prior history of breast conservation in 2004   There are no concerns on exam today   She is already scheduled for six month follow-up mammogram of the left breast secondary to the calcifications  She is now five years out of her diagnosis and surgery  I will therefore plan to see her again in one year as long as there are no concerns on the follow-up mammogram   I advised her to return sooner should the need arise

## 2021-08-18 ENCOUNTER — PATIENT OUTREACH (OUTPATIENT)
Dept: CASE MANAGEMENT | Facility: HOSPITAL | Age: 76
End: 2021-08-18

## 2021-08-18 NOTE — PROGRESS NOTES
Oncology LSW received pt's completed distress thermometer in which pt self scored a 10/10 and noted depression as emotional concerns  Pt is in surveillance from a h/o breast cancer  Chart reviewed, and noted pt is a resident at [facility], and has a h/o dementia  Supportive outreach call placed to pt's daughter today  Introduced self and role  Jacqui Renner stated her mother is not happy living at [facility] and wants to return to her own home, however with her memory loss she needs 24/7 supervision and a safe environment  Jacqui Renner stated her mother's needs have been well met at the facility  LSW offered additional support as needed  Jacqui Renner stated she will keep this in mind, and was appreciative of call

## 2021-08-24 NOTE — PROGRESS NOTES
[facility] - Outpatient Follow-up Note  Ailin Ackerman 70 y o  female MRN: [id number]  Encounter: [id number]          ASSESSMENT AND PLAN:      Ligia Whitman is a 69 y/o female history of breast cancer, A fib, hypothyroid, Dementia who presents for follow-up of diverticulitis  1  Diverticulitis  2  Proctitis  Pt is poor historian due to dementia  Colonoscopy 5/2021 showed ulceration of the sigmoid colon, ulceration of TI with acute and chronic changes and cryptitis with crypt abscess formation, and acute and chronic inflammation and ulceration of the rectum  June Colonoscopy depicted chronic mucosal changes and focal cryptitis of the rectosigmoid colon  Pt was started on hydrocortisone suppositories x 6 weeks in June, however CT 8/2021 depicted diverticulitis; pt was treated with Augmentin for this  Pt is accompanied by her daughter who says they are unaware if she is taking the mesalamine that she was put on however they say that she is taking "whatever is on her list " Pt lives in nursing home facility  Pt says she has not had any further abdominal pain or diarrhea since being tx for diverticulitis  Pt says she is moving her bowels regularly without bloody or black stools   Pt says she does not want any further work-up at this time; daughter agrees and says they would like to wait for further management    -pt likely has underlying UC proctitis versus SCAD versus acute episode of infectious or ischemic proctitis   -I explained to pt and daughter that she warrants repeat endoscopic eval (w flex sig)  at this time as her colonoscopy biopsies depicted acute on chronic inflammation of unknown etiology, however pt and daughter both decline    -I advised pt/daughter to call our office (or have her facility) and let us know exactly what medications she is taking daily   -continue rowasa enemas daily   -continue daily metamucil   -repeat fecal rene to trend  -call the office if any of the above symptoms reoccur  -close follow-up    3  Anorexia  Pt complains of loss of apetite but she says that this is because she does not like the food at her facility and she believes they are "messing" with her food    -I advised nutritionist referral as this will help give her guidelines as to what types of food she should be eating, but pt deferred this at this time   ______________________________________________________________________    SUBJECTIVE:  Kong Jang is a 69 y/o female history of breast cancer, A fib, and hypothyroid who presents for follow-up of diverticulitis  CT 8/2021 was done in the ED and depicted diverticulitis in LLQ  Pt was treated with Augmentin  Pt underwent colonoscopy in both May and June 2021 (before episode of diverticulitis), both of which depicted left-sided colitis  Biopsies from May colonoscopy include: " Benign small intestinal mucosa with retained villous architecture and intramucosal lymphoid follicle" in the terminal ileum, "acute & chronic inflammation, cryptitis,  crypt abscess formation and mucosal ulceration" of sigmoid colon ulcer, and acute and chronic inflammation and ulceration of the rectum  Biopsies from June colonoscopy depict chronic mucosal changes and focal cryptitis of the rectosigmoid colon  Pt was started on hydrocortisone suppositories  Pt is a poor historian, but is accompanied by her daughter  Pt says she is unaware what medications she is taking but she knows that she is taking "wahetevr is on her list " Pt and daughter both deny any further symptoms of abdominal pain or diarrhea  She says she is moving her bowels daily without issues and they are formed  Pt denies any bloody or black stools  Pt furthermore denies any upper GI symptoms of: heartburn, epigastric pain, dysphagia, odynophagia, n/v  Pt does however note that she is experiencing loss of apetitite bc she does not like the food at the facility she lives at and she believes they are "messing" with her food  REVIEW OF SYSTEMS IS OTHERWISE NEGATIVE  Historical Information   Past Medical History:   Diagnosis Date    Abnormal CT of the abdomen     Acute bronchitis     Anxiety     Appetite loss     Arthritis     Ascending aortic aneurysm (HCC)     Bilateral renal cysts     Cyst of ovary     Disease of thyroid gland     Dysphagia     Esophageal reflux disease     Full dentures     GERD (gastroesophageal reflux disease)     Herpes zoster     Hyperlipidemia     Hypertension     Hypokalemia     Hypomagnesemia     Hypothyroidism     Impaired fasting glucose     Irritable bowel syndrome (IBS)     Ischemic colitis (HCC)     Knee pain     Limb alert care status     no BP/IV right arm    Osteoarthritis of right knee     Pneumonia     Post-op pain     Pulmonary nodules     Thoracic aortic aneurysm (HCC)     Unspecified ovarian cysts     Use of anastrozole (Arimidex)     Vasovagal syncope     Vitamin D deficiency     Wears glasses     Weight loss      Past Surgical History:   Procedure Laterality Date    APPENDECTOMY      pt states it was not removed    BREAST BIOPSY Right 03/02/2016    BREAST LUMPECTOMY Right 2004    BREAST SURGERY Right     Single lesion excision 1990 hyperplasia, 1st biopsy at 25yrs old was benign    CHOLECYSTECTOMY      COLONOSCOPY      COLONOSCOPY N/A 5/12/2017    Procedure: COLONOSCOPY with biopsy;  Surgeon: Hans Bell MD;  Location: [facility]; Service:     ESOPHAGOGASTRODUODENOSCOPY N/A 1/19/2019    Procedure: ESOPHAGOGASTRODUODENOSCOPY (EGD) with 4cc of epinephrine injection and cauterized with gold probe; Surgeon: Cindy Hearn MD;  Location: [facility];   Service: Gastroenterology    [facility] LUMBAR PUNCTURE DIAGNOSTIC  5/24/2019    HYSTERECTOMY  1977    IR LUMBAR PUNCTURE  2/21/2019    KNEE SURGERY Right     AZ BIOPSY/EXCISION, LYMPH NODE(S) Right 4/12/2016    Procedure: BIOPSY LYMPH NODE SENTINEL @ 1200 LYMPHOSCINTIGRAPHY LYMPHATIC MAPPING; Surgeon: Denilson Lopez MD;  Location: [facility];  Service: General    NE MASTECTOMY, SIMPLE, COMPLETE Right 4/12/2016    Procedure: MASTECTOMY SIMPLE;  Surgeon: Denilson Lopez MD;  Location: [facility];  Service: [address] BREAST BIOPSY RIGHT COMPLETE Right 3/9/2016     Social History   Social History     Substance and Sexual Activity   Alcohol Use Not Currently     Social History     Substance and Sexual Activity   Drug Use No     Social History     Tobacco Use   Smoking Status Never Smoker   Smokeless Tobacco Never Used   Tobacco Comment    not interested     Family History   Problem Relation Age of Onset    Stroke Maternal Grandmother     Anemia Mother     Other Mother         disorders of blood and blood forming organs    Hypertension Mother     Stroke Father     Alcohol abuse Brother     Colon cancer Neg Hx        Meds/Allergies       Current Outpatient Medications:     acetaminophen (TYLENOL) 325 mg tablet    aluminum-magnesium hydroxide-simethicone (Linda-Lanta) 200-200-20 mg/5 mL suspension    azithromycin (ZITHROMAX) 250 mg tablet    cyanocobalamin (VITAMIN B-12) 1,000 mcg tablet    escitalopram (LEXAPRO) 5 mg tablet    gabapentin (NEURONTIN) 100 mg capsule    levothyroxine 50 mcg tablet    mesalamine (ROWASA) 4 g    Metamucil Fiber 51 7 % PACK    metoprolol succinate (TOPROL-XL) 50 mg 24 hr tablet    ondansetron (ZOFRAN-ODT) 4 mg disintegrating tablet    pantoprazole (PROTONIX) 40 mg tablet    psyllium (METAMUCIL) packet    QUEtiapine (SEROquel) 25 mg tablet    sulfaSALAzine (AZULFIDINE) 500 mg tablet    Allergies   Allergen Reactions    Demerol [Meperidine] GI Intolerance and Other (See Comments)     Other reaction(s):  Other (See Comments)  severe nausea  severe nausea  Nausea/vomiting    Nitroglycerin Anaphylaxis and Other (See Comments)     BP drops drastically    Lipitor [Atorvastatin] Other (See Comments)     Joint/muscle pain    Zocor [Simvastatin] Other (See Comments)     Joint/muscle pain           Objective     There were no vitals taken for this visit  There is no height or weight on file to calculate BMI  PHYSICAL EXAM:      General Appearance:   Alert, cooperative, no distress   HEENT:   Normocephalic, atraumatic, anicteric      Neck:  Supple, symmetrical, trachea midline   Lungs:   Clear to auscultation bilaterally; no rales, rhonchi or wheezing; respirations unlabored    Heart[de-identified]   Regular rate and rhythm; no murmur, rub, or gallop  Abdomen:   Soft, non-tender, non-distended; normal bowel sounds; no masses, no organomegaly    Genitalia:   Deferred    Rectal:   Deferred    Extremities:  No cyanosis, clubbing or edema    Pulses:  2+ and symmetric    Skin:  No jaundice, rashes, or lesions    Lymph nodes:  No palpable cervical lymphadenopathy        Lab Results:   No visits with results within 1 Day(s) from this visit     Latest known visit with results is:   Admission on 08/04/2021, Discharged on 08/05/2021   Component Date Value    WBC 08/04/2021 4 90     RBC 08/04/2021 4 20     Hemoglobin 08/04/2021 11 6     Hematocrit 08/04/2021 36 4     MCV 08/04/2021 87     MCH 08/04/2021 27 6     MCHC 08/04/2021 31 9     RDW 08/04/2021 15 3*    MPV 08/04/2021 9 3     Platelets 94/60/7024 169     nRBC 08/04/2021 0     Neutrophils Relative 08/04/2021 78*    Immat GRANS % 08/04/2021 1     Lymphocytes Relative 08/04/2021 11*    Monocytes Relative 08/04/2021 8     Eosinophils Relative 08/04/2021 1     Basophils Relative 08/04/2021 1     Neutrophils Absolute 08/04/2021 3 85     Immature Grans Absolute 08/04/2021 0 03     Lymphocytes Absolute 08/04/2021 0 52*    Monocytes Absolute 08/04/2021 0 40     Eosinophils Absolute 08/04/2021 0 07     Basophils Absolute 08/04/2021 0 03     Sodium 08/04/2021 130*    Potassium 08/04/2021 3 3*    Chloride 08/04/2021 95*    CO2 08/04/2021 26     ANION GAP 08/04/2021 9     BUN 08/04/2021 9  Creatinine 08/04/2021 0 89     Glucose 08/04/2021 125     Calcium 08/04/2021 8 6     Corrected Calcium 08/04/2021 9 4     AST 08/04/2021 13     ALT 08/04/2021 12     Alkaline Phosphatase 08/04/2021 71     Total Protein 08/04/2021 6 5     Albumin 08/04/2021 3 0*    Total Bilirubin 08/04/2021 0 35     eGFR 08/04/2021 65     Troponin I 08/04/2021 <0 02     Ventricular Rate 08/04/2021 91     Atrial Rate 08/04/2021 91     TN Interval 08/04/2021 184     QRSD Interval 08/04/2021 96     QT Interval 08/04/2021 342     QTC Interval 08/04/2021 420     P Axis 08/04/2021 55     QRS Axis 08/04/2021 -40     T Wave Axis 08/04/2021 36     Lipase 08/04/2021 115     SARS-CoV-2 08/04/2021 Negative          Radiology Results:   CT head without contrast    Result Date: 8/4/2021  Narrative: CT BRAIN - WITHOUT CONTRAST INDICATION:   Syncope  COMPARISON:  Head CT April 1, 2021 TECHNIQUE:  CT examination of the brain was performed  In addition to axial images, sagittal and coronal 2D reformatted images were created and submitted for interpretation  Radiation dose length product (DLP) for this visit:  852 mGy-cm   This examination, like all CT scans performed in the [facility], was performed utilizing techniques to minimize radiation dose exposure, including the use of iterative reconstruction and automated exposure control  IMAGE QUALITY:  Diagnostic  FINDINGS: PARENCHYMA: Decreased attenuation is noted in periventricular and subcortical white matter demonstrating an appearance that is statistically most likely to represent mild microangiopathic change  No CT signs of acute infarction  No intracranial mass, mass effect or midline shift  No acute parenchymal hemorrhage  VENTRICLES AND EXTRA-AXIAL SPACES:  Normal for the patient's age  VISUALIZED ORBITS AND PARANASAL SINUSES:  Minimal mucosal thickening of the maxillary sinuses  No air-fluid levels    Remainder of the paranasal sinuses and mastoid air cells are clear  CALVARIUM AND EXTRACRANIAL SOFT TISSUES:  Normal      Impression: No acute intracranial abnormality  Mild microangiopathic changes are similar to the April 1, 2021 CT  Workstation performed: [id number]     CT chest abdomen pelvis w contrast    Result Date: 8/4/2021  Narrative: CT CHEST, ABDOMEN AND PELVIS WITH IV CONTRAST INDICATION:   Diverticulitis  COMPARISON:  CT abdomen and pelvis July 1, 2021  CT chest April 18, 2017 TECHNIQUE: CT examination of the chest, abdomen and pelvis was performed  Axial, sagittal, and coronal 2D reformatted images were created from the source data and submitted for interpretation  Radiation dose length product (DLP) for this visit:  573 mGy-cm   This examination, like all CT scans performed in the [facility], was performed utilizing techniques to minimize radiation dose exposure, including the use of iterative reconstruction and automated exposure control  IV Contrast:  100 mL of iohexol (OMNIPAQUE) Enteric Contrast: Enteric contrast was administered  FINDINGS: CHEST LUNGS:  0 2 cm perifissural nodule in the left lower lobe  (series 3, image 88)  Reticulation in the medial right lower lobe measuring 1 9 cm is new since the CT abdomen pelvis from July 1, 2021 and is most compatible with focal atelectasis  Minimal bibasilar linear atelectasis  There is no tracheal or endobronchial lesion  PLEURA:  Unremarkable  HEART/GREAT VESSELS:  Atherosclerotic changes are noted in thoracic aorta and coronary arteries  Ascending aorta measures 4 5 cm in caliber, unchanged from April 18, 2017 MEDIASTINUM AND KATI:  Unremarkable  CHEST WALL AND LOWER NECK:   Unremarkable  ABDOMEN LIVER/BILIARY TREE:  Unremarkable  GALLBLADDER:  Cholecystectomy SPLEEN:  Unremarkable  PANCREAS:  Unremarkable  ADRENAL GLANDS:  Unremarkable  KIDNEYS/URETERS:  Multiple bilateral renal cysts  No hydroureteronephrosis   STOMACH AND BOWEL:  Diverticulosis with a focally inflamed diverticulum with surrounding mild inflammation in the descending colon in the anterior left lower quadrant (series 2, image 101)  No drainable fluid collection  No bowel obstruction  APPENDIX:  There are expected postoperative changes of appendectomy  ABDOMINOPELVIC CAVITY:  No ascites  No pneumoperitoneum  No lymphadenopathy  VESSELS:  Atherosclerotic changes are present  No evidence of aneurysm  PELVIS REPRODUCTIVE ORGANS:  Surgical changes of prior hysterectomy  URINARY BLADDER:  Unremarkable  ABDOMINAL WALL/INGUINAL REGIONS:  Unremarkable  OSSEOUS STRUCTURES:  No acute fracture or destructive osseous lesion  Spinal degenerative changes are noted  Impression: Mild diverticulitis in the anterior left lower quadrant without an intra-abdominal abscess  Dilation of the ascending aorta measuring up to 4 5 cm in caliber is unchanged since April 18, 2017 Workstation performed: [id number]     Mammo diagnostic left w cad    Result Date: 8/13/2021  Narrative: DIAGNOSIS: Abnormal screening mammogram TECHNIQUE: Digital screening mammography was performed  Computer Aided Detection (CAD) analyzed all applicable images  COMPARISONS: Prior breast imaging dated: 07/16/2021, 07/15/2020, 04/01/2019, 03/30/2018, 03/29/2017, 02/24/2016, 02/04/2015, and 01/03/2014 RELEVANT HISTORY: Family Breast Cancer History: No known family history of breast cancer  Family Medical History: No known relevant family medical history  Personal History: Hormone history includes tamoxifen and other  Surgical history includes breast biopsy, lumpectomy, and hysterectomy  Medical history includes breast cancer  RISK ASSESSMENT: 5 Year Tyrer-Cuzick: 1 62 % 10 Year Tyrer-Cuzick: 3 45 % Lifetime Tyrer-Cuzick: 4 63 % TISSUE DENSITY: The breasts are heterogeneously dense, which may obscure small masses   INDICATION: Lord Hayes is a 70 y o  female presenting for Abnormal screening mammogram  FINDINGS: LEFT 1) CALCIFICATIONS: There are vascular, round, amorphous and coarse heterogeneous calcifications in a grouped distribution seen in the lower inner quadrant of the left breast  Mixed pattern of calcifications grouped within the medial left breast only very slightly more prominent than on prior study  Of note, there are other groups of calcifications elsewhere in the breast which appear generally similar with some coarse heterogeneous benign calcifications seen more centrally in the breast and few small scattered groups of tiny round calcifications  Overall, low suspicion and only very slightly changed from previous exams  I feel that this is safe to monitor with diagnostic mammography in 6 months  I discussed this with the patient and her daughter today  I explained that a stereotactic biopsy would give definitive diagnosis but that watchful waiting is a reasonable alternative and the patient agreed that she would prefer to wait for diagnostic mammogram in 6 months to re-evaluate the finding  Impression:  Small group of indeterminate but probably benign microcalcifications within the medial left breast for which six-month follow-up diagnostic mammogram is recommended for surveillance   ASSESSMENT/BI-RADS CATEGORY: Left: 3 - Probably Benign Overall: 3 - Probably Benign RECOMMENDATION:      - Diagnostic mammogram in 6 months for the left breast  Workstation ID: [id number]

## 2021-08-27 ENCOUNTER — OFFICE VISIT (OUTPATIENT)
Dept: GASTROENTEROLOGY | Facility: CLINIC | Age: 76
End: 2021-08-27
Payer: MEDICARE

## 2021-08-27 VITALS
HEIGHT: 63 IN | BODY MASS INDEX: 24.27 KG/M2 | DIASTOLIC BLOOD PRESSURE: 70 MMHG | TEMPERATURE: 98.2 F | HEART RATE: 80 BPM | SYSTOLIC BLOOD PRESSURE: 110 MMHG | WEIGHT: 137 LBS

## 2021-08-27 DIAGNOSIS — R19.5 ELEVATED FECAL CALPROTECTIN: Primary | ICD-10-CM

## 2021-08-27 PROCEDURE — 99214 OFFICE O/P EST MOD 30 MIN: CPT | Performed by: PHYSICIAN ASSISTANT

## 2021-08-29 ENCOUNTER — HOSPITAL ENCOUNTER (EMERGENCY)
Facility: HOSPITAL | Age: 76
Discharge: HOME | End: 2021-08-30
Attending: EMERGENCY MEDICINE | Admitting: EMERGENCY MEDICINE
Payer: MEDICARE

## 2021-08-29 VITALS
RESPIRATION RATE: 16 BRPM | OXYGEN SATURATION: 98 % | TEMPERATURE: 98.4 F | HEART RATE: 90 BPM | BODY MASS INDEX: 22.13 KG/M2 | WEIGHT: 124.9 LBS | DIASTOLIC BLOOD PRESSURE: 90 MMHG | SYSTOLIC BLOOD PRESSURE: 135 MMHG

## 2021-08-29 DIAGNOSIS — N39.0 UTI (URINARY TRACT INFECTION): ICD-10-CM

## 2021-08-29 DIAGNOSIS — F03.91 DEMENTIA WITH PSYCHOSIS (HCC): Primary | ICD-10-CM

## 2021-08-29 LAB
ALBUMIN SERPL BCP-MCNC: 3.7 G/DL (ref 3–5.2)
ALP SERPL-CCNC: 95 U/L (ref 43–122)
ALT SERPL W P-5'-P-CCNC: 17 U/L
AMPHETAMINES SERPL QL SCN: NEGATIVE
ANION GAP SERPL CALCULATED.3IONS-SCNC: 9 MMOL/L (ref 5–14)
APAP SERPL-MCNC: <10 UG/ML (ref 10–20)
AST SERPL W P-5'-P-CCNC: 30 U/L (ref 14–36)
BACTERIA UR QL AUTO: NORMAL /HPF
BARBITURATES UR QL: NEGATIVE
BASOPHILS # BLD AUTO: 0 THOUSANDS/ΜL (ref 0–0.1)
BASOPHILS NFR BLD AUTO: 1 % (ref 0–1)
BENZODIAZ UR QL: NEGATIVE
BILIRUB DIRECT SERPL-MCNC: 0.35 MG/DL
BILIRUB SERPL-MCNC: 0.35 MG/DL
BILIRUB UR QL STRIP: NEGATIVE
BUN SERPL-MCNC: 15 MG/DL (ref 5–25)
CALCIUM SERPL-MCNC: 9.5 MG/DL (ref 8.4–10.2)
CHLORIDE SERPL-SCNC: 100 MMOL/L (ref 97–108)
CLARITY UR: CLEAR
CO2 SERPL-SCNC: 26 MMOL/L (ref 22–30)
COCAINE UR QL: NEGATIVE
COLOR UR: YELLOW
CREAT SERPL-MCNC: 0.78 MG/DL (ref 0.6–1.2)
EOSINOPHIL # BLD AUTO: 0 THOUSAND/ΜL (ref 0–0.4)
EOSINOPHIL NFR BLD AUTO: 0 % (ref 0–6)
ERYTHROCYTE [DISTWIDTH] IN BLOOD BY AUTOMATED COUNT: 18.3 %
ETHANOL SERPL-MCNC: <10 MG/DL (ref 0–10)
GFR SERPL CREATININE-BSD FRML MDRD: 76 ML/MIN/1.73SQ M
GLUCOSE SERPL-MCNC: 84 MG/DL (ref 70–99)
GLUCOSE UR STRIP-MCNC: NEGATIVE MG/DL
HCT VFR BLD AUTO: 36.8 % (ref 36–46)
HGB BLD-MCNC: 12 G/DL (ref 12–16)
HGB UR QL STRIP.AUTO: NEGATIVE
KETONES UR STRIP-MCNC: NEGATIVE MG/DL
LEUKOCYTE ESTERASE UR QL STRIP: 100
LYMPHOCYTES # BLD AUTO: 1 THOUSANDS/ΜL (ref 0.5–4)
LYMPHOCYTES NFR BLD AUTO: 27 % (ref 25–45)
MAGNESIUM SERPL-MCNC: 2 MG/DL (ref 1.6–2.3)
MCH RBC QN AUTO: 28.3 PG (ref 26–34)
MCHC RBC AUTO-ENTMCNC: 32.5 G/DL (ref 31–36)
MCV RBC AUTO: 87 FL (ref 80–100)
METHADONE UR QL: NEGATIVE
MONOCYTES # BLD AUTO: 0.5 THOUSAND/ΜL (ref 0.2–0.9)
MONOCYTES NFR BLD AUTO: 12 % (ref 1–10)
NEUTROPHILS # BLD AUTO: 2.4 THOUSANDS/ΜL (ref 1.8–7.8)
NEUTS SEG NFR BLD AUTO: 61 % (ref 45–65)
NITRITE UR QL STRIP: NEGATIVE
NON-SQ EPI CELLS URNS QL MICRO: NORMAL /HPF
OPIATES UR QL SCN: NEGATIVE
OXYCODONE+OXYMORPHONE UR QL SCN: NEGATIVE
PCP UR QL: NEGATIVE
PH UR STRIP.AUTO: 6.5 [PH]
PLATELET # BLD AUTO: 195 THOUSANDS/UL (ref 150–450)
PMV BLD AUTO: 7 FL (ref 8.9–12.7)
POTASSIUM SERPL-SCNC: 3.7 MMOL/L (ref 3.6–5)
PROT SERPL-MCNC: 6.3 G/DL (ref 5.9–8.4)
PROT UR STRIP-MCNC: NEGATIVE MG/DL
RBC # BLD AUTO: 4.22 MILLION/UL (ref 4–5.2)
RBC #/AREA URNS AUTO: NORMAL /HPF
SALICYLATES SERPL-MCNC: <1 MG/DL (ref 10–30)
SARS-COV-2 RNA RESP QL NAA+PROBE: NEGATIVE
SODIUM SERPL-SCNC: 135 MMOL/L (ref 137–147)
SP GR UR STRIP.AUTO: 1.01 (ref 1–1.04)
THC UR QL: NEGATIVE
TSH SERPL DL<=0.05 MIU/L-ACNC: 2.35 UIU/ML (ref 0.47–4.68)
UROBILINOGEN UA: NEGATIVE MG/DL
WBC # BLD AUTO: 3.9 THOUSAND/UL (ref 4.5–11)
WBC #/AREA URNS AUTO: NORMAL /HPF

## 2021-08-29 PROCEDURE — 99285 EMERGENCY DEPT VISIT HI MDM: CPT

## 2021-08-29 PROCEDURE — 80143 DRUG ASSAY ACETAMINOPHEN: CPT | Performed by: EMERGENCY MEDICINE

## 2021-08-29 PROCEDURE — 81001 URINALYSIS AUTO W/SCOPE: CPT | Performed by: EMERGENCY MEDICINE

## 2021-08-29 PROCEDURE — 93005 ELECTROCARDIOGRAM TRACING: CPT

## 2021-08-29 PROCEDURE — 80076 HEPATIC FUNCTION PANEL: CPT | Performed by: EMERGENCY MEDICINE

## 2021-08-29 PROCEDURE — 36415 COLL VENOUS BLD VENIPUNCTURE: CPT | Performed by: EMERGENCY MEDICINE

## 2021-08-29 PROCEDURE — 82077 ASSAY SPEC XCP UR&BREATH IA: CPT | Performed by: EMERGENCY MEDICINE

## 2021-08-29 PROCEDURE — U0005 INFEC AGEN DETEC AMPLI PROBE: HCPCS | Performed by: EMERGENCY MEDICINE

## 2021-08-29 PROCEDURE — 80048 BASIC METABOLIC PNL TOTAL CA: CPT | Performed by: EMERGENCY MEDICINE

## 2021-08-29 PROCEDURE — 80307 DRUG TEST PRSMV CHEM ANLYZR: CPT | Performed by: EMERGENCY MEDICINE

## 2021-08-29 PROCEDURE — 80179 DRUG ASSAY SALICYLATE: CPT | Performed by: EMERGENCY MEDICINE

## 2021-08-29 PROCEDURE — 81003 URINALYSIS AUTO W/O SCOPE: CPT | Performed by: EMERGENCY MEDICINE

## 2021-08-29 PROCEDURE — 83735 ASSAY OF MAGNESIUM: CPT | Performed by: EMERGENCY MEDICINE

## 2021-08-29 PROCEDURE — 85025 COMPLETE CBC W/AUTO DIFF WBC: CPT | Performed by: EMERGENCY MEDICINE

## 2021-08-29 PROCEDURE — 99284 EMERGENCY DEPT VISIT MOD MDM: CPT | Performed by: EMERGENCY MEDICINE

## 2021-08-29 PROCEDURE — U0003 INFECTIOUS AGENT DETECTION BY NUCLEIC ACID (DNA OR RNA); SEVERE ACUTE RESPIRATORY SYNDROME CORONAVIRUS 2 (SARS-COV-2) (CORONAVIRUS DISEASE [COVID-19]), AMPLIFIED PROBE TECHNIQUE, MAKING USE OF HIGH THROUGHPUT TECHNOLOGIES AS DESCRIBED BY CMS-2020-01-R: HCPCS | Performed by: EMERGENCY MEDICINE

## 2021-08-29 PROCEDURE — 84443 ASSAY THYROID STIM HORMONE: CPT | Performed by: EMERGENCY MEDICINE

## 2021-08-29 RX ORDER — METOPROLOL SUCCINATE 50 MG/1
50 TABLET, EXTENDED RELEASE ORAL DAILY
Status: DISCONTINUED | OUTPATIENT
Start: 2021-08-30 | End: 2021-08-30 | Stop reason: HOSPADM

## 2021-08-29 RX ORDER — NITROFURANTOIN 25; 75 MG/1; MG/1
100 CAPSULE ORAL 2 TIMES DAILY WITH MEALS
Status: DISCONTINUED | OUTPATIENT
Start: 2021-08-30 | End: 2021-08-30 | Stop reason: HOSPADM

## 2021-08-29 RX ORDER — PANTOPRAZOLE SODIUM 40 MG/1
40 TABLET, DELAYED RELEASE ORAL
Status: DISCONTINUED | OUTPATIENT
Start: 2021-08-30 | End: 2021-08-30 | Stop reason: HOSPADM

## 2021-08-29 RX ORDER — GABAPENTIN 100 MG/1
100 CAPSULE ORAL 3 TIMES DAILY
Status: DISCONTINUED | OUTPATIENT
Start: 2021-08-29 | End: 2021-08-30 | Stop reason: HOSPADM

## 2021-08-29 RX ORDER — LEVOTHYROXINE SODIUM 0.05 MG/1
50 TABLET ORAL
Status: DISCONTINUED | OUTPATIENT
Start: 2021-08-30 | End: 2021-08-30 | Stop reason: HOSPADM

## 2021-08-29 RX ORDER — SULFASALAZINE 500 MG/1
500 TABLET ORAL 3 TIMES DAILY
Status: DISCONTINUED | OUTPATIENT
Start: 2021-08-29 | End: 2021-08-30 | Stop reason: HOSPADM

## 2021-08-29 RX ORDER — QUETIAPINE FUMARATE 25 MG/1
12.5 TABLET, FILM COATED ORAL
Status: DISCONTINUED | OUTPATIENT
Start: 2021-08-29 | End: 2021-08-30 | Stop reason: HOSPADM

## 2021-08-29 RX ORDER — ESCITALOPRAM OXALATE 5 MG/1
5 TABLET ORAL DAILY
Status: DISCONTINUED | OUTPATIENT
Start: 2021-08-30 | End: 2021-08-30 | Stop reason: HOSPADM

## 2021-08-30 LAB
ATRIAL RATE: 91 BPM
P AXIS: 64 DEGREES
PR INTERVAL: 226 MS
QRS AXIS: -42 DEGREES
QRSD INTERVAL: 86 MS
QT INTERVAL: 376 MS
QTC INTERVAL: 462 MS
T WAVE AXIS: 30 DEGREES
VENTRICULAR RATE: 91 BPM

## 2021-08-30 PROCEDURE — 93010 ELECTROCARDIOGRAM REPORT: CPT | Performed by: INTERNAL MEDICINE

## 2021-08-30 RX ORDER — NITROFURANTOIN 25; 75 MG/1; MG/1
100 CAPSULE ORAL 2 TIMES DAILY
Qty: 14 CAPSULE | Refills: 0 | Status: SHIPPED | OUTPATIENT
Start: 2021-08-30 | End: 2021-09-06

## 2021-08-30 RX ORDER — NITROFURANTOIN 25; 75 MG/1; MG/1
100 CAPSULE ORAL 2 TIMES DAILY
Qty: 14 CAPSULE | Refills: 0 | Status: SHIPPED | OUTPATIENT
Start: 2021-08-30 | End: 2021-08-30 | Stop reason: SDUPTHER

## 2021-08-30 RX ADMIN — QUETIAPINE FUMARATE 12.5 MG: 25 TABLET ORAL at 00:50

## 2021-08-30 RX ADMIN — GABAPENTIN 100 MG: 100 CAPSULE ORAL at 00:50
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
## 2021-08-30 NOTE — ED NOTES
Jessica will transport Pt back to [facility] at 4209  [facility] to be updated @ [phone]

## 2021-08-30 NOTE — ED NOTES
Pt sleeping on litter with no signs of distress, 1:1 continues       Sarita Bolanos RN  08/29/21 7157

## 2021-08-30 NOTE — ED NOTES
Patient continues to sleep in bed, no signs of distress or discomfort   Continued Q7 checks      Danielle Pappas RN  08/30/21 2871

## 2021-08-30 NOTE — ED NOTES
Crisis worker spoke with female staff member at [facility]  She stated that per the , the medication tech will not be in to potentially petition a 302 until later today  Informed staff member that due to Pt not exhibiting any 302 type of behaviors, we have no grounds to hold her here further  She was educated on the 302 process prior to Pt leaving the facility  Made aware that we would work on transport and call with report/ETA  Pt RN made aware of transport information and # for report

## 2021-08-30 NOTE — ED NOTES
Patient sleeping in bed  No signs of distress or discomfort  Continued Q7 checks        Dorathy Cowing, RN  08/30/21 7291

## 2021-08-30 NOTE — ED NOTES
Patient sleeping in bed  No s/s of distress   Q7 checks continued      Renette Boas, RN  08/30/21 7128

## 2021-08-30 NOTE — ED NOTES
Per Dr Hermila Kinsey patient may be placed on a bed alarm and may be Q 7 minute checks       Nito Shah RN  08/29/21 2813

## 2021-08-30 NOTE — ED NOTES
Pt is confused and attempting to climb off the litter  She was placed on a 1:1 for safety       Anya Maxwell RN  08/29/21 2034

## 2021-08-30 NOTE — ED NOTES
Shantell from [facility] ([facility] AT [facility] returned call and stated that the only contact made from [facility] was that they were going to be sending Pt to the ED, but as of now no one has called to provide a petition statement  Spoke with staff member at [facility] regarding status of 302 petition  She reviewed their notes and stated she does not have a record of someone from the facility (earlier med tech/doctor) contacting [facility] to provide a statement  Crisis worker advised her that typically [facility] ([facility] is to be contacted prior to the Pt leaving facility in order to see if petition is upheld/denied by the [facility]  The med tech that observed earlier behavior and the doctor are no longer present at the facility at this time  Staff member was reaching out to their on-call  for next steps due to a 302 not being started at this time  Crisis to follow up

## 2021-08-30 NOTE — ED NOTES
Elia hansen contacted for report on pt  Aware of patient's departure       Cande Snell RN  08/30/21 2755

## 2021-08-30 NOTE — ED NOTES
Pt laying in the bed with no complaints at this time, 1:1 continues       Kassandra Sheikh RN  08/29/21 4339

## 2021-08-30 NOTE — ED NOTES
[place]  Subscriber not found  Primary payor is Medicare  Patient is not enrolled in a managed plan  Secondary payor is [employer]  Plan is a Medicare supplement

## 2021-08-30 NOTE — ED NOTES
Pt handed the Tech a handful of pills and stated, "this is what they are trying to kill me with"  Pills were given to the provider       Kong Argueta, ERNESTO  08/29/21 2021

## 2021-08-30 NOTE — ED CARE HANDOFF
Emergency Department Sign Out Note        Sign out and transfer of care from Dr Koch Repress  See Separate Emergency Department note  The patient, Avtar Carpenter, was evaluated by the previous provider for acute agitation/confusion  Workup Completed:  labs    ED Course / Workup Pending (followup):  Still awaiting ECF decision regarding 302 petition through [facility]; will discuss case with Crisis  Per Crisis (Montserrat Larkin), no call placed to [facility] for 302 petition  Will discharge back to [facility] after transportation arranged  Procedures  MDM    Disposition  Final diagnoses:   Dementia with psychosis ([facility] [address] )   UTI (urinary tract infection)     Time reflects when diagnosis was documented in both MDM as applicable and the Disposition within this note     Time User Action Codes Description Comment    8/29/2021 11:13 PM Aneesh Ragland [F03 91] Dementia with psychosis ([facility] [address] )     8/29/2021 11:13 PM Aneesh Sneed Add [N39 0] UTI (urinary tract infection)       ED Disposition     ED Disposition Condition Date/Time Comment    Discharge Stable Mon Aug 30, 2021  5:37 AM Avtar Carpenter discharge to home/self care  Follow-up Information     Follow up With Specialties Details Why Contact Info    Ben Garcia DO Family Medicine Schedule an appointment as soon as possible for a visit  As needed [facility]  [phone]          Patient's Medications   Discharge Prescriptions    NITROFURANTOIN (MACROBID) 100 MG CAPSULE    Take 1 capsule (100 mg total) by mouth 2 (two) times a day for 7 days       Start Date: 8/30/2021 End Date: 9/6/2021       Order Dose: 100 mg       Quantity: 14 capsule    Refills: 0     No discharge procedures on file         ED Provider  Electronically Signed by     Kostas Braxton DO  08/30/21 7676

## 2021-08-30 NOTE — ED NOTES
Spoke with [facility] answering service  Waiting for return call regarding status of the 302 petition from [facility]

## 2021-09-24 NOTE — PLAN OF CARE
Problem: Potential for Falls  Goal: Patient will remain free of falls  Description  INTERVENTIONS:  - Assess patient frequently for physical needs  -  Identify cognitive and physical deficits and behaviors that affect risk of falls    -  Leona fall precautions as indicated by assessment   - Educate patient/family on patient safety including physical limitations  - Instruct patient to call for assistance with activity based on assessment  - Modify environment to reduce risk of injury  - Consider OT/PT consult to assist with strengthening/mobility  Outcome: Progressing     Problem: PAIN - ADULT  Goal: Verbalizes/displays adequate comfort level or baseline comfort level  Description  Interventions:  - Encourage patient to monitor pain and request assistance  - Assess pain using appropriate pain scale  - Administer analgesics based on type and severity of pain and evaluate response  - Implement non-pharmacological measures as appropriate and evaluate response  - Consider cultural and social influences on pain and pain management  - Notify physician/advanced practitioner if interventions unsuccessful or patient reports new pain  Outcome: Progressing     Problem: INFECTION - ADULT  Goal: Absence or prevention of progression during hospitalization  Description  INTERVENTIONS:  - Assess and monitor for signs and symptoms of infection  - Monitor lab/diagnostic results  - Administer medications as ordered  - Instruct and encourage patient and family to use good hand hygiene technique  - Identify and instruct in appropriate isolation precautions for identified infection/condition   Outcome: Progressing     Problem: SAFETY ADULT  Goal: Maintain or return to baseline ADL function  Description  INTERVENTIONS:  -  Assess patient's ability to carry out ADLs; assess patient's baseline for ADL function and identify physical deficits which impact ability to perform ADLs (bathing, care of mouth/teeth, toileting, grooming, dressing, etc )  - Assess/evaluate cause of self-care deficits   - Assess range of motion  - Assess patient's mobility; develop plan if impaired  - Assess patient's need for assistive devices and provide as appropriate  - Encourage maximum independence but intervene and supervise when necessary  ¯ Involve family in performance of ADLs  ¯ Assess for home care needs following discharge   ¯ Request OT consult to assist with ADL evaluation and planning for discharge  ¯ Provide patient education as appropriate  Outcome: Progressing  Goal: Maintain or return mobility status to optimal level  Description  INTERVENTIONS:  - Assess patient's baseline mobility status (ambulation, transfers, stairs, etc )    - Identify cognitive and physical deficits and behaviors that affect mobility  - Identify mobility aids required to assist with transfers and/or ambulation (gait belt, sit-to-stand, lift, walker, cane, etc )  - Ladora fall precautions as indicated by assessment  - Record patient progress and toleration of activity level on Mobility SBAR; progress patient to next Phase/Stage  - Instruct patient to call for assistance with activity based on assessment  - Request Rehabilitation consult to assist with strengthening/weightbearing, etc   Outcome: Progressing     Problem: DISCHARGE PLANNING  Goal: Discharge to home or other facility with appropriate resources  Description  INTERVENTIONS:  - Identify barriers to discharge w/patient and caregiver  - Arrange for needed discharge resources and transportation as appropriate  - Identify discharge learning needs (meds, wound care, etc )  - Arrange for interpretive services to assist at discharge as needed  - Refer to Case Management Department for coordinating discharge planning if the patient needs post-hospital services based on physician/advanced practitioner order or complex needs related to functional status, cognitive ability, or social support system  Outcome: Progressing Problem: Knowledge Deficit  Goal: Patient/family/caregiver demonstrates understanding of disease process, treatment plan, medications, and discharge instructions  Description  Complete learning assessment and assess knowledge base  Interventions:  - Provide teaching at level of understanding  - Provide teaching via preferred learning methods  Outcome: Progressing     Problem: Neurological Deficit  Goal: Neurological status is stable or improving  Description  Interventions:  - Monitor and assess patient's level of consciousness, motor function, sensory function, and level of assistance needed for ADLs  - Monitor and report changes from baseline  Collaborate with interdisciplinary team to initiate plan and implement interventions as ordered  - Provide and maintain a safe environment  - Utilize fall precautions  - Utilize aspiration precautions  - Utilize bleeding precautions  Outcome: Progressing     Problem: Communication Impairment  Goal: Ability to express needs and understand communication  Description  Assess patient's communication skills and ability to understand information  Patient will demonstrate use of effective communication techniques, alternative methods of communication and understanding even if not able to speak  - Encourage communication and provide alternate methods of communication as needed  - Collaborate with case management/ for discharge needs  - Include patient/family/caregiver in decisions related to communication  Outcome: Progressing     Problem: Nutrition/Hydration-ADULT  Goal: Nutrient/Hydration intake appropriate for improving, restoring or maintaining nutritional needs  Description  Monitor and assess patient's nutrition/hydration status for malnutrition (ex- brittle hair, bruises, dry skin, pale skin and conjunctiva, muscle wasting, smooth red tongue, and disorientation)   Collaborate with interdisciplinary team and initiate plan and interventions as ordered  Monitor patient's weight and dietary intake as ordered or per policy  Utilize nutrition screening tool and intervene per policy  Determine patient's food preferences and provide high-protein, high-caloric foods as appropriate       INTERVENTIONS:  - Monitor oral intake, urinary output, labs, and treatment plans  - Assess nutrition and hydration status and recommend course of action  - Evaluate amount of meals eaten  - Assist patient with eating if necessary   - Allow adequate time for meals  - Recommend/ encourage appropriate diets, oral nutritional supplements, and vitamin/mineral supplements  - Order, calculate, and assess calorie counts as needed  - Recommend, monitor, and adjust tube feedings and TPN/PPN based on assessed needs  - Assess need for intravenous fluids  - Provide specific nutrition/hydration education as appropriate  - Include patient/family/caregiver in decisions related to nutrition  Outcome: Progressing home

## 2021-10-26 DIAGNOSIS — R11.10 EMESIS: ICD-10-CM

## 2021-10-26 RX ORDER — ONDANSETRON 4 MG/1
TABLET, ORALLY DISINTEGRATING ORAL
Qty: 30 TABLET | Refills: 4 | Status: SHIPPED | OUTPATIENT
Start: 2021-10-26 | End: 2021-12-13

## 2021-11-11 ENCOUNTER — HOSPITAL ENCOUNTER (EMERGENCY)
Facility: HOSPITAL | Age: 76
Discharge: HOME | End: 2021-11-11
Attending: EMERGENCY MEDICINE
Payer: MEDICARE

## 2021-11-11 VITALS
OXYGEN SATURATION: 96 % | SYSTOLIC BLOOD PRESSURE: 134 MMHG | DIASTOLIC BLOOD PRESSURE: 82 MMHG | WEIGHT: 144.4 LBS | RESPIRATION RATE: 16 BRPM | TEMPERATURE: 98.9 F | HEART RATE: 71 BPM | BODY MASS INDEX: 25.58 KG/M2

## 2021-11-11 DIAGNOSIS — F03.90 DEMENTIA (HCC): Primary | ICD-10-CM

## 2021-11-11 DIAGNOSIS — N39.0 UTI (URINARY TRACT INFECTION): ICD-10-CM

## 2021-11-11 LAB
ANION GAP SERPL CALCULATED.3IONS-SCNC: 10 MMOL/L (ref 4–13)
ATRIAL RATE: 66 BPM
BACTERIA UR QL AUTO: ABNORMAL /HPF
BASOPHILS # BLD AUTO: 0.03 THOUSANDS/ΜL (ref 0–0.1)
BASOPHILS NFR BLD AUTO: 1 % (ref 0–1)
BILIRUB UR QL STRIP: NEGATIVE
BUN SERPL-MCNC: 17 MG/DL (ref 5–25)
CALCIUM SERPL-MCNC: 9.1 MG/DL (ref 8.3–10.1)
CHLORIDE SERPL-SCNC: 103 MMOL/L (ref 100–108)
CLARITY UR: ABNORMAL
CO2 SERPL-SCNC: 27 MMOL/L (ref 21–32)
COLOR UR: YELLOW
CREAT SERPL-MCNC: 0.93 MG/DL (ref 0.6–1.3)
EOSINOPHIL # BLD AUTO: 0.03 THOUSAND/ΜL (ref 0–0.61)
EOSINOPHIL NFR BLD AUTO: 1 % (ref 0–6)
ERYTHROCYTE [DISTWIDTH] IN BLOOD BY AUTOMATED COUNT: 14.1 % (ref 11.6–15.1)
GFR SERPL CREATININE-BSD FRML MDRD: 61 ML/MIN/1.73SQ M
GLUCOSE SERPL-MCNC: 109 MG/DL (ref 65–140)
GLUCOSE UR STRIP-MCNC: NEGATIVE MG/DL
HCT VFR BLD AUTO: 38 % (ref 34.8–46.1)
HGB BLD-MCNC: 12.4 G/DL (ref 11.5–15.4)
HGB UR QL STRIP.AUTO: ABNORMAL
IMM GRANULOCYTES # BLD AUTO: 0.02 THOUSAND/UL (ref 0–0.2)
IMM GRANULOCYTES NFR BLD AUTO: 0 % (ref 0–2)
KETONES UR STRIP-MCNC: NEGATIVE MG/DL
LEUKOCYTE ESTERASE UR QL STRIP: ABNORMAL
LYMPHOCYTES # BLD AUTO: 1.36 THOUSANDS/ΜL (ref 0.6–4.47)
LYMPHOCYTES NFR BLD AUTO: 28 % (ref 14–44)
MCH RBC QN AUTO: 30.9 PG (ref 26.8–34.3)
MCHC RBC AUTO-ENTMCNC: 32.6 G/DL (ref 31.4–37.4)
MCV RBC AUTO: 95 FL (ref 82–98)
MONOCYTES # BLD AUTO: 0.54 THOUSAND/ΜL (ref 0.17–1.22)
MONOCYTES NFR BLD AUTO: 11 % (ref 4–12)
NEUTROPHILS # BLD AUTO: 2.89 THOUSANDS/ΜL (ref 1.85–7.62)
NEUTS SEG NFR BLD AUTO: 59 % (ref 43–75)
NITRITE UR QL STRIP: NEGATIVE
NON-SQ EPI CELLS URNS QL MICRO: ABNORMAL /HPF
NRBC BLD AUTO-RTO: 0 /100 WBCS
P AXIS: 50 DEGREES
PH UR STRIP.AUTO: 6 [PH] (ref 4.5–8)
PLATELET # BLD AUTO: 199 THOUSANDS/UL (ref 149–390)
PMV BLD AUTO: 9.8 FL (ref 8.9–12.7)
POTASSIUM SERPL-SCNC: 4 MMOL/L (ref 3.5–5.3)
PR INTERVAL: 176 MS
PROT UR STRIP-MCNC: NEGATIVE MG/DL
QRS AXIS: -25 DEGREES
QRSD INTERVAL: 92 MS
QT INTERVAL: 386 MS
QTC INTERVAL: 404 MS
RBC # BLD AUTO: 4.01 MILLION/UL (ref 3.81–5.12)
RBC #/AREA URNS AUTO: ABNORMAL /HPF
SODIUM SERPL-SCNC: 140 MMOL/L (ref 136–145)
SP GR UR STRIP.AUTO: 1.01 (ref 1–1.03)
T WAVE AXIS: 29 DEGREES
UROBILINOGEN UR QL STRIP.AUTO: 0.2 E.U./DL
VENTRICULAR RATE: 66 BPM
WBC # BLD AUTO: 4.87 THOUSAND/UL (ref 4.31–10.16)
WBC #/AREA URNS AUTO: ABNORMAL /HPF

## 2021-11-11 PROCEDURE — 36415 COLL VENOUS BLD VENIPUNCTURE: CPT | Performed by: STUDENT IN AN ORGANIZED HEALTH CARE EDUCATION/TRAINING PROGRAM

## 2021-11-11 PROCEDURE — 80048 BASIC METABOLIC PNL TOTAL CA: CPT | Performed by: STUDENT IN AN ORGANIZED HEALTH CARE EDUCATION/TRAINING PROGRAM

## 2021-11-11 PROCEDURE — 85025 COMPLETE CBC W/AUTO DIFF WBC: CPT | Performed by: STUDENT IN AN ORGANIZED HEALTH CARE EDUCATION/TRAINING PROGRAM

## 2021-11-11 PROCEDURE — 93010 ELECTROCARDIOGRAM REPORT: CPT | Performed by: INTERNAL MEDICINE

## 2021-11-11 PROCEDURE — 99285 EMERGENCY DEPT VISIT HI MDM: CPT

## 2021-11-11 PROCEDURE — 99284 EMERGENCY DEPT VISIT MOD MDM: CPT | Performed by: EMERGENCY MEDICINE

## 2021-11-11 PROCEDURE — 93005 ELECTROCARDIOGRAM TRACING: CPT

## 2021-11-11 PROCEDURE — 81001 URINALYSIS AUTO W/SCOPE: CPT

## 2021-11-11 RX ORDER — CEPHALEXIN 500 MG/1
500 CAPSULE ORAL EVERY 6 HOURS SCHEDULED
Qty: 28 CAPSULE | Refills: 0 | Status: SHIPPED | OUTPATIENT
Start: 2021-11-11 | End: 2021-11-11 | Stop reason: SDUPTHER

## 2021-11-11 RX ORDER — CEPHALEXIN 500 MG/1
500 CAPSULE ORAL EVERY 6 HOURS SCHEDULED
Qty: 28 CAPSULE | Refills: 0 | Status: SHIPPED | OUTPATIENT
Start: 2021-11-11 | End: 2021-11-18

## 2021-11-19 ENCOUNTER — OFFICE VISIT (OUTPATIENT)
Dept: GASTROENTEROLOGY | Facility: CLINIC | Age: 76
End: 2021-11-19
Payer: MEDICARE

## 2021-11-19 VITALS
DIASTOLIC BLOOD PRESSURE: 60 MMHG | SYSTOLIC BLOOD PRESSURE: 120 MMHG | WEIGHT: 138 LBS | HEIGHT: 63 IN | BODY MASS INDEX: 24.45 KG/M2 | TEMPERATURE: 98.6 F | HEART RATE: 66 BPM | OXYGEN SATURATION: 98 %

## 2021-11-19 DIAGNOSIS — K52.9 COLITIS: ICD-10-CM

## 2021-11-19 DIAGNOSIS — R19.5 ELEVATED FECAL CALPROTECTIN: Primary | ICD-10-CM

## 2021-11-19 PROCEDURE — 99214 OFFICE O/P EST MOD 30 MIN: CPT | Performed by: INTERNAL MEDICINE

## 2021-11-30 ENCOUNTER — TELEPHONE (OUTPATIENT)
Dept: PREADMISSION TESTING | Facility: HOSPITAL | Age: 76
End: 2021-11-30

## 2021-12-10 ENCOUNTER — TELEPHONE (OUTPATIENT)
Dept: GASTROENTEROLOGY | Facility: HOSPITAL | Age: 76
End: 2021-12-10

## 2021-12-12 ENCOUNTER — ANESTHESIA EVENT (OUTPATIENT)
Dept: ANESTHESIOLOGY | Facility: HOSPITAL | Age: 76
End: 2021-12-12

## 2021-12-12 ENCOUNTER — ANESTHESIA (OUTPATIENT)
Dept: ANESTHESIOLOGY | Facility: HOSPITAL | Age: 76
End: 2021-12-12

## 2021-12-12 PROBLEM — Z90.710 HISTORY OF HYSTERECTOMY: Status: ACTIVE | Noted: 2021-12-12

## 2021-12-12 RX ORDER — SODIUM CHLORIDE 9 MG/ML
125 INJECTION, SOLUTION INTRAVENOUS CONTINUOUS
Status: CANCELLED | OUTPATIENT
Start: 2021-12-12

## 2021-12-13 ENCOUNTER — HOSPITAL ENCOUNTER (OUTPATIENT)
Dept: GASTROENTEROLOGY | Facility: HOSPITAL | Age: 76
Setting detail: OUTPATIENT SURGERY
Discharge: HOME | End: 2021-12-13
Attending: INTERNAL MEDICINE | Admitting: INTERNAL MEDICINE
Payer: MEDICARE

## 2021-12-13 ENCOUNTER — ANESTHESIA (OUTPATIENT)
Dept: GASTROENTEROLOGY | Facility: HOSPITAL | Age: 76
End: 2021-12-13

## 2021-12-13 ENCOUNTER — ANESTHESIA EVENT (OUTPATIENT)
Dept: GASTROENTEROLOGY | Facility: HOSPITAL | Age: 76
End: 2021-12-13

## 2021-12-13 VITALS
RESPIRATION RATE: 18 BRPM | SYSTOLIC BLOOD PRESSURE: 140 MMHG | HEIGHT: 63 IN | BODY MASS INDEX: 24.45 KG/M2 | TEMPERATURE: 96.6 F | HEART RATE: 68 BPM | OXYGEN SATURATION: 95 % | DIASTOLIC BLOOD PRESSURE: 76 MMHG

## 2021-12-13 DIAGNOSIS — R19.5 ELEVATED FECAL CALPROTECTIN: ICD-10-CM

## 2021-12-13 DIAGNOSIS — K52.9 COLITIS: ICD-10-CM

## 2021-12-13 PROCEDURE — 45385 COLONOSCOPY W/LESION REMOVAL: CPT | Performed by: INTERNAL MEDICINE

## 2021-12-13 RX ORDER — HALOPERIDOL 1 MG/1
TABLET ORAL
COMMUNITY
Start: 2021-11-22

## 2021-12-13 RX ORDER — SODIUM CHLORIDE 9 MG/ML
125 INJECTION, SOLUTION INTRAVENOUS CONTINUOUS
Status: DISCONTINUED | OUTPATIENT
Start: 2021-12-13 | End: 2021-12-17 | Stop reason: HOSPADM

## 2021-12-13 RX ORDER — LIDOCAINE HYDROCHLORIDE 10 MG/ML
INJECTION, SOLUTION EPIDURAL; INFILTRATION; INTRACAUDAL; PERINEURAL AS NEEDED
Status: DISCONTINUED | OUTPATIENT
Start: 2021-12-13 | End: 2021-12-13

## 2021-12-13 RX ORDER — PROPOFOL 10 MG/ML
INJECTION, EMULSION INTRAVENOUS AS NEEDED
Status: DISCONTINUED | OUTPATIENT
Start: 2021-12-13 | End: 2021-12-13

## 2021-12-13 RX ORDER — GLYCOPYRROLATE 0.2 MG/ML
INJECTION INTRAMUSCULAR; INTRAVENOUS AS NEEDED
Status: DISCONTINUED | OUTPATIENT
Start: 2021-12-13 | End: 2021-12-13

## 2021-12-13 RX ADMIN — PROPOFOL 20 MG: 10 INJECTION, EMULSION INTRAVENOUS at 13:34

## 2021-12-13 RX ADMIN — SODIUM CHLORIDE 125 ML/HR: 0.9 INJECTION, SOLUTION INTRAVENOUS at 12:52

## 2021-12-13 RX ADMIN — PROPOFOL 20 MG: 10 INJECTION, EMULSION INTRAVENOUS at 13:40

## 2021-12-13 RX ADMIN — GLYCOPYRROLATE 0.1 MG: 0.2 INJECTION, SOLUTION INTRAMUSCULAR; INTRAVENOUS at 13:38

## 2021-12-13 RX ADMIN — PROPOFOL 20 MG: 10 INJECTION, EMULSION INTRAVENOUS at 13:37

## 2021-12-13 RX ADMIN — LIDOCAINE HYDROCHLORIDE 50 MG: 10 INJECTION, SOLUTION EPIDURAL; INFILTRATION; INTRACAUDAL at 13:30

## 2021-12-13 RX ADMIN — PROPOFOL 20 MG: 10 INJECTION, EMULSION INTRAVENOUS at 13:43

## 2021-12-13 RX ADMIN — PROPOFOL 80 MG: 10 INJECTION, EMULSION INTRAVENOUS at 13:30

## 2021-12-13 RX ADMIN — PROPOFOL 20 MG: 10 INJECTION, EMULSION INTRAVENOUS at 13:32

## 2022-02-18 ENCOUNTER — HOSPITAL ENCOUNTER (OUTPATIENT)
Dept: MAMMOGRAPHY | Facility: CLINIC | Age: 77
Discharge: HOME | End: 2022-02-18
Payer: MEDICARE

## 2022-02-18 VITALS — BODY MASS INDEX: 24.45 KG/M2 | HEIGHT: 63 IN | WEIGHT: 138 LBS

## 2022-02-18 DIAGNOSIS — R92.8 ABNORMAL MAMMOGRAM: ICD-10-CM

## 2022-02-18 PROCEDURE — 77065 DX MAMMO INCL CAD UNI: CPT

## 2022-07-08 ENCOUNTER — APPOINTMENT (EMERGENCY)
Dept: RADIOLOGY | Facility: HOSPITAL | Age: 77
End: 2022-07-08
Payer: MEDICARE

## 2022-07-08 ENCOUNTER — APPOINTMENT (EMERGENCY)
Dept: CT IMAGING | Facility: HOSPITAL | Age: 77
End: 2022-07-08
Payer: MEDICARE

## 2022-07-08 ENCOUNTER — HOSPITAL ENCOUNTER (EMERGENCY)
Facility: HOSPITAL | Age: 77
Discharge: HOME | End: 2022-07-09
Attending: EMERGENCY MEDICINE | Admitting: EMERGENCY MEDICINE
Payer: MEDICARE

## 2022-07-08 DIAGNOSIS — R55 SYNCOPE: Primary | ICD-10-CM

## 2022-07-08 DIAGNOSIS — F03.90 DEMENTIA (HCC): ICD-10-CM

## 2022-07-08 LAB
ALBUMIN SERPL BCP-MCNC: 2.7 G/DL (ref 3.5–5)
ALP SERPL-CCNC: 87 U/L (ref 46–116)
ALT SERPL W P-5'-P-CCNC: 16 U/L (ref 12–78)
ANION GAP SERPL CALCULATED.3IONS-SCNC: 8 MMOL/L (ref 4–13)
AST SERPL W P-5'-P-CCNC: 16 U/L (ref 5–45)
BASOPHILS # BLD AUTO: 0.06 THOUSANDS/ΜL (ref 0–0.1)
BASOPHILS NFR BLD AUTO: 1 % (ref 0–1)
BILIRUB SERPL-MCNC: 0.25 MG/DL (ref 0.2–1)
BUN SERPL-MCNC: 17 MG/DL (ref 5–25)
CALCIUM ALBUM COR SERPL-MCNC: 9.3 MG/DL (ref 8.3–10.1)
CALCIUM SERPL-MCNC: 8.3 MG/DL (ref 8.3–10.1)
CARDIAC TROPONIN I PNL SERPL HS: 2 NG/L
CHLORIDE SERPL-SCNC: 104 MMOL/L (ref 100–108)
CO2 SERPL-SCNC: 29 MMOL/L (ref 21–32)
CREAT SERPL-MCNC: 1.05 MG/DL (ref 0.6–1.3)
EOSINOPHIL # BLD AUTO: 0.11 THOUSAND/ΜL (ref 0–0.61)
EOSINOPHIL NFR BLD AUTO: 1 % (ref 0–6)
ERYTHROCYTE [DISTWIDTH] IN BLOOD BY AUTOMATED COUNT: 14.2 % (ref 11.6–15.1)
GFR SERPL CREATININE-BSD FRML MDRD: 52 ML/MIN/1.73SQ M
GLUCOSE SERPL-MCNC: 135 MG/DL (ref 65–140)
GLUCOSE SERPL-MCNC: 142 MG/DL (ref 65–140)
HCT VFR BLD AUTO: 33.9 % (ref 34.8–46.1)
HGB BLD-MCNC: 10.6 G/DL (ref 11.5–15.4)
IMM GRANULOCYTES # BLD AUTO: 0.09 THOUSAND/UL (ref 0–0.2)
IMM GRANULOCYTES NFR BLD AUTO: 1 % (ref 0–2)
LYMPHOCYTES # BLD AUTO: 1.06 THOUSANDS/ΜL (ref 0.6–4.47)
LYMPHOCYTES NFR BLD AUTO: 10 % (ref 14–44)
MCH RBC QN AUTO: 30.6 PG (ref 26.8–34.3)
MCHC RBC AUTO-ENTMCNC: 31.3 G/DL (ref 31.4–37.4)
MCV RBC AUTO: 98 FL (ref 82–98)
MONOCYTES # BLD AUTO: 0.71 THOUSAND/ΜL (ref 0.17–1.22)
MONOCYTES NFR BLD AUTO: 7 % (ref 4–12)
NEUTROPHILS # BLD AUTO: 8.28 THOUSANDS/ΜL (ref 1.85–7.62)
NEUTS SEG NFR BLD AUTO: 80 % (ref 43–75)
NRBC BLD AUTO-RTO: 0 /100 WBCS
PLATELET # BLD AUTO: 229 THOUSANDS/UL (ref 149–390)
PMV BLD AUTO: 8.9 FL (ref 8.9–12.7)
POTASSIUM SERPL-SCNC: 4.1 MMOL/L (ref 3.5–5.3)
PROT SERPL-MCNC: 6.3 G/DL (ref 6.4–8.2)
RBC # BLD AUTO: 3.46 MILLION/UL (ref 3.81–5.12)
SODIUM SERPL-SCNC: 141 MMOL/L (ref 136–145)
WBC # BLD AUTO: 10.31 THOUSAND/UL (ref 4.31–10.16)

## 2022-07-08 PROCEDURE — 85025 COMPLETE CBC W/AUTO DIFF WBC: CPT | Performed by: EMERGENCY MEDICINE

## 2022-07-08 PROCEDURE — 99285 EMERGENCY DEPT VISIT HI MDM: CPT

## 2022-07-08 PROCEDURE — 71045 X-RAY EXAM CHEST 1 VIEW: CPT

## 2022-07-08 PROCEDURE — 70450 CT HEAD/BRAIN W/O DYE: CPT

## 2022-07-08 PROCEDURE — 82948 REAGENT STRIP/BLOOD GLUCOSE: CPT

## 2022-07-08 PROCEDURE — G1004 CDSM NDSC: HCPCS

## 2022-07-08 PROCEDURE — 93005 ELECTROCARDIOGRAM TRACING: CPT

## 2022-07-08 PROCEDURE — 80053 COMPREHEN METABOLIC PANEL: CPT | Performed by: EMERGENCY MEDICINE

## 2022-07-08 PROCEDURE — 36415 COLL VENOUS BLD VENIPUNCTURE: CPT | Performed by: EMERGENCY MEDICINE

## 2022-07-08 PROCEDURE — 99285 EMERGENCY DEPT VISIT HI MDM: CPT | Performed by: EMERGENCY MEDICINE

## 2022-07-08 PROCEDURE — 84484 ASSAY OF TROPONIN QUANT: CPT | Performed by: EMERGENCY MEDICINE

## 2022-07-08 PROCEDURE — 1124F ACP DISCUSS-NO DSCNMKR DOCD: CPT | Performed by: EMERGENCY MEDICINE

## 2022-07-08 RX ORDER — ONDANSETRON 2 MG/ML
1 INJECTION INTRAMUSCULAR; INTRAVENOUS ONCE
Status: COMPLETED | OUTPATIENT
Start: 2022-07-08 | End: 2022-07-08

## 2022-07-09 VITALS
DIASTOLIC BLOOD PRESSURE: 59 MMHG | RESPIRATION RATE: 14 BRPM | HEART RATE: 63 BPM | SYSTOLIC BLOOD PRESSURE: 99 MMHG | OXYGEN SATURATION: 100 %

## 2022-07-09 LAB
2HR DELTA HS TROPONIN: <0 NG/L
ATRIAL RATE: 66 BPM
ATRIAL RATE: 68 BPM
CARDIAC TROPONIN I PNL SERPL HS: <2 NG/L
P AXIS: 105 DEGREES
PR INTERVAL: 190 MS
QRS AXIS: -2 DEGREES
QRS AXIS: -8 DEGREES
QRSD INTERVAL: 80 MS
QRSD INTERVAL: 86 MS
QT INTERVAL: 410 MS
QT INTERVAL: 412 MS
QTC INTERVAL: 429 MS
QTC INTERVAL: 438 MS
T WAVE AXIS: 55 DEGREES
T WAVE AXIS: 57 DEGREES
VENTRICULAR RATE: 66 BPM
VENTRICULAR RATE: 68 BPM

## 2022-07-09 PROCEDURE — 93005 ELECTROCARDIOGRAM TRACING: CPT

## 2022-07-09 PROCEDURE — 93010 ELECTROCARDIOGRAM REPORT: CPT | Performed by: INTERNAL MEDICINE

## 2022-07-09 PROCEDURE — 36415 COLL VENOUS BLD VENIPUNCTURE: CPT | Performed by: EMERGENCY MEDICINE

## 2022-07-09 PROCEDURE — 84484 ASSAY OF TROPONIN QUANT: CPT | Performed by: EMERGENCY MEDICINE

## 2022-07-09 NOTE — ED PROVIDER NOTES
History  Chief Complaint   Patient presents with    Medical Problem     Patient arrives via EMS coming from [facility]  Per staff, patient was using the toilet and had an episode of "violent shaking " Staff safely lowered patient to the floor  Upon EMS arrival, patient was back to baseline  Patient experienced a syncopal episode in their care and went hypotensive  Patient now back to baseline  This is a 72-year-old female with a history of dementia, hyperlipidemia, hypertension, AFib presents after a syncopal event  Per EMS, patient was using the toilet at the nursing home and started to have an episode of violent shaking  Patient was lowered to the floor by staff  When EMS arrived, patient was back to baseline  With EMS, patient had another syncopal event during which she was hypotensive  She arrives to the Emergency Department back at baseline  Patient has no complaints  Only alert and oriented to herself  Prior to Admission Medications   Prescriptions Last Dose Informant Patient Reported? Taking?    QUEtiapine (SEROquel) 25 mg tablet  Self No No   Sig: TAKE 1/2 OF A TABLET BY MOUTH EVERY NIGHT AT BEDTIME   acetaminophen (TYLENOL) 325 mg tablet  Self Yes No   Sig: Take 650 mg by mouth every 6 (six) hours as needed for mild pain   aluminum-magnesium hydroxide-simethicone (Linda-Lanta) 200-200-20 mg/5 mL suspension  Self Yes No   Sig: Take 30 mL by mouth every 4 (four) hours as needed for indigestion or heartburn   escitalopram (LEXAPRO) 5 mg tablet  Self No No   Sig: TAKE ONE TABLET BY MOUTH EVERY DAY   gabapentin (NEURONTIN) 100 mg capsule  Self No No   Sig: TAKE ONE CAPSULE BY MOUTH THREE TIMES A DAY   haloperidol (HALDOL) 1 mg tablet   Yes No   levothyroxine 50 mcg tablet  Self No No   Sig: TAKE ONE TABLET BY MOUTH EVERY DAY   metoprolol succinate (TOPROL-XL) 50 mg 24 hr tablet  Self No No   Sig: TAKE ONE TABLET BY MOUTH EVERY DAY   ondansetron (ZOFRAN-ODT) 4 mg disintegrating tablet No No   Sig: DISSOLVE 1 TABLET BY MOUTH SUBLINGUALLY EVERY 8 HOURS AS NEEDED   pantoprazole (PROTONIX) 40 mg tablet  Self No No   Sig: TAKE ONE TABLET BY MOUTH TWO TIMES A DAY BEFORE MEALS   sulfaSALAzine (AZULFIDINE) 500 mg tablet  Self No No   Sig: TAKE TWO TABLETS BY MOUTH THREE TIMES A DAY AFTER MEALS      Facility-Administered Medications: None       Past Medical History:   Diagnosis Date    Abnormal CT of the abdomen     Acute bronchitis     Anxiety     Appetite loss     Arthritis     Ascending aortic aneurysm (HCC)     Bilateral renal cysts     Cyst of ovary     Disease of thyroid gland     Dysphagia     Esophageal reflux disease     Full dentures     GERD (gastroesophageal reflux disease)     Herpes zoster     Hyperlipidemia     Hypertension     Hypokalemia     Hypomagnesemia     Hypothyroidism     Impaired fasting glucose     Irritable bowel syndrome (IBS)     Ischemic colitis (HCC)     Knee pain     Limb alert care status     no BP/IV right arm    Osteoarthritis of right knee     Pneumonia     Post-op pain     Pulmonary nodules     Thoracic aortic aneurysm (HCC)     Unspecified ovarian cysts     Use of anastrozole (Arimidex)     Vasovagal syncope     Vitamin D deficiency     Wears glasses     Weight loss        Past Surgical History:   Procedure Laterality Date    APPENDECTOMY      pt states it was not removed    BREAST BIOPSY Right 03/02/2016    BREAST LUMPECTOMY Right 2004    BREAST SURGERY Right     Single lesion excision 1990 hyperplasia, 1st biopsy at 25yrs old was benign    CHOLECYSTECTOMY      COLONOSCOPY      COLONOSCOPY N/A 5/12/2017    Procedure: COLONOSCOPY with biopsy;  Surgeon: Bright Middleton MD;  Location: [facility]; Service:     ESOPHAGOGASTRODUODENOSCOPY N/A 1/19/2019    Procedure: ESOPHAGOGASTRODUODENOSCOPY (EGD) with 4cc of epinephrine injection and cauterized with gold probe; Surgeon: Isaias Palacios MD;  Location: [facility];   Service: Gastroenterology    [facility] LUMBAR PUNCTURE DIAGNOSTIC  5/24/2019    HYSTERECTOMY  1977    IR LUMBAR PUNCTURE  2/21/2019    KNEE SURGERY Right     NV BIOPSY/EXCISION, LYMPH NODE(S) Right 4/12/2016    Procedure: BIOPSY LYMPH NODE SENTINEL @ 1200 LYMPHOSCINTIGRAPHY LYMPHATIC MAPPING;  Surgeon: Fernanda Garcia MD;  [facility]: [facility];  Service: General    NV MASTECTOMY, SIMPLE, COMPLETE Right 4/12/2016    Procedure: MASTECTOMY SIMPLE;  Surgeon: Fernanda Garcia MD;  [facility];  Service: General    TUBAL LIGATION      UPPER GASTROINTESTINAL ENDOSCOPY      US GUIDED BREAST BIOPSY RIGHT COMPLETE Right 3/9/2016       Family History   Problem Relation Age of Onset    Stroke Maternal Grandmother     Anemia Mother     Other Mother         disorders of blood and blood forming organs    Hypertension Mother     Stroke Father     Alcohol abuse Brother     Colon cancer Neg Hx      I have reviewed and agree with the history as documented  E-Cigarette/Vaping    E-Cigarette Use Never User      E-Cigarette/Vaping Substances    Nicotine No     THC No     CBD No     Flavoring No     Other No     Unknown No      Social History     Tobacco Use    Smoking status: Never Smoker    Smokeless tobacco: Never Used    Tobacco comment: not interested   Vaping Use    Vaping Use: Never used   Substance Use Topics    Alcohol use: Not Currently    Drug use: No       Review of Systems   Unable to perform ROS: Dementia       Physical Exam  Physical Exam  Constitutional:       General: She is not in acute distress  Appearance: Normal appearance  She is well-developed  HENT:      Head: Normocephalic and atraumatic  Mouth/Throat:      Pharynx: Uvula midline  Eyes:      Conjunctiva/sclera: Conjunctivae normal       Pupils: Pupils are equal, round, and reactive to light  Neck:      Thyroid: No thyroid mass or thyromegaly        Trachea: Trachea normal    Cardiovascular:      Rate and Rhythm: Normal rate and regular rhythm  Heart sounds: Normal heart sounds  No murmur heard  Pulmonary:      Effort: Pulmonary effort is normal       Breath sounds: Normal breath sounds  Chest:      Comments: Areas of excoriation  Abdominal:      General: Bowel sounds are normal       Palpations: Abdomen is soft  Tenderness: There is no abdominal tenderness  There is no guarding or rebound  Musculoskeletal:      Comments: Areas of excoriation on the bilateral upper and lower extremities  No evidence of infection  Skin:     General: Skin is warm and dry  Neurological:      Mental Status: She is alert  Comments: Alert and oriented to self  Psychiatric:         Behavior: Behavior normal  Behavior is cooperative  Thought Content:  Thought content normal          Vital Signs  ED Triage Vitals [07/08/22 2213]   Temp Pulse Respirations Blood Pressure SpO2   -- 76 18 119/71 97 %      Temp src Heart Rate Source Patient Position - Orthostatic VS BP Location FiO2 (%)   -- Monitor Lying Right arm --      Pain Score       No Pain           Vitals:    07/08/22 2213 07/09/22 0029   BP: 119/71 96/54   Pulse: 76 68   Patient Position - Orthostatic VS: Lying Lying         Visual Acuity      ED Medications  Medications   ondansetron (FOR EMS ONLY) (ZOFRAN) 4 mg/2 mL injection 4 mg (0 mg Does not apply Given to EMS 7/8/22 2215)       Diagnostic Studies  Results Reviewed     Procedure Component Value Units Date/Time    HS Troponin I 2hr [[id number]]  (Normal) Collected: 07/09/22 0028    Lab Status: Final result Specimen: Blood from Arm, Right Updated: 07/09/22 0104     hs TnI 2hr <2 ng/L      Delta 2hr hsTnI <0 ng/L     HS Troponin 0hr (reflex protocol) [[id number]]  (Normal) Collected: 07/08/22 2226    Lab Status: Final result Specimen: Blood from Arm, Right Updated: 07/08/22 2256     hs TnI 0hr 2 ng/L     Comprehensive metabolic panel [[id number]]  (Abnormal) Collected: 07/08/22 2226    Lab Status: Final result Specimen: Blood from Arm, Right Updated: 07/08/22 2254     Sodium 141 mmol/L      Potassium 4 1 mmol/L      Chloride 104 mmol/L      CO2 29 mmol/L      ANION GAP 8 mmol/L      BUN 17 mg/dL      Creatinine 1 05 mg/dL      Glucose 142 mg/dL      Calcium 8 3 mg/dL      Corrected Calcium 9 3 mg/dL      AST 16 U/L      ALT 16 U/L      Alkaline Phosphatase 87 U/L      Total Protein 6 3 g/dL      Albumin 2 7 g/dL      Total Bilirubin 0 25 mg/dL      eGFR 52 ml/min/1 73sq m     Narrative:      National Kidney Disease Foundation guidelines for Chronic Kidney Disease (CKD):     Stage 1 with normal or high GFR (GFR > 90 mL/min/1 73 square meters)    Stage 2 Mild CKD (GFR = 60-89 mL/min/1 73 square meters)    Stage 3A Moderate CKD (GFR = 45-59 mL/min/1 73 square meters)    Stage 3B Moderate CKD (GFR = 30-44 mL/min/1 73 square meters)    Stage 4 Severe CKD (GFR = 15-29 mL/min/1 73 square meters)    Stage 5 End Stage CKD (GFR <15 mL/min/1 73 square meters)  Note: GFR calculation is accurate only with a steady state creatinine    CBC and differential [[id number]]  (Abnormal) Collected: 07/08/22 2226    Lab Status: Final result Specimen: Blood from Arm, Right Updated: 07/08/22 2233     WBC 10 31 Thousand/uL      RBC 3 46 Million/uL      Hemoglobin 10 6 g/dL      Hematocrit 33 9 %      MCV 98 fL      MCH 30 6 pg      MCHC 31 3 g/dL      RDW 14 2 %      MPV 8 9 fL      Platelets 530 Thousands/uL      nRBC 0 /100 WBCs      Neutrophils Relative 80 %      Immat GRANS % 1 %      Lymphocytes Relative 10 %      Monocytes Relative 7 %      Eosinophils Relative 1 %      Basophils Relative 1 %      Neutrophils Absolute 8 28 Thousands/µL      Immature Grans Absolute 0 09 Thousand/uL      Lymphocytes Absolute 1 06 Thousands/µL      Monocytes Absolute 0 71 Thousand/µL      Eosinophils Absolute 0 11 Thousand/µL      Basophils Absolute 0 06 Thousands/µL     Fingerstick Glucose (POCT) [[id number]]  (Normal) Collected: 07/08/22 2213    Lab Status: Final result Updated: 07/08/22 2214     POC Glucose 135 mg/dl                  CT head without contrast   Final Result by Avery Momin MD (07/09 0016)      No acute intracranial abnormality  Workstation performed: [id number]         XR chest 1 view portable   ED Interpretation by Yvonne Balderas MD (07/09 0105)   No acute cardiopulmonary disease as interpreted by myself  Procedures  Procedures         ED Course  ED Course as of 07/09/22 0136   Sat Jul 09, 2022   0135 Patient able to ambulate without any issue around the emergency department using her walker  Will discharge at this time  SBIRT 20yo+    Flowsheet Row Most Recent Value   SBIRT (23 yo +)    In order to provide better care to our patients, we are screening all of our patients for alcohol and drug use  Would it be okay to ask you these screening questions? No Filed at: 07/08/2022 2215                    MDM  Number of Diagnoses or Management Options  Diagnosis management comments: Will check labs, EKG, chest x-ray  CT head  Disposition pending results and ambulatory test       Disposition  Final diagnoses:   Syncope   Dementia ([facility] [address] )     Time reflects when diagnosis was documented in both MDM as applicable and the Disposition within this note     Time User Action Codes Description Comment    7/9/2022  1:35 AM Tatyana Nidhi P Add [R55] Syncope     7/9/2022  1:35 AM Urvashi Smith Add [F03 90] Dementia [facility])       ED Disposition     ED Disposition   Discharge    Condition   Stable    Date/Time   Sat Jul 9, 2022  1:35 AM    Comment   Nasir Ruiz discharge to home/self care                 Follow-up Information     Follow up With Specialties Details Why Contact Info Additional [address] [facility], DO Family Medicine Schedule an appointment as soon as possible for a visit   [facility] Emergency Medicine Go to  If symptoms worsen [place] [phone]  [address] [facility] Emergency Department, [address] , [facility], [address]          Patient's Medications   Discharge Prescriptions    No medications on file       No discharge procedures on file      PDMP Review     None          ED Provider  Electronically Signed by           Olya Hernandez MD  07/09/22 7477

## 2022-07-09 NOTE — ED NOTES
Willie called at this time to arrange transport  Awaiting  info        Rena Guerin RN  07/09/22 3490

## 2022-07-09 NOTE — ED NOTES
time with [facility] at [phone]  Medical necessity and face sheet faxed at this time to [phone]        Erik Ledezma RN  07/09/22 4179

## 2022-07-09 NOTE — ED NOTES
ARLEY reviewed with [facility] ARKEL via telephone, staff verbalized understanding of d/c instructions and made aware of patient's return to facility  LUKE Lopez resumed care of patient at      Dontrell Castle RN  07/09/22 1535

## 2022-07-09 NOTE — ED NOTES
Patient ambulated with slow but steady gait with the use of a walker        Ara Belle RN  07/09/22 4302

## 2022-07-11 ENCOUNTER — TELEPHONE (OUTPATIENT)
Dept: GYNECOLOGIC ONCOLOGY | Facility: CLINIC | Age: 77
End: 2022-07-11

## 2022-07-11 NOTE — TELEPHONE ENCOUNTER
Spoke to patients daughter about rescheduling her mothers cancelled appt with Dr Courtney Mcclure  The soonest opening she had was 2/7, daughter was ok with this date

## 2022-08-01 ENCOUNTER — IN HOME VISIT (OUTPATIENT)
Dept: WOUND CARE | Facility: HOSPITAL | Age: 77
End: 2022-08-01
Payer: MEDICARE

## 2022-08-01 DIAGNOSIS — L02.93 CARBUNCLE: Primary | ICD-10-CM

## 2022-08-01 DIAGNOSIS — K64.9 HEMORRHOIDS, UNSPECIFIED HEMORRHOID TYPE: ICD-10-CM

## 2022-08-01 DIAGNOSIS — L29.9 ITCHING: ICD-10-CM

## 2022-08-01 PROCEDURE — 99325 PR DOMICIL/REST HOME NEW PT VISIT MOD SEVER 30 MIN: CPT | Performed by: NURSE PRACTITIONER

## 2022-08-01 NOTE — PROGRESS NOTES
[address] [facility]   [facility]       Patient ID: Disha Bailey is a 78 y o  female Date of Birth 1945     Location of Service: [address]    Performed wound round with: [employer], RN    Chief Complaint : Right buttock    Wound Instructions:  Wound:  Right buttock  Discontinue previous wound order  Cleanse the wound bed with NSS   Apply non-sting skin prep to periwound area  Apply triple antibiotic oitment to wound bed, then cover with bordered foam  Frequency : daily and prn for soiling  Sarna anti-itch lotion to BLE daily  Offload all wounds  Turn and reposition frequently, maximum of every two hours  Instruct / Assist with weight shifting when in chair  Increase protein intake  Monitor for any sign of infection or worsening, inform PCP or patient's primary physician in your facility  Allergies  Demerol [meperidine], Nitroglycerin, Lipitor [atorvastatin], and Zocor [simvastatin]      Assessment & Plan:  1  Carbuncle  Assessment & Plan:  Right buttock  - no pus, 100% granulation  - order triple antibiotic  - follow up next week      2  Itching  Assessment & Plan:  + itching on BLE  - ordered Sarna anti itch lotion      3  Hemorrhoids, unspecified hemorrhoid type  Assessment & Plan:  - have external hemmorhoid with scant amount of bleeding  - inform the staff to call Dr Aron Álvarez: This is a 78 y o , female referred to our service for wound/ skin alterations on right buttock  Patient have a complex medical history including but not limited to  has a past medical history of Abnormal CT of the abdomen, Acute bronchitis, Anxiety, Appetite loss, Arthritis, Ascending aortic aneurysm (HCC), Bilateral renal cysts, Cyst of ovary, Disease of thyroid gland, Dysphagia, Esophageal reflux disease, Full dentures, GERD (gastroesophageal reflux disease), Herpes zoster, Hyperlipidemia, Hypertension, Hypokalemia, Hypomagnesemia, Hypothyroidism, Impaired fasting glucose, Irritable bowel syndrome (IBS), Ischemic colitis ([address] ), Knee pain, Limb alert care status, Osteoarthritis of right knee, Pneumonia, Post-op pain, Pulmonary nodules, Thoracic aortic aneurysm ([address] ), Unspecified ovarian cysts, Use of anastrozole (Arimidex), Vasovagal syncope, Vitamin D deficiency, Wears glasses, and Weight loss    Patient was referred by Senior Care Team  Patient was seen in collaboration with the facility wound team      Patient is a poor historian and was not able to provide information related to the wound  As per report, the wound on the right buttock started two weeks ago  The wound improved but not completely closed and have a " hard area" as per nurse  Current treatment " triad  Facility also reported that patient keeps on scratching her legs  Review of Systems   Constitutional: Negative  HENT: Negative  Eyes: Negative  Respiratory: Negative  Cardiovascular: Negative for chest pain and leg swelling  Gastrointestinal: Negative  Endocrine: Negative  Genitourinary: Negative  Musculoskeletal: Positive for gait problem  Skin: Positive for wound  See HPI   Neurological: Negative for dizziness and headaches  Psychiatric/Behavioral: Negative for behavioral problems  Objective:    Physical Exam  Constitutional:       Appearance: Normal appearance  HENT:      Head: Normocephalic and atraumatic  Nose: Nose normal       Mouth/Throat:      Pharynx: Oropharynx is clear  Eyes:      Conjunctiva/sclera: Conjunctivae normal    Cardiovascular:      Rate and Rhythm: Normal rate  Pulmonary:      Effort: Pulmonary effort is normal    Abdominal:      Tenderness: There is no abdominal tenderness  There is no guarding  Comments: + scant amount of blood from the rectum  + haemmorhoid    Genitourinary:     Comments: incontinent  Musculoskeletal:      Cervical back: Normal range of motion        Comments: LROM   Skin:     Findings: Lesion present  Comments: Right buttock - wound size is 0 3 x 0 3 x 0 3 cm, 100% granulation, small amount of blood, with 1 cm induration , no malodor, no pus, no erythema    BLE - patient is on jeans, BLE not assessed   Neurological:      Mental Status: She is alert  Gait: Gait abnormal    Psychiatric:         Mood and Affect: Mood normal          Behavior: Behavior normal               Procedures           Patient's care was coordinated with nursing facility staff  Recent vitals, labs and updated medications were reviewed on EMR or chart system of facility  Past Medical, surgical, social, medication and allergy history and patient's previous records were reviewed and updated as appropriate: Most up-to date information is available in the facility EMR where the patient is currently admitted      Patient Active Problem List   Diagnosis    Anxiety    GERD (gastroesophageal reflux disease)    Essential hypertension    Hypothyroidism    Hyperlipidemia    Bilateral renal cysts    Ischemic colitis ([email or web address] )    Thoracic aortic aneurysm (HCC)    Impaired fasting glucose    Esophageal reflux disease    Personal history of adenocarcinoma of breast    Closed odontoid fracture with type I morphology ([facility] [address] [facility]    Closed nondisplaced fracture of fifth cervical vertebra (HCC)    Closed nondisplaced fracture of seventh cervical vertebra (HCC)    Syncope    Lung nodule seen on imaging study    Atherosclerosis    History of alcohol use    Hyponatremia    Breast cancer screening, high risk patient    Irregular cardiac rhythm    Atrial fibrillation (HCC)    Gastrointestinal hemorrhage associated with gastric ulcer    Iron deficiency anemia    Thrombocytopenia (HCC)    Acute non-recurrent maxillary sinusitis    Low TSH level    Confusion    Abnormal finding on MRI of brain    Left upper arm pain    HLD (hyperlipidemia)    Gastroesophageal reflux disease without esophagitis    Acute blood loss anemia    Peptic ulcer disease    Transient alteration of awareness    Alcohol use with intoxication (HCC)    Dense breast tissue    History of head injury    Arterial hypotension    Cognitive impairment    Leptomeningeal disease    Vision loss, bilateral    Localized osteoarthritis of left knee    Impacted cerumen of left ear    Grade II hemorrhoids    Postural dizziness with presyncope    Colitis    Functional diarrhea    At risk for delirium    ACP (advance care planning)    Insomnia due to medical condition    Hematochezia    Abnormal mammogram with microcalcification    Encounter for follow-up surveillance of breast cancer    History of hysterectomy    Carbuncle    Itching    Hemorrhoids     Past Medical History:   Diagnosis Date    Abnormal CT of the abdomen     Acute bronchitis     Anxiety     Appetite loss     Arthritis     Ascending aortic aneurysm (HCC)     Bilateral renal cysts     Cyst of ovary     Disease of thyroid gland     Dysphagia     Esophageal reflux disease     Full dentures     GERD (gastroesophageal reflux disease)     Herpes zoster     Hyperlipidemia     Hypertension     Hypokalemia     Hypomagnesemia     Hypothyroidism     Impaired fasting glucose     Irritable bowel syndrome (IBS)     Ischemic colitis (HCC)     Knee pain     Limb alert care status     no BP/IV right arm    Osteoarthritis of right knee     Pneumonia     Post-op pain     Pulmonary nodules     Thoracic aortic aneurysm (HCC)     Unspecified ovarian cysts     Use of anastrozole (Arimidex)     Vasovagal syncope     Vitamin D deficiency     Wears glasses     Weight loss      Past Surgical History:   Procedure Laterality Date    APPENDECTOMY      pt states it was not removed    BREAST BIOPSY Right 03/02/2016    BREAST LUMPECTOMY Right 2004    BREAST SURGERY Right     Single lesion excision 1990 hyperplasia, 1st biopsy at 25yrs old was benign    CHOLECYSTECTOMY      COLONOSCOPY  COLONOSCOPY N/A 5/12/2017    Procedure: COLONOSCOPY with biopsy;  Surgeon: Jake Faustin MD;  Location: [facility]; Service:     ESOPHAGOGASTRODUODENOSCOPY N/A 1/19/2019    Procedure: ESOPHAGOGASTRODUODENOSCOPY (EGD) with 4cc of epinephrine injection and cauterized with gold probe; Surgeon: Carito Zarate MD;  Location: [facility];   Service: Gastroenterology    [facility] LUMBAR PUNCTURE DIAGNOSTIC  5/24/2019    HYSTERECTOMY  1977    IR LUMBAR PUNCTURE  2/21/2019    KNEE SURGERY Right     HI BIOPSY/EXCISION, LYMPH NODE(S) Right 4/12/2016    Procedure: BIOPSY LYMPH NODE SENTINEL @ 1200 LYMPHOSCINTIGRAPHY LYMPHATIC MAPPING;  Surgeon: Martin Bernal MD;  Location[facility];  Service: General    HI MASTECTOMY, SIMPLE, COMPLETE Right 4/12/2016    Procedure: MASTECTOMY SIMPLE;  Surgeon: Martin Bernal MD;  [facility];  Service: General    TUBAL LIGATION      UPPER GASTROINTESTINAL ENDOSCOPY      US GUIDED BREAST BIOPSY RIGHT COMPLETE Right 3/9/2016     Social History     Socioeconomic History    Marital status:      Spouse name: None    Number of children: None    Years of education: None    Highest education level: None   Occupational History    Occupation: retired   Tobacco Use    Smoking status: Never Smoker    Smokeless tobacco: Never Used    Tobacco comment: not interested   Vaping Use    Vaping Use: Never used   Substance and Sexual Activity    Alcohol use: Not Currently    Drug use: No    Sexual activity: Not Currently   Other Topics Concern    None   Social History Narrative    None     Social Determinants of Health     Financial Resource Strain: Not on file   Food Insecurity: Not on file   Transportation Needs: Not on file   Physical Activity: Not on file   Stress: Not on file   Social Connections: Not on file   Intimate Partner Violence: Not on file   Housing Stability: Not on file        Current Outpatient Medications:     acetaminophen (TYLENOL) 325 mg tablet, Take 650 mg by mouth every 6 (six) hours as needed for mild pain, Disp: , Rfl:     aluminum-magnesium hydroxide-simethicone (Linda-Lanta) 200-200-20 mg/5 mL suspension, Take 30 mL by mouth every 4 (four) hours as needed for indigestion or heartburn, Disp: , Rfl:     escitalopram (LEXAPRO) 5 mg tablet, TAKE ONE TABLET BY MOUTH EVERY DAY, Disp: 30 tablet, Rfl: 10    gabapentin (NEURONTIN) 100 mg capsule, TAKE ONE CAPSULE BY MOUTH THREE TIMES A DAY, Disp: 90 capsule, Rfl: 10    haloperidol (HALDOL) 1 mg tablet, , Disp: , Rfl:     levothyroxine 50 mcg tablet, TAKE ONE TABLET BY MOUTH EVERY DAY, Disp: 30 tablet, Rfl: 10    metoprolol succinate (TOPROL-XL) 50 mg 24 hr tablet, TAKE ONE TABLET BY MOUTH EVERY DAY, Disp: 30 tablet, Rfl: 10    ondansetron (ZOFRAN-ODT) 4 mg disintegrating tablet, DISSOLVE 1 TABLET BY MOUTH SUBLINGUALLY EVERY 8 HOURS AS NEEDED, Disp: 30 tablet, Rfl: 4    pantoprazole (PROTONIX) 40 mg tablet, TAKE ONE TABLET BY MOUTH TWO TIMES A DAY BEFORE MEALS, Disp: 60 tablet, Rfl: 10    QUEtiapine (SEROquel) 25 mg tablet, TAKE 1/2 OF A TABLET BY MOUTH EVERY NIGHT AT BEDTIME, Disp: 15 tablet, Rfl: 10    sulfaSALAzine (AZULFIDINE) 500 mg tablet, TAKE TWO TABLETS BY MOUTH THREE TIMES A DAY AFTER MEALS, Disp: 180 tablet, Rfl: 10  Family History   Problem Relation Age of Onset    Stroke Maternal Grandmother     Anemia Mother     Other Mother         disorders of blood and blood forming organs    Hypertension Mother     Stroke Father     Alcohol abuse Brother     Colon cancer Neg Hx               Coordination of Care: Wound team aware of the treatment plan  Facility nurse will provide wound treatment and monitor the wound for any changes  Patient / Staff education : Patient / Staff was given education on sign of infection and pressure ulcer prevention   Patient/ Staff verbalized understanding     Follow up :  Next week    Voice-recognition software may have been used in the preparation of this document  Occasional wrong word or "sound-alike" substitutions may have occurred due to the inherent limitations of voice recognition software  Interpretation should be guided by context        KG Osullivan

## 2022-08-08 ENCOUNTER — IN HOME VISIT (OUTPATIENT)
Dept: WOUND CARE | Facility: HOSPITAL | Age: 77
End: 2022-08-08
Payer: MEDICARE

## 2022-08-08 DIAGNOSIS — T14.8XXA SCRATCH: ICD-10-CM

## 2022-08-08 DIAGNOSIS — L29.9 ITCHING: ICD-10-CM

## 2022-08-08 DIAGNOSIS — L02.93 CARBUNCLE: Primary | ICD-10-CM

## 2022-08-08 PROCEDURE — 99335 PR DOM/R-HOME E/M EST PT LW MOD SEVERITY 25 MINUTES: CPT | Performed by: NURSE PRACTITIONER

## 2022-08-08 NOTE — ASSESSMENT & PLAN NOTE
Right buttock  - wound bed decreased,100% granulation  - continue triple antibiotic  - follow up next week

## 2022-08-08 NOTE — PROGRESS NOTES
[address] [facility]   [facility]     Date of Service : August 7, 2022    Patient ID: Jr Solitario is a 78 y o  female Date of Birth 1945     [facility] of [facility]: [facility]    Performed wound round with: Med tech    Chief Complaint : Right buttock    Wound Instructions:  Wound:  Right buttock  Discontinue previous wound order  Cleanse the wound bed with NSS   Apply non-sting skin prep to periwound area  Apply triple antibiotic oitment to wound bed, then cover with bordered foam  Frequency : daily and prn for soiling  Sarna anti-itch lotion to BLE daily  Offload all wounds  Turn and reposition frequently, maximum of every two hours  Instruct / Assist with weight shifting when in chair  Increase protein intake  Monitor for any sign of infection or worsening, inform PCP or patient's primary physician in your facility  Allergies  Demerol [meperidine], Nitroglycerin, Lipitor [atorvastatin], and Zocor [simvastatin]      Assessment & Plan:  1  Carbuncle  Assessment & Plan:  Right buttock  - wound bed decreased,100% granulation  - continue triple antibiotic  - follow up next week      2  Itching  Assessment & Plan:  -+ itching on BLE  - ordered Sarna anti itch lotion      3  Scratch  Assessment & Plan:  - Multiple eschar on the BLE due to scratching  - ordered Sarna anti itch lotion               Subjective:   8/1/2022This is a 78 y o , female referred to our service for wound/ skin alterations on right buttock  Patient have a complex medical history including but not limited to  has a past medical history of Abnormal CT of the abdomen, Acute bronchitis, Anxiety, Appetite loss, Arthritis, Ascending aortic aneurysm (HCC), Bilateral renal cysts, Cyst of ovary, Disease of thyroid gland, Dysphagia, Esophageal reflux disease, Full dentures, GERD (gastroesophageal reflux disease), Herpes zoster, Hyperlipidemia, Hypertension, Hypokalemia, Hypomagnesemia, Hypothyroidism, Impaired fasting glucose, Irritable bowel syndrome (IBS), Ischemic colitis ([address] ), Knee pain, Limb alert care status, Osteoarthritis of right knee, Pneumonia, Post-op pain, Pulmonary nodules, Thoracic aortic aneurysm ([address] ), Unspecified ovarian cysts, Use of anastrozole (Arimidex), Vasovagal syncope, Vitamin D deficiency, Wears glasses, and Weight loss    Patient was referred by Senior Care Team  Patient was seen in collaboration with the facility wound team      Patient is a poor historian and was not able to provide information related to the wound  As per report, the wound on the right buttock started two weeks ago  The wound improved but not completely closed and have a " hard area" as per nurse  Current treatment " triad  Facility also reported that patient keeps on scratching her legs  8/7/2022 Follow up for wound on the right buttock and legs    Received patient, not in distress  Facility staff did not report any significant issues related to the wound  Denies pain  Review of Systems   Constitutional: Negative  HENT: Negative  Eyes: Negative  Respiratory: Negative  Cardiovascular: Negative for chest pain and leg swelling  Gastrointestinal: Negative  Endocrine: Negative  Genitourinary: Negative  Musculoskeletal: Positive for gait problem  Skin: Positive for wound  See HPI   Neurological: Negative for dizziness and headaches  Psychiatric/Behavioral: Negative for behavioral problems  Objective:    Physical Exam  Constitutional:       Appearance: Normal appearance  HENT:      Head: Normocephalic and atraumatic  Nose: Nose normal       Mouth/Throat:      Pharynx: Oropharynx is clear  Eyes:      Conjunctiva/sclera: Conjunctivae normal    Pulmonary:      Effort: Pulmonary effort is normal    Abdominal:      Tenderness: There is no abdominal tenderness  There is no guarding        Comments: + scant amount of blood from the rectum  + haemmorhoid Genitourinary:     Comments: incontinent  Musculoskeletal:      Cervical back: Normal range of motion  Comments: LROM   Skin:     Findings: Lesion present  Comments: Right buttock - wound size is 0 2 x 0 2 x 0 2 cm, 100% granulation, no drainage, no induration , no malodor, no pus, no erythema    BLE - + multiple eschar, no drainage, dry skin   Neurological:      Mental Status: She is alert  Gait: Gait abnormal    Psychiatric:         Mood and Affect: Mood normal          Behavior: Behavior normal               Procedures           Patient's care was coordinated with nursing facility staff  Recent vitals, labs and updated medications were reviewed on EMR or chart system of facility  Past Medical, surgical, social, medication and allergy history and patient's previous records were reviewed and updated as appropriate: Most up-to date information is available in the facility EMR where the patient is currently admitted      Patient Active Problem List   Diagnosis    Anxiety    GERD (gastroesophageal reflux disease)    Essential hypertension    Hypothyroidism    Hyperlipidemia    Bilateral renal cysts    [facility]schem[facility] )    Thoracic aortic aneurysm (HCC)    Impaired fasting glucose    Esophageal reflux disease    Personal history of adenocarcinoma of breast    Closed odontoid fracture with type I morphology ([facility]    Closed nondisplaced fracture of fifth cervical vertebra (HCC)    Closed nondisplaced fracture of seventh cervical vertebra (HCC)    Syncope    Lung nodule seen on imaging study    Atherosclerosis    History of alcohol use    Hyponatremia    Breast cancer screening, high risk patient    Irregular cardiac rhythm    Atrial fibrillation (HCC)    Gastrointestinal hemorrhage associated with gastric ulcer    Iron deficiency anemia    Thrombocytopenia (HCC)    Acute non-recurrent maxillary sinusitis    Low TSH level    Confusion    Abnormal finding on MRI of brain  Left upper arm pain    HLD (hyperlipidemia)    Gastroesophageal reflux disease without esophagitis    Acute blood loss anemia    Peptic ulcer disease    Transient alteration of awareness    Alcohol use with intoxication (HCC)    Dense breast tissue    History of head injury    Arterial hypotension    Cognitive impairment    Leptomeningeal disease    Vision loss, bilateral    Localized osteoarthritis of left knee    Impacted cerumen of left ear    Grade II hemorrhoids    Postural dizziness with presyncope    Colitis    Functional diarrhea    At risk for delirium    ACP (advance care planning)    Insomnia due to medical condition    Hematochezia    Abnormal mammogram with microcalcification    Encounter for follow-up surveillance of breast cancer    History of hysterectomy    Carbuncle    Itching    Hemorrhoids    Scratch     Past Medical History:   Diagnosis Date    Abnormal CT of the abdomen     Acute bronchitis     Anxiety     Appetite loss     Arthritis     Ascending aortic aneurysm (HCC)     Bilateral renal cysts     Cyst of ovary     Disease of thyroid gland     Dysphagia     Esophageal reflux disease     Full dentures     GERD (gastroesophageal reflux disease)     Herpes zoster     Hyperlipidemia     Hypertension     Hypokalemia     Hypomagnesemia     Hypothyroidism     Impaired fasting glucose     Irritable bowel syndrome (IBS)     Ischemic colitis (HCC)     Knee pain     Limb alert care status     no BP/IV right arm    Osteoarthritis of right knee     Pneumonia     Post-op pain     Pulmonary nodules     Thoracic aortic aneurysm (HCC)     Unspecified ovarian cysts     Use of anastrozole (Arimidex)     Vasovagal syncope     Vitamin D deficiency     Wears glasses     Weight loss      Past Surgical History:   Procedure Laterality Date    APPENDECTOMY      pt states it was not removed    BREAST BIOPSY Right 03/02/2016    BREAST LUMPECTOMY Right 2004    BREAST SURGERY Right     Single lesion excision 1990 hyperplasia, 1st biopsy at 25yrs old was benign    CHOLECYSTECTOMY      COLONOSCOPY      COLONOSCOPY N/A 5/12/2017    Procedure: COLONOSCOPY with biopsy;  Surgeon: Tatiana Alston MD;  [facility]; Service:     ESOPHAGOGASTRODUODENOSCOPY N/A 1/19/2019    Procedure: ESOPHAGOGASTRODUODENOSCOPY (EGD) with 4cc of epinephrine injection and cauterized with gold probe; Surgeon: Maury Morales MD;  [facility];   Service: Gastroenterology    [facility] LUMBAR PUNCTURE DIAGNOSTIC  5/24/2019    HYSTERECTOMY  1977    IR LUMBAR PUNCTURE  2/21/2019    KNEE SURGERY Right     AL BIOPSY/EXCISION, LYMPH NODE(S) Right 4/12/2016    Procedure: BIOPSY LYMPH NODE SENTINEL @ 1200 LYMPHOSCINTIGRAPHY LYMPHATIC MAPPING;  Surgeon: Debbi Valentino MD;  [facility];  Service: General    AL MASTECTOMY, SIMPLE, COMPLETE Right 4/12/2016    Procedure: MASTECTOMY SIMPLE;  Surgeon: Debbi Valentino MD;  [facility];  Service: General    TUBAL LIGATION      UPPER GASTROINTESTINAL ENDOSCOPY      US GUIDED BREAST BIOPSY RIGHT COMPLETE Right 3/9/2016     Social History     Socioeconomic History    Marital status:      Spouse name: Not on file    Number of children: Not on file    Years of education: Not on file    Highest education level: Not on file   Occupational History    Occupation: retired   Tobacco Use    Smoking status: Never Smoker    Smokeless tobacco: Never Used    Tobacco comment: not interested   Vaping Use    Vaping Use: Never used   Substance and Sexual Activity    Alcohol use: Not Currently    Drug use: No    Sexual activity: Not Currently   Other Topics Concern    Not on file   Social History Narrative    Not on file     Social Determinants of Health     Financial Resource Strain: Not on file   Food Insecurity: Not on file   Transportation Needs: Not on file   Physical Activity: Not on file   Stress: Not on file Social Connections: Not on file   Intimate Partner Violence: Not on file   Housing Stability: Not on file        Current Outpatient Medications:     acetaminophen (TYLENOL) 325 mg tablet, Take 650 mg by mouth every 6 (six) hours as needed for mild pain, Disp: , Rfl:     aluminum-magnesium hydroxide-simethicone (Linda-Lanta) 200-200-20 mg/5 mL suspension, Take 30 mL by mouth every 4 (four) hours as needed for indigestion or heartburn, Disp: , Rfl:     escitalopram (LEXAPRO) 5 mg tablet, TAKE ONE TABLET BY MOUTH EVERY DAY, Disp: 30 tablet, Rfl: 10    gabapentin (NEURONTIN) 100 mg capsule, TAKE ONE CAPSULE BY MOUTH THREE TIMES A DAY, Disp: 90 capsule, Rfl: 10    haloperidol (HALDOL) 1 mg tablet, , Disp: , Rfl:     levothyroxine 50 mcg tablet, TAKE ONE TABLET BY MOUTH EVERY DAY, Disp: 30 tablet, Rfl: 10    metoprolol succinate (TOPROL-XL) 50 mg 24 hr tablet, TAKE ONE TABLET BY MOUTH EVERY DAY, Disp: 30 tablet, Rfl: 10    ondansetron (ZOFRAN-ODT) 4 mg disintegrating tablet, DISSOLVE 1 TABLET BY MOUTH SUBLINGUALLY EVERY 8 HOURS AS NEEDED, Disp: 30 tablet, Rfl: 4    pantoprazole (PROTONIX) 40 mg tablet, TAKE ONE TABLET BY MOUTH TWO TIMES A DAY BEFORE MEALS, Disp: 60 tablet, Rfl: 10    QUEtiapine (SEROquel) 25 mg tablet, TAKE 1/2 OF A TABLET BY MOUTH EVERY NIGHT AT BEDTIME, Disp: 15 tablet, Rfl: 10    sulfaSALAzine (AZULFIDINE) 500 mg tablet, TAKE TWO TABLETS BY MOUTH THREE TIMES A DAY AFTER MEALS, Disp: 180 tablet, Rfl: 10  Family History   Problem Relation Age of Onset    Stroke Maternal Grandmother     Anemia Mother     Other Mother         disorders of blood and blood forming organs    Hypertension Mother     Stroke Father     Alcohol abuse Brother     Colon cancer Neg Hx               Coordination of Care: Wound team aware of the treatment plan  Facility nurse will provide wound treatment and monitor the wound for any changes       Patient / Staff education :  Staff was given education on sign of infection and pressure ulcer prevention  Staff verbalized understanding     Follow up :  Next week    Voice-recognition software may have been used in the preparation of this document  Occasional wrong word or "sound-alike" substitutions may have occurred due to the inherent limitations of voice recognition software  Interpretation should be guided by context        KG Weber

## 2022-08-15 ENCOUNTER — IN HOME VISIT (OUTPATIENT)
Dept: WOUND CARE | Facility: HOSPITAL | Age: 77
End: 2022-08-15
Payer: MEDICARE

## 2022-08-15 DIAGNOSIS — T14.8XXA SCRATCH: ICD-10-CM

## 2022-08-15 DIAGNOSIS — L02.93 CARBUNCLE: Primary | ICD-10-CM

## 2022-08-15 PROCEDURE — 99335 PR DOM/R-HOME E/M EST PT LW MOD SEVERITY 25 MINUTES: CPT | Performed by: NURSE PRACTITIONER

## 2022-08-15 NOTE — PROGRESS NOTES
[address] [facility]NAGEMENT   [facility]     Date of Service : August 7, 2022    Patient ID: Lilo Bowers is a 78 y o  female Date of Birth 1945     Location of Service: [address]    Performed wound round with: Lilian Nur RN    Chief Complaint : Right buttock    Wound Instructions:  Wound:  Right buttock  Discontinue previous wound order  Cleanse the skin with soap and water  Apply zguard to skin  Frequency : twice a day and prn for soiling  Sarna anti-itch lotion to BLE daily  Offload all wounds  Turn and reposition frequently, maximum of every two hours  Instruct / Assist with weight shifting when in chair  Increase protein intake  Monitor for any sign of infection or worsening, inform PCP or patient's primary physician in your facility  Allergies  Demerol [meperidine], Nitroglycerin, Lipitor [atorvastatin], and Zocor [simvastatin]      Assessment & Plan:  1  Carbuncle  Assessment & Plan:  Right buttock  - healed  - zguard for prevention  - sign off      2  Scratch  Assessment & Plan:  - Multiple eschar on the BLE due to scratching, improved  - ordered Sarna anti itch lotion               Subjective:   8/1/2022This is a 78 y o , female referred to our service for wound/ skin alterations on right buttock  Patient have a complex medical history including but not limited to  has a past medical history of Abnormal CT of the abdomen, Acute bronchitis, Anxiety, Appetite loss, Arthritis, Ascending aortic aneurysm (HCC), Bilateral renal cysts, Cyst of ovary, Disease of thyroid gland, Dysphagia, Esophageal reflux disease, Full dentures, GERD (gastroesophageal reflux disease), Herpes zoster, Hyperlipidemia, Hypertension, Hypokalemia, Hypomagnesemia, Hypothyroidism, Impaired fasting glucose, Irritable bowel syndrome (IBS), Ischemic colitis ([address] ), Knee pain, Limb alert care status, Osteoarthritis of right knee, Pneumonia, Post-op pain, Pulmonary nodules, Thoracic aortic aneurysm ([address] ), Unspecified ovarian cysts, Use of anastrozole (Arimidex), Vasovagal syncope, Vitamin D deficiency, Wears glasses, and Weight loss    Patient was referred by Senior Care Team  Patient was seen in collaboration with the facility wound team      Patient is a poor historian and was not able to provide information related to the wound  As per report, the wound on the right buttock started two weeks ago  The wound improved but not completely closed and have a " hard area" as per nurse  Current treatment " triad  Facility also reported that patient keeps on scratching her legs  8/7/2022 Follow up for wound on the right buttock and legs    Received patient, not in distress  Facility staff did not report any significant issues related to the wound  Denies pain  8/15/2022 Follow up for wound on the Right buttock and BLE   Received patient, not in distress  Facility staff did not report any significant issues related to the wound  Denies pain  Review of Systems   Constitutional: Negative  HENT: Negative  Eyes: Negative  Respiratory: Negative  Cardiovascular: Negative for chest pain and leg swelling  Gastrointestinal: Negative  Endocrine: Negative  Genitourinary: Negative  Musculoskeletal: Positive for gait problem  Skin: Positive for wound  See HPI   Neurological: Negative for dizziness and headaches  Psychiatric/Behavioral: Negative for behavioral problems  Objective:    Physical Exam  Constitutional:       Appearance: Normal appearance  HENT:      Head: Normocephalic and atraumatic  Nose: Nose normal       Mouth/Throat:      Pharynx: Oropharynx is clear  Eyes:      Conjunctiva/sclera: Conjunctivae normal    Pulmonary:      Effort: Pulmonary effort is normal    Abdominal:      Tenderness: There is no abdominal tenderness  There is no guarding        Comments: + scant amount of blood from the rectum  + haemmorhoid    Genitourinary:     Comments: incontinent  Musculoskeletal:      Cervical back: Normal range of motion  Comments: LROM   Skin:     Findings: Lesion present  Comments: Right buttock - healed  BLE - + small multiple eschar, no drainage, dry skin   Neurological:      Mental Status: She is alert  Gait: Gait abnormal    Psychiatric:         Mood and Affect: Mood normal          Behavior: Behavior normal               Procedures           Patient's care was coordinated with nursing facility staff  Recent vitals, labs and updated medications were reviewed on EMR or chart system of facility  Past Medical, surgical, social, medication and allergy history and patient's previous records were reviewed and updated as appropriate: Most up-to date information is available in the facility EMR where the patient is currently admitted      Patient Active Problem List   Diagnosis    Anxiety    GERD (gastroesophageal reflux disease)    Essential hypertension    Hypothyroidism    Hyperlipidemia    Bilateral renal cysts    [facility]    Thoracic aortic aneurysm (HCC)    Impaired fasting glucose    Esophageal reflux disease    Personal history of adenocarcinoma of breast    Closed odontoid fracture with type I morphology ([facility] )    Closed nondisplaced fracture of fifth cervical vertebra (HCC)    Closed nondisplaced fracture of seventh cervical vertebra (HCC)    Syncope    Lung nodule seen on imaging study    Atherosclerosis    History of alcohol use    Hyponatremia    Breast cancer screening, high risk patient    Irregular cardiac rhythm    Atrial fibrillation (HCC)    Gastrointestinal hemorrhage associated with gastric ulcer    Iron deficiency anemia    Thrombocytopenia (HCC)    Acute non-recurrent maxillary sinusitis    Low TSH level    Confusion    Abnormal finding on MRI of brain    Left upper arm pain    HLD (hyperlipidemia)    Gastroesophageal reflux disease without esophagitis    Acute blood loss anemia  Peptic ulcer disease    Transient alteration of awareness    Alcohol use with intoxication (HCC)    Dense breast tissue    History of head injury    Arterial hypotension    Cognitive impairment    Leptomeningeal disease    Vision loss, bilateral    Localized osteoarthritis of left knee    Impacted cerumen of left ear    Grade II hemorrhoids    Postural dizziness with presyncope    Colitis    Functional diarrhea    At risk for delirium    ACP (advance care planning)    Insomnia due to medical condition    Hematochezia    Abnormal mammogram with microcalcification    Encounter for follow-up surveillance of breast cancer    History of hysterectomy    Carbuncle    Itching    Hemorrhoids    Scratch     Past Medical History:   Diagnosis Date    Abnormal CT of the abdomen     Acute bronchitis     Anxiety     Appetite loss     Arthritis     Ascending aortic aneurysm (HCC)     Bilateral renal cysts     Cyst of ovary     Disease of thyroid gland     Dysphagia     Esophageal reflux disease     Full dentures     GERD (gastroesophageal reflux disease)     Herpes zoster     Hyperlipidemia     Hypertension     Hypokalemia     Hypomagnesemia     Hypothyroidism     Impaired fasting glucose     Irritable bowel syndrome (IBS)     Ischemic colitis (HCC)     Knee pain     Limb alert care status     no BP/IV right arm    Osteoarthritis of right knee     Pneumonia     Post-op pain     Pulmonary nodules     Thoracic aortic aneurysm (HCC)     Unspecified ovarian cysts     Use of anastrozole (Arimidex)     Vasovagal syncope     Vitamin D deficiency     Wears glasses     Weight loss      Past Surgical History:   Procedure Laterality Date    APPENDECTOMY      pt states it was not removed    BREAST BIOPSY Right 03/02/2016    BREAST LUMPECTOMY Right 2004    BREAST SURGERY Right     Single lesion excision 1990 hyperplasia, 1st biopsy at 25yrs old was benign    CHOLECYSTECTOMY  COLONOSCOPY      COLONOSCOPY N/A 5/12/2017    Procedure: COLONOSCOPY with biopsy;  Surgeon: Alex Del Rio MD;  [facility]; Service:     ESOPHAGOGASTRODUODENOSCOPY N/A 1/19/2019    Procedure: ESOPHAGOGASTRODUODENOSCOPY (EGD) with 4cc of epinephrine injection and cauterized with gold probe; Surgeon: Livia Garnica MD;  [facility];   Service: Gastroenterology    [facility] LUMBAR PUNCTURE DIAGNOSTIC  5/24/2019    HYSTERECTOMY  1977    IR LUMBAR PUNCTURE  2/21/2019    KNEE SURGERY Right     VT BIOPSY/EXCISION, LYMPH NODE(S) Right 4/12/2016    Procedure: BIOPSY LYMPH NODE SENTINEL @ 1200 LYMPHOSCINTIGRAPHY LYMPHATIC MAPPING;  Surgeon: Shanon Apple MD;  [facility];  Service: General    VT MASTECTOMY, SIMPLE, COMPLETE Right 4/12/2016    Procedure: MASTECTOMY SIMPLE;  Surgeon: Shanon Apple MD;  [facility];  Service: General    TUBAL LIGATION      UPPER GASTROINTESTINAL ENDOSCOPY      US GUIDED BREAST BIOPSY RIGHT COMPLETE Right 3/9/2016     Social History     Socioeconomic History    Marital status:      Spouse name: None    Number of children: None    Years of education: None    Highest education level: None   Occupational History    Occupation: retired   Tobacco Use    Smoking status: Never Smoker    Smokeless tobacco: Never Used    Tobacco comment: not interested   Vaping Use    Vaping Use: Never used   Substance and Sexual Activity    Alcohol use: Not Currently    Drug use: No    Sexual activity: Not Currently   Other Topics Concern    None   Social History Narrative    None     Social Determinants of Health     Financial Resource Strain: Not on file   Food Insecurity: Not on file   Transportation Needs: Not on file   Physical Activity: Not on file   Stress: Not on file   Social Connections: Not on file   Intimate Partner Violence: Not on file   Housing Stability: Not on file        Current Outpatient Medications:     acetaminophen (TYLENOL) 325 mg tablet, Take 650 mg by mouth every 6 (six) hours as needed for mild pain, Disp: , Rfl:     aluminum-magnesium hydroxide-simethicone (Linda-Lanta) 200-200-20 mg/5 mL suspension, Take 30 mL by mouth every 4 (four) hours as needed for indigestion or heartburn, Disp: , Rfl:     escitalopram (LEXAPRO) 5 mg tablet, TAKE ONE TABLET BY MOUTH EVERY DAY, Disp: 30 tablet, Rfl: 10    gabapentin (NEURONTIN) 100 mg capsule, TAKE ONE CAPSULE BY MOUTH THREE TIMES A DAY, Disp: 90 capsule, Rfl: 10    haloperidol (HALDOL) 1 mg tablet, , Disp: , Rfl:     levothyroxine 50 mcg tablet, TAKE ONE TABLET BY MOUTH EVERY DAY, Disp: 30 tablet, Rfl: 10    metoprolol succinate (TOPROL-XL) 50 mg 24 hr tablet, TAKE ONE TABLET BY MOUTH EVERY DAY, Disp: 30 tablet, Rfl: 10    ondansetron (ZOFRAN-ODT) 4 mg disintegrating tablet, DISSOLVE 1 TABLET BY MOUTH SUBLINGUALLY EVERY 8 HOURS AS NEEDED, Disp: 30 tablet, Rfl: 4    pantoprazole (PROTONIX) 40 mg tablet, TAKE ONE TABLET BY MOUTH TWO TIMES A DAY BEFORE MEALS, Disp: 60 tablet, Rfl: 10    QUEtiapine (SEROquel) 25 mg tablet, TAKE 1/2 OF A TABLET BY MOUTH EVERY NIGHT AT BEDTIME, Disp: 15 tablet, Rfl: 10    sulfaSALAzine (AZULFIDINE) 500 mg tablet, TAKE TWO TABLETS BY MOUTH THREE TIMES A DAY AFTER MEALS, Disp: 180 tablet, Rfl: 10  Family History   Problem Relation Age of Onset    Stroke Maternal Grandmother     Anemia Mother     Other Mother         disorders of blood and blood forming organs    Hypertension Mother     Stroke Father     Alcohol abuse Brother     Colon cancer Neg Hx               Coordination of Care: Wound team aware of the treatment plan  Facility nurse will provide wound treatment and monitor the wound for any changes  Patient / Staff education :  Staff was given education on sign of infection and pressure ulcer prevention  Staff verbalized understanding     Follow up :  Next week    Voice-recognition software may have been used in the preparation of this document  Occasional wrong word or "sound-alike" substitutions may have occurred due to the inherent limitations of voice recognition software  Interpretation should be guided by context        KG Campos

## 2022-08-17 DIAGNOSIS — L29.9 ITCHING: Primary | ICD-10-CM

## 2022-08-17 RX ORDER — MENTHOL/CAMPHOR 0.5 %-0.5%
LOTION (ML) TOPICAL
Qty: 222 ML | Refills: 1 | Status: SHIPPED | OUTPATIENT
Start: 2022-08-17

## 2022-08-29 ENCOUNTER — IN HOME VISIT (OUTPATIENT)
Dept: WOUND CARE | Facility: HOSPITAL | Age: 77
End: 2022-08-29
Payer: MEDICARE

## 2022-08-29 DIAGNOSIS — L29.9 ITCHING: ICD-10-CM

## 2022-08-29 DIAGNOSIS — L02.93 CARBUNCLE: Primary | ICD-10-CM

## 2022-08-29 DIAGNOSIS — T14.8XXA SCRATCH: ICD-10-CM

## 2022-08-29 PROCEDURE — 99334 PR DOM/R-HOME E/M EST PT SELF-LMTD/MINOR 15 MINUTES: CPT | Performed by: NURSE PRACTITIONER

## 2022-08-29 NOTE — PROGRESS NOTES
[address] [facility]     Date of Service : August 7, 2022    Patient ID: Reggie Call is a 78 y o  female Date of Birth 1945     [facility] of [facility]: [facility]    Performed wound round with: Carito Deleon RN    Chief Complaint : Right buttock and BLE    Wound Instructions:  Wound:  buttock  Discontinue previous wound order  Cleanse the skin with soap and water  Apply zguard to skin  Frequency : twice a day and prn for soiling  Sarna anti-itch lotion to BLE daily  Offload all wounds  Turn and reposition frequently, maximum of every two hours  Instruct / Assist with weight shifting when in chair  Increase protein intake  Monitor for any sign of infection or worsening, inform PCP or patient's primary physician in your facility  Allergies  Demerol [meperidine], Nitroglycerin, Lipitor [atorvastatin], and Zocor [simvastatin]      Assessment & Plan:  1  Carbuncle  Assessment & Plan:  Right buttock  - healed  - zguard for prevention  - sign off      2  Scratch  Assessment & Plan:  - Multiple eschar on the BLE due to scratching, improved  - all superficial  - ordered Sarna anti itch lotion  - Sign off  Facility staff will continue to provide treatment and monitor the wound  Can reconsult wound nurse practitioner if wound failed to heal or worsened  3  Itching  Assessment & Plan:  -+ itching on BLE  - ordered Sarna anti itch lotion  - improved as per staff  - patient currently wearing jeans, educate the staff that it is better to use a cotton instead of Jeans for BLE clothes  Subjective:   8/1/2022This is a 78 y o , female referred to our service for wound/ skin alterations on right buttock  Patient have a complex medical history including but not limited to  has a past medical history of Abnormal CT of the abdomen, Acute bronchitis, Anxiety, Appetite loss, Arthritis, Ascending aortic aneurysm (HCC), Bilateral renal cysts, Cyst of ovary, Disease of thyroid gland, Dysphagia, Esophageal reflux disease, Full dentures, GERD (gastroesophageal reflux disease), Herpes zoster, Hyperlipidemia, Hypertension, Hypokalemia, Hypomagnesemia, Hypothyroidism, Impaired fasting glucose, Irritable bowel syndrome (IBS), Ischemic colitis ([facility] [address] ), Knee pain, Limb alert care status, Osteoarthritis of right knee, Pneumonia, Post-op pain, Pulmonary nodules, Thoracic aortic aneurysm ([facility] [address] ), Unspecified ovarian cysts, Use of anastrozole (Arimidex), Vasovagal syncope, Vitamin D deficiency, Wears glasses, and Weight loss    Patient was referred by Senior Care Team  Patient was seen in collaboration with the facility wound team      Patient is a poor historian and was not able to provide information related to the wound  As per report, the wound on the right buttock started two weeks ago  The wound improved but not completely closed and have a " hard area" as per nurse  Current treatment " triad  Facility also reported that patient keeps on scratching her legs  8/7/2022 Follow up for wound on the right buttock and legs    Received patient, not in distress  Facility staff did not report any significant issues related to the wound  Denies pain  8/15/2022 Follow up for wound on the Right buttock and BLE   Received patient, not in distress  Facility staff did not report any significant issues related to the wound  Denies pain  8/29/2022 Follow up for wound on the bilateral legs and buttocks as per facility request   Received patient, not in distress  Facility staff did not report any significant issues related to the wound  As per staff, patient wound improved  Review of Systems   Constitutional: Negative  HENT: Negative  Eyes: Negative  Respiratory: Negative  Cardiovascular: Negative for chest pain and leg swelling  Gastrointestinal: Negative  Endocrine: Negative  Genitourinary: Negative  Musculoskeletal: Positive for gait problem     Skin: Positive for wound  See HPI   Neurological: Negative for dizziness and headaches  Psychiatric/Behavioral: Negative for behavioral problems  Objective:    Physical Exam  Constitutional:       Appearance: Normal appearance  HENT:      Head: Normocephalic and atraumatic  Nose: Nose normal       Mouth/Throat:      Pharynx: Oropharynx is clear  Eyes:      Conjunctiva/sclera: Conjunctivae normal    Pulmonary:      Effort: Pulmonary effort is normal    Abdominal:      Tenderness: There is no abdominal tenderness  There is no guarding  Comments: + scant amount of blood from the rectum  + haemmorhoid    Genitourinary:     Comments: incontinent  Musculoskeletal:      Cervical back: Normal range of motion  Comments: LROM   Skin:     Findings: Lesion present  Comments: Right buttock - healed  BLE - + small multiple eschar, no drainage, dry skin , no obvious sign of infection   Neurological:      Mental Status: She is alert  Gait: Gait abnormal    Psychiatric:         Mood and Affect: Mood normal          Behavior: Behavior normal               Procedures           Patient's care was coordinated with nursing facility staff  Recent vitals, labs and updated medications were reviewed on EMR or chart system of facility  Past Medical, surgical, social, medication and allergy history and patient's previous records were reviewed and updated as appropriate: Most up-to date information is available in the facility EMR where the patient is currently admitted      Patient Active Problem List   Diagnosis    Anxiety    GERD (gastroesophageal reflux disease)    Essential hypertension    Hypothyroidism    Hyperlipidemia    Bilateral renal cysts    Ischemic colitis ([address] )    Thoracic aortic aneurysm (HCC)    Impaired fasting glucose    Esophageal reflux disease    Personal history of adenocarcinoma of breast    Closed odontoid fracture with type I morphology (HCC)    Closed nondisplaced fracture of fifth cervical vertebra (HCC)    Closed nondisplaced fracture of seventh cervical vertebra (HCC)    Syncope    Lung nodule seen on imaging study    Atherosclerosis    History of alcohol use    Hyponatremia    Breast cancer screening, high risk patient    Irregular cardiac rhythm    Atrial fibrillation (HCC)    Gastrointestinal hemorrhage associated with gastric ulcer    Iron deficiency anemia    Thrombocytopenia (HCC)    Acute non-recurrent maxillary sinusitis    Low TSH level    Confusion    Abnormal finding on MRI of brain    Left upper arm pain    HLD (hyperlipidemia)    Gastroesophageal reflux disease without esophagitis    Acute blood loss anemia    Peptic ulcer disease    Transient alteration of awareness    Alcohol use with intoxication (HCC)    Dense breast tissue    History of head injury    Arterial hypotension    Cognitive impairment    Leptomeningeal disease    Vision loss, bilateral    Localized osteoarthritis of left knee    Impacted cerumen of left ear    Grade II hemorrhoids    Postural dizziness with presyncope    Colitis    Functional diarrhea    At risk for delirium    ACP (advance care planning)    Insomnia due to medical condition    Hematochezia    Abnormal mammogram with microcalcification    Encounter for follow-up surveillance of breast cancer    History of hysterectomy    Carbuncle    Itching    Hemorrhoids    Scratch     Past Medical History:   Diagnosis Date    Abnormal CT of the abdomen     Acute bronchitis     Anxiety     Appetite loss     Arthritis     Ascending aortic aneurysm (HCC)     Bilateral renal cysts     Cyst of ovary     Disease of thyroid gland     Dysphagia     Esophageal reflux disease     Full dentures     GERD (gastroesophageal reflux disease)     Herpes zoster     Hyperlipidemia     Hypertension     Hypokalemia     Hypomagnesemia     Hypothyroidism     Impaired fasting glucose     Irritable bowel syndrome (IBS)     Ischemic colitis ([facility] [address] )     Knee pain     Limb alert care status     no BP/IV right arm    Osteoarthritis of right knee     Pneumonia     Post-op pain     Pulmonary nodules     Thoracic aortic aneurysm (HCC)     Unspecified ovarian cysts     Use of anastrozole (Arimidex)     Vasovagal syncope     Vitamin D deficiency     Wears glasses     Weight loss      Past Surgical History:   Procedure Laterality Date    APPENDECTOMY      pt states it was not removed    BREAST BIOPSY Right 03/02/2016    BREAST LUMPECTOMY Right 2004    BREAST SURGERY Right     Single lesion excision 1990 hyperplasia, 1st biopsy at 25yrs old was benign    CHOLECYSTECTOMY      COLONOSCOPY      COLONOSCOPY N/A 5/12/2017    Procedure: COLONOSCOPY with biopsy;  Surgeon: Lennox Corral, MD;  Location: [facility]; Service:     ESOPHAGOGASTRODUODENOSCOPY N/A 1/19/2019    Procedure: ESOPHAGOGASTRODUODENOSCOPY (EGD) with 4cc of epinephrine injection and cauterized with gold probe; Surgeon: Maicol Lee MD;  Location: [facility];   Service: Gastroenterology    [facility] LUMBAR PUNCTURE DIAGNOSTIC  5/24/2019    HYSTERECTOMY  1977    IR LUMBAR PUNCTURE  2/21/2019    KNEE SURGERY Right     GA BIOPSY/EXCISION, LYMPH NODE(S) Right 4/12/2016    Procedure: BIOPSY LYMPH NODE SENTINEL @ 1200 LYMPHOSCINTIGRAPHY LYMPHATIC MAPPING;  Surgeon: Den Ojeda MD;  [facility];  Service: General    GA MASTECTOMY, SIMPLE, COMPLETE Right 4/12/2016    Procedure: MASTECTOMY SIMPLE;  Surgeon: Den Ojeda MD;  [facility];  Service: General    TUBAL LIGATION      UPPER GASTROINTESTINAL ENDOSCOPY      US GUIDED BREAST BIOPSY RIGHT COMPLETE Right 3/9/2016     Social History     Socioeconomic History    Marital status:      Spouse name: None    Number of children: None    Years of education: None    Highest education level: None   Occupational History    Occupation: retired   Tobacco Use    Smoking status: Never Smoker    Smokeless tobacco: Never Used    Tobacco comment: not interested   Vaping Use    Vaping Use: Never used   Substance and Sexual Activity    Alcohol use: Not Currently    Drug use: No    Sexual activity: Not Currently   Other Topics Concern    None   Social History Narrative    None     Social Determinants of Health     Financial Resource Strain: Not on file   Food Insecurity: Not on file   Transportation Needs: Not on file   Physical Activity: Not on file   Stress: Not on file   Social Connections: Not on file   Intimate Partner Violence: Not on file   Housing Stability: Not on file        Current Outpatient Medications:     acetaminophen (TYLENOL) 325 mg tablet, Take 650 mg by mouth every 6 (six) hours as needed for mild pain, Disp: , Rfl:     aluminum-magnesium hydroxide-simethicone (Linda-Lanta) 200-200-20 mg/5 mL suspension, Take 30 mL by mouth every 4 (four) hours as needed for indigestion or heartburn, Disp: , Rfl:     escitalopram (LEXAPRO) 5 mg tablet, TAKE ONE TABLET BY MOUTH EVERY DAY, Disp: 30 tablet, Rfl: 10    gabapentin (NEURONTIN) 100 mg capsule, TAKE ONE CAPSULE BY MOUTH THREE TIMES A DAY, Disp: 90 capsule, Rfl: 10    haloperidol (HALDOL) 1 mg tablet, , Disp: , Rfl:     levothyroxine 50 mcg tablet, TAKE ONE TABLET BY MOUTH EVERY DAY, Disp: 30 tablet, Rfl: 10    metoprolol succinate (TOPROL-XL) 50 mg 24 hr tablet, TAKE ONE TABLET BY MOUTH EVERY DAY, Disp: 30 tablet, Rfl: 10    ondansetron (ZOFRAN-ODT) 4 mg disintegrating tablet, DISSOLVE 1 TABLET BY MOUTH SUBLINGUALLY EVERY 8 HOURS AS NEEDED, Disp: 30 tablet, Rfl: 4    pantoprazole (PROTONIX) 40 mg tablet, TAKE ONE TABLET BY MOUTH TWO TIMES A DAY BEFORE MEALS, Disp: 60 tablet, Rfl: 10    QUEtiapine (SEROquel) 25 mg tablet, TAKE 1/2 OF A TABLET BY MOUTH EVERY NIGHT AT BEDTIME, Disp: 15 tablet, Rfl: 10    Sarna lotion, APPLY TOPICALLY TO B/L LE DAILY, Disp: 222 mL, Rfl: 1    sulfaSALAzine (AZULFIDINE) 500 mg tablet, TAKE TWO TABLETS BY MOUTH THREE TIMES A DAY AFTER MEALS, Disp: 180 tablet, Rfl: 8  Family History   Problem Relation Age of Onset    Stroke Maternal Grandmother     Anemia Mother     Other Mother         disorders of blood and blood forming organs    Hypertension Mother     Stroke Father     Alcohol abuse Brother     Colon cancer Neg Hx               Coordination of Care: Wound team aware of the treatment plan  Facility nurse will provide wound treatment and monitor the wound for any changes  Patient / Staff education :  Staff was given education on sign of infection and pressure ulcer prevention  Staff verbalized understanding     Follow up :  Sign off  Facility staff will continue to provide treatment and monitor the wound  Can reconsult wound nurse practitioner if wound failed to heal or worsened  Voice-recognition software may have been used in the preparation of this document  Occasional wrong word or "sound-alike" substitutions may have occurred due to the inherent limitations of voice recognition software  Interpretation should be guided by context        KG Bryant

## 2022-08-29 NOTE — ASSESSMENT & PLAN NOTE
- Multiple eschar on the BLE due to scratching, improved  - all superficial  - ordered Sarna anti itch lotion  - Sign off  Facility staff will continue to provide treatment and monitor the wound  Can reconsult wound nurse practitioner if wound failed to heal or worsened

## 2022-08-29 NOTE — ASSESSMENT & PLAN NOTE
-+ itching on BLE  - ordered Sarna anti itch lotion  - improved as per staff  - patient currently wearing jeans, educate the staff that it is better to use a cotton instead of Jeans for BLE clothes

## 2022-09-21 ENCOUNTER — TELEPHONE (OUTPATIENT)
Dept: HEMATOLOGY ONCOLOGY | Facility: CLINIC | Age: 77
End: 2022-09-21

## 2022-09-21 NOTE — TELEPHONE ENCOUNTER
Patient resides in nursing facility  I called to r/s cancelled appt   Was told patient is being put on hospice so there  Is no need to r/s appt

## 2022-10-12 PROBLEM — H61.22 IMPACTED CERUMEN OF LEFT EAR: Status: RESOLVED | Noted: 2020-12-21 | Resolved: 2022-10-12

## 2022-11-10 DIAGNOSIS — L29.9 ITCHING: ICD-10-CM

## 2022-11-10 RX ORDER — MENTHOL/CAMPHOR 0.5 %-0.5%
LOTION (ML) TOPICAL
Qty: 222 ML | Refills: 2 | Status: SHIPPED | OUTPATIENT
Start: 2022-11-10

## (undated) DEVICE — NEEDLE SCLERO INTERJECT 25G 240MM

## (undated) DEVICE — SINGLE-USE BIOPSY FORCEPS: Brand: RADIAL JAW 4

## (undated) DEVICE — BIPOLAR ELECTROHEMOSTASIS CATHETER: Brand: GOLD PROBE